# Patient Record
Sex: FEMALE | Race: WHITE | NOT HISPANIC OR LATINO | Employment: OTHER | URBAN - METROPOLITAN AREA
[De-identification: names, ages, dates, MRNs, and addresses within clinical notes are randomized per-mention and may not be internally consistent; named-entity substitution may affect disease eponyms.]

---

## 2017-04-10 RX ORDER — LOSARTAN POTASSIUM AND HYDROCHLOROTHIAZIDE 12.5; 1 MG/1; MG/1
TABLET ORAL
Qty: 90 TABLET | Refills: 1 | Status: SHIPPED | OUTPATIENT
Start: 2017-04-10 | End: 2017-09-27 | Stop reason: SDUPTHER

## 2017-04-13 ENCOUNTER — TELEPHONE (OUTPATIENT)
Dept: INTERNAL MEDICINE | Age: 67
End: 2017-04-13

## 2017-04-13 NOTE — TELEPHONE ENCOUNTER
Called patient 4-13-17 @ 10:44 to reschedule her appointment due to her having to fast. Needs an earlier appointment

## 2017-04-18 ENCOUNTER — OFFICE VISIT (OUTPATIENT)
Dept: INTERNAL MEDICINE | Age: 67
End: 2017-04-18

## 2017-04-18 VITALS
SYSTOLIC BLOOD PRESSURE: 136 MMHG | WEIGHT: 151 LBS | RESPIRATION RATE: 12 BRPM | HEART RATE: 72 BPM | HEIGHT: 64 IN | BODY MASS INDEX: 25.78 KG/M2 | DIASTOLIC BLOOD PRESSURE: 76 MMHG

## 2017-04-18 DIAGNOSIS — G25.0 BENIGN HEAD TREMOR: ICD-10-CM

## 2017-04-18 DIAGNOSIS — I10 BENIGN ESSENTIAL HTN: Primary | ICD-10-CM

## 2017-04-18 DIAGNOSIS — E78.5 HYPERLIPIDEMIA, UNSPECIFIED HYPERLIPIDEMIA TYPE: ICD-10-CM

## 2017-04-18 DIAGNOSIS — Z72.0 TOBACCO ABUSE: ICD-10-CM

## 2017-04-18 DIAGNOSIS — L98.9 SKIN LESIONS: ICD-10-CM

## 2017-04-18 DIAGNOSIS — R73.9 HYPERGLYCEMIA: ICD-10-CM

## 2017-04-18 LAB
ALBUMIN SERPL-MCNC: 4.8 G/DL (ref 3.5–5.2)
ALBUMIN/GLOB SERPL: 1.8 G/DL
ALP SERPL-CCNC: 107 U/L (ref 39–117)
ALT SERPL-CCNC: 18 U/L (ref 1–33)
AST SERPL-CCNC: 18 U/L (ref 1–32)
BILIRUB SERPL-MCNC: 0.6 MG/DL (ref 0.1–1.2)
BUN SERPL-MCNC: 8 MG/DL (ref 8–23)
BUN/CREAT SERPL: 11.6 (ref 7–25)
CALCIUM SERPL-MCNC: 10.2 MG/DL (ref 8.6–10.5)
CHLORIDE SERPL-SCNC: 100 MMOL/L (ref 98–107)
CHOLEST SERPL-MCNC: 218 MG/DL (ref 0–200)
CO2 SERPL-SCNC: 26.5 MMOL/L (ref 22–29)
CREAT SERPL-MCNC: 0.69 MG/DL (ref 0.57–1)
GLOBULIN SER CALC-MCNC: 2.6 GM/DL
GLUCOSE SERPL-MCNC: 131 MG/DL (ref 65–99)
HBA1C MFR BLD: 5.41 % (ref 4.8–5.6)
HDLC SERPL-MCNC: 102 MG/DL (ref 40–60)
LDLC SERPL CALC-MCNC: 106 MG/DL (ref 0–100)
POTASSIUM SERPL-SCNC: 4.2 MMOL/L (ref 3.5–5.2)
PROT SERPL-MCNC: 7.4 G/DL (ref 6–8.5)
SODIUM SERPL-SCNC: 140 MMOL/L (ref 136–145)
TRIGL SERPL-MCNC: 49 MG/DL (ref 0–150)
VLDLC SERPL CALC-MCNC: 9.8 MG/DL (ref 5–40)

## 2017-04-18 PROCEDURE — 99214 OFFICE O/P EST MOD 30 MIN: CPT | Performed by: INTERNAL MEDICINE

## 2017-04-18 RX ORDER — DICLOFENAC SODIUM 75 MG/1
75 TABLET, DELAYED RELEASE ORAL DAILY PRN
COMMUNITY
Start: 2017-04-17 | End: 2018-06-19 | Stop reason: SDUPTHER

## 2017-04-18 NOTE — PROGRESS NOTES
"  Katherine Williamson is a 66 y.o. female who presents with   Chief Complaint   Patient presents with   • Hypertension     Checkup; lab updates   • Hyperlipidemia     As above   • Blood Sugar Problem     As above.  Blood sugar/A1c on lab testing 6 months ago-normal.  Currently on no medications for blood sugar.   • Skin lesions     Unrelated reason for today's visit: Skin lesions-left neck; right temple.   • Nicotine Dependence     Unrelated reason for today's visit: Cigarette smoker-one pack daily for the past 43 years.  Low dose CT scanning ordered in the past but she did not pursue it.  We will reorder it today and she is agreeable \"if insurance will cover it\".   • Head tremor     Unrelated reason for today's visit: Head tremor.  No other tremors.   .    Hypertension   This is a chronic problem. The current episode started more than 1 year ago. The problem is controlled. Past treatments include diuretics and angiotensin blockers. The current treatment provides moderate improvement. There are no compliance problems.    Hyperlipidemia   This is a chronic problem. The current episode started more than 1 year ago. The problem is controlled. Exacerbating diseases include diabetes. She is currently on no antihyperlipidemic treatment.   Blood Sugar Problem   This is a chronic problem. The current episode started more than 1 year ago. The problem has been unchanged. She has tried nothing for the symptoms.   Nicotine Dependence   Presents for follow-up visit. Her urge triggers include company of smokers. She smokes 1 pack of cigarettes per day.      Head tremor: History is that the patient's  notices the head tremor but the patient herself has not had a problem with it, does not know what is happening and does not consider it to be a problem at this point.  She has no other tremors she says.    Skin lesions: Seborrheic-like keratoses areas-left neck; right temple.  She would like dermatology referral for removal.    The " following portions of the patient's history were reviewed and updated as appropriate: allergies, current medications, past medical history and problem list.    Review of Systems   Constitutional: Negative.    HENT: Negative.    Eyes: Negative.    Respiratory: Negative.    Cardiovascular: Negative.    Genitourinary: Negative.    Musculoskeletal: Negative.    Skin: Negative.    Neurological: Positive for tremors.        Head tremor.  Patient says it is not a problem for her but her  has noticed it.   Psychiatric/Behavioral: Negative.        Objective   Physical Exam   Constitutional: She is oriented to person, place, and time. She appears well-developed and well-nourished. No distress.   HENT:   Head: Normocephalic and atraumatic.   Eyes: Conjunctivae and EOM are normal. Pupils are equal, round, and reactive to light.   Neck: Normal range of motion. Neck supple. No thyromegaly present.   Neck exam negative.  Carotid auscultation normal-no bruits heard.   Cardiovascular: Normal rate, regular rhythm, normal heart sounds and intact distal pulses.  Exam reveals no gallop and no friction rub.    No murmur heard.  Pulmonary/Chest: Effort normal and breath sounds normal. No respiratory distress. She has no wheezes. She has no rales. She exhibits no tenderness.   Musculoskeletal:   No head tremors or any other tremors noticed on today's visit.  Patient declines neurology referral stating that the issue is not enough of a problem for her at this point in time.   Neurological: She is alert and oriented to person, place, and time. She displays normal reflexes. No cranial nerve deficit. She exhibits normal muscle tone. Coordination normal.   History of head tremor as noted but I do not see any tremors on today's visit in her head or hands or any other extremity.  She does not consider the issue a problem to the extent that she would like a workup at this point and declines it.   Skin:   There are 2 areas of seborrheic-like  "keratoses, one at the left neck measuring approximately 7 mm; the other is at the right temple measuring approximately 8 mm.  The left neck lesion is raised.  The right temporal area is flat.  Neither appear to be suspicious for skin cancer.   Psychiatric: She has a normal mood and affect. Her behavior is normal. Judgment and thought content normal.   Nursing note and vitals reviewed.      Assessment/Plan   Katherine was seen today for hypertension, hyperlipidemia, blood sugar problem, skin lesions, nicotine dependence and head tremor.    Diagnoses and all orders for this visit:    Benign essential HTN  -     Comprehensive Metabolic Panel    Hyperlipidemia, unspecified hyperlipidemia type  -     Comprehensive Metabolic Panel  -     Lipid Panel    Hyperglycemia  -     Comprehensive Metabolic Panel  -     Hemoglobin A1c    Skin lesions  -     Ambulatory Referral to Dermatology    Tobacco abuse  -     CT Chest Low Dose Wo; Future    Benign head tremor      Plan: Labs as above for the issues as outlined.  We will make a dermatology referral for her skin lesions per her request.  She is agreeable to getting a low dose CT scan of the lungs because of her long history of cigarette smoking of at least one pack daily for the past 43 years.  She is only agreeable however \"if insurance will cover it\".    Her history of head tremor is not such that she wants to get into a workup at this time nor does she express any interest in a neurology referral at this time.    She will continue all current medications as prescribed.  We will see her in 6 months for a general checkup/lab update visit then.         "

## 2017-09-27 RX ORDER — LOSARTAN POTASSIUM AND HYDROCHLOROTHIAZIDE 12.5; 1 MG/1; MG/1
1 TABLET ORAL DAILY
Qty: 90 TABLET | Refills: 0 | Status: SHIPPED | OUTPATIENT
Start: 2017-09-27 | End: 2018-01-05 | Stop reason: SDUPTHER

## 2017-10-12 RX ORDER — LOSARTAN POTASSIUM AND HYDROCHLOROTHIAZIDE 12.5; 1 MG/1; MG/1
TABLET ORAL
Qty: 90 TABLET | Refills: 1 | Status: SHIPPED | OUTPATIENT
Start: 2017-10-12 | End: 2017-10-18 | Stop reason: SDUPTHER

## 2017-10-16 ENCOUNTER — TELEPHONE (OUTPATIENT)
Dept: INTERNAL MEDICINE | Age: 67
End: 2017-10-16

## 2017-10-16 NOTE — TELEPHONE ENCOUNTER
Pt called and said that she has an apt for this Wed and she is suppose to come fasting for labs, but she states that she has to go to [facility] in order for her Ins to pay for it.    Pt said is there a form that has to be sent over for them to know what type of labs to draw.    Pt said please call her at [phone]

## 2017-10-18 ENCOUNTER — OFFICE VISIT (OUTPATIENT)
Dept: INTERNAL MEDICINE | Age: 67
End: 2017-10-18

## 2017-10-18 VITALS
DIASTOLIC BLOOD PRESSURE: 70 MMHG | HEART RATE: 70 BPM | WEIGHT: 150.2 LBS | HEIGHT: 64 IN | BODY MASS INDEX: 25.64 KG/M2 | TEMPERATURE: 98.2 F | RESPIRATION RATE: 12 BRPM | SYSTOLIC BLOOD PRESSURE: 110 MMHG

## 2017-10-18 DIAGNOSIS — I10 BENIGN ESSENTIAL HTN: Primary | ICD-10-CM

## 2017-10-18 DIAGNOSIS — R73.9 HYPERGLYCEMIA: ICD-10-CM

## 2017-10-18 DIAGNOSIS — E78.5 HYPERLIPIDEMIA, UNSPECIFIED HYPERLIPIDEMIA TYPE: ICD-10-CM

## 2017-10-18 PROCEDURE — 99214 OFFICE O/P EST MOD 30 MIN: CPT | Performed by: INTERNAL MEDICINE

## 2017-10-18 NOTE — PROGRESS NOTES
"  Katherine Williamson is a 66 y.o. female who presents with   Chief Complaint   Patient presents with   • Hypertension     Checkup; lab updates   • Hyperlipidemia     As above.  Currently on no lipid lowering therapy.   • Blood Sugar Problem     Blood sugar 131; A1c normal-April 2017.  Patient admits to consuming \"3-4 beers every day\"   .    Hypertension   This is a chronic problem. The current episode started more than 1 year ago. The problem is controlled. Past treatments include diuretics and angiotensin blockers. The current treatment provides moderate improvement. There are no compliance problems.    Hyperlipidemia   This is a chronic problem. The current episode started more than 1 year ago. The problem is controlled. Recent lipid tests were reviewed and are normal. Exacerbating diseases include diabetes. She is currently on no antihyperlipidemic treatment.   Blood Sugar Problem   This is a chronic problem. The current episode started more than 1 month ago. The problem has been waxing and waning. The symptoms are aggravated by eating (Also by beer consumption as per history). She has tried nothing for the symptoms.        The following portions of the patient's history were reviewed and updated as appropriate: allergies, current medications, past medical history and problem list.    Review of Systems   Constitutional: Negative.    HENT: Negative.    Eyes: Negative.    Respiratory: Negative.    Cardiovascular: Negative.    Genitourinary: Negative.    Musculoskeletal: Negative.    Skin: Negative.    Neurological: Negative.    Psychiatric/Behavioral: Negative.        Objective   Physical Exam   Constitutional: She is oriented to person, place, and time. She appears well-developed and well-nourished. No distress.   HENT:   Head: Normocephalic and atraumatic.   Eyes: Conjunctivae and EOM are normal. Pupils are equal, round, and reactive to light.   Neck: Normal range of motion. Neck supple. No thyromegaly present.   Neck " exam negative.  Carotid auscultation normal-no bruits heard.   Cardiovascular: Normal rate, regular rhythm, normal heart sounds and intact distal pulses.  Exam reveals no gallop and no friction rub.    No murmur heard.  Pulmonary/Chest: Effort normal and breath sounds normal. No respiratory distress. She has no wheezes. She has no rales. She exhibits no tenderness.   Neurological: She is alert and oriented to person, place, and time.   Psychiatric: She has a normal mood and affect. Her behavior is normal. Judgment and thought content normal.   Nursing note and vitals reviewed.      Assessment/Plan   Katherine was seen today for hypertension, hyperlipidemia and blood sugar problem.    Diagnoses and all orders for this visit:    Benign essential HTN  -     Comprehensive Metabolic Panel    Hyperlipidemia, unspecified hyperlipidemia type  -     Comprehensive Metabolic Panel  -     Lipid Panel    Hyperglycemia  -     Comprehensive Metabolic Panel  -     Hemoglobin A1c      Plan: Labs as above.Today's exam unremarkable for any new problems or events.  Continue all current treatment as prescribed.  A follow-up checkup/lab update visit is advised for 6 months.  It is suspected that her hyperglycemia is more dietary induced than diabetic induced given the fact that her A1c is normal.    She also says that she will need to go to [facility]  To get her blood tests done to get it covered through her insurance.    The patient currently is a smoker.  Smoking cessation has been discussed and also a low-dose CT scan of the lung has been advised and was actually ordered in April but the record shows that it was not done.  At this point the patient declines the test fully aware of the fact that the test is a screening test for lung cancer and that it is advised by radiology to be done in smokers of the age of the patient.  The patient is aware that declining low-dose CT scan means that lung cancer if present may not be detected and may  progress to the point of being metastatic and eventually terminal.  The patient is willing to assume those risks based on the current decision made.

## 2018-01-05 RX ORDER — LOSARTAN POTASSIUM AND HYDROCHLOROTHIAZIDE 12.5; 1 MG/1; MG/1
TABLET ORAL
Qty: 90 TABLET | Refills: 0 | Status: SHIPPED | OUTPATIENT
Start: 2018-01-05 | End: 2018-04-11 | Stop reason: SDUPTHER

## 2018-04-11 RX ORDER — LOSARTAN POTASSIUM AND HYDROCHLOROTHIAZIDE 12.5; 1 MG/1; MG/1
TABLET ORAL
Qty: 90 TABLET | Refills: 0 | Status: SHIPPED | OUTPATIENT
Start: 2018-04-11 | End: 2018-07-10 | Stop reason: SDUPTHER

## 2018-04-18 ENCOUNTER — OFFICE VISIT (OUTPATIENT)
Dept: INTERNAL MEDICINE | Age: 68
End: 2018-04-18

## 2018-04-18 VITALS
HEIGHT: 64 IN | TEMPERATURE: 98.2 F | SYSTOLIC BLOOD PRESSURE: 120 MMHG | DIASTOLIC BLOOD PRESSURE: 66 MMHG | BODY MASS INDEX: 25.78 KG/M2 | RESPIRATION RATE: 12 BRPM | WEIGHT: 151 LBS | OXYGEN SATURATION: 98 % | HEART RATE: 81 BPM

## 2018-04-18 DIAGNOSIS — R73.9 HYPERGLYCEMIA: ICD-10-CM

## 2018-04-18 DIAGNOSIS — Z72.0 TOBACCO ABUSE: ICD-10-CM

## 2018-04-18 DIAGNOSIS — Z00.00 HEALTHCARE MAINTENANCE: ICD-10-CM

## 2018-04-18 DIAGNOSIS — E78.5 HYPERLIPIDEMIA, UNSPECIFIED HYPERLIPIDEMIA TYPE: ICD-10-CM

## 2018-04-18 DIAGNOSIS — I10 BENIGN ESSENTIAL HTN: Primary | ICD-10-CM

## 2018-04-18 LAB
ALBUMIN SERPL-MCNC: 4.2 G/DL (ref 3.5–5.2)
ALBUMIN/GLOB SERPL: 1.4 G/DL
ALP SERPL-CCNC: 104 U/L (ref 39–117)
ALT SERPL-CCNC: 19 U/L (ref 1–33)
AST SERPL-CCNC: 19 U/L (ref 1–32)
BILIRUB SERPL-MCNC: 0.8 MG/DL (ref 0.1–1.2)
BUN SERPL-MCNC: 10 MG/DL (ref 8–23)
BUN/CREAT SERPL: 13.2 (ref 7–25)
CALCIUM SERPL-MCNC: 9.9 MG/DL (ref 8.6–10.5)
CHLORIDE SERPL-SCNC: 100 MMOL/L (ref 98–107)
CHOLEST SERPL-MCNC: 205 MG/DL (ref 0–200)
CO2 SERPL-SCNC: 24.8 MMOL/L (ref 22–29)
CREAT SERPL-MCNC: 0.76 MG/DL (ref 0.57–1)
GFR SERPLBLD CREATININE-BSD FMLA CKD-EPI: 76 ML/MIN/1.73
GFR SERPLBLD CREATININE-BSD FMLA CKD-EPI: 93 ML/MIN/1.73
GLOBULIN SER CALC-MCNC: 3 GM/DL
GLUCOSE SERPL-MCNC: 98 MG/DL (ref 65–99)
HBA1C MFR BLD: 5.59 % (ref 4.8–5.6)
HDLC SERPL-MCNC: 82 MG/DL (ref 40–60)
LDLC SERPL CALC-MCNC: 111 MG/DL (ref 0–100)
POTASSIUM SERPL-SCNC: 4.3 MMOL/L (ref 3.5–5.2)
PROT SERPL-MCNC: 7.2 G/DL (ref 6–8.5)
SODIUM SERPL-SCNC: 140 MMOL/L (ref 136–145)
T4 FREE SERPL-MCNC: 1.27 NG/DL (ref 0.93–1.7)
TRIGL SERPL-MCNC: 59 MG/DL (ref 0–150)
TSH SERPL DL<=0.005 MIU/L-ACNC: 1.16 MIU/ML (ref 0.27–4.2)
VLDLC SERPL CALC-MCNC: 11.8 MG/DL (ref 5–40)

## 2018-04-18 PROCEDURE — 99214 OFFICE O/P EST MOD 30 MIN: CPT | Performed by: INTERNAL MEDICINE

## 2018-04-18 NOTE — PROGRESS NOTES
Katherine Williamson is a 67 y.o. female who presents with   Chief Complaint   Patient presents with   • Hypertension   • Hyperlipidemia   • Blood Sugar Problem   • Needs mammogram updated   • Needs low dose CT scanning of the lungs-cigarette smoker-one   .    67-year-old female presents for her six-month checkup/lab update visit.  Labs were ordered through [facility] in October but I see no reports from that time in her chart.  Also she continues to smoke one pack of cigarettes or less daily.  A low-dose CT scan of the lungs was ordered last April but she was less than 67 years of age then and the record shows that it was not done.      Hypertension   This is a chronic problem. The current episode started more than 1 year ago. Past treatments include angiotensin blockers and diuretics. Current antihypertension treatment includes angiotensin blockers and diuretics. The current treatment provides moderate improvement. There are no compliance problems.    Hyperlipidemia   This is a chronic problem. The current episode started more than 1 year ago. The problem is controlled. Exacerbating diseases include diabetes. She is currently on no antihyperlipidemic treatment.   Blood Sugar Problem   This is a chronic problem. The problem has been waxing and waning. She has tried nothing for the symptoms.        The following portions of the patient's history were reviewed and updated as appropriate: allergies, current medications, past medical history and problem list.    Review of Systems   Constitutional: Negative.    HENT: Negative.    Eyes: Negative.    Respiratory: Negative.    Cardiovascular: Negative.    Genitourinary: Negative.    Musculoskeletal: Negative.    Skin: Negative.    Neurological: Negative.    Psychiatric/Behavioral: Negative.        Objective   Physical Exam   Constitutional: She is oriented to person, place, and time. She appears well-developed and well-nourished. No distress.   HENT:   Head:  Normocephalic and atraumatic.   Eyes: Conjunctivae and EOM are normal. Pupils are equal, round, and reactive to light.   Neck: Normal range of motion. Neck supple. No thyromegaly present.   Neck exam negative.  Carotid auscultation normal-no bruits heard.   Cardiovascular: Normal rate, regular rhythm, normal heart sounds and intact distal pulses.  Exam reveals no gallop and no friction rub.    No murmur heard.  Pulmonary/Chest: Effort normal and breath sounds normal. No respiratory distress. She has no wheezes. She has no rales. She exhibits no tenderness.   Neurological: She is alert and oriented to person, place, and time.   Psychiatric: She has a normal mood and affect. Her behavior is normal. Judgment and thought content normal.   Nursing note and vitals reviewed.      Assessment/Plan   Katherine was seen today for hypertension, hyperlipidemia, blood sugar problem, needs mammogram updated and needs low dose ct scanning of the lungs-cigarette smoker-one.    Diagnoses and all orders for this visit:    Benign essential HTN  -     Comprehensive Metabolic Panel  -     TSH+Free T4    Hyperlipidemia, unspecified hyperlipidemia type  -     Comprehensive Metabolic Panel  -     Lipid Panel  -     TSH+Free T4    Hyperglycemia  -     Comprehensive Metabolic Panel  -     Hemoglobin A1c  -     TSH+Free T4    Tobacco abuse  -     CT Chest Low Dose Wo; Future    Healthcare maintenance  -     Mammo Screening Bilateral With CAD; Future      Plan: Labs as above.Today's exam unremarkable for any new problems or events.  Continue all current treatment as prescribed.  A follow-up checkup/lab update visit is advised for 6 months assuming today's labs are all acceptable.

## 2018-04-24 ENCOUNTER — APPOINTMENT (OUTPATIENT)
Dept: MAMMOGRAPHY | Facility: HOSPITAL | Age: 68
End: 2018-04-24

## 2018-05-09 ENCOUNTER — APPOINTMENT (OUTPATIENT)
Dept: MAMMOGRAPHY | Facility: HOSPITAL | Age: 68
End: 2018-05-09

## 2018-05-29 ENCOUNTER — HOSPITAL ENCOUNTER (OUTPATIENT)
Dept: MAMMOGRAPHY | Facility: HOSPITAL | Age: 68
Discharge: HOME | End: 2018-05-29
Admitting: INTERNAL MEDICINE

## 2018-05-29 DIAGNOSIS — Z00.00 HEALTHCARE MAINTENANCE: ICD-10-CM

## 2018-05-29 PROCEDURE — 77067 SCR MAMMO BI INCL CAD: CPT

## 2018-06-19 ENCOUNTER — TELEPHONE (OUTPATIENT)
Dept: INTERNAL MEDICINE | Age: 68
End: 2018-06-19

## 2018-06-19 RX ORDER — DICLOFENAC SODIUM 75 MG/1
75 TABLET, DELAYED RELEASE ORAL 2 TIMES DAILY WITH MEALS
Qty: 60 TABLET | Refills: 5 | Status: SHIPPED | OUTPATIENT
Start: 2018-06-19 | End: 2019-04-18

## 2018-06-19 NOTE — TELEPHONE ENCOUNTER
I do not recall how she is taking it however the usual dose is diclofenac sodium, 75 mg twice a day.  Take with food.  Go ahead and send her in this medication-number [id number] refills

## 2018-06-19 NOTE — TELEPHONE ENCOUNTER
Pt called stating at her last visit she asked Dr Swanson if he wouldn't mind prescribing Diclofenac Sodium 75 mg (last filled by pt's Ortho) and Dr Swanson said he would.  Nothing in pt's chart showing this was done.  [facility] pharmacy # [phone]  Pt's # [phone]  Thanks SP

## 2018-07-10 RX ORDER — LOSARTAN POTASSIUM AND HYDROCHLOROTHIAZIDE 12.5; 1 MG/1; MG/1
TABLET ORAL
Qty: 90 TABLET | Refills: 0 | Status: SHIPPED | OUTPATIENT
Start: 2018-07-10 | End: 2018-10-07 | Stop reason: SDUPTHER

## 2018-10-08 RX ORDER — LOSARTAN POTASSIUM AND HYDROCHLOROTHIAZIDE 12.5; 1 MG/1; MG/1
TABLET ORAL
Qty: 90 TABLET | Refills: 0 | Status: SHIPPED | OUTPATIENT
Start: 2018-10-08 | End: 2019-01-02 | Stop reason: SDUPTHER

## 2018-10-18 ENCOUNTER — OFFICE VISIT (OUTPATIENT)
Dept: INTERNAL MEDICINE | Age: 68
End: 2018-10-18

## 2018-10-18 VITALS
BODY MASS INDEX: 24.59 KG/M2 | DIASTOLIC BLOOD PRESSURE: 60 MMHG | OXYGEN SATURATION: 97 % | HEIGHT: 64 IN | RESPIRATION RATE: 13 BRPM | HEART RATE: 84 BPM | TEMPERATURE: 97.3 F | SYSTOLIC BLOOD PRESSURE: 118 MMHG | WEIGHT: 144 LBS

## 2018-10-18 DIAGNOSIS — E78.5 HYPERLIPIDEMIA, UNSPECIFIED HYPERLIPIDEMIA TYPE: ICD-10-CM

## 2018-10-18 DIAGNOSIS — Z23 NEED FOR INFLUENZA VACCINATION: ICD-10-CM

## 2018-10-18 DIAGNOSIS — I10 BENIGN ESSENTIAL HTN: Primary | ICD-10-CM

## 2018-10-18 PROCEDURE — 90662 IIV NO PRSV INCREASED AG IM: CPT | Performed by: INTERNAL MEDICINE

## 2018-10-18 PROCEDURE — 90471 IMMUNIZATION ADMIN: CPT | Performed by: INTERNAL MEDICINE

## 2018-10-18 PROCEDURE — 99214 OFFICE O/P EST MOD 30 MIN: CPT | Performed by: INTERNAL MEDICINE

## 2018-10-18 RX ORDER — CYANOCOBALAMIN (VITAMIN B-12) 500 MCG
TABLET ORAL
COMMUNITY

## 2018-10-18 NOTE — PROGRESS NOTES
Katherine Williamson is a 67 y.o. female who presents with   Chief Complaint   Patient presents with   • Hypertension   • Hyperlipidemia   • Flu Vaccine   .    67-year-old female.  Six-month checkup/lab update visit.  Flu shot also given today.  No complaints on today's visit.      Hypertension   This is a chronic problem. The current episode started more than 1 year ago. The problem is controlled. Current antihypertension treatment includes diuretics and angiotensin blockers. The current treatment provides moderate improvement. There are no compliance problems.    Hyperlipidemia   This is a chronic problem. The current episode started more than 1 year ago. The problem is controlled. Recent lipid tests were reviewed and are high. Current antihyperlipidemic treatment includes diet change. The current treatment provides moderate improvement of lipids. There are no compliance problems.         The following portions of the patient's history were reviewed and updated as appropriate: allergies, current medications, past medical history and problem list.    Review of Systems   Constitutional: Negative.    HENT: Negative.    Eyes: Negative.    Respiratory: Negative.    Cardiovascular: Negative.    Genitourinary: Negative.    Musculoskeletal: Negative.    Skin: Negative.    Neurological: Negative.    Psychiatric/Behavioral: Negative.        Objective   Physical Exam   Constitutional: She is oriented to person, place, and time. She appears well-developed and well-nourished. No distress.   HENT:   Head: Normocephalic and atraumatic.   Eyes: Pupils are equal, round, and reactive to light. Conjunctivae and EOM are normal.   Neck: Normal range of motion. Neck supple. No thyromegaly present.   Neck exam negative.  Carotid auscultation normal-no bruits heard.   Cardiovascular: Normal rate, regular rhythm, normal heart sounds and intact distal pulses.  Exam reveals no gallop and no friction rub.    No murmur heard.  Pulmonary/Chest:  Effort normal and breath sounds normal. No respiratory distress. She has no wheezes. She has no rales. She exhibits no tenderness.   Neurological: She is alert and oriented to person, place, and time.   Psychiatric: She has a normal mood and affect. Her behavior is normal. Judgment and thought content normal.   Nursing note and vitals reviewed.      Assessment/Plan   Katherine was seen today for hypertension, hyperlipidemia and flu vaccine.    Diagnoses and all orders for this visit:    Benign essential HTN  -     Comprehensive Metabolic Panel    Need for influenza vaccination  -     Flu Vaccine High Dose PF 65YR+ ([id number])    Hyperlipidemia, unspecified hyperlipidemia type  -     Comprehensive Metabolic Panel  -     Lipid Panel        Plan: Labs as above for the issues as outlined.  Her hyperlipidemia was reviewed with her showing persisting elevations of cholesterol and LDL but no treatment has been advised because of her HDLs being in the  range.    Blood pressure under good control.  Continue the same.  Her weight continues to be managed very well by her with consistent weights in the 150-155 range.    She continues to smoke cigarettes.  On 3 separate occasions we have ordered a low-dose CT scan of the lungs and on all 3 of those occasions she never did follow through to get the procedure performed.  She does not seem interested in doing that at this time.  She is willing to assume the risks of her decision.    Assuming today's labs are acceptable, we will see her in 6 months for her checkup/lab update visit sometime in April.

## 2019-01-02 RX ORDER — LOSARTAN POTASSIUM AND HYDROCHLOROTHIAZIDE 12.5; 1 MG/1; MG/1
1 TABLET ORAL DAILY
Qty: 90 TABLET | Refills: 0 | Status: SHIPPED | OUTPATIENT
Start: 2019-01-02 | End: 2019-04-03 | Stop reason: SDUPTHER

## 2019-04-03 RX ORDER — LOSARTAN POTASSIUM AND HYDROCHLOROTHIAZIDE 12.5; 1 MG/1; MG/1
TABLET ORAL
Qty: 90 TABLET | Refills: 0 | Status: SHIPPED | OUTPATIENT
Start: 2019-04-03 | End: 2019-07-01 | Stop reason: SDUPTHER

## 2019-04-18 ENCOUNTER — OFFICE VISIT (OUTPATIENT)
Dept: INTERNAL MEDICINE | Age: 69
End: 2019-04-18

## 2019-04-18 VITALS
BODY MASS INDEX: 24.28 KG/M2 | DIASTOLIC BLOOD PRESSURE: 68 MMHG | SYSTOLIC BLOOD PRESSURE: 122 MMHG | OXYGEN SATURATION: 98 % | TEMPERATURE: 98.3 F | RESPIRATION RATE: 13 BRPM | HEART RATE: 98 BPM | WEIGHT: 142.2 LBS | HEIGHT: 64 IN

## 2019-04-18 DIAGNOSIS — E78.5 HYPERLIPIDEMIA, UNSPECIFIED HYPERLIPIDEMIA TYPE: ICD-10-CM

## 2019-04-18 DIAGNOSIS — I10 BENIGN ESSENTIAL HTN: Primary | ICD-10-CM

## 2019-04-18 DIAGNOSIS — R25.2 MUSCLE CRAMPS: ICD-10-CM

## 2019-04-18 DIAGNOSIS — R73.9 HYPERGLYCEMIA: ICD-10-CM

## 2019-04-18 PROCEDURE — 99214 OFFICE O/P EST MOD 30 MIN: CPT | Performed by: INTERNAL MEDICINE

## 2019-04-18 NOTE — PROGRESS NOTES
Katherine Williamson is a 68 y.o. female who presents with   Chief Complaint   Patient presents with   • Hypertension   • Hyperlipidemia   • Blood Sugar Problem   • Hands cramping     Off and on-3 months   .    68-year-old female.  6-month checkup/lab update visit.  Main complaint on today's visit is off and on hand cramps for the past 3 months.      Hypertension   This is a chronic problem. The current episode started more than 1 year ago. The problem is controlled. Current antihypertension treatment includes diuretics and angiotensin blockers. The current treatment provides moderate improvement. There are no compliance problems.    Hyperlipidemia   This is a chronic problem. The current episode started more than 1 year ago. The problem is controlled. Exacerbating diseases include diabetes. Current antihyperlipidemic treatment includes diet change.   Blood Sugar Problem   This is a chronic problem. The current episode started more than 1 year ago. The problem has been waxing and waning. She has tried nothing for the symptoms.        The following portions of the patient's history were reviewed and updated as appropriate: allergies, current medications, past medical history and problem list.    Review of Systems   Constitutional: Negative.    HENT: Negative.    Eyes: Negative.    Respiratory: Negative.    Cardiovascular: Negative.    Genitourinary: Negative.    Musculoskeletal: Negative.    Skin: Negative.    Neurological: Negative.    Psychiatric/Behavioral: Negative.        Objective   Physical Exam   Constitutional: She is oriented to person, place, and time. She appears well-developed and well-nourished. No distress.   HENT:   Head: Normocephalic and atraumatic.   Eyes: Conjunctivae and EOM are normal. Pupils are equal, round, and reactive to light.   Neck: Normal range of motion. Neck supple. No thyromegaly present.   Neck exam negative.  Carotid auscultation normal-no bruits heard.   Cardiovascular: Normal rate,  regular rhythm, normal heart sounds and intact distal pulses. Exam reveals no gallop and no friction rub.   No murmur heard.  Pulmonary/Chest: Effort normal and breath sounds normal. No respiratory distress. She has no wheezes. She has no rales. She exhibits no tenderness.   Neurological: She is alert and oriented to person, place, and time.   Psychiatric: She has a normal mood and affect. Her behavior is normal. Judgment and thought content normal.   Nursing note and vitals reviewed.      Assessment/Plan   Katherine was seen today for hypertension, hyperlipidemia, blood sugar problem and hands cramping.    Diagnoses and all orders for this visit:    Benign essential HTN  -     Comprehensive Metabolic Panel; Future    Hyperlipidemia, unspecified hyperlipidemia type  -     Comprehensive Metabolic Panel; Future  -     Lipid Panel; Future    Hyperglycemia  -     Comprehensive Metabolic Panel; Future  -     Hemoglobin A1c; Future    Muscle cramps  -     CK; Future  -     Magnesium; Future      Plan: Labs as above for the issues as outlined.  Patient advised to try using Gatorade as a liquid supplement especially during the warmer months when she is outdoors a lot which she likes to do.    Assuming today's labs are acceptable we will tentatively plan on seeing her for a 6-month checkup/lab update visit sometime in October.

## 2019-04-23 ENCOUNTER — RESULTS ENCOUNTER (OUTPATIENT)
Dept: INTERNAL MEDICINE | Age: 69
End: 2019-04-23

## 2019-04-23 DIAGNOSIS — R25.2 MUSCLE CRAMPS: ICD-10-CM

## 2019-04-23 DIAGNOSIS — E78.5 HYPERLIPIDEMIA, UNSPECIFIED HYPERLIPIDEMIA TYPE: ICD-10-CM

## 2019-04-23 DIAGNOSIS — I10 BENIGN ESSENTIAL HTN: ICD-10-CM

## 2019-04-23 DIAGNOSIS — R73.9 HYPERGLYCEMIA: ICD-10-CM

## 2019-05-10 ENCOUNTER — HOSPITAL ENCOUNTER (EMERGENCY)
Facility: HOSPITAL | Age: 69
Discharge: HOME | End: 2019-05-10
Attending: EMERGENCY MEDICINE | Admitting: EMERGENCY MEDICINE

## 2019-05-10 VITALS
HEIGHT: 63 IN | HEART RATE: 77 BPM | WEIGHT: 145.5 LBS | SYSTOLIC BLOOD PRESSURE: 158 MMHG | RESPIRATION RATE: 16 BRPM | DIASTOLIC BLOOD PRESSURE: 94 MMHG | OXYGEN SATURATION: 97 % | TEMPERATURE: 98.4 F | BODY MASS INDEX: 25.78 KG/M2

## 2019-05-10 DIAGNOSIS — S05.01XA ABRASION OF RIGHT CORNEA, INITIAL ENCOUNTER: Primary | ICD-10-CM

## 2019-05-10 PROCEDURE — 99283 EMERGENCY DEPT VISIT LOW MDM: CPT

## 2019-05-10 RX ORDER — TETRACAINE HYDROCHLORIDE 5 MG/ML
2 SOLUTION OPHTHALMIC ONCE
Status: COMPLETED | OUTPATIENT
Start: 2019-05-10 | End: 2019-05-10

## 2019-05-10 RX ORDER — NAPROXEN 500 MG/1
500 TABLET ORAL 2 TIMES DAILY PRN
COMMUNITY
End: 2020-09-03 | Stop reason: SDUPTHER

## 2019-05-10 RX ORDER — HYDROCODONE BITARTRATE AND ACETAMINOPHEN 5; 325 MG/1; MG/1
1 TABLET ORAL 4 TIMES DAILY PRN
Qty: 16 TABLET | Refills: 0 | Status: SHIPPED | OUTPATIENT
Start: 2019-05-10 | End: 2020-04-30

## 2019-05-10 RX ORDER — ERYTHROMYCIN 5 MG/G
OINTMENT OPHTHALMIC
Qty: 1 G | Refills: 0 | Status: SHIPPED | OUTPATIENT
Start: 2019-05-10 | End: 2019-10-24

## 2019-05-10 RX ADMIN — TETRACAINE HYDROCHLORIDE 2 DROP: 5 SOLUTION OPHTHALMIC at 04:14

## 2019-05-10 RX ADMIN — FLUORESCEIN SODIUM 1 STRIP: 1 STRIP OPHTHALMIC at 04:15

## 2019-05-10 NOTE — ED PROVIDER NOTES
EMERGENCY DEPARTMENT ENCOUNTER    CHIEF COMPLAINT  Chief Complaint: Eye pain  History given by: patient   History limited by: n/a   Room Number: 16/16  PMD: Ronnie Swanson MD      HPI:  Pt is a 68 y.o. female who presents complaining of right eye pain that woke her from sleep at 02:30. Pt also complains of increased tearing and redness. She denies any vision changes or visual field loss. Pt states that she was recently dx with cataracts, and is scheduled to see Chrissy. Pt states that she was at her baseline prior to going to bed.     Duration:  2 hours   Timing: constant   Location: right eye   Radiation: none  Quality: pain  Intensity/Severity: redness  Progression: unchanged   Associated Symptoms: increased tearing, redness   Aggravating Factors: none  Alleviating Factors: none    PAST MEDICAL HISTORY  Active Ambulatory Problems     Diagnosis Date Noted   • Benign essential HTN 04/01/2016   • HLD (hyperlipidemia) 04/01/2016   • Tobacco abuse 04/05/2016   • Dislocation of hip joint prosthesis (CMS/[facility]) 04/16/2016   • Fracture of proximal humerus 08/22/2015   • Mechanical complication of internal orthopedic device (CMS/[facility]) 04/16/2016   • 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 Kaplan.  Follow-up in 5 years.   • JOINT REPLACEMENT  2005?    Left total hip replacement in 2005.  In December 2015 patient had left hip revision       FAMILY HISTORY  Family History   Problem Relation Age of Onset   • Cancer Mother    • Breast cancer Mother        SOCIAL  HISTORY  Social History     Socioeconomic History   • Marital status:      Spouse name: Not on file   • Number of children: Not on file   • Years of education: Not on file   • Highest education level: Not on file   Tobacco Use   • Smoking status: Current Every Day Smoker     Packs/day: 0.50     Years: 43.00     Pack years: 21.50     Types: Cigarettes   • Smokeless tobacco: Never Used   Substance and Sexual Activity   • Alcohol use: Yes     Alcohol/week: 15.0 oz     Types: 25 Cans of beer per week       ALLERGIES  Hydrocodone-acetaminophen    REVIEW OF SYSTEMS  Review of Systems   Constitutional: Negative for chills and fever.   HENT: Negative for sore throat.    Eyes: Positive for pain and redness.        Increased tears   Respiratory: Negative for cough.    Gastrointestinal: Negative for nausea and vomiting.   Genitourinary: Negative for dysuria.   Musculoskeletal: Negative for back pain.   Psychiatric/Behavioral: The patient is not nervous/anxious.        PHYSICAL EXAM  ED Triage Vitals   Temp Heart Rate Resp BP SpO2   05/10/19 0358 05/10/19 0358 05/10/19 0358 05/10/19 0419 05/10/19 0358   98.4 °F (36.9 °C) 102 18 158/82 96 %      Temp src Heart Rate Source Patient Position BP Location FiO2 (%)   05/10/19 0358 -- 05/10/19 0419 05/10/19 0419 --   Tympanic  Sitting Right arm        Physical Exam   Constitutional: She is oriented to person, place, and time. No distress.   Eyes: EOM are normal. Lids are everted and swept, no foreign bodies found. Right conjunctiva is injected.   Slit lamp exam:       The right eye shows fluorescein uptake (medial superior aspect of the orbit). The right eye shows no corneal ulcer and no foreign body.   No consensual photophobia. No temporal artery tenderness.    Neck: Normal range of motion.   Cardiovascular: Normal rate and regular rhythm.   Pulmonary/Chest: Effort normal and breath sounds normal. No respiratory distress.   Neurological: She is alert and oriented to person,  place, and time. She has normal sensation and normal strength.   Skin: Skin is warm and dry.   Psychiatric: Affect normal.   Nursing note and vitals reviewed.      PROCEDURES  Procedures      PROGRESS AND CONSULTS       04:24  BP- 158/82 HR- 102 Temp- 98.4 °F (36.9 °C) (Tympanic) O2 sat- 96%  Fluorescein and tetracaine placed in the right eye. Woods lamp exam performed. Informed pt that there is dye uptake indicating a corneal abrasion. Informed her of the plan for abx ointment and pain medication. Pt will be discharged and is to f/u with an ophthalmologist. Pt instructed not to wear contacts.. Pt understands and agrees with the plan, all questions answered.    MEDICAL DECISION MAKING  Results were reviewed/discussed with the patient and they were also made aware of online access. Pt also made aware that some labs, such as cultures, will not be resulted during ER visit and follow up with PMD is necessary.     MDM  Number of Diagnoses or Management Options  Patient Progress  Patient progress: stable         DIAGNOSIS  Final diagnoses:   Abrasion of right cornea, initial encounter       DISPOSITION  DISCHARGE    Patient discharged in stable condition.    Reviewed implications of results, diagnosis, meds, responsibility to follow up, warning signs and symptoms of possible worsening, potential complications and reasons to return to ER.    Patient/Family voiced understanding of above instructions.    Discussed plan for discharge, as there is no emergent indication for admission. Patient referred to primary care provider for BP management due to today's BP. Pt/family is agreeable and understands need for follow up and repeat testing.  Pt is aware that discharge does not mean that nothing is wrong but it indicates no emergency is present that requires admission and they must continue care with follow-up as given below or physician of their choice.     FOLLOW-UP  Ronnie Swanson MD  [address]  [address]  [phone]    Schedule an appointment as soon as possible for a visit           Medication List      New Prescriptions    erythromycin 5 MG/GM ophthalmic ointment  Commonly known as:  ROMYCIN  Administer  to the right eye Every 4 (Four) Hours While Awake.     HYDROcodone-acetaminophen 5-325 MG per tablet  Commonly known as:  NORCO  Take 1 tablet by mouth 4 (Four) Times a Day As Needed for Moderate Pain .           Latest Documented Vital Signs:   As of 6:47 AM   BP- 158/94 HR- 77 Temp- 98.4 °F (36.9 °C) (Oral) O2 sat- 97%    --  Documentation assistance provided by troy Gomez for Dr Ruvalcaba.  Information recorded by the scribe was done at my direction and has been verified and validated by me.     Delia Gomez  05/10/19 0437       Delia Gomez  05/10/19 0443       Greg Ruvalcaba MD  05/10/19 [id number]

## 2019-05-10 NOTE — ED TRIAGE NOTES
"To ER via PV.  C/o pain, redness, watery right eye.  NKI.  Woke up with pain.  States sharp pain \"feels like glass\".  "

## 2019-07-01 RX ORDER — LOSARTAN POTASSIUM AND HYDROCHLOROTHIAZIDE 12.5; 1 MG/1; MG/1
TABLET ORAL
Qty: 90 TABLET | Refills: 1 | Status: SHIPPED | OUTPATIENT
Start: 2019-07-01 | End: 2019-10-02 | Stop reason: RX

## 2019-10-02 ENCOUNTER — TELEPHONE (OUTPATIENT)
Dept: INTERNAL MEDICINE | Age: 69
End: 2019-10-02

## 2019-10-02 NOTE — TELEPHONE ENCOUNTER
Rx for losartan-hydrochlorothiazide (HYZAAR) 100-12.5 MG per tablet is on back order.    Pharmacy is asking for alternative.    Please advise.    KD

## 2019-10-03 RX ORDER — IRBESARTAN AND HYDROCHLOROTHIAZIDE 150; 12.5 MG/1; MG/1
1 TABLET, FILM COATED ORAL DAILY
Qty: 90 TABLET | Refills: 3 | Status: SHIPPED | OUTPATIENT
Start: 2019-10-03 | End: 2019-10-24 | Stop reason: SDUPTHER

## 2019-10-08 RX ORDER — IRBESARTAN 150 MG/1
150 TABLET ORAL DAILY
Qty: 90 TABLET | Refills: 0 | Status: SHIPPED | OUTPATIENT
Start: 2019-10-08 | End: 2020-01-06

## 2019-10-08 RX ORDER — HYDROCHLOROTHIAZIDE 12.5 MG/1
12.5 TABLET ORAL DAILY
Qty: 90 TABLET | Refills: 0 | Status: SHIPPED | OUTPATIENT
Start: 2019-10-08 | End: 2020-01-06

## 2019-10-24 ENCOUNTER — OFFICE VISIT (OUTPATIENT)
Dept: INTERNAL MEDICINE | Age: 69
End: 2019-10-24

## 2019-10-24 VITALS
HEIGHT: 63 IN | RESPIRATION RATE: 13 BRPM | TEMPERATURE: 97.9 F | DIASTOLIC BLOOD PRESSURE: 80 MMHG | SYSTOLIC BLOOD PRESSURE: 120 MMHG | WEIGHT: 144.6 LBS | BODY MASS INDEX: 25.62 KG/M2 | HEART RATE: 58 BPM | OXYGEN SATURATION: 98 %

## 2019-10-24 DIAGNOSIS — E78.5 HYPERLIPIDEMIA, UNSPECIFIED HYPERLIPIDEMIA TYPE: ICD-10-CM

## 2019-10-24 DIAGNOSIS — R14.0 ABDOMINAL BLOATING: ICD-10-CM

## 2019-10-24 DIAGNOSIS — R73.9 HYPERGLYCEMIA: ICD-10-CM

## 2019-10-24 DIAGNOSIS — I10 BENIGN ESSENTIAL HTN: Primary | ICD-10-CM

## 2019-10-24 DIAGNOSIS — Z00.00 HEALTHCARE MAINTENANCE: ICD-10-CM

## 2019-10-24 PROCEDURE — 99214 OFFICE O/P EST MOD 30 MIN: CPT | Performed by: INTERNAL MEDICINE

## 2019-10-24 NOTE — PROGRESS NOTES
Katherine Williamson is a 68 y.o. female who presents with   Chief Complaint   Patient presents with   • Hypertension     Avapro 150 mg; hydrochlorothiazide 12.5 mg   • Hyperlipidemia     No prescription medication   • Blood Sugar Problem     No prescription medication   • Bloated     3-4 weeks.  Abdominal bloating.  No pain.  No history of food intolerance.   .    68-year-old female.  6-month checkup/lab update visit.  Complains of abdominal bloating for the past 3-4 weeks.      Hypertension   This is a chronic problem. The current episode started more than 1 year ago. The problem is controlled. Current antihypertension treatment includes diuretics and angiotensin blockers. The current treatment provides moderate improvement. There are no compliance problems.    Hyperlipidemia   This is a chronic problem. The current episode started more than 1 year ago. The problem is controlled. Recent lipid tests were reviewed and are normal. Exacerbating diseases include diabetes. She is currently on no antihyperlipidemic treatment.   Blood Sugar Problem   This is a chronic problem. The current episode started more than 1 year ago. The problem has been waxing and waning. She has tried nothing for the symptoms.        The following portions of the patient's history were reviewed and updated as appropriate: allergies, current medications, past medical history and problem list.    Review of Systems   Constitutional: Negative.    HENT: Negative.    Eyes: Negative.    Respiratory: Negative.    Cardiovascular: Negative.    Gastrointestinal:        History of bloating as noted above   Genitourinary: Negative.    Musculoskeletal: Negative.    Skin: Negative.    Neurological: Negative.    Psychiatric/Behavioral: Negative.        Objective   Physical Exam   Constitutional: She is oriented to person, place, and time. She appears well-developed and well-nourished. No distress.   HENT:   Head: Normocephalic and atraumatic.   Eyes: Conjunctivae  and EOM are normal. Pupils are equal, round, and reactive to light.   Neck: Normal range of motion. Neck supple. No thyromegaly present.   Neck exam negative.  Carotid auscultation normal-no bruits heard.   Cardiovascular: Normal rate, regular rhythm, normal heart sounds and intact distal pulses. Exam reveals no gallop and no friction rub.   No murmur heard.  Pulmonary/Chest: Effort normal and breath sounds normal. No respiratory distress. She has no wheezes. She has no rales. She exhibits no tenderness.   Abdominal: Soft. Bowel sounds are normal. She exhibits no distension and no mass. There is no tenderness. There is no rebound and no guarding. No hernia.   Neurological: She is alert and oriented to person, place, and time.   Psychiatric: She has a normal mood and affect. Her behavior is normal. Judgment and thought content normal.   Nursing note and vitals reviewed.      Assessment/Plan   Katherine was seen today for hypertension, hyperlipidemia, blood sugar problem and bloated.    Diagnoses and all orders for this visit:    Benign essential HTN  -     Comprehensive Metabolic Panel; Future    Hyperlipidemia, unspecified hyperlipidemia type  -     Comprehensive Metabolic Panel; Future  -     Lipid Panel; Future    Hyperglycemia  -     Comprehensive Metabolic Panel; Future  -     Hemoglobin A1c; Future    Abdominal bloating  -     US Gallbladder; Future    Healthcare maintenance  -     Hepatitis C Antibody; Future      Plan: Labs as above for the issues as outlined.  Labs to be done through [email or web address].    Continue current treatment as prescribed.    We will schedule gallbladder ultrasound.  May need CT scan if negative.    Annual physical advised-declined.    6-month checkup/lab update advised.

## 2019-10-29 ENCOUNTER — RESULTS ENCOUNTER (OUTPATIENT)
Dept: INTERNAL MEDICINE | Age: 69
End: 2019-10-29

## 2019-10-29 DIAGNOSIS — I10 BENIGN ESSENTIAL HTN: ICD-10-CM

## 2019-10-29 DIAGNOSIS — R73.9 HYPERGLYCEMIA: ICD-10-CM

## 2019-10-29 DIAGNOSIS — Z00.00 HEALTHCARE MAINTENANCE: ICD-10-CM

## 2019-10-29 DIAGNOSIS — E78.5 HYPERLIPIDEMIA, UNSPECIFIED HYPERLIPIDEMIA TYPE: ICD-10-CM

## 2019-10-31 ENCOUNTER — HOSPITAL ENCOUNTER (OUTPATIENT)
Dept: ULTRASOUND IMAGING | Facility: HOSPITAL | Age: 69
Discharge: HOME | End: 2019-10-31
Admitting: INTERNAL MEDICINE

## 2019-10-31 DIAGNOSIS — R14.0 ABDOMINAL BLOATING: ICD-10-CM

## 2019-10-31 PROCEDURE — 76705 ECHO EXAM OF ABDOMEN: CPT

## 2019-11-04 ENCOUNTER — TELEPHONE (OUTPATIENT)
Dept: INTERNAL MEDICINE | Age: 69
End: 2019-11-04

## 2019-11-04 DIAGNOSIS — R14.0 ABDOMINAL BLOATING: Primary | ICD-10-CM

## 2019-11-04 DIAGNOSIS — R10.9 ABDOMINAL PAIN, UNSPECIFIED ABDOMINAL LOCATION: ICD-10-CM

## 2019-11-04 NOTE — TELEPHONE ENCOUNTER
Pt was read dictation of US results. Pt would like to proceed with CT Abd. Order placed. Pt stated that CT had to be done at [facility]. Do to insurance. MM

## 2019-11-11 ENCOUNTER — TELEPHONE (OUTPATIENT)
Dept: INTERNAL MEDICINE | Age: 69
End: 2019-11-11

## 2019-11-11 DIAGNOSIS — R14.0 BLOATING: Primary | ICD-10-CM

## 2019-11-11 NOTE — TELEPHONE ENCOUNTER
Dr. Swanson ordered a CT abd w/contrast; however the scan ends at the belly botton.    Pt diagnosis is bloating & if  wants the entire stomach, order needs to be changed to CT abd & pelvis.    Pls advise.    [facility] w[facility] can be reached #[phone]

## 2019-11-11 NOTE — TELEPHONE ENCOUNTER
Spoke c [facility]. Advised Dr. Swanson gave the ok to change order. Updated order placed.   [facility] verbalized understanding. LUISANA

## 2019-11-14 PROBLEM — K57.90 DIVERTICULOSIS: Status: ACTIVE | Noted: 2019-11-14

## 2019-11-14 PROBLEM — K76.0 HEPATIC STEATOSIS: Status: ACTIVE | Noted: 2019-11-14

## 2020-01-06 RX ORDER — IRBESARTAN 150 MG/1
150 TABLET ORAL DAILY
Qty: 90 TABLET | Refills: 3 | Status: SHIPPED | OUTPATIENT
Start: 2020-01-06 | End: 2020-12-23

## 2020-01-06 RX ORDER — HYDROCHLOROTHIAZIDE 12.5 MG/1
12.5 TABLET ORAL DAILY
Qty: 90 TABLET | Refills: 3 | Status: SHIPPED | OUTPATIENT
Start: 2020-01-06 | End: 2020-12-18

## 2020-04-24 ENCOUNTER — OFFICE VISIT (OUTPATIENT)
Dept: INTERNAL MEDICINE | Age: 70
End: 2020-04-24

## 2020-04-24 DIAGNOSIS — R07.2 PRECORDIAL PAIN: ICD-10-CM

## 2020-04-24 DIAGNOSIS — I10 BENIGN ESSENTIAL HTN: Primary | ICD-10-CM

## 2020-04-24 PROCEDURE — 99443 PR PHYS/QHP TELEPHONE EVALUATION 21-30 MIN: CPT | Performed by: INTERNAL MEDICINE

## 2020-04-24 NOTE — PROGRESS NOTES
"  Katherine Williamson is a 69 y.o. female who presents with   Chief Complaint   Patient presents with   • Hypertension     Telephone visit: Avapro 150 mg; hydrochlorothiazide 12.5 mg.  Not monitoring blood pressures at home.  Last visit October 24, 2019.  Blood pressure 120/80 then   • Chest Pain     2 weeks ago chest tightness with rest and tingling in left jaw.   .    69-year-old female.  Telephone visit due to the coronavirus pandemic.  Patient last seen October 24, 2019 as noted above.  Currently she is not monitoring her blood pressure at home but continues to take her prescribed medications as above.  Her additional complaint on today's visit 1 to 2 weeks ago while sitting in her kitchen she had sudden onset of chest tightness with tingling in her right jaw.  She said \"it felt like a heard of elephants sitting on my chest\".  She denies any nausea, vomiting or diaphoresis associated with the symptoms and since then has had no further symptoms either with rest or with exertion.    Hypertension   This is a chronic problem. The current episode started more than 1 year ago. The problem is unchanged. The problem is controlled. Associated symptoms include chest pain and neck pain. Pertinent negatives include no palpitations or shortness of breath. Current antihypertension treatment includes angiotensin blockers and diuretics. The current treatment provides moderate improvement.   Chest Pain    This is a new problem. The current episode started 1 to 4 weeks ago. The onset quality is sudden. The problem has been resolved. The pain is present in the substernal region. The pain is moderate. The quality of the pain is described as heavy. The pain radiates to the right jaw. Pertinent negatives include no palpitations or shortness of breath.        There were no vitals taken for this visit. Due to the nature of today's telephone visit, vital signs could not be performed.    The following portions of the patient's history were " "reviewed and updated as appropriate: allergies, current medications, past medical history and problem list.    Review of Systems   Respiratory: Negative for shortness of breath.    Cardiovascular: Positive for chest pain. Negative for palpitations.   Musculoskeletal: Positive for neck pain.       Objective   Physical Exam Due to the nature of today's telephone visit a physical exam could not be performed.  Physically her chest tubes are as outlined    Assessment/Plan   Katherine was seen today for hypertension and chest pain.    Diagnoses and all orders for this visit:    Benign essential HTN    Precordial pain  -     Ambulatory Referral to Cardiology        Plan: Patient is not monitoring her own blood pressures at home but says she \"feels no different\" than her visit in October.  For now we will continue her current treatment as prescribed and we will plan on seeing her sometime in August for a checkup/lab update visit then.    Her chest symptoms sound strongly suggestive of possible cardiac origin and I am going to make a referral to cardiology for further evaluation of this.    Total amount of time spent with this patient on this telephone visit- 22 minutes.    Verbal consent obtained for this telephone visit.         "

## 2020-04-30 ENCOUNTER — OFFICE VISIT (OUTPATIENT)
Dept: CARDIOLOGY | Facility: CLINIC | Age: 70
End: 2020-04-30

## 2020-04-30 VITALS
HEIGHT: 63 IN | SYSTOLIC BLOOD PRESSURE: 108 MMHG | WEIGHT: 147.8 LBS | RESPIRATION RATE: 18 BRPM | DIASTOLIC BLOOD PRESSURE: 70 MMHG | HEART RATE: 85 BPM | BODY MASS INDEX: 26.19 KG/M2

## 2020-04-30 DIAGNOSIS — E78.5 HYPERLIPIDEMIA, UNSPECIFIED HYPERLIPIDEMIA TYPE: ICD-10-CM

## 2020-04-30 DIAGNOSIS — R73.9 HYPERGLYCEMIA: ICD-10-CM

## 2020-04-30 DIAGNOSIS — I10 BENIGN ESSENTIAL HTN: ICD-10-CM

## 2020-04-30 DIAGNOSIS — R07.89 CHEST PAIN, ATYPICAL: Primary | ICD-10-CM

## 2020-04-30 DIAGNOSIS — Z72.0 TOBACCO ABUSE: ICD-10-CM

## 2020-04-30 PROCEDURE — 99204 OFFICE O/P NEW MOD 45 MIN: CPT | Performed by: INTERNAL MEDICINE

## 2020-04-30 PROCEDURE — 93000 ELECTROCARDIOGRAM COMPLETE: CPT | Performed by: INTERNAL MEDICINE

## 2020-04-30 RX ORDER — ASCORBIC ACID 500 MG
500 TABLET ORAL DAILY
COMMUNITY

## 2020-04-30 NOTE — PROGRESS NOTES
Cardiology clinic note  Fred Nixon MD, PhD  [facility] cardiology  Subjective:     Encounter Date:04/30/2020      Patient ID: Katherine Williamson is a 69 y.o. female.    Chief Complaint:  Chief Complaint   Patient presents with   • Chest Pain       HPI:  History of Present Illness  At the pleasure of seeing this very pleasant 69-year-old female today who was referred by primary care for evaluation of atypical chest pain approximately 2 weeks ago.  She had a single episode of substernal chest pain without radiation diaphoresis or palpitations that was of moderate severity lasting approximately 20 minutes and then resolving spontaneously.  She has not had any chest pain since.  She has no history of thromboembolic disease.  She has no previous history of any cardiac or cardiovascular disease other than cardiac risk factors of prolonged for greater than 50 years smoking, essential hypertension and age.  She has a history of hyperlipidemia by documentation however most recent lipid panel not on antilipid therapy shows HDL of 90 with LDL of 93 and non-HDL cholesterol less than 100 which is essentially at goal.  She does not take statin therapy on aspirin presently she is on antihypertensives for essential hypertension which is well controlled today with HCTZ as well as irbesartan.  She has not had any recurrent symptoms of chest pain or above normal dyspnea on exertion or syncope or palpitations or PND orthopnea heart failure signs or symptoms or peripheral edema.  She has been in her normal state health and otherwise feels fairly well.  She has not had previous ischemic evaluation or structural functional evaluation with 2D echo.  No other concerning signs or symptoms presently.  We had a long conversation about smoking cessation but it appears she is not ready to quit at this time.  She does walk routinely on a treadmill 3-4 times a week approximately 1-1/2 miles but is plagued by hip pain and knee pain  which limits her activity.  She is fairly active outside in her yard as well.  She again routinely does not get chest pain with activity and this seems to be an isolated episode.    Review of systems a 14 point review of systems is negative except was mentioned above    Historical data copied forward from previous encounters and EMR is unchanged    The following portions of the patient's history were reviewed and updated as appropriate: allergies, current medications, past family history, past medical history, past social history, past surgical history and problem list.    Problem List:  Patient Active Problem List   Diagnosis   • Benign essential HTN   • HLD (hyperlipidemia)   • Tobacco abuse   • Dislocation of hip joint prosthesis (CMS/HCC)   • Fracture of proximal humerus   • Mechanical complication of internal orthopedic device (CMS/HCC)   • Wear of articular bearing surface of internal prosthetic joint (CMS/HCC)   • History of repair of hip joint   • History of operative procedure on hip   • Hyperglycemia   • Hepatic steatosis   • Diverticulosis   • Chest pain, atypical       Past Medical History:  Past Medical History:   Diagnosis Date   • Cataract    • Hypertension        Past Surgical History:  Past Surgical History:   Procedure Laterality Date   • COLONOSCOPY  10/2015    Qbppa-vknuiiecipol-Qn. Kaplan.  Follow-up in 5 years.   • JOINT REPLACEMENT  2005?    Left total hip replacement in 2005.  In December 2015 patient had left hip revision       Social History:  Social History     Socioeconomic History   • Marital status:      Spouse name: Not on file   • Number of children: Not on file   • Years of education: Not on file   • Highest education level: Not on file   Tobacco Use   • Smoking status: Current Every Day Smoker     Packs/day: 0.50     Years: 50.00     Pack years: 25.00     Types: Cigarettes   • Smokeless tobacco: Never Used   Substance and Sexual Activity   • Alcohol use: Yes     Alcohol/week:  "30.0 standard drinks     Types: 30 Cans of beer per week       Allergies:  Allergies   Allergen Reactions   • Hydrocodone-Acetaminophen Itching       Immunizations:  Immunization History   Administered Date(s) Administered   • Fluzone High Dose =>65 Years ([employer] ONLY) 10/18/2018       ROS:  ROS       Objective:         /70 (BP Location: Left arm, Patient Position: Sitting)   Pulse 85   Resp 18   Ht 160 cm (63\")   Wt 67 kg (147 lb 12.8 oz)   BMI 26.18 kg/m²     Physical Exam  No acute distress alert and oriented x3 afebrile vital signs stable  Normocephalic atraumatic pupils equal round extraocular was intact bilaterally  Trachea midline neck supple no carotid bruits no JVD  Mild delayed expiratory phase with no wheezing rales or rhonchi  Regular rate and rhythm no rubs or gallops faint 1 out of 6 systolic ejection murmur left sternal border  Nondistended nontender bowel sounds positive  No clubbing cyanosis with only trace lower extremity edema mild varicosities  Palpable pulses in dorsalis pedis as well as radials equal in quality 1-2+  Neurologically grossly intact bilaterally  Normal mood and affect  Vitals reviewed above  No gross bony abnormalities.  In-Office Procedure(s):    ECG 12 Lead  Date/Time: 4/30/2020 1:52 PM  Performed by: Fred Nixon MD  Authorized by: Fred Nixon MD   Comparison: not compared with previous ECG   Previous ECG: no previous ECG available  Rhythm: sinus rhythm  Rate: normal  Conduction: conduction normal  QRS axis: normal    Clinical impression: normal ECG            ASCVD RIsk Score::  The 10-year ASCVD risk score ([place] WILBURN Jr., et al., 2013) is: 10.2%    Values used to calculate the score:      Age: 69 years      Sex: Female      Is Non- : No      Diabetic: No      Tobacco smoker: Yes      Systolic Blood Pressure: 108 mmHg      Is BP treated: Yes      HDL Cholesterol: 82 mg/dL      Total Cholesterol: 205 mg/dL    Recent " Radiology:  Imaging Results (Most Recent)     None          Lab Review:   No visits with results within 6 Month(s) from this visit.   Latest known visit with results is:   Office Visit on 04/18/2018   Component Date Value   • Glucose 04/18/2018 98    • BUN 04/18/2018 10    • Creatinine 04/18/2018 0.76    • eGFR Non African Am 04/18/2018 76    • eGFR  Am 04/18/2018 93    • BUN/Creatinine Ratio 04/18/2018 13.2    • Sodium 04/18/2018 140    • Potassium 04/18/2018 4.3    • Chloride 04/18/2018 100    • Total CO2 04/18/2018 24.8    • Calcium 04/18/2018 9.9    • Total Protein 04/18/2018 7.2    • Albumin 04/18/2018 4.20    • Globulin 04/18/2018 3.0    • A/G Ratio 04/18/2018 1.4    • Total Bilirubin 04/18/2018 0.8    • Alkaline Phosphatase 04/18/2018 104    • AST (SGOT) 04/18/2018 19    • ALT (SGPT) 04/18/2018 19    • Hemoglobin A1C 04/18/2018 5.59    • Total Cholesterol 04/18/2018 205*   • Triglycerides 04/18/2018 59    • HDL Cholesterol 04/18/2018 82*   • VLDL Cholesterol 04/18/2018 11.8    • LDL Cholesterol  04/18/2018 111*   • TSH 04/18/2018 1.160    • Free T4 04/18/2018 1.27                 Assessment:          Diagnosis Plan   1. Benign essential HTN     2. Hyperlipidemia, unspecified hyperlipidemia type     3. Hyperglycemia     4. Tobacco abuse     5. Chest pain, atypical            Plan:      Atypical chest pain  Risk ratification with 2D echo and Lexiscan stress if she is unable to walk on the treadmill  ASCVD risk score greater than 10% and she should be on small daily aspirin as well as at least low intensity statin therapy  We will discuss with the patient about atorvastatin 10 mg p.o. daily in addition to aspirin 81 mg p.o. daily    Essential hypertension is controlled goal blood pressure less than 130 systolic  Smoking cessation counseling provided today extensively greater than 10 minutes  Preventative health maintenance conversation for greater than 10 minutes as well on lifestyle changes, diet and  exercise per AHA guidelines, smoking cessation, reduction of cardiovascular vascular risk factors, AHA guidelines for suspect statin and aspirin therapy.    No evidence of congestive heart failure    Tobacco abuse, smoking cessation counseling as above  Alcohol counseling per AHA guidelines    We will see back in clinic in 30 days to discuss results and reassess symptoms, will address aspirin and statin therapy at that time      New patient to me with new problems above    Fred Nixon MD, PhD    Thank you very much for the referral with the pleasure of involved in her cardiovascular care.  Please call with any questions or concerns               Fred Nixon MD  04/30/20  .  Answers for HPI/ROS submitted by the patient on 4/29/2020   What is the primary reason for your visit?: Other  Please describe your symptoms.: Extreme pressure on chest, sweats and tingling in right jaw. It lasted for about 15-20 minutes. That was 2 weeks ago and there have been no other symptoms since. Dr Swanson recommended to have it checked out.  Have you had these symptoms before?: No  How long have you been having these symptoms?: Other  Please list any medications you are currently taking for this condition.: None. I do take ibsartan and htzd for blood pressure.  Please describe any probable cause for these symptoms. : None that I can think of. I was sitting at the kitchen table drinking a cup of coffee.

## 2020-05-13 ENCOUNTER — HOSPITAL ENCOUNTER (OUTPATIENT)
Dept: NUCLEAR MEDICINE | Facility: HOSPITAL | Age: 70
Discharge: HOME | End: 2020-05-13

## 2020-05-13 ENCOUNTER — HOSPITAL ENCOUNTER (OUTPATIENT)
Dept: CARDIOLOGY | Facility: HOSPITAL | Age: 70
Discharge: HOME | End: 2020-05-13
Admitting: INTERNAL MEDICINE

## 2020-05-13 VITALS
HEART RATE: 99 BPM | SYSTOLIC BLOOD PRESSURE: 130 MMHG | HEIGHT: 63 IN | BODY MASS INDEX: 26.17 KG/M2 | DIASTOLIC BLOOD PRESSURE: 86 MMHG | WEIGHT: 147.71 LBS

## 2020-05-13 DIAGNOSIS — R07.89 CHEST PAIN, ATYPICAL: ICD-10-CM

## 2020-05-13 LAB
AORTIC DIMENSIONLESS INDEX: 0.8 (DI)
BH CV ECHO MEAS - ACS: 1.8 CM
BH CV ECHO MEAS - AO MAX PG: 7 MMHG
BH CV ECHO MEAS - AO MEAN PG (FULL): 2 MMHG
BH CV ECHO MEAS - AO MEAN PG: 4 MMHG
BH CV ECHO MEAS - AO ROOT AREA (BSA CORRECTED): 1.9
BH CV ECHO MEAS - AO ROOT AREA: 8.6 CM^2
BH CV ECHO MEAS - AO ROOT DIAM: 3.3 CM
BH CV ECHO MEAS - AO V2 MAX: 133 CM/SEC
BH CV ECHO MEAS - AO V2 MEAN: 96.1 CM/SEC
BH CV ECHO MEAS - AO V2 VTI: 27 CM
BH CV ECHO MEAS - AVA(I,A): 2.2 CM^2
BH CV ECHO MEAS - AVA(I,D): 2.2 CM^2
BH CV ECHO MEAS - BSA(HAYCOCK): 1.7 M^2
BH CV ECHO MEAS - BSA: 1.7 M^2
BH CV ECHO MEAS - BZI_BMI: 26 KILOGRAMS/M^2
BH CV ECHO MEAS - BZI_METRIC_HEIGHT: 160 CM
BH CV ECHO MEAS - BZI_METRIC_WEIGHT: 66.7 KG
BH CV ECHO MEAS - EDV(CUBED): 32.8 ML
BH CV ECHO MEAS - EDV(MOD-SP2): 39 ML
BH CV ECHO MEAS - EDV(MOD-SP4): 42 ML
BH CV ECHO MEAS - EDV(TEICH): 41 ML
BH CV ECHO MEAS - EF(CUBED): 67.5 %
BH CV ECHO MEAS - EF(MOD-BP): 67 %
BH CV ECHO MEAS - EF(MOD-SP2): 64.1 %
BH CV ECHO MEAS - EF(MOD-SP4): 66.7 %
BH CV ECHO MEAS - EF(TEICH): 60.4 %
BH CV ECHO MEAS - ESV(CUBED): 10.6 ML
BH CV ECHO MEAS - ESV(MOD-SP2): 14 ML
BH CV ECHO MEAS - ESV(MOD-SP4): 14 ML
BH CV ECHO MEAS - ESV(TEICH): 16.2 ML
BH CV ECHO MEAS - FS: 31.3 %
BH CV ECHO MEAS - IVS/LVPW: 1.2
BH CV ECHO MEAS - IVSD: 1.2 CM
BH CV ECHO MEAS - LA DIMENSION: 3.2 CM
BH CV ECHO MEAS - LA/AO: 0.97
BH CV ECHO MEAS - LAT PEAK E' VEL: 12.8 CM/SEC
BH CV ECHO MEAS - LV DIASTOLIC VOL/BSA (35-75): 24.8 ML/M^2
BH CV ECHO MEAS - LV MASS(C)D: 104.3 GRAMS
BH CV ECHO MEAS - LV MASS(C)DI: 61.5 GRAMS/M^2
BH CV ECHO MEAS - LV MEAN PG: 2 MMHG
BH CV ECHO MEAS - LV SYSTOLIC VOL/BSA (12-30): 8.3 ML/M^2
BH CV ECHO MEAS - LV V1 MAX: 103 CM/SEC
BH CV ECHO MEAS - LV V1 MEAN: 69.6 CM/SEC
BH CV ECHO MEAS - LV V1 VTI: 20.8 CM
BH CV ECHO MEAS - LVIDD: 3.2 CM
BH CV ECHO MEAS - LVIDS: 2.2 CM
BH CV ECHO MEAS - LVLD AP2: 6.6 CM
BH CV ECHO MEAS - LVLD AP4: 7.3 CM
BH CV ECHO MEAS - LVLS AP2: 5.1 CM
BH CV ECHO MEAS - LVLS AP4: 5.1 CM
BH CV ECHO MEAS - LVOT AREA (M): 2.8 CM^2
BH CV ECHO MEAS - LVOT AREA: 2.8 CM^2
BH CV ECHO MEAS - LVOT DIAM: 1.9 CM
BH CV ECHO MEAS - LVPWD: 1 CM
BH CV ECHO MEAS - MED PEAK E' VEL: 7.8 CM/SEC
BH CV ECHO MEAS - MV A DUR: 0.17 SEC
BH CV ECHO MEAS - MV A MAX VEL: 97.7 CM/SEC
BH CV ECHO MEAS - MV DEC SLOPE: 195 CM/SEC^2
BH CV ECHO MEAS - MV DEC TIME: 0.16 SEC
BH CV ECHO MEAS - MV E MAX VEL: 61.2 CM/SEC
BH CV ECHO MEAS - MV E/A: 0.63
BH CV ECHO MEAS - MV MEAN PG: 2 MMHG
BH CV ECHO MEAS - MV P1/2T MAX VEL: 59.7 CM/SEC
BH CV ECHO MEAS - MV P1/2T: 89.7 MSEC
BH CV ECHO MEAS - MV V2 MEAN: 58.2 CM/SEC
BH CV ECHO MEAS - MV V2 VTI: 16.1 CM
BH CV ECHO MEAS - MVA P1/2T LCG: 3.7 CM^2
BH CV ECHO MEAS - MVA(P1/2T): 2.5 CM^2
BH CV ECHO MEAS - MVA(VTI): 3.7 CM^2
BH CV ECHO MEAS - PA ACC SLOPE: 604 CM/SEC^2
BH CV ECHO MEAS - PA ACC TIME: 0.14 SEC
BH CV ECHO MEAS - PA MAX PG (FULL): 0.11 MMHG
BH CV ECHO MEAS - PA MAX PG: 3.2 MMHG
BH CV ECHO MEAS - PA PR(ACCEL): 15.6 MMHG
BH CV ECHO MEAS - PA V2 MAX: 89.5 CM/SEC
BH CV ECHO MEAS - PULM A REVS DUR: 0.17 SEC
BH CV ECHO MEAS - PULM A REVS VEL: 36 CM/SEC
BH CV ECHO MEAS - PULM DIAS VEL: 34.1 CM/SEC
BH CV ECHO MEAS - PULM S/D: 2.1
BH CV ECHO MEAS - PULM SYS VEL: 73.1 CM/SEC
BH CV ECHO MEAS - PVA(V,A): 4.4 CM^2
BH CV ECHO MEAS - PVA(V,D): 4.4 CM^2
BH CV ECHO MEAS - QP/QS: 1.2
BH CV ECHO MEAS - RV MAX PG: 3.1 MMHG
BH CV ECHO MEAS - RV MEAN PG: 2 MMHG
BH CV ECHO MEAS - RV V1 MAX: 88 CM/SEC
BH CV ECHO MEAS - RV V1 MEAN: 60.5 CM/SEC
BH CV ECHO MEAS - RV V1 VTI: 15.5 CM
BH CV ECHO MEAS - RVOT AREA: 4.5 CM^2
BH CV ECHO MEAS - RVOT DIAM: 2.4 CM
BH CV ECHO MEAS - SI(AO): 136.1 ML/M^2
BH CV ECHO MEAS - SI(CUBED): 13 ML/M^2
BH CV ECHO MEAS - SI(LVOT): 34.8 ML/M^2
BH CV ECHO MEAS - SI(MOD-SP2): 14.7 ML/M^2
BH CV ECHO MEAS - SI(MOD-SP4): 16.5 ML/M^2
BH CV ECHO MEAS - SI(TEICH): 14.6 ML/M^2
BH CV ECHO MEAS - SV(AO): 230.9 ML
BH CV ECHO MEAS - SV(CUBED): 22.1 ML
BH CV ECHO MEAS - SV(LVOT): 59 ML
BH CV ECHO MEAS - SV(MOD-SP2): 25 ML
BH CV ECHO MEAS - SV(MOD-SP4): 28 ML
BH CV ECHO MEAS - SV(RVOT): 70.1 ML
BH CV ECHO MEAS - SV(TEICH): 24.8 ML
BH CV ECHO MEAS - TAPSE (>1.6): 1.8 CM2
BH CV ECHO MEASUREMENTS AVERAGE E/E' RATIO: 5.94
BH CV XLRA - RV BASE: 2.6 CM
BH CV XLRA - RV LENGTH: 6.5 CM
BH CV XLRA - RV MID: 1.7 CM
BH CV XLRA - TDI S': 12.1 CM/SEC
LEFT ATRIUM VOLUME INDEX: 21 ML/M2
MAXIMAL PREDICTED HEART RATE: 153 BPM
STRESS TARGET HR: 130 BPM

## 2020-05-13 PROCEDURE — 0 TECHNETIUM SESTAMIBI: Performed by: INTERNAL MEDICINE

## 2020-05-13 PROCEDURE — A9500 TC99M SESTAMIBI: HCPCS | Performed by: INTERNAL MEDICINE

## 2020-05-13 PROCEDURE — 78452 HT MUSCLE IMAGE SPECT MULT: CPT | Performed by: INTERNAL MEDICINE

## 2020-05-13 PROCEDURE — 93306 TTE W/DOPPLER COMPLETE: CPT

## 2020-05-13 PROCEDURE — 93017 CV STRESS TEST TRACING ONLY: CPT

## 2020-05-13 PROCEDURE — 93306 TTE W/DOPPLER COMPLETE: CPT | Performed by: INTERNAL MEDICINE

## 2020-05-13 PROCEDURE — 93018 CV STRESS TEST I&R ONLY: CPT | Performed by: INTERNAL MEDICINE

## 2020-05-13 PROCEDURE — 78452 HT MUSCLE IMAGE SPECT MULT: CPT

## 2020-05-13 RX ADMIN — TECHNETIUM TC 99M SESTAMIBI 1 DOSE: 1 INJECTION INTRAVENOUS at 08:00

## 2020-05-13 RX ADMIN — TECHNETIUM TC 99M SESTAMIBI 1 DOSE: 1 INJECTION INTRAVENOUS at 09:25

## 2020-05-15 LAB
BH CV STRESS BP STAGE 1: NORMAL
BH CV STRESS BP STAGE 2: NORMAL
BH CV STRESS DURATION MIN STAGE 1: 3
BH CV STRESS DURATION MIN STAGE 2: 1
BH CV STRESS DURATION SEC STAGE 1: 0
BH CV STRESS DURATION SEC STAGE 2: 45
BH CV STRESS GRADE STAGE 1: 10
BH CV STRESS GRADE STAGE 2: 12
BH CV STRESS HR STAGE 1: 125
BH CV STRESS HR STAGE 2: 136
BH CV STRESS METS STAGE 1: 5
BH CV STRESS METS STAGE 2: 7.5
BH CV STRESS PROTOCOL 1: NORMAL
BH CV STRESS RECOVERY BP: NORMAL MMHG
BH CV STRESS RECOVERY HR: 90 BPM
BH CV STRESS SPEED STAGE 1: 1.7
BH CV STRESS SPEED STAGE 2: 2.5
BH CV STRESS STAGE 1: 1
BH CV STRESS STAGE 2: 2
LV EF NUC BP: 81 %
MAXIMAL PREDICTED HEART RATE: 153 BPM
PERCENT MAX PREDICTED HR: 88.89 %
STRESS BASELINE BP: NORMAL MMHG
STRESS BASELINE HR: 91 BPM
STRESS PERCENT HR: 105 %
STRESS POST ESTIMATED WORKLOAD: 6.5 METS
STRESS POST EXERCISE DUR MIN: 4 MIN
STRESS POST EXERCISE DUR SEC: 35 SEC
STRESS POST PEAK BP: NORMAL MMHG
STRESS POST PEAK HR: 136 BPM
STRESS TARGET HR: 130 BPM

## 2020-09-03 ENCOUNTER — OFFICE VISIT (OUTPATIENT)
Dept: INTERNAL MEDICINE | Age: 70
End: 2020-09-03

## 2020-09-03 VITALS
SYSTOLIC BLOOD PRESSURE: 130 MMHG | HEART RATE: 100 BPM | HEIGHT: 63 IN | OXYGEN SATURATION: 96 % | BODY MASS INDEX: 26.22 KG/M2 | DIASTOLIC BLOOD PRESSURE: 70 MMHG | RESPIRATION RATE: 13 BRPM | TEMPERATURE: 98.5 F | WEIGHT: 148 LBS

## 2020-09-03 DIAGNOSIS — I10 BENIGN ESSENTIAL HTN: Primary | ICD-10-CM

## 2020-09-03 DIAGNOSIS — L98.9 SKIN LESION: ICD-10-CM

## 2020-09-03 DIAGNOSIS — E78.5 HYPERLIPIDEMIA, UNSPECIFIED HYPERLIPIDEMIA TYPE: ICD-10-CM

## 2020-09-03 DIAGNOSIS — R73.9 HYPERGLYCEMIA: ICD-10-CM

## 2020-09-03 PROCEDURE — 99214 OFFICE O/P EST MOD 30 MIN: CPT | Performed by: INTERNAL MEDICINE

## 2020-09-03 RX ORDER — NAPROXEN 500 MG/1
500 TABLET ORAL 2 TIMES DAILY PRN
Qty: 30 TABLET | Refills: 5 | Status: SHIPPED | OUTPATIENT
Start: 2020-09-03 | End: 2021-10-07 | Stop reason: SDUPTHER

## 2020-09-03 NOTE — PROGRESS NOTES
"Answers for HPI/ROS submitted by the patient on 8/31/2020   What is the primary reason for your visit?: Physical    Katherine Williamson is a 69 y.o. female who presents with   Chief Complaint   Patient presents with   • Hypertension     Hydrochlorothiazide 12.5 mg; Avapro 150 mg   • Hyperlipidemia     No specific treatment   • Blood Sugar Problem     No specific treatment   • Skin lesions     Right zygomatic area; left lateral abdominal wall   .    69-year-old female.  6-month checkup/lab update visit.  She gets her labs done through [facility].  They were last done in October and were reviewed with her.  All appear to be within ranges of acceptability.  Is also noted that she currently continues to smoke.  She has no desire to quit.  Low-dose CT scan of the lungs was ordered for her in 2016, 2017 and 2018 and she never showed up to have the procedure done.  It is further noted that she is not interested in doing it and is willing to assume the risks of her decision and realizes that cigarette smoking is associated with lung cancer and that the low-dose CT scan of the lungs may be able to detect it in an early incurable stage.    Hypertension   This is a chronic problem. The problem is unchanged. The problem is controlled. Current antihypertension treatment includes diuretics and angiotensin blockers. The current treatment provides moderate improvement. There are no compliance problems.    Hyperlipidemia   This is a chronic problem. The current episode started more than 1 year ago. The problem is controlled. Recent lipid tests were reviewed and are normal. She is currently on no antihyperlipidemic treatment.   Blood Sugar Problem   This is a chronic problem. The current episode started more than 1 year ago. The problem has been waxing and waning. She has tried nothing for the symptoms.        /70   Pulse 100   Temp 98.5 °F (36.9 °C)   Resp 13   Ht 160 cm (62.99\")   Wt 67.1 kg (148 lb)   SpO2 96%   BMI " 26.22 kg/m²     The following portions of the patient's history were reviewed and updated as appropriate: allergies, current medications, past medical history and problem list.    Review of Systems   Constitutional: Negative.    HENT: Negative.    Eyes: Negative.    Respiratory: Negative.    Cardiovascular: Negative.    Genitourinary: Negative.    Musculoskeletal: Negative.    Skin: Negative.    Neurological: Negative.    Psychiatric/Behavioral: Negative.        Objective   Physical Exam   Constitutional: She is oriented to person, place, and time. She appears well-developed and well-nourished. No distress.   HENT:   Head: Normocephalic and atraumatic.   Eyes: Pupils are equal, round, and reactive to light. Conjunctivae and EOM are normal.   Neck: Normal range of motion. Neck supple. No thyromegaly present.   Neck exam negative.  Carotid auscultation normal-no bruits heard.   Cardiovascular: Normal rate, regular rhythm, normal heart sounds and intact distal pulses. Exam reveals no gallop and no friction rub.   No murmur heard.  Pulmonary/Chest: Effort normal and breath sounds normal. No respiratory distress. She has no wheezes. She has no rales. She exhibits no tenderness.   Neurological: She is alert and oriented to person, place, and time.   Psychiatric: She has a normal mood and affect. Her behavior is normal. Judgment and thought content normal.   Nursing note and vitals reviewed.      Assessment/Plan   Katherine was seen today for hypertension, hyperlipidemia, blood sugar problem and skin lesions.    Diagnoses and all orders for this visit:    Benign essential HTN  -     Comprehensive Metabolic Panel; Future    Hyperlipidemia, unspecified hyperlipidemia type  -     Comprehensive Metabolic Panel; Future  -     Lipid Panel; Future    Hyperglycemia  -     Comprehensive Metabolic Panel; Future  -     Hemoglobin A1c; Future    Skin lesion  -     Ambulatory Referral to Dermatology        Plan: Labs as above for the  physical issues as outlined.    Continue current treatment as prescribed assuming today's labs are acceptable    Annual physical with comprehensive lab updates in 1 year advised

## 2020-09-08 ENCOUNTER — RESULTS ENCOUNTER (OUTPATIENT)
Dept: INTERNAL MEDICINE | Age: 70
End: 2020-09-08

## 2020-09-08 DIAGNOSIS — E78.5 HYPERLIPIDEMIA, UNSPECIFIED HYPERLIPIDEMIA TYPE: ICD-10-CM

## 2020-09-08 DIAGNOSIS — R73.9 HYPERGLYCEMIA: ICD-10-CM

## 2020-09-08 DIAGNOSIS — I10 BENIGN ESSENTIAL HTN: ICD-10-CM

## 2020-12-18 DIAGNOSIS — I10 BENIGN ESSENTIAL HTN: Primary | ICD-10-CM

## 2020-12-18 RX ORDER — HYDROCHLOROTHIAZIDE 12.5 MG/1
12.5 TABLET ORAL DAILY
Qty: 90 TABLET | Refills: 3 | Status: SHIPPED | OUTPATIENT
Start: 2020-12-18 | End: 2021-10-07

## 2020-12-23 DIAGNOSIS — I10 BENIGN ESSENTIAL HTN: Primary | ICD-10-CM

## 2020-12-23 RX ORDER — IRBESARTAN 150 MG/1
150 TABLET ORAL DAILY
Qty: 90 TABLET | Refills: 3 | Status: SHIPPED | OUTPATIENT
Start: 2020-12-23 | End: 2021-10-07

## 2021-02-19 ENCOUNTER — TELEPHONE (OUTPATIENT)
Dept: INTERNAL MEDICINE | Age: 71
End: 2021-02-19

## 2021-02-19 DIAGNOSIS — E78.5 HYPERLIPIDEMIA, UNSPECIFIED HYPERLIPIDEMIA TYPE: ICD-10-CM

## 2021-02-19 DIAGNOSIS — I10 BENIGN ESSENTIAL HTN: ICD-10-CM

## 2021-02-19 DIAGNOSIS — R73.09 HIGH GLUCOSE: ICD-10-CM

## 2021-02-19 DIAGNOSIS — Z00.00 HEALTHCARE MAINTENANCE: Primary | ICD-10-CM

## 2021-02-19 DIAGNOSIS — Z13.29 THYROID DISORDER SCREENING: ICD-10-CM

## 2021-02-19 NOTE — TELEPHONE ENCOUNTER
PT WILL BE DOING PHYSICAL LABS ONE WEEK PRIOR AT [facility], ORDERS NEED TO BE PLACED AND FAXED. PHYSICAL IS 9/7/21

## 2021-02-22 NOTE — TELEPHONE ENCOUNTER
The first set of labs I put in for [facility]...that was wrong. I deleted those orders and updated order to show external draw with [facility] resulting for insurance purpose per pt request.   Please sign orders for Aug 2021. MM

## 2021-03-19 ENCOUNTER — BULK ORDERING (OUTPATIENT)
Dept: CASE MANAGEMENT | Facility: OTHER | Age: 71
End: 2021-03-19

## 2021-03-19 DIAGNOSIS — Z23 IMMUNIZATION DUE: ICD-10-CM

## 2021-10-07 ENCOUNTER — OFFICE VISIT (OUTPATIENT)
Dept: INTERNAL MEDICINE | Age: 71
End: 2021-10-07

## 2021-10-07 VITALS
SYSTOLIC BLOOD PRESSURE: 130 MMHG | TEMPERATURE: 97.1 F | BODY MASS INDEX: 26.33 KG/M2 | HEIGHT: 63 IN | OXYGEN SATURATION: 99 % | WEIGHT: 148.6 LBS | DIASTOLIC BLOOD PRESSURE: 80 MMHG | HEART RATE: 85 BPM

## 2021-10-07 DIAGNOSIS — F17.210: ICD-10-CM

## 2021-10-07 DIAGNOSIS — Z12.31 ENCOUNTER FOR SCREENING MAMMOGRAM FOR MALIGNANT NEOPLASM OF BREAST: ICD-10-CM

## 2021-10-07 DIAGNOSIS — Z00.00 ENCOUNTER FOR ANNUAL PHYSICAL EXAM: ICD-10-CM

## 2021-10-07 DIAGNOSIS — I73.00 RAYNAUD'S PHENOMENON WITHOUT GANGRENE: ICD-10-CM

## 2021-10-07 DIAGNOSIS — I10 PRIMARY HYPERTENSION: Primary | ICD-10-CM

## 2021-10-07 DIAGNOSIS — F17.200 CURRENT EVERY DAY SMOKER: ICD-10-CM

## 2021-10-07 DIAGNOSIS — Z80.0 FAMILY HISTORY OF COLON CANCER: ICD-10-CM

## 2021-10-07 DIAGNOSIS — L98.9 SKIN LESION: ICD-10-CM

## 2021-10-07 DIAGNOSIS — Z12.11 SCREENING FOR MALIGNANT NEOPLASM OF COLON: ICD-10-CM

## 2021-10-07 PROCEDURE — 99397 PER PM REEVAL EST PAT 65+ YR: CPT | Performed by: NURSE PRACTITIONER

## 2021-10-07 PROCEDURE — 99213 OFFICE O/P EST LOW 20 MIN: CPT | Performed by: NURSE PRACTITIONER

## 2021-10-07 RX ORDER — NAPROXEN 500 MG/1
500 TABLET ORAL 2 TIMES DAILY PRN
Qty: 30 TABLET | Refills: 5 | Status: SHIPPED | OUTPATIENT
Start: 2021-10-07 | End: 2022-01-10

## 2021-10-07 RX ORDER — IRBESARTAN AND HYDROCHLOROTHIAZIDE 150; 12.5 MG/1; MG/1
1 TABLET, FILM COATED ORAL DAILY
Qty: 90 TABLET | Refills: 3 | Status: SHIPPED | OUTPATIENT
Start: 2021-10-07 | End: 2021-12-21 | Stop reason: SDUPTHER

## 2021-10-07 NOTE — PROGRESS NOTES
"[facility] [place] [facility]  MARGARITO Garvin      Katherine CAST Williamson / 70 y.o. / female  10/07/2021      VITALS     Visit Vitals  /80 (Cuff Size: Adult)   Pulse 85   Temp 97.1 °F (36.2 °C) (Temporal)   Ht 160 cm (63\")   Wt 67.4 kg (148 lb 9.6 oz)   SpO2 99%   BMI 26.32 kg/m²       BP Readings from Last 3 Encounters:   10/07/21 130/80   09/03/20 130/70   05/13/20 130/86     Wt Readings from Last 3 Encounters:   10/07/21 67.4 kg (148 lb 9.6 oz)   09/03/20 67.1 kg (148 lb)   05/13/20 67 kg (147 lb 11.3 oz)      Body mass index is 26.32 kg/m².    MEDICATIONS     Current Outpatient Medications   Medication Sig Dispense Refill   • Cholecalciferol (VITAMIN D) 1000 UNITS tablet Take 2,000 Units by mouth.     • cyanocobalamin (VITAMIN B-12) 500 MCG tablet Take  by mouth.     • naproxen (NAPROSYN) 500 MG tablet Take 1 tablet by mouth 2 (Two) Times a Day As Needed for Mild Pain . 30 tablet 5   • vitamin C (ASCORBIC ACID) 500 MG tablet Take 500 mg by mouth Daily.     • irbesartan-hydrochlorothiazide (Avalide) 150-12.5 MG tablet Take 1 tablet by mouth Daily. 90 tablet 3     No current facility-administered medications for this visit.       _____________________________________________________________________________________    CHIEF COMPLAINT     Establish Care, Annual Exam, and Hypertension      HISTORY OF PRESENT ILLNESS     Katherine presents for annual health maintenance visit.    · Last health maintenance visit: approximately 1 year ago  · General health: fair  · Lifestyle:  · Attempting to lose weight?: No   · Diet: eats decently  · Exercise: does not exercise  · Tobacco: Regular use (>1 pack/day)   · Alcohol: 5+ occasions/week  · Work: Retired  · Reproductive health:  · Sexually active?: No   · Sexual problems?: No problems  · Concern for STD?: No    · Sees Gynecologist?: No   · Vivienne/Postmenopausal?: Yes   · Domestic abuse concerns: No   · Depression Screening:      PHQ-2/PHQ-9 Depression Screening 10/7/2021   Little interest " or pleasure in doing things 0   Feeling down, depressed, or hopeless 0   Total Score 0         PHQ-2: 0 (Not depressed)   PHQ-9: 0 (Negative screening for depression)    Patient Care Team:  Zelalem Flor APRN as PCP - General (Internal Medicine)  Brandon Mitchell MD as Consulting Physician (Orthopedic Surgery)  ______________________________________________________________________    ALLERGIES  Allergies   Allergen Reactions   • Hydrocodone-Acetaminophen Itching        PFSH:     The following portions of the patient's history were reviewed and updated as appropriate: Allergies / Current Medications / Past Medical History / Surgical History / Social History / Family History    PROBLEM LIST   Patient Active Problem List   Diagnosis   • Benign essential HTN   • Tobacco abuse   • Dislocation of hip joint prosthesis (HCC)   • Fracture of proximal humerus   • Mechanical complication of internal orthopedic device (HCC)   • 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 Kaplan.  Follow-up in 5 years.   • EYE SURGERY     • JOINT REPLACEMENT  2005?    Left total hip replacement in 2005.  In December 2015 patient had left hip revision   • TONSILLECTOMY         SOCIAL HISTORY  Social History     Socioeconomic History   • Marital status:      Spouse name: Not on file   • Number of children: Not on file   • Years of education: Not on file   • Highest education level: Not on file   Tobacco Use   • Smoking status: Current Every Day Smoker     Packs/day: 0.50     Years: 51.00     Pack years: 25.50      Types: Cigarettes     Start date: 1970   • Smokeless tobacco: Never Used   Vaping Use   • Vaping Use: Never used   Substance and Sexual Activity   • Alcohol use: Yes     Alcohol/week: 30.0 standard drinks     Types: 30 Cans of beer per week   • Drug use: Never   • Sexual activity: Not Currently     Partners: Male     Birth control/protection: None       FAMILY HISTORY  Family History   Problem Relation Age of Onset   • Cancer Mother    • Breast cancer Mother    • Arthritis Mother    • Deep vein thrombosis Mother    • Heart attack Father    • Heart disease Father    • Heart attack Maternal Grandmother        IMMUNIZATION HISTORY  Immunization History   Administered Date(s) Administered   • COVID-19 (PFIZER) 03/08/2021, 03/29/2021   • Fluzone High Dose =>65 Years ([employer] ONLY) 10/18/2018, 10/02/2019, 09/23/2020, 09/25/2021   • Fluzone High-Dose 65+yrs 09/23/2020, 09/25/2021   • Pneumococcal Conjugate 13-Valent (PCV13) 11/22/2019   • Shingrix 09/23/2020, 09/25/2021   • Zostavax 09/23/2020   • Zoster, Unspecified 09/25/2021       ______________________________________________________________________    REVIEW OF SYSTEMS     Review of Systems   Constitutional: Negative.    HENT: Negative.    Eyes: Negative.    Respiratory: Negative.    Cardiovascular: Negative.         Cold fingertips/toes; discoloration    Gastrointestinal: Negative.    Endocrine: Negative.    Genitourinary: Negative.    Musculoskeletal: Negative.    Skin: Negative.    Allergic/Immunologic: Negative.    Neurological: Negative.    Hematological: Negative.    Psychiatric/Behavioral: Negative.      PHYSICAL EXAMINATION     Physical Exam  Constitutional:       Appearance: Normal appearance.   HENT:      Head: Normocephalic and atraumatic.      Right Ear: Tympanic membrane normal.      Left Ear: Tympanic membrane normal.      Nose: Nose normal.      Mouth/Throat:      Mouth: Mucous membranes are moist.      Pharynx: Oropharynx is clear.   Eyes:       Conjunctiva/sclera: Conjunctivae normal.      Pupils: Pupils are equal, round, and reactive to light.   Neck:      Vascular: No carotid bruit.   Cardiovascular:      Rate and Rhythm: Normal rate and regular rhythm.      Pulses: Normal pulses.           Carotid pulses are 2+ on the right side and 2+ on the left side.       Radial pulses are 2+ on the right side and 2+ on the left side.        Popliteal pulses are 2+ on the right side and 2+ on the left side.        Dorsalis pedis pulses are 2+ on the right side and 2+ on the left side.      Heart sounds: Normal heart sounds.   Pulmonary:      Effort: Pulmonary effort is normal.      Breath sounds: Normal breath sounds.   Abdominal:      Palpations: Abdomen is soft.      Tenderness: There is no right CVA tenderness or left CVA tenderness.   Genitourinary:     Comments: Has discontinued Pap smears.  Order for mammogram  Musculoskeletal:         General: Normal range of motion.      Cervical back: Normal range of motion.      Right lower leg: No edema.      Left lower leg: No edema.   Lymphadenopathy:      Cervical: No cervical adenopathy.   Skin:     General: Skin is dry.      Findings: Lesion present.      Comments: Cool to touch fingertips and toes; intermittent purpling in color   Neurological:      Mental Status: She is alert and oriented to person, place, and time.      Cranial Nerves: No cranial nerve deficit.      Sensory: No sensory deficit.      Motor: No weakness.      Gait: Gait normal.      Deep Tendon Reflexes: Babinski sign absent on the right side. Babinski sign absent on the left side.      Reflex Scores:       Patellar reflexes are 2+ on the right side and 2+ on the left side.  Psychiatric:         Thought Content: Thought content normal.         Judgment: Judgment normal.        REVIEWED DATA      Labs:    Lab Results   Component Value Date     04/18/2018    K 4.3 04/18/2018    CALCIUM 9.9 04/18/2018    AST 19 04/18/2018    ALT 19 04/18/2018     BUN 10 04/18/2018    CREATININE 0.76 04/18/2018    CREATININE 0.69 04/18/2017    CREATININE 0.66 10/03/2016    EGFRIFNONA 76 04/18/2018    EGFRIFAFRI 93 04/18/2018       Lab Results   Component Value Date    GLU 98 04/18/2018    HGBA1C 5.59 04/18/2018    HGBA1C 5.41 04/18/2017    HGBA1C 5.60 10/03/2016    TSH 1.160 04/18/2018    FREET4 1.27 04/18/2018       Lab Results   Component Value Date     (H) 04/18/2018    HDL 82 (H) 04/18/2018    TRIG 59 04/18/2018       No results found for: CLVG19KF     No results found for: WBC, HGB, MCV, PLT    No results found for: PROTEIN, GLUCOSEU, BLOODU, NITRITEU, LEUKOCYTESUR     No results found for: HEPCVIRUSABY    Imaging:        Medical Tests:        ASSESSMENT & PLAN     ANNUAL WELLNESS EXAM / PHYSICAL     Other medical problems addressed today:  Patient presents to [facility] new provider in the office along with annual physical.    Annual physical labs unremarkable:  Sugars in normal range.  No anemia or infection seen on CBC.  CMP shows stable kidney function, electrolytes and liver enzymes.  Hepatitis C virus antibody is negative.  Cholesterol looks good.  Thyroid is in normal range.  No significant infection seen on urinalysis.     Previous provider follow-up for hypertension: Currently well controlled with irbesartan 150 along with hydrochlorothiazide 12.5 mg daily.  No chest pain, shortness of breath, lower leg swelling or headache.    She does have multiple skin lesions in which she would like referral to dermatology.    Bilateral toe and finger coolness along with intermittent discoloration purple in color.  She is an every day smoker and has increased to a pack a day over the course the last year from half a pack.  No autoimmune disease in her family, ever she has had no work-up for scleroderma or lupus.  This is been occurring over the course the last 2 years.  Denies any claudication symptoms.     Summary/Discussion:     · Order for colonoscopy for  routine screening.  Last completed in 2016.  Parent history of colon cancer.  · Order for mammogram  · Referral to dermatology for skin lesion  · Start work-up for what sounds like Raynaud's phenomenon with JASSON.   · Order for low-dose chest CT for lung cancer screening  · Follow-up recommended in 1 year for annual physical with fasting labs and chronic medical follow-up, earlier if needed    Next Appointment with me: Visit date not found    Return in about 1 year (around 10/7/2022) for Annual physical, Next scheduled follow up.      HEALTHCARE MAINTENANCE ISSUES     Cancer Screening:  · Colon: Initial/Next screening at age: DUE NOW  · Repeat colon cancer screening: every 3-5 years  · Breast: Recommended monthly self exams; annual professional exam  · Mammogram: every 1 year  · Cervical: screening deferred by patient  · Skin: Monthly self skin examination, annual exam by health professional  · Lung: Order for low-dose CT  · Other:    Screening Labs & Tests:  · Lab results reviewed & discussed with the patient or test orders placed today.  · EKG:  · CV Screening: Lipid panel  · DEXA (65+ or postmenopausal with risk factors):   · HEP C (If born [birth year], or risk factors): Negative screen  · Other:     Immunization/Vaccinations (to be given today unless deferred by patient)  · Influenza: Patient had the flu shot this season  · Hepatitis A: Recommended here or at pharmacy  · Tetanus/Pertussis: Wants to get it later  · Pneumovax: Wants to get it later  · Shingles: Not needed at this time  · Other:     Lifestyle Counseling:  · Lifestyle Modifications: Improve dietary compliance and Quit smoking  · Safety Issues: Always wear seatbelt, Avoid texting while driving   · Use sunscreen, regular skin examination  · Recommended annual dental/vision examination.  · Emotional/Stress/Sleep: Reviewed and  given when appropriate      Health Maintenance   Topic Date Due   • TDAP/TD VACCINES (1 - Tdap) Never done   • LUNG CANCER  SCREENING  Never done   • Pneumococcal Vaccine 65+ (1 of 2 - PPSV23) 01/17/2020   • MAMMOGRAM  05/29/2020   • ANNUAL PHYSICAL  10/25/2020   • DXA SCAN  10/07/2021 (Originally 11/11/2018)   • LIPID PANEL  09/07/2022   • COLORECTAL CANCER SCREENING  10/24/2029   • HEPATITIS C SCREENING  Completed   • COVID-19 Vaccine  Completed   • INFLUENZA VACCINE  Completed   • ZOSTER VACCINE  Completed   • PAP SMEAR  Discontinued         Examiner was wearing KN95 mask, face shield and exam gloves during the entire duration of the visit. Patient was masked the entire time.   Minimum social distance of 6 ft maintained entire visit except if physical contact was necessary as documented.     **Dragon Disclaimer:   Much of this encounter note is an electronic transcription/translation of spoken language to printed text. The electronic translation of spoken language may permit erroneous, or at times, nonsensical words or phrases to be inadvertently transcribed. Although I have reviewed the note for such errors, some may still exist.

## 2021-10-20 ENCOUNTER — PREP FOR SURGERY (OUTPATIENT)
Dept: OTHER | Facility: HOSPITAL | Age: 71
End: 2021-10-20

## 2021-10-20 DIAGNOSIS — Z86.010 HISTORY OF COLON POLYPS: Primary | ICD-10-CM

## 2021-10-20 DIAGNOSIS — Z80.0 FAMILY HISTORY OF COLON CANCER IN MOTHER: ICD-10-CM

## 2021-10-20 PROBLEM — Z86.0100 HISTORY OF COLON POLYPS: Status: ACTIVE | Noted: 2021-10-20

## 2021-11-08 ENCOUNTER — HOSPITAL ENCOUNTER (OUTPATIENT)
Dept: CT IMAGING | Facility: HOSPITAL | Age: 71
Discharge: HOME | End: 2021-11-08
Admitting: NURSE PRACTITIONER

## 2021-11-08 DIAGNOSIS — F17.200 CURRENT EVERY DAY SMOKER: ICD-10-CM

## 2021-11-08 DIAGNOSIS — F17.210: ICD-10-CM

## 2021-11-08 PROCEDURE — 71271 CT THORAX LUNG CANCER SCR C-: CPT

## 2021-12-21 RX ORDER — IRBESARTAN AND HYDROCHLOROTHIAZIDE 150; 12.5 MG/1; MG/1
1 TABLET, FILM COATED ORAL DAILY
Qty: 90 TABLET | Refills: 3 | Status: SHIPPED | OUTPATIENT
Start: 2021-12-21 | End: 2023-01-09

## 2022-01-10 RX ORDER — NAPROXEN 500 MG/1
500 TABLET ORAL 2 TIMES DAILY PRN
Qty: 180 TABLET | Refills: 0 | Status: SHIPPED | OUTPATIENT
Start: 2022-01-10

## 2022-01-12 ENCOUNTER — HOSPITAL ENCOUNTER (OUTPATIENT)
Facility: HOSPITAL | Age: 72
Setting detail: HOSPITAL OUTPATIENT SURGERY
Discharge: HOME | End: 2022-01-12
Attending: COLON & RECTAL SURGERY | Admitting: COLON & RECTAL SURGERY

## 2022-01-12 ENCOUNTER — ANESTHESIA EVENT (OUTPATIENT)
Dept: GASTROENTEROLOGY | Facility: HOSPITAL | Age: 72
End: 2022-01-12

## 2022-01-12 ENCOUNTER — ANESTHESIA (OUTPATIENT)
Dept: GASTROENTEROLOGY | Facility: HOSPITAL | Age: 72
End: 2022-01-12

## 2022-01-12 VITALS
WEIGHT: 148 LBS | OXYGEN SATURATION: 97 % | SYSTOLIC BLOOD PRESSURE: 151 MMHG | BODY MASS INDEX: 26.22 KG/M2 | HEIGHT: 63 IN | DIASTOLIC BLOOD PRESSURE: 73 MMHG | HEART RATE: 75 BPM | RESPIRATION RATE: 16 BRPM

## 2022-01-12 DIAGNOSIS — Z86.010 HISTORY OF COLON POLYPS: ICD-10-CM

## 2022-01-12 DIAGNOSIS — Z80.0 FAMILY HISTORY OF COLON CANCER IN MOTHER: ICD-10-CM

## 2022-01-12 PROCEDURE — 88305 TISSUE EXAM BY PATHOLOGIST: CPT | Performed by: COLON & RECTAL SURGERY

## 2022-01-12 PROCEDURE — 25010000002 PROPOFOL 10 MG/ML EMULSION: Performed by: NURSE ANESTHETIST, CERTIFIED REGISTERED

## 2022-01-12 PROCEDURE — 45380 COLONOSCOPY AND BIOPSY: CPT | Performed by: COLON & RECTAL SURGERY

## 2022-01-12 PROCEDURE — 45385 COLONOSCOPY W/LESION REMOVAL: CPT | Performed by: COLON & RECTAL SURGERY

## 2022-01-12 RX ORDER — SODIUM CHLORIDE, SODIUM LACTATE, POTASSIUM CHLORIDE, CALCIUM CHLORIDE 600; 310; 30; 20 MG/100ML; MG/100ML; MG/100ML; MG/100ML
30 INJECTION, SOLUTION INTRAVENOUS CONTINUOUS PRN
Status: DISCONTINUED | OUTPATIENT
Start: 2022-01-12 | End: 2022-01-12 | Stop reason: HOSPADM

## 2022-01-12 RX ORDER — ONDANSETRON 2 MG/ML
4 INJECTION INTRAMUSCULAR; INTRAVENOUS ONCE AS NEEDED
Status: DISCONTINUED | OUTPATIENT
Start: 2022-01-12 | End: 2022-01-12 | Stop reason: HOSPADM

## 2022-01-12 RX ORDER — PROPOFOL 10 MG/ML
VIAL (ML) INTRAVENOUS AS NEEDED
Status: DISCONTINUED | OUTPATIENT
Start: 2022-01-12 | End: 2022-01-12 | Stop reason: SURG

## 2022-01-12 RX ORDER — PROPOFOL 10 MG/ML
VIAL (ML) INTRAVENOUS CONTINUOUS PRN
Status: DISCONTINUED | OUTPATIENT
Start: 2022-01-12 | End: 2022-01-12 | Stop reason: SURG

## 2022-01-12 RX ORDER — LIDOCAINE HYDROCHLORIDE 20 MG/ML
INJECTION, SOLUTION INFILTRATION; PERINEURAL AS NEEDED
Status: DISCONTINUED | OUTPATIENT
Start: 2022-01-12 | End: 2022-01-12 | Stop reason: SURG

## 2022-01-12 RX ADMIN — SODIUM CHLORIDE, POTASSIUM CHLORIDE, SODIUM LACTATE AND CALCIUM CHLORIDE 30 ML/HR: 600; 310; 30; 20 INJECTION, SOLUTION INTRAVENOUS at 08:26

## 2022-01-12 RX ADMIN — Medication 200 MCG/KG/MIN: at 08:50

## 2022-01-12 RX ADMIN — PROPOFOL 80 MG: 10 INJECTION, EMULSION INTRAVENOUS at 08:50

## 2022-01-12 RX ADMIN — LIDOCAINE HYDROCHLORIDE 60 MG: 20 INJECTION, SOLUTION INFILTRATION; PERINEURAL at 08:50

## 2022-01-12 NOTE — H&P
Katherine Williamson is a 71 y.o. female  who is referred by Colin Schroeder MD for a colonoscopy. She   has an indications: previous adenomatous polyp.     She denies any change in bowel function, melena, or hematochezia.    Past Medical History:   Diagnosis Date   • Arthritis    • Cataract    • History of colon polyps    • Hypertension        Past Surgical History:   Procedure Laterality Date   • COLONOSCOPY  10/2015    Fbrah-igotoapmsozj-Du. [facility].  Follow-up in 5 years.   • EYE SURGERY     • JOINT REPLACEMENT  2005?    Left total hip replacement in 2005.  In December 2015 patient had left hip revision   • TONSILLECTOMY         Medications Prior to Admission   Medication Sig Dispense Refill Last Dose   • Cholecalciferol (VITAMIN D) 1000 UNITS tablet Take 2,000 Units by mouth.   1/11/2022 at Unknown time   • cyanocobalamin (VITAMIN B-12) 500 MCG tablet Take  by mouth.   1/11/2022 at Unknown time   • irbesartan-hydrochlorothiazide (Avalide) 150-12.5 MG tablet Take 1 tablet by mouth Daily. 90 tablet 3 1/11/2022 at Unknown time   • vitamin C (ASCORBIC ACID) 500 MG tablet Take 500 mg by mouth Daily.   1/11/2022 at Unknown time   • naproxen (NAPROSYN) 500 MG tablet TAKE 1 TABLET BY MOUTH 2 (TWO) TIMES A DAY AS NEEDED FOR MILD PAIN . 180 tablet 0 1/6/2022       Allergies   Allergen Reactions   • Hydrocodone-Acetaminophen Itching       Family History   Problem Relation Age of Onset   • Cancer Mother    • Breast cancer Mother    • Arthritis Mother    • Deep vein thrombosis Mother    • Heart attack Father    • Heart disease Father    • Heart attack Maternal Grandmother    • Malig Hyperthermia Neg Hx        Social History     Socioeconomic History   • Marital status:    Tobacco Use   • Smoking status: Current Every Day Smoker     Packs/day: 0.50     Years: 51.00     Pack years: 25.50     Types: Cigarettes     Start date: 1970   • Smokeless tobacco: Never Used   Vaping Use   • Vaping Use: Never used   Substance and Sexual  Activity   • Alcohol use: Yes     Alcohol/week: 30.0 standard drinks     Types: 30 Cans of beer per week     Comment: 4-6 per day    • Drug use: Never   • Sexual activity: Not Currently     Partners: Male     Birth control/protection: None       Review of Systems   Gastrointestinal: Negative for abdominal pain, nausea and vomiting.   All other systems reviewed and are negative.      Vitals:    01/12/22 0818   BP: 142/84   Pulse: 77   Resp: 14   SpO2: 97%         Physical Exam  Constitutional:       Appearance: She is well-developed.   HENT:      Head: Normocephalic and atraumatic.   Eyes:      Pupils: Pupils are equal, round, and reactive to light.   Cardiovascular:      Rate and Rhythm: Regular rhythm.   Pulmonary:      Effort: Pulmonary effort is normal.   Abdominal:      General: There is no distension.      Palpations: Abdomen is soft.   Musculoskeletal:         General: Normal range of motion.   Skin:     General: Skin is warm and dry.   Neurological:      Mental Status: She is alert and oriented to person, place, and time.   Psychiatric:         Thought Content: Thought content normal.         Judgment: Judgment normal.           Assessment/Plan      indications: previous adenomatous polyp         I recommend colonoscopy.  I described risks, benefits of the procedure with the patient including but not limited to bleeding, infection, possibility of perforation and possible polypectomy. All of the patient's questions were answered and they would like to proceed with the above recommendations.

## 2022-01-12 NOTE — ANESTHESIA PREPROCEDURE EVALUATION
Anesthesia Evaluation     Patient summary reviewed                Airway   No difficulty expected  Dental      Pulmonary    Cardiovascular     Rhythm: regular    (+) hypertension,       Neuro/Psych  GI/Hepatic/Renal/Endo    (+)   liver disease,     Musculoskeletal     Abdominal    Substance History      OB/GYN          Other                        Anesthesia Plan    ASA 2     MAC       Anesthetic plan, all risks, benefits, and alternatives have been provided, discussed and informed consent has been obtained with: patient.

## 2022-01-12 NOTE — ANESTHESIA POSTPROCEDURE EVALUATION
Patient: Katherine Williamson    Procedure Summary     Date: 01/12/22 Room / Location: [facility] ENDOSCOPY 9 /  [facility]    Anesthesia Start: 0844 Anesthesia Stop: 0914    Procedure: COLONOSCOPY to cecum with biopsies / hot snare polypectomies (N/A ) Diagnosis:       History of colon polyps      Family history of colon cancer in mother      (History of colon polyps [Z86.010])      (Family history of colon cancer in mother [Z80.0])    Surgeons: Colin Schroeder MD Provider: Satnam Clark MD    Anesthesia Type: MAC ASA Status: 2          Anesthesia Type: MAC    Vitals  Vitals Value Taken Time   /73 01/12/22 0934   Temp     Pulse 75 01/12/22 0934   Resp 16 01/12/22 0934   SpO2 97 % 01/12/22 0934           Post Anesthesia Care and Evaluation    Patient location during evaluation: PACU  Patient participation: complete - patient participated  Level of consciousness: awake  Pain score: 1  Pain management: adequate  Airway patency: patent  Anesthetic complications: No anesthetic complications  PONV Status: none  Cardiovascular status: acceptable  Respiratory status: acceptable  Hydration status: acceptable

## 2022-01-13 LAB
LAB AP CASE REPORT: NORMAL
LAB AP CLINICAL INFORMATION: NORMAL
PATH REPORT.FINAL DX SPEC: NORMAL
PATH REPORT.GROSS SPEC: NORMAL

## 2022-01-19 ENCOUNTER — HOSPITAL ENCOUNTER (OUTPATIENT)
Dept: MAMMOGRAPHY | Facility: HOSPITAL | Age: 72
Discharge: HOME | End: 2022-01-19
Admitting: NURSE PRACTITIONER

## 2022-01-19 DIAGNOSIS — Z12.31 ENCOUNTER FOR SCREENING MAMMOGRAM FOR MALIGNANT NEOPLASM OF BREAST: ICD-10-CM

## 2022-01-19 PROCEDURE — 77063 BREAST TOMOSYNTHESIS BI: CPT

## 2022-01-19 PROCEDURE — 77067 SCR MAMMO BI INCL CAD: CPT

## 2022-06-06 ENCOUNTER — OFFICE VISIT (OUTPATIENT)
Dept: INTERNAL MEDICINE | Age: 72
End: 2022-06-06

## 2022-06-06 VITALS
WEIGHT: 148 LBS | BODY MASS INDEX: 26.22 KG/M2 | TEMPERATURE: 97.1 F | HEIGHT: 63 IN | SYSTOLIC BLOOD PRESSURE: 122 MMHG | HEART RATE: 98 BPM | OXYGEN SATURATION: 98 % | DIASTOLIC BLOOD PRESSURE: 80 MMHG

## 2022-06-06 DIAGNOSIS — J30.9 ALLERGIC RHINITIS, UNSPECIFIED SEASONALITY, UNSPECIFIED TRIGGER: Primary | ICD-10-CM

## 2022-06-06 PROCEDURE — 99213 OFFICE O/P EST LOW 20 MIN: CPT | Performed by: NURSE PRACTITIONER

## 2022-06-06 RX ORDER — FLUTICASONE PROPIONATE 50 MCG
SPRAY, SUSPENSION (ML) NASAL
COMMUNITY
Start: 2022-06-06

## 2022-06-06 RX ORDER — GUAIFENESIN AND DEXTROMETHORPHAN HYDROBROMIDE 600; 30 MG/1; MG/1
1 TABLET, EXTENDED RELEASE ORAL 2 TIMES DAILY PRN
Qty: 30 TABLET | Refills: 0 | Status: SHIPPED | OUTPATIENT
Start: 2022-06-06 | End: 2022-06-27

## 2022-06-06 NOTE — PROGRESS NOTES
"    I N T E R N A L  M E D I C I N E  MARGARITO Zelaya    ENCOUNTER DATE:  06/06/2022    Katherine Williamson / 71 y.o. / female      CHIEF COMPLAINT / REASON FOR OFFICE VISIT     Cough      ASSESSMENT & PLAN     Diagnoses and all orders for this visit:    1. Allergic rhinitis, unspecified seasonality, unspecified trigger (Primary)    Other orders  -     fluticasone (FLONASE) 50 MCG/ACT nasal spray; Administer 1spray in each nostril twice daily.  -     guaifenesin-dextromethorphan (MUCINEX DM)  MG tablet sustained-release 12 hour tablet; Take 1 tablet by mouth 2 (Two) Times a Day As Needed (cough) for up to 21 days.  Dispense: 30 tablet; Refill: 0         SUMMARY/DISCUSSION  • Discussion of Allergic Rhinitis and management, medications to pharmacy  • Follow up with MARGARITO Garvin as scheduled  • I spent 15 min in direct care of this patient on this date of service. This time includes times spent by me in the following activities: Preparing for the visit, obtaining and/or reviewing a separately obtained history, performing a medically appropriate examination and/or evaluation, reviewing medical records, reviewing tests, ordering medications, tests, or procedures, counseling and educating the patient, documenting information in the medical record and reviewing office note/correspondence from other providers.     Return in about 4 months (around 10/11/2022) for Annual physical.      VITAL SIGNS     Visit Vitals  /80   Pulse 98   Temp 97.1 °F (36.2 °C) (Temporal)   Ht 160 cm (62.99\")   Wt 67.1 kg (148 lb)   SpO2 98%   BMI 26.22 kg/m²           BP Readings from Last 3 Encounters:   06/06/22 122/80   01/12/22 151/73   10/07/21 130/80     Wt Readings from Last 3 Encounters:   06/06/22 67.1 kg (148 lb)   01/11/22 67.1 kg (148 lb)   10/07/21 67.4 kg (148 lb 9.6 oz)     Body mass index is 26.22 kg/m².    Blood pressure readings recorded on patient flowsheet:  No flowsheet data found.          MEDICATIONS AT THE TIME " OF OFFICE VISIT     Current Outpatient Medications on File Prior to Visit   Medication Sig Dispense Refill   • Cholecalciferol (VITAMIN D) 1000 UNITS tablet Take 2,000 Units by mouth.     • cyanocobalamin (VITAMIN B-12) 500 MCG tablet Take  by mouth.     • irbesartan-hydrochlorothiazide (Avalide) 150-12.5 MG tablet Take 1 tablet by mouth Daily. 90 tablet 3   • naproxen (NAPROSYN) 500 MG tablet TAKE 1 TABLET BY MOUTH 2 (TWO) TIMES A DAY AS NEEDED FOR MILD PAIN . 180 tablet 0   • vitamin C (ASCORBIC ACID) 500 MG tablet Take 500 mg by mouth Daily.       No current facility-administered medications on file prior to visit.        HISTORY OF PRESENT ILLNESS     71 year old female being seen today for Allergic Rhinitis.  States had been working out in the yard at her home and her daughter's for past two weeks. Positive for nasal drainage. Negative for congestion, sore throat, headache or sinus discomfort. Discussion of allergies and management. Encouraged daily anithistamine. Rx for flonase and Mucinex DM to pharmacy. Encouraged to call if not feeling better later this week.       Patient Care Team:  Zelalem Flor APRN as PCP - General (Internal Medicine)  Brandon Mitchell MD as Consulting Physician (Orthopedic Surgery)    REVIEW OF SYSTEMS     Review of Systems   Constitutional: Negative.    HENT: Positive for rhinorrhea.    Eyes: Negative.    Respiratory: Negative.    Cardiovascular: Negative.    Skin: Negative.    Allergic/Immunologic: Positive for environmental allergies.   Neurological: Negative.           PHYSICAL EXAMINATION     Physical Exam  Constitutional:       Appearance: Normal appearance.   HENT:      Head: Normocephalic.      Right Ear: Tympanic membrane normal.      Left Ear: Tympanic membrane normal.      Nose: Nose normal.      Mouth/Throat:      Mouth: Mucous membranes are moist.   Cardiovascular:      Rate and Rhythm: Normal rate and regular rhythm.      Pulses: Normal pulses.      Heart sounds:  Normal heart sounds.   Pulmonary:      Effort: Pulmonary effort is normal.      Breath sounds: Normal breath sounds.   Musculoskeletal:      Cervical back: Normal range of motion and neck supple.   Skin:     General: Skin is warm and dry.   Neurological:      Mental Status: She is alert and oriented to person, place, and time.           REVIEWED DATA     Labs:     Lab Results   Component Value Date     04/18/2018    K 4.3 04/18/2018    CALCIUM 9.9 04/18/2018    AST 19 04/18/2018    ALT 19 04/18/2018    BUN 10 04/18/2018    CREATININE 0.76 04/18/2018    CREATININE 0.69 04/18/2017    CREATININE 0.66 10/03/2016    EGFRIFNONA 76 04/18/2018    EGFRIFAFRI 93 04/18/2018       Lab Results   Component Value Date    HGBA1C 5.59 04/18/2018    HGBA1C 5.41 04/18/2017    HGBA1C 5.60 10/03/2016       Lab Results   Component Value Date     (H) 04/18/2018     (H) 04/18/2017     (H) 10/03/2016    HDL 82 (H) 04/18/2018     (H) 04/18/2017    TRIG 59 04/18/2018    TRIG 49 04/18/2017       Lab Results   Component Value Date    TSH 1.160 04/18/2018    FREET4 1.27 04/18/2018       No results found for: WBC, HGB, PLT    No results found for: MALBCRERATIO       Imaging:           Medical Tests:           Summary of old records / correspondence / consultant report:           Request outside records:             *Examiner was wearing [id number] mask and eye protection during the entire duration of the visit. Patient was masked the entire time. Minimum social distance of 6 ft maintained entire visit except if physical contact was necessary as documented.       Template created by MARGARITO Zelaya

## 2022-08-30 ENCOUNTER — OFFICE VISIT (OUTPATIENT)
Dept: INTERNAL MEDICINE | Age: 72
End: 2022-08-30

## 2022-08-30 VITALS
HEART RATE: 107 BPM | SYSTOLIC BLOOD PRESSURE: 128 MMHG | HEIGHT: 63 IN | DIASTOLIC BLOOD PRESSURE: 74 MMHG | TEMPERATURE: 97.1 F | WEIGHT: 149 LBS | BODY MASS INDEX: 26.4 KG/M2 | OXYGEN SATURATION: 98 %

## 2022-08-30 DIAGNOSIS — N64.4 NIPPLE PAIN: Primary | ICD-10-CM

## 2022-08-30 DIAGNOSIS — Z78.0 POSTMENOPAUSAL: ICD-10-CM

## 2022-08-30 PROCEDURE — 90471 IMMUNIZATION ADMIN: CPT | Performed by: NURSE PRACTITIONER

## 2022-08-30 PROCEDURE — 90677 PCV20 VACCINE IM: CPT | Performed by: NURSE PRACTITIONER

## 2022-08-30 PROCEDURE — 91305 COVID-19 (PFIZER) 12+ YRS: CPT | Performed by: NURSE PRACTITIONER

## 2022-08-30 PROCEDURE — 99213 OFFICE O/P EST LOW 20 MIN: CPT | Performed by: NURSE PRACTITIONER

## 2022-08-30 PROCEDURE — 0051A COVID-19 (PFIZER) 12+ YRS: CPT | Performed by: NURSE PRACTITIONER

## 2022-08-30 NOTE — PROGRESS NOTES
"Today has not been as bad of the day yesterday yesterday was terrible    I N T E R N A L  M E D I C I N MAIRA FIGUEROATOMEKA STANTONMARGARITO      ENCOUNTER DATE:  08/30/2022    Katherine Williamson / 71 y.o. / female      CHIEF COMPLAINT / REASON FOR OFFICE VISIT     Breast Problem (Sharp pain in nipple hourly at times x2 wks, no pain in last 3-4 days, nipple discolored at this time)      ASSESSMENT & PLAN     1. Nipple pain  - Suspect likely nipple vasospasm, but with family history of breast cancer we will perform diagnostic mammogram with ultrasound if needed  - Mammo diagnostic digital tomosynthesis left w CAD; Future  - US breast left complete; Future    2. Postmenopausal  - DEXA Bone Density Axial; Future    Orders Placed This Encounter   Procedures   • DEXA Bone Density Axial   • Mammo diagnostic digital tomosynthesis left w CAD   • US breast left complete   • COVID-19 Vaccine (Pfizer) Gray Cap   • Pneumococcal Conjugate Vaccine 20-Valent (PCV20)     No orders of the defined types were placed in this encounter.      SUMMARY/DISCUSSION  Follow-up as scheduled, earlier if needed    Next Appointment with me: 10/11/2022    Return for Next scheduled follow up.      VITAL SIGNS     Visit Vitals  /74 (Cuff Size: Adult)   Pulse 107   Temp 97.1 °F (36.2 °C) (Temporal)   Ht 160 cm (62.99\")   Wt 67.6 kg (149 lb)   SpO2 98%   BMI 26.40 kg/m²       Wt Readings from Last 3 Encounters:   08/30/22 67.6 kg (149 lb)   06/06/22 67.1 kg (148 lb)   01/11/22 67.1 kg (148 lb)     Body mass index is 26.4 kg/m².      MEDICATIONS AT THE TIME OF OFFICE VISIT     Current Outpatient Medications on File Prior to Visit   Medication Sig   • Cholecalciferol (VITAMIN D) 1000 UNITS tablet Take 2,000 Units by mouth.   • cyanocobalamin (VITAMIN B-12) 500 MCG tablet Take  by mouth.   • fluticasone (FLONASE) 50 MCG/ACT nasal spray Administer 1spray in each nostril twice daily.   • irbesartan-hydrochlorothiazide (Avalide) 150-12.5 MG tablet Take 1 tablet by mouth " Daily.   • naproxen (NAPROSYN) 500 MG tablet TAKE 1 TABLET BY MOUTH 2 (TWO) TIMES A DAY AS NEEDED FOR MILD PAIN .   • vitamin C (ASCORBIC ACID) 500 MG tablet Take 500 mg by mouth Daily.     No current facility-administered medications on file prior to visit.       HISTORY OF PRESENT ILLNESS     Patient presents with sharp pain in her left nipple over the course the last 2 weeks almost on an hourly basis with mild erythema which has completely resolved in the last 3 to 4 days.  She does have a family history of breast cancer, no discharge from the nipple, and retraction, breast pain or tenderness.  No masses felt on home breast exam.  Last mammogram completed in January 2022 with benign findings.    REVIEW OF SYSTEMS     Constitutional neg except per HPI   Resp neg  CV neg  Chest left nipple pain    PHYSICAL EXAMINATION     Physical Exam  Constitutional  No distress  Chest negative left nipple retraction, discharge, no masses felt in all 4 quadrants along with the axilla, no discoloration on today's exam    REVIEWED DATA     Labs:         Imaging:           Medical Tests:             Summary of old records / correspondence / consultant report:           Request outside records:           *Examiner was wearing medical surgical mask, face shield and exam gloves during the entire duration of the visit. Patient was masked the entire time.   Minimum social distance of 6 ft maintained entire visit except if physical contact was necessary as documented.     Dictated utilizing Dragon dictation

## 2022-09-22 ENCOUNTER — HOSPITAL ENCOUNTER (OUTPATIENT)
Dept: MAMMOGRAPHY | Facility: HOSPITAL | Age: 72
Discharge: HOME | End: 2022-09-22

## 2022-09-22 ENCOUNTER — HOSPITAL ENCOUNTER (OUTPATIENT)
Dept: ULTRASOUND IMAGING | Facility: HOSPITAL | Age: 72
Discharge: HOME | End: 2022-09-22

## 2022-09-22 DIAGNOSIS — N64.4 NIPPLE PAIN: ICD-10-CM

## 2022-09-22 PROCEDURE — G0279 TOMOSYNTHESIS, MAMMO: HCPCS

## 2022-09-22 PROCEDURE — 76642 ULTRASOUND BREAST LIMITED: CPT

## 2022-09-22 PROCEDURE — 77065 DX MAMMO INCL CAD UNI: CPT

## 2022-09-28 DIAGNOSIS — Z00.00 HEALTHCARE MAINTENANCE: Primary | ICD-10-CM

## 2022-10-05 ENCOUNTER — OFFICE VISIT (OUTPATIENT)
Dept: INTERNAL MEDICINE | Age: 72
End: 2022-10-05

## 2022-10-05 VITALS
SYSTOLIC BLOOD PRESSURE: 124 MMHG | TEMPERATURE: 96.9 F | BODY MASS INDEX: 26.4 KG/M2 | HEIGHT: 63 IN | HEART RATE: 106 BPM | WEIGHT: 149 LBS | OXYGEN SATURATION: 98 % | DIASTOLIC BLOOD PRESSURE: 80 MMHG

## 2022-10-05 DIAGNOSIS — N64.4 NIPPLE PAIN: ICD-10-CM

## 2022-10-05 DIAGNOSIS — R61 NIGHT SWEATS: Primary | ICD-10-CM

## 2022-10-05 DIAGNOSIS — Z12.2 SCREENING FOR LUNG CANCER: ICD-10-CM

## 2022-10-05 DIAGNOSIS — Z00.00 HEALTHCARE MAINTENANCE: ICD-10-CM

## 2022-10-05 DIAGNOSIS — Z80.3 FAMILY HISTORY OF BREAST CANCER: ICD-10-CM

## 2022-10-05 DIAGNOSIS — Z87.891 SMOKING HISTORY: ICD-10-CM

## 2022-10-05 PROCEDURE — 99214 OFFICE O/P EST MOD 30 MIN: CPT

## 2022-10-05 NOTE — PROGRESS NOTES
"    I N T E R N A L  M E D I C I N E  MARGARITO Murphy    ENCOUNTER DATE:  10/05/2022    Katherine Williamson / 71 y.o. / female      CHIEF COMPLAINT / REASON FOR OFFICE VISIT     Night Sweats      ASSESSMENT & PLAN     Diagnoses and all orders for this visit:    1. Night sweats (Primary)  -     CT Chest Low Dose Wo; Future    2. Nipple pain  -     MRI Breast Left With & Without Contrast; Future    3. Family history of breast cancer  -     MRI Breast Left With & Without Contrast; Future    4. Smoking history  -     CT Chest Low Dose Wo; Future    5. Screening for lung cancer  -     CT Chest Low Dose Wo; Future         SUMMARY/DISCUSSION  • She is agreeable to receive Breast MRI and update CT Chest lung cancer screening.  • Agreeable to have labs done at [facility], and will have results faxed to us for review.  • Follow up with Zelalem PIMENTEL as scheduled next week.      Next Appointment with me: Visit date not found    Return for Next scheduled follow up.      VITAL SIGNS     Visit Vitals  /80   Pulse 106   Temp 96.9 °F (36.1 °C)   Ht 160 cm (62.99\")   Wt 67.6 kg (149 lb)   SpO2 98%   BMI 26.40 kg/m²             Wt Readings from Last 3 Encounters:   10/05/22 67.6 kg (149 lb)   08/30/22 67.6 kg (149 lb)   06/06/22 67.1 kg (148 lb)     Body mass index is 26.4 kg/m².        MEDICATIONS AT THE TIME OF OFFICE VISIT     Current Outpatient Medications on File Prior to Visit   Medication Sig Dispense Refill   • Cholecalciferol (VITAMIN D) 1000 UNITS tablet Take 2,000 Units by mouth.     • cyanocobalamin (VITAMIN B-12) 500 MCG tablet Take  by mouth.     • fluticasone (FLONASE) 50 MCG/ACT nasal spray Administer 1spray in each nostril twice daily.     • irbesartan-hydrochlorothiazide (Avalide) 150-12.5 MG tablet Take 1 tablet by mouth Daily. 90 tablet 3   • naproxen (NAPROSYN) 500 MG tablet TAKE 1 TABLET BY MOUTH 2 (TWO) TIMES A DAY AS NEEDED FOR MILD PAIN . 180 tablet 0   • vitamin C (ASCORBIC ACID) 500 MG tablet Take 500 mg by " "mouth Daily.       No current facility-administered medications on file prior to visit.        HISTORY OF PRESENT ILLNESS     Still experiencing recurrent, fleeting episodes of \"sharp\" pain, \"like a needle,\" in left nipple.  Family history of breast cancer in her mom (diagnosed in her 80s) and great grandmother with radical mastectomy.  No discharge, retraction, breast pain or tenderness.  Mammogram and ultrasound updated on September 22, 2022 without significant findings.  Recommend follow up with MRI breast.       Over the last month, she reports recurrent episodes of waking up in the middle of the night drenched with sweat.  Episodes occur 3-4 times each week.  No recent weight loss, fever/ chills.  Denies any new medications or supplements.  She is a current smoker, with 50 year smoking history.  Does have a chronic cough, but denies any changes.  CT Low Dose Lung CA screening due in November 2022.    Up to date on colonoscopy.    09/22/2022 Mammogram & Ultrasound  IMPRESSION:  There are no findings suspicious for malignancy in the left  breast. Routine followup mammography is recommended. If there is  persistent clinical concern regarding the patient's left nipple  discomfort/pain, then further evaluation with an MRI of the breast  without and with contrast should be considered.      Patient Care Team:  Zelalem Flor APRN as PCP - General (Internal Medicine)  Brandon Mitchell MD as Consulting Physician (Orthopedic Surgery)    REVIEW OF SYSTEMS     Review of Systems   Constitutional: Positive for diaphoresis (Night sweats). Negative for chills, fever and unexpected weight change.   Respiratory: Positive for cough (Chronic). Negative for chest tightness and shortness of breath.    Cardiovascular: Negative for chest pain, palpitations and leg swelling.   Gastrointestinal: Negative for abdominal pain, constipation, diarrhea, nausea and vomiting.   Genitourinary: Negative for decreased urine volume and " difficulty urinating.   Neurological: Negative for dizziness, weakness, light-headedness and headaches.   Psychiatric/Behavioral: The patient is not nervous/anxious.           PHYSICAL EXAMINATION     Physical Exam  Vitals reviewed.   Constitutional:       General: She is not in acute distress.     Appearance: Normal appearance. She is not ill-appearing, toxic-appearing or diaphoretic.   HENT:      Head: Normocephalic and atraumatic.   Cardiovascular:      Rate and Rhythm: Normal rate and regular rhythm.      Heart sounds: Normal heart sounds.   Pulmonary:      Effort: Pulmonary effort is normal.      Breath sounds: Normal breath sounds.   Neurological:      Mental Status: She is alert and oriented to person, place, and time. Mental status is at baseline.   Psychiatric:         Mood and Affect: Mood normal.         Behavior: Behavior normal.         Thought Content: Thought content normal.         Judgment: Judgment normal.           REVIEWED DATA     Labs:           Imaging:            Medical Tests:           Summary of old records / correspondence / consultant report:           Request outside records:

## 2022-10-11 ENCOUNTER — OFFICE VISIT (OUTPATIENT)
Dept: INTERNAL MEDICINE | Age: 72
End: 2022-10-11

## 2022-10-11 VITALS
SYSTOLIC BLOOD PRESSURE: 124 MMHG | HEIGHT: 63 IN | OXYGEN SATURATION: 97 % | HEART RATE: 86 BPM | DIASTOLIC BLOOD PRESSURE: 74 MMHG | TEMPERATURE: 97.3 F | WEIGHT: 151 LBS | BODY MASS INDEX: 26.75 KG/M2

## 2022-10-11 DIAGNOSIS — Z12.31 ENCOUNTER FOR SCREENING MAMMOGRAM FOR MALIGNANT NEOPLASM OF BREAST: ICD-10-CM

## 2022-10-11 DIAGNOSIS — R61 UNEXPLAINED NIGHT SWEATS: ICD-10-CM

## 2022-10-11 DIAGNOSIS — Z82.49 FAMILY HISTORY OF HEART DISEASE: ICD-10-CM

## 2022-10-11 DIAGNOSIS — Z00.00 ENCOUNTER FOR ANNUAL PHYSICAL EXAM: Primary | ICD-10-CM

## 2022-10-11 DIAGNOSIS — E83.52 HYPERCALCEMIA: ICD-10-CM

## 2022-10-11 PROCEDURE — 99397 PER PM REEVAL EST PAT 65+ YR: CPT | Performed by: NURSE PRACTITIONER

## 2022-10-11 PROCEDURE — 93000 ELECTROCARDIOGRAM COMPLETE: CPT | Performed by: NURSE PRACTITIONER

## 2022-10-11 PROCEDURE — 99214 OFFICE O/P EST MOD 30 MIN: CPT | Performed by: NURSE PRACTITIONER

## 2022-10-11 NOTE — PROGRESS NOTES
"[facility]  MARGARITO Garvin      Katherine CAST Williamson / 71 y.o. / female  10/11/2022      VITALS     Visit Vitals  /74 (Cuff Size: Adult)   Pulse 86   Temp 97.3 °F (36.3 °C) (Temporal)   Ht 160 cm (62.99\")   Wt 68.5 kg (151 lb)   SpO2 97%   BMI 26.76 kg/m²       BP Readings from Last 3 Encounters:   10/11/22 124/74   10/05/22 124/80   08/30/22 128/74     Wt Readings from Last 3 Encounters:   10/11/22 68.5 kg (151 lb)   10/05/22 67.6 kg (149 lb)   08/30/22 67.6 kg (149 lb)      Body mass index is 26.76 kg/m².    MEDICATIONS     Current Outpatient Medications   Medication Sig Dispense Refill   • Cholecalciferol (VITAMIN D) 1000 UNITS tablet Take 2,000 Units by mouth.     • cyanocobalamin (VITAMIN B-12) 500 MCG tablet Take  by mouth.     • fluticasone (FLONASE) 50 MCG/ACT nasal spray Administer 1spray in each nostril twice daily.     • irbesartan-hydrochlorothiazide (Avalide) 150-12.5 MG tablet Take 1 tablet by mouth Daily. 90 tablet 3   • naproxen (NAPROSYN) 500 MG tablet TAKE 1 TABLET BY MOUTH 2 (TWO) TIMES A DAY AS NEEDED FOR MILD PAIN . 180 tablet 0   • vitamin C (ASCORBIC ACID) 500 MG tablet Take 500 mg by mouth Daily.       No current facility-administered medications for this visit.       _____________________________________________________________________________________    CHIEF COMPLAINT     Annual Exam, Night Sweats, and Abnormal Calcium      HISTORY OF PRESENT ILLNESS     Katherine presents for annual health maintenance visit.    · Last health maintenance visit: approximately 1 year ago  · General health: good  · Lifestyle:  · Attempting to lose weight?: No   · Diet: eats decently  · Exercise: exercises 0-1 day/week   · Tobacco: Regular use (~1 pack/day)   · Alcohol: 4-5 occasions/week and 5+ drinks/occasion  · Work: Retired  · Reproductive health:  · Sexually active?: No   · Sexual problems?: No problems  · Concern for STD?: No    · Sees Gynecologist?: No   · Vivienne/Postmenopausal?: Yes "   · Domestic abuse concerns: No   · Depression Screening:      PHQ-2/PHQ-9 Depression Screening 10/11/2022   Retired PHQ-9 Total Score -   Retired Total Score -   Little Interest or Pleasure in Doing Things 0-->not at all   Feeling Down, Depressed or Hopeless 0-->not at all   PHQ-9: Brief Depression Severity Measure Score 0         PHQ-2: 0 (Not depressed)   PHQ-9: 0 (Negative screening for depression)    Patient Care Team:  Zelalem Flor APRN as PCP - General (Internal Medicine)  Brandon Mitchell MD as Consulting Physician (Orthopedic Surgery)  ______________________________________________________________________    ALLERGIES  Allergies   Allergen Reactions   • Hydrocodone-Acetaminophen Itching        PFSH:     The following portions of the patient's history were reviewed and updated as appropriate: Allergies / Current Medications / Past Medical History / Surgical History / Social History / Family History    PROBLEM LIST   Patient Active Problem List   Diagnosis   • Benign essential HTN   • Tobacco abuse   • Dislocation of hip joint prosthesis (HCC)   • Fracture of proximal humerus   • Mechanical complication of internal orthopedic device (HCC)   • 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 JOSEPH LAU   • Hypertension        SURGICAL HISTORY  Past Surgical History:   Procedure Laterality Date   • COLONOSCOPY  10/2015    Ptxft-[id number]-Qr. Kaplan.  Follow-up in 5 years.   • COLONOSCOPY N/A 1/12/2022    2 BENIGN POLYPS IN DESCENDING, 5 MM BENIGN POLYP IN SIGMOID, MULTIPLE SMALL AND LARGE  DIVERTICULA IN SIGMOID, RESCOPE IN 3 YRS, DR. JOSEPH LAU AT [facility]   • EYE SURGERY     • JOINT REPLACEMENT  2005?    Left total hip replacement in 2005.  In December 2015 patient had left hip revision   • TONSILLECTOMY         SOCIAL HISTORY  Social History     Socioeconomic History   • Marital status:    Tobacco Use   • Smoking status: Every Day     Packs/day: 1.00     Years: 51.00     Pack years: 51.00     Types: Cigarettes     Start date: 1/1/1970   • Smokeless tobacco: Never   Vaping Use   • Vaping Use: Never used   Substance and Sexual Activity   • Alcohol use: Yes     Alcohol/week: 30.0 standard drinks     Types: 30 Cans of beer per week     Comment: 4-6 per day    • Drug use: Never   • Sexual activity: Not Currently     Partners: Male     Birth control/protection: None       FAMILY HISTORY  Family History   Problem Relation Age of Onset   • Cancer Mother    • Breast cancer Mother    • Arthritis Mother    • Deep vein thrombosis Mother    • Heart attack Father    • Heart disease Father         Had quadruple bypass   • Heart attack Maternal Grandmother    • Heart disease Maternal Grandmother    • Malig Hyperthermia Neg Hx        IMMUNIZATION HISTORY  Immunization History   Administered Date(s) Administered   • COVID-19 (PFIZER) PURPLE CAP 03/08/2021, 03/29/2021, 01/21/2022   • Covid-19 (Pfizer) Gray Cap 01/21/2022, 08/30/2022   • FLUAD TRI 65YR+ 09/25/2021   • Fluzone High Dose =>65 Years (Vax[employer] ONLY) 10/18/2018, 10/02/2019, 09/23/2020, 09/25/2021   • Fluzone High-Dose 65+yrs 09/23/2020, 09/25/2021   • Pneumococcal Conjugate 13-Valent (PCV13) 11/22/2019   • Pneumococcal Conjugate 20-Valent (PCV20) 08/30/2022   • Shingrix 09/23/2020, 09/25/2021, 09/25/2021   • Zostavax 09/23/2020   • Zoster, Unspecified 09/25/2021       ______________________________________________________________________    REVIEW OF SYSTEMS     Review of Systems   Constitutional: Negative.    HENT: Negative.    Eyes: Negative.     Respiratory: Negative.    Cardiovascular: Negative.    Gastrointestinal: Negative.    Endocrine:        Night sweats    Genitourinary: Negative.    Musculoskeletal: Negative.    Skin: Negative.    Allergic/Immunologic: Negative.    Neurological: Negative.    Hematological: Negative.    Psychiatric/Behavioral: Negative.      PHYSICAL EXAMINATION     Physical Exam  Constitutional:       Appearance: Normal appearance.   HENT:      Head: Normocephalic and atraumatic.      Right Ear: Tympanic membrane normal.      Left Ear: Tympanic membrane normal.      Nose: Nose normal. No congestion.      Mouth/Throat:      Mouth: Mucous membranes are moist.      Pharynx: Oropharynx is clear.   Eyes:      Conjunctiva/sclera: Conjunctivae normal.      Pupils: Pupils are equal, round, and reactive to light.   Neck:      Thyroid: No thyromegaly.      Vascular: No carotid bruit.   Cardiovascular:      Rate and Rhythm: Normal rate and regular rhythm.      Pulses: Normal pulses.      Heart sounds: Normal heart sounds.   Pulmonary:      Effort: Pulmonary effort is normal.      Breath sounds: Normal breath sounds.   Abdominal:      General: There is no distension.      Palpations: Abdomen is soft.      Tenderness: There is no abdominal tenderness. There is no right CVA tenderness, left CVA tenderness, guarding or rebound.   Genitourinary:     Comments: Discontinued pelvic exam, neg  symptoms   Musculoskeletal:         General: Normal range of motion.      Cervical back: Normal range of motion.      Right lower leg: No edema.      Left lower leg: No edema.   Lymphadenopathy:      Head:      Right side of head: No submental, submandibular, tonsillar, preauricular, posterior auricular or occipital adenopathy.      Left side of head: No submental, submandibular, tonsillar, preauricular, posterior auricular or occipital adenopathy.      Cervical: No cervical adenopathy.      Right cervical: No superficial, deep or posterior cervical  adenopathy.     Left cervical: No superficial, deep or posterior cervical adenopathy.      Upper Body:      Right upper body: No supraclavicular or axillary adenopathy.      Left upper body: No supraclavicular or axillary adenopathy.      Lower Body: No right inguinal adenopathy. No left inguinal adenopathy.   Skin:     General: Skin is warm and dry.   Neurological:      Mental Status: She is alert and oriented to person, place, and time.      Cranial Nerves: No cranial nerve deficit.      Motor: No weakness.      Coordination: Coordination normal.      Gait: Gait normal.      Deep Tendon Reflexes: Babinski sign absent on the right side. Babinski sign absent on the left side.      Reflex Scores:       Patellar reflexes are 2+ on the right side and 2+ on the left side.  Psychiatric:         Mood and Affect: Mood normal.         Behavior: Behavior normal.         Thought Content: Thought content normal.         Judgment: Judgment normal.        REVIEWED DATA      Labs:    Lab Results   Component Value Date     04/18/2018    K 4.3 04/18/2018    CALCIUM 9.9 04/18/2018    AST 19 04/18/2018    ALT 19 04/18/2018    BUN 10 04/18/2018    CREATININE 0.76 04/18/2018    CREATININE 0.69 04/18/2017    CREATININE 0.66 10/03/2016    EGFRIFNONA 76 04/18/2018    EGFRIFAFRI 93 04/18/2018       Lab Results   Component Value Date    HGBA1C 5.59 04/18/2018    HGBA1C 5.41 04/18/2017    HGBA1C 5.60 10/03/2016    TSH 1.160 04/18/2018    FREET4 1.27 04/18/2018       Lab Results   Component Value Date     (H) 04/18/2018    HDL 82 (H) 04/18/2018    TRIG 59 04/18/2018       No results found for: BAQS75AR     No results found for: WBC, HGB, MCV, PLT    No results found for: PROTEIN, GLUCOSEU, BLOODU, NITRITEU, LEUKOCYTESUR     No results found for: HEPCVIRUSABY    Imaging:        Medical Tests:        ASSESSMENT & PLAN     ANNUAL WELLNESS EXAM / PHYSICAL     Other medical problems addressed today:    Patient presents for annual  physical along with complaints of night sweats approximately 3-4 times weekly not correlating with spicy foods, although she does admit to some increased GERD, but does not correlate on the dates that night sweats occur.  She has been afebrile, checking her temperature at home.  No chest pain, shortness of breath or cough above her normal smoker's cough.  Continues to smoke approximately 1 pack daily.  Not ready for smoking cessation.  Colonoscopy in January 2022 with recall in 3 years.  CT chest low-dose is scheduled for November 18, prior CT chest low-dose last year within normal.  Scheduled for MRI of the breast as she is having vasospasms within the next month.  Ultrasound of the breast benign.  All annual fasting labs were unremarkable except for elevated calcium.  She is on hydrochlorothiazide and taking calcium supplementation.  She was having intermittent numbness and tingling to the left upper extremity.  EKG normal sinus rhythm today without any noted ischemia.  No new medications or over-the-counter supplements.  No limb swelling, sore throat or recent illness.  No abdominal pain or changes in stool.  No vaginal discharge, bleeding, hematuria or changes in urination.  No increasing fatigue.  No nausea or vomiting.  No new sexual partners, low risk for HIV.    Summary/Discussion:     · Family history of heart disease with night sweats, although EKG is within normal received CT cardiac calcium score.  Work-up by cardiology in 2020 with stress test and echocardiogram that was overall stable.  · Will add additional parathyroid work-up with high calcium, stop calcium supplement.  We will additionally add a.m. cortisol, ESR and CBC that was not performed during annual physical labs to assess for blood disorders  · Recommend obtaining flu vaccination at pharmacy, declines in office today  · Follow-up in 3 months for chronic medical, 1 year annual physical    Next Appointment with me: Visit date not  found    Return in about 3 months (around 1/11/2023) for Next scheduled follow up; 1 year physical .      HEALTHCARE MAINTENANCE ISSUES     Cancer Screening:  · Colon: Initial/Next screening at age: CURRENT  · Repeat colon cancer screening: every 3 years  · Breast: Recommended monthly self exams; annual professional exam  · Mammogram: every 1 year  · Cervical: screening deferred by patient  · Skin: Monthly self skin examination, annual exam by health professional  · Lung: Meets criteria for lung cancer screening. Low dose CT recommended and patient wishes to proceed.   · Other:    Screening Labs & Tests:  · Lab results reviewed & discussed with the patient or test orders placed today.  EKG:   ECG 12 Lead    Date/Time: 10/11/2022 10:22 AM  Performed by: Zelalem Flor APRN  Authorized by: Zelalem Flor APRN   Previous ECG: no previous ECG available  Rhythm: sinus rhythm  Conduction: conduction normal  ST Segments: ST segments normal  T Waves: T waves normal    Clinical impression: normal ECG        ·   · CV Screening: Lipid panel  · DEXA (65+ or postmenopausal with risk factors):   · HEP C (If born [birth year]10-4[birth year], or risk factors): Negative screen  · Other:     Immunization/Vaccinations (to be given today unless deferred by patient)  · Influenza: Patient plans to get the flu vaccine at pharmacy/work/outside facility  · Hepatitis A: Not needed at this time  · Tetanus/Pertussis: Recommended here or at pharmacy  · Pneumovax: Up to date  · Shingles: Up to date  · Other:     Lifestyle Counseling:  · Lifestyle Modifications: Improve dietary compliance, Increase intensity/regularity of aerobic exercise and Quit smoking  · Safety Issues: Always wear seatbelt, Avoid texting while driving   · Use sunscreen, regular skin examination  · Recommended annual dental/vision examination.  · Emotional/Stress/Sleep: Reviewed and  given when appropriate      Health Maintenance   Topic Date Due   • DXA SCAN  11/11/2018   • TDAP/TD  VACCINES (1 - Tdap) 10/11/2022 (Originally 12/2/1969)   • INFLUENZA VACCINE  03/31/2023 (Originally 8/1/2022)   • COVID-19 Vaccine (5 - Booster for Pfizer series) 10/25/2022   • LUNG CANCER SCREENING  11/08/2022   • LIPID PANEL  10/05/2023   • ANNUAL PHYSICAL  10/12/2023   • MAMMOGRAM  09/22/2024   • COLORECTAL CANCER SCREENING  01/12/2032   • HEPATITIS C SCREENING  Completed   • Pneumococcal Vaccine 65+  Completed   • PAP SMEAR  Discontinued   • ZOSTER VACCINE  Discontinued         Examiner was wearing KN95 mask, face shield and exam gloves during the entire duration of the visit. Patient was masked the entire time.   Minimum social distance of 6 ft maintained entire visit except if physical contact was necessary as documented.     **Dragon Disclaimer:   Much of this encounter note is an electronic transcription/translation of spoken language to printed text. The electronic translation of spoken language may permit erroneous, or at times, nonsensical words or phrases to be inadvertently transcribed. Although I have reviewed the note for such errors, some may still exist.

## 2022-11-01 ENCOUNTER — APPOINTMENT (OUTPATIENT)
Dept: MRI IMAGING | Facility: HOSPITAL | Age: 72
End: 2022-11-01

## 2022-11-15 ENCOUNTER — TELEPHONE (OUTPATIENT)
Dept: INTERNAL MEDICINE | Age: 72
End: 2022-11-15

## 2022-11-15 NOTE — TELEPHONE ENCOUNTER
Ok for hub to read imaging report.  ----- Message from MARGARITO Murphy sent at 11/15/2022 12:01 PM EST -----  Please call pt.    CT Chest was a benign lung cancer screening, with mild emphysematous changes in the upper lobes along with a faint 3 mm nodule in the right middle lobe, which the radiologist identified as being stable.      It is very important to reduce cigarette intake/ pursue smoking cessation.  Please let us know if you would like to discuss further.    Please follow up with Zelalem PIMENTEL as scheduled.

## 2022-11-17 ENCOUNTER — HOSPITAL ENCOUNTER (OUTPATIENT)
Dept: MRI IMAGING | Facility: HOSPITAL | Age: 72
Discharge: HOME | End: 2022-11-17

## 2022-11-17 DIAGNOSIS — Z80.3 FAMILY HISTORY OF BREAST CANCER: ICD-10-CM

## 2022-11-17 DIAGNOSIS — N64.4 NIPPLE PAIN: ICD-10-CM

## 2022-11-17 PROCEDURE — A9577 INJ MULTIHANCE: HCPCS

## 2022-11-17 PROCEDURE — 82565 ASSAY OF CREATININE: CPT

## 2022-11-17 PROCEDURE — 0 GADOBENATE DIMEGLUMINE 529 MG/ML SOLUTION

## 2022-11-17 PROCEDURE — 77049 MRI BREAST C-+ W/CAD BI: CPT

## 2022-11-17 RX ADMIN — GADOBENATE DIMEGLUMINE 14 ML: 529 INJECTION, SOLUTION INTRAVENOUS at 15:35

## 2022-11-18 ENCOUNTER — APPOINTMENT (OUTPATIENT)
Dept: CT IMAGING | Facility: HOSPITAL | Age: 72
End: 2022-11-18

## 2022-11-18 ENCOUNTER — TELEPHONE (OUTPATIENT)
Dept: INTERNAL MEDICINE | Age: 72
End: 2022-11-18

## 2022-11-18 LAB — CREAT BLDA-MCNC: 0.7 MG/DL (ref 0.6–1.3)

## 2022-11-18 NOTE — TELEPHONE ENCOUNTER
Lm for pt to call for MRI results ok for hub to read. ----- Message from MARGARITO Murphy sent at 11/18/2022 11:15 AM EST -----  Please call pt.    Breast MRI showed no suspicious findings for malignancy in either breast.  No abnormality to explain the left nipple pain was seen.  Has the left nipple pain resolved and/ or improved?    Recommend following up with Zelalem PIMENTEL as scheduled.

## 2022-12-02 ENCOUNTER — APPOINTMENT (OUTPATIENT)
Dept: CT IMAGING | Facility: HOSPITAL | Age: 72
End: 2022-12-02

## 2023-01-09 ENCOUNTER — OFFICE VISIT (OUTPATIENT)
Dept: INTERNAL MEDICINE | Age: 73
End: 2023-01-09
Payer: COMMERCIAL

## 2023-01-09 VITALS
WEIGHT: 147.8 LBS | HEART RATE: 103 BPM | SYSTOLIC BLOOD PRESSURE: 108 MMHG | DIASTOLIC BLOOD PRESSURE: 66 MMHG | BODY MASS INDEX: 26.19 KG/M2 | TEMPERATURE: 96.6 F | HEIGHT: 63 IN | OXYGEN SATURATION: 98 %

## 2023-01-09 DIAGNOSIS — I73.9 VASOSPASM: Primary | ICD-10-CM

## 2023-01-09 DIAGNOSIS — I95.1 ORTHOSTATIC HYPOTENSION: ICD-10-CM

## 2023-01-09 DIAGNOSIS — N64.4 BREAST PAIN, LEFT: ICD-10-CM

## 2023-01-09 PROCEDURE — 99213 OFFICE O/P EST LOW 20 MIN: CPT | Performed by: NURSE PRACTITIONER

## 2023-01-09 RX ORDER — IRBESARTAN 150 MG/1
150 TABLET ORAL NIGHTLY
Qty: 90 TABLET | Refills: 1 | Status: SHIPPED | OUTPATIENT
Start: 2023-01-09

## 2023-01-09 NOTE — PROGRESS NOTES
I N T E R N A L  M E D I C I N E  TOMEKA STANTON, MARGARITO      ENCOUNTER DATE:  01/09/2023    Katherine Williamson / 72 y.o. / female      CHIEF COMPLAINT / REASON FOR OFFICE VISIT     Hypertension, Dizziness, and Breast Problem      ASSESSMENT & PLAN     Problem List Items Addressed This Visit    None  Visit Diagnoses     Vasospasm (HCC)    -  Primary    Relevant Orders    Ambulatory Referral to Obstetrics / Gynecology    Breast pain, left        Relevant Orders    Ambulatory Referral to Obstetrics / Gynecology    Orthostatic hypotension            Orders Placed This Encounter   Procedures   • Ambulatory Referral to Obstetrics / Gynecology     New Medications Ordered This Visit   Medications   • irbesartan (Avapro) 150 MG tablet     Sig: Take 1 tablet by mouth Every Night.     Dispense:  90 tablet     Refill:  1       SUMMARY/DISCUSSION  • Patient was orthostatic blood pressures today, will irbesartan hydrochlorothiazide and discontinue hydrochlorothiazide portion.  • Follow-up in 4 months for chronic medical, earlier if needed    Next Appointment with me: Visit date not found    Return in about 4 months (around 5/9/2023) for Next scheduled follow up.      VITAL SIGNS     Visit Vitals  /66 (BP Location: Left arm, Patient Position: Lying)   Pulse 103   Temp 96.6 °F (35.9 °C) (Temporal)   Ht 160 cm (62.99\")   Wt 67 kg (147 lb 12.8 oz)   SpO2 98%   BMI 26.19 kg/m²     Wt Readings from Last 3 Encounters:   01/09/23 67 kg (147 lb 12.8 oz)   10/11/22 68.5 kg (151 lb)   10/05/22 67.6 kg (149 lb)     Body mass index is 26.19 kg/m².      MEDICATIONS AT THE TIME OF OFFICE VISIT     Current Outpatient Medications on File Prior to Visit   Medication Sig   • Cholecalciferol (VITAMIN D) 1000 UNITS tablet Take 1,000 Units by mouth.   • cyanocobalamin (VITAMIN B-12) 500 MCG tablet Take  by mouth.   • fluticasone (FLONASE) 50 MCG/ACT nasal spray Administer 1spray in each nostril twice daily.   • naproxen (NAPROSYN) 500 MG tablet TAKE  1 TABLET BY MOUTH 2 (TWO) TIMES A DAY AS NEEDED FOR MILD PAIN .   • vitamin C (ASCORBIC ACID) 500 MG tablet Take 500 mg by mouth Daily.   • [DISCONTINUED] irbesartan-hydrochlorothiazide (Avalide) 150-12.5 MG tablet Take 1 tablet by mouth Daily.     No current facility-administered medications on file prior to visit.          HISTORY OF PRESENT ILLNESS     Patient has been experiencing left breast vasospasms for almost a year at this point.  Ultrasound of the breast benign.  MRI of the breast unremarkable.  Symptoms do not correlate with stress.  Not ready for smoking cessation.  No longer has OB/GYN.  No nipple discharge, significant tenderness to the site or erythema.    She does admit to the last couple weeks having lightheadedness or near syncope episodes when standing or changing positions too suddenly.  She is lost 4 pounds since October, often does not drink enough fluids and is started exercising regularly.  No chest pain, heart palpitations, headache, confusion or shortness of breath.    REVIEW OF SYSTEMS     Constitutional neg except per HPI   Resp neg  CV neg  Breast vasospasm  Neuro lightheadedness    PHYSICAL EXAMINATION     Physical Exam  Constitutional  No distress  Cardiovascular Rate  normal . Rhythm: regular . Heart sounds:  normal  Pulmonary/Chest  Effort normal. Breath sounds:  normal  Neuro normal gait  Psychiatric  Alert. Judgment and thought content normal. Mood normal     REVIEWED DATA     Labs:   Lab Results   Component Value Date    GLUCOSE 98 04/18/2018    BUN 10 04/18/2018    CREATININE 0.70 11/17/2022    EGFRIFNONA 76 04/18/2018    EGFRIFAFRI 93 04/18/2018    BCR 13.2 04/18/2018    K 4.3 04/18/2018    CO2 24.8 04/18/2018    CALCIUM 9.9 04/18/2018    PROTENTOTREF 7.2 04/18/2018    ALBUMIN 4.20 04/18/2018    LABIL2 1.4 04/18/2018    AST 19 04/18/2018    ALT 19 04/18/2018   No results found for: WBC, HGB, HCT, MCV, PLT   Lab Results   Component Value Date    TSH 1.160 04/18/2018     Lab  Results   Component Value Date    CHLPL 205 (H) 04/18/2018    TRIG 59 04/18/2018    HDL 82 (H) 04/18/2018     (H) 04/18/2018     Lab Results   Component Value Date    HGBA1C 5.59 04/18/2018     Imaging:           Medical Tests:           Summary of old records / correspondence / consultant report:           Request outside records:             *Examiner was wearing medical surgical mask  **Dragon dictation used for documentation

## 2023-01-23 ENCOUNTER — HOSPITAL ENCOUNTER (OUTPATIENT)
Dept: MAMMOGRAPHY | Facility: HOSPITAL | Age: 73
Discharge: HOME | End: 2023-01-23
Admitting: NURSE PRACTITIONER
Payer: COMMERCIAL

## 2023-01-23 DIAGNOSIS — Z12.31 ENCOUNTER FOR SCREENING MAMMOGRAM FOR MALIGNANT NEOPLASM OF BREAST: ICD-10-CM

## 2023-01-23 PROCEDURE — 77067 SCR MAMMO BI INCL CAD: CPT

## 2023-01-23 PROCEDURE — 77063 BREAST TOMOSYNTHESIS BI: CPT

## 2023-01-24 ENCOUNTER — OFFICE VISIT (OUTPATIENT)
Dept: OBSTETRICS AND GYNECOLOGY | Facility: CLINIC | Age: 73
End: 2023-01-24
Payer: COMMERCIAL

## 2023-01-24 VITALS
BODY MASS INDEX: 26.29 KG/M2 | SYSTOLIC BLOOD PRESSURE: 134 MMHG | DIASTOLIC BLOOD PRESSURE: 70 MMHG | WEIGHT: 148.4 LBS | HEIGHT: 63 IN

## 2023-01-24 DIAGNOSIS — N64.4 MASTALGIA IN FEMALE: Primary | ICD-10-CM

## 2023-01-24 PROCEDURE — 99203 OFFICE O/P NEW LOW 30 MIN: CPT | Performed by: OBSTETRICS & GYNECOLOGY

## 2023-01-24 NOTE — PROGRESS NOTES
"        REASON FOR FOLLOWUP/CHIEF COMPLAINT: Left nipple pain     HISTORY OF PRESENT ILLNESS: Patient first noticed this in April 2022.  She will have periodic sharp shooting discomfort emanating from the left nipple that will not radiate anywhere else and will last only a few seconds to a minute or so.  There is nothing that she does to alleviate it and denies any precipitating factor.  She denies any trauma.  She denies any nipple discharge or nipple bleeding.  She had a normal breast MRI in November 2022 and she had a normal mammogram yesterday.  Both of these readings are in epic.      Past Medical History, Past Surgical History, Social History, Family History have been reviewed and are without significant changes except as mentioned.    Review of Systems   Review of Systems   Constitutional: Negative for fatigue and fever.   Respiratory: Negative for cough and shortness of breath.    Genitourinary: Negative for vaginal bleeding.   Skin:        Left breast pain   Patient reports that she is not currently experiencing any symptoms of urinary incontinence.      Medications:  The current medication list was reviewed in the EMR    ALLERGIES:    Allergies   Allergen Reactions   • Hydrocodone-Acetaminophen Itching              Vitals:    01/24/23 0957   BP: 134/70   Weight: 67.3 kg (148 lb 6.4 oz)   Height: 160 cm (62.99\")     Physical Exam    CONSTITUTIONAL:  Vital signs reviewed.  No distress, looks comfortable.  BREAST: Breasts are symmetric and there are no skin changes.  Both nipples are prominent but again without lesion or erythema or open areas or any drainage.  Deeper breast exam is negative for mass or tenderness.  There is no tenderness on direct palpation of the left nipple on today's exam.  SKIN:  Warm.  No rashes.  PSYCHIATRIC:  Normal judgment and insight.  Normal mood and affect.       RECENT LABS:No results found for: WBC, HGB, PLT    ASSESSMENT/PLAN:  Katherine Williamson [unfilled]   1.  Left breast mastalgia. "  Uncertain of etiology but did discuss use of vitamin E and evening primrose oil as potential sources even though prospective control data do not show any absolute benefit.  2.  Positive family history of breast cancer.  Reassured by negative MRI and negative mammogram both in the past several months.  Did discuss referral to high risk breast clinic for ongoing care and potentially second opinion on this given her family history of breast cancer.

## 2023-01-26 ENCOUNTER — PATIENT MESSAGE (OUTPATIENT)
Dept: OBSTETRICS AND GYNECOLOGY | Facility: CLINIC | Age: 73
End: 2023-01-26
Payer: COMMERCIAL

## 2023-01-26 ENCOUNTER — PATIENT ROUNDING (BHMG ONLY) (OUTPATIENT)
Dept: OBSTETRICS AND GYNECOLOGY | Facility: CLINIC | Age: 73
End: 2023-01-26
Payer: COMMERCIAL

## 2023-03-17 ENCOUNTER — TELEPHONE (OUTPATIENT)
Dept: INTERNAL MEDICINE | Age: 73
End: 2023-03-17
Payer: COMMERCIAL

## 2023-03-17 NOTE — TELEPHONE ENCOUNTER
CALL 1;  [facility] ADVISING TO RESCHEDULE APPT WITH SCHEDULING OR CALL OFFICE TO CANCEL ORDER IF NOT GOING TO HAVE IT DONE

## 2023-04-03 ENCOUNTER — HOSPITAL ENCOUNTER (OUTPATIENT)
Dept: CT IMAGING | Facility: HOSPITAL | Age: 73
Discharge: HOME | End: 2023-04-03
Payer: COMMERCIAL

## 2023-04-03 ENCOUNTER — OFFICE VISIT (OUTPATIENT)
Dept: INTERNAL MEDICINE | Age: 73
End: 2023-04-03
Payer: COMMERCIAL

## 2023-04-03 ENCOUNTER — LAB (OUTPATIENT)
Dept: LAB | Facility: HOSPITAL | Age: 73
End: 2023-04-03
Payer: COMMERCIAL

## 2023-04-03 VITALS
OXYGEN SATURATION: 92 % | WEIGHT: 140 LBS | BODY MASS INDEX: 24.8 KG/M2 | SYSTOLIC BLOOD PRESSURE: 122 MMHG | TEMPERATURE: 96.2 F | HEART RATE: 86 BPM | DIASTOLIC BLOOD PRESSURE: 74 MMHG | HEIGHT: 63 IN

## 2023-04-03 DIAGNOSIS — M54.6 ACUTE RIGHT-SIDED THORACIC BACK PAIN: Primary | ICD-10-CM

## 2023-04-03 DIAGNOSIS — R79.89 POSITIVE D DIMER: ICD-10-CM

## 2023-04-03 DIAGNOSIS — Z87.891 HISTORY OF SMOKING: ICD-10-CM

## 2023-04-03 DIAGNOSIS — R07.1 CHEST PAIN ON BREATHING: ICD-10-CM

## 2023-04-03 LAB
BILIRUB BLD-MCNC: NEGATIVE MG/DL
CLARITY, POC: CLEAR
COLOR UR: NORMAL
D DIMER PPP FEU-MCNC: 0.96 MCGFEU/ML (ref 0–0.7)
GLUCOSE UR STRIP-MCNC: NEGATIVE MG/DL
KETONES UR QL: NEGATIVE
LEUKOCYTE EST, POC: NEGATIVE
NITRITE UR-MCNC: NEGATIVE MG/ML
PH UR: 7 [PH] (ref 5–8)
PROT UR STRIP-MCNC: NEGATIVE MG/DL
RBC # UR STRIP: NEGATIVE /UL
SP GR UR: 1.01 (ref 1–1.03)
UROBILINOGEN UR QL: NORMAL

## 2023-04-03 PROCEDURE — 85379 FIBRIN DEGRADATION QUANT: CPT

## 2023-04-03 PROCEDURE — 71275 CT ANGIOGRAPHY CHEST: CPT

## 2023-04-03 PROCEDURE — 82565 ASSAY OF CREATININE: CPT

## 2023-04-03 PROCEDURE — 36415 COLL VENOUS BLD VENIPUNCTURE: CPT

## 2023-04-03 PROCEDURE — 25510000001 IOPAMIDOL PER 1 ML

## 2023-04-03 RX ORDER — METHYLPREDNISOLONE 4 MG/1
TABLET ORAL
Qty: 21 TABLET | Refills: 0 | OUTPATIENT
Start: 2023-04-03 | End: 2023-04-10

## 2023-04-03 RX ADMIN — IOPAMIDOL 85 ML: 755 INJECTION, SOLUTION INTRAVENOUS at 15:16

## 2023-04-03 NOTE — PROGRESS NOTES
"    I N T E R N A L  M E D I C I NICOLE E  MARGARITO Murphy    ENCOUNTER DATE:  04/03/2023    Katherine Williamson / 72 y.o. / female      CHIEF COMPLAINT / REASON FOR OFFICE VISIT     Back Pain      ASSESSMENT & PLAN     Diagnoses and all orders for this visit:    1. Acute right-sided thoracic back pain (Primary)  -     ECG 12 Lead  -     D-dimer, Quantitative  -     POC Urinalysis Dipstick  -     CT Angiogram Chest    2. Chest pain on breathing  -     CT Angiogram Chest    3. Positive D dimer  -     CT Angiogram Chest    4. History of smoking  -     CT Angiogram Chest         SUMMARY/DISCUSSION    Pt is not experiencing any shortness of breath, but does report right posterior thoracic area pleuritic type pain.  She is agreeable to rule out PE with D-dimer blood test.  If positive, she understands recommendation is to undergo CT Angiogram Chest rule out.  She is aware to visit ER for any chest pain, dyspnea or worsening symptoms.       UPDATE: Positive D-dimer blood test.  STAT CT Angiogram Chest ordered.      ECG 12 Lead    Date/Time: 4/3/2023 12:07 PM  Performed by: Kaykay Tena APRN  Authorized by: Kaykay Tena APRN   Comparison: compared with previous ECG from 10/11/2022  Rhythm: sinus rhythm          Next Appointment with me: Visit date not found    Return for Next scheduled follow up.      VITAL SIGNS     Visit Vitals  /74   Pulse 86   Temp 96.2 °F (35.7 °C)   Ht 160 cm (62.99\")   Wt 63.5 kg (140 lb)   SpO2 92%   BMI 24.81 kg/m²             Wt Readings from Last 3 Encounters:   04/03/23 63.5 kg (140 lb)   01/24/23 67.3 kg (148 lb 6.4 oz)   01/09/23 67 kg (147 lb 12.8 oz)     Body mass index is 24.81 kg/m².        MEDICATIONS AT THE TIME OF OFFICE VISIT     Current Outpatient Medications on File Prior to Visit   Medication Sig Dispense Refill   • Cholecalciferol (VITAMIN D) 1000 UNITS tablet Take 1 tablet by mouth.     • cyanocobalamin (VITAMIN B-12) 500 MCG tablet Take  by mouth.     • fluticasone (FLONASE) " "50 MCG/ACT nasal spray Administer 1spray in each nostril twice daily. (Patient taking differently: Administer 1spray in each nostril twice daily.  prn)     • irbesartan (Avapro) 150 MG tablet Take 1 tablet by mouth Every Night. 90 tablet 1   • naproxen (NAPROSYN) 500 MG tablet TAKE 1 TABLET BY MOUTH 2 (TWO) TIMES A DAY AS NEEDED FOR MILD PAIN . (Patient taking differently: Take 1 tablet by mouth 2 (Two) Times a Day As Needed for Mild Pain. Prn) 180 tablet 0   • vitamin C (ASCORBIC ACID) 500 MG tablet Take 1 tablet by mouth Daily.       No current facility-administered medications on file prior to visit.        HISTORY OF PRESENT ILLNESS     Symptoms started acutely on Friday afternoon with right posterior \"sharp\" thoracic pain that is worse with inspiration and coughing.  Symptoms were also made worse after a period of pulling weeds this weekend.  Pain is constant and is reduced when she lays on her right side for sleeping.  She has taken acetaminophen without any significant improvement in symptoms.  Symptoms started after she had helped her friend clean her home.  She denies any trauma or injury.  She is a smoker with a chronic cough.  No prior history of blood clots.  Denies dyspnea.  Denies any pain with pressure or palpation to right posterior thoracic area.      She is actively working towards weight loss and has lost 8 pounds since January 2023.        No prior history of back pain.  Denies numbness, tingling, weakness, bowel/ bladder changes.      CT Chest Low Dose on 11/11/2022 showed stable, faint 3 mm nodule in the right middle lobe, along with mild emphysematous changes in the upper lobes.      Patient Care Team:  Zelalem Flor APRN as PCP - General (Internal Medicine)  Brandon Mitchell MD as Consulting Physician (Orthopedic Surgery)    REVIEW OF SYSTEMS     Review of Systems   Constitutional: Negative for chills, fever and unexpected weight change.   Respiratory: Positive for cough. Negative for " chest tightness and shortness of breath.    Cardiovascular: Negative for chest pain, palpitations and leg swelling.   Musculoskeletal: Positive for back pain (Right thoracic).   Neurological: Negative for dizziness, weakness, light-headedness and headaches.   Psychiatric/Behavioral: The patient is not nervous/anxious.           PHYSICAL EXAMINATION     Physical Exam  Vitals reviewed.   Constitutional:       General: She is not in acute distress.     Appearance: Normal appearance. She is not ill-appearing, toxic-appearing or diaphoretic.   HENT:      Head: Normocephalic and atraumatic.   Cardiovascular:      Rate and Rhythm: Normal rate and regular rhythm.      Heart sounds: Normal heart sounds.   Pulmonary:      Effort: Pulmonary effort is normal. No respiratory distress.      Breath sounds: Normal breath sounds. No wheezing, rhonchi or rales.   Musculoskeletal:      Thoracic back: Normal. No tenderness or bony tenderness. Normal range of motion.        Back:       Right lower leg: No edema.      Left lower leg: No edema.   Neurological:      Mental Status: She is alert and oriented to person, place, and time. Mental status is at baseline.   Psychiatric:         Mood and Affect: Mood normal.         Behavior: Behavior normal.         Thought Content: Thought content normal.         Judgment: Judgment normal.             REVIEWED DATA     Labs:           Imaging:            Medical Tests:           Summary of old records / correspondence / consultant report:           Request outside records:

## 2023-04-04 LAB — CREAT BLDA-MCNC: 0.6 MG/DL (ref 0.6–1.3)

## 2023-04-05 DIAGNOSIS — Z87.891 HISTORY OF SMOKING: Primary | ICD-10-CM

## 2023-04-05 DIAGNOSIS — R91.1 LUNG NODULE SEEN ON IMAGING STUDY: ICD-10-CM

## 2023-04-10 ENCOUNTER — HOSPITAL ENCOUNTER (EMERGENCY)
Facility: HOSPITAL | Age: 73
Discharge: HOME | End: 2023-04-10
Attending: EMERGENCY MEDICINE | Admitting: EMERGENCY MEDICINE
Payer: COMMERCIAL

## 2023-04-10 VITALS
HEART RATE: 91 BPM | BODY MASS INDEX: 23.22 KG/M2 | OXYGEN SATURATION: 90 % | TEMPERATURE: 98.9 F | HEIGHT: 64 IN | SYSTOLIC BLOOD PRESSURE: 140 MMHG | RESPIRATION RATE: 16 BRPM | DIASTOLIC BLOOD PRESSURE: 81 MMHG | WEIGHT: 136 LBS

## 2023-04-10 DIAGNOSIS — M54.6 ACUTE RIGHT-SIDED THORACIC BACK PAIN: Primary | ICD-10-CM

## 2023-04-10 LAB
ALBUMIN SERPL-MCNC: 4.1 G/DL (ref 3.5–5.2)
ALBUMIN/GLOB SERPL: 1.4 G/DL
ALP SERPL-CCNC: 147 U/L (ref 39–117)
ALT SERPL W P-5'-P-CCNC: 27 U/L (ref 1–33)
ANION GAP SERPL CALCULATED.3IONS-SCNC: 9.8 MMOL/L (ref 5–15)
AST SERPL-CCNC: 18 U/L (ref 1–32)
BASOPHILS # BLD AUTO: 0.04 10*3/MM3 (ref 0–0.2)
BASOPHILS NFR BLD AUTO: 0.3 % (ref 0–1.5)
BILIRUB SERPL-MCNC: 0.4 MG/DL (ref 0–1.2)
BILIRUB UR QL STRIP: NEGATIVE
BUN SERPL-MCNC: 14 MG/DL (ref 8–23)
BUN/CREAT SERPL: 23.3 (ref 7–25)
CALCIUM SPEC-SCNC: 9.8 MG/DL (ref 8.6–10.5)
CHLORIDE SERPL-SCNC: 100 MMOL/L (ref 98–107)
CLARITY UR: CLEAR
CO2 SERPL-SCNC: 26.2 MMOL/L (ref 22–29)
COLOR UR: YELLOW
CREAT SERPL-MCNC: 0.6 MG/DL (ref 0.57–1)
DEPRECATED RDW RBC AUTO: 42.5 FL (ref 37–54)
EGFRCR SERPLBLD CKD-EPI 2021: 96.7 ML/MIN/1.73
EOSINOPHIL # BLD AUTO: 0.31 10*3/MM3 (ref 0–0.4)
EOSINOPHIL NFR BLD AUTO: 2.4 % (ref 0.3–6.2)
ERYTHROCYTE [DISTWIDTH] IN BLOOD BY AUTOMATED COUNT: 12.4 % (ref 12.3–15.4)
GLOBULIN UR ELPH-MCNC: 3 GM/DL
GLUCOSE SERPL-MCNC: 112 MG/DL (ref 65–99)
GLUCOSE UR STRIP-MCNC: NEGATIVE MG/DL
HCT VFR BLD AUTO: 42.4 % (ref 34–46.6)
HGB BLD-MCNC: 15 G/DL (ref 12–15.9)
HGB UR QL STRIP.AUTO: NEGATIVE
IMM GRANULOCYTES # BLD AUTO: 0.06 10*3/MM3 (ref 0–0.05)
IMM GRANULOCYTES NFR BLD AUTO: 0.5 % (ref 0–0.5)
KETONES UR QL STRIP: NEGATIVE
LEUKOCYTE ESTERASE UR QL STRIP.AUTO: NEGATIVE
LIPASE SERPL-CCNC: 24 U/L (ref 13–60)
LYMPHOCYTES # BLD AUTO: 2.07 10*3/MM3 (ref 0.7–3.1)
LYMPHOCYTES NFR BLD AUTO: 15.9 % (ref 19.6–45.3)
MCH RBC QN AUTO: 32.9 PG (ref 26.6–33)
MCHC RBC AUTO-ENTMCNC: 35.4 G/DL (ref 31.5–35.7)
MCV RBC AUTO: 93 FL (ref 79–97)
MONOCYTES # BLD AUTO: 0.86 10*3/MM3 (ref 0.1–0.9)
MONOCYTES NFR BLD AUTO: 6.6 % (ref 5–12)
NEUTROPHILS NFR BLD AUTO: 74.3 % (ref 42.7–76)
NEUTROPHILS NFR BLD AUTO: 9.69 10*3/MM3 (ref 1.7–7)
NITRITE UR QL STRIP: NEGATIVE
NRBC BLD AUTO-RTO: 0.1 /100 WBC (ref 0–0.2)
PH UR STRIP.AUTO: 7 [PH] (ref 5–8)
PLATELET # BLD AUTO: 297 10*3/MM3 (ref 140–450)
PMV BLD AUTO: 10 FL (ref 6–12)
POTASSIUM SERPL-SCNC: 4.6 MMOL/L (ref 3.5–5.2)
PROT SERPL-MCNC: 7.1 G/DL (ref 6–8.5)
PROT UR QL STRIP: NEGATIVE
QT INTERVAL: 352 MS
RBC # BLD AUTO: 4.56 10*6/MM3 (ref 3.77–5.28)
SODIUM SERPL-SCNC: 136 MMOL/L (ref 136–145)
SP GR UR STRIP: 1.01 (ref 1–1.03)
TROPONIN T SERPL HS-MCNC: <6 NG/L
UROBILINOGEN UR QL STRIP: NORMAL
WBC NRBC COR # BLD: 13.03 10*3/MM3 (ref 3.4–10.8)

## 2023-04-10 PROCEDURE — 93010 ELECTROCARDIOGRAM REPORT: CPT | Performed by: INTERNAL MEDICINE

## 2023-04-10 PROCEDURE — 99283 EMERGENCY DEPT VISIT LOW MDM: CPT

## 2023-04-10 PROCEDURE — 81003 URINALYSIS AUTO W/O SCOPE: CPT | Performed by: EMERGENCY MEDICINE

## 2023-04-10 PROCEDURE — 83690 ASSAY OF LIPASE: CPT | Performed by: EMERGENCY MEDICINE

## 2023-04-10 PROCEDURE — 84484 ASSAY OF TROPONIN QUANT: CPT | Performed by: EMERGENCY MEDICINE

## 2023-04-10 PROCEDURE — 80053 COMPREHEN METABOLIC PANEL: CPT | Performed by: EMERGENCY MEDICINE

## 2023-04-10 PROCEDURE — 85025 COMPLETE CBC W/AUTO DIFF WBC: CPT | Performed by: EMERGENCY MEDICINE

## 2023-04-10 PROCEDURE — 25010000002 KETOROLAC TROMETHAMINE PER 15 MG: Performed by: EMERGENCY MEDICINE

## 2023-04-10 PROCEDURE — 93005 ELECTROCARDIOGRAM TRACING: CPT | Performed by: EMERGENCY MEDICINE

## 2023-04-10 PROCEDURE — 96374 THER/PROPH/DIAG INJ IV PUSH: CPT

## 2023-04-10 RX ORDER — KETOROLAC TROMETHAMINE 15 MG/ML
15 INJECTION, SOLUTION INTRAMUSCULAR; INTRAVENOUS ONCE
Status: COMPLETED | OUTPATIENT
Start: 2023-04-10 | End: 2023-04-10

## 2023-04-10 RX ORDER — KETOROLAC TROMETHAMINE 10 MG/1
10 TABLET, FILM COATED ORAL EVERY 8 HOURS PRN
Qty: 15 TABLET | Refills: 0 | Status: SHIPPED | OUTPATIENT
Start: 2023-04-10 | End: 2023-04-15

## 2023-04-10 RX ORDER — SODIUM CHLORIDE 0.9 % (FLUSH) 0.9 %
10 SYRINGE (ML) INJECTION AS NEEDED
Status: DISCONTINUED | OUTPATIENT
Start: 2023-04-10 | End: 2023-04-10 | Stop reason: HOSPADM

## 2023-04-10 RX ORDER — OMEPRAZOLE 40 MG/1
40 CAPSULE, DELAYED RELEASE ORAL DAILY
Qty: 30 CAPSULE | Refills: 0 | Status: SHIPPED | OUTPATIENT
Start: 2023-04-10

## 2023-04-10 RX ADMIN — KETOROLAC TROMETHAMINE 15 MG: 15 INJECTION, SOLUTION INTRAMUSCULAR; INTRAVENOUS at 07:59

## 2023-04-10 NOTE — ED PROVIDER NOTES
EMERGENCY DEPARTMENT ENCOUNTER    Room Number:  09/09  Date seen:  4/10/2023  PCP: Zelalem Flor APRN  Historian: Patient      HPI:  Chief Complaint: Right-sided back pain  A complete HPI/ROS/PMH/PSH/SH/FH are unobtainable due to: Nothing  Context: Katherine Williamson is a 72 y.o. female who presents to the ED c/o right scapular pain that radiates around the right flank to the right upper quadrant.  The symptoms started about 10 days ago.  She saw her PCP last Monday and was prescribed a Medrol Dosepak which she believed provided some temporary relief.  She also had a CTA chest performed last week as outpatient that was negative for blood clots that demonstrated a new pulmonary nodule.  She denies fever or chills.  She has had no bowel or bladder incontinence, no tingling, numbness, weakness in lower extremities.  She denies rash.  The pain is worse with breathing.  She has tried Tylenol without much relief.  She also tried topical Aspercreme.            PAST MEDICAL HISTORY  Active Ambulatory Problems     Diagnosis Date Noted   • Benign essential HTN 04/01/2016   • Tobacco abuse 04/05/2016   • Dislocation of hip joint prosthesis 04/16/2016   • Fracture of proximal humerus 08/22/2015   • Mechanical complication of internal orthopedic device 04/16/2016   • Wear of articular bearing surface of internal prosthetic joint 12/03/2015   • History of repair of hip joint 09/28/2015   • History of operative procedure on hip 12/03/2015   • Hyperglycemia 10/03/2016   • Hepatic steatosis 11/14/2019   • Diverticulosis 11/14/2019   • Chest pain, atypical 04/30/2020   • History of colon polyps 10/20/2021   • Family history of colon cancer in mother 10/20/2021   • Allergic rhinitis 06/06/2022   • Lung nodule seen on imaging study 04/05/2023     Resolved Ambulatory Problems     Diagnosis Date Noted   • HLD (hyperlipidemia) 04/01/2016     Past Medical History:   Diagnosis Date   • Arthritis    • Cataract    • Colon polyps    • Hypertension           PAST SURGICAL HISTORY  Past Surgical History:   Procedure Laterality Date   • COLONOSCOPY  10/2015    Mxyjy-zzvxexgnavux-Zo. Kaplan.  Follow-up in 5 years.   • COLONOSCOPY N/A 1/12/2022    2 BENIGN POLYPS IN DESCENDING, 5 MM BENIGN POLYP IN SIGMOID, MULTIPLE SMALL AND LARGE DIVERTICULA IN SIGMOID, RESCOPE IN 3 YRS, DR. JOSEPH LAU AT [facility]   • EYE SURGERY     • JOINT REPLACEMENT  2005?    Left total hip replacement in 2005.  In December 2015 patient had left hip revision   • TONSILLECTOMY           FAMILY HISTORY  Family History   Problem Relation Age of Onset   • Cancer Mother    • Breast cancer Mother    • Arthritis Mother    • Deep vein thrombosis Mother    • Heart attack Father    • Heart disease Father         Had quadruple bypass   • Heart attack Maternal Grandmother    • Heart disease Maternal Grandmother    • Malig Hyperthermia Neg Hx          SOCIAL HISTORY  Social History     Socioeconomic History   • Marital status:    Tobacco Use   • Smoking status: Every Day     Packs/day: 1.00     Years: 51.00     Pack years: 51.00     Types: Cigarettes     Start date: 1/1/1970   • Smokeless tobacco: Never   Vaping Use   • Vaping Use: Never used   Substance and Sexual Activity   • Alcohol use: Yes     Alcohol/week: 30.0 standard drinks     Types: 30 Cans of beer per week     Comment: 4-6 per day    • Drug use: Never   • Sexual activity: Not Currently     Partners: Male     Birth control/protection: None         ALLERGIES  Hydrocodone-acetaminophen        REVIEW OF SYSTEMS  Review of Systems   Review of all 14 systems is negative other than stated in the HPI above.      PHYSICAL EXAM  ED Triage Vitals   Temp Heart Rate Resp BP SpO2   04/10/23 0657 04/10/23 0657 04/10/23 0657 04/10/23 0659 04/10/23 0657   98.9 °F (37.2 °C) 103 16 144/83 91 %      Temp src Heart Rate Source Patient Position BP Location FiO2 (%)   -- -- -- -- --              Physical Exam      GENERAL: Awake and alert, no acute  distress  HENT: nares patent  EYES: no scleral icterus, EOMI  CV: regular rhythm, normal rate  RESPIRATORY: normal effort, lungs are bilaterally  ABDOMEN: soft, no significant right upper quadrant tenderness, negative Romo sign  MUSCULOSKELETAL: no deformity, no midline T or L-spine tenderness, mild point tenderness of the right ribs posteriorly without crepitus.  NEURO: alert, moves all extremities, follows commands, normal sensation to light touch throughout all extremities, cranial nerves II through XII grossly intact  PSYCH:  calm, cooperative  SKIN: warm, dry.  There is a 2 cm slightly erythematous papule on the right lower flank in the mid axillary line at approximately T10 or T11 dermatome level.    Vital signs and nursing notes reviewed.          LAB RESULTS  Recent Results (from the past 24 hour(s))   Comprehensive Metabolic Panel    Collection Time: 04/10/23  7:33 AM    Specimen: Blood   Result Value Ref Range    Glucose 112 (H) 65 - 99 mg/dL    BUN 14 8 - 23 mg/dL    Creatinine 0.60 0.57 - 1.00 mg/dL    Sodium 136 136 - 145 mmol/L    Potassium 4.6 3.5 - 5.2 mmol/L    Chloride 100 98 - 107 mmol/L    CO2 26.2 22.0 - 29.0 mmol/L    Calcium 9.8 8.6 - 10.5 mg/dL    Total Protein 7.1 6.0 - 8.5 g/dL    Albumin 4.1 3.5 - 5.2 g/dL    ALT (SGPT) 27 1 - 33 U/L    AST (SGOT) 18 1 - 32 U/L    Alkaline Phosphatase 147 (H) 39 - 117 U/L    Total Bilirubin 0.4 0.0 - 1.2 mg/dL    Globulin 3.0 gm/dL    A/G Ratio 1.4 g/dL    BUN/Creatinine Ratio 23.3 7.0 - 25.0    Anion Gap 9.8 5.0 - 15.0 mmol/L    eGFR 96.7 >60.0 mL/min/1.73   Lipase    Collection Time: 04/10/23  7:33 AM    Specimen: Blood   Result Value Ref Range    Lipase 24 13 - 60 U/L   Single High Sensitivity Troponin T    Collection Time: 04/10/23  7:33 AM    Specimen: Blood   Result Value Ref Range    HS Troponin T <6 <10 ng/L   CBC Auto Differential    Collection Time: 04/10/23  7:33 AM    Specimen: Blood   Result Value Ref Range    WBC 13.03 (H) 3.40 - 10.80  10*3/mm3    RBC 4.56 3.77 - 5.28 10*6/mm3    Hemoglobin 15.0 12.0 - 15.9 g/dL    Hematocrit 42.4 34.0 - 46.6 %    MCV 93.0 79.0 - 97.0 fL    MCH 32.9 26.6 - 33.0 pg    MCHC 35.4 31.5 - 35.7 g/dL    RDW 12.4 12.3 - 15.4 %    RDW-SD 42.5 37.0 - 54.0 fl    MPV 10.0 6.0 - 12.0 fL    Platelets 297 140 - 450 10*3/mm3    Neutrophil % 74.3 42.7 - 76.0 %    Lymphocyte % 15.9 (L) 19.6 - 45.3 %    Monocyte % 6.6 5.0 - 12.0 %    Eosinophil % 2.4 0.3 - 6.2 %    Basophil % 0.3 0.0 - 1.5 %    Immature Grans % 0.5 0.0 - 0.5 %    Neutrophils, Absolute 9.69 (H) 1.70 - 7.00 10*3/mm3    Lymphocytes, Absolute 2.07 0.70 - 3.10 10*3/mm3    Monocytes, Absolute 0.86 0.10 - 0.90 10*3/mm3    Eosinophils, Absolute 0.31 0.00 - 0.40 10*3/mm3    Basophils, Absolute 0.04 0.00 - 0.20 10*3/mm3    Immature Grans, Absolute 0.06 (H) 0.00 - 0.05 10*3/mm3    nRBC 0.1 0.0 - 0.2 /100 WBC   ECG 12 Lead Chest Pain    Collection Time: 04/10/23  7:41 AM   Result Value Ref Range    QT Interval 352 ms   Urinalysis With Microscopic If Indicated (No Culture) - Urine, Clean Catch    Collection Time: 04/10/23  8:36 AM    Specimen: Urine, Clean Catch   Result Value Ref Range    Color, UA Yellow Yellow, Straw    Appearance, UA Clear Clear    pH, UA 7.0 5.0 - 8.0    Specific Gravity, UA 1.013 1.005 - 1.030    Glucose, UA Negative Negative    Ketones, UA Negative Negative    Bilirubin, UA Negative Negative    Blood, UA Negative Negative    Protein, UA Negative Negative    Leuk Esterase, UA Negative Negative    Nitrite, UA Negative Negative    Urobilinogen, UA 0.2 E.U./dL 0.2 - 1.0 E.U./dL       Ordered the above labs and reviewed the results.        RADIOLOGY  No Radiology Exams Resulted Within Past 24 Hours    Ordered the above noted radiological studies. Reviewed by me in PACS.            PROCEDURES  Procedures              MEDICATIONS GIVEN IN ER  Medications   sodium chloride 0.9 % flush 10 mL (has no administration in time range)   ketorolac (TORADOL) injection 15  mg (15 mg Intravenous Given 4/10/23 0759)                   MEDICAL DECISION MAKING, PROGRESS, and CONSULTS    All labs have been independently reviewed by me.  All radiology studies have been reviewed by me and I have also reviewed the radiology report.   EKG's independently viewed and interpreted by me.  Discussion below represents my analysis of pertinent findings related to patient's condition, differential diagnosis, treatment plan and final disposition.      Additional sources:  - Discussed/ obtained information from independent historians: N/A    - External (non-ED) record review: Recent PCP note reviewed and also outpatient CTA chest.        Orders placed during this visit:  Orders Placed This Encounter   Procedures   • Comprehensive Metabolic Panel   • Lipase   • Single High Sensitivity Troponin T   • CBC Auto Differential   • Urinalysis With Microscopic If Indicated (No Culture) - Urine, Clean Catch   • ECG 12 Lead Chest Pain   • Insert Peripheral IV   • CBC & Differential         Differential diagnosis includes but is not limited to:    Thoracic radiculopathy  Pleurisy  Herpes zoster  Pulmonary embolus  Intercostal strain        Independent interpretation of labs, radiology studies, and discussions with consultants:  ED Course as of 04/10/23 0907   Mon Apr 10, 2023   0718 Record review: I reviewed PCP progress note from 4/3/2023.  She was evaluated for right posterior thoracic pleuritic pain.  A stat CTA chest of been ordered to further evaluate for pulmonary embolus.  There was no PE identified however there was a new right lower lobe nodule. [JR]   0718 There is a small slightly erythematous papule on the right lower flank that does not correlate with the distribution of patient's pain.  Additionally this small rash does not appear consistent with herpes zoster. [JR]   0742 Patient reassessed.  She had significant symptomatic relief with Toradol.  I explained that her labs appear reassuring and that I  think her symptoms are most likely secondary to either pleurisy or thoracic radiculopathy.  I explained that if symptoms persist she may need MRI of the thoracic spine for further evaluation.  She requested oral Toradol to take at home.  I counseled her on the risk of gastritis and peptic ulcer disease when taking NSAIDs frequently.  We ultimately agreed upon a 5-day course of oral Toradol, with daily omeprazole for GI prophylaxis, follow-up with PCP if symptoms persist for consideration of an alternative such as meloxicam.  Return precautions were discussed. [JR]   0749 EKG          EKG time: 7:41 AM  Rhythm/Rate: Sinus rhythm, 92  P waves and ID: Normal  QRS, axis: Normal axis  ST and T waves: No acute ischemic changes    Interpreted Contemporaneously by me, independently viewed         [JR]   0830 HS Troponin T: <6 [JR]   0831 Alkaline Phosphatase(!): 147 [JR]   0831 AST (SGOT): 18 [JR]   0831 Total Bilirubin: 0.4 [JR]   0831 ALT (SGPT): 27 [JR]   0849 Leukocytes, UA: Negative [JR]   0849 Nitrite, UA: Negative [JR]      ED Course User Index  [JR] Brandon Kennedy MD             I wore an N95 mask, face shield, and gloves during this patient encounter.  Patient also wearing a surgical mask.  Hand hygeine performed before and after seeing the patient.    DIAGNOSIS  Final diagnoses:   Acute right-sided thoracic back pain         DISPOSITION  DISCHARGE    Patient discharged in stable condition.    Reviewed implications of results, diagnosis, meds, responsibility to follow up, warning signs and symptoms of possible worsening, potential complications and reasons to return to ER.    Patient/Family voiced understanding of above instructions.    Discussed plan for discharge, as there is no emergent indication for admission. Patient referred to primary care provider for BP management due to today's BP. Pt/family is agreeable and understands need for follow up and repeat testing.  Pt is aware that discharge does not  mean that nothing is wrong but it indicates no emergency is present that requires admission and they must continue care with follow-up as given below or physician of their choice.     FOLLOW-UP  Zelalem Flor APRN  [address]  [place] [id number] [phone]    Schedule an appointment as soon as possible for a visit       Markell López MD  [address]  [place] [id number] [phone]    Schedule an appointment as soon as possible for a visit   As needed         Medication List      New Prescriptions    ketorolac 10 MG tablet  Commonly known as: TORADOL  Take 1 tablet by mouth Every 8 (Eight) Hours As Needed for Moderate Pain for up to 5 days.     omeprazole 40 MG capsule  Commonly known as: priLOSEC  Take 1 capsule by mouth Daily.        Changed    fluticasone 50 MCG/ACT nasal spray  Commonly known as: FLONASE  Administer 1spray in each nostril twice daily.  What changed: additional instructions        Stop    methylPREDNISolone 4 MG dose pack  Commonly known as: MEDROL     naproxen 500 MG tablet  Commonly known as: NAPROSYN           Where to Get Your Medications      These medications were sent to [facility]/pharmacy #[id number] - [place], [place] - [address] - [phone]  - [phone] FX  [address], [facility] [address]    Phone: [phone]   · ketorolac 10 MG tablet  · omeprazole 40 MG capsule                   Latest Documented Vital Signs:  As of 09:07 EDT  BP- 140/81 HR- 91 Temp- 98.9 °F (37.2 °C) O2 sat- 90%              --    Please note that portions of this were completed with a voice recognition program.       Note Disclaimer: At [facility], we believe that sharing information builds trust and better relationships. You are receiving this note because you are receiving care at [facility] or recently visited. It is possible you will see health information before a provider has talked with you about it. This kind of information can be easy to  misunderstand. To help you fully understand what it means for your health, we urge you to discuss this note with your provider.           Brandon Kennedy MD  04/10/23 0907

## 2023-04-10 NOTE — ED NOTES
RUQ pain and R middle back pain, and R shoulder pain x1 week. Seen last week and had some relief with steroid prescription, but pain has now worsened.

## 2023-04-18 NOTE — ED NOTES
Pt here for c/o right eye pain that woke her from sleep at 0230 - Pt cannot remember if anything got in her eye. Pt tried flushing eye with water, using visine, without relief.  Bed in low position, call light within reach, side rails up X2. Pt is alert and oriented X4, PERRLA, respirations are even and unlabored, chest rise and fall is equal in expansion.  Pt does not appear to be in distress at this time     Kelli Quick RN  05/10/19 8455    
[FreeTextEntry2] : Follow Up- L Spine

## 2023-04-24 ENCOUNTER — TELEPHONE (OUTPATIENT)
Dept: INTERNAL MEDICINE | Age: 73
End: 2023-04-24
Payer: COMMERCIAL

## 2023-04-24 ENCOUNTER — OFFICE VISIT (OUTPATIENT)
Dept: INTERNAL MEDICINE | Age: 73
End: 2023-04-24
Payer: COMMERCIAL

## 2023-04-24 VITALS
SYSTOLIC BLOOD PRESSURE: 108 MMHG | DIASTOLIC BLOOD PRESSURE: 60 MMHG | OXYGEN SATURATION: 95 % | TEMPERATURE: 97.2 F | WEIGHT: 136 LBS | BODY MASS INDEX: 23.22 KG/M2 | HEIGHT: 64 IN | HEART RATE: 98 BPM

## 2023-04-24 DIAGNOSIS — M54.6 ACUTE RIGHT-SIDED THORACIC BACK PAIN: ICD-10-CM

## 2023-04-24 DIAGNOSIS — R53.83 OTHER FATIGUE: ICD-10-CM

## 2023-04-24 DIAGNOSIS — R06.09 DYSPNEA ON EXERTION: Primary | ICD-10-CM

## 2023-04-24 NOTE — TELEPHONE ENCOUNTER
----- Message from MARGARITO Garvin sent at 4/24/2023 11:43 AM EDT -----  Regarding: FW: Back Pain  Contact: [phone]  Needs to be seen by us ASAP or the ER  ----- Message -----  From: Daily Carrasco LPN  Sent: 4/24/2023  11:39 AM EDT  To: MARGARITO Garvin  Subject: FW: Back Pain                                      ----- Message -----  From: Katherine Williamson  Sent: 4/24/2023  11:35 AM EDT  To: Yaima  [id number]  Subject: Back Pain                                        Ok, so the back pain isn’t as intense, but still there.  I’m now experiencing some shortness of breath and swelling in my feet/ankles (especially my left foot).  The swelling just started in the past week.  Could it be from eliminating the diuretic in my blood pressure medicine?  I feel like I’m whining but am so tired of not feeling good or having any energy.

## 2023-04-24 NOTE — TELEPHONE ENCOUNTER
CANT SCHEDULE PATIENT WITHOUT THE CT AND ULTRASOUND ORDERS. PLEASE CALL THEM BACK     [phone] IS FAX NUMBER

## 2023-04-24 NOTE — PROGRESS NOTES
"    I N T E R N A L  M E D I C I N E  Kaykay Tena, APRN    ENCOUNTER DATE:  04/24/2023    Katherine Williamson / 72 y.o. / female      CHIEF COMPLAINT / REASON FOR OFFICE VISIT     Shortness of Breath, Back Pain, and Edema (Left foot and ankle )      ASSESSMENT & PLAN     Diagnoses and all orders for this visit:    1. Dyspnea on exertion (Primary)  -     Full Pulmonary Function Test With Bronchodilator; Future  -     Adult Transthoracic Echo Complete W/ Cont if Necessary Per Protocol; Future  -     CBC & Differential    2. Other fatigue  -     CBC & Differential  -     Comprehensive Metabolic Panel  -     TSH+Free T4  -     Iron Profile  -     Ferritin  -     Folate    3. Acute right-sided thoracic back pain         SUMMARY/DISCUSSION  • Pt is agreeable for labs, PFT and ECHO.  No signs of DVTs on today's exam.  She was educated to visit the ER for acutely worsening symptoms, redness, warmth, swelling, pain.  • May need to consider thoracic XR/ MRI if no improvement in thoracic back pain with conservative treatment.        Next Appointment with me: Visit date not found    Return for Next scheduled follow up.      VITAL SIGNS     Visit Vitals  /60   Pulse 98   Temp 97.2 °F (36.2 °C)   Ht 162.6 cm (64.02\")   Wt 61.7 kg (136 lb)   SpO2 95%   BMI 23.33 kg/m²             Wt Readings from Last 3 Encounters:   04/24/23 61.7 kg (136 lb)   04/10/23 61.7 kg (136 lb)   04/03/23 63.5 kg (140 lb)     Body mass index is 23.33 kg/m².        MEDICATIONS AT THE TIME OF OFFICE VISIT     Current Outpatient Medications on File Prior to Visit   Medication Sig Dispense Refill   • Cholecalciferol (VITAMIN D) 1000 UNITS tablet Take 1 tablet by mouth.     • cyanocobalamin (VITAMIN B-12) 500 MCG tablet Take  by mouth.     • fluticasone (FLONASE) 50 MCG/ACT nasal spray Administer 1spray in each nostril twice daily. (Patient taking differently: Administer 1spray in each nostril twice daily.  prn)     • irbesartan (Avapro) 150 MG tablet Take 1 " tablet by mouth Every Night. 90 tablet 1   • vitamin C (ASCORBIC ACID) 500 MG tablet Take 1 tablet by mouth Daily.       No current facility-administered medications on file prior to visit.        HISTORY OF PRESENT ILLNESS     Here today with concerns for ongoing, worsening fatigue.  She reports shortness of breath with exertion.  She has experienced 12 pound weight loss since January 2023.  She reports she was initially focused on improved dietary habits, but has stopped in recent weeks but continues to lose weight.      Pt was seen in the ER on April 10, 2023 for acutely worsening right-sided thoracic back pain.  She had initially been treated with a steroid dose pack with some improvement.  She reports symptoms are overall improving; however, she does continue to experience sharp pain at times, worse with deep inhalation.  She is using ice, heat, naproxen with benefit.  She has had prior negative CT Chest Angiogram for PE, but did have new right lower lobe nodule, with recommended follow up due in 3 months.      Last week, she reports new bilateral foot swelling, with L>R.  Symptoms are worse after she has been standing for a period of time and are improved after she reclines for the evening.  She reports minimal swelling is currently present at today's visit.     She does continue to smoke.      Patient Care Team:  Zelalem Flor APRN as PCP - General (Internal Medicine)  Brandon Mitchell MD as Consulting Physician (Orthopedic Surgery)    REVIEW OF SYSTEMS     Review of Systems   Constitutional: Positive for fatigue and unexpected weight change. Negative for chills and fever.   Respiratory: Positive for shortness of breath (With exertion). Negative for cough and chest tightness.    Cardiovascular: Positive for leg swelling. Negative for chest pain and palpitations.   Musculoskeletal: Positive for back pain (Right posterior thoracic pain).   Neurological: Negative for dizziness, weakness, light-headedness  and headaches.   Psychiatric/Behavioral: The patient is not nervous/anxious.           PHYSICAL EXAMINATION     Physical Exam  Vitals reviewed.   Constitutional:       General: She is not in acute distress.     Appearance: Normal appearance. She is not ill-appearing, toxic-appearing or diaphoretic.   HENT:      Head: Normocephalic and atraumatic.   Cardiovascular:      Rate and Rhythm: Normal rate and regular rhythm.      Pulses: Normal pulses.      Heart sounds: Normal heart sounds.   Pulmonary:      Effort: Pulmonary effort is normal.      Breath sounds: Normal breath sounds. No wheezing, rhonchi or rales.   Musculoskeletal:      Right lower leg: No edema.      Left lower leg: No edema.   Skin:     General: Skin is warm and dry.      Findings: No erythema.   Neurological:      Mental Status: She is alert and oriented to person, place, and time. Mental status is at baseline.   Psychiatric:         Mood and Affect: Mood normal.         Behavior: Behavior normal.         Thought Content: Thought content normal.         Judgment: Judgment normal.           REVIEWED DATA     Labs:           Imaging:            Medical Tests:           Summary of old records / correspondence / consultant report:           Request outside records:

## 2023-05-02 ENCOUNTER — APPOINTMENT (OUTPATIENT)
Dept: GENERAL RADIOLOGY | Facility: HOSPITAL | Age: 73
DRG: 871 | End: 2023-05-02
Payer: COMMERCIAL

## 2023-05-02 ENCOUNTER — APPOINTMENT (OUTPATIENT)
Dept: CT IMAGING | Facility: HOSPITAL | Age: 73
DRG: 871 | End: 2023-05-02
Payer: COMMERCIAL

## 2023-05-02 ENCOUNTER — TELEPHONE (OUTPATIENT)
Dept: INTERNAL MEDICINE | Age: 73
End: 2023-05-02
Payer: COMMERCIAL

## 2023-05-02 ENCOUNTER — HOSPITAL ENCOUNTER (INPATIENT)
Facility: HOSPITAL | Age: 73
LOS: 17 days | DRG: 871 | End: 2023-05-19
Attending: EMERGENCY MEDICINE | Admitting: INTERNAL MEDICINE
Payer: COMMERCIAL

## 2023-05-02 DIAGNOSIS — R73.9 HYPERGLYCEMIA: ICD-10-CM

## 2023-05-02 DIAGNOSIS — J96.01 ACUTE RESPIRATORY FAILURE WITH HYPOXIA: Primary | ICD-10-CM

## 2023-05-02 DIAGNOSIS — R74.8 ELEVATED LIVER ENZYMES: ICD-10-CM

## 2023-05-02 DIAGNOSIS — J18.9 COMMUNITY ACQUIRED PNEUMONIA, UNSPECIFIED LATERALITY: ICD-10-CM

## 2023-05-02 DIAGNOSIS — R93.89 ABNORMAL CXR: ICD-10-CM

## 2023-05-02 DIAGNOSIS — A41.9 SEPSIS, DUE TO UNSPECIFIED ORGANISM, UNSPECIFIED WHETHER ACUTE ORGAN DYSFUNCTION PRESENT: ICD-10-CM

## 2023-05-02 PROBLEM — J86.9 EMPYEMA: Status: ACTIVE | Noted: 2023-05-02

## 2023-05-02 LAB
ALBUMIN SERPL-MCNC: 2.3 G/DL (ref 3.5–5.2)
ALBUMIN/GLOB SERPL: 0.5 G/DL
ALP SERPL-CCNC: 202 U/L (ref 39–117)
ALT SERPL W P-5'-P-CCNC: 37 U/L (ref 1–33)
ANION GAP SERPL CALCULATED.3IONS-SCNC: 17 MMOL/L (ref 5–15)
AST SERPL-CCNC: 37 U/L (ref 1–32)
B PARAPERT DNA SPEC QL NAA+PROBE: NOT DETECTED
B PERT DNA SPEC QL NAA+PROBE: NOT DETECTED
BILIRUB SERPL-MCNC: 0.6 MG/DL (ref 0–1.2)
BUN SERPL-MCNC: 28 MG/DL (ref 8–23)
BUN/CREAT SERPL: 28.6 (ref 7–25)
C PNEUM DNA NPH QL NAA+NON-PROBE: NOT DETECTED
CALCIUM SPEC-SCNC: 9.6 MG/DL (ref 8.6–10.5)
CHLORIDE SERPL-SCNC: 97 MMOL/L (ref 98–107)
CO2 SERPL-SCNC: 23 MMOL/L (ref 22–29)
CREAT SERPL-MCNC: 0.98 MG/DL (ref 0.57–1)
D-LACTATE SERPL-SCNC: 3.3 MMOL/L (ref 0.5–2)
D-LACTATE SERPL-SCNC: 6.3 MMOL/L (ref 0.5–2)
DEPRECATED RDW RBC AUTO: 42.6 FL (ref 37–54)
EGFRCR SERPLBLD CKD-EPI 2021: 62.2 ML/MIN/1.73
ERYTHROCYTE [DISTWIDTH] IN BLOOD BY AUTOMATED COUNT: 12.8 % (ref 12.3–15.4)
FLUAV SUBTYP SPEC NAA+PROBE: NOT DETECTED
FLUBV RNA ISLT QL NAA+PROBE: NOT DETECTED
GEN 5 2HR TROPONIN T REFLEX: 10 NG/L
GLOBULIN UR ELPH-MCNC: 4.4 GM/DL
GLUCOSE SERPL-MCNC: 124 MG/DL (ref 65–99)
HADV DNA SPEC NAA+PROBE: NOT DETECTED
HCOV 229E RNA SPEC QL NAA+PROBE: NOT DETECTED
HCOV HKU1 RNA SPEC QL NAA+PROBE: NOT DETECTED
HCOV NL63 RNA SPEC QL NAA+PROBE: NOT DETECTED
HCOV OC43 RNA SPEC QL NAA+PROBE: NOT DETECTED
HCT VFR BLD AUTO: 37.5 % (ref 34–46.6)
HGB BLD-MCNC: 12.9 G/DL (ref 12–15.9)
HMPV RNA NPH QL NAA+NON-PROBE: NOT DETECTED
HOLD SPECIMEN: NORMAL
HOLD SPECIMEN: NORMAL
HPIV1 RNA ISLT QL NAA+PROBE: NOT DETECTED
HPIV2 RNA SPEC QL NAA+PROBE: NOT DETECTED
HPIV3 RNA NPH QL NAA+PROBE: NOT DETECTED
HPIV4 P GENE NPH QL NAA+PROBE: NOT DETECTED
L PNEUMO1 AG UR QL IA: NEGATIVE
LYMPHOCYTES # BLD MANUAL: 0.68 10*3/MM3 (ref 0.7–3.1)
LYMPHOCYTES NFR BLD MANUAL: 1 % (ref 5–12)
M PNEUMO IGG SER IA-ACNC: NOT DETECTED
MCH RBC QN AUTO: 31.3 PG (ref 26.6–33)
MCHC RBC AUTO-ENTMCNC: 34.4 G/DL (ref 31.5–35.7)
MCV RBC AUTO: 91 FL (ref 79–97)
METAMYELOCYTES NFR BLD MANUAL: 3 % (ref 0–0)
MONOCYTES # BLD: 0.23 10*3/MM3 (ref 0.1–0.9)
MRSA DNA SPEC QL NAA+PROBE: NORMAL
NEUTROPHILS # BLD AUTO: 21.12 10*3/MM3 (ref 1.7–7)
NEUTROPHILS NFR BLD MANUAL: 92.9 % (ref 42.7–76)
NRBC BLD AUTO-RTO: 0 /100 WBC (ref 0–0.2)
NT-PROBNP SERPL-MCNC: 1345 PG/ML (ref 0–900)
PLAT MORPH BLD: NORMAL
PLATELET # BLD AUTO: 589 10*3/MM3 (ref 140–450)
PMV BLD AUTO: 10.2 FL (ref 6–12)
POTASSIUM SERPL-SCNC: 4.2 MMOL/L (ref 3.5–5.2)
PROCALCITONIN SERPL-MCNC: 3.95 NG/ML (ref 0–0.25)
PROT SERPL-MCNC: 6.7 G/DL (ref 6–8.5)
QT INTERVAL: 323 MS
RBC # BLD AUTO: 4.12 10*6/MM3 (ref 3.77–5.28)
RBC MORPH BLD: NORMAL
RHINOVIRUS RNA SPEC NAA+PROBE: NOT DETECTED
RSV RNA NPH QL NAA+NON-PROBE: NOT DETECTED
S PNEUM AG SPEC QL LA: NEGATIVE
SARS-COV-2 RNA NPH QL NAA+NON-PROBE: NOT DETECTED
SODIUM SERPL-SCNC: 137 MMOL/L (ref 136–145)
TROPONIN T DELTA: -1 NG/L
TROPONIN T SERPL HS-MCNC: 11 NG/L
VARIANT LYMPHS NFR BLD MANUAL: 3 % (ref 19.6–45.3)
WBC MORPH BLD: NORMAL
WBC NRBC COR # BLD: 22.73 10*3/MM3 (ref 3.4–10.8)
WHOLE BLOOD HOLD COAG: NORMAL
WHOLE BLOOD HOLD SPECIMEN: NORMAL

## 2023-05-02 PROCEDURE — 87076 CULTURE ANAEROBE IDENT EACH: CPT | Performed by: EMERGENCY MEDICINE

## 2023-05-02 PROCEDURE — 87070 CULTURE OTHR SPECIMN AEROBIC: CPT | Performed by: INTERNAL MEDICINE

## 2023-05-02 PROCEDURE — 84484 ASSAY OF TROPONIN QUANT: CPT | Performed by: EMERGENCY MEDICINE

## 2023-05-02 PROCEDURE — 85007 BL SMEAR W/DIFF WBC COUNT: CPT | Performed by: EMERGENCY MEDICINE

## 2023-05-02 PROCEDURE — 83605 ASSAY OF LACTIC ACID: CPT | Performed by: EMERGENCY MEDICINE

## 2023-05-02 PROCEDURE — 71045 X-RAY EXAM CHEST 1 VIEW: CPT

## 2023-05-02 PROCEDURE — 94799 UNLISTED PULMONARY SVC/PX: CPT

## 2023-05-02 PROCEDURE — 83880 ASSAY OF NATRIURETIC PEPTIDE: CPT | Performed by: EMERGENCY MEDICINE

## 2023-05-02 PROCEDURE — 93010 ELECTROCARDIOGRAM REPORT: CPT | Performed by: INTERNAL MEDICINE

## 2023-05-02 PROCEDURE — 84145 PROCALCITONIN (PCT): CPT | Performed by: EMERGENCY MEDICINE

## 2023-05-02 PROCEDURE — 94664 DEMO&/EVAL PT USE INHALER: CPT

## 2023-05-02 PROCEDURE — 87641 MR-STAPH DNA AMP PROBE: CPT | Performed by: INTERNAL MEDICINE

## 2023-05-02 PROCEDURE — 25010000002 CEFTRIAXONE PER 250 MG: Performed by: EMERGENCY MEDICINE

## 2023-05-02 PROCEDURE — 94761 N-INVAS EAR/PLS OXIMETRY MLT: CPT

## 2023-05-02 PROCEDURE — 93005 ELECTROCARDIOGRAM TRACING: CPT

## 2023-05-02 PROCEDURE — 25010000002 VANCOMYCIN 10 G RECONSTITUTED SOLUTION: Performed by: EMERGENCY MEDICINE

## 2023-05-02 PROCEDURE — 87449 NOS EACH ORGANISM AG IA: CPT | Performed by: INTERNAL MEDICINE

## 2023-05-02 PROCEDURE — 80053 COMPREHEN METABOLIC PANEL: CPT | Performed by: EMERGENCY MEDICINE

## 2023-05-02 PROCEDURE — 36415 COLL VENOUS BLD VENIPUNCTURE: CPT

## 2023-05-02 PROCEDURE — 25010000002 METHYLPREDNISOLONE PER 40 MG: Performed by: INTERNAL MEDICINE

## 2023-05-02 PROCEDURE — 71250 CT THORAX DX C-: CPT

## 2023-05-02 PROCEDURE — 94640 AIRWAY INHALATION TREATMENT: CPT

## 2023-05-02 PROCEDURE — 0202U NFCT DS 22 TRGT SARS-COV-2: CPT | Performed by: EMERGENCY MEDICINE

## 2023-05-02 PROCEDURE — 99285 EMERGENCY DEPT VISIT HI MDM: CPT

## 2023-05-02 PROCEDURE — 25010000002 PIPERACILLIN SOD-TAZOBACTAM PER 1 G: Performed by: INTERNAL MEDICINE

## 2023-05-02 PROCEDURE — 87205 SMEAR GRAM STAIN: CPT | Performed by: INTERNAL MEDICINE

## 2023-05-02 PROCEDURE — 85025 COMPLETE CBC W/AUTO DIFF WBC: CPT | Performed by: EMERGENCY MEDICINE

## 2023-05-02 PROCEDURE — 87040 BLOOD CULTURE FOR BACTERIA: CPT | Performed by: EMERGENCY MEDICINE

## 2023-05-02 RX ORDER — CHOLECALCIFEROL (VITAMIN D3) 125 MCG
1000 CAPSULE ORAL DAILY
Status: DISCONTINUED | OUTPATIENT
Start: 2023-05-03 | End: 2023-05-19 | Stop reason: HOSPADM

## 2023-05-02 RX ORDER — SODIUM CHLORIDE 0.9 % (FLUSH) 0.9 %
10 SYRINGE (ML) INJECTION AS NEEDED
Status: DISCONTINUED | OUTPATIENT
Start: 2023-05-02 | End: 2023-05-17

## 2023-05-02 RX ORDER — FLUTICASONE PROPIONATE 50 MCG
1 SPRAY, SUSPENSION (ML) NASAL DAILY
Status: DISCONTINUED | OUTPATIENT
Start: 2023-05-03 | End: 2023-05-19 | Stop reason: HOSPADM

## 2023-05-02 RX ORDER — SODIUM CHLORIDE 9 MG/ML
125 INJECTION, SOLUTION INTRAVENOUS CONTINUOUS
Status: DISCONTINUED | OUTPATIENT
Start: 2023-05-02 | End: 2023-05-04

## 2023-05-02 RX ORDER — ONDANSETRON 2 MG/ML
4 INJECTION INTRAMUSCULAR; INTRAVENOUS EVERY 6 HOURS PRN
Status: DISCONTINUED | OUTPATIENT
Start: 2023-05-02 | End: 2023-05-17

## 2023-05-02 RX ORDER — SODIUM CHLORIDE 9 MG/ML
250 INJECTION, SOLUTION INTRAVENOUS CONTINUOUS
Status: DISCONTINUED | OUTPATIENT
Start: 2023-05-02 | End: 2023-05-02

## 2023-05-02 RX ORDER — SODIUM CHLORIDE 9 MG/ML
40 INJECTION, SOLUTION INTRAVENOUS AS NEEDED
Status: DISCONTINUED | OUTPATIENT
Start: 2023-05-02 | End: 2023-05-17

## 2023-05-02 RX ORDER — NITROGLYCERIN 0.4 MG/1
0.4 TABLET SUBLINGUAL
Status: DISCONTINUED | OUTPATIENT
Start: 2023-05-02 | End: 2023-05-17

## 2023-05-02 RX ORDER — IPRATROPIUM BROMIDE AND ALBUTEROL SULFATE 2.5; .5 MG/3ML; MG/3ML
3 SOLUTION RESPIRATORY (INHALATION)
Status: DISCONTINUED | OUTPATIENT
Start: 2023-05-02 | End: 2023-05-19 | Stop reason: HOSPADM

## 2023-05-02 RX ORDER — ASCORBIC ACID 500 MG
500 TABLET ORAL DAILY
Status: DISCONTINUED | OUTPATIENT
Start: 2023-05-03 | End: 2023-05-14

## 2023-05-02 RX ORDER — LOSARTAN POTASSIUM 50 MG/1
50 TABLET ORAL
Status: DISCONTINUED | OUTPATIENT
Start: 2023-05-03 | End: 2023-05-14

## 2023-05-02 RX ORDER — ACETAMINOPHEN 500 MG
1000 TABLET ORAL ONCE
Status: COMPLETED | OUTPATIENT
Start: 2023-05-02 | End: 2023-05-02

## 2023-05-02 RX ORDER — METHYLPREDNISOLONE SODIUM SUCCINATE 40 MG/ML
40 INJECTION, POWDER, LYOPHILIZED, FOR SOLUTION INTRAMUSCULAR; INTRAVENOUS EVERY 8 HOURS
Status: DISCONTINUED | OUTPATIENT
Start: 2023-05-02 | End: 2023-05-04

## 2023-05-02 RX ORDER — ACETAMINOPHEN 325 MG/1
650 TABLET ORAL EVERY 4 HOURS PRN
Status: DISCONTINUED | OUTPATIENT
Start: 2023-05-02 | End: 2023-05-17

## 2023-05-02 RX ORDER — SODIUM CHLORIDE 0.9 % (FLUSH) 0.9 %
10 SYRINGE (ML) INJECTION EVERY 12 HOURS SCHEDULED
Status: DISCONTINUED | OUTPATIENT
Start: 2023-05-02 | End: 2023-05-17

## 2023-05-02 RX ORDER — ACETAMINOPHEN 650 MG/1
650 SUPPOSITORY RECTAL EVERY 4 HOURS PRN
Status: DISCONTINUED | OUTPATIENT
Start: 2023-05-02 | End: 2023-05-17

## 2023-05-02 RX ORDER — ACETAMINOPHEN 160 MG/5ML
650 SOLUTION ORAL EVERY 4 HOURS PRN
Status: DISCONTINUED | OUTPATIENT
Start: 2023-05-02 | End: 2023-05-17

## 2023-05-02 RX ADMIN — IPRATROPIUM BROMIDE AND ALBUTEROL SULFATE 3 ML: 2.5; .5 SOLUTION RESPIRATORY (INHALATION) at 20:43

## 2023-05-02 RX ADMIN — SODIUM CHLORIDE 125 ML/HR: 9 INJECTION, SOLUTION INTRAVENOUS at 21:41

## 2023-05-02 RX ADMIN — SODIUM CHLORIDE 500 ML: 9 INJECTION, SOLUTION INTRAVENOUS at 21:41

## 2023-05-02 RX ADMIN — TAZOBACTAM SODIUM AND PIPERACILLIN SODIUM 3.38 G: 375; 3 INJECTION, SOLUTION INTRAVENOUS at 21:39

## 2023-05-02 RX ADMIN — IPRATROPIUM BROMIDE AND ALBUTEROL SULFATE 3 ML: 2.5; .5 SOLUTION RESPIRATORY (INHALATION) at 17:05

## 2023-05-02 RX ADMIN — METHYLPREDNISOLONE SODIUM SUCCINATE 40 MG: 40 INJECTION, POWDER, FOR SOLUTION INTRAMUSCULAR; INTRAVENOUS at 16:58

## 2023-05-02 RX ADMIN — SODIUM CHLORIDE 1851 ML: 9 INJECTION, SOLUTION INTRAVENOUS at 14:37

## 2023-05-02 RX ADMIN — Medication 10 ML: at 21:40

## 2023-05-02 RX ADMIN — VANCOMYCIN HYDROCHLORIDE 1250 MG: 10 INJECTION, POWDER, LYOPHILIZED, FOR SOLUTION INTRAVENOUS at 15:58

## 2023-05-02 RX ADMIN — ACETAMINOPHEN 1000 MG: 500 TABLET ORAL at 16:57

## 2023-05-02 RX ADMIN — CEFTRIAXONE 2 G: 2 INJECTION, POWDER, FOR SOLUTION INTRAMUSCULAR; INTRAVENOUS at 14:38

## 2023-05-02 NOTE — CONSULTS
CONSULT NOTE    Patient Identification:  Katherine Williamson  72 y.o.  female  1950  [id number]            Requesting physician:     Reason for Consultation:      CC: Shortness of breath with exertion    History of Present Illness:  Patient is a 72-year-old with a previous medical history of emphysema and ongoing tobacco abuse as well as abnormal CT scan beginning of April with a new pulmonary nodule in the right lower base who presented to the emergency room with increased shortness of breath.  This has been ongoing for 2 weeks of progressive in nature.  It is severe.  She has had associated cough.  Cough has been productive of brown mucus.  Her shortness of breath is worse with exertion however does not resolve with rest.  She denies fever rigors or chills.    She does report excessive alcohol intake however none over the past 10 days.  She says she drinks 4-5 beers a night on a routine.  Smoked up until 10 days prior to admission as well.  She says she has never been diagnosed with COPD that she is aware of she never been evaluated by pulmonologist and never uses any inhalers.    No diarrhea vomiting emesis.  No pleuritic chest pain.    Review of Systems:  As per HPI otherwise a 12 system review of systems performed and all else negative    Past Medical History:   Diagnosis Date   • Arthritis    • Cataract    • Colon polyps     FOLLOWED BY DR. JOSEPH LAU   • Hypertension        Past Surgical History:   Procedure Laterality Date   • COLONOSCOPY  10/2015    [id number].  Follow-up in 5 years.   • COLONOSCOPY N/A 1/12/2022    2 BENIGN POLYPS IN DESCENDING, 5 MM BENIGN POLYP IN SIGMOID, MULTIPLE SMALL AND LARGE DIVERTICULA IN SIGMOID, RESCOPE IN 3 YRS, DR. JOSEPH LAU AT [facility]   • EYE SURGERY     • JOINT REPLACEMENT  2005?    Left total hip replacement in 2005.  In December 2015 patient had left hip revision   • TONSILLECTOMY          (Not in a hospital admission)      Allergies    Allergen Reactions   • Hydrocodone-Acetaminophen Itching       Social History     Socioeconomic History   • Marital status:    Tobacco Use   • Smoking status: Every Day     Packs/day: 1.00     Years: 51.00     Pack years: 51.00     Types: Cigarettes     Start date: 1/1/1970   • Smokeless tobacco: Never   Vaping Use   • Vaping Use: Never used   Substance and Sexual Activity   • Alcohol use: Yes     Alcohol/week: 30.0 standard drinks     Types: 30 Cans of beer per week     Comment: 4-6 per day    • Drug use: Never   • Sexual activity: Not Currently     Partners: Male     Birth control/protection: None       Family History   Problem Relation Age of Onset   • Cancer Mother    • Breast cancer Mother    • Arthritis Mother    • Deep vein thrombosis Mother    • Heart attack Father    • Heart disease Father         Had quadruple bypass   • Heart attack Maternal Grandmother    • Heart disease Maternal Grandmother    • Malig Hyperthermia Neg Hx        Physical Exam:  /60   Pulse 98   Temp 98 °F (36.7 °C) (Tympanic)   Resp 16   SpO2 91%   There is no height or weight on file to calculate BMI.   8 L nasal cannula  General appearance: Ill-appearing  conversant   Eyes: Anicteric sclerae, moist conjunctivae; no lid lag;    HENT: Atraumatic; oropharynx clear with moist mucous membranes and no mucosal ulcerations; normal hard and soft palate  Neck: Trachea midline; supple  Lungs: Diminished air entry and crackles at right base, with slightly increased respiratory effort and no intercostal retractions  CV: RRR, no MRGs   Abdomen: Soft, nontender; no masses or HSM  Extremities: Mild peripheral edema or extremity lymphadenopathy  Skin: Normal temperature, turgor and texture; no rash, ulcers or subcutaneous nodules  Psych: Appropriate affect, alert  Neuro cranials 2 through gross intact speech intact moves all extremities    LABS:  Results from last 7 days   Lab Units 05/02/23  1428   WBC 10*3/mm3 22.73*   HEMOGLOBIN  g/dL 12.9   PLATELETS 10*3/mm3 589*     Results from last 7 days   Lab Units 05/02/23  1428   SODIUM mmol/L 137   POTASSIUM mmol/L 4.2   CHLORIDE mmol/L 97*   CO2 mmol/L 23.0   BUN mg/dL 28*   CREATININE mg/dL 0.98   GLUCOSE mg/dL 124*   CALCIUM mg/dL 9.6   Estimated Creatinine Clearance: 52 mL/min (by C-G formula based on SCr of 0.98 mg/dL).    Imaging: I personally visualized the images of scans/x-rays performed within last 3 days.  Imaging Results (Most Recent)     Procedure Component Value Units Date/Time    XR Chest 1 View [[id number]] Collected: 05/02/23 1504     Updated: 05/02/23 1510    Narrative:      XR CHEST 1 VW-     HISTORY: Female who is 72 years-old,  short of breath     TECHNIQUE: Frontal view of the chest     COMPARISON:  image from CT from 11/08/2021     FINDINGS: Heart, mediastinum and pulmonary vasculature are unremarkable.  A large region of opacification at the right mid to lower lung  suggesting pneumonia and/or neoplasm, clinical correlation and follow-up  advised, CT could be obtained for further evaluation. There may be  minimal right pleural effusion. No pneumothorax. No acute osseous  process.       Impression:      A large region of opacification at the right mid to lower  lung suggesting pneumonia and/or neoplasm, clinical correlation and  follow-up advised, CT could be obtained for further evaluation.      This report was finalized on 5/2/2023 3:06 PM by Dr. Ronnie Connor M.D.           Ct chest 4-3-2023:I  There is a newly seen triangular peripheral right  lower lobe 2.4 x 2.1 cm nodule on series 5, image 102. No pleural  effusion or pneumothorax are seen. The central airways are patent.  MPRESSION:  1. Newly seen right lower lobe nodule. Consider 3 month follow-up after  treatment if clinically indicated. Differential considerations include  pneumonia, infarct, and neoplasm.  2. No pulmonary embolus seen. Additional incidental findings as above.       Assessment /  Recommendations:  Right lower lobe opacity - nodule, mass vs pna  CT scan with a 2.4 cm lung nodule in the right lower lobe last month  Emphysema  Hypertension  Tobacco abuse  AHRF  Sepsis  Suspect underlying COPD with acute exacerbation.    From pulmonary perspective  Ceftriaxone  Azithromycin  CT scan chest noncontrast stat  Titrate oxygen.  Sputum culture  Urine Legionella  Urine strep  IV fluids this can tolerated.  Blood cultures  Adjust antibiotics per microbiology.  Tobacco cessation counseling.  DuoNebs  IV Solu-Medrol    If worsening oxygenation will need to be moved to the ICU  Await ct chest - susepct if oxygenation improves will need bronch  If worsens and needs intubation, then would bronch.  Discussed with ER provider    Mark Barrett MD  [facility]  05/02/23  16:52 EDT
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
## 2023-05-02 NOTE — H&P
Internal medicine history and physical  INTERNAL MEDICINE   [facility]       Patient Identification:  Name: Katherine Williamson  Age: 72 y.o.  Sex: female  :  1950  MRN: [id number]                   Primary Care Physician: Zelalem Flor APRN                               Date of admission:2023    Chief Complaint: Progressive weakness increasing cough and shortness of breath worse over the course of last 2 weeks but has not been feeling very well since the beginning of April.  Also having foul-smelling breath in the last few days.    History of Present Illness:   Patient is a 72-year-old female with past medical history remarkable for arthritis, hypertension was in her usual state of her health until  i.e. Friday afternoon when she developed sudden onset of right mid back/chest discomfort that gets worse with coughing and taking deep breath.  Her symptoms started after she helped her friend cleaning her home.  Besides this discomfort that she had she was able to carry out routines of her activities including yard work and pulling weeds over the weekend since the onset of pain.  Her symptoms continued to get worse to the point that she went to see her primary care provider and was evaluated with plans to do CT scan of the chest PE protocol as her D-dimer came back positive.  CT scan of chest PE protocol on 4/3/2023 revealed severe emphysematous changes in the lung without any consolidation pleural effusion or pneumothorax she was noted to have triangular peripheral right lower lobe 2.4 x 2.1 cm nodule with no pleural effusion or pneumothorax.  Differential diagnosis for this nodule based on shape and location included: Pneumonia, infarct or neoplasm.  Follow-up examination in 3 months was recommended.  Patient was started on prednisone Dosepak on 4/3/2023 and follow-up call back/communication from the patient on 2023 suggested improvement in her discomfort.  After she completed her  Dosepak her symptoms recurred so she came back to the emergency room at our facility on 4/10/2023.  Review of system documented at that time shows she denies fever or chill or decrease in appetite.  A work-up revealed white blood cell count of 13,000 and fairly unremarkable CMP.  An assessment of pleurisy/thoracic radiculopathy versus zoster versus pulmonary embolism versus intercostal strain was raised.  She was given IV Toradol with improvement and was subsequently discharged on 5-day course of oral Toradol along with omeprazole for GI prophylaxis with instructions to follow-up with PCP and if she continues to get better may consider alternative such as meloxicam.  Patient had a follow-up visit with her primary care provider for back pain dyspnea on exertion on 4/24/2023 with persistent discomfort in her back along with shortness of breath and swelling of bilateral lower extremity left greater than right.  She he was also noted to have lost 4 pounds since beginning of the April.  It is documented that she continues to smoke.  Arrangements were made for echocardiogram and pulmonary function test and consideration for thoracic x-ray and MRI if her back pain does not improve with conservative management.  Review system performed that day shows fatigue weight change shortness of breath but no fever or chills.  Patient is also scheduled to have CT scan of the chest and pulmonary function test.  It appears that patient CT scan of the chest performed-imaging on 4/26/2023.  Patient called and communicated with the care provider on 5/1/2023 but with a result of her CT chest that was not uploaded in her MyChart.  It was at that point recommended the patient and the need to come in and see her provider in the office to see the extent of her discomfort and declining function and associated shortness of breath or if she is not well may have to go to the emergency room.  Earlier today [facility] imaging called about the result  of the CT scan of the chest performed on 4/26/2023 and findings were concerning for thick-walled pleural effusion with gas in it concerning for empyema as well as worsening pneumonia in the right lower lobe with previously noted lung nodule of 3 mm increased to 7 mm for which PET scan was recommended.  Patient also had in the meantime got herself a pulse ox meter and reported that her oxygen levels are less than 90 and she was recommended to go to the ER immediately.  In the emergency room she was noted to be hypoxic with worsening lung infiltrate and parameters positive for sepsis.  Pulmonary service was consulted patient had blood cultures drawn and was started on Zithromax and ceftriaxone.  Patient is significant foul-smelling breath that has been going on for the last couple of weeks.  Patient is being admitted for further care.      Past Medical History:  Past Medical History:   Diagnosis Date   • Arthritis    • Cataract    • Colon polyps     FOLLOWED BY DR. JOSEPH LAU   • Hypertension      Past Surgical History:  Past Surgical History:   Procedure Laterality Date   • COLONOSCOPY  10/2015    Vuriz-lbuhjwpqvryw-Lv. Kaplan.  Follow-up in 5 years.   • COLONOSCOPY N/A 1/12/2022    2 BENIGN POLYPS IN DESCENDING, 5 MM BENIGN POLYP IN SIGMOID, MULTIPLE SMALL AND LARGE DIVERTICULA IN SIGMOID, RESCOPE IN 3 YRS, DR. JOSEPH LAU AT [facility]   • EYE SURGERY     • JOINT REPLACEMENT  2005?    Left total hip replacement in 2005.  In December 2015 patient had left hip revision   • TONSILLECTOMY        Home Meds:  (Not in a hospital admission)    Current Meds:     Current Facility-Administered Medications:   •  azithromycin (ZITHROMAX) 500 mg in sodium chloride 0.9 % 250 mL IVPB-VTB, 500 mg, Intravenous, Q24H, Mark Barrett MD  •  [START ON 5/3/2023] cefTRIAXone (ROCEPHIN) 2 g in sodium chloride 0.9 % 100 mL IVPB-VTB, 2 g, Intravenous, Q24H, Mark Barrett MD  •  ipratropium-albuterol (DUO-NEB) nebulizer solution 3  mL, 3 mL, Nebulization, 4x Daily - RT, Mark Barrett MD, 3 mL at 05/02/23 1705  •  methylPREDNISolone sodium succinate (SOLU-Medrol) injection 40 mg, 40 mg, Intravenous, Q8H, Mark Barrett MD, 40 mg at 05/02/23 1658  •  [COMPLETED] Insert Peripheral IV, , , Once **AND** sodium chloride 0.9 % flush 10 mL, 10 mL, Intravenous, PRN, Brandon Kennedy MD    Current Outpatient Medications:   •  Cholecalciferol (VITAMIN D) 1000 UNITS tablet, Take 1 tablet by mouth., Disp: , Rfl:   •  cyanocobalamin (VITAMIN B-12) 500 MCG tablet, Take  by mouth., Disp: , Rfl:   •  fluticasone (FLONASE) 50 MCG/ACT nasal spray, Administer 1spray in each nostril twice daily. (Patient taking differently: Administer 1spray in each nostril twice daily. prn), Disp: , Rfl:   •  irbesartan (Avapro) 150 MG tablet, Take 1 tablet by mouth Every Night., Disp: 90 tablet, Rfl: 1  •  vitamin C (ASCORBIC ACID) 500 MG tablet, Take 1 tablet by mouth Daily., Disp: , Rfl:   Allergies:  Allergies   Allergen Reactions   • Hydrocodone-Acetaminophen Itching     Social History:   Social History     Tobacco Use   • Smoking status: Every Day     Packs/day: 1.00     Years: 51.00     Pack years: 51.00     Types: Cigarettes     Start date: 1/1/1970   • Smokeless tobacco: Never   Substance Use Topics   • Alcohol use: Yes     Alcohol/week: 30.0 standard drinks     Types: 30 Cans of beer per week     Comment: 4-6 per day       Family History:  Family History   Problem Relation Age of Onset   • Cancer Mother    • Breast cancer Mother    • Arthritis Mother    • Deep vein thrombosis Mother    • Heart attack Father    • Heart disease Father         Had quadruple bypass   • Heart attack Maternal Grandmother    • Heart disease Maternal Grandmother    • Malig Hyperthermia Neg Hx           Review of Systems  See history of present illness and past medical history.   Constitutional: Positive for fatigue and unexpected weight change. Negative for chills and  fever.   Respiratory: Positive for shortness of breath (With exertion). Negative for cough and chest tightness.    Cardiovascular: Positive for leg swelling. Negative for chest pain and palpitations.   Musculoskeletal: Positive for back pain (Right posterior thoracic pain).   Neurological: Negative for dizziness, weakness, light-headedness and headaches.   Psychiatric/Behavioral: The patient is not nervous/anxious.      Vitals:   /54   Pulse 106   Temp 98 °F (36.7 °C) (Tympanic)   Resp 18   SpO2 90%   I/O:     Intake/Output Summary (Last 24 hours) at 5/2/2023 1803  Last data filed at 5/2/2023 1740  Gross per 24 hour   Intake 2201 ml   Output --   Net 2201 ml     Exam:  Patient is examined using the personal protective equipment as per guidelines from infection control for this particular patient as enacted.  Hand washing was performed before and after patient interaction.  General Appearance:   Ill-appearing female who is significant foul-smelling breath but she is also aware of.   Head:    Normocephalic, without obvious abnormality, atraumatic   Eyes:    PERRL, conjunctiva/corneas clear, EOM's intact, both eyes   Ears:    Normal external ear canals, both ears   Nose:   Nares normal, septum midline, mucosa normal, no drainage    or sinus tenderness   Throat:   Lips, tongue, gums normal; oral mucosa pink and moist   Neck:   Supple, symmetrical, trachea midline, no adenopathy;     thyroid:  no enlargement/tenderness/nodules; no carotid    bruit or JVD   Back:     Symmetric, no curvature, ROM normal, no CVA tenderness   Lungs:    Decreased breath sounds all over her right chest   Chest Wall:    No tenderness or deformity    Heart:   S1-S2 tachycardic   Abdomen:    Soft nontender   Extremities:   Extremities normal, atraumatic, no cyanosis or edema   Pulses:   Pulses palpable in all extremities; symmetric all extremities   Skin:   Skin color normal, Skin is warm and dry,  no rashes or palpable lesions    Neurologic:  Alert and oriented and grossly nonfocal       Data Review:      I reviewed the patient's new clinical results.  Results from last 7 days   Lab Units 05/02/23  1428   WBC 10*3/mm3 22.73*   HEMOGLOBIN g/dL 12.9   PLATELETS 10*3/mm3 589*     Results from last 7 days   Lab Units 05/02/23  1428   SODIUM mmol/L 137   POTASSIUM mmol/L 4.2   CHLORIDE mmol/L 97*   CO2 mmol/L 23.0   BUN mg/dL 28*   CREATININE mg/dL 0.98   CALCIUM mg/dL 9.6   GLUCOSE mg/dL 124*     CT Angiogram Chest    Addendum Date: 4/4/2023    Outside study of 11/11/2022 has become available. The right lower lobe nodule is new from that study. The differential remains unchanged. Recommendations remain unchanged.  This report was finalized on 4/4/2023 3:53 PM by Dr. El Segovia M.D.      Result Date: 4/4/2023  1. Newly seen right lower lobe nodule. Consider 3 month follow-up after treatment if clinically indicated. Differential considerations include pneumonia, infarct, and neoplasm. 2. No pulmonary embolus seen. Additional incidental findings as above.  This report was finalized on 4/3/2023 3:57 PM by Dr. El Segovia M.D.      XR Chest 1 View    Result Date: 5/2/2023  A large region of opacification at the right mid to lower lung suggesting pneumonia and/or neoplasm, clinical correlation and follow-up advised, CT could be obtained for further evaluation.  This report was finalized on 5/2/2023 3:06 PM by Dr. Ronnie Connor M.D.      Microbiology Results (last 10 days)     Procedure Component Value - Date/Time    Legionella Antigen, Urine - Urine, Urine, Clean Catch [[id number]]  (Normal) Collected: 05/02/23 1754    Lab Status: Final result Specimen: Urine, Clean Catch Updated: 05/02/23 1834     LEGIONELLA ANTIGEN, URINE Negative    S. Pneumo Ag Urine or CSF - Urine, Urine, Clean Catch [[id number]]  (Normal) Collected: 05/02/23 1754    Lab Status: Final result Specimen: Urine, Clean Catch Updated: 05/02/23 1834     Strep Pneumo Ag  Negative    MRSA Screen, PCR (Inpatient) - Swab, Nares [[id number]]  (Normal) Collected: 05/02/23 1741    Lab Status: Final result Specimen: Swab from Nares Updated: 05/02/23 1923     MRSA PCR No MRSA Detected    Narrative:      The negative predictive value of this diagnostic test is high and should only be used to consider de-escalating anti-MRSA therapy. A positive result may indicate colonization with MRSA and must be correlated clinically.    Respiratory Culture - Sputum, Cough [[id number]] Collected: 05/02/23 1739    Lab Status: Preliminary result Specimen: Sputum from Cough Updated: 05/02/23 2258     Gram Stain Many (4+) WBCs per low power field      Many (4+) Mixed bacterial morphotypes seen on Gram Stain      Moderate (3+) Epithelial cells per low power field    Respiratory Panel PCR w/COVID-19(SARS-CoV-2) [facility]O/DOREEN/[facility] In-House, NP Swab in UTM/VTM, 3-4 HR TAT - Swab, Nasopharynx [[id number]]  (Normal) Collected: 05/02/23 1427    Lab Status: Final result Specimen: Swab from Nasopharynx Updated: 05/02/23 1542     ADENOVIRUS, PCR Not Detected     Coronavirus 229E Not Detected     Coronavirus HKU1 Not Detected     Coronavirus NL63 Not Detected     Coronavirus OC43 Not Detected     COVID19 Not Detected     Human Metapneumovirus Not Detected     Human Rhinovirus/Enterovirus Not Detected     Influenza A PCR Not Detected     Influenza B PCR Not Detected     Parainfluenza Virus 1 Not Detected     Parainfluenza Virus 2 Not Detected     Parainfluenza Virus 3 Not Detected     Parainfluenza Virus 4 Not Detected     RSV, PCR Not Detected     Bordetella pertussis pcr Not Detected     Bordetella parapertussis PCR Not Detected     Chlamydophila pneumoniae PCR Not Detected     Mycoplasma pneumo by PCR Not Detected    Narrative:      In the setting of a positive respiratory panel with a viral infection PLUS a negative procalcitonin without other underlying concern for bacterial infection, consider observing off  antibiotics or discontinuation of antibiotics and continue supportive care. If the respiratory panel is positive for atypical bacterial infection (Bordetella pertussis, Chlamydophila pneumoniae, or Mycoplasma pneumoniae), consider antibiotic de-escalation to target atypical bacterial infection.        ECG 12 Lead Dyspnea   Final Result   HEART RATE= 109  bpm   RR Interval= 550  ms   LA Interval= 130  ms   P Horizontal Axis= 17  deg   P Front Axis= 81  deg   QRSD Interval= 73  ms   QT Interval= 323  ms   QRS Axis= 67  deg   T Wave Axis= 69  deg   - OTHERWISE NORMAL ECG -   Sinus tachycardia   Rate has increased   Electronically Signed By: Genet Gomez ([facility]) 02-May-2023 17:02:28   Date and Time of Study: 2023-05-02 13:56:39          Assessment:  Active Hospital Problems    Diagnosis  POA   • **Acute respiratory failure with hypoxia [J96.01]  Yes   • Pneumonia [J18.9]  Yes   • Empyema [J86.9]  Yes   • Tobacco abuse [Z72.0]  Yes   • Benign essential HTN [I10]  Yes       Medical decision making/care plan: See admitting orders  · Acute hypoxic respiratory failure due to worsening right-sided pneumonia with significant halitosis and progressive parapneumonic effusion with evidence of bronchopleural fistula versus active infection with gas-forming agents-concerning for aspiration pneumonia with lung abscess and evolving empyema-plan is to admit the patient continue with IV antibiotics in the situation would recommend Lalito, thoracic surgery consultation for consideration of VATS decortication and drainage of empyema, continued assistance from pulmonary service for her potential declining respiratory status and need for supportive care, make her n.p.o. after midnight, continue with IV fluids.  Follow-up on blood cultures and adjust antibiotic therapy accordingly.  Duration of antibiotic treatment at present is open ended and will depend upon when her final surgical intervention is performed.  · Chronic tobacco use with  emphysema/COPD-continue with oxygen supplementation and nebulizer treatment.  Further management of her respiratory status including whether or not she would need steroid will be deferred to our pulmonary service.  It appears that Solu-Medrol has been initiated earlier by pulmonary service in the emergency room.  · Hypertension-continue antihypertensive regimen and avoid hypotensive episodes.    Olga Palm MD   5/2/2023  18:03 EDT    Parts of this note may be an electronic transcription/translation of spoken language to printed text using the Dragon dictation system.

## 2023-05-02 NOTE — TELEPHONE ENCOUNTER
Caller: Katherine Williamson    Relationship: Self    Best call back number: [phone]    What was the call regarding: PATIENT CALLED REQUESTING TO SPEAK WITH TOMEKA STANTON OR NURSE, SHE STATED SHE HAD BOUGHT A PULSE OXIMETER AND HAD READINGS.    OFFICE INFORMED HUB NURSE WAS NOT AVAILABLE, AND TO PUT BACK MESSAGE WITH READINGS.    PATIENTS READINGS TODAY:    1030AM- SITTING 63/85                 STANDING 64/61    11AM- SITTING 34/83             STANDING 52/62      Do you require a callback: YES

## 2023-05-02 NOTE — ED NOTES
Nursing report ED to floor  Katherine Williamson  72 y.o.  female    HPI :   Chief Complaint   Patient presents with    Shortness of Breath       Admitting doctor:   Olga Palm MD    Admitting diagnosis:   The primary encounter diagnosis was Acute respiratory failure with hypoxia. Diagnoses of Sepsis, due to unspecified organism, unspecified whether acute organ dysfunction present, Community acquired pneumonia, unspecified laterality, Abnormal CXR, Hyperglycemia, and Elevated liver enzymes were also pertinent to this visit.    Code status:   Current Code Status       Date Active Code Status Order ID Comments User Context       Not on file            Allergies:   Hydrocodone-acetaminophen    Isolation:   Enhanced Droplet/Contact     Intake and Output    Intake/Output Summary (Last 24 hours) at 5/2/2023 1714  Last data filed at 5/2/2023 1701  Gross per 24 hour   Intake 1951 ml   Output --   Net 1951 ml       Weight:   There were no vitals filed for this visit.    Most recent vitals:   Vitals:    05/02/23 1650 05/02/23 1701 05/02/23 1705 05/02/23 1706   BP:  134/61     BP Location:       Patient Position:       Pulse:  102     Resp:   18    Temp:       TempSrc:       SpO2: 90%   (!) 88%       Active LDAs/IV Access:   Lines, Drains & Airways       Active LDAs       Name Placement date Placement time Site Days    Peripheral IV 05/02/23 1430 Right Antecubital 05/02/23  1430  Antecubital  less than 1                    Labs (abnormal labs have a star):   Labs Reviewed   COMPREHENSIVE METABOLIC PANEL - Abnormal; Notable for the following components:       Result Value    Glucose 124 (*)     BUN 28 (*)     Chloride 97 (*)     Albumin 2.3 (*)     ALT (SGPT) 37 (*)     AST (SGOT) 37 (*)     Alkaline Phosphatase 202 (*)     BUN/Creatinine Ratio 28.6 (*)     Anion Gap 17.0 (*)     All other components within normal limits    Narrative:     GFR Normal >60  Chronic Kidney Disease <60  Kidney Failure <15     BNP (IN-HOUSE) - Abnormal;  "Notable for the following components:    proBNP 1,345.0 (*)     All other components within normal limits    Narrative:     Among patients with dyspnea, NT-proBNP is highly sensitive for the detection of acute congestive heart failure. In addition NT-proBNP of <300 pg/ml effectively rules out acute congestive heart failure with 99% negative predictive value.    Results may be falsely decreased if patient taking Biotin.     TROPONIN - Abnormal; Notable for the following components:    HS Troponin T 11 (*)     All other components within normal limits    Narrative:     High Sensitive Troponin T Reference Range:  <10.0 ng/L- Negative Female for AMI  <15.0 ng/L- Negative Male for AMI  >=10 - Abnormal Female indicating possible myocardial injury.  >=15 - Abnormal Male indicating possible myocardial injury.   Clinicians would have to utilize clinical acumen, EKG, Troponin, and serial changes to determine if it is an Acute Myocardial Infarction or myocardial injury due to an underlying chronic condition.        CBC WITH AUTO DIFFERENTIAL - Abnormal; Notable for the following components:    WBC 22.73 (*)     Platelets 589 (*)     All other components within normal limits   PROCALCITONIN - Abnormal; Notable for the following components:    Procalcitonin 3.95 (*)     All other components within normal limits    Narrative:     As a Marker for Sepsis (Non-Neonates):    1. <0.5 ng/mL represents a low risk of severe sepsis and/or septic shock.  2. >2 ng/mL represents a high risk of severe sepsis and/or septic shock.    As a Marker for Lower Respiratory Tract Infections that require antibiotic therapy:    PCT on Admission    Antibiotic Therapy       6-12 Hrs later    >0.5                Strongly Recommended  >0.25 - <0.5        Recommended   0.1 - 0.25          Discouraged              Remeasure/reassess PCT  <0.1                Strongly Discouraged     Remeasure/reassess PCT    As 28 day mortality risk marker: \"Change in " "Procalcitonin Result\" (>80% or <=80%) if Day 0 (or Day 1) and Day 4 values are available. Refer to http://www.[facility]-pct-calculator.com    Change in PCT <=80%  A decrease of PCT levels below or equal to 80% defines a positive change in PCT test result representing a higher risk for 28-day all-cause mortality of patients diagnosed with severe sepsis for septic shock.    Change in PCT >80%  A decrease of PCT levels of more than 80% defines a negative change in PCT result representing a lower risk for 28-day all-cause mortality of patients diagnosed with severe sepsis or septic shock.      LACTIC ACID, PLASMA - Abnormal; Notable for the following components:    Lactate 6.3 (*)     All other components within normal limits   MANUAL DIFFERENTIAL - Abnormal; Notable for the following components:    Neutrophil % 92.9 (*)     Lymphocyte % 3.0 (*)     Monocyte % 1.0 (*)     Metamyelocyte % 3.0 (*)     Neutrophils Absolute 21.12 (*)     Lymphocytes Absolute 0.68 (*)     All other components within normal limits   RESPIRATORY PANEL PCR W/ COVID-19 (SARS-COV-2) ALEN/GATITO/DOREEN/PAD/COR/MAD/[facility] IN-HOUSE, NP SWAB IN [facility], 3-4 HR TAT - Normal    Narrative:     In the setting of a positive respiratory panel with a viral infection PLUS a negative procalcitonin without other underlying concern for bacterial infection, consider observing off antibiotics or discontinuation of antibiotics and continue supportive care. If the respiratory panel is positive for atypical bacterial infection (Bordetella pertussis, Chlamydophila pneumoniae, or Mycoplasma pneumoniae), consider antibiotic de-escalation to target atypical bacterial infection.   BLOOD CULTURE   BLOOD CULTURE   RESPIRATORY CULTURE   LEGIONELLA ANTIGEN, URINE   STREP PNEUMO AG, URINE OR CSF   MRSA SCREEN, PCR   RAINBOW DRAW    Narrative:     The following orders were created for panel order [place] Draw.  Procedure                               Abnormality         Status              "        ---------                               -----------         ------                     Green Top (Gel)[[id number]]                                  Final result               Lavender Top[[id number]]                                     Final result               Gold Top - SST[[id number]]                                   Final result               Light Blue Top[[id number]]                                   Final result                 Please view results for these tests on the individual orders.   HIGH SENSITIVITIY TROPONIN T 2HR   LACTIC ACID, REFLEX   CBC AND DIFFERENTIAL    Narrative:     The following orders were created for panel order CBC & Differential.  Procedure                               Abnormality         Status                     ---------                               -----------         ------                     CBC Auto Differential[[id number]]        Abnormal            Final result                 Please view results for these tests on the individual orders.   GREEN TOP   LAVENDER TOP   GOLD TOP - SST   LIGHT BLUE TOP       EKG:   ECG 12 Lead Dyspnea   Final Result   HEART RATE= 109  bpm   RR Interval= 550  ms   SC Interval= 130  ms   P Horizontal Axis= 17  deg   P Front Axis= 81  deg   QRSD Interval= 73  ms   QT Interval= 323  ms   QRS Axis= 67  deg   T Wave Axis= 69  deg   - OTHERWISE NORMAL ECG -   Sinus tachycardia   Rate has increased   Electronically Signed By: Genet Gomez ([facility]) 02-May-2023 17:02:28   Date and Time of Study: 2023-05-02 13:56:39          Meds given in ED:   Medications   sodium chloride 0.9 % flush 10 mL (has no administration in time range)   cefTRIAXone (ROCEPHIN) 2 g in sodium chloride 0.9 % 100 mL IVPB-VTB (has no administration in time range)   azithromycin (ZITHROMAX) 500 mg in sodium chloride 0.9 % 250 mL IVPB-VTB (has no administration in time range)   ipratropium-albuterol (DUO-NEB) nebulizer solution 3 mL (3 mL Nebulization Given 5/2/23 0893)    methylPREDNISolone sodium succinate (SOLU-Medrol) injection 40 mg (40 mg Intravenous Given 5/2/23 1658)   cefTRIAXone (ROCEPHIN) 2 g in sodium chloride 0.9 % 100 mL IVPB-VTB (0 g Intravenous Stopped 5/2/23 1515)   sodium chloride 0.9 % bolus 1,851 mL (0 mL Intravenous Stopped 5/2/23 1701)   vancomycin 1250 mg/250 mL 0.9% NS IVPB ([facility]) (1,250 mg Intravenous New Bag 5/2/23 1558)   acetaminophen (TYLENOL) tablet 1,000 mg (1,000 mg Oral Given 5/2/23 1657)       Imaging results:  XR Chest 1 View    Result Date: 5/2/2023  A large region of opacification at the right mid to lower lung suggesting pneumonia and/or neoplasm, clinical correlation and follow-up advised, CT could be obtained for further evaluation.  This report was finalized on 5/2/2023 3:06 PM by Dr. Ronnie Connor M.D.       Ambulatory status:   - Ax2    Social issues:   Social History     Socioeconomic History    Marital status:    Tobacco Use    Smoking status: Every Day     Packs/day: 1.00     Years: 51.00     Pack years: 51.00     Types: Cigarettes     Start date: 1/1/1970    Smokeless tobacco: Never   Vaping Use    Vaping Use: Never used   Substance and Sexual Activity    Alcohol use: Yes     Alcohol/week: 30.0 standard drinks     Types: 30 Cans of beer per week     Comment: 4-6 per day     Drug use: Never    Sexual activity: Not Currently     Partners: Male     Birth control/protection: None       NIH Stroke Scale:         Neela Willson RN  05/02/23 17:14 EDT     Call Neela #[phone] with any questions

## 2023-05-02 NOTE — TELEPHONE ENCOUNTER
"[facility] called, pt had imaging on 4/26/2023 and the radiologist was concerned about several things. He said that in November of 2022 the pt had a 3mm nodule in her right middle lung, that nodule is now 7mm and he recommends that she have a PET scan done. He also said that she had \"thick walled pleural effusion' with gas in it and some infection 'empyema\" that needs to be drained as well as pneumonia in her right lower lobe. Radiologist stated that the imaging has been sent to us but it is behind due to some IT issues on their end.  "

## 2023-05-03 PROBLEM — R91.1 PULMONARY NODULE: Status: ACTIVE | Noted: 2023-05-03

## 2023-05-03 PROBLEM — A41.9 SEPSIS: Status: ACTIVE | Noted: 2023-05-03

## 2023-05-03 PROBLEM — J43.8 OTHER EMPHYSEMA: Status: ACTIVE | Noted: 2023-05-03

## 2023-05-03 PROBLEM — J90 PLEURAL EFFUSION, RIGHT: Status: ACTIVE | Noted: 2023-05-03

## 2023-05-03 LAB
ALBUMIN SERPL-MCNC: 2 G/DL (ref 3.5–5.2)
ALBUMIN/GLOB SERPL: 0.5 G/DL
ALP SERPL-CCNC: 162 U/L (ref 39–117)
ALT SERPL W P-5'-P-CCNC: 28 U/L (ref 1–33)
ANION GAP SERPL CALCULATED.3IONS-SCNC: 12.4 MMOL/L (ref 5–15)
ARTERIAL PATENCY WRIST A: POSITIVE
AST SERPL-CCNC: 26 U/L (ref 1–32)
ATMOSPHERIC PRESS: 742.5 MMHG
BASE EXCESS BLDA CALC-SCNC: -1.4 MMOL/L (ref 0–2)
BDY SITE: ABNORMAL
BILIRUB SERPL-MCNC: 0.4 MG/DL (ref 0–1.2)
BUN SERPL-MCNC: 32 MG/DL (ref 8–23)
BUN/CREAT SERPL: 45.7 (ref 7–25)
BURR CELLS BLD QL SMEAR: ABNORMAL
CALCIUM SPEC-SCNC: 8.6 MG/DL (ref 8.6–10.5)
CHLORIDE SERPL-SCNC: 105 MMOL/L (ref 98–107)
CO2 SERPL-SCNC: 21.6 MMOL/L (ref 22–29)
CREAT SERPL-MCNC: 0.7 MG/DL (ref 0.57–1)
D-LACTATE SERPL-SCNC: 1.9 MMOL/L (ref 0.5–2)
DEPRECATED RDW RBC AUTO: 41.2 FL (ref 37–54)
EGFRCR SERPLBLD CKD-EPI 2021: 93.2 ML/MIN/1.73
ERYTHROCYTE [DISTWIDTH] IN BLOOD BY AUTOMATED COUNT: 12.8 % (ref 12.3–15.4)
GAS FLOW AIRWAY: 10 LPM
GLOBULIN UR ELPH-MCNC: 3.7 GM/DL
GLUCOSE SERPL-MCNC: 119 MG/DL (ref 65–99)
HCO3 BLDA-SCNC: 22.6 MMOL/L (ref 22–28)
HCT VFR BLD AUTO: 33.6 % (ref 34–46.6)
HGB BLD-MCNC: 11.7 G/DL (ref 12–15.9)
LYMPHOCYTES # BLD MANUAL: 0.72 10*3/MM3 (ref 0.7–3.1)
LYMPHOCYTES NFR BLD MANUAL: 5.4 % (ref 5–12)
MCH RBC QN AUTO: 30.7 PG (ref 26.6–33)
MCHC RBC AUTO-ENTMCNC: 34.8 G/DL (ref 31.5–35.7)
MCV RBC AUTO: 88.2 FL (ref 79–97)
MODALITY: ABNORMAL
MONOCYTES # BLD: 1.18 10*3/MM3 (ref 0.1–0.9)
NEUTROPHILS # BLD AUTO: 19.96 10*3/MM3 (ref 1.7–7)
NEUTROPHILS NFR BLD MANUAL: 91.3 % (ref 42.7–76)
PCO2 BLDA: 35.1 MM HG (ref 35–45)
PH BLDA: 7.42 PH UNITS (ref 7.35–7.45)
PLAT MORPH BLD: NORMAL
PLATELET # BLD AUTO: 498 10*3/MM3 (ref 140–450)
PMV BLD AUTO: 9.8 FL (ref 6–12)
PO2 BLDA: 69.6 MM HG (ref 80–100)
POIKILOCYTOSIS BLD QL SMEAR: ABNORMAL
POTASSIUM SERPL-SCNC: 4 MMOL/L (ref 3.5–5.2)
PROCALCITONIN SERPL-MCNC: 2.62 NG/ML (ref 0–0.25)
PROT SERPL-MCNC: 5.7 G/DL (ref 6–8.5)
RBC # BLD AUTO: 3.81 10*6/MM3 (ref 3.77–5.28)
SAO2 % BLDCOA: 94.1 % (ref 92–99)
SMUDGE CELLS BLD QL SMEAR: ABNORMAL
SODIUM SERPL-SCNC: 139 MMOL/L (ref 136–145)
SPHEROCYTES BLD QL SMEAR: ABNORMAL
TOTAL RATE: 26 BREATHS/MINUTE
VARIANT LYMPHS NFR BLD MANUAL: 3.3 % (ref 19.6–45.3)
WBC NRBC COR # BLD: 21.86 10*3/MM3 (ref 3.4–10.8)

## 2023-05-03 PROCEDURE — 94799 UNLISTED PULMONARY SVC/PX: CPT

## 2023-05-03 PROCEDURE — 94761 N-INVAS EAR/PLS OXIMETRY MLT: CPT

## 2023-05-03 PROCEDURE — 82803 BLOOD GASES ANY COMBINATION: CPT

## 2023-05-03 PROCEDURE — 80053 COMPREHEN METABOLIC PANEL: CPT | Performed by: INTERNAL MEDICINE

## 2023-05-03 PROCEDURE — 25010000002 PIPERACILLIN SOD-TAZOBACTAM PER 1 G: Performed by: INTERNAL MEDICINE

## 2023-05-03 PROCEDURE — 25010000002 METHYLPREDNISOLONE PER 40 MG: Performed by: INTERNAL MEDICINE

## 2023-05-03 PROCEDURE — 85007 BL SMEAR W/DIFF WBC COUNT: CPT | Performed by: INTERNAL MEDICINE

## 2023-05-03 PROCEDURE — 83605 ASSAY OF LACTIC ACID: CPT | Performed by: INTERNAL MEDICINE

## 2023-05-03 PROCEDURE — 85025 COMPLETE CBC W/AUTO DIFF WBC: CPT | Performed by: INTERNAL MEDICINE

## 2023-05-03 PROCEDURE — 94760 N-INVAS EAR/PLS OXIMETRY 1: CPT

## 2023-05-03 PROCEDURE — 36600 WITHDRAWAL OF ARTERIAL BLOOD: CPT

## 2023-05-03 PROCEDURE — 84145 PROCALCITONIN (PCT): CPT | Performed by: INTERNAL MEDICINE

## 2023-05-03 PROCEDURE — 99223 1ST HOSP IP/OBS HIGH 75: CPT | Performed by: NURSE PRACTITIONER

## 2023-05-03 RX ORDER — MAGNESIUM SULFATE HEPTAHYDRATE 40 MG/ML
2 INJECTION, SOLUTION INTRAVENOUS AS NEEDED
Status: DISCONTINUED | OUTPATIENT
Start: 2023-05-03 | End: 2023-05-16

## 2023-05-03 RX ORDER — POTASSIUM CHLORIDE 1.5 G/1.77G
40 POWDER, FOR SOLUTION ORAL AS NEEDED
Status: DISCONTINUED | OUTPATIENT
Start: 2023-05-03 | End: 2023-05-16

## 2023-05-03 RX ORDER — MAGNESIUM SULFATE HEPTAHYDRATE 40 MG/ML
4 INJECTION, SOLUTION INTRAVENOUS AS NEEDED
Status: DISCONTINUED | OUTPATIENT
Start: 2023-05-03 | End: 2023-05-16

## 2023-05-03 RX ORDER — GUAIFENESIN 600 MG/1
600 TABLET, EXTENDED RELEASE ORAL 2 TIMES DAILY
Status: DISCONTINUED | OUTPATIENT
Start: 2023-05-03 | End: 2023-05-19 | Stop reason: HOSPADM

## 2023-05-03 RX ORDER — POTASSIUM CHLORIDE 750 MG/1
40 TABLET, FILM COATED, EXTENDED RELEASE ORAL AS NEEDED
Status: DISCONTINUED | OUTPATIENT
Start: 2023-05-03 | End: 2023-05-16

## 2023-05-03 RX ORDER — POTASSIUM CHLORIDE 7.45 MG/ML
10 INJECTION INTRAVENOUS
Status: DISCONTINUED | OUTPATIENT
Start: 2023-05-03 | End: 2023-05-16

## 2023-05-03 RX ADMIN — IPRATROPIUM BROMIDE AND ALBUTEROL SULFATE 3 ML: 2.5; .5 SOLUTION RESPIRATORY (INHALATION) at 07:43

## 2023-05-03 RX ADMIN — GUAIFENESIN 600 MG: 600 TABLET, EXTENDED RELEASE ORAL at 17:18

## 2023-05-03 RX ADMIN — IPRATROPIUM BROMIDE AND ALBUTEROL SULFATE 3 ML: 2.5; .5 SOLUTION RESPIRATORY (INHALATION) at 12:02

## 2023-05-03 RX ADMIN — IPRATROPIUM BROMIDE AND ALBUTEROL SULFATE 3 ML: 2.5; .5 SOLUTION RESPIRATORY (INHALATION) at 15:42

## 2023-05-03 RX ADMIN — Medication 10 ML: at 21:34

## 2023-05-03 RX ADMIN — TAZOBACTAM SODIUM AND PIPERACILLIN SODIUM 3.38 G: 375; 3 INJECTION, SOLUTION INTRAVENOUS at 03:16

## 2023-05-03 RX ADMIN — Medication 1000 MCG: at 09:19

## 2023-05-03 RX ADMIN — TAZOBACTAM SODIUM AND PIPERACILLIN SODIUM 3.38 G: 375; 3 INJECTION, SOLUTION INTRAVENOUS at 12:36

## 2023-05-03 RX ADMIN — METHYLPREDNISOLONE SODIUM SUCCINATE 40 MG: 40 INJECTION, POWDER, FOR SOLUTION INTRAMUSCULAR; INTRAVENOUS at 09:19

## 2023-05-03 RX ADMIN — OXYCODONE HYDROCHLORIDE AND ACETAMINOPHEN 500 MG: 500 TABLET ORAL at 09:19

## 2023-05-03 RX ADMIN — LOSARTAN POTASSIUM 50 MG: 50 TABLET, FILM COATED ORAL at 09:29

## 2023-05-03 RX ADMIN — FLUTICASONE PROPIONATE 1 SPRAY: 50 SPRAY, METERED NASAL at 09:19

## 2023-05-03 RX ADMIN — METHYLPREDNISOLONE SODIUM SUCCINATE 40 MG: 40 INJECTION, POWDER, FOR SOLUTION INTRAMUSCULAR; INTRAVENOUS at 00:31

## 2023-05-03 RX ADMIN — IPRATROPIUM BROMIDE AND ALBUTEROL SULFATE 3 ML: 2.5; .5 SOLUTION RESPIRATORY (INHALATION) at 19:39

## 2023-05-03 RX ADMIN — Medication 10 ML: at 09:21

## 2023-05-03 RX ADMIN — METHYLPREDNISOLONE SODIUM SUCCINATE 40 MG: 40 INJECTION, POWDER, FOR SOLUTION INTRAMUSCULAR; INTRAVENOUS at 17:18

## 2023-05-03 RX ADMIN — TAZOBACTAM SODIUM AND PIPERACILLIN SODIUM 3.38 G: 375; 3 INJECTION, SOLUTION INTRAVENOUS at 19:45

## 2023-05-03 RX ADMIN — SODIUM CHLORIDE 125 ML/HR: 9 INJECTION, SOLUTION INTRAVENOUS at 16:00

## 2023-05-03 NOTE — DISCHARGE PLACEMENT REQUEST
"Clay Katherine CAST (72 y.o. Female)     Date of Birth   1950    Social Security Number       Address   [address] [address]    Home Phone   [phone]    MRN   [id number]       Congregation   [employer]    Marital Status                               Admission Date   5/2/23    Admission Type   Emergency    Admitting Provider   Olga Palm MD    Attending Provider   Ford Britton DO    Department, Room/Bed   [address], [address]/1       Discharge Date       Discharge Disposition       Discharge Destination                               Attending Provider: Ford Britton DO    Allergies: Hydrocodone-acetaminophen    Isolation: None   Infection: None   Code Status: CPR    Ht: 160 cm (63\")   Wt: 63.5 kg (139 lb 15.9 oz)    Admission Cmt: None   Principal Problem: Acute respiratory failure with hypoxia [J96.01]                 Active Insurance as of 5/2/2023     Primary Coverage     Payor Plan Insurance Group Employer/Plan Group    [employer] BLUE [employer]OSS [employer] BLUE CROSS BLUE SHIELD PPO [id number]     Payor Plan Address Payor Plan Phone Number Payor Plan Fax Number Effective Dates    [address] [phone]  3/24/2013 - None Entered    [facility] [id number]       Subscriber Name Subscriber Birth Date Member ID       SAMANTHA CERDA III 3/[birth year]/1960 [id number]                 Emergency Contacts      (Rel.) Home Phone Work Phone Mobile Phone    KERRI lauren (Daughter) [phone] -- --    Odalys Briceno (Other) -- -- [phone]    Samantha Cerda III (Spouse) [phone] -- --          "

## 2023-05-03 NOTE — CASE MANAGEMENT/SOCIAL WORK
Discharge Planning Assessment  [facility]     Patient Name: Katherine Williamson  MRN: [id number]  Today's Date: 5/3/2023    Admit Date: 5/2/2023    Plan: if rehab is needed pt wants BAR  ~ will need pt/ot eval, Normal for home oxygen if needed, [facility] when she returns home   Discharge Needs Assessment     Row Name 05/03/23 1625       Living Environment    People in Home spouse    Name(s) of People in Home : Ronnie    Current Living Arrangements home    Potentially Unsafe Housing Conditions none    Primary Care Provided by self    Provides Primary Care For no one    Family Caregiver if Needed spouse    Family Caregiver Names : Ronnie    Quality of Family Relationships supportive    Able to Return to Prior Arrangements other (see comments)  uncertain at this time       Resource/Environmental Concerns    Resource/Environmental Concerns none    Transportation Concerns none       Transition Planning    Patient/Family Anticipates Transition to inpatient rehabilitation facility;home with family    Patient/Family Anticipated Services at Transition skilled nursing;home health care;rehabilitation services    Transportation Anticipated family or friend will provide       Discharge Needs Assessment    Readmission Within the Last 30 Days no previous admission in last 30 days    Equipment Currently Used at Home none    Concerns to be Addressed discharge planning;decision-making;adjustment to diagnosis/illness;basic needs    Anticipated Changes Related to Illness inability to care for self    Equipment Needed After Discharge oxygen               Discharge Plan     Row Name 05/03/23 1628       Plan    Plan if rehab is needed pt wants BAR  ~ will need pt/ot eval, Normal for home oxygen if needed, [facility] when she returns home    Row Name 05/03/23 1626       Plan    Provided Post Acute Provider List? Yes    Post Acute Provider List Nursing Home;Home Health    Delivered To Patient    Method of  Delivery In person    Plan Comments Pt confirmed the address, PCP and [facility] are correct. Pt said she lives with her  Ronnie ([phone]) and prior to admit she was IADL, used no DME, could afford her Rx,  has been to a rehab facility and had [facility] after hip surgery.  Pt said if home oxygen is needed she wants to use [facility] provided they are in network with her [place], she said she wants the least out of pocket, pt said if she needs rehab she wants to stay here and get rehab at [facility].               Sarita Richardson RN              Continued Care and Services - Admitted Since 5/2/2023     Destination     Service Provider Request Status Selected Services Address Phone Fax Patient Preferred    [facility] Pending - No Request Sent N/A [address] [address] [phone] -- --          Durable Medical Equipment     Service Provider Request Status Selected Services Address Phone Fax Patient Preferred    [facility] Pending - Request Sent N/A [address], [facility] [address] [phone] --          Home Medical Care     Service Provider Request Status Selected Services Address Phone Fax Patient Preferred    [facility] Pending - Request Sent N/A [address] [id number] [phone] --              Expected Discharge Date and Time     Expected Discharge Date Expected Discharge Time    May 8, 2023          Demographic Summary     Row Name 05/03/23 4974       General Information    Admission Type inpatient    Arrived From home    Required Notices Provided --  Pt said she has Medicare A, message sent to [employer] with request she confirm and add Medicare to facesheet    Referral Source admission list    Reason for Consult discharge planning    Preferred Language English       Contact Information    Permission Granted to Share Info With  family/designee               Functional Status     Row Name 05/03/23 1625       Functional Status, IADL    Medications independent    Meal Preparation independent    Housekeeping independent    Laundry independent    Shopping independent               Sarita Richardson RN

## 2023-05-03 NOTE — PLAN OF CARE
Goal Outcome Evaluation:              Outcome Evaluation: new admit from ED. vss overnight on 11-12L o2.  Pt is upadlib to BSC. NS @ 100ml/hr, IV zosyn.  Cardiothoracic consulted for today.

## 2023-05-03 NOTE — PROGRESS NOTES
Name: Katherine Williamson ADMIT: 2023   : 1950  PCP: Zelalem Flor APRN    MRN: [id number] LOS: 1 days   AGE/SEX: 72 y.o. female  ROOM: [facility]     Subjective   Subjective   Patient seen and examined this morning.  Hospital day 1.  At time my examination, patient is awake, alert, oriented, resting in bed.  On 10 L HFNC, she did have an episode of desaturation this morning when she was getting up from bed to go and use the restroom, subsequently improved when she sat back down.  O2 sats currently 91% at time my examination.  She continues to endorse ongoing dyspnea, productive cough, relatively unchanged from prior.         Objective   Objective   Vital Signs  Temp:  [97.9 °F (36.6 °C)-98.3 °F (36.8 °C)] 97.9 °F (36.6 °C)  Heart Rate:  [] 82  Resp:  [14-26] 26  BP: ()/(33-71) 132/71  SpO2:  [82 %-99 %] 82 %  on  Flow (L/min):  [3-12] 10;   Device (Oxygen Therapy): humidified;high-flow nasal cannula  Body mass index is 23.91 kg/m².  Physical Exam  Vitals and nursing note reviewed.   Constitutional:       Appearance: She is ill-appearing (Elderly/frail, ill-appearing).      Comments: Appears uncomfortable at present   Cardiovascular:      Rate and Rhythm: Normal rate and regular rhythm.      Pulses: Normal pulses.      Heart sounds: Normal heart sounds.   Pulmonary:      Effort: Pulmonary effort is normal. No respiratory distress.      Breath sounds: Decreased air movement present. Decreased breath sounds, wheezing and rhonchi present.   Abdominal:      General: Bowel sounds are normal.      Palpations: Abdomen is soft.      Tenderness: There is no abdominal tenderness.   Musculoskeletal:      Right lower leg: No edema.      Left lower leg: No edema.   Skin:     General: Skin is warm and dry.   Neurological:      General: No focal deficit present.      Mental Status: She is alert and oriented to person, place, and time.       Results Review     I reviewed the patient's new clinical  results.  Results from last 7 days   Lab Units 05/02/23  1428   WBC 10*3/mm3 22.73*   HEMOGLOBIN g/dL 12.9   PLATELETS 10*3/mm3 589*     Results from last 7 days   Lab Units 05/03/23  0607 05/02/23  1428   SODIUM mmol/L 139 137   POTASSIUM mmol/L 4.0 4.2   CHLORIDE mmol/L 105 97*   CO2 mmol/L 21.6* 23.0   BUN mg/dL 32* 28*   CREATININE mg/dL 0.70 0.98   GLUCOSE mg/dL 119* 124*   EGFR mL/min/1.73 93.2 62.2     Results from last 7 days   Lab Units 05/03/23  0607 05/02/23  1428   ALBUMIN g/dL 2.0* 2.3*   BILIRUBIN mg/dL 0.4 0.6   ALK PHOS U/L 162* 202*   AST (SGOT) U/L 26 37*   ALT (SGPT) U/L 28 37*     Results from last 7 days   Lab Units 05/03/23  0607 05/02/23  1428   CALCIUM mg/dL 8.6 9.6   ALBUMIN g/dL 2.0* 2.3*     Results from last 7 days   Lab Units 05/03/23  0607 05/02/23  1743 05/02/23  1433 05/02/23  1428   PROCALCITONIN ng/mL 2.62*  --   --  3.95*   LACTATE mmol/L 1.9 3.3* 6.3*  --      No results found for: HGBA1C, POCGLU    XR Chest 1 View    Result Date: 5/2/2023  A large region of opacification at the right mid to lower lung suggesting pneumonia and/or neoplasm, clinical correlation and follow-up advised, CT could be obtained for further evaluation.  This report was finalized on 5/2/2023 3:06 PM by Dr. Ronnie Connor M.D.      I have personally reviewed all medications:  Scheduled Medications  vitamin C, 500 mg, Oral, Daily  fluticasone, 1 spray, Each Nare, Daily  ipratropium-albuterol, 3 mL, Nebulization, 4x Daily - RT  losartan, 50 mg, Oral, Q24H  methylPREDNISolone sodium succinate, 40 mg, Intravenous, Q8H  piperacillin-tazobactam, 3.375 g, Intravenous, Q8H  sodium chloride, 10 mL, Intravenous, Q12H  cyanocobalamin, 1,000 mcg, Oral, Daily    Infusions  sodium chloride, 125 mL/hr, Last Rate: 125 mL/hr (05/02/23 2141)    Diet  NPO Diet NPO Type: Sips with Meds, Ice Chips    I have personally reviewed:  [x]  Laboratory   [x]  Microbiology   [x]  Radiology   [x]  EKG/Telemetry  [x]   Cardiology/Vascular   [x]  Records       Assessment/Plan     Active Hospital Problems    Diagnosis  POA   • **Acute respiratory failure with hypoxia [J96.01]  Yes   • Sepsis [A41.9]  Yes   • Pleural effusion, right [J90]  Yes   • Other emphysema [J43.8]  Yes   • Pulmonary nodule [R91.1]  Yes   • Pneumonia [J18.9]  Yes   • Tobacco abuse [Z72.0]  Yes   • Benign essential HTN [I10]  Yes      Resolved Hospital Problems   No resolved problems to display.       72 y.o. female admitted with Acute respiratory failure with hypoxia.    Sepsis secondary to bacterial pneumonia  Acute respiratory failure with hypoxia  Right Pleural effusion  Emphysema/COPD with acute exacerbation  Pulmonary nodule  - SIRS 3/4 on admission (tachycardia, hypotension, leukocytosis).  - Patient meets criteria for diagnosis of acute respiratory with hypoxia with: Complaints of dyspnea, documented O2 sat of 82% on room air, new oxygen requirement and P/F ratio <300.  - Imaging obtained on admission reviewed, showing dense consolidation in the right lower and middle lobes, pleural effusion with gas bubbles demonstrated within felt to likely represent empyema versus lung abscess, enlarging precarinal lymph node. Per chart review, patient does have recent prior findings of pulmonary nodule on imaging.  - Pulmonology consulted following admission, she does have history of emphysema and has longstanding smoking history, has never been formally diagnosed with COPD, however, they feel like she likely has COPD with acute exacerbation at this time.  - Infectious workup unremarkable thus far. Patient currently on treatment with Zosyn, IV corticosteroids, bronchodilators.  She continues to have increased oxygen requirements, however, stable from prior, along with significant respiratory symptoms.  - Continue current treatment as prescribed for now.  Pulmonology and cardiothoracic surgery are both been consulted, will follow their plans and recommendations,  greatly appreciate their help.  - Continue supplemental oxygen/HFNC as needed, telemetry, pulse oximetry, elevate HOB, aspiration precautions.    History of hypertension with hypotension on admission  - BP slightly improved this morning, when compared to admission. Continue to hold home Losartan. Can resume if/when appropriate.    Lactic acidosis  - Likely 2/2 hypoperfusion from sepsis. Lactic acid remains elevated on repeat labs, but improving from prior. Continue to closely monitor.    Sinus tachycardia  - Noted on telemetry and EKG. Monitor for now on telemetry.      · SCDs for DVT prophylaxis.  · Full code.  · Discussed with patient, nursing staff, consulting provider, CCP and care team on multidisciplinary rounds.  · Anticipate discharge TBD timing yet to be determined.      Ford Britton DO  [facility]  05/03/23  08:07 EDT

## 2023-05-03 NOTE — CONSULTS
Inpatient Thoracic Surgery Consult  Consult performed by: Yvrose Stone, HILARIA, APRN  Consult ordered by: Olga Palm MD          Patient Care Team:  Zelalem Flor APRN as PCP - General (Internal Medicine)  Brandon Mitchell MD as Consulting Physician (Orthopedic Surgery)    Chief Complaint   Patient presents with   • Shortness of Breath       Subjective     History of Present Illness    Ms. Williamson is a 72-year-old lady who is a current everyday smoker with past medical history of hypertension.  She presented to [facility] ER on 5/2/2023 with increasing shortness of breath x2 weeks.  Initially it was just with exertion but progressed to occurring at rest.  She also has had increased fatigue and general malaise. She had a CT angiogram on 4/2/2023 which was negative for pulmonary embolus, did reveal a 2.4 cm right lower lobe pulmonary nodule and 3-month CT chest was suggested.  Initial evaluation in the ER this admission included a CT of the chest which demonstrated a large right pleural effusion with gas bubbles and diffuse consolidation and through the right middle and lower lobe.  These findings were concerning for empyema versus lung abscess, for which thoracic surgery has been asked to see this lady.    Lactate on admission was 6.3, now 1.9 after fluid resuscitation and antibiotics.  White count upon presentation was 22,000, now 21.8 K as of this morning.  She is resting in bed on 11 L high flow nasal cannula which is drastically different from her baseline where she is active and able to care for herself.      Review of Systems   Constitutional: Positive for fatigue.   Eyes: Negative.    Respiratory: Positive for shortness of breath.    Cardiovascular: Negative.    Gastrointestinal: Negative.    Endocrine: Negative.    Genitourinary: Negative.    Musculoskeletal: Negative.    Skin: Negative.    Allergic/Immunologic: Negative.    Neurological: Positive for weakness.   Hematological: Negative.          Patient Active Problem List   Diagnosis   • Benign essential HTN   • Tobacco abuse   • Dislocation of hip joint prosthesis   • Fracture of proximal humerus   • Mechanical complication of internal orthopedic device   • Wear of articular bearing surface of internal prosthetic joint   • History of repair of hip joint   • History of operative procedure on hip   • Hyperglycemia   • Hepatic steatosis   • Diverticulosis   • Chest pain, atypical   • History of colon polyps   • Family history of colon cancer in mother   • Allergic rhinitis   • Lung nodule seen on imaging study   • Acute respiratory failure with hypoxia   • Pneumonia   • Empyema   • Sepsis   • Pleural effusion, right   • Other emphysema   • Pulmonary nodule     Past Medical History:   Diagnosis Date   • Arthritis    • Cataract    • Colon polyps     FOLLOWED BY DR. JOSEPH LAU   • Hypertension      Past Surgical History:   Procedure Laterality Date   • COLONOSCOPY  10/2015    Akjad-gzdenxdwavar-Pm. Kaplan.  Follow-up in 5 years.   • COLONOSCOPY N/A 1/12/2022    2 BENIGN POLYPS IN DESCENDING, 5 MM BENIGN POLYP IN SIGMOID, MULTIPLE SMALL AND LARGE DIVERTICULA IN SIGMOID, RESCOPE IN 3 YRS, DR. JOSEPH LAU AT [facility]   • EYE SURGERY     • JOINT REPLACEMENT  2005?    Left total hip replacement in 2005.  In December 2015 patient had left hip revision   • TONSILLECTOMY       Family History   Problem Relation Age of Onset   • Cancer Mother    • Breast cancer Mother    • Arthritis Mother    • Deep vein thrombosis Mother    • Heart attack Father    • Heart disease Father         Had quadruple bypass   • Heart attack Maternal Grandmother    • Heart disease Maternal Grandmother    • Malig Hyperthermia Neg Hx      Social History     Socioeconomic History   • Marital status:    Tobacco Use   • Smoking status: Every Day     Packs/day: 1.00     Years: 51.00     Pack years: 51.00     Types: Cigarettes     Start date: 1/1/1970   • Smokeless tobacco: Never   Vaping Use    • Vaping Use: Never used   Substance and Sexual Activity   • Alcohol use: Yes     Alcohol/week: 30.0 standard drinks     Types: 30 Cans of beer per week     Comment: 4-6 per day, but very few in the past month   • Drug use: Never   • Sexual activity: Not Currently     Partners: Male     Birth control/protection: None     Medications Prior to Admission   Medication Sig Dispense Refill Last Dose   • Cholecalciferol (VITAMIN D) 1000 UNITS tablet Take 1 tablet by mouth.   5/1/2023   • cyanocobalamin (VITAMIN B-12) 500 MCG tablet Take  by mouth.   5/1/2023   • irbesartan (Avapro) 150 MG tablet Take 1 tablet by mouth Every Night. 90 tablet 1 5/1/2023   • vitamin C (ASCORBIC ACID) 500 MG tablet Take 1 tablet by mouth Daily.   5/1/2023     Allergies   Allergen Reactions   • Hydrocodone-Acetaminophen Itching       Objective      Vital Signs  Temp:  [97.9 °F (36.6 °C)-98.3 °F (36.8 °C)] 97.9 °F (36.6 °C)  Heart Rate:  [] 95  Resp:  [14-26] 26  BP: ()/(33-71) 134/69    Intake & Output (last day)       05/02 0701  05/03 0700 05/03 0701  05/04 0700    IV Piggyback 2201     Total Intake(mL/kg) 2201 (36)     Net +2201           Urine Unmeasured Occurrence 2 x 1 x          Physical Exam  Constitutional:       General: She is not in acute distress.     Appearance: Normal appearance. She is not ill-appearing.   HENT:      Head: Normocephalic and atraumatic.   Cardiovascular:      Rate and Rhythm: Normal rate.   Pulmonary:      Effort: Tachypnea and respiratory distress present.      Breath sounds: Decreased breath sounds present.   Musculoskeletal:      Cervical back: Normal range of motion and neck supple.   Skin:     General: Skin is warm.   Neurological:      General: No focal deficit present.      Mental Status: She is alert.   Psychiatric:         Mood and Affect: Mood normal.         Results Review:    I reviewed the patient's new clinical results.  I reviewed the patient's new imaging results and agree with the  interpretation.  Discussed with patient, RN and Dr. Gray    Imaging Results (Last 24 Hours)     Procedure Component Value Units Date/Time    CT Chest Without Contrast Diagnostic [[id number]] Collected: 05/02/23 1657     Updated: 05/02/23 1705    Narrative:      CT CHEST WO CONTRAST DIAGNOSTIC-     Radiation dose reduction techniques were utilized, including automated  exposure control and exposure modulation based on body size.     Clinical: Shortness of breath, sepsis     COMPARISON examination 04/03/2023     FINDINGS: There is dense consolidation involving the right lower lobe  and right middle lobe. There is a new pleural effusion which is small to  moderate in size. Attenuation is compatible with serous fluid. There are  gas bubbles demonstrated within the pleural cavity. Fluid and gas-filled  cavities demonstrated along the posterior margin of the right middle  lobe, in the expected area of the minor fissure, empyema versus lung  abscess.     There is extensive bullae formation involving both lungs with an upper  lobe predominance. The left lung is clear. The tracheobronchial tree is  satisfactory in appearance. Enlarging precarinal lymph node, currently  16 mm in length. Cardiac size within normal limits, trace pericardial  thickening or fluid. Likely small sliding-type hiatal hernia. The  esophagus otherwise unremarkable. Limited images through the upper  abdomen demonstrate diffuse fatty infiltration of the liver.     CONCLUSION:  1. Dense consolidation demonstrated within the right lower lobe and  right middle lobe.  2. Pleural effusion, gas bubbles are demonstrated within. Gas and fluid  filled cavities along the posterior right middle lobe, in the projected  area of the minor fissure, empyema versus lung abscess.  3. Enlarging precarinal lymph node.  4. Probable small sliding-type hiatal hernia.  5. Fatty infiltration of the liver.  6. Trace pericardial thickening/effusion.        This report was finalized  on 5/2/2023 5:02 PM by Dr. Abdullahi Voss M.D.       XR Chest 1 View [[id number]] Collected: 05/02/23 1504     Updated: 05/02/23 1510    Narrative:      XR CHEST 1 VW-     HISTORY: Female who is 72 years-old,  short of breath     TECHNIQUE: Frontal view of the chest     COMPARISON:  image from CT from 11/08/2021     FINDINGS: Heart, mediastinum and pulmonary vasculature are unremarkable.  A large region of opacification at the right mid to lower lung  suggesting pneumonia and/or neoplasm, clinical correlation and follow-up  advised, CT could be obtained for further evaluation. There may be  minimal right pleural effusion. No pneumothorax. No acute osseous  process.       Impression:      A large region of opacification at the right mid to lower  lung suggesting pneumonia and/or neoplasm, clinical correlation and  follow-up advised, CT could be obtained for further evaluation.      This report was finalized on 5/2/2023 3:06 PM by Dr. Ronnie Connor M.D.             Lab Results:  Lab Results (last 24 hours)     Procedure Component Value Units Date/Time    Manual Differential [[id number]]  (Abnormal) Collected: 05/03/23 0607    Specimen: Blood Updated: 05/03/23 0857     Neutrophil % 91.3 %      Lymphocyte % 3.3 %      Monocyte % 5.4 %      Neutrophils Absolute 19.96 10*3/mm3      Lymphocytes Absolute 0.72 10*3/mm3      Monocytes Absolute 1.18 10*3/mm3      Clifford Cells Mod/2+     Poikilocytes Large/3+     Spherocytes Slight/1+     Smudge Cells Slight/1+     Platelet Morphology Normal    CBC Auto Differential [[id number]]  (Abnormal) Collected: 05/03/23 0607    Specimen: Blood Updated: 05/03/23 0857     WBC 21.86 10*3/mm3      RBC 3.81 10*6/mm3      Hemoglobin 11.7 g/dL      Hematocrit 33.6 %      MCV 88.2 fL      MCH 30.7 pg      MCHC 34.8 g/dL      RDW 12.8 %      RDW-SD 41.2 fl      MPV 9.8 fL      Platelets 498 10*3/mm3     Blood Gas, Arterial - [[id number]]  (Abnormal) Collected: 05/03/23 0805    Specimen:  "Arterial Blood Updated: 05/03/23 0808     Site Arterial: left radial     Alexandre's Test Positive     pH, Arterial 7.417 pH units      pCO2, Arterial 35.1 mm Hg      pO2, Arterial 69.6 mm Hg      HCO3, Arterial 22.6 mmol/L      Base Excess, Arterial -1.4 mmol/L      O2 Saturation Calculated 94.1 %      Comment: 91% on 10L Meter: [id number] : [id number] Debbie Melchor        Barometric Pressure for Blood Gas 742.5 mmHg      Modality HFNC     Flow Rate 10 lpm      Rate 26 Breaths/minute     Respiratory Culture - Sputum, Cough [[id number]] Collected: 05/02/23 1739    Specimen: Sputum from Cough Updated: 05/03/23 0752     Respiratory Culture Scant growth (1+) The culture consists of normal respiratory aishwarya. This is a preliminary report; final report to follow.     Gram Stain Many (4+) WBCs per low power field      Many (4+) Mixed bacterial morphotypes seen on Gram Stain      Moderate (3+) Epithelial cells per low power field    Procalcitonin [[id number]]  (Abnormal) Collected: 05/03/23 0607    Specimen: Blood Updated: 05/03/23 0723     Procalcitonin 2.62 ng/mL     Narrative:      As a Marker for Sepsis (Non-Neonates):    1. <0.5 ng/mL represents a low risk of severe sepsis and/or septic shock.  2. >2 ng/mL represents a high risk of severe sepsis and/or septic shock.    As a Marker for Lower Respiratory Tract Infections that require antibiotic therapy:    PCT on Admission    Antibiotic Therapy       6-12 Hrs later    >0.5                Strongly Recommended  >0.25 - <0.5        Recommended   0.1 - 0.25          Discouraged              Remeasure/reassess PCT  <0.1                Strongly Discouraged     Remeasure/reassess PCT    As 28 day mortality risk marker: \"Change in Procalcitonin Result\" (>80% or <=80%) if Day 0 (or Day 1) and Day 4 values are available. Refer to http://www.[facility]-pct-calculator.com    Change in PCT <=80%  A decrease of PCT levels below or equal to 80% defines a positive change in PCT test " result representing a higher risk for 28-day all-cause mortality of patients diagnosed with severe sepsis for septic shock.    Change in PCT >80%  A decrease of PCT levels of more than 80% defines a negative change in PCT result representing a lower risk for 28-day all-cause mortality of patients diagnosed with severe sepsis or septic shock.       Comprehensive Metabolic Panel [[id number]]  (Abnormal) Collected: 05/03/23 0607    Specimen: Blood Updated: 05/03/23 0720     Glucose 119 mg/dL      BUN 32 mg/dL      Creatinine 0.70 mg/dL      Sodium 139 mmol/L      Potassium 4.0 mmol/L      Chloride 105 mmol/L      CO2 21.6 mmol/L      Calcium 8.6 mg/dL      Total Protein 5.7 g/dL      Albumin 2.0 g/dL      ALT (SGPT) 28 U/L      AST (SGOT) 26 U/L      Alkaline Phosphatase 162 U/L      Total Bilirubin 0.4 mg/dL      Globulin 3.7 gm/dL      A/G Ratio 0.5 g/dL      BUN/Creatinine Ratio 45.7     Anion Gap 12.4 mmol/L      eGFR 93.2 mL/min/1.73     Narrative:      GFR Normal >60  Chronic Kidney Disease <60  Kidney Failure <15      Lactic Acid, Plasma [[id number]]  (Normal) Collected: 05/03/23 0607    Specimen: Blood Updated: 05/03/23 0715     Lactate 1.9 mmol/L     MRSA Screen, PCR (Inpatient) - Swab, Nares [[id number]]  (Normal) Collected: 05/02/23 1741    Specimen: Swab from Nares Updated: 05/02/23 1923     MRSA PCR No MRSA Detected    Narrative:      The negative predictive value of this diagnostic test is high and should only be used to consider de-escalating anti-MRSA therapy. A positive result may indicate colonization with MRSA and must be correlated clinically.    STAT Lactic Acid, Reflex [[id number]]  (Abnormal) Collected: 05/02/23 1743    Specimen: Blood Updated: 05/02/23 1850     Lactate 3.3 mmol/L     Legionella Antigen, Urine - Urine, Urine, Clean Catch [[id number]]  (Normal) Collected: 05/02/23 1754    Specimen: Urine, Clean Catch Updated: 05/02/23 1834     LEGIONELLA ANTIGEN, URINE Negative    S. Pneumo Ag Urine or  CSF - Urine, Urine, Clean Catch [[id number]]  (Normal) Collected: 05/02/23 1754    Specimen: Urine, Clean Catch Updated: 05/02/23 1834     Strep Pneumo Ag Negative    High Sensitivity Troponin T 2Hr [[id number]]  (Abnormal) Collected: 05/02/23 1743    Specimen: Blood Updated: 05/02/23 1832     HS Troponin T 10 ng/L      Troponin T Delta -1 ng/L     Narrative:      High Sensitive Troponin T Reference Range:  <10.0 ng/L- Negative Female for AMI  <15.0 ng/L- Negative Male for AMI  >=10 - Abnormal Female indicating possible myocardial injury.  >=15 - Abnormal Male indicating possible myocardial injury.   Clinicians would have to utilize clinical acumen, EKG, Troponin, and serial changes to determine if it is an Acute Myocardial Infarction or myocardial injury due to an underlying chronic condition.         Respiratory Panel PCR w/COVID-19(SARS-CoV-2) ALEN/GATITO/DOREEN/PAD/[facility] In-House, NP Swab in UTM/VTM, 3-4 HR TAT - Swab, Nasopharynx [[id number]]  (Normal) Collected: 05/02/23 1427    Specimen: Swab from Nasopharynx Updated: 05/02/23 1542     ADENOVIRUS, PCR Not Detected     Coronavirus 229E Not Detected     Coronavirus HKU1 Not Detected     Coronavirus NL63 Not Detected     Coronavirus OC43 Not Detected     COVID19 Not Detected     Human Metapneumovirus Not Detected     Human Rhinovirus/Enterovirus Not Detected     Influenza A PCR Not Detected     Influenza B PCR Not Detected     Parainfluenza Virus 1 Not Detected     Parainfluenza Virus 2 Not Detected     Parainfluenza Virus 3 Not Detected     Parainfluenza Virus 4 Not Detected     RSV, PCR Not Detected     Bordetella pertussis pcr Not Detected     Bordetella parapertussis PCR Not Detected     Chlamydophila pneumoniae PCR Not Detected     Mycoplasma pneumo by PCR Not Detected    Narrative:      In the setting of a positive respiratory panel with a viral infection PLUS a negative procalcitonin without other underlying concern for bacterial infection, consider observing  off antibiotics or discontinuation of antibiotics and continue supportive care. If the respiratory panel is positive for atypical bacterial infection (Bordetella pertussis, Chlamydophila pneumoniae, or Mycoplasma pneumoniae), consider antibiotic de-escalation to target atypical bacterial infection.    [place] Draw [[id number]] Collected: 05/02/23 1428    Specimen: Blood Updated: 05/02/23 1531    Narrative:      The following orders were created for panel order [place] Draw.  Procedure                               Abnormality         Status                     ---------                               -----------         ------                     Green Top (Gel)[[id number]]                                  Final result               Lavender Top[[id number]]                                     Final result               Gold Top - SST[[id number]]                                   Final result               Light Blue Top[[id number]]                                   Final result                 Please view results for these tests on the individual orders.    Gold Top - SST [[id number]] Collected: 05/02/23 1428    Specimen: Blood Updated: 05/02/23 1531     Extra Tube Hold for add-ons.     Comment: Auto resulted.       Light Blue Top [[id number]] Collected: 05/02/23 1428    Specimen: Blood Updated: 05/02/23 1531     Extra Tube Hold for add-ons.     Comment: Auto resulted       Green Top (Gel) [[id number]] Collected: 05/02/23 1428    Specimen: Blood Updated: 05/02/23 1531     Extra Tube Hold for add-ons.     Comment: Auto resulted.       Lavender Top [[id number]] Collected: 05/02/23 1428    Specimen: Blood Updated: 05/02/23 1531     Extra Tube hold for add-on     Comment: Auto resulted       Lactic Acid, Plasma [[id number]]  (Abnormal) Collected: 05/02/23 1433    Specimen: Blood Updated: 05/02/23 1526     Lactate 6.3 mmol/L     Manual Differential [[id number]]  (Abnormal) Collected: 05/02/23 1428    Specimen: Blood Updated:  05/02/23 1521     Neutrophil % 92.9 %      Lymphocyte % 3.0 %      Monocyte % 1.0 %      Metamyelocyte % 3.0 %      Neutrophils Absolute 21.12 10*3/mm3      Lymphocytes Absolute 0.68 10*3/mm3      Monocytes Absolute 0.23 10*3/mm3      RBC Morphology Normal     WBC Morphology Normal     Platelet Morphology Normal    BNP [[id number]]  (Abnormal) Collected: 05/02/23 1428    Specimen: Blood Updated: 05/02/23 1514     proBNP 1,345.0 pg/mL     Narrative:      Among patients with dyspnea, NT-proBNP is highly sensitive for the detection of acute congestive heart failure. In addition NT-proBNP of <300 pg/ml effectively rules out acute congestive heart failure with 99% negative predictive value.    Results may be falsely decreased if patient taking Biotin.      High Sensitivity Troponin T [[id number]]  (Abnormal) Collected: 05/02/23 1428    Specimen: Blood Updated: 05/02/23 1514     HS Troponin T 11 ng/L     Narrative:      High Sensitive Troponin T Reference Range:  <10.0 ng/L- Negative Female for AMI  <15.0 ng/L- Negative Male for AMI  >=10 - Abnormal Female indicating possible myocardial injury.  >=15 - Abnormal Male indicating possible myocardial injury.   Clinicians would have to utilize clinical acumen, EKG, Troponin, and serial changes to determine if it is an Acute Myocardial Infarction or myocardial injury due to an underlying chronic condition.         Procalcitonin [[id number]]  (Abnormal) Collected: 05/02/23 1428    Specimen: Blood Updated: 05/02/23 1514     Procalcitonin 3.95 ng/mL     Narrative:      As a Marker for Sepsis (Non-Neonates):    1. <0.5 ng/mL represents a low risk of severe sepsis and/or septic shock.  2. >2 ng/mL represents a high risk of severe sepsis and/or septic shock.    As a Marker for Lower Respiratory Tract Infections that require antibiotic therapy:    PCT on Admission    Antibiotic Therapy       6-12 Hrs later    >0.5                Strongly Recommended  >0.25 - <0.5        Recommended   0.1  "- 0.25          Discouraged              Remeasure/reassess PCT  <0.1                Strongly Discouraged     Remeasure/reassess PCT    As 28 day mortality risk marker: \"Change in Procalcitonin Result\" (>80% or <=80%) if Day 0 (or Day 1) and Day 4 values are available. Refer to http://www.[facility] [place]-pct-calculator.com    Change in PCT <=80%  A decrease of PCT levels below or equal to 80% defines a positive change in PCT test result representing a higher risk for 28-day all-cause mortality of patients diagnosed with severe sepsis for septic shock.    Change in PCT >80%  A decrease of PCT levels of more than 80% defines a negative change in PCT result representing a lower risk for 28-day all-cause mortality of patients diagnosed with severe sepsis or septic shock.       Comprehensive Metabolic Panel [[id number]]  (Abnormal) Collected: 05/02/23 1428    Specimen: Blood Updated: 05/02/23 1507     Glucose 124 mg/dL      BUN 28 mg/dL      Creatinine 0.98 mg/dL      Sodium 137 mmol/L      Potassium 4.2 mmol/L      Chloride 97 mmol/L      CO2 23.0 mmol/L      Calcium 9.6 mg/dL      Total Protein 6.7 g/dL      Albumin 2.3 g/dL      ALT (SGPT) 37 U/L      AST (SGOT) 37 U/L      Alkaline Phosphatase 202 U/L      Total Bilirubin 0.6 mg/dL      Globulin 4.4 gm/dL      A/G Ratio 0.5 g/dL      BUN/Creatinine Ratio 28.6     Anion Gap 17.0 mmol/L      eGFR 62.2 mL/min/1.73     Narrative:      GFR Normal >60  Chronic Kidney Disease <60  Kidney Failure <15      CBC & Differential [[id number]]  (Abnormal) Collected: 05/02/23 1428    Specimen: Blood Updated: 05/02/23 1447    Narrative:      The following orders were created for panel order CBC & Differential.  Procedure                               Abnormality         Status                     ---------                               -----------         ------                     CBC Auto Differential[[id number]]        Abnormal            Final result                 Please view results for " these tests on the individual orders.    CBC Auto Differential [[id number]]  (Abnormal) Collected: 05/02/23 1428    Specimen: Blood Updated: 05/02/23 1447     WBC 22.73 10*3/mm3      RBC 4.12 10*6/mm3      Hemoglobin 12.9 g/dL      Hematocrit 37.5 %      MCV 91.0 fL      MCH 31.3 pg      MCHC 34.4 g/dL      RDW 12.8 %      RDW-SD 42.6 fl      MPV 10.2 fL      Platelets 589 10*3/mm3      nRBC 0.0 /100 WBC     Blood Culture - Blood, Arm, Left [[id number]] Collected: 05/02/23 1434    Specimen: Blood from Arm, Left Updated: 05/02/23 1439    Blood Culture - Blood, Arm, Right [[id number]] Collected: 05/02/23 1433    Specimen: Blood from Arm, Right Updated: 05/02/23 1439              Assessment & Plan       Acute respiratory failure with hypoxia    Benign essential HTN    Tobacco abuse    Pneumonia    Sepsis    Pleural effusion, right    Other emphysema    Pulmonary nodule      Assessment & Plan    I independently reviewed the CT of the chest performed upon admission which demonstrates diffuse intraparenchymal pneumonia to the right middle and lower lung.  There is a small right pleural effusion.  There is an enlarged right precarinal lymph node.    Pneumonia: Continue IV antibiotics per primary service.  Pulmonary consult noted and notes reviewed.  No plans noted for bronchoscopy at this time, but answered patient's questions to the best of my ability regarding this procedure if indicated.  She will need to increase her pulmonary toilet.  This was discussed at length with the patient and her visitor at bedside.  Agree with plans for repeat CT chest after completion of antibiotics.    Right pleural effusion: Not significant enough for intervention.  Likely related to her pneumonia.  To continue treatment as above.    I discussed the patients findings and our recommendations with patient, nursing staff and Dr. Gray    Thank you for this consult and allowing us to participate in the care of your patient.  Nothing more to offer  from a thoracic perspective.  We will sign off.      Yvrose Stone DNP, APRN  [facility]  05/03/23  10:07 EDT      I spent >75 minutes reviewing the patient's chart including medical history, notes, radiographic imaging, labs and performing an assessment and development of a plan and discussion with the patient/family at bedside.

## 2023-05-03 NOTE — PLAN OF CARE
Goal Outcome Evaluation:            Educated patient on use and importance of incentive spirometer and OPEP. Left both at bedside. Patient O2 demands increased slightly from 10L to 12L by end of shift.

## 2023-05-03 NOTE — PROGRESS NOTES
[facility] INPATIENT PROGRESS NOTE         [address]    5/3/2023      PATIENT IDENTIFICATION:  Name: Katherine Williamson ADMIT: 2023   : 1950  PCP: Zelalem Flor APRN    MRN: [id number] LOS: 1 days   AGE/SEX: 72 y.o. female  ROOM: [facility]                     LOS 1    Reason for visit: Right lower lobe lung mass versus pneumonia      SUBJECTIVE:      On 10 L high flow supplemental oxygen with saturations of 92% at time of visit.  Complains of coarse cough and feeling as if it is more on her right side of her chest.  No wheezing or cardiac-like ches discomfort.  Diminished breath sounds.  No wheezing or rhonchi.  I am seeing the patient for the first time today.  All patient problems are new to me.      Objective   OBJECTIVE:    Vital Sign Min/Max for last 24 hours  Temp  Min: 97.9 °F (36.6 °C)  Max: 98.3 °F (36.8 °C)   BP  Min: 84/70  Max: 143/69   Pulse  Min: 77  Max: 118   Resp  Min: 14  Max: 26   SpO2  Min: 82 %  Max: 99 %   No data recorded   Weight  Min: 61.2 kg (135 lb)  Max: 63.5 kg (139 lb 15.9 oz)                         Body mass index is 24.8 kg/m².    Intake/Output Summary (Last 24 hours) at 5/3/2023 1044  Last data filed at 2023 1740  Gross per 24 hour   Intake 2201 ml   Output --   Net 2201 ml         Exam:  GEN:  No distress, appears stated age  EYES:   PERRL, anicteric sclerae  ENT:    External ears/nose normal, OP clear  NECK:  No adenopathy, midline trachea  LUNGS: Normal chest on inspection, palpation and diminished basilar breath sounds on auscultation   CV:  Normal S1S2, without murmur  ABD:  Nontender, nondistended, no hepatosplenomegaly, +BS  EXT:  No edema.  No cyanosis or clubbing.  No mottling and normal cap refill.    Assessment     Scheduled meds:  vitamin C, 500 mg, Oral, Daily  fluticasone, 1 spray, Each Nare, Daily  ipratropium-albuterol, 3 mL, Nebulization, 4x Daily - RT  losartan, 50 mg, Oral, Q24H  methylPREDNISolone sodium succinate, 40 mg, Intravenous,  Q8H  piperacillin-tazobactam, 3.375 g, Intravenous, Q8H  sodium chloride, 10 mL, Intravenous, Q12H  cyanocobalamin, 1,000 mcg, Oral, Daily      IV meds:                      sodium chloride, 125 mL/hr, Last Rate: 125 mL/hr (05/02/23 2141)      Data Review:  Results from last 7 days   Lab Units 05/03/23  0607 05/02/23  1428   SODIUM mmol/L 139 137   POTASSIUM mmol/L 4.0 4.2   CHLORIDE mmol/L 105 97*   CO2 mmol/L 21.6* 23.0   BUN mg/dL 32* 28*   CREATININE mg/dL 0.70 0.98   GLUCOSE mg/dL 119* 124*   CALCIUM mg/dL 8.6 9.6         Estimated Creatinine Clearance: 67.1 mL/min (by C-G formula based on SCr of 0.7 mg/dL).  Results from last 7 days   Lab Units 05/03/23  0607 05/02/23  1428   WBC 10*3/mm3 21.86* 22.73*   HEMOGLOBIN g/dL 11.7* 12.9   PLATELETS 10*3/mm3 498* 589*         Results from last 7 days   Lab Units 05/03/23  0607 05/02/23  1428   ALT (SGPT) U/L 28 37*   AST (SGOT) U/L 26 37*     Results from last 7 days   Lab Units 05/03/23  0805   PH, ARTERIAL pH units 7.417   PO2 ART mm Hg 69.6*   PCO2, ARTERIAL mm Hg 35.1   HCO3 ART mmol/L 22.6     Results from last 7 days   Lab Units 05/03/23  0607 05/02/23  1743 05/02/23  1433 05/02/23  1428   PROCALCITONIN ng/mL 2.62*  --   --  3.95*   LACTATE mmol/L 1.9 3.3* 6.3*  --          No results found for: HGBA1C, POCGLU      CT 5/2 chest reviewed            Microbiology reviewed                  Active Hospital Problems    Diagnosis  POA   • **Acute respiratory failure with hypoxia [J96.01]  Yes   • Sepsis [A41.9]  Yes   • Pleural effusion, right [J90]  Yes   • Other emphysema [J43.8]  Yes   • Pulmonary nodule [R91.1]  Yes   • Pneumonia [J18.9]  Yes   • Tobacco abuse [Z72.0]  Yes   • Benign essential HTN [I10]  Yes      Resolved Hospital Problems   No resolved problems to display.         ASSESSMENT:    Right lower lobe opacity - nodule, mass vs pna  CT scan with a 2.4 cm lung nodule in the right lower lobe last month  Emphysema  Hypertension  Tobacco  abuse  AHRF  Sepsis  Suspect underlying COPD with acute exacerbation.        PLAN:    Encourage pulmonary toilet.  Continue antibiotics.  Repeat CT chest in several weeks after antibiotics for follow-up of questionable mass component  Wean oxygen as able.        Elijah Berrios MD  Pulmonary and Critical Care Medicine  [facility], [facility]  5/3/2023    10:44 EDT

## 2023-05-04 DIAGNOSIS — Z87.891 SMOKING HISTORY: Primary | ICD-10-CM

## 2023-05-04 DIAGNOSIS — R91.1 LUNG NODULE SEEN ON IMAGING STUDY: ICD-10-CM

## 2023-05-04 LAB
ANION GAP SERPL CALCULATED.3IONS-SCNC: 9.5 MMOL/L (ref 5–15)
ANISOCYTOSIS BLD QL: ABNORMAL
BACTERIA SPEC AEROBE CULT: ABNORMAL
BACTERIA SPEC RESP CULT: NORMAL
BUN SERPL-MCNC: 25 MG/DL (ref 8–23)
BUN/CREAT SERPL: 55.6 (ref 7–25)
CALCIUM SPEC-SCNC: 9.1 MG/DL (ref 8.6–10.5)
CHLORIDE SERPL-SCNC: 106 MMOL/L (ref 98–107)
CO2 SERPL-SCNC: 25.5 MMOL/L (ref 22–29)
CREAT SERPL-MCNC: 0.45 MG/DL (ref 0.57–1)
DEPRECATED RDW RBC AUTO: 40.8 FL (ref 37–54)
EGFRCR SERPLBLD CKD-EPI 2021: 103.6 ML/MIN/1.73
ERYTHROCYTE [DISTWIDTH] IN BLOOD BY AUTOMATED COUNT: 12.8 % (ref 12.3–15.4)
GLUCOSE SERPL-MCNC: 142 MG/DL (ref 65–99)
GRAM STN SPEC: ABNORMAL
GRAM STN SPEC: NORMAL
HCT VFR BLD AUTO: 33.3 % (ref 34–46.6)
HGB BLD-MCNC: 11.6 G/DL (ref 12–15.9)
ISOLATED FROM: ABNORMAL
LYMPHOCYTES # BLD MANUAL: 0.45 10*3/MM3 (ref 0.7–3.1)
LYMPHOCYTES NFR BLD MANUAL: 2.1 % (ref 5–12)
MACROCYTES BLD QL SMEAR: ABNORMAL
MCH RBC QN AUTO: 30.8 PG (ref 26.6–33)
MCHC RBC AUTO-ENTMCNC: 34.8 G/DL (ref 31.5–35.7)
MCV RBC AUTO: 88.3 FL (ref 79–97)
MONOCYTES # BLD: 0.45 10*3/MM3 (ref 0.1–0.9)
NEUTROPHILS # BLD AUTO: 20.68 10*3/MM3 (ref 1.7–7)
NEUTROPHILS NFR BLD MANUAL: 95.8 % (ref 42.7–76)
PLAT MORPH BLD: NORMAL
PLATELET # BLD AUTO: 441 10*3/MM3 (ref 140–450)
PMV BLD AUTO: 9.7 FL (ref 6–12)
POIKILOCYTOSIS BLD QL SMEAR: ABNORMAL
POTASSIUM SERPL-SCNC: 4.1 MMOL/L (ref 3.5–5.2)
RBC # BLD AUTO: 3.77 10*6/MM3 (ref 3.77–5.28)
SMUDGE CELLS BLD QL SMEAR: ABNORMAL
SODIUM SERPL-SCNC: 141 MMOL/L (ref 136–145)
TARGETS BLD QL SMEAR: ABNORMAL
VARIANT LYMPHS NFR BLD MANUAL: 2.1 % (ref 19.6–45.3)
WBC NRBC COR # BLD: 21.59 10*3/MM3 (ref 3.4–10.8)

## 2023-05-04 PROCEDURE — 94664 DEMO&/EVAL PT USE INHALER: CPT

## 2023-05-04 PROCEDURE — 94799 UNLISTED PULMONARY SVC/PX: CPT

## 2023-05-04 PROCEDURE — 85007 BL SMEAR W/DIFF WBC COUNT: CPT | Performed by: STUDENT IN AN ORGANIZED HEALTH CARE EDUCATION/TRAINING PROGRAM

## 2023-05-04 PROCEDURE — 25010000002 PIPERACILLIN SOD-TAZOBACTAM PER 1 G: Performed by: INTERNAL MEDICINE

## 2023-05-04 PROCEDURE — 80048 BASIC METABOLIC PNL TOTAL CA: CPT | Performed by: STUDENT IN AN ORGANIZED HEALTH CARE EDUCATION/TRAINING PROGRAM

## 2023-05-04 PROCEDURE — 94761 N-INVAS EAR/PLS OXIMETRY MLT: CPT

## 2023-05-04 PROCEDURE — 94760 N-INVAS EAR/PLS OXIMETRY 1: CPT

## 2023-05-04 PROCEDURE — 87040 BLOOD CULTURE FOR BACTERIA: CPT | Performed by: NURSE PRACTITIONER

## 2023-05-04 PROCEDURE — 25010000002 METHYLPREDNISOLONE PER 40 MG: Performed by: INTERNAL MEDICINE

## 2023-05-04 PROCEDURE — 85025 COMPLETE CBC W/AUTO DIFF WBC: CPT | Performed by: STUDENT IN AN ORGANIZED HEALTH CARE EDUCATION/TRAINING PROGRAM

## 2023-05-04 RX ADMIN — OXYCODONE HYDROCHLORIDE AND ACETAMINOPHEN 500 MG: 500 TABLET ORAL at 09:33

## 2023-05-04 RX ADMIN — TAZOBACTAM SODIUM AND PIPERACILLIN SODIUM 3.38 G: 375; 3 INJECTION, SOLUTION INTRAVENOUS at 12:32

## 2023-05-04 RX ADMIN — GUAIFENESIN 600 MG: 600 TABLET, EXTENDED RELEASE ORAL at 09:32

## 2023-05-04 RX ADMIN — Medication 10 ML: at 20:20

## 2023-05-04 RX ADMIN — TAZOBACTAM SODIUM AND PIPERACILLIN SODIUM 3.38 G: 375; 3 INJECTION, SOLUTION INTRAVENOUS at 18:21

## 2023-05-04 RX ADMIN — TAZOBACTAM SODIUM AND PIPERACILLIN SODIUM 3.38 G: 375; 3 INJECTION, SOLUTION INTRAVENOUS at 03:47

## 2023-05-04 RX ADMIN — IPRATROPIUM BROMIDE AND ALBUTEROL SULFATE 3 ML: 2.5; .5 SOLUTION RESPIRATORY (INHALATION) at 10:42

## 2023-05-04 RX ADMIN — IPRATROPIUM BROMIDE AND ALBUTEROL SULFATE 3 ML: 2.5; .5 SOLUTION RESPIRATORY (INHALATION) at 15:49

## 2023-05-04 RX ADMIN — IPRATROPIUM BROMIDE AND ALBUTEROL SULFATE 3 ML: 2.5; .5 SOLUTION RESPIRATORY (INHALATION) at 07:11

## 2023-05-04 RX ADMIN — Medication 10 ML: at 09:37

## 2023-05-04 RX ADMIN — METHYLPREDNISOLONE SODIUM SUCCINATE 40 MG: 40 INJECTION, POWDER, FOR SOLUTION INTRAMUSCULAR; INTRAVENOUS at 09:32

## 2023-05-04 RX ADMIN — IPRATROPIUM BROMIDE AND ALBUTEROL SULFATE 3 ML: 2.5; .5 SOLUTION RESPIRATORY (INHALATION) at 20:25

## 2023-05-04 RX ADMIN — METHYLPREDNISOLONE SODIUM SUCCINATE 40 MG: 40 INJECTION, POWDER, FOR SOLUTION INTRAMUSCULAR; INTRAVENOUS at 00:06

## 2023-05-04 RX ADMIN — GUAIFENESIN 600 MG: 600 TABLET, EXTENDED RELEASE ORAL at 20:20

## 2023-05-04 RX ADMIN — LOSARTAN POTASSIUM 50 MG: 50 TABLET, FILM COATED ORAL at 09:32

## 2023-05-04 RX ADMIN — Medication 1000 MCG: at 09:32

## 2023-05-04 NOTE — PROGRESS NOTES
[facility] INPATIENT PROGRESS NOTE         [address]    2023      PATIENT IDENTIFICATION:  Name: Katherine Williamson ADMIT: 2023   : 1950  PCP: Zelalem Flor APRN    MRN: [id number] LOS: 2 days   AGE/SEX: 72 y.o. female  ROOM: [facility]                     LOS 2    Reason for visit: Right lower lobe lung mass versus pneumonia      SUBJECTIVE:      Still requiring high flow supplemental oxygen at 10 L/min.  Still with moderate cough.      Objective   OBJECTIVE:    Vital Sign Min/Max for last 24 hours  Temp  Min: 97.4 °F (36.3 °C)  Max: 98.1 °F (36.7 °C)   BP  Min: 135/67  Max: 160/80   Pulse  Min: 65  Max: 109   Resp  Min: 14  Max: 22   SpO2  Min: 90 %  Max: 98 %   No data recorded   Weight  Min: 66.2 kg (145 lb 15.1 oz)  Max: 66.2 kg (145 lb 15.1 oz)                         Body mass index is 25.85 kg/m².    Intake/Output Summary (Last 24 hours) at 2023 0950  Last data filed at 2023 0937  Gross per 24 hour   Intake 480 ml   Output --   Net 480 ml         Exam:  GEN:  No distress, appears stated age  EYES:   PERRL, anicteric sclerae  ENT:    External ears/nose normal, OP clear  NECK:  No adenopathy, midline trachea  LUNGS: Normal chest on inspection, palpation and diminished basilar breath sounds on auscultation   CV:  Normal S1S2, without murmur  ABD:  Nontender, nondistended, no hepatosplenomegaly, +BS  EXT:  No edema.  No cyanosis or clubbing.  No mottling and normal cap refill.    Assessment     Scheduled meds:  vitamin C, 500 mg, Oral, Daily  fluticasone, 1 spray, Each Nare, Daily  guaiFENesin, 600 mg, Oral, BID  ipratropium-albuterol, 3 mL, Nebulization, 4x Daily - RT  losartan, 50 mg, Oral, Q24H  methylPREDNISolone sodium succinate, 40 mg, Intravenous, Q8H  piperacillin-tazobactam, 3.375 g, Intravenous, Q8H  sodium chloride, 10 mL, Intravenous, Q12H  cyanocobalamin, 1,000 mcg, Oral, Daily      IV meds:                      sodium chloride, 125 mL/hr, Last Rate: Stopped  (05/03/23 4076)      Data Review:  Results from last 7 days   Lab Units 05/04/23  0603 05/03/23  0607 05/02/23  1428   SODIUM mmol/L 141 139 137   POTASSIUM mmol/L 4.1 4.0 4.2   CHLORIDE mmol/L 106 105 97*   CO2 mmol/L 25.5 21.6* 23.0   BUN mg/dL 25* 32* 28*   CREATININE mg/dL 0.45* 0.70 0.98   GLUCOSE mg/dL 142* 119* 124*   CALCIUM mg/dL 9.1 8.6 9.6         Estimated Creatinine Clearance: 106.3 mL/min (A) (by C-G formula based on SCr of 0.45 mg/dL (L)).  Results from last 7 days   Lab Units 05/04/23  0604 05/03/23  0607 05/02/23  1428   WBC 10*3/mm3 21.59* 21.86* 22.73*   HEMOGLOBIN g/dL 11.6* 11.7* 12.9   PLATELETS 10*3/mm3 441 498* 589*         Results from last 7 days   Lab Units 05/03/23  0607 05/02/23  1428   ALT (SGPT) U/L 28 37*   AST (SGOT) U/L 26 37*     Results from last 7 days   Lab Units 05/03/23  0805   PH, ARTERIAL pH units 7.417   PO2 ART mm Hg 69.6*   PCO2, ARTERIAL mm Hg 35.1   HCO3 ART mmol/L 22.6     Results from last 7 days   Lab Units 05/03/23  0607 05/02/23  1743 05/02/23  1433 05/02/23  1428   PROCALCITONIN ng/mL 2.62*  --   --  3.95*   LACTATE mmol/L 1.9 3.3* 6.3*  --          No results found for: HGBA1C, POCGLU      CT 5/2 chest reviewed            Microbiology reviewed                  Active Hospital Problems    Diagnosis  POA   • **Acute respiratory failure with hypoxia [J96.01]  Yes   • Sepsis [A41.9]  Yes   • Pleural effusion, right [J90]  Yes   • Other emphysema [J43.8]  Yes   • Pulmonary nodule [R91.1]  Yes   • Pneumonia [J18.9]  Yes   • Tobacco abuse [Z72.0]  Yes   • Benign essential HTN [I10]  Yes      Resolved Hospital Problems   No resolved problems to display.         ASSESSMENT:    Right lower lobe opacity - nodule, mass vs pna  CT scan with a 2.4 cm lung nodule in the right lower lobe last month  Emphysema  Hypertension  Tobacco abuse  AHRF  Sepsis  Suspect underlying COPD with acute exacerbation.        PLAN:    Encourage pulmonary toilet.  Continue antibiotics.  I do not  think we need to continue steroids.  Repeat CT chest in several weeks after antibiotics for follow-up of questionable mass component.    Wean oxygen as able.        Elijah Berrios MD  Pulmonary and Critical Care Medicine  [facility], [facility]  5/4/2023    09:50 EDT

## 2023-05-04 NOTE — CASE MANAGEMENT/SOCIAL WORK
Continued Stay Note  [facility]     Patient Name: Katherine Williamson  MRN: [id number]  Today's Date: 5/4/2023    Admit Date: 5/2/2023    Plan: Rehab vs. retrun home with [facility] following, [place] if needs O2.   Discharge Plan     Row Name 05/04/23 1130       Plan    Plan Rehab vs. retrun home with [facility] following, [place] if needs O2.    Patient/Family in Agreement with Plan yes    Plan Comments MD requests referral to LTAC.  Spoke with patient at bedside, she is agreeable - left message for Talya/John.  [facility] also following, [facility] following.  Still on 12l/Hi-flow.  CCP continues to follow.  Chelsey CLAYTON                   Expected Discharge Date and Time     Expected Discharge Date Expected Discharge Time    May 8, 2023             Becky S. Humeniuk, RN

## 2023-05-04 NOTE — PLAN OF CARE
Goal Outcome Evaluation:  Plan of Care Reviewed With: patient        Progress: no change  Outcome Evaluation: Patient had no c/o pain through the night. Up with assist of to BSC. Very SOA with activity. Curently on 10 L HFNC. IV Zosyn continued. Devoloping edema in BUE and BLE. VSS. Daily weight. Blood culture positive results called to MD. COFFMAN.

## 2023-05-04 NOTE — PAYOR COMM NOTE
"  Katherine Cerda (72 y.o. Female)       PLEASE SEE ATTACHED FOR INPT CONTINUED STAY AUTH.     REF#[id number]    PLEASE CALL  OR  [phone]    THANK YOU    YOSVANY COSTA LPN CCP    Date of Birth   1950    Social Security Number       Address   [address] [address]    Home Phone   [phone]    MRN   [id number]       Protestant   Quaker    Marital Status                               Admission Date   5/2/23    Admission Type   Emergency    Admitting Provider   Olga Palm MD    Attending Provider   Gee Clement MD    Department, Room/Bed   [address], [address]607/1       Discharge Date       Discharge Disposition       Discharge Destination                               Attending Provider: Gee Clement MD    Allergies: Hydrocodone-acetaminophen    Isolation: None   Infection: None   Code Status: CPR    Ht: 160 cm (63\")   Wt: 66.2 kg (145 lb 15.1 oz)    Admission Cmt: None   Principal Problem: Acute respiratory failure with hypoxia [J96.01]                 Active Insurance as of 5/2/2023     Primary Coverage     Payor Plan Insurance Group Employer/Plan Group    ANTHEM BLUE CROSS ANTHEM BLUE CROSS BLUE SHIELD PPO [id number]     Payor Plan Address Payor Plan Phone Number Payor Plan Fax Number Effective Dates    [address] [phone]  3/24/2013 - None Entered    [facility] [id number]       Subscriber Name Subscriber Birth Date Member ID       SAMANTHA CERDA III [birth year]1960 [id number]           Secondary Coverage     Payor Plan Insurance Group Employer/Plan Group    MEDICARE MEDICARE A ONLY      Payor Plan Address Payor Plan Phone Number Payor Plan Fax Number Effective Dates    [address] [phone]  12/1/2017 - None Entered    [facility] [id number]       Subscriber Name Subscriber Birth Date Member ID       KATHERINE CERDA 1950 [id number]                 Emergency Contacts      (Rel.) Home Phone Work Phone Mobile Phone "    xinKERRI (Daughter) [phone] -- --    Odalys Briceno (Other) -- -- [phone]    ClayRonnie III (Spouse) [phone] -- --            [place]: [facility] [id number]  Tax ID [id number]  Oxygen Therapy (last day)     Date/Time SpO2 Device (Oxygen Therapy) Flow (L/min) Oxygen Concentration (%) ETCO2 (mmHg)    05/04/23 1042 90 high-flow nasal cannula 12 -- --    05/04/23 0800 -- high-flow nasal cannula 12 -- --    05/04/23 0738 -- high-flow nasal cannula 12 -- --    05/04/23 0720 -- high-flow nasal cannula 12 -- --    05/04/23 0711 -- high-flow nasal cannula 10 -- --    05/04/23 0348 98 humidified;high-flow nasal cannula 10 -- --    05/04/23 0008 -- humidified;high-flow nasal cannula 11 -- --    05/03/23 2300 93 -- -- -- --    05/03/23 2019 -- humidified;high-flow nasal cannula 11 -- --    05/03/23 1949 90 humidified;high-flow nasal cannula 12 -- --    05/03/23 1943 94 -- -- -- --    05/03/23 1939 97 humidified;high-flow nasal cannula 12 -- --    05/03/23 1700 -- humidified;high-flow nasal cannula 12 -- --    05/03/23 1542 91 humidified;high-flow nasal cannula 10 -- --    05/03/23 1228 91 high-flow nasal cannula 10 -- --    05/03/23 1202 91 humidified;high-flow nasal cannula 10 -- --    05/03/23 1100 95 -- -- -- --    05/03/23 0929 91 -- -- -- --    05/03/23 0900 -- humidified;high-flow nasal cannula 11 -- --    05/03/23 0743 82 humidified;high-flow nasal cannula 10 -- --    05/03/23 0736 88 high-flow nasal cannula 10 -- --    05/03/23 0631 92 humidified;high-flow nasal cannula 10 -- --    05/03/23 0534 93 -- 11 -- --    05/03/23 0244 93 -- -- -- --          Intake & Output (last day)       05/03 0701 05/04 0700 05/04 0701 05/05 0700    P.O. 240 240    IV Piggyback      Total Intake(mL/kg) 240 (3.6) 240 (3.6)    Net +240 +240          Urine Unmeasured Occurrence 2 x 1 x    Stool Unmeasured Occurrence  1 x        Lines, Drains & Airways     Active LDAs     Name Placement date Placement time Site Days     Peripheral IV 23 1430 Right Antecubital 23  1430  Antecubital  1    Peripheral IV 23 1410 Anterior;Right Wrist 23  1410  Wrist  less than 1                  Operative/Procedure Notes (last 24 hours)  Notes from 23 1227 through 23 1227   No notes of this type exist for this encounter.            Physician Progress Notes (last 24 hours)      Elijah Berrios MD at 23 0950              [facility] INPATIENT PROGRESS NOTE         [address]    2023      PATIENT IDENTIFICATION:  Name: Katherine Williamson ADMIT: 2023   : 1950  PCP: Zelalem Flor APRN    MRN: [id number] LOS: 2 days   AGE/SEX: 72 y.o. female  ROOM: [facility]                     LOS 2    Reason for visit: Right lower lobe lung mass versus pneumonia      SUBJECTIVE:      Still requiring high flow supplemental oxygen at 10 L/min.  Still with moderate cough.      Objective   OBJECTIVE:    Vital Sign Min/Max for last 24 hours  Temp  Min: 97.4 °F (36.3 °C)  Max: 98.1 °F (36.7 °C)   BP  Min: 135/67  Max: 160/80   Pulse  Min: 65  Max: 109   Resp  Min: 14  Max: 22   SpO2  Min: 90 %  Max: 98 %   No data recorded   Weight  Min: 66.2 kg (145 lb 15.1 oz)  Max: 66.2 kg (145 lb 15.1 oz)                         Body mass index is 25.85 kg/m².    Intake/Output Summary (Last 24 hours) at 2023 0950  Last data filed at 2023 0937  Gross per 24 hour   Intake 480 ml   Output --   Net 480 ml         Exam:  GEN:  No distress, appears stated age  EYES:   PERRL, anicteric sclerae  ENT:    External ears/nose normal, OP clear  NECK:  No adenopathy, midline trachea  LUNGS: Normal chest on inspection, palpation and diminished basilar breath sounds on auscultation   CV:  Normal S1S2, without murmur  ABD:  Nontender, nondistended, no hepatosplenomegaly, +BS  EXT:  No edema.  No cyanosis or clubbing.  No mottling and normal cap refill.    Assessment     Scheduled meds:  vitamin C, 500 mg, Oral, Daily  fluticasone, 1  spray, Each Nare, Daily  guaiFENesin, 600 mg, Oral, BID  ipratropium-albuterol, 3 mL, Nebulization, 4x Daily - RT  losartan, 50 mg, Oral, Q24H  methylPREDNISolone sodium succinate, 40 mg, Intravenous, Q8H  piperacillin-tazobactam, 3.375 g, Intravenous, Q8H  sodium chloride, 10 mL, Intravenous, Q12H  cyanocobalamin, 1,000 mcg, Oral, Daily      IV meds:                      sodium chloride, 125 mL/hr, Last Rate: Stopped (05/03/23 1757)      Data Review:  Results from last 7 days   Lab Units 05/04/23  0603 05/03/23  0607 05/02/23  1428   SODIUM mmol/L 141 139 137   POTASSIUM mmol/L 4.1 4.0 4.2   CHLORIDE mmol/L 106 105 97*   CO2 mmol/L 25.5 21.6* 23.0   BUN mg/dL 25* 32* 28*   CREATININE mg/dL 0.45* 0.70 0.98   GLUCOSE mg/dL 142* 119* 124*   CALCIUM mg/dL 9.1 8.6 9.6         Estimated Creatinine Clearance: 106.3 mL/min (A) (by C-G formula based on SCr of 0.45 mg/dL (L)).  Results from last 7 days   Lab Units 05/04/23  0604 05/03/23  0607 05/02/23  1428   WBC 10*3/mm3 21.59* 21.86* 22.73*   HEMOGLOBIN g/dL 11.6* 11.7* 12.9   PLATELETS 10*3/mm3 441 498* 589*         Results from last 7 days   Lab Units 05/03/23  0607 05/02/23  1428   ALT (SGPT) U/L 28 37*   AST (SGOT) U/L 26 37*     Results from last 7 days   Lab Units 05/03/23  0805   PH, ARTERIAL pH units 7.417   PO2 ART mm Hg 69.6*   PCO2, ARTERIAL mm Hg 35.1   HCO3 ART mmol/L 22.6     Results from last 7 days   Lab Units 05/03/23  0607 05/02/23  1743 05/02/23  1433 05/02/23  1428   PROCALCITONIN ng/mL 2.62*  --   --  3.95*   LACTATE mmol/L 1.9 3.3* 6.3*  --          No results found for: HGBA1C, POCGLU      CT 5/2 chest reviewed            Microbiology reviewed                 Active Hospital Problems    Diagnosis  POA   • **Acute respiratory failure with hypoxia [J96.01]  Yes   • Sepsis [A41.9]  Yes   • Pleural effusion, right [J90]  Yes   • Other emphysema [J43.8]  Yes   • Pulmonary nodule [R91.1]  Yes   • Pneumonia [J18.9]  Yes   • Tobacco abuse [Z72.0]  Yes   •  Benign essential HTN [I10]  Yes      Resolved Hospital Problems   No resolved problems to display.         ASSESSMENT:    Right lower lobe opacity - nodule, mass vs pna  CT scan with a 2.4 cm lung nodule in the right lower lobe last month  Emphysema  Hypertension  Tobacco abuse  AHRF  Sepsis  Suspect underlying COPD with acute exacerbation.        PLAN:    Encourage pulmonary toilet.  Continue antibiotics.  I do not think we need to continue steroids.  Repeat CT chest in several weeks after antibiotics for follow-up of questionable mass component.    Wean oxygen as able.        Elijah Berrios MD  Pulmonary and Critical Care Medicine  [facility], [facility]  5/4/2023    09:50 EDT       Electronically signed by Elijah Berrios MD at 05/04/23 0910       Consult Notes (last 24 hours)  Notes from 05/03/23 1227 through 05/04/23 1227   No notes of this type exist for this encounter.

## 2023-05-05 ENCOUNTER — APPOINTMENT (OUTPATIENT)
Dept: GENERAL RADIOLOGY | Facility: HOSPITAL | Age: 73
DRG: 871 | End: 2023-05-05
Payer: COMMERCIAL

## 2023-05-05 LAB
ANION GAP SERPL CALCULATED.3IONS-SCNC: 6.3 MMOL/L (ref 5–15)
BUN SERPL-MCNC: 24 MG/DL (ref 8–23)
BUN/CREAT SERPL: 49 (ref 7–25)
C DIFF TOX GENS STL QL NAA+PROBE: NEGATIVE
CALCIUM SPEC-SCNC: 9.2 MG/DL (ref 8.6–10.5)
CHLORIDE SERPL-SCNC: 103 MMOL/L (ref 98–107)
CO2 SERPL-SCNC: 27.7 MMOL/L (ref 22–29)
CREAT SERPL-MCNC: 0.49 MG/DL (ref 0.57–1)
DEPRECATED RDW RBC AUTO: 42.2 FL (ref 37–54)
EGFRCR SERPLBLD CKD-EPI 2021: 101.5 ML/MIN/1.73
ERYTHROCYTE [DISTWIDTH] IN BLOOD BY AUTOMATED COUNT: 12.9 % (ref 12.3–15.4)
GLUCOSE SERPL-MCNC: 132 MG/DL (ref 65–99)
HCT VFR BLD AUTO: 33.1 % (ref 34–46.6)
HGB BLD-MCNC: 11.4 G/DL (ref 12–15.9)
LYMPHOCYTES # BLD MANUAL: 0.73 10*3/MM3 (ref 0.7–3.1)
LYMPHOCYTES NFR BLD MANUAL: 5.8 % (ref 5–12)
MCH RBC QN AUTO: 31 PG (ref 26.6–33)
MCHC RBC AUTO-ENTMCNC: 34.4 G/DL (ref 31.5–35.7)
MCV RBC AUTO: 89.9 FL (ref 79–97)
MONOCYTES # BLD: 1.14 10*3/MM3 (ref 0.1–0.9)
NEUTROPHILS # BLD AUTO: 17.8 10*3/MM3 (ref 1.7–7)
NEUTROPHILS NFR BLD MANUAL: 90.5 % (ref 42.7–76)
PLAT MORPH BLD: NORMAL
PLATELET # BLD AUTO: 355 10*3/MM3 (ref 140–450)
PMV BLD AUTO: 9.7 FL (ref 6–12)
POIKILOCYTOSIS BLD QL SMEAR: ABNORMAL
POTASSIUM SERPL-SCNC: 4 MMOL/L (ref 3.5–5.2)
RBC # BLD AUTO: 3.68 10*6/MM3 (ref 3.77–5.28)
SODIUM SERPL-SCNC: 137 MMOL/L (ref 136–145)
VARIANT LYMPHS NFR BLD MANUAL: 3.7 % (ref 19.6–45.3)
WBC MORPH BLD: NORMAL
WBC NRBC COR # BLD: 19.67 10*3/MM3 (ref 3.4–10.8)

## 2023-05-05 PROCEDURE — 80048 BASIC METABOLIC PNL TOTAL CA: CPT | Performed by: STUDENT IN AN ORGANIZED HEALTH CARE EDUCATION/TRAINING PROGRAM

## 2023-05-05 PROCEDURE — 94761 N-INVAS EAR/PLS OXIMETRY MLT: CPT

## 2023-05-05 PROCEDURE — 85025 COMPLETE CBC W/AUTO DIFF WBC: CPT | Performed by: STUDENT IN AN ORGANIZED HEALTH CARE EDUCATION/TRAINING PROGRAM

## 2023-05-05 PROCEDURE — 94799 UNLISTED PULMONARY SVC/PX: CPT

## 2023-05-05 PROCEDURE — 94760 N-INVAS EAR/PLS OXIMETRY 1: CPT

## 2023-05-05 PROCEDURE — 92610 EVALUATE SWALLOWING FUNCTION: CPT

## 2023-05-05 PROCEDURE — 25010000002 PIPERACILLIN SOD-TAZOBACTAM PER 1 G: Performed by: INTERNAL MEDICINE

## 2023-05-05 PROCEDURE — 94664 DEMO&/EVAL PT USE INHALER: CPT

## 2023-05-05 PROCEDURE — 87493 C DIFF AMPLIFIED PROBE: CPT | Performed by: NURSE PRACTITIONER

## 2023-05-05 PROCEDURE — 85007 BL SMEAR W/DIFF WBC COUNT: CPT | Performed by: STUDENT IN AN ORGANIZED HEALTH CARE EDUCATION/TRAINING PROGRAM

## 2023-05-05 PROCEDURE — 71046 X-RAY EXAM CHEST 2 VIEWS: CPT

## 2023-05-05 RX ORDER — CITALOPRAM 20 MG/1
20 TABLET ORAL DAILY
Status: DISCONTINUED | OUTPATIENT
Start: 2023-05-05 | End: 2023-05-19 | Stop reason: HOSPADM

## 2023-05-05 RX ADMIN — TAZOBACTAM SODIUM AND PIPERACILLIN SODIUM 3.38 G: 375; 3 INJECTION, SOLUTION INTRAVENOUS at 20:14

## 2023-05-05 RX ADMIN — CITALOPRAM 20 MG: 20 TABLET, FILM COATED ORAL at 16:55

## 2023-05-05 RX ADMIN — GUAIFENESIN 600 MG: 600 TABLET, EXTENDED RELEASE ORAL at 09:05

## 2023-05-05 RX ADMIN — GUAIFENESIN 600 MG: 600 TABLET, EXTENDED RELEASE ORAL at 20:14

## 2023-05-05 RX ADMIN — TAZOBACTAM SODIUM AND PIPERACILLIN SODIUM 3.38 G: 375; 3 INJECTION, SOLUTION INTRAVENOUS at 02:24

## 2023-05-05 RX ADMIN — Medication 1000 MCG: at 09:05

## 2023-05-05 RX ADMIN — LOSARTAN POTASSIUM 50 MG: 50 TABLET, FILM COATED ORAL at 09:05

## 2023-05-05 RX ADMIN — IPRATROPIUM BROMIDE AND ALBUTEROL SULFATE 3 ML: 2.5; .5 SOLUTION RESPIRATORY (INHALATION) at 15:04

## 2023-05-05 RX ADMIN — TAZOBACTAM SODIUM AND PIPERACILLIN SODIUM 3.38 G: 375; 3 INJECTION, SOLUTION INTRAVENOUS at 11:40

## 2023-05-05 RX ADMIN — Medication 10 ML: at 09:09

## 2023-05-05 RX ADMIN — Medication 10 ML: at 20:15

## 2023-05-05 RX ADMIN — IPRATROPIUM BROMIDE AND ALBUTEROL SULFATE 3 ML: 2.5; .5 SOLUTION RESPIRATORY (INHALATION) at 10:52

## 2023-05-05 RX ADMIN — IPRATROPIUM BROMIDE AND ALBUTEROL SULFATE 3 ML: 2.5; .5 SOLUTION RESPIRATORY (INHALATION) at 19:09

## 2023-05-05 RX ADMIN — IPRATROPIUM BROMIDE AND ALBUTEROL SULFATE 3 ML: 2.5; .5 SOLUTION RESPIRATORY (INHALATION) at 07:02

## 2023-05-05 RX ADMIN — OXYCODONE HYDROCHLORIDE AND ACETAMINOPHEN 500 MG: 500 TABLET ORAL at 09:05

## 2023-05-05 NOTE — CASE MANAGEMENT/SOCIAL WORK
Continued Stay Note  [facility]     Patient Name: Katherine Williamson  MRN: [id number]  Today's Date: 5/5/2023    Admit Date: 5/2/2023    Plan: LTAC vs. [facility] vs. home with [facility] following   Discharge Plan     Row Name 05/05/23 1457       Plan    Plan LTAC vs. BAR vs. home with [facility] following    Patient/Family in Agreement with Plan yes    Plan Comments MD anticipates patient will be ready to DC on Monday.  Spoke with patient at bedside.  She prefers [facility] due to location.  But is agreeable to [place]/LTAC if needed.  Spoke with Talya, she will start pre-cert with plan for Monday.  [facility] also following.  CCP continues to follow.  Chelsey CLAYTON                   Expected Discharge Date and Time     Expected Discharge Date Expected Discharge Time    May 8, 2023             Becky S. Humeniuk, RN

## 2023-05-05 NOTE — PAYOR COMM NOTE
"Katherine Cerda (72 y.o. Female)     PLEASE SEE ATTACHED FOR INPT CONTINUED STAY AUTH.    REF#[id number]    PLEASE CALL   OR  [phone]    THANK YOU    YOSVANY COSTA LPN CCP    Date of Birth   1950    Social Security Number       Address   [address] [address]    Home Phone   [phone]    MRN   [id number]       Mosque   Druze    Marital Status                               Admission Date   5/2/23    Admission Type   Emergency    Admitting Provider   Olga Palm MD    Attending Provider   Gee Clement MD    Department, Room/Bed   [address], S607/1       Discharge Date       Discharge Disposition       Discharge Destination                               Attending Provider: Gee Clement MD    Allergies: Hydrocodone-acetaminophen    Isolation: None   Infection: None   Code Status: CPR    Ht: 160 cm (63\")   Wt: 65.8 kg (145 lb)    Admission Cmt: None   Principal Problem: Acute respiratory failure with hypoxia [J96.01]                 Active Insurance as of 5/2/2023     Primary Coverage     Payor Plan Insurance Group Employer/Plan Group    ANTHEM BLUE CROSS ANTHEM BLUE CROSS BLUE SHIELD PPO [id number]     Payor Plan Address Payor Plan Phone Number Payor Plan Fax Number Effective Dates    [address] [phone]  3/24/2013 - None Entered    [facility] [id number]       Subscriber Name Subscriber Birth Date Member ID       SAMANTHA CERDA III 3/3/1958 [id number]           Secondary Coverage     Payor Plan Insurance Group Employer/Plan Group    MEDICARE MEDICARE A ONLY      Payor Plan Address Payor Plan Phone Number Payor Plan Fax Number Effective Dates    [address] [phone]  12/1/2017 - None Entered    [facility] [id number]       Subscriber Name Subscriber Birth Date Member ID       KATHERINE CERDA 1950 [id number]                 Emergency Contacts      (Rel.) Home Phone Work Phone Mobile Phone    " xinKERRI (Daughter) [phone] -- --    Odalys Briceno (Other) -- -- [phone]    WilliamsonRonnie page III (Spouse) [phone] -- --            [place]: NP [phone]  Tax ID [id number]  Oxygen Therapy (last day)     Date/Time SpO2 Device (Oxygen Therapy) Flow (L/min) Oxygen Concentration (%) ETCO2 (mmHg)    05/05/23 1052 100 humidified;high-flow nasal cannula 10 -- --    05/05/23 0800 -- high-flow nasal cannula 10 -- --    05/05/23 0733 90 high-flow nasal cannula 10 -- --    05/05/23 0702 86 humidified;nasal cannula 10 -- --    05/05/23 0039 -- humidified;high-flow nasal cannula 10 -- --    05/04/23 2344 97 humidified;high-flow nasal cannula 10 -- --    05/04/23 2100 -- humidified;high-flow nasal cannula 10 -- --    05/04/23 2025 94 humidified;high-flow nasal cannula 10 -- --    05/04/23 1949 94 high-flow nasal cannula 10 -- --    05/04/23 1549 -- high-flow nasal cannula 10 -- --    05/04/23 1248 -- high-flow nasal cannula 12 -- --    05/04/23 1042 90 high-flow nasal cannula 12 -- --    05/04/23 0800 -- high-flow nasal cannula 12 -- --    05/04/23 0738 -- high-flow nasal cannula 12 -- --    05/04/23 0720 -- high-flow nasal cannula 12 -- --    05/04/23 0711 -- high-flow nasal cannula 10 -- --    05/04/23 0348 98 humidified;high-flow nasal cannula 10 -- --    05/04/23 0008 -- humidified;high-flow nasal cannula 11 -- --          Intake & Output (last day)       05/04 0701  05/05 0700 05/05 0701  05/06 0700    P.O. 450 0    Total Intake(mL/kg) 450 (6.8) 0 (0)    Net +450 0          Urine Unmeasured Occurrence 2 x     Stool Unmeasured Occurrence 2 x         Lines, Drains & Airways     Active LDAs     Name Placement date Placement time Site Days    Peripheral IV 05/02/23 1430 Right Antecubital 05/02/23  1430  Antecubital  2    Peripheral IV 05/03/23 1410 Anterior;Right Wrist 05/03/23  1410  Wrist  1                Operative/Procedure Notes (last 24 hours)  Notes from 05/04/23 1206 through 05/05/23 1206   No notes of  this type exist for this encounter.            Physician Progress Notes (last 24 hours)      Elijah Berrios MD at 23 0800              [facility] INPATIENT PROGRESS NOTE         [address]    2023      PATIENT IDENTIFICATION:  Name: Katherine Williamson ADMIT: 2023   : 1950  PCP: Zelalem Flor APRN    MRN: [id number] LOS: 3 days   AGE/SEX: 72 y.o. female  ROOM: [facility]                     LOS 3    Reason for visit: Right lower lobe lung mass versus pneumonia      SUBJECTIVE:      Still requiring high flow supplemental oxygen at 10 L/min.  Still with moderate cough.  Complains of some difficulty swallowing in the last couple days.      Objective   OBJECTIVE:    Vital Sign Min/Max for last 24 hours  Temp  Min: 97.9 °F (36.6 °C)  Max: 98.5 °F (36.9 °C)   BP  Min: 139/85  Max: 161/88   Pulse  Min: 73  Max: 106   Resp  Min: 16  Max: 18   SpO2  Min: 86 %  Max: 97 %   No data recorded   Weight  Min: 65.8 kg (145 lb)  Max: 65.8 kg (145 lb)                         Body mass index is 25.69 kg/m².    Intake/Output Summary (Last 24 hours) at 2023 0800  Last data filed at 2023 0700  Gross per 24 hour   Intake 450 ml   Output --   Net 450 ml         Exam:  GEN:  No distress, appears stated age  EYES:   PERRL, anicteric sclerae  ENT:    External ears/nose normal, OP clear  NECK:  No adenopathy, midline trachea  LUNGS: Normal chest on inspection, palpation and diminished basilar breath sounds on auscultation   CV:  Normal S1S2, without murmur  ABD:  Nontender, nondistended, no hepatosplenomegaly, +BS  EXT:  No edema.  No cyanosis or clubbing.  No mottling and normal cap refill.    Assessment     Scheduled meds:  vitamin C, 500 mg, Oral, Daily  fluticasone, 1 spray, Each Nare, Daily  guaiFENesin, 600 mg, Oral, BID  ipratropium-albuterol, 3 mL, Nebulization, 4x Daily - RT  losartan, 50 mg, Oral, Q24H  piperacillin-tazobactam, 3.375 g, Intravenous, Q8H  sodium chloride, 10 mL, Intravenous,  Q12H  cyanocobalamin, 1,000 mcg, Oral, Daily      IV meds:                         Data Review:  Results from last 7 days   Lab Units 05/05/23  0556 05/04/23  0603 05/03/23  0607 05/02/23  1428   SODIUM mmol/L 137 141 139 137   POTASSIUM mmol/L 4.0 4.1 4.0 4.2   CHLORIDE mmol/L 103 106 105 97*   CO2 mmol/L 27.7 25.5 21.6* 23.0   BUN mg/dL 24* 25* 32* 28*   CREATININE mg/dL 0.49* 0.45* 0.70 0.98   GLUCOSE mg/dL 132* 142* 119* 124*   CALCIUM mg/dL 9.2 9.1 8.6 9.6         Estimated Creatinine Clearance: 97.5 mL/min (A) (by C-G formula based on SCr of 0.49 mg/dL (L)).  Results from last 7 days   Lab Units 05/04/23  0604 05/03/23  0607 05/02/23  1428   WBC 10*3/mm3 21.59* 21.86* 22.73*   HEMOGLOBIN g/dL 11.6* 11.7* 12.9   PLATELETS 10*3/mm3 441 498* 589*         Results from last 7 days   Lab Units 05/03/23  0607 05/02/23  1428   ALT (SGPT) U/L 28 37*   AST (SGOT) U/L 26 37*     Results from last 7 days   Lab Units 05/03/23  0805   PH, ARTERIAL pH units 7.417   PO2 ART mm Hg 69.6*   PCO2, ARTERIAL mm Hg 35.1   HCO3 ART mmol/L 22.6     Results from last 7 days   Lab Units 05/03/23  0607 05/02/23  1743 05/02/23  1433 05/02/23  1428   PROCALCITONIN ng/mL 2.62*  --   --  3.95*   LACTATE mmol/L 1.9 3.3* 6.3*  --          No results found for: HGBA1C, POCGLU      CT 5/2 chest reviewed            Microbiology reviewed  05/02/2023 1739 05/04/2023 0717 Respiratory Culture - Sputum, Cough [[id number]]   Sputum from Cough    Final result Component Value   Respiratory Culture Scant growth (1+) Normal respiratory aishwarya. No S. aureus or Pseudomonas aeruginosa detected. Final report.   Gram Stain Many (4+) WBCs per low power field    Many (4+) Mixed bacterial morphotypes seen on Gram Stain    Moderate (3+) Epithelial cells per low power field             05/02/2023 1434 05/04/2023 0737 Blood Culture - Blood, Arm, Left [[id number]]    (Abnormal)   Blood from Arm, Left    Final result Component Value   Blood Culture Corynebacterium  species Critical    Isolated from Aerobic Bottle   Gram Stain Aerobic Bottle Gram positive bacilli resembling diphtheroids Critical                        Active Hospital Problems    Diagnosis  POA   • **Acute respiratory failure with hypoxia [J96.01]  Yes   • Sepsis [A41.9]  Yes   • Pleural effusion, right [J90]  Yes   • Other emphysema [J43.8]  Yes   • Pulmonary nodule [R91.1]  Yes   • Pneumonia [J18.9]  Yes   • Tobacco abuse [Z72.0]  Yes   • Benign essential HTN [I10]  Yes      Resolved Hospital Problems   No resolved problems to display.         ASSESSMENT:    Right lower lobe opacity - nodule, mass vs pna  CT scan with a 2.4 cm lung nodule in the right lower lobe last month  Emphysema  Hypertension  Tobacco abuse  AHRF  Sepsis  Suspect underlying COPD with acute exacerbation.  Dysphagia      PLAN:    Encourage pulmonary toilet.  Continue antibiotics.  Steroid discontinued.   Repeat CT chest in several weeks after antibiotics for follow-up of questionable mass component.    Repeat CXR today for follow up PNA and eval for progressing effusion.    Wean oxygen as able.  Speech evaluation given her complaints of difficulty swallowing.      Elijah Berrios MD  Pulmonary and Critical Care Medicine  [facility]  2023    08:00 EDT       Electronically signed by Elijah Berrios MD at 23 0834     Elvin Bowen APRN at 23 1549              Name: Katherine Williamson ADMIT: 2023   : 1950  PCP: Zelalem Flor APRN    MRN: [id number] LOS: 2 days   AGE/SEX: 72 y.o. female  ROOM: [facility]     Subjective   Subjective   Patient appears generally weak, ill, relatively comfortable, no apparent distress.  Tolerating diet and minimal physical activity to bathroom.  Denies overnight events.  Respiratory therapy at bedside.    Review of Systems   Constitutional: Negative for chills and fever.   Respiratory: Positive for cough and shortness of breath.    Cardiovascular: Negative for chest pain  and leg swelling.   Gastrointestinal: Negative for nausea and vomiting.   Genitourinary: Negative for difficulty urinating and dysuria.   Musculoskeletal: Positive for gait problem (Due to generalized weakness). Negative for myalgias.       Objective   Objective   Vital Signs  Temp:  [97.4 °F (36.3 °C)-98.2 °F (36.8 °C)] 98.2 °F (36.8 °C)  Heart Rate:  [] 65  Resp:  [14-19] 16  BP: (151-160)/(70-80) 159/73  SpO2:  [90 %-98 %] 90 %  on  Flow (L/min):  [10-12] 10;   Device (Oxygen Therapy): high-flow nasal cannula  Body mass index is 25.85 kg/m².     Physical Exam  Constitutional:       General: She is not in acute distress.     Appearance: She is ill-appearing. She is not toxic-appearing.   Cardiovascular:      Rate and Rhythm: Normal rate.      Heart sounds: Normal heart sounds.   Pulmonary:      Effort: Pulmonary effort is normal.      Breath sounds: Wheezing (Right lung) and rhonchi (Right lung) present.   Abdominal:      General: Bowel sounds are normal.      Palpations: Abdomen is soft.   Musculoskeletal:      Right lower leg: No edema.      Left lower leg: No edema.   Skin:     General: Skin is warm and dry.   Neurological:      Mental Status: She is alert.       Results Review     I reviewed the patient's new clinical results.  Results from last 7 days   Lab Units 05/04/23  0604 05/03/23  0607 05/02/23  1428   WBC 10*3/mm3 21.59* 21.86* 22.73*   HEMOGLOBIN g/dL 11.6* 11.7* 12.9   PLATELETS 10*3/mm3 441 498* 589*     Results from last 7 days   Lab Units 05/04/23  0603 05/03/23  0607 05/02/23  1428   SODIUM mmol/L 141 139 137   POTASSIUM mmol/L 4.1 4.0 4.2   CHLORIDE mmol/L 106 105 97*   CO2 mmol/L 25.5 21.6* 23.0   BUN mg/dL 25* 32* 28*   CREATININE mg/dL 0.45* 0.70 0.98   GLUCOSE mg/dL 142* 119* 124*   EGFR mL/min/1.73 103.6 93.2 62.2     Results from last 7 days   Lab Units 05/03/23  0607 05/02/23  1428   ALBUMIN g/dL 2.0* 2.3*   BILIRUBIN mg/dL 0.4 0.6   ALK PHOS U/L 162* 202*   AST (SGOT) U/L 26 37*    ALT (SGPT) U/L 28 37*     Results from last 7 days   Lab Units 05/04/23  0603 05/03/23  0607 05/02/23  1428   CALCIUM mg/dL 9.1 8.6 9.6   ALBUMIN g/dL  --  2.0* 2.3*     Results from last 7 days   Lab Units 05/03/23  0607 05/02/23  1743 05/02/23  1433 05/02/23  1428   PROCALCITONIN ng/mL 2.62*  --   --  3.95*   LACTATE mmol/L 1.9 3.3* 6.3*  --      No results found for: HGBA1C, POCGLU    No radiology results for the last day  I have personally reviewed all medications:  Scheduled Medications  vitamin C, 500 mg, Oral, Daily  fluticasone, 1 spray, Each Nare, Daily  guaiFENesin, 600 mg, Oral, BID  ipratropium-albuterol, 3 mL, Nebulization, 4x Daily - RT  losartan, 50 mg, Oral, Q24H  piperacillin-tazobactam, 3.375 g, Intravenous, Q8H  sodium chloride, 10 mL, Intravenous, Q12H  cyanocobalamin, 1,000 mcg, Oral, Daily    Infusions   Diet  Diet: Regular/House Diet; Texture: Regular Texture (IDDSI 7); Fluid Consistency: Thin (IDDSI 0)    I have personally reviewed:  [x]  Laboratory   [x]  Microbiology   []  Radiology   []  EKG/Telemetry  []  Cardiology/Vascular   []  Pathology    []  Records      Assessment/Plan     Active Hospital Problems    Diagnosis  POA   • **Acute respiratory failure with hypoxia [J96.01]  Yes   • Sepsis [A41.9]  Yes   • Pleural effusion, right [J90]  Yes   • Other emphysema [J43.8]  Yes   • Pulmonary nodule [R91.1]  Yes   • Pneumonia [J18.9]  Yes   • Tobacco abuse [Z72.0]  Yes   • Benign essential HTN [I10]  Yes      Resolved Hospital Problems   No resolved problems to display.       72 y.o. female admitted with Acute respiratory failure with hypoxia.    Sepsis secondary to bacterial pneumonia  Acute respiratory failure with hypoxia  Right Pleural effusion  Emphysema/COPD with acute exacerbation  Pulmonary nodule  - SIRS 3/4 on admission (tachycardia, hypotension, leukocytosis).  - Patient meets criteria for diagnosis of acute respiratory with hypoxia with: Complaints of dyspnea, documented O2 sat  of 82% on room air, new oxygen requirement and P/F ratio <300.  Improving with trend.  -Radiograph showing dense consolidation in the right lower and middle lobes, pleural effusion with gas bubbles demonstrated within felt to likely represent empyema versus lung abscess, enlarging precarinal lymph node. Per chart review, patient does have recent prior findings of pulmonary nodule on imaging.  - Pulmonology following discontinued steroids recommend continue antibiotic for now.    -Blood cultures growing Corynebacterium species (1 of 2 ), plan repeat blood cultures x2.  Continue Zosyn & bronchodilators.    - Cardiothoracic surgery signed off and did not recommend intervention given right pleural effusion not significant.     History of hypertension with hypotension on admission  - BP acceptable acutely. Losartan resumed.      Lactic acidosis  - Resolved.  Likely 2/2 hypoperfusion from sepsis.  Continue to closely monitor.     Sinus tachycardia  - Resolved.  Most likely due to complications of PNA.  Monitor for now on telemetry.      · SCDs for DVT prophylaxis.  · Full code.  · Discussed with patient & RN.  · Anticipate discharge pending clinical course /rehab versus home with home health      MARGARITO Garcia  [facility]  05/04/23  15:59 EDT        Electronically signed by Elvin Bowen APRN at 05/04/23 2074       Consult Notes (last 24 hours)  Notes from 05/04/23 1206 through 05/05/23 1206   No notes of this type exist for this encounter.

## 2023-05-05 NOTE — PROGRESS NOTES
[facility] INPATIENT PROGRESS NOTE         [address]    2023      PATIENT IDENTIFICATION:  Name: Katherine Williamson ADMIT: 2023   : 1950  PCP: Zelalem Flor APRN    MRN: [id number] LOS: 3 days   AGE/SEX: 72 y.o. female  ROOM: [facility]                     LOS 3    Reason for visit: Right lower lobe lung mass versus pneumonia      SUBJECTIVE:      Still requiring high flow supplemental oxygen at 10 L/min.  Still with moderate cough.  Complains of some difficulty swallowing in the last couple days.      Objective   OBJECTIVE:    Vital Sign Min/Max for last 24 hours  Temp  Min: 97.9 °F (36.6 °C)  Max: 98.5 °F (36.9 °C)   BP  Min: 139/85  Max: 161/88   Pulse  Min: 73  Max: 106   Resp  Min: 16  Max: 18   SpO2  Min: 86 %  Max: 97 %   No data recorded   Weight  Min: 65.8 kg (145 lb)  Max: 65.8 kg (145 lb)                         Body mass index is 25.69 kg/m².    Intake/Output Summary (Last 24 hours) at 2023 0800  Last data filed at 2023 0700  Gross per 24 hour   Intake 450 ml   Output --   Net 450 ml         Exam:  GEN:  No distress, appears stated age  EYES:   PERRL, anicteric sclerae  ENT:    External ears/nose normal, OP clear  NECK:  No adenopathy, midline trachea  LUNGS: Normal chest on inspection, palpation and diminished basilar breath sounds on auscultation   CV:  Normal S1S2, without murmur  ABD:  Nontender, nondistended, no hepatosplenomegaly, +BS  EXT:  No edema.  No cyanosis or clubbing.  No mottling and normal cap refill.    Assessment     Scheduled meds:  vitamin C, 500 mg, Oral, Daily  fluticasone, 1 spray, Each Nare, Daily  guaiFENesin, 600 mg, Oral, BID  ipratropium-albuterol, 3 mL, Nebulization, 4x Daily - RT  losartan, 50 mg, Oral, Q24H  piperacillin-tazobactam, 3.375 g, Intravenous, Q8H  sodium chloride, 10 mL, Intravenous, Q12H  cyanocobalamin, 1,000 mcg, Oral, Daily      IV meds:                         Data Review:  Results from last 7 days   Lab Units  05/05/23  0556 05/04/23  0603 05/03/23  0607 05/02/23  1428   SODIUM mmol/L 137 141 139 137   POTASSIUM mmol/L 4.0 4.1 4.0 4.2   CHLORIDE mmol/L 103 106 105 97*   CO2 mmol/L 27.7 25.5 21.6* 23.0   BUN mg/dL 24* 25* 32* 28*   CREATININE mg/dL 0.49* 0.45* 0.70 0.98   GLUCOSE mg/dL 132* 142* 119* 124*   CALCIUM mg/dL 9.2 9.1 8.6 9.6         Estimated Creatinine Clearance: 97.5 mL/min (A) (by C-G formula based on SCr of 0.49 mg/dL (L)).  Results from last 7 days   Lab Units 05/04/23  0604 05/03/23  0607 05/02/23  1428   WBC 10*3/mm3 21.59* 21.86* 22.73*   HEMOGLOBIN g/dL 11.6* 11.7* 12.9   PLATELETS 10*3/mm3 441 498* 589*         Results from last 7 days   Lab Units 05/03/23  0607 05/02/23  1428   ALT (SGPT) U/L 28 37*   AST (SGOT) U/L 26 37*     Results from last 7 days   Lab Units 05/03/23  0805   PH, ARTERIAL pH units 7.417   PO2 ART mm Hg 69.6*   PCO2, ARTERIAL mm Hg 35.1   HCO3 ART mmol/L 22.6     Results from last 7 days   Lab Units 05/03/23  0607 05/02/23  1743 05/02/23  1433 05/02/23  1428   PROCALCITONIN ng/mL 2.62*  --   --  3.95*   LACTATE mmol/L 1.9 3.3* 6.3*  --          No results found for: HGBA1C, POCGLU      CT 5/2 chest reviewed            Microbiology reviewed  05/02/2023 1739 05/04/2023 0717 Respiratory Culture - Sputum, Cough [[id number]]   Sputum from Cough    Final result Component Value   Respiratory Culture Scant growth (1+) Normal respiratory aishwarya. No S. aureus or Pseudomonas aeruginosa detected. Final report.   Gram Stain Many (4+) WBCs per low power field    Many (4+) Mixed bacterial morphotypes seen on Gram Stain    Moderate (3+) Epithelial cells per low power field             05/02/2023 1434 05/04/2023 0737 Blood Culture - Blood, Arm, Left [[id number]]    (Abnormal)   Blood from Arm, Left    Final result Component Value   Blood Culture Corynebacterium species Critical    Isolated from Aerobic Bottle   Gram Stain Aerobic Bottle Gram positive bacilli resembling diphtheroids Critical                          Active Hospital Problems    Diagnosis  POA   • **Acute respiratory failure with hypoxia [J96.01]  Yes   • Sepsis [A41.9]  Yes   • Pleural effusion, right [J90]  Yes   • Other emphysema [J43.8]  Yes   • Pulmonary nodule [R91.1]  Yes   • Pneumonia [J18.9]  Yes   • Tobacco abuse [Z72.0]  Yes   • Benign essential HTN [I10]  Yes      Resolved Hospital Problems   No resolved problems to display.         ASSESSMENT:    Right lower lobe opacity - nodule, mass vs pna  CT scan with a 2.4 cm lung nodule in the right lower lobe last month  Emphysema  Hypertension  Tobacco abuse  AHRF  Sepsis  Suspect underlying COPD with acute exacerbation.  Dysphagia      PLAN:    Encourage pulmonary toilet.  Continue antibiotics.  Steroid discontinued.   Repeat CT chest in several weeks after antibiotics for follow-up of questionable mass component.    Repeat CXR today for follow up PNA and eval for progressing effusion.    Wean oxygen as able.  Speech evaluation given her complaints of difficulty swallowing.      Elijah Berrios MD  Pulmonary and Critical Care Medicine  [facility]  5/5/2023    08:00 EDT

## 2023-05-05 NOTE — PROGRESS NOTES
Name: Katherine Williamson ADMIT: 2023   : 1950  PCP: Zelalem Flor APRN    MRN: [id number] LOS: 3 days   AGE/SEX: 72 y.o. female  ROOM: [facility]     Subjective   Subjective   Patient seen at bedside.       Objective   Objective   Vital Signs  Temp:  [97.9 °F (36.6 °C)-98.5 °F (36.9 °C)] 98.3 °F (36.8 °C)  Heart Rate:  [] 97  Resp:  [16] 16  BP: (139-161)/(61-88) 148/61  SpO2:  [86 %-100 %] 88 %  on  Flow (L/min):  [10] 10;   Device (Oxygen Therapy): high-flow nasal cannula;humidified  Body mass index is 25.69 kg/m².  Physical Exam   General, awake and alert.  Head and ENT, normocephalic and atraumatic.  Lungs, right lung rhonchi  Heart, regular rate and rhythm.  Abdomen, soft and nontender.  Extremities, no clubbing or cyanosis.  Neuro, no focal deficits.  Skin: Warm and no rash.  Psych, normal mood and affect.  Musculoskeletal, joint examination is grossly normal.        Results Review     I reviewed the patient's new clinical results.  Results from last 7 days   Lab Units 23  0556 23  0604 23  0607 23  1428   WBC 10*3/mm3 19.67* 21.59* 21.86* 22.73*   HEMOGLOBIN g/dL 11.4* 11.6* 11.7* 12.9   PLATELETS 10*3/mm3 355 441 498* 589*     Results from last 7 days   Lab Units 23  0556 23  0603 23  0607 23  1428   SODIUM mmol/L 137 141 139 137   POTASSIUM mmol/L 4.0 4.1 4.0 4.2   CHLORIDE mmol/L 103 106 105 97*   CO2 mmol/L 27.7 25.5 21.6* 23.0   BUN mg/dL 24* 25* 32* 28*   CREATININE mg/dL 0.49* 0.45* 0.70 0.98   GLUCOSE mg/dL 132* 142* 119* 124*   EGFR mL/min/1.73 101.5 103.6 93.2 62.2     Results from last 7 days   Lab Units 23  0607 23  1428   ALBUMIN g/dL 2.0* 2.3*   BILIRUBIN mg/dL 0.4 0.6   ALK PHOS U/L 162* 202*   AST (SGOT) U/L 26 37*   ALT (SGPT) U/L 28 37*     Results from last 7 days   Lab Units 23  0556 23  0603 23  0607 23  1428   CALCIUM mg/dL 9.2 9.1 8.6 9.6   ALBUMIN g/dL  --   --  2.0* 2.3*     Results from  last 7 days   Lab Units 05/03/23  0607 05/02/23  1743 05/02/23  1433 05/02/23  1428   PROCALCITONIN ng/mL 2.62*  --   --  3.95*   LACTATE mmol/L 1.9 3.3* 6.3*  --      No results found for: HGBA1C, POCGLU    No radiology results for the last day  I have personally reviewed all medications:  Scheduled Medications  vitamin C, 500 mg, Oral, Daily  citalopram, 20 mg, Oral, Daily  fluticasone, 1 spray, Each Nare, Daily  guaiFENesin, 600 mg, Oral, BID  ipratropium-albuterol, 3 mL, Nebulization, 4x Daily - RT  losartan, 50 mg, Oral, Q24H  piperacillin-tazobactam, 3.375 g, Intravenous, Q8H  sodium chloride, 10 mL, Intravenous, Q12H  cyanocobalamin, 1,000 mcg, Oral, Daily    Infusions   Diet  Diet: Regular/House Diet; Texture: Regular Texture (IDDSI 7); Fluid Consistency: Thin (IDDSI 0)    I have personally reviewed:  [x]  Laboratory   [x]  Microbiology   [x]  Radiology   [x]  EKG/Telemetry  [x]  Cardiology/Vascular   [x]  Pathology    [x]  Records       Assessment/Plan     Active Hospital Problems    Diagnosis  POA   • **Acute respiratory failure with hypoxia [J96.01]  Yes   • Sepsis [A41.9]  Yes   • Pleural effusion, right [J90]  Yes   • Other emphysema [J43.8]  Yes   • Pulmonary nodule [R91.1]  Yes   • Pneumonia [J18.9]  Yes   • Tobacco abuse [Z72.0]  Yes   • Benign essential HTN [I10]  Yes      Resolved Hospital Problems   No resolved problems to display.       72 y.o. female admitted with Acute respiratory failure with hypoxia.    Assessment and plan  1.  Sepsis due to bacterial pneumonia, patient has acute respiratory failure with hypoxia along with right-sided pleural effusion and emphysema.  Patient is on Zosyn coverage which will be continued.  Pulmonary on board and following.    2.  COPD with acute exacerbation, continue breathing treatments.  Continue supplemental oxygen.    3.  Sinus tachycardia, resolved, continue to monitor.    4.  Depression, start Celexa.    5.  CODE STATUS is full code.  Further plans based  on hospital course.    Gee Clement MD  [facility]  05/05/23  16:30 EDT

## 2023-05-05 NOTE — PLAN OF CARE
Goal Outcome Evaluation:  Plan of Care Reviewed With: patient, family           Outcome Evaluation: Clinical swallow eval completed. Pt had baseline coughing prior to eval and inconsistently during (productive at times) the eval. Pt c/o decreased appetite. Pt took trials of thin (cup/straw), pureed, soft, mixed, and regular solids. No coughing was noted directly after a trial and voice remained strong throughout the eval. Laryngeal elevation was adequate with palpation. Given pt's respiratory status, silent aspiration is a concern. Discussed with RN and MD. MD would like to proceed with VFSS to further assess. Recommend continue with regular and thin; send supplements; meds w/ thin; upright for meals and 30 min after; slow rate; small bites/sips. ST to schedule VFSS to further assess and r/o aspiration.

## 2023-05-05 NOTE — THERAPY EVALUATION
Acute Care - Speech Language Pathology   Swallow Initial Evaluation [facility]     Patient Name: Katherine Williamson  : 1950  MRN: [id number]  Today's Date: 2023               Admit Date: 2023    Visit Dx:     ICD-10-CM ICD-9-CM   1. Acute respiratory failure with hypoxia  J96.01 518.81   2. Sepsis, due to unspecified organism, unspecified whether acute organ dysfunction present  A41.9 038.9     995.91   3. Community acquired pneumonia, unspecified laterality  J18.9 486   4. Abnormal CXR  R93.89 793.2   5. Hyperglycemia  R73.9 790.29   6. 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 JOSEPH LAU   • Hypertension      Past Surgical History:   Procedure Laterality Date   • COLONOSCOPY  10/2015    [id number]. [facility].  Follow-up in 5 years.   • COLONOSCOPY N/A 2022    2 BENIGN POLYPS IN DESCENDING, 5 MM BENIGN POLYP IN SIGMOID, MULTIPLE SMALL AND LARGE DIVERTICULA IN SIGMOID, RESCOPE IN 3 YRS, DR. JOSEPH LAU AT [facility]   • EYE SURGERY     • JOINT REPLACEMENT  ?    Left total hip replacement in .  In 2015 patient had left hip revision   • TONSILLECTOMY         SLP  Recommendation and Plan  SLP Swallowing Diagnosis: R/O pharyngeal dysphagia (05/05/23 1200)  SLP Diet Recommendation: regular textures, thin liquids (05/05/23 1200)  Recommended Precautions and Strategies: upright posture during/after eating, small bites of food and sips of liquid (05/05/23 1200)  SLP Rec. for Method of Medication Administration: meds whole, with thin liquids, as tolerated (05/05/23 1200)     Monitor for Signs of Aspiration: yes (05/05/23 1200)  Recommended Diagnostics: VFSS ([facility] [place] (05/05/23 1200)  Swallow Criteria for Skilled Therapeutic Interventions Met: demonstrates skilled criteria (05/05/23 1200)  Anticipated Discharge Disposition (SLP): unknown (05/05/23 1200)  Rehab Potential/Prognosis, Swallowing: good, to achieve stated therapy goals (05/05/23 1200)  Therapy Frequency (Swallow): PRN (05/05/23 1200)  Predicted Duration Therapy Intervention (Days): until discharge (05/05/23 1200)                                        Plan of Care Reviewed With: patient, family  Outcome Evaluation: Clinical swallow eval completed. Pt had baseline coughing prior to eval and inconsistently during (productive at times) the eval. Pt c/o decreased appetite. Pt took trials of thin (cup/straw), pureed, soft, mixed, and regular solids. No coughing was noted directly after a trial and voice remained strong throughout the eval. Laryngeal elevation was adequate with palpation. Given pt's respiratory status, silent aspiration is a concern. Discussed with RN and MD. MD would like to proceed with VFSS to further assess. Recommend continue with regular and thin; send supplements; meds w/ thin; upright for meals and 30 min after; slow rate; small bites/sips. ST to schedule VFSS to further assess and r/o aspiration.      SWALLOW EVALUATION (last 72 hours)     SLP Adult Swallow Evaluation     Row Name 05/05/23 1200                   Rehab Evaluation    Document Type evaluation  -AW        Subjective Information no complaints   -AW        Patient Observations alert;cooperative;agree to therapy  -AW        Patient/Family/Caregiver Comments/Observations Pt's cousin at bedside.  -AW        Patient Effort good  -AW        Symptoms Noted During/After Treatment none  -AW           General Information    Patient Profile Reviewed yes  -AW        Pertinent History Of Current Problem Pt admitted with sepsis, PNA, respiratory failure, and concern for R lobe mass vs infection. PMH: COPD  -AW        Current Method of Nutrition regular textures;thin liquids  -AW        Precautions/Limitations, Vision WFL  -AW        Precautions/Limitations, Hearing WFL  -AW        Prior Level of Function-Communication WFL  -AW        Prior Level of Function-Swallowing no diet consistency restrictions  -AW        Plans/Goals Discussed with patient;family;agreed upon  -AW        Barriers to Rehab medically complex  -AW        Patient's Goals for Discharge return home  -AW        Family Goals for Discharge family did not state  -AW           Pain    Additional Documentation Pain Scale: Numbers Pre/Post-Treatment (Group)  -AW           Pain Scale: Numbers Pre/Post-Treatment    Pretreatment Pain Rating 0/10 - no pain  -AW        Posttreatment Pain Rating 0/10 - no pain  -AW           Oral Motor Structure and Function    Dentition Assessment natural, present and adequate  -AW        Secretion Management WNL/WFL  -AW        Mucosal Quality moist, healthy  -AW        Volitional Swallow WFL  -AW           Oral Musculature and Cranial Nerve Assessment    Oral Motor General Assessment WFL  -AW           General Eating/Swallowing Observations    Respiratory Support Currently in Use high-flow nasal cannula  -AW        Eating/Swallowing Skills self-fed  -AW        Positioning During Eating upright in bed  -AW        Utensils Used spoon;cup;straw  -AW        Consistencies Trialed regular textures;soft to chew textures;mixed consistency;pureed;thin liquids  -AW           Clinical  Swallow Eval    Clinical Swallow Evaluation Summary Clinical swallow eval completed. Pt had baseline coughing prior to eval and inconsistently during (productive at times) the eval. Pt c/o decreased appetite. Pt took trials of thin (cup/straw), pureed, soft, mixed, and regular solids. No coughing was noted directly after a trial and voice remained strong throughout the eval. Laryngeal elevation was adequate with palpation. Given pt's respiratory status, silent aspiration is a concern. Discussed with RN and MD. MD would like to proceed with VFSS to further assess. Recommend continue with regular and thin; send supplements; meds w/ thin; upright for meals and 30 min after; slow rate; small bites/sips. ST to schedule VFSS to further assess and r/o aspiration.  -AW           SLP Evaluation Clinical Impression    SLP Swallowing Diagnosis R/O pharyngeal dysphagia  -AW        Functional Impact risk of aspiration/pneumonia  -AW        Rehab Potential/Prognosis, Swallowing good, to achieve stated therapy goals  -AW        Swallow Criteria for Skilled Therapeutic Interventions Met demonstrates skilled criteria  -AW           Recommendations    Therapy Frequency (Swallow) PRN  -AW        Predicted Duration Therapy Intervention (Days) until discharge  -AW        SLP Diet Recommendation regular textures;thin liquids  -AW        Recommended Diagnostics VFSS ([facility])  -AW        Recommended Precautions and Strategies upright posture during/after eating;small bites of food and sips of liquid  -AW        Oral Care Recommendations Oral Care BID/PRN  -AW        SLP Rec. for Method of Medication Administration meds whole;with thin liquids;as tolerated  -AW        Monitor for Signs of Aspiration yes  -AW        Anticipated Discharge Disposition (SLP) unknown  -AW              User Key  (r) = Recorded By, (t) = Taken By, (c) = Cosigned By    Initials Name Effective Dates    AW Silke Ewing, MS [facility]-SLP 06/16/21 -                  EDUCATION  The patient has been educated in the following areas:   Dysphagia (Swallowing Impairment) Oral Care/Hydration.              Time Calculation:    Time Calculation- SLP     Row Name 05/05/23 1504             Time Calculation- SLP    SLP Start Time 1200  -AW      SLP Received On 05/05/23  -MELCHOR            User Key  (r) = Recorded By, (t) = Taken By, (c) = Cosigned By    Initials Name Provider Type    Silke Joshi, MS CCC-SLP Speech and Language Pathologist                Therapy Charges for Today     Code Description Service Date Service Provider Modifiers Qty    [id number]  ST EVAL ORAL PHARYNG SWALLOW 4 5/5/2023 Silke Ewing, MS CCC-SLP GN 1               iSlke Ewing MS CCC-SLP  5/5/2023

## 2023-05-06 ENCOUNTER — APPOINTMENT (OUTPATIENT)
Dept: GENERAL RADIOLOGY | Facility: HOSPITAL | Age: 73
DRG: 871 | End: 2023-05-06
Payer: COMMERCIAL

## 2023-05-06 LAB
ANION GAP SERPL CALCULATED.3IONS-SCNC: 5.3 MMOL/L (ref 5–15)
BUN SERPL-MCNC: 16 MG/DL (ref 8–23)
BUN/CREAT SERPL: 43.2 (ref 7–25)
CALCIUM SPEC-SCNC: 9.2 MG/DL (ref 8.6–10.5)
CHLORIDE SERPL-SCNC: 105 MMOL/L (ref 98–107)
CO2 SERPL-SCNC: 29.7 MMOL/L (ref 22–29)
CREAT SERPL-MCNC: 0.37 MG/DL (ref 0.57–1)
DEPRECATED RDW RBC AUTO: 41.6 FL (ref 37–54)
EGFRCR SERPLBLD CKD-EPI 2021: 108.6 ML/MIN/1.73
EOSINOPHIL # BLD MANUAL: 0.2 10*3/MM3 (ref 0–0.4)
EOSINOPHIL NFR BLD MANUAL: 1 % (ref 0.3–6.2)
ERYTHROCYTE [DISTWIDTH] IN BLOOD BY AUTOMATED COUNT: 12.6 % (ref 12.3–15.4)
GLUCOSE SERPL-MCNC: 107 MG/DL (ref 65–99)
HCT VFR BLD AUTO: 32.8 % (ref 34–46.6)
HGB BLD-MCNC: 11.2 G/DL (ref 12–15.9)
LYMPHOCYTES # BLD MANUAL: 0.4 10*3/MM3 (ref 0.7–3.1)
LYMPHOCYTES NFR BLD MANUAL: 2 % (ref 5–12)
MCH RBC QN AUTO: 30.8 PG (ref 26.6–33)
MCHC RBC AUTO-ENTMCNC: 34.1 G/DL (ref 31.5–35.7)
MCV RBC AUTO: 90.1 FL (ref 79–97)
MONOCYTES # BLD: 0.4 10*3/MM3 (ref 0.1–0.9)
MYELOCYTES NFR BLD MANUAL: 1 % (ref 0–0)
NEUTROPHILS # BLD AUTO: 18.61 10*3/MM3 (ref 1.7–7)
NEUTROPHILS NFR BLD MANUAL: 94 % (ref 42.7–76)
PLAT MORPH BLD: NORMAL
PLATELET # BLD AUTO: 316 10*3/MM3 (ref 140–450)
PMV BLD AUTO: 9.8 FL (ref 6–12)
POIKILOCYTOSIS BLD QL SMEAR: ABNORMAL
POTASSIUM SERPL-SCNC: 3.7 MMOL/L (ref 3.5–5.2)
RBC # BLD AUTO: 3.64 10*6/MM3 (ref 3.77–5.28)
SODIUM SERPL-SCNC: 140 MMOL/L (ref 136–145)
VARIANT LYMPHS NFR BLD MANUAL: 2 % (ref 19.6–45.3)
WBC MORPH BLD: NORMAL
WBC NRBC COR # BLD: 19.8 10*3/MM3 (ref 3.4–10.8)

## 2023-05-06 PROCEDURE — 94799 UNLISTED PULMONARY SVC/PX: CPT

## 2023-05-06 PROCEDURE — 92611 MOTION FLUOROSCOPY/SWALLOW: CPT

## 2023-05-06 PROCEDURE — 85025 COMPLETE CBC W/AUTO DIFF WBC: CPT | Performed by: STUDENT IN AN ORGANIZED HEALTH CARE EDUCATION/TRAINING PROGRAM

## 2023-05-06 PROCEDURE — 74230 X-RAY XM SWLNG FUNCJ C+: CPT

## 2023-05-06 PROCEDURE — 25010000002 PIPERACILLIN SOD-TAZOBACTAM PER 1 G: Performed by: INTERNAL MEDICINE

## 2023-05-06 PROCEDURE — 94664 DEMO&/EVAL PT USE INHALER: CPT

## 2023-05-06 PROCEDURE — 87205 SMEAR GRAM STAIN: CPT | Performed by: INTERNAL MEDICINE

## 2023-05-06 PROCEDURE — 94761 N-INVAS EAR/PLS OXIMETRY MLT: CPT

## 2023-05-06 PROCEDURE — 87070 CULTURE OTHR SPECIMN AEROBIC: CPT | Performed by: INTERNAL MEDICINE

## 2023-05-06 PROCEDURE — 80048 BASIC METABOLIC PNL TOTAL CA: CPT | Performed by: STUDENT IN AN ORGANIZED HEALTH CARE EDUCATION/TRAINING PROGRAM

## 2023-05-06 PROCEDURE — 85007 BL SMEAR W/DIFF WBC COUNT: CPT | Performed by: STUDENT IN AN ORGANIZED HEALTH CARE EDUCATION/TRAINING PROGRAM

## 2023-05-06 RX ORDER — LOPERAMIDE HYDROCHLORIDE 2 MG/1
2 CAPSULE ORAL ONCE
Status: COMPLETED | OUTPATIENT
Start: 2023-05-06 | End: 2023-05-06

## 2023-05-06 RX ORDER — MIRTAZAPINE 15 MG/1
7.5 TABLET, FILM COATED ORAL NIGHTLY
Status: DISCONTINUED | OUTPATIENT
Start: 2023-05-06 | End: 2023-05-14

## 2023-05-06 RX ADMIN — Medication 10 ML: at 08:55

## 2023-05-06 RX ADMIN — LOPERAMIDE HYDROCHLORIDE 2 MG: 2 CAPSULE ORAL at 16:53

## 2023-05-06 RX ADMIN — BARIUM SULFATE 183 ML: 960 POWDER, FOR SUSPENSION ORAL at 09:45

## 2023-05-06 RX ADMIN — MIRTAZAPINE 7.5 MG: 15 TABLET, FILM COATED ORAL at 20:36

## 2023-05-06 RX ADMIN — IPRATROPIUM BROMIDE AND ALBUTEROL SULFATE 3 ML: 2.5; .5 SOLUTION RESPIRATORY (INHALATION) at 15:21

## 2023-05-06 RX ADMIN — Medication 1000 MCG: at 08:55

## 2023-05-06 RX ADMIN — BARIUM SULFATE 1 TEASPOON(S): 0.6 CREAM ORAL at 09:53

## 2023-05-06 RX ADMIN — BARIUM SULFATE 135 ML: 980 POWDER, FOR SUSPENSION ORAL at 09:53

## 2023-05-06 RX ADMIN — Medication 10 ML: at 20:36

## 2023-05-06 RX ADMIN — TAZOBACTAM SODIUM AND PIPERACILLIN SODIUM 3.38 G: 375; 3 INJECTION, SOLUTION INTRAVENOUS at 10:29

## 2023-05-06 RX ADMIN — IPRATROPIUM BROMIDE AND ALBUTEROL SULFATE 3 ML: 2.5; .5 SOLUTION RESPIRATORY (INHALATION) at 20:58

## 2023-05-06 RX ADMIN — LOPERAMIDE HYDROCHLORIDE 2 MG: 2 CAPSULE ORAL at 05:36

## 2023-05-06 RX ADMIN — LOSARTAN POTASSIUM 50 MG: 50 TABLET, FILM COATED ORAL at 08:55

## 2023-05-06 RX ADMIN — IPRATROPIUM BROMIDE AND ALBUTEROL SULFATE 3 ML: 2.5; .5 SOLUTION RESPIRATORY (INHALATION) at 11:10

## 2023-05-06 RX ADMIN — IPRATROPIUM BROMIDE AND ALBUTEROL SULFATE 3 ML: 2.5; .5 SOLUTION RESPIRATORY (INHALATION) at 07:16

## 2023-05-06 RX ADMIN — GUAIFENESIN 600 MG: 600 TABLET, EXTENDED RELEASE ORAL at 20:36

## 2023-05-06 RX ADMIN — GUAIFENESIN 600 MG: 600 TABLET, EXTENDED RELEASE ORAL at 08:55

## 2023-05-06 RX ADMIN — TAZOBACTAM SODIUM AND PIPERACILLIN SODIUM 3.38 G: 375; 3 INJECTION, SOLUTION INTRAVENOUS at 02:38

## 2023-05-06 RX ADMIN — CITALOPRAM 20 MG: 20 TABLET, FILM COATED ORAL at 08:55

## 2023-05-06 RX ADMIN — TAZOBACTAM SODIUM AND PIPERACILLIN SODIUM 3.38 G: 375; 3 INJECTION, SOLUTION INTRAVENOUS at 18:23

## 2023-05-06 RX ADMIN — OXYCODONE HYDROCHLORIDE AND ACETAMINOPHEN 500 MG: 500 TABLET ORAL at 08:55

## 2023-05-06 NOTE — CONSULTS
"IDENTIFYING INFORMATION: The patient is a 72-year-old  white female admitted with weakness increasing cough and shortness of breath over the past 2 weeks.  She is seen by psychiatry related to depression and poor sleep and loss of appetite.    CHIEF COMPLAINT: I have never been sick before.    INFORMANT: Patient and chart    RELIABILITY: Good    HISTORY OF PRESENT ILLNESS: The patient is a 72-year-old white female admitted on 5/2/2023 following a 2-week period of increasing weakness cough and shortness of air.  On admission she was diagnosed with acute hypoxic respiratory failure secondary to right-sided pneumonia.  She is seen by psychiatry related and some depression.  She denies suicidal or homicidal ideation but reports that great frustration over her hospitalization stating \"I have never been sick before.  She is currently on Celexa 20 mg daily but reports that this is prescribed by her primary care physician and reports no other previous psychiatric contact apart from marriage counseling 4 years ago following her first marriages end.  She does complain of poor sleep and loss of appetite.  She also complains of frustrated mood.  She is reporting reduction in sleep and appetite.  She is tired and lives with her .  She reports social alcohol use.    PAST PSYCHIATRIC HISTORY: As noted previously, the patient saw a marriage counselor some 40 years ago following the end of her first marriage.  She is currently prescribed citalopram 20 mg daily.    PAST MEDICAL HISTORY: Significant for pneumonia empyema, hypertension, respiratory failure, arthritis, cataracts, colonic polyps    MEDICATIONS:   Current Facility-Administered Medications   Medication Dose Route Frequency Provider Last Rate Last Admin   • acetaminophen (TYLENOL) tablet 650 mg  650 mg Oral Q4H PRN Olga Palm MD        Or   • acetaminophen (TYLENOL) 160 MG/5ML solution 650 mg  650 mg Oral Q4H PRN Olga Palm MD        Or   • acetaminophen " (TYLENOL) suppository 650 mg  650 mg Rectal Q4H PRN Olga Palm MD       • ascorbic acid (VITAMIN C) tablet 500 mg  500 mg Oral Daily Olga Palm MD   500 mg at 05/06/23 0855   • citalopram (CeleXA) tablet 20 mg  20 mg Oral Daily Gee Clement MD   20 mg at 05/06/23 0855   • fluticasone (FLONASE) 50 MCG/ACT nasal spray 1 spray  1 spray Each Nare Daily Olga Palm MD   1 spray at 05/03/23 0919   • guaiFENesin (MUCINEX) 12 hr tablet 600 mg  600 mg Oral BID Yvrose Stone, HILARIA, APRN   600 mg at 05/06/23 0855   • ipratropium-albuterol (DUO-NEB) nebulizer solution 3 mL  3 mL Nebulization 4x Daily - RT Olga Palm MD   3 mL at 05/06/23 1521   • loperamide (IMODIUM) capsule 2 mg  2 mg Oral Once Gee Clement MD       • losartan (COZAAR) tablet 50 mg  50 mg Oral Q24H Olga Palm MD   50 mg at 05/06/23 0855   • Magnesium Sulfate - Total Dose 10 grams - Magnesium 1 or Less  2 g Intravenous PRN Ford Britton DO        Or   • Magnesium Sulfate - Total Dose 6 grams - Magnesium 1.1 - 1.5  2 g Intravenous PRN Ford Britton DO        Or   • Magnesium Sulfate - Total Dose 4 grams - Magnesium 1.6 - 1.9  4 g Intravenous PRN Ford Britton DO       • mirtazapine (REMERON) tablet 7.5 mg  7.5 mg Oral Nightly Yfn Garcia III, MD       • nitroglycerin (NITROSTAT) SL tablet 0.4 mg  0.4 mg Sublingual Q5 Min PRN Olga Palm MD       • ondansetron (ZOFRAN) injection 4 mg  4 mg Intravenous Q6H PRN Olga Palm MD       • piperacillin-tazobactam (ZOSYN) 3.375 g in iso-osmotic dextrose 50 ml (premix)  3.375 g Intravenous Q8H Olga Palm MD   3.375 g at 05/06/23 1029   • potassium & sodium phosphates (PHOS-NAK) 280-160-250 MG packet - for Phosphorus less than 1.25 mg/dL  2 packet Oral Q6H PRN Ford Britton, DO        Or   • potassium & sodium phosphates (PHOS-NAK) 280-160-250 MG packet - for Phosphorus 1.25 - 2.5 mg/dL  2 packet Oral Q6H PRN Ford Britton, DO       • potassium chloride (K-DUR,KLOR-CON) ER  "tablet 40 mEq  40 mEq Oral PRN Ford Britton DO        Or   • potassium chloride (KLOR-CON) packet 40 mEq  40 mEq Oral PRN Ford Britton DO        Or   • potassium chloride 10 mEq in 100 mL IVPB  10 mEq Intravenous Q1H PRN Ford Britton DO       • sodium chloride 0.9 % flush 10 mL  10 mL Intravenous PRN Olga Palm MD       • sodium chloride 0.9 % flush 10 mL  10 mL Intravenous Q12H Olga Palm MD   10 mL at 05/06/23 0855   • sodium chloride 0.9 % flush 10 mL  10 mL Intravenous PRN Olga Palm MD       • sodium chloride 0.9 % infusion 40 mL  40 mL Intravenous PRN Olga Palm MD       • vitamin B-12 (CYANOCOBALAMIN) tablet 1,000 mcg  1,000 mcg Oral Daily Olga Palm MD   1,000 mcg at 05/06/23 0855         ALLERGIES: Hydrocodone    FAMILY HISTORY: The patient's father \"took Valium\"    SOCIAL HISTORY: Patient lives with her .  She is retired.  She is a smoker and reports social alcohol use    MENTAL STATUS EXAM: The patient is a well-developed well-nourished white female appearing her stated age.  A large breathing device is noted to be in place.  The patient is awake alert and oriented in all spheres.  Her mood is mildly dysphoric her affect congruent.  Speech is relevant and coherent.  There are no deficits memory or cognition noted.  Intelligence is judged to be in the average range based on fund of knowledge, the patient is cooperative with interview.  She denies current suicidal or homicidal ideation or psychotic features.  Her judgment insight are intact.    ASSETS/LIABILITIES: Motivation for change/health issues    DIAGNOSTIC IMPRESSION: Major depressive disorder recurrent mild, medical problems described previously    PLAN: Although the patient is already on citalopram, I will add Remeron 7.5 mg nightly in hopes of addressing the patient's complaints of reduction in appetite and sleep.  I will continue to follow with you.  "

## 2023-05-06 NOTE — PLAN OF CARE
Goal Outcome Evaluation:               Patient had multiple loose bowel movements overnight. CDIFF negative. Imodium ordered. Incontinence care provided. On 10L highflow nasal cannula. IV abx infusing per order. VSS.

## 2023-05-06 NOTE — PLAN OF CARE
Goal Outcome Evaluation:  Plan of Care Reviewed With: patient        Progress: no change  Outcome Evaluation:   Video swallow study completed. Pt present with mild oropharyngeal dysphagia. Transient penetration for initial trial thins, immediately ejected from the airway. No penetration or aspiration throughout additional trials. Esophageal scan may suggest slow clearance, with thin wash aiding, possible retrograde flow below UES. Pt exhibits coughing x2 between PO trials with fluoro on, no material observed in airway.     Recommend: continue regular and thins. Medications: with thins. Compensations: GERD precautions which include: consumption of small meals, utilization of thin liquid wash or extra sauces/gravies as indicated, remaining upright for all PO consumption and at least 30 minutes s/p, avoid eating at reasonable time frame based on sleep schedule, avoid dry/sticky/dense foods as needed.    Completed verbal education in fluoroscopy suite, reviewing video swallow study images and education for results, compensations. Question comprehension d/t pt levels of discomfort, recommend additional review at bedside as indicated.

## 2023-05-06 NOTE — MBS/VFSS/FEES
Acute Care - Speech Language Pathology   Swallow Initial Evaluation [facility]     Patient Name: Katherine Williamson  : 1950  MRN: [id number]  Today's Date: 2023               Admit Date: 2023    Visit Dx:     ICD-10-CM ICD-9-CM   1. Acute respiratory failure with hypoxia  J96.01 518.81   2. Sepsis, due to unspecified organism, unspecified whether acute organ dysfunction present  A41.9 038.9     995.91   3. Community acquired pneumonia, unspecified laterality  J18.9 486   4. Abnormal CXR  R93.89 793.2   5. Hyperglycemia  R73.9 790.29   6. 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 JOSEPH LAU   • Hypertension      Past Surgical History:   Procedure Laterality Date   • COLONOSCOPY  10/2015    Slrov-yopvvgmbeonk-[facility]d. [facility].  Follow-up in 5 years.   • COLONOSCOPY N/A 2022    2 BENIGN POLYPS IN DESCENDING, 5 MM BENIGN POLYP IN SIGMOID, MULTIPLE SMALL AND LARGE DIVERTICULA IN SIGMOID, RESCOPE IN 3 YRS, DR. JOSEPH LAU AT [facility]   • EYE SURGERY     • JOINT REPLACEMENT  ?    Left total hip replacement in .  In 2015 patient had left hip revision   • TONSILLECTOMY         SLP  Recommendation and Plan  Video swallow study completed. Pt present with mild oropharyngeal dysphagia. Transient penetration for initial trial thins, immediately ejected from the airway. No penetration or aspiration throughout additional trials. Esophageal scan may suggest slow clearance, with thin wash aiding, possible retrograde flow below UES. Pt exhibits coughing x2 between PO trials with fluoro on, no material observed in airway.       Recommend: continue regular and thins. Medications: with thins. Compensations: GERD precautions which include: consumption of small meals, utilization of thin liquid wash or extra sauces/gravies as indicated, remaining upright for all PO consumption and at least 30 minutes s/p, avoid eating at reasonable time frame based on sleep schedule, avoid dry/sticky/dense foods as needed.      Completed verbal education in fluoroscopy suite, reviewing video swallow study images and education for results, compensations. Question comprehension d/t pt levels of discomfort, recommend additional review at bedside as indicated.      SWALLOW EVALUATION (last 72 hours)     SLP Adult Swallow Evaluation     Row Name 05/06/23 1100       Rehab Evaluation    Document Type evaluation  -AB    Subjective Information complains of  not feeling well, discomfort of stretcher  -AB    Patient Observations alert  -AB    Patient/Family/Caregiver Comments/Observations seen in fluorscopy suite  -AB    Patient Effort good  -AB    Symptoms Noted During/After Treatment none  -AB       General Information    Patient Profile Reviewed yes  -AB    Pertinent History Of Current Problem acute respiratory failure with hypoxia  -AB    Current Method of Nutrition regular textures;thin liquids  -AB    Precautions/Limitations, Vision WFL  -AB    Precautions/Limitations, Hearing WFL  -AB    Prior Level of Function-Communication WFL  -AB    Prior Level of Function-Swallowing no diet consistency restrictions  -AB    Plans/Goals  Discussed with patient;family;agreed upon  -AB    Barriers to Rehab medically complex  -AB    Patient's Goals for Discharge --    Family Goals for Discharge --       Pain    Additional Documentation --       Pain Scale: Numbers Pre/Post-Treatment    Pretreatment Pain Rating 0/10 - no pain  -AB    Posttreatment Pain Rating 0/10 - no pain  -AB       Oral Motor Structure and Function    Oral Lesions or Structural Abnormalities and/or variants none  -AB    Dentition Assessment natural, present and adequate  -AB    Secretion Management WNL/WFL  -AB    Mucosal Quality moist, healthy  -AB    Gag Response --  did not test  -AB    Volitional Swallow WFL  -AB    Volitional Cough weak  -AB       Oral Musculature and Cranial Nerve Assessment    Oral Motor General Assessment WFL  -AB       General Eating/Swallowing Observations    Respiratory Support Currently in Use high-flow nasal cannula  -AB    O2 Liters --  10L  -AB    Eating/Swallowing Skills self-fed  -AB    Positioning During Eating upright in bed  -AB    Utensils Used spoon;cup;straw  -AB    Consistencies Trialed regular textures;soft to chew textures;mixed consistency;pureed;thin liquids  -AB       Clinical Swallow Eval    Clinical Swallow Evaluation Summary --       MBS/VFSS Interpretation    VFSS Summary Radiologist Dr. Connor present. Pt seated 90 degree hip flexion, visualized in lateral position. Trials assessed included:  thin by cup/straw, puree, mech soft/mixed consistency, regular solids. Premature spillage to valleculae with mixed consistencies, pyriform with thins, Base of tongue retraction, epiglottic inversion adequate. Hyolaryngeal elevation mildly reduced, functional. Minimal initiation delay 1 second with thin liquids. Pooling in pyriform, trace lining residue for thins; minimal base of tongue and valleculae residue for solids cleared with second swallow. Transient penetration, underside of the epiglottis, ejected from the airway before the swallow  with initial thins by cup. No additional incidents of penetration or aspiration occur throughout. Esophageal scan performed following regular solids and thin liquids. Possible slow distal clearance of regular solid, aided by thin wash with minimal retrograde flow remaining below UES.  -AB       SLP Communication to Radiology    Summary Statement Radiologist Dr. Connor present. Pt seated 90 degree hip flexion, visualized in lateral position. Trials assessed included:  thin by cup/straw, puree, mech soft/mixed consistency, regular solids. Premature spillage to valleculae with mixed consistencies, pyriform with thins, Base of tongue retraction, epiglottic inversion adequate. Hyolaryngeal elevation mildly reduced, functional. Minimal initiation delay 1 second with thin liquids. Pooling in pyriform, trace lining residue for thins; minimal base of tongue and valleculae residue for solids cleared with second swallow. Transient penetration, underside of the epiglottis, ejected from the airway before the swallow with initial thins by cup. No additional incidents of penetration or aspiration occur throughout. Esophageal scan performed following regular solids and thin liquids. Possible slow distal clearance of regular solid, aided by thin wash with minimal retrograde flow remaining below UES.  -AB       SLP Evaluation Clinical Impression    SLP Swallowing Diagnosis mild;oral dysphagia;pharyngeal dysphagia  -AB    Functional Impact risk of aspiration/pneumonia  -AB    Rehab Potential/Prognosis, Swallowing good, to achieve stated therapy goals  -AB    Swallow Criteria for Skilled Therapeutic Interventions Met demonstrates skilled criteria  -AB       SLP Treatment Clinical Impressions    Care Plan Review evaluation/treatment results reviewed;care plan/treatment goals reviewed;risks/benefits reviewed;current/potential barriers reviewed;patient/other agree to care plan  -AB       Recommendations    Therapy Frequency (Swallow) PRN   -AB    Predicted Duration Therapy Intervention (Days) until discharge  -AB    SLP Diet Recommendation regular textures;thin liquids  -AB    Recommended Diagnostics --  -AB    Recommended Precautions and Strategies upright posture during/after eating;small bites of food and sips of liquid;reflux precautions  -AB    Oral Care Recommendations Oral Care BID/PRN  -AB    SLP Rec. for Method of Medication Administration meds whole;with thin liquids;as tolerated  -AB    Monitor for Signs of Aspiration yes  -AB    Anticipated Discharge Disposition (SLP) unknown  -AB       Swallow Goals (SLP)    Swallow LTGs Patient will demonstrate functional swallow for  -AB    Swallow STGs swallow management recall goal selection (SLP)  -AB    Swallow Management Recall Goal Selection (SLP) swallow management recall, SLP goal 1  -AB       (LTG) Patient will demonstrate functional swallow for    Diet Texture (Demonstrate functional swallow) regular textures  -AB    Liquid viscosity (Demonstrate functional swallow) thin liquids  -AB    [place] (Demonstrate functional swallow) independently (over 90% accuracy)  -AB    Progress/Outcomes (Demonstrate functional swallow) new goal  -AB       (STG) Swallow Management Recall Goal 1 (SLP)    Activity (Swallow Management Recall Goal 1, SLP) independent recall of;aspiration precautions;reflux precautions  -AB    [place]/Accuracy (Swallow Management Recall Goal 1, SLP) independently (over 90% accuracy)  -AB    Progress/Outcomes (Swallow Management Recall Goal 1, SLP) new goal  -AB          User Key  (r) = Recorded By, (t) = Taken By, (c) = Cosigned By    Initials Name Effective Dates    Marce Sanderson, MS CCC-SLP 12/27/22 -                 EDUCATION  The patient has been educated in the following areas:   Dysphagia (Swallowing Impairment) Modified Diet Instruction.        SLP GOALS     Row Name 05/06/23 1100             (LTG) Patient will demonstrate functional swallow for    Diet  Texture (Demonstrate functional swallow) regular textures  -AB      Liquid viscosity (Demonstrate functional swallow) thin liquids  -AB      [place] (Demonstrate functional swallow) independently (over 90% accuracy)  -AB      Progress/Outcomes (Demonstrate functional swallow) new goal  -AB         (STG) Swallow Management Recall Goal 1 (SLP)    Activity (Swallow Management Recall Goal 1, SLP) independent recall of;aspiration precautions;reflux precautions  -AB      [place]/Accuracy (Swallow Management Recall Goal 1, SLP) independently (over 90% accuracy)  -AB      Progress/Outcomes (Swallow Management Recall Goal 1, SLP) new goal  -AB            User Key  (r) = Recorded By, (t) = Taken By, (c) = Cosigned By    Initials Name Provider Type    Marce Sanderson MS CCC-SLP Speech and Language Pathologist                   Time Calculation:    Time Calculation- SLP     Row Name 05/06/23 1138             Time Calculation- SLP    SLP Received On 05/06/23  -AB            User Key  (r) = Recorded By, (t) = Taken By, (c) = Cosigned By    Initials Name Provider Type    Marce Sanderson MS CCC-SLP Speech and Language Pathologist                Therapy Charges for Today     Code Description Service Date Service Provider Modifiers Qty    [id number]  ST MOTION FLUORO EVAL SWALLOW 6 5/6/2023 Marce Alvarado, MS CCC-SLP GN 1               Marce Alvarado MS CCC-SLP  5/6/2023

## 2023-05-06 NOTE — PROGRESS NOTES
Name: Katherine Williamson ADMIT: 2023   : 1950  PCP: Zelalem Flor APRN    MRN: [id number] LOS: 4 days   AGE/SEX: 72 y.o. female  ROOM: [facility]     Subjective   Subjective   Patient is seen at bedside, no new complaints.         Objective   Objective   Vital Signs  Temp:  [97.5 °F (36.4 °C)-98.6 °F (37 °C)] 98.6 °F (37 °C)  Heart Rate:  [67-93] 89  Resp:  [18-24] 20  BP: (140-151)/(62-84) 147/62  SpO2:  [86 %-95 %] 90 %  on  Flow (L/min):  [10-50] 50;   Device (Oxygen Therapy): high-flow nasal cannula;heated;humidified  Body mass index is 24.21 kg/m².  Physical Exam   General, awake and alert.  Head and ENT, normocephalic and atraumatic.  Lungs, right lung rhonchi  Heart, regular rate and rhythm.  Abdomen, soft and nontender.  Extremities, no clubbing or cyanosis.  Neuro, no focal deficits.  Skin: Warm and no rash.  Psych, normal mood and affect.  Musculoskeletal, joint examination is grossly normal.    Copied text material from yesterday's note has been reviewed for appropriate changes and remains accurate as of 23.      Results Review     I reviewed the patient's new clinical results.  Results from last 7 days   Lab Units 23  0556 23  0604 23  0607   WBC 10*3/mm3 19.80* 19.67* 21.59* 21.86*   HEMOGLOBIN g/dL 11.2* 11.4* 11.6* 11.7*   PLATELETS 10*3/mm3 316 355 441 498*     Results from last 7 days   Lab Units 23  0556 23  0603 23  0607   SODIUM mmol/L 140 137 141 139   POTASSIUM mmol/L 3.7 4.0 4.1 4.0   CHLORIDE mmol/L 105 103 106 105   CO2 mmol/L 29.7* 27.7 25.5 21.6*   BUN mg/dL 16 24* 25* 32*   CREATININE mg/dL 0.37* 0.49* 0.45* 0.70   GLUCOSE mg/dL 107* 132* 142* 119*   EGFR mL/min/1.73 108.6 101.5 103.6 93.2     Results from last 7 days   Lab Units 23  0607 23  1428   ALBUMIN g/dL 2.0* 2.3*   BILIRUBIN mg/dL 0.4 0.6   ALK PHOS U/L 162* 202*   AST (SGOT) U/L 26 37*   ALT (SGPT) U/L 28 37*     Results from last 7 days    Lab Units 05/06/23  0627 05/05/23  0556 05/04/23  0603 05/03/23  0607 05/02/23  1428   CALCIUM mg/dL 9.2 9.2 9.1 8.6 9.6   ALBUMIN g/dL  --   --   --  2.0* 2.3*     Results from last 7 days   Lab Units 05/03/23  0607 05/02/23  1743 05/02/23  1433 05/02/23  1428   PROCALCITONIN ng/mL 2.62*  --   --  3.95*   LACTATE mmol/L 1.9 3.3* 6.3*  --      No results found for: HGBA1C, POCGLU    FL Video Swallow With Speech Single Contrast    Result Date: 5/6/2023  Fluoroscopy was provided for the speech pathologist during a video swallow study. For full details please see the speech pathology report.  This report was finalized on 5/6/2023 1:48 PM by Dr. Sebastian Burciaga M.D.      I have personally reviewed all medications:  Scheduled Medications  vitamin C, 500 mg, Oral, Daily  citalopram, 20 mg, Oral, Daily  fluticasone, 1 spray, Each Nare, Daily  guaiFENesin, 600 mg, Oral, BID  ipratropium-albuterol, 3 mL, Nebulization, 4x Daily - RT  losartan, 50 mg, Oral, Q24H  mirtazapine, 7.5 mg, Oral, Nightly  piperacillin-tazobactam, 3.375 g, Intravenous, Q8H  sodium chloride, 10 mL, Intravenous, Q12H  cyanocobalamin, 1,000 mcg, Oral, Daily    Infusions   Diet  Diet: Regular/House Diet; Texture: Regular Texture (IDDSI 7); Fluid Consistency: Thin (IDDSI 0)    I have personally reviewed:  [x]  Laboratory   [x]  Microbiology   [x]  Radiology   [x]  EKG/Telemetry  [x]  Cardiology/Vascular   [x]  Pathology    [x]  Records       Assessment/Plan     Active Hospital Problems    Diagnosis  POA   • **Acute respiratory failure with hypoxia [J96.01]  Yes   • Sepsis [A41.9]  Yes   • Pleural effusion, right [J90]  Yes   • Other emphysema [J43.8]  Yes   • Pulmonary nodule [R91.1]  Yes   • Pneumonia [J18.9]  Yes   • Tobacco abuse [Z72.0]  Yes   • Benign essential HTN [I10]  Yes      Resolved Hospital Problems   No resolved problems to display.       72 y.o. female admitted with Acute respiratory failure with hypoxia.    Assessment and plan  1.  Sepsis  due to bacterial pneumonia, patient has acute respiratory failure with hypoxia along with right-sided pleural effusion and emphysema.  Patient is on Zosyn coverage which will be continued.  Pulmonary on board and following.     2.  COPD with acute exacerbation, continue breathing treatments.  Continue supplemental oxygen.     3.  Sinus tachycardia, resolved, continue to monitor.     4.  Depression, start Celexa.     5.  CODE STATUS is full code.  Further plans based on hospital course.      Gee Clement MD  [facility]  05/06/23  17:33 EDT

## 2023-05-06 NOTE — PROGRESS NOTES
"                                              LOS: 4 days   Patient Care Team:  Zelalem Flor APRN as PCP - General (Internal Medicine)  Stephen, Brandon Meyer MD as Consulting Physician (Orthopedic Surgery)    Chief Complaint:  F/up respiratory failure, pneumonia and medical problems listed below.    Subjective   Interval History  I reviewed the admission note, progress notes, PMH, PSH, Family hx, social history, imagings and prior records on this admission, summarized the finding in my note and formulated a transition of care plan.    On 10 L oxygen earlier today but I saw her when she returned from [facility] and was changed in bed and she was on 15 L oxygen and her SPO2 was down to 83%.  Staff reported that she desats with minimal activities and she does not have much reserve and it takes her a while to come back off.    Patient continues to report cough with brownish to yellowish phlegm.  On questioning she and the indicated that she has a pleuritic right-sided chest pain.    REVIEW OF SYSTEMS:   CARDIOVASCULAR: No chest pain, chest pressure or chest discomfort. No palpitations or edema.      GASTROINTESTINAL: Diarrhea.  No nausea or vomiting.  CONSTITUTIONAL: No fever or chills.     Ventilator/Non-Invasive Ventilation Settings (From admission, onward)    None                Physical Exam:     Vital Signs  Temp:  [97.5 °F (36.4 °C)-98.5 °F (36.9 °C)] 98.5 °F (36.9 °C)  Heart Rate:  [75-97] 89  Resp:  [16-20] 18  BP: (140-151)/(61-84) 140/84    Intake/Output Summary (Last 24 hours) at 5/6/2023 0922  Last data filed at 5/5/2023 1747  Gross per 24 hour   Intake 300 ml   Output --   Net 300 ml     Flowsheet Rows    Flowsheet Row First Filed Value   Admission Height 160 cm (63\") Documented at 05/02/2023 1900   Admission Weight 61.2 kg (135 lb) Documented at 05/02/2023 1900          PPE used per hospital policy    General Appearance:   Alert, cooperative, in no acute distress   ENMT:  Mallampati score 3, dry mucous " membrane.  Mild thrush on tongue.   Eyes:  Pupils equal and reactive to light. EOMI   Neck:   Trachea midline. No thyromegaly.   Lungs:    Coarse breath sounds with crackles on the right and diminished air entry.    Heart:   Regular rhythm and normal rate, normal S1 and S2, no         murmur   Skin:   No rash or ecchymosis   Abdomen:     Soft. No tenderness. No HSM.   Neuro/psych:  Conscious, alert, oriented x3. Strength 5/5 in upper and lower  ext.  Appropriate mood and affect   Extremities:  No cyanosis, clubbing or edema.  Warm extremities and well-perfused          Results Review:        Results from last 7 days   Lab Units 05/06/23  0627 05/05/23  0556 05/04/23  0603   SODIUM mmol/L 140 137 141   POTASSIUM mmol/L 3.7 4.0 4.1   CHLORIDE mmol/L 105 103 106   CO2 mmol/L 29.7* 27.7 25.5   BUN mg/dL 16 24* 25*   CREATININE mg/dL 0.37* 0.49* 0.45*   GLUCOSE mg/dL 107* 132* 142*   CALCIUM mg/dL 9.2 9.2 9.1     Results from last 7 days   Lab Units 05/02/23  1743 05/02/23  1428   HSTROP T ng/L 10* 11*     Results from last 7 days   Lab Units 05/06/23  0627 05/05/23  0556 05/04/23  0604   WBC 10*3/mm3 19.80* 19.67* 21.59*   HEMOGLOBIN g/dL 11.2* 11.4* 11.6*   HEMATOCRIT % 32.8* 33.1* 33.3*   PLATELETS 10*3/mm3 316 355 441         Results from last 7 days   Lab Units 05/02/23  1428   PROBNP pg/mL 1,345.0*                   Results from last 7 days   Lab Units 05/03/23  0805   PH, ARTERIAL pH units 7.417   PCO2, ARTERIAL mm Hg 35.1   PO2 ART mm Hg 69.6*   FLOW RATE lpm 10   MODALITY  HFNC   O2 SATURATION CALC % 94.1         I reviewed the patient's new clinical results.        Medication Review:   vitamin C, 500 mg, Oral, Daily  citalopram, 20 mg, Oral, Daily  fluticasone, 1 spray, Each Nare, Daily  guaiFENesin, 600 mg, Oral, BID  ipratropium-albuterol, 3 mL, Nebulization, 4x Daily - RT  losartan, 50 mg, Oral, Q24H  piperacillin-tazobactam, 3.375 g, Intravenous, Q8H  sodium chloride, 10 mL, Intravenous,  Q12H  cyanocobalamin, 1,000 mcg, Oral, Daily             Diagnostic imaging:  I personally and independently reviewed the following images:  CXR 5/5/2023 compared to CT chest 5/2/2023:  Upper lobe predominant emphysema.  Cavitating pneumonia/abscess right lower lobe with loculated right pleural effusion.          Assessment     1. Right lower lobe lung abscess/pneumonia  2. Right loculated pleural effusion  3. Acute hypoxic respiratory failure  4. Sepsis  5. Emphysema, upper lobe predominant  6. Peripheral pleural-based nodule, 2.4 cm on CT chest 4/3/23, could have been related to early pneumonia.  Underlying malignancy cannot be excluded.    · Tobacco abuse  · Dysphagia    All problems new to me    Plan         Continue empiric antibiotic therapy with Zosyn.  Eventually can be switched to Augmentin/Unasyn.  Recommend at least 14 days therapy due to abscess formation and pleural effusion.  Will require reimaging as well in the near future, ideally 4 to 6 weeks following antibiotic therapy but could reimage sooner if no improvement or if worsening.  Pleural effusion was initially too small and not amenable for intervention but could get larger over time.  Patient was already seen by thoracic surgery on 5/3.    No benefit from bronchoscopy at this point other than trying to identify the underlying organism which would not  much.  In addition patient is requiring significant oxygen support and at risk of worsening respiratory failure and requiring mechanical ventilation with any sort of sedation..    Bronchodilators with DuoNeb.  Pulmonary toileting.    Recollect sputum culture.    Oxygen by NC and titrate keep SPO2 > 90%.  We will switch to HHFNC.    Recheck CXR in a.m.    Speech evaluation.  VFSS    Cheyanne Ayers MD  05/06/23  09:29 EDT          This note was dictated utilizing Dragon dictation

## 2023-05-06 NOTE — PLAN OF CARE
Goal Outcome Evaluation:  Plan of Care Reviewed With: patient        Progress: no change  Outcome Evaluation: Patient down for video swallow this AM. Worsening O2 saturations and increased to 15L HF w/ stats mid 80s. Dr. Jamibeh at bedside during this time, and optiflow ordered. On 50L and 78%. Stats low 90s. Continue IV abx. Multiple loose BMs, imodium ordered x's 1. VSS. Will continue to monitor.

## 2023-05-07 ENCOUNTER — APPOINTMENT (OUTPATIENT)
Dept: GENERAL RADIOLOGY | Facility: HOSPITAL | Age: 73
DRG: 871 | End: 2023-05-07
Payer: COMMERCIAL

## 2023-05-07 LAB
ANION GAP SERPL CALCULATED.3IONS-SCNC: 8.7 MMOL/L (ref 5–15)
BACTERIA SPEC AEROBE CULT: NORMAL
BASOPHILS # BLD AUTO: 0.07 10*3/MM3 (ref 0–0.2)
BASOPHILS NFR BLD AUTO: 0.4 % (ref 0–1.5)
BUN SERPL-MCNC: 12 MG/DL (ref 8–23)
BUN/CREAT SERPL: 32.4 (ref 7–25)
CALCIUM SPEC-SCNC: 8.9 MG/DL (ref 8.6–10.5)
CHLORIDE SERPL-SCNC: 102 MMOL/L (ref 98–107)
CO2 SERPL-SCNC: 31.3 MMOL/L (ref 22–29)
CREAT SERPL-MCNC: 0.37 MG/DL (ref 0.57–1)
DEPRECATED RDW RBC AUTO: 42.3 FL (ref 37–54)
EGFRCR SERPLBLD CKD-EPI 2021: 108.6 ML/MIN/1.73
EOSINOPHIL # BLD AUTO: 0.03 10*3/MM3 (ref 0–0.4)
EOSINOPHIL NFR BLD AUTO: 0.2 % (ref 0.3–6.2)
ERYTHROCYTE [DISTWIDTH] IN BLOOD BY AUTOMATED COUNT: 12.9 % (ref 12.3–15.4)
GLUCOSE SERPL-MCNC: 96 MG/DL (ref 65–99)
HCT VFR BLD AUTO: 33.6 % (ref 34–46.6)
HGB BLD-MCNC: 11.5 G/DL (ref 12–15.9)
IMM GRANULOCYTES # BLD AUTO: 0.65 10*3/MM3 (ref 0–0.05)
IMM GRANULOCYTES NFR BLD AUTO: 3.3 % (ref 0–0.5)
LYMPHOCYTES # BLD AUTO: 1.74 10*3/MM3 (ref 0.7–3.1)
LYMPHOCYTES NFR BLD AUTO: 8.8 % (ref 19.6–45.3)
MCH RBC QN AUTO: 30.9 PG (ref 26.6–33)
MCHC RBC AUTO-ENTMCNC: 34.2 G/DL (ref 31.5–35.7)
MCV RBC AUTO: 90.3 FL (ref 79–97)
MONOCYTES # BLD AUTO: 0.59 10*3/MM3 (ref 0.1–0.9)
MONOCYTES NFR BLD AUTO: 3 % (ref 5–12)
NEUTROPHILS NFR BLD AUTO: 16.64 10*3/MM3 (ref 1.7–7)
NEUTROPHILS NFR BLD AUTO: 84.3 % (ref 42.7–76)
NRBC BLD AUTO-RTO: 0.1 /100 WBC (ref 0–0.2)
PLATELET # BLD AUTO: 313 10*3/MM3 (ref 140–450)
PMV BLD AUTO: 9.6 FL (ref 6–12)
POTASSIUM SERPL-SCNC: 3.4 MMOL/L (ref 3.5–5.2)
RBC # BLD AUTO: 3.72 10*6/MM3 (ref 3.77–5.28)
SODIUM SERPL-SCNC: 142 MMOL/L (ref 136–145)
WBC NRBC COR # BLD: 19.72 10*3/MM3 (ref 3.4–10.8)

## 2023-05-07 PROCEDURE — 25010000002 PIPERACILLIN SOD-TAZOBACTAM PER 1 G: Performed by: INTERNAL MEDICINE

## 2023-05-07 PROCEDURE — 94799 UNLISTED PULMONARY SVC/PX: CPT

## 2023-05-07 PROCEDURE — 85025 COMPLETE CBC W/AUTO DIFF WBC: CPT | Performed by: STUDENT IN AN ORGANIZED HEALTH CARE EDUCATION/TRAINING PROGRAM

## 2023-05-07 PROCEDURE — 94761 N-INVAS EAR/PLS OXIMETRY MLT: CPT

## 2023-05-07 PROCEDURE — 71045 X-RAY EXAM CHEST 1 VIEW: CPT

## 2023-05-07 PROCEDURE — 94664 DEMO&/EVAL PT USE INHALER: CPT

## 2023-05-07 PROCEDURE — 80048 BASIC METABOLIC PNL TOTAL CA: CPT | Performed by: STUDENT IN AN ORGANIZED HEALTH CARE EDUCATION/TRAINING PROGRAM

## 2023-05-07 RX ORDER — LOPERAMIDE HYDROCHLORIDE 2 MG/1
2 CAPSULE ORAL EVERY 6 HOURS PRN
Status: DISCONTINUED | OUTPATIENT
Start: 2023-05-07 | End: 2023-05-14

## 2023-05-07 RX ADMIN — TAZOBACTAM SODIUM AND PIPERACILLIN SODIUM 3.38 G: 375; 3 INJECTION, SOLUTION INTRAVENOUS at 18:22

## 2023-05-07 RX ADMIN — Medication 10 ML: at 21:09

## 2023-05-07 RX ADMIN — MIRTAZAPINE 7.5 MG: 15 TABLET, FILM COATED ORAL at 21:08

## 2023-05-07 RX ADMIN — LOSARTAN POTASSIUM 50 MG: 50 TABLET, FILM COATED ORAL at 09:02

## 2023-05-07 RX ADMIN — IPRATROPIUM BROMIDE AND ALBUTEROL SULFATE 3 ML: 2.5; .5 SOLUTION RESPIRATORY (INHALATION) at 15:18

## 2023-05-07 RX ADMIN — Medication 10 ML: at 09:02

## 2023-05-07 RX ADMIN — Medication 1000 MCG: at 09:02

## 2023-05-07 RX ADMIN — TAZOBACTAM SODIUM AND PIPERACILLIN SODIUM 3.38 G: 375; 3 INJECTION, SOLUTION INTRAVENOUS at 11:54

## 2023-05-07 RX ADMIN — IPRATROPIUM BROMIDE AND ALBUTEROL SULFATE 3 ML: 2.5; .5 SOLUTION RESPIRATORY (INHALATION) at 06:59

## 2023-05-07 RX ADMIN — CITALOPRAM 20 MG: 20 TABLET, FILM COATED ORAL at 09:02

## 2023-05-07 RX ADMIN — TAZOBACTAM SODIUM AND PIPERACILLIN SODIUM 3.38 G: 375; 3 INJECTION, SOLUTION INTRAVENOUS at 01:12

## 2023-05-07 RX ADMIN — OXYCODONE HYDROCHLORIDE AND ACETAMINOPHEN 500 MG: 500 TABLET ORAL at 09:02

## 2023-05-07 RX ADMIN — IPRATROPIUM BROMIDE AND ALBUTEROL SULFATE 3 ML: 2.5; .5 SOLUTION RESPIRATORY (INHALATION) at 12:01

## 2023-05-07 RX ADMIN — GUAIFENESIN 600 MG: 600 TABLET, EXTENDED RELEASE ORAL at 21:08

## 2023-05-07 RX ADMIN — LOPERAMIDE HYDROCHLORIDE 2 MG: 2 CAPSULE ORAL at 18:22

## 2023-05-07 RX ADMIN — POTASSIUM CHLORIDE 40 MEQ: 750 TABLET, EXTENDED RELEASE ORAL at 06:54

## 2023-05-07 RX ADMIN — GUAIFENESIN 600 MG: 600 TABLET, EXTENDED RELEASE ORAL at 09:02

## 2023-05-07 RX ADMIN — IPRATROPIUM BROMIDE AND ALBUTEROL SULFATE 3 ML: 2.5; .5 SOLUTION RESPIRATORY (INHALATION) at 19:30

## 2023-05-07 NOTE — PLAN OF CARE
Goal Outcome Evaluation:  Plan of Care Reviewed With: patient        Progress: no change   Pt is AO x 4.No complains of pain .O2 saturations dropped to 84% on activity. Currently on 30L .other VSS.

## 2023-05-07 NOTE — PROGRESS NOTES
"                                              LOS: 5 days   Patient Care Team:  Zelalem Flor APRN as PCP - General (Internal Medicine)  Stephen, Brandon Meyer MD as Consulting Physician (Orthopedic Surgery)    Chief Complaint:  F/up respiratory failure, pneumonia and medical problems listed below.    Subjective   Interval History  On HHFNC 50 L/minute and 70% FiO2.    Continues to have cough.  Now productive of thick greenish phlegm.    REVIEW OF SYSTEMS:   CARDIOVASCULAR: No chest pain, chest pressure or chest discomfort. No palpitations or edema.      GASTROINTESTINAL: No nausea, vomiting or diarrhea.  CONSTITUTIONAL: No fever or chills.     Ventilator/Non-Invasive Ventilation Settings (From admission, onward)    None                Physical Exam:     Vital Signs  Temp:  [98 °F (36.7 °C)-99.3 °F (37.4 °C)] 98.5 °F (36.9 °C)  Heart Rate:  [67-96] 96  Resp:  [16-24] 18  BP: (145-158)/(62-89) 158/89    Intake/Output Summary (Last 24 hours) at 5/7/2023 0838  Last data filed at 5/6/2023 1825  Gross per 24 hour   Intake 240 ml   Output --   Net 240 ml     Flowsheet Rows    Flowsheet Row First Filed Value   Admission Height 160 cm (63\") Documented at 05/02/2023 1900   Admission Weight 61.2 kg (135 lb) Documented at 05/02/2023 1900          PPE used per hospital policy    General Appearance:   Alert, cooperative, in no acute distress   ENMT:  Mallampati score 3, dry mucous membrane.  Mild thrush on tongue.   Eyes:  Pupils equal and reactive to light. EOMI   Neck:   Trachea midline. No thyromegaly.   Lungs:    Coarse crackles and diminished air entry in the right lower lobe.  No wheezing.  No use of accessory muscles.    Heart:   Regular rhythm and normal rate, normal S1 and S2, no         murmur   Skin:   No rash or ecchymosis   Abdomen:     Soft. No tenderness. No HSM.   Neuro/psych:  Conscious, alert, oriented x3. Strength 5/5 in upper and lower  ext.  Appropriate mood and affect   Extremities:  No cyanosis, clubbing " or edema.  Warm extremities and well-perfused          Results Review:        Results from last 7 days   Lab Units 05/07/23  0435 05/06/23  0627 05/05/23  0556   SODIUM mmol/L 142 140 137   POTASSIUM mmol/L 3.4* 3.7 4.0   CHLORIDE mmol/L 102 105 103   CO2 mmol/L 31.3* 29.7* 27.7   BUN mg/dL 12 16 24*   CREATININE mg/dL 0.37* 0.37* 0.49*   GLUCOSE mg/dL 96 107* 132*   CALCIUM mg/dL 8.9 9.2 9.2     Results from last 7 days   Lab Units 05/02/23  1743 05/02/23  1428   HSTROP T ng/L 10* 11*     Results from last 7 days   Lab Units 05/07/23  0435 05/06/23  0627 05/05/23  0556   WBC 10*3/mm3 19.72* 19.80* 19.67*   HEMOGLOBIN g/dL 11.5* 11.2* 11.4*   HEMATOCRIT % 33.6* 32.8* 33.1*   PLATELETS 10*3/mm3 313 316 355         Results from last 7 days   Lab Units 05/02/23  1428   PROBNP pg/mL 1,345.0*                   Results from last 7 days   Lab Units 05/03/23  0805   PH, ARTERIAL pH units 7.417   PCO2, ARTERIAL mm Hg 35.1   PO2 ART mm Hg 69.6*   FLOW RATE lpm 10   MODALITY  HFNC   O2 SATURATION CALC % 94.1         I reviewed the patient's new clinical results.        Medication Review:   vitamin C, 500 mg, Oral, Daily  citalopram, 20 mg, Oral, Daily  fluticasone, 1 spray, Each Nare, Daily  guaiFENesin, 600 mg, Oral, BID  ipratropium-albuterol, 3 mL, Nebulization, 4x Daily - RT  losartan, 50 mg, Oral, Q24H  mirtazapine, 7.5 mg, Oral, Nightly  piperacillin-tazobactam, 3.375 g, Intravenous, Q8H  sodium chloride, 10 mL, Intravenous, Q12H  cyanocobalamin, 1,000 mcg, Oral, Daily             Diagnostic imaging:  I personally and independently reviewed the following images:  CXR 5/5/2023 compared to CT chest 5/2/2023:  Upper lobe predominant emphysema.  Cavitating pneumonia/abscess right lower lobe with loculated right pleural effusion.          CXR 5/7/23: Right lower lobe consolidation.  Similar to 5/5    Assessment     1. Right lower lobe lung abscess/pneumonia  2. Right loculated pleural effusion  3. Acute hypoxic respiratory  failure  4. Sepsis  5. Emphysema, upper lobe predominant  6. Peripheral pleural-based nodule, 2.4 cm on CT chest 4/3/23, could have been related to early pneumonia.  Underlying malignancy cannot be excluded.    · Tobacco abuse  · Dysphagia      Plan         Continue empiric antibiotic therapy with Zosyn. Recommend at least 14 days therapy due to abscess formation and pleural effusion.  Will require reimaging as well in the near future, ideally 4 to 6 weeks following antibiotic therapy but could reimage sooner if no improvement or if worsening.  Pleural effusion was initially too small and not amenable for intervention but could get larger over time.  Patient was already seen by thoracic surgery on 5/3.    DuoNeb 4 times a day.  Pulmonary toileting.    Oxygen by HHFNC and titrate keep SPO2 >90%.    Mucinex    Guarded prognosis.  Discussed with the patient again the seriousness of her illness.     Cheyanne Ayers MD  05/07/23  08:38 EDT          This note was dictated utilizing [employer] dictation

## 2023-05-07 NOTE — PROGRESS NOTES
Name: Katherine Williamson ADMIT: 2023   : 1950  PCP: Zelalem Flor APRN    MRN: [id number] LOS: 5 days   AGE/SEX: 72 y.o. female  ROOM: [facility]     Subjective   Subjective   Patient seen at bedside.       Objective   Objective   Vital Signs  Temp:  [98 °F (36.7 °C)-99.3 °F (37.4 °C)] 98.1 °F (36.7 °C)  Heart Rate:  [82-96] 83  Resp:  [16-20] 20  BP: (141-158)/(73-89) 141/74  SpO2:  [90 %-98 %] 94 %  on  Flow (L/min):  [50] 50;   Device (Oxygen Therapy): high-flow nasal cannula;heated;humidified  Body mass index is 24.98 kg/m².  Physical Exam   General, awake and alert.  Head and ENT, normocephalic and atraumatic.  Lungs, right lung rhonchi  Heart, regular rate and rhythm.  Abdomen, soft and nontender.  Extremities, no clubbing or cyanosis.  Neuro, no focal deficits.  Skin: Warm and no rash.  Psych, normal mood and affect.  Musculoskeletal, joint examination is grossly normal.    Copied text material from yesterday's note has been reviewed for appropriate changes and remains accurate as of 23.    Results Review     I reviewed the patient's new clinical results.  Results from last 7 days   Lab Units 23  0435 23  0623  0556 23  0604   WBC 10*3/mm3 19.72* 19.80* 19.67* 21.59*   HEMOGLOBIN g/dL 11.5* 11.2* 11.4* 11.6*   PLATELETS 10*3/mm3 313 316 355 441     Results from last 7 days   Lab Units 23  0435 23  0627 23  0556 23  0603   SODIUM mmol/L 142 140 137 141   POTASSIUM mmol/L 3.4* 3.7 4.0 4.1   CHLORIDE mmol/L 102 105 103 106   CO2 mmol/L 31.3* 29.7* 27.7 25.5   BUN mg/dL 12 16 24* 25*   CREATININE mg/dL 0.37* 0.37* 0.49* 0.45*   GLUCOSE mg/dL 96 107* 132* 142*   EGFR mL/min/1.73 108.6 108.6 101.5 103.6     Results from last 7 days   Lab Units 23  0607 23  1428   ALBUMIN g/dL 2.0* 2.3*   BILIRUBIN mg/dL 0.4 0.6   ALK PHOS U/L 162* 202*   AST (SGOT) U/L 26 37*   ALT (SGPT) U/L 28 37*     Results from last 7 days   Lab Units 23  7649  05/06/23  0627 05/05/23  0556 05/04/23  0603 05/03/23  0607 05/02/23  1428   CALCIUM mg/dL 8.9 9.2 9.2 9.1 8.6 9.6   ALBUMIN g/dL  --   --   --   --  2.0* 2.3*     Results from last 7 days   Lab Units 05/03/23  0607 05/02/23  1743 05/02/23  1433 05/02/23  1428   PROCALCITONIN ng/mL 2.62*  --   --  3.95*   LACTATE mmol/L 1.9 3.3* 6.3*  --      No results found for: HGBA1C, POCGLU    FL Video Swallow With Speech Single Contrast    Result Date: 5/6/2023  Fluoroscopy was provided for the speech pathologist during a video swallow study. For full details please see the speech pathology report.  This report was finalized on 5/6/2023 1:48 PM by Dr. Sebastian Burciaga M.D.      XR Chest 1 View    Result Date: 5/7/2023  Consolidation right mid to lower lung appears similar to prior exam, with persistent right pleural effusion. Mild left basilar atelectasis or infiltrate appears mildly increased.  This report was finalized on 5/7/2023 7:12 AM by Dr. Ronnie Connor M.D.      I have personally reviewed all medications:  Scheduled Medications  vitamin C, 500 mg, Oral, Daily  citalopram, 20 mg, Oral, Daily  fluticasone, 1 spray, Each Nare, Daily  guaiFENesin, 600 mg, Oral, BID  ipratropium-albuterol, 3 mL, Nebulization, 4x Daily - RT  losartan, 50 mg, Oral, Q24H  mirtazapine, 7.5 mg, Oral, Nightly  piperacillin-tazobactam, 3.375 g, Intravenous, Q8H  sodium chloride, 10 mL, Intravenous, Q12H  cyanocobalamin, 1,000 mcg, Oral, Daily    Infusions   Diet  Diet: Regular/House Diet; Texture: Regular Texture (IDDSI 7); Fluid Consistency: Thin (IDDSI 0)    I have personally reviewed:  [x]  Laboratory   [x]  Microbiology   [x]  Radiology   [x]  EKG/Telemetry  [x]  Cardiology/Vascular   [x]  Pathology    [x]  Records       Assessment/Plan     Active Hospital Problems    Diagnosis  POA   • **Acute respiratory failure with hypoxia [J96.01]  Yes   • Sepsis [A41.9]  Yes   • Pleural effusion, right [J90]  Yes   • Other emphysema [J43.8]  Yes   •  Pulmonary nodule [R91.1]  Yes   • Pneumonia [J18.9]  Yes   • Tobacco abuse [Z72.0]  Yes   • Benign essential HTN [I10]  Yes      Resolved Hospital Problems   No resolved problems to display.       72 y.o. female admitted with Acute respiratory failure with hypoxia.    Assessment and plan  1.  Sepsis due to bacterial pneumonia, patient has acute respiratory failure with hypoxia along with right-sided pleural effusion and emphysema.  Patient is on Zosyn coverage which will be continued.  Pulmonary on board and following.     2.  COPD with acute exacerbation, continue breathing treatments.  Continue supplemental oxygen.     3.  Sinus tachycardia, resolved, continue to monitor.     4.  Depression, start Celexa.    5.  Diarrhea, add Imodium as needed.     6.  CODE STATUS is full code.  Further plans based on hospital course.          Gee Clement MD  [facility]  05/07/23  17:41 EDT

## 2023-05-07 NOTE — SIGNIFICANT NOTE
05/07/23 1201   Oxygen Therapy   SpO2 95 %   Pulse Oximetry Type Continuous   Device (Oxygen Therapy) humidified;high-flow nasal cannula;heated   Flow (L/min) 50   Oxygen Concentration (%) 73  (dec to 60%)

## 2023-05-07 NOTE — PLAN OF CARE
Goal Outcome Evaluation:  Plan of Care Reviewed With: patient        Progress: no change   Pt sat remain low,especially with activity. At 30l currently and saturatig at 92-93%.No diarrhea noted tonight.

## 2023-05-08 ENCOUNTER — APPOINTMENT (OUTPATIENT)
Dept: CT IMAGING | Facility: HOSPITAL | Age: 73
DRG: 871 | End: 2023-05-08
Payer: COMMERCIAL

## 2023-05-08 LAB
ANION GAP SERPL CALCULATED.3IONS-SCNC: 4.4 MMOL/L (ref 5–15)
ARTERIAL PATENCY WRIST A: POSITIVE
ATMOSPHERIC PRESS: 746.9 MMHG
BACTERIA SPEC RESP CULT: NORMAL
BASE EXCESS BLDA CALC-SCNC: 9.8 MMOL/L (ref 0–2)
BASOPHILS # BLD AUTO: 0.04 10*3/MM3 (ref 0–0.2)
BASOPHILS NFR BLD AUTO: 0.2 % (ref 0–1.5)
BDY SITE: ABNORMAL
BUN SERPL-MCNC: 11 MG/DL (ref 8–23)
BUN/CREAT SERPL: 36.7 (ref 7–25)
CALCIUM SPEC-SCNC: 8.7 MG/DL (ref 8.6–10.5)
CHLORIDE SERPL-SCNC: 101 MMOL/L (ref 98–107)
CO2 SERPL-SCNC: 31.6 MMOL/L (ref 22–29)
CREAT SERPL-MCNC: 0.3 MG/DL (ref 0.57–1)
DEPRECATED RDW RBC AUTO: 43.7 FL (ref 37–54)
EGFRCR SERPLBLD CKD-EPI 2021: 114.3 ML/MIN/1.73
EOSINOPHIL # BLD AUTO: 0.07 10*3/MM3 (ref 0–0.4)
EOSINOPHIL NFR BLD AUTO: 0.4 % (ref 0.3–6.2)
ERYTHROCYTE [DISTWIDTH] IN BLOOD BY AUTOMATED COUNT: 13.1 % (ref 12.3–15.4)
GLUCOSE BLDC GLUCOMTR-MCNC: 154 MG/DL (ref 70–130)
GLUCOSE SERPL-MCNC: 105 MG/DL (ref 65–99)
GRAM STN SPEC: NORMAL
HCO3 BLDA-SCNC: 34 MMOL/L (ref 22–28)
HCT VFR BLD AUTO: 33.5 % (ref 34–46.6)
HGB BLD-MCNC: 11.1 G/DL (ref 12–15.9)
IMM GRANULOCYTES # BLD AUTO: 0.49 10*3/MM3 (ref 0–0.05)
IMM GRANULOCYTES NFR BLD AUTO: 2.7 % (ref 0–0.5)
INHALED O2 CONCENTRATION: 65 %
LYMPHOCYTES # BLD AUTO: 1.38 10*3/MM3 (ref 0.7–3.1)
LYMPHOCYTES NFR BLD AUTO: 7.6 % (ref 19.6–45.3)
MCH RBC QN AUTO: 30.4 PG (ref 26.6–33)
MCHC RBC AUTO-ENTMCNC: 33.1 G/DL (ref 31.5–35.7)
MCV RBC AUTO: 91.8 FL (ref 79–97)
MODALITY: ABNORMAL
MONOCYTES # BLD AUTO: 0.46 10*3/MM3 (ref 0.1–0.9)
MONOCYTES NFR BLD AUTO: 2.5 % (ref 5–12)
NEUTROPHILS NFR BLD AUTO: 15.6 10*3/MM3 (ref 1.7–7)
NEUTROPHILS NFR BLD AUTO: 86.6 % (ref 42.7–76)
NRBC BLD AUTO-RTO: 0.1 /100 WBC (ref 0–0.2)
O2 A-A PPRESDIFF RESPIRATORY: 0.1 MMHG
PCO2 BLDA: 42.7 MM HG (ref 35–45)
PH BLDA: 7.51 PH UNITS (ref 7.35–7.45)
PLATELET # BLD AUTO: 288 10*3/MM3 (ref 140–450)
PMV BLD AUTO: 9.9 FL (ref 6–12)
PO2 BLDA: 60 MM HG (ref 80–100)
POTASSIUM SERPL-SCNC: 3.5 MMOL/L (ref 3.5–5.2)
RBC # BLD AUTO: 3.65 10*6/MM3 (ref 3.77–5.28)
SAO2 % BLDCOA: 92.7 % (ref 92–99)
SODIUM SERPL-SCNC: 137 MMOL/L (ref 136–145)
TOTAL RATE: 20 BREATHS/MINUTE
WBC NRBC COR # BLD: 18.04 10*3/MM3 (ref 3.4–10.8)

## 2023-05-08 PROCEDURE — 94760 N-INVAS EAR/PLS OXIMETRY 1: CPT

## 2023-05-08 PROCEDURE — 80048 BASIC METABOLIC PNL TOTAL CA: CPT | Performed by: STUDENT IN AN ORGANIZED HEALTH CARE EDUCATION/TRAINING PROGRAM

## 2023-05-08 PROCEDURE — 94664 DEMO&/EVAL PT USE INHALER: CPT

## 2023-05-08 PROCEDURE — 82948 REAGENT STRIP/BLOOD GLUCOSE: CPT

## 2023-05-08 PROCEDURE — 25010000002 ENOXAPARIN PER 10 MG: Performed by: INTERNAL MEDICINE

## 2023-05-08 PROCEDURE — 94799 UNLISTED PULMONARY SVC/PX: CPT

## 2023-05-08 PROCEDURE — 94761 N-INVAS EAR/PLS OXIMETRY MLT: CPT

## 2023-05-08 PROCEDURE — 85025 COMPLETE CBC W/AUTO DIFF WBC: CPT | Performed by: STUDENT IN AN ORGANIZED HEALTH CARE EDUCATION/TRAINING PROGRAM

## 2023-05-08 PROCEDURE — 25010000002 PIPERACILLIN SOD-TAZOBACTAM PER 1 G: Performed by: INTERNAL MEDICINE

## 2023-05-08 PROCEDURE — 82803 BLOOD GASES ANY COMBINATION: CPT

## 2023-05-08 PROCEDURE — 71250 CT THORAX DX C-: CPT

## 2023-05-08 PROCEDURE — 36600 WITHDRAWAL OF ARTERIAL BLOOD: CPT

## 2023-05-08 RX ORDER — ENOXAPARIN SODIUM 100 MG/ML
40 INJECTION SUBCUTANEOUS NIGHTLY
Status: DISCONTINUED | OUTPATIENT
Start: 2023-05-08 | End: 2023-05-09

## 2023-05-08 RX ADMIN — TAZOBACTAM SODIUM AND PIPERACILLIN SODIUM 3.38 G: 375; 3 INJECTION, SOLUTION INTRAVENOUS at 03:34

## 2023-05-08 RX ADMIN — OXYCODONE HYDROCHLORIDE AND ACETAMINOPHEN 500 MG: 500 TABLET ORAL at 08:08

## 2023-05-08 RX ADMIN — IPRATROPIUM BROMIDE AND ALBUTEROL SULFATE 3 ML: 2.5; .5 SOLUTION RESPIRATORY (INHALATION) at 07:39

## 2023-05-08 RX ADMIN — MIRTAZAPINE 7.5 MG: 15 TABLET, FILM COATED ORAL at 21:21

## 2023-05-08 RX ADMIN — TAZOBACTAM SODIUM AND PIPERACILLIN SODIUM 3.38 G: 375; 3 INJECTION, SOLUTION INTRAVENOUS at 18:50

## 2023-05-08 RX ADMIN — Medication 1000 MCG: at 08:08

## 2023-05-08 RX ADMIN — GUAIFENESIN 600 MG: 600 TABLET, EXTENDED RELEASE ORAL at 08:08

## 2023-05-08 RX ADMIN — Medication 10 ML: at 21:21

## 2023-05-08 RX ADMIN — LOSARTAN POTASSIUM 50 MG: 50 TABLET, FILM COATED ORAL at 08:08

## 2023-05-08 RX ADMIN — ENOXAPARIN SODIUM 40 MG: 100 INJECTION SUBCUTANEOUS at 21:22

## 2023-05-08 RX ADMIN — IPRATROPIUM BROMIDE AND ALBUTEROL SULFATE 3 ML: 2.5; .5 SOLUTION RESPIRATORY (INHALATION) at 14:58

## 2023-05-08 RX ADMIN — CITALOPRAM 20 MG: 20 TABLET, FILM COATED ORAL at 08:08

## 2023-05-08 RX ADMIN — TAZOBACTAM SODIUM AND PIPERACILLIN SODIUM 3.38 G: 375; 3 INJECTION, SOLUTION INTRAVENOUS at 11:50

## 2023-05-08 RX ADMIN — FLUTICASONE PROPIONATE 1 SPRAY: 50 SPRAY, METERED NASAL at 08:08

## 2023-05-08 RX ADMIN — GUAIFENESIN 600 MG: 600 TABLET, EXTENDED RELEASE ORAL at 21:21

## 2023-05-08 RX ADMIN — Medication 10 ML: at 08:08

## 2023-05-08 RX ADMIN — IPRATROPIUM BROMIDE AND ALBUTEROL SULFATE 3 ML: 2.5; .5 SOLUTION RESPIRATORY (INHALATION) at 20:01

## 2023-05-08 RX ADMIN — IPRATROPIUM BROMIDE AND ALBUTEROL SULFATE 3 ML: 2.5; .5 SOLUTION RESPIRATORY (INHALATION) at 11:21

## 2023-05-08 NOTE — PROGRESS NOTES
I reviewed the CT chest done stat today and compared to 5/2.  There has not been much change in the left pleural effusion which appears to be mild and partially loculated.  There is persistent large consolidation with abscess and necrosis in the right lower lobe.  Development of new infiltrates in the left upper lobe and mild left pleural effusion.    CT 5/8 compared to 5/2:      I do not see much benefit from draining the left pleural effusion which did not appear to be different compared to previously.  I think patient is mostly symptomatic from her lung abscess and pneumonia than from the pleural effusion.    We will continue current management with antibiotics and pulmonary toileting.  Unfortunately she has extensive pneumonia and her prognosis is guarded at best.

## 2023-05-08 NOTE — PAYOR COMM NOTE
"Katherine Cerda (72 y.o. Female)     PLEASE SEE ATTACHED FOR CONTINUED STAY REVIEW. PT TRANSFERRED  TO ICU TODAY.       AUTH#[id number]    PLEASE CALL  [phone] OR  [phone]    THANK YOU    YOSVANY COSTA LPN CCP    Date of Birth   1950    Social Security Number       Address   [address] [place] [address]    Home Phone   [phone]    MRN   [id number]       Sikh   Faith    Marital Status                               Admission Date   5/2/23    Admission Type   Emergency    Admitting Provider   Olga Palm MD    Attending Provider   Gee Clement MD    Department, Room/Bed   [facility] CARDIAC INTENSIVE CARE, 3009/1       Discharge Date       Discharge Disposition       Discharge Destination                               Attending Provider: Gee Clement MD    Allergies: Hydrocodone-acetaminophen    Isolation: None   Infection: None   Code Status: CPR    Ht: 160 cm (63\")   Wt: 66.9 kg (147 lb 7.8 oz)    Admission Cmt: None   Principal Problem: Acute respiratory failure with hypoxia [J96.01]                 Active Insurance as of 5/2/2023     Primary Coverage     Payor Plan Insurance Group Employer/Plan Group    [employer]HEM BLUE CROSS ANTHEM BLUE CROSS BLUE SHIELD PPO [id number]     Payor Plan Address Payor Plan Phone Number Payor Plan Fax Number Effective Dates    [address] [phone]  3/24/2013 - None Entered    [facility] [id number]       Subscriber Name Subscriber Birth Date Member ID       SAMANTHA CERDA III 3/3/1958 [id number]           Secondary Coverage     Payor Plan Insurance Group Employer/Plan Group    MEDICARE MEDICARE A ONLY      Payor Plan Address Payor Plan Phone Number Payor Plan Fax Number Effective Dates    [address] [phone]  12/1/2017 - None Entered    [facility] [id number]       Subscriber Name Subscriber Birth Date Member ID       KATHERINE CERDA 1950 [id number]                 Emergency Contacts      " (Rel.) Home Phone Work Phone Mobile Phone    KERRI lauren (Daughter) [phone] -- --    JanakOdalys (Other) -- -- [phone]    WilliamsonRonnie page III (Spouse) [phone] -- --            Oxygen Therapy (last day)     Date/Time SpO2 Device (Oxygen Therapy) Flow (L/min) Oxygen Concentration (%) ETCO2 (mmHg)    05/08/23 1240 98 high-flow nasal cannula;heated;humidified 55 65 --    05/08/23 1121 91 heated;high-flow nasal cannula 50 80 --    05/08/23 1100 86 -- -- -- --    05/08/23 0900 88 -- -- -- --    05/08/23 0803 -- heated;high-flow nasal cannula 50 60 --    05/08/23 0739 92 heated;high-flow nasal cannula 50 60 --    05/08/23 0651 91 high-flow nasal cannula -- -- --    05/08/23 0458 -- high-flow nasal cannula;heated;humidified 50 60 --    05/08/23 0028 -- heated;high-flow nasal cannula;humidified -- -- --    05/08/23 0019 95 high-flow nasal cannula;humidified;heated -- -- --    05/07/23 2055 -- humidified;heated;high-flow nasal cannula -- -- --    05/07/23 2005 96 high-flow nasal cannula;heated;humidified -- -- --    05/07/23 1930 -- high-flow nasal cannula;heated;humidified 50 60 --    05/07/23 1518 94 high-flow nasal cannula;heated;humidified 50 60 --    05/07/23 1430 -- heated;humidified;high-flow nasal cannula 50 60 --    05/07/23 1339 94 high-flow nasal cannula -- -- --    05/07/23 1207 -- heated;high-flow nasal cannula;humidified 50 60 --    05/07/23 1201 95 humidified;high-flow nasal cannula;heated 50 73 --    05/07/23 0845 -- heated;humidified;high-flow nasal cannula 50 73 --    05/07/23 0720 96 high-flow nasal cannula -- -- --    05/07/23 0704 92 -- -- -- --    05/07/23 0659 -- high-flow nasal cannula;heated;humidified 50 73 --          Intake & Output (last day)       05/07 0701 05/08 0700 05/08 0701 05/09 0700    P.O. 120     Total Intake(mL/kg) 120 (1.8)     Net +120           Stool Unmeasured Occurrence 1 x         Lines, Drains & Airways     Active LDAs     Name Placement date Placement time Site  Days    Peripheral IV 05/02/23 1430 Right Antecubital 05/02/23  1430  Antecubital  5    Peripheral IV 05/03/23 1410 Anterior;Right Wrist 05/03/23  1410  Wrist  4    External Urinary Catheter 05/07/23  --  --  1                     Physician Progress Notes (last 24 hours)      Yfn Garcia III, MD at 05/08/23 1239        The patient reports some improvement in sleep and appetite with initiation of Remeron.  She is frustrated at her medical course and states that she will be transferred to ICU later today.  I will continue to follow with you.    Electronically signed by Yfn Garcia III, MD at 05/08/23 1239     Cheyanne Ayers MD at 05/08/23 1219                                                        LOS: 6 days   Patient Care Team:  Zelalem Flor APRN as PCP - General (Internal Medicine)  Stephen, Brandon Meyer MD as Consulting Physician (Orthopedic Surgery)    Chief Complaint:  F/up respiratory failure, pneumonia and medical problems listed below.    Subjective   Interval History  Worse today.  She became cyanotic this morning.  Oxygen requirement increased.  She went down to 86% despite being on 50 L/man and 60% FiO2.    Continues to have cough.  Productive of greenish to dark phlegm.  Has mild pleuritic right-sided chest pain.    REVIEW OF SYSTEMS:   CARDIOVASCULAR: No chest pain, chest pressure or chest discomfort. No palpitations or edema.      GASTROINTESTINAL: No nausea, vomiting or diarrhea.  CONSTITUTIONAL: No fever or chills.     Ventilator/Non-Invasive Ventilation Settings (From admission, onward)    None                Physical Exam:     Vital Signs  Temp:  [98.1 °F (36.7 °C)-98.7 °F (37.1 °C)] 98.3 °F (36.8 °C)  Heart Rate:  [70-97] 90  Resp:  [18-20] 20  BP: (125-162)/(70-84) 162/84    Intake/Output Summary (Last 24 hours) at 5/8/2023 1219  Last data filed at 5/7/2023 1837  Gross per 24 hour   Intake 120 ml   Output --   Net 120 ml     Flowsheet Rows    Flowsheet Row First  "Filed Value   Admission Height 160 cm (63\") Documented at 05/02/2023 1900   Admission Weight 61.2 kg (135 lb) Documented at 05/02/2023 1900          PPE used per hospital policy    General Appearance:   Alert, cooperative, mild distress at the time of evaluation when she was taken off HHFNC.   ENMT:  Mallampati score 3, dry mucous membrane.  Mild thrush on tongue.   Eyes:  Pupils equal and reactive to light. EOMI   Neck:   Trachea midline. No thyromegaly.   Lungs:    Coarse crackles and diminished air entry in the right lower lobe.  No wheezing.  Mild use of accessory muscles noted.    Heart:   Regular rhythm and normal rate, normal S1 and S2, no         murmur   Skin:   No rash or ecchymosis   Abdomen:     Soft. No tenderness. No HSM.   Neuro/psych:  Conscious, alert, oriented x3. Strength 5/5 in upper and lower  ext.  Appropriate mood and affect   Extremities:  No cyanosis, clubbing or edema.  Warm extremities and well-perfused          Results Review:        Results from last 7 days   Lab Units 05/08/23  0605 05/07/23 0435 05/06/23  0627   SODIUM mmol/L 137 142 140   POTASSIUM mmol/L 3.5 3.4* 3.7   CHLORIDE mmol/L 101 102 105   CO2 mmol/L 31.6* 31.3* 29.7*   BUN mg/dL 11 12 16   CREATININE mg/dL 0.30* 0.37* 0.37*   GLUCOSE mg/dL 105* 96 107*   CALCIUM mg/dL 8.7 8.9 9.2     Results from last 7 days   Lab Units 05/02/23  1743 05/02/23  1428   HSTROP T ng/L 10* 11*     Results from last 7 days   Lab Units 05/08/23  0605 05/07/23  0435 05/06/23  0627   WBC 10*3/mm3 18.04* 19.72* 19.80*   HEMOGLOBIN g/dL 11.1* 11.5* 11.2*   HEMATOCRIT % 33.5* 33.6* 32.8*   PLATELETS 10*3/mm3 288 313 316         Results from last 7 days   Lab Units 05/02/23  1428   PROBNP pg/mL 1,345.0*                   Results from last 7 days   Lab Units 05/03/23  0805   PH, ARTERIAL pH units 7.417   PCO2, ARTERIAL mm Hg 35.1   PO2 ART mm Hg 69.6*   FLOW RATE lpm 10   MODALITY  HFNC   O2 SATURATION CALC % 94.1         I reviewed the patient's new " clinical results.        Medication Review:   vitamin C, 500 mg, Oral, Daily  citalopram, 20 mg, Oral, Daily  fluticasone, 1 spray, Each Nare, Daily  guaiFENesin, 600 mg, Oral, BID  ipratropium-albuterol, 3 mL, Nebulization, 4x Daily - RT  losartan, 50 mg, Oral, Q24H  mirtazapine, 7.5 mg, Oral, Nightly  piperacillin-tazobactam, 3.375 g, Intravenous, Q8H  sodium chloride, 10 mL, Intravenous, Q12H  cyanocobalamin, 1,000 mcg, Oral, Daily             Diagnostic imaging:  I personally and independently reviewed the following images:  CXR 5/5/2023 compared to CT chest 5/2/2023:  Upper lobe predominant emphysema.  Cavitating pneumonia/abscess right lower lobe with loculated right pleural effusion.          CXR 5/7/23: Right lower lobe consolidation.  Similar to 5/5    Assessment     1. Right lower lobe lung abscess/pneumonia  2. Right loculated pleural effusion  3. Acute hypoxic respiratory failure, worse  4. Sepsis  5. Emphysema, upper lobe predominant  6. Peripheral pleural-based nodule, 2.4 cm on CT chest 4/3/23, could have been related to early pneumonia.  Underlying malignancy cannot be excluded.    · Tobacco abuse  · Dysphagia  · HTN      Plan     O2 by [facility]- settings increased.  Now 50 L/minute FiO2 80%.  Check stat ABG.  Discussed with patient and family that if her condition deteriorates then she may require intubation and mechanical ventilation.  Transfer to ICU for close monitoring.    Antibiotics with Zosyn day 5.  She will need at least 14 days of treatment and may be longer.    Check stat CT chest to evaluate for worsening effusion.  She may require either a small bore chest tube or large bore chest tube for drainage.  High risk mortality with VATS but we will defer decision to thoracic surgery and I will contact them after the CT is performed.      DuoNeb 4 times a day.  Pulmonary toileting.  Mucinex.    Mucinex    Initiate Lovenox for DVT prophylaxis    Continue losartan for HTN    Condition is critical.   Prognosis is guarded.  Discussed with her family at bedside and nursing staff    Cheyanne Ayers MD  23  12:19 EDT    CC time 37 minutes      This note was dictated utilizing Dragon dictation    Electronically signed by Cheyanne Ayers MD at 23 [phone]     Gee Clement MD at 23 [phone]              Name: Katherine Williamson ADMIT: 2023   : 1950  PCP: Zelalem Flor APRN    MRN: [id number] LOS: 5 days   AGE/SEX: 72 y.o. female  ROOM: [facility]     Subjective   Subjective   Patient seen at bedside.      Objective   Objective   Vital Signs  Temp:  [98 °F (36.7 °C)-99.3 °F (37.4 °C)] 98.1 °F (36.7 °C)  Heart Rate:  [82-96] 83  Resp:  [16-20] 20  BP: (141-158)/(73-89) 141/74  SpO2:  [90 %-98 %] 94 %  on  Flow (L/min):  [50] 50;   Device (Oxygen Therapy): high-flow nasal cannula;heated;humidified  Body mass index is 24.98 kg/m².  Physical Exam   General, awake and alert.  Head and ENT, normocephalic and atraumatic.  Lungs, right lung rhonchi  Heart, regular rate and rhythm.  Abdomen, soft and nontender.  Extremities, no clubbing or cyanosis.  Neuro, no focal deficits.  Skin: Warm and no rash.  Psych, normal mood and affect.  Musculoskeletal, joint examination is grossly normal.    Copied text material from yesterday's note has been reviewed for appropriate changes and remains accurate as of 23.    Results Review     I reviewed the patient's new clinical results.  Results from last 7 days   Lab Units 23  04323  0556 23  0604   WBC 10*3/mm3 19.72* 19.80* 19.67* 21.59*   HEMOGLOBIN g/dL 11.5* 11.2* 11.4* 11.6*   PLATELETS 10*3/mm3 313 316 355 441     Results from last 7 days   Lab Units 23  0435 23  0623  0556 23  0603   SODIUM mmol/L 142 140 137 141   POTASSIUM mmol/L 3.4* 3.7 4.0 4.1   CHLORIDE mmol/L 102 105 103 106   CO2 mmol/L 31.3* 29.7* 27.7 25.5   BUN mg/dL 12 16 24* 25*   CREATININE mg/dL 0.37* 0.37* 0.49* 0.45*   GLUCOSE mg/dL 96 107*  132* 142*   EGFR mL/min/1.73 108.6 108.6 101.5 103.6     Results from last 7 days   Lab Units 05/03/23  0607 05/02/23  1428   ALBUMIN g/dL 2.0* 2.3*   BILIRUBIN mg/dL 0.4 0.6   ALK PHOS U/L 162* 202*   AST (SGOT) U/L 26 37*   ALT (SGPT) U/L 28 37*     Results from last 7 days   Lab Units 05/07/23  0435 05/06/23  0627 05/05/23  0556 05/04/23  0603 05/03/23  0607 05/02/23  1428   CALCIUM mg/dL 8.9 9.2 9.2 9.1 8.6 9.6   ALBUMIN g/dL  --   --   --   --  2.0* 2.3*     Results from last 7 days   Lab Units 05/03/23  0607 05/02/23  1743 05/02/23  1433 05/02/23  1428   PROCALCITONIN ng/mL 2.62*  --   --  3.95*   LACTATE mmol/L 1.9 3.3* 6.3*  --      No results found for: HGBA1C, POCGLU    FL Video Swallow With Speech Single Contrast    Result Date: 5/6/2023  Fluoroscopy was provided for the speech pathologist during a video swallow study. For full details please see the speech pathology report.  This report was finalized on 5/6/2023 1:48 PM by Dr. Sebastian Burciaga M.D.      XR Chest 1 View    Result Date: 5/7/2023  Consolidation right mid to lower lung appears similar to prior exam, with persistent right pleural effusion. Mild left basilar atelectasis or infiltrate appears mildly increased.  This report was finalized on 5/7/2023 7:12 AM by Dr. Ronnie Connor M.D.      I have personally reviewed all medications:  Scheduled Medications  vitamin C, 500 mg, Oral, Daily  citalopram, 20 mg, Oral, Daily  fluticasone, 1 spray, Each Nare, Daily  guaiFENesin, 600 mg, Oral, BID  ipratropium-albuterol, 3 mL, Nebulization, 4x Daily - RT  losartan, 50 mg, Oral, Q24H  mirtazapine, 7.5 mg, Oral, Nightly  piperacillin-tazobactam, 3.375 g, Intravenous, Q8H  sodium chloride, 10 mL, Intravenous, Q12H  cyanocobalamin, 1,000 mcg, Oral, Daily    Infusions   Diet  Diet: Regular/House Diet; Texture: Regular Texture (IDDSI 7); Fluid Consistency: Thin (IDDSI 0)    I have personally reviewed:  [x]  Laboratory   [x]  Microbiology   [x]  Radiology    [x]  EKG/Telemetry  [x]  Cardiology/Vascular   [x]  Pathology    [x]  Records      Assessment/Plan     Active Hospital Problems    Diagnosis  POA   • **Acute respiratory failure with hypoxia [J96.01]  Yes   • Sepsis [A41.9]  Yes   • Pleural effusion, right [J90]  Yes   • Other emphysema [J43.8]  Yes   • Pulmonary nodule [R91.1]  Yes   • Pneumonia [J18.9]  Yes   • Tobacco abuse [Z72.0]  Yes   • Benign essential HTN [I10]  Yes      Resolved Hospital Problems   No resolved problems to display.       72 y.o. female admitted with Acute respiratory failure with hypoxia.    Assessment and plan  1.  Sepsis due to bacterial pneumonia, patient has acute respiratory failure with hypoxia along with right-sided pleural effusion and emphysema.  Patient is on Zosyn coverage which will be continued.  Pulmonary on board and following.     2.  COPD with acute exacerbation, continue breathing treatments.  Continue supplemental oxygen.     3.  Sinus tachycardia, resolved, continue to monitor.     4.  Depression, start Celexa.    5.  Diarrhea, add Imodium as needed.     6.  CODE STATUS is full code.  Further plans based on hospital course.          Gee Clement MD  [facility]  05/07/23  17:41 EDT      Electronically signed by Gee Clement MD at 05/07/23 0207

## 2023-05-08 NOTE — PROGRESS NOTES
"                                              LOS: 6 days   Patient Care Team:  Zelalem Flor APRN as PCP - General (Internal Medicine)  Brandon Mitchell MD as Consulting Physician (Orthopedic Surgery)    Chief Complaint:  F/up respiratory failure, pneumonia and medical problems listed below.    Subjective   Interval History  Worse today.  She became cyanotic this morning.  Oxygen requirement increased.  She went down to 86% despite being on 50 L/man and 60% FiO2.    Continues to have cough.  Productive of greenish to dark phlegm.  Has mild pleuritic right-sided chest pain.    REVIEW OF SYSTEMS:   CARDIOVASCULAR: No chest pain, chest pressure or chest discomfort. No palpitations or edema.      GASTROINTESTINAL: No nausea, vomiting or diarrhea.  CONSTITUTIONAL: No fever or chills.     Ventilator/Non-Invasive Ventilation Settings (From admission, onward)    None                Physical Exam:     Vital Signs  Temp:  [98.1 °F (36.7 °C)-98.7 °F (37.1 °C)] 98.3 °F (36.8 °C)  Heart Rate:  [70-97] 90  Resp:  [18-20] 20  BP: (125-162)/(70-84) 162/84    Intake/Output Summary (Last 24 hours) at 5/8/2023 1219  Last data filed at 5/7/2023 1837  Gross per 24 hour   Intake 120 ml   Output --   Net 120 ml     Flowsheet Rows    Flowsheet Row First Filed Value   Admission Height 160 cm (63\") Documented at 05/02/2023 1900   Admission Weight 61.2 kg (135 lb) Documented at 05/02/2023 1900          PPE used per hospital policy    General Appearance:   Alert, cooperative, mild distress at the time of evaluation when she was taken off HHFNC.   ENMT:  Mallampati score 3, dry mucous membrane.  Mild thrush on tongue.   Eyes:  Pupils equal and reactive to light. EOMI   Neck:   Trachea midline. No thyromegaly.   Lungs:    Coarse crackles and diminished air entry in the right lower lobe.  No wheezing.  Mild use of accessory muscles noted.    Heart:   Regular rhythm and normal rate, normal S1 and S2, no         murmur   Skin:   No rash or " ecchymosis   Abdomen:     Soft. No tenderness. No HSM.   Neuro/psych:  Conscious, alert, oriented x3. Strength 5/5 in upper and lower  ext.  Appropriate mood and affect   Extremities:  No cyanosis, clubbing or edema.  Warm extremities and well-perfused          Results Review:        Results from last 7 days   Lab Units 05/08/23  0605 05/07/23  0435 05/06/23  0627   SODIUM mmol/L 137 142 140   POTASSIUM mmol/L 3.5 3.4* 3.7   CHLORIDE mmol/L 101 102 105   CO2 mmol/L 31.6* 31.3* 29.7*   BUN mg/dL 11 12 16   CREATININE mg/dL 0.30* 0.37* 0.37*   GLUCOSE mg/dL 105* 96 107*   CALCIUM mg/dL 8.7 8.9 9.2     Results from last 7 days   Lab Units 05/02/23  1743 05/02/23  1428   HSTROP T ng/L 10* 11*     Results from last 7 days   Lab Units 05/08/23  0605 05/07/23  0435 05/06/23  0627   WBC 10*3/mm3 18.04* 19.72* 19.80*   HEMOGLOBIN g/dL 11.1* 11.5* 11.2*   HEMATOCRIT % 33.5* 33.6* 32.8*   PLATELETS 10*3/mm3 288 313 316         Results from last 7 days   Lab Units 05/02/23  1428   PROBNP pg/mL 1,345.0*                   Results from last 7 days   Lab Units 05/03/23  0805   PH, ARTERIAL pH units 7.417   PCO2, ARTERIAL mm Hg 35.1   PO2 ART mm Hg 69.6*   FLOW RATE lpm 10   MODALITY  HFNC   O2 SATURATION CALC % 94.1         I reviewed the patient's new clinical results.        Medication Review:   vitamin C, 500 mg, Oral, Daily  citalopram, 20 mg, Oral, Daily  fluticasone, 1 spray, Each Nare, Daily  guaiFENesin, 600 mg, Oral, BID  ipratropium-albuterol, 3 mL, Nebulization, 4x Daily - RT  losartan, 50 mg, Oral, Q24H  mirtazapine, 7.5 mg, Oral, Nightly  piperacillin-tazobactam, 3.375 g, Intravenous, Q8H  sodium chloride, 10 mL, Intravenous, Q12H  cyanocobalamin, 1,000 mcg, Oral, Daily             Diagnostic imaging:  I personally and independently reviewed the following images:  CXR 5/5/2023 compared to CT chest 5/2/2023:  Upper lobe predominant emphysema.  Cavitating pneumonia/abscess right lower lobe with loculated right pleural  effusion.          CXR 5/7/23: Right lower lobe consolidation.  Similar to 5/5    Assessment     1. Right lower lobe lung abscess/pneumonia  2. Right loculated pleural effusion  3. Acute hypoxic respiratory failure, worse  4. Sepsis  5. Emphysema, upper lobe predominant  6. Peripheral pleural-based nodule, 2.4 cm on CT chest 4/3/23, could have been related to early pneumonia.  Underlying malignancy cannot be excluded.    · Tobacco abuse  · Dysphagia  · HTN      Plan     O2 by [facility]- settings increased.  Now 50 L/minute FiO2 80%.  Check stat ABG.  Discussed with patient and family that if her condition deteriorates then she may require intubation and mechanical ventilation.  Transfer to ICU for close monitoring.    Antibiotics with Zosyn day 5.  She will need at least 14 days of treatment and may be longer.    Check stat CT chest to evaluate for worsening effusion.  She may require either a small bore chest tube or large bore chest tube for drainage.  High risk mortality with VATS but we will defer decision to thoracic surgery and I will contact them after the CT is performed.      DuoNeb 4 times a day.  Pulmonary toileting.  Mucinex.    Mucinex    Initiate Lovenox for DVT prophylaxis    Continue losartan for HTN    Condition is critical.  Prognosis is guarded.  Discussed with her family at bedside and nursing staff    Cheyanne Ayers MD  05/08/23  12:19 EDT    CC time 37 minutes      This note was dictated utilizing Tellybean dictation

## 2023-05-08 NOTE — PROGRESS NOTES
The patient reports some improvement in sleep and appetite with initiation of Remeron.  She is frustrated at her medical course and states that she will be transferred to ICU later today.  I will continue to follow with you.

## 2023-05-08 NOTE — PLAN OF CARE
Goal Outcome Evaluation:               Pt on optiflow (50L - 60%), purewick in place. No BM this shift. IV abx continues as ordered. Rested well. VSS. Will continue to monitor.

## 2023-05-08 NOTE — PROGRESS NOTES
Name: Katherine Williamson ADMIT: 2023   : 1950  PCP: Zelalem Flor APRN    MRN: [id number] LOS: 6 days   AGE/SEX: 72 y.o. female  ROOM: [facility]     Subjective   Subjective   Patient seen at bedside.       Objective   Objective   Vital Signs  Temp:  [97.7 °F (36.5 °C)-99.2 °F (37.3 °C)] 99.2 °F (37.3 °C)  Heart Rate:  [] 77  Resp:  [18-24] 22  BP: (125-162)/(66-84) 139/70  SpO2:  [86 %-100 %] 96 %  on  Flow (L/min):  [5-55] 5;   Device (Oxygen Therapy): humidified;heated;high-flow nasal cannula  Body mass index is 26.13 kg/m².  Physical Exam   General, awake and alert.  Head and ENT, normocephalic and atraumatic.  Lungs, right lung rhonchi  Heart, regular rate and rhythm.  Abdomen, soft and nontender.  Extremities, no clubbing or cyanosis.  Neuro, no focal deficits.  Skin: Warm and no rash.  Psych, normal mood and affect.  Musculoskeletal, joint examination is grossly normal.     Copied text material from yesterday's note has been reviewed for appropriate changes and remains accurate as of 23.      Results Review     I reviewed the patient's new clinical results.  Results from last 7 days   Lab Units 23  0623  0556   WBC 10*3/mm3 18.04* 19.72* 19.80* 19.67*   HEMOGLOBIN g/dL 11.1* 11.5* 11.2* 11.4*   PLATELETS 10*3/mm3 288 313 316 355     Results from last 7 days   Lab Units 23  0623  04323  0623  0556   SODIUM mmol/L 137 142 140 137   POTASSIUM mmol/L 3.5 3.4* 3.7 4.0   CHLORIDE mmol/L 101 102 105 103   CO2 mmol/L 31.6* 31.3* 29.7* 27.7   BUN mg/dL 11 12 16 24*   CREATININE mg/dL 0.30* 0.37* 0.37* 0.49*   GLUCOSE mg/dL 105* 96 107* 132*   EGFR mL/min/1.73 114.3 108.6 108.6 101.5     Results from last 7 days   Lab Units 23  0607 23  1428   ALBUMIN g/dL 2.0* 2.3*   BILIRUBIN mg/dL 0.4 0.6   ALK PHOS U/L 162* 202*   AST (SGOT) U/L 26 37*   ALT (SGPT) U/L 28 37*     Results from last 7 days   Lab Units  05/08/23  0605 05/07/23  0435 05/06/23  0627 05/05/23  0556 05/04/23  0603 05/03/23  0607 05/02/23  1428   CALCIUM mg/dL 8.7 8.9 9.2 9.2   < > 8.6 9.6   ALBUMIN g/dL  --   --   --   --   --  2.0* 2.3*    < > = values in this interval not displayed.     Results from last 7 days   Lab Units 05/03/23  0607 05/02/23  1743 05/02/23  1433 05/02/23  1428   PROCALCITONIN ng/mL 2.62*  --   --  3.95*   LACTATE mmol/L 1.9 3.3* 6.3*  --      Glucose   Date/Time Value Ref Range Status   05/08/2023 1242 154 (H) 70 - 130 mg/dL Final     Comment:     Meter: [id number] : [id number] William Wright NA       XR Chest 1 View    Result Date: 5/7/2023  Consolidation right mid to lower lung appears similar to prior exam, with persistent right pleural effusion. Mild left basilar atelectasis or infiltrate appears mildly increased.  This report was finalized on 5/7/2023 7:12 AM by Dr. Ronnie Connor M.D.      I have personally reviewed all medications:  Scheduled Medications  vitamin C, 500 mg, Oral, Daily  citalopram, 20 mg, Oral, Daily  enoxaparin, 40 mg, Subcutaneous, Nightly  fluticasone, 1 spray, Each Nare, Daily  guaiFENesin, 600 mg, Oral, BID  ipratropium-albuterol, 3 mL, Nebulization, 4x Daily - RT  losartan, 50 mg, Oral, Q24H  mirtazapine, 7.5 mg, Oral, Nightly  piperacillin-tazobactam, 3.375 g, Intravenous, Q8H  sodium chloride, 10 mL, Intravenous, Q12H  cyanocobalamin, 1,000 mcg, Oral, Daily    Infusions   Diet  Diet: Regular/House Diet; Texture: Regular Texture (IDDSI 7); Fluid Consistency: Thin (IDDSI 0)    I have personally reviewed:  [x]  Laboratory   [x]  Microbiology   [x]  Radiology   [x]  EKG/Telemetry  [x]  Cardiology/Vascular   [x]  Pathology    [x]  Records       Assessment/Plan     Active Hospital Problems    Diagnosis  POA   • **Acute respiratory failure with hypoxia [J96.01]  Yes   • Sepsis [A41.9]  Yes   • Pleural effusion, right [J90]  Yes   • Other emphysema [J43.8]  Yes   • Pulmonary nodule [R91.1]  Yes   •  Pneumonia [J18.9]  Yes   • Tobacco abuse [Z72.0]  Yes   • Benign essential HTN [I10]  Yes      Resolved Hospital Problems   No resolved problems to display.       72 y.o. female admitted with Acute respiratory failure with hypoxia.    Assessment and plan  1.  Sepsis due to bacterial pneumonia, patient has acute respiratory failure with hypoxia along with right-sided pleural effusion and emphysema.  Patient is on Zosyn coverage which will be continued.  Pulmonary on board and following.  Patient's pulmonary status is declining, transfer out of ICU.     2.  COPD with acute exacerbation, continue breathing treatments.  Continue supplemental oxygen.     3.  Sinus tachycardia, resolved, continue to monitor.     4.  Depression, start Celexa.     5.  Diarrhea, added Imodium as needed.     6.  CODE STATUS is full code.  Further plans based on hospital course.          Gee Clement MD  [facility]  05/08/23  16:08 EDT

## 2023-05-08 NOTE — PLAN OF CARE
Goal Outcome Evaluation:   Transferred from the floor for increasing respiratory distress. Awake and alert in no acute distress. Tolerating optiflow 70%FiO2/55 LPM with O2 sats greater than 95%. Vitals stable. Appetite poor. Plan of care explained to daughter . Will continue to monitor.

## 2023-05-09 LAB
ANION GAP SERPL CALCULATED.3IONS-SCNC: 5 MMOL/L (ref 5–15)
BACTERIA SPEC AEROBE CULT: NORMAL
BACTERIA SPEC AEROBE CULT: NORMAL
BASOPHILS # BLD AUTO: 0.03 10*3/MM3 (ref 0–0.2)
BASOPHILS NFR BLD AUTO: 0.2 % (ref 0–1.5)
BUN SERPL-MCNC: 12 MG/DL (ref 8–23)
BUN/CREAT SERPL: 38.7 (ref 7–25)
CALCIUM SPEC-SCNC: 8.5 MG/DL (ref 8.6–10.5)
CHLORIDE SERPL-SCNC: 100 MMOL/L (ref 98–107)
CO2 SERPL-SCNC: 30 MMOL/L (ref 22–29)
CREAT SERPL-MCNC: 0.31 MG/DL (ref 0.57–1)
CRYPTOC AG CSF QL: NEGATIVE
DEPRECATED RDW RBC AUTO: 45.4 FL (ref 37–54)
EGFRCR SERPLBLD CKD-EPI 2021: 113.4 ML/MIN/1.73
EOSINOPHIL # BLD AUTO: 0.08 10*3/MM3 (ref 0–0.4)
EOSINOPHIL NFR BLD AUTO: 0.4 % (ref 0.3–6.2)
ERYTHROCYTE [DISTWIDTH] IN BLOOD BY AUTOMATED COUNT: 13.3 % (ref 12.3–15.4)
GLUCOSE SERPL-MCNC: 102 MG/DL (ref 65–99)
HCT VFR BLD AUTO: 37 % (ref 34–46.6)
HGB BLD-MCNC: 12.2 G/DL (ref 12–15.9)
LYMPHOCYTES # BLD AUTO: 1.82 10*3/MM3 (ref 0.7–3.1)
LYMPHOCYTES NFR BLD AUTO: 9.3 % (ref 19.6–45.3)
MCH RBC QN AUTO: 30.7 PG (ref 26.6–33)
MCHC RBC AUTO-ENTMCNC: 33 G/DL (ref 31.5–35.7)
MCV RBC AUTO: 93.2 FL (ref 79–97)
MONOCYTES # BLD AUTO: 0.49 10*3/MM3 (ref 0.1–0.9)
MONOCYTES NFR BLD AUTO: 2.5 % (ref 5–12)
NEUTROPHILS NFR BLD AUTO: 16.76 10*3/MM3 (ref 1.7–7)
NEUTROPHILS NFR BLD AUTO: 85.7 % (ref 42.7–76)
PLAT MORPH BLD: NORMAL
PLATELET # BLD AUTO: 320 10*3/MM3 (ref 140–450)
PMV BLD AUTO: 10.8 FL (ref 6–12)
POTASSIUM SERPL-SCNC: 4.7 MMOL/L (ref 3.5–5.2)
PROCALCITONIN SERPL-MCNC: 0.23 NG/ML (ref 0–0.25)
RBC # BLD AUTO: 3.97 10*6/MM3 (ref 3.77–5.28)
RBC MORPH BLD: NORMAL
SODIUM SERPL-SCNC: 135 MMOL/L (ref 136–145)
WBC MORPH BLD: NORMAL
WBC NRBC COR # BLD: 19.56 10*3/MM3 (ref 3.4–10.8)

## 2023-05-09 PROCEDURE — 85025 COMPLETE CBC W/AUTO DIFF WBC: CPT | Performed by: STUDENT IN AN ORGANIZED HEALTH CARE EDUCATION/TRAINING PROGRAM

## 2023-05-09 PROCEDURE — 87449 NOS EACH ORGANISM AG IA: CPT | Performed by: INTERNAL MEDICINE

## 2023-05-09 PROCEDURE — 86235 NUCLEAR ANTIGEN ANTIBODY: CPT | Performed by: INTERNAL MEDICINE

## 2023-05-09 PROCEDURE — 83516 IMMUNOASSAY NONANTIBODY: CPT | Performed by: INTERNAL MEDICINE

## 2023-05-09 PROCEDURE — 99232 SBSQ HOSP IP/OBS MODERATE 35: CPT | Performed by: NURSE PRACTITIONER

## 2023-05-09 PROCEDURE — 94664 DEMO&/EVAL PT USE INHALER: CPT

## 2023-05-09 PROCEDURE — 86037 ANCA TITER EACH ANTIBODY: CPT | Performed by: INTERNAL MEDICINE

## 2023-05-09 PROCEDURE — 87899 AGENT NOS ASSAY W/OPTIC: CPT | Performed by: INTERNAL MEDICINE

## 2023-05-09 PROCEDURE — 86698 HISTOPLASMA ANTIBODY: CPT | Performed by: INTERNAL MEDICINE

## 2023-05-09 PROCEDURE — 84145 PROCALCITONIN (PCT): CPT | Performed by: INTERNAL MEDICINE

## 2023-05-09 PROCEDURE — 94799 UNLISTED PULMONARY SVC/PX: CPT

## 2023-05-09 PROCEDURE — 99223 1ST HOSP IP/OBS HIGH 75: CPT | Performed by: INTERNAL MEDICINE

## 2023-05-09 PROCEDURE — 85007 BL SMEAR W/DIFF WBC COUNT: CPT | Performed by: STUDENT IN AN ORGANIZED HEALTH CARE EDUCATION/TRAINING PROGRAM

## 2023-05-09 PROCEDURE — 25010000002 PIPERACILLIN SOD-TAZOBACTAM PER 1 G: Performed by: INTERNAL MEDICINE

## 2023-05-09 PROCEDURE — 94761 N-INVAS EAR/PLS OXIMETRY MLT: CPT

## 2023-05-09 PROCEDURE — 25010000002 MEROPENEM PER 100 MG: Performed by: INTERNAL MEDICINE

## 2023-05-09 PROCEDURE — 80048 BASIC METABOLIC PNL TOTAL CA: CPT | Performed by: STUDENT IN AN ORGANIZED HEALTH CARE EDUCATION/TRAINING PROGRAM

## 2023-05-09 PROCEDURE — 87385 HISTOPLASMA CAPSUL AG IA: CPT | Performed by: INTERNAL MEDICINE

## 2023-05-09 PROCEDURE — 87305 ASPERGILLUS AG IA: CPT | Performed by: INTERNAL MEDICINE

## 2023-05-09 PROCEDURE — 86225 DNA ANTIBODY NATIVE: CPT | Performed by: INTERNAL MEDICINE

## 2023-05-09 RX ORDER — OMEPRAZOLE 40 MG/1
40 CAPSULE, DELAYED RELEASE ORAL DAILY
Status: ON HOLD | COMMUNITY
End: 2023-05-23

## 2023-05-09 RX ADMIN — GUAIFENESIN 600 MG: 600 TABLET, EXTENDED RELEASE ORAL at 08:31

## 2023-05-09 RX ADMIN — Medication 1000 MCG: at 08:30

## 2023-05-09 RX ADMIN — GUAIFENESIN 600 MG: 600 TABLET, EXTENDED RELEASE ORAL at 20:49

## 2023-05-09 RX ADMIN — CITALOPRAM 20 MG: 20 TABLET, FILM COATED ORAL at 08:31

## 2023-05-09 RX ADMIN — MEROPENEM 1 G: 1 INJECTION, POWDER, FOR SOLUTION INTRAVENOUS at 14:57

## 2023-05-09 RX ADMIN — IPRATROPIUM BROMIDE AND ALBUTEROL SULFATE 3 ML: 2.5; .5 SOLUTION RESPIRATORY (INHALATION) at 06:56

## 2023-05-09 RX ADMIN — MIRTAZAPINE 7.5 MG: 15 TABLET, FILM COATED ORAL at 20:49

## 2023-05-09 RX ADMIN — LOSARTAN POTASSIUM 50 MG: 50 TABLET, FILM COATED ORAL at 08:31

## 2023-05-09 RX ADMIN — MEROPENEM 1 G: 1 INJECTION, POWDER, FOR SOLUTION INTRAVENOUS at 20:48

## 2023-05-09 RX ADMIN — Medication 10 ML: at 20:50

## 2023-05-09 RX ADMIN — DOXYCYCLINE 100 MG: 100 INJECTION, POWDER, LYOPHILIZED, FOR SOLUTION INTRAVENOUS at 15:53

## 2023-05-09 RX ADMIN — TAZOBACTAM SODIUM AND PIPERACILLIN SODIUM 3.38 G: 375; 3 INJECTION, SOLUTION INTRAVENOUS at 03:55

## 2023-05-09 RX ADMIN — IPRATROPIUM BROMIDE AND ALBUTEROL SULFATE 3 ML: 2.5; .5 SOLUTION RESPIRATORY (INHALATION) at 19:34

## 2023-05-09 RX ADMIN — TAZOBACTAM SODIUM AND PIPERACILLIN SODIUM 3.38 G: 375; 3 INJECTION, SOLUTION INTRAVENOUS at 10:58

## 2023-05-09 RX ADMIN — IPRATROPIUM BROMIDE AND ALBUTEROL SULFATE 3 ML: 2.5; .5 SOLUTION RESPIRATORY (INHALATION) at 10:36

## 2023-05-09 RX ADMIN — IPRATROPIUM BROMIDE AND ALBUTEROL SULFATE 3 ML: 2.5; .5 SOLUTION RESPIRATORY (INHALATION) at 14:50

## 2023-05-09 RX ADMIN — Medication 10 ML: at 08:34

## 2023-05-09 RX ADMIN — OXYCODONE HYDROCHLORIDE AND ACETAMINOPHEN 500 MG: 500 TABLET ORAL at 08:31

## 2023-05-09 NOTE — PROGRESS NOTES
PROGRESS NOTE  Patient Name: Katherine Williamson  Age/Sex: 72 y.o. female  : 1950  MRN: [id number]    Date of Admission: 2023  Date of Encounter Visit: 23   LOS: 7 days   Patient Care Team:  Zelalem Flor APRN as PCP - General (Internal Medicine)  Stephen, Brandon Meyer MD as Consulting Physician (Orthopedic Surgery)    Chief Complaint: Acute progressive respiratory failure    Hospital course: Patient has been progressing despite being treated with Rocephin and vancomycin initially and then switched to Zosyn was increased to density in the extent of the consolidation and now with some necrosis/cavitation.  She is on the Optiflow, she is maintaining good oxygenation but this is an increase in her oxygen requirements compared to earlier despite being on Zosyn for several days.  Her white count has been consistently elevated and her fever is low-grade and persistent.  The patient awake and responsive, able to answer question  She denies any sick exposure  She denies any significant risk factor for the zoonosis infection  Denies any autoimmune disease or connective tissue disorder      REVIEW OF SYSTEMS:   CONSTITUTIONAL: no fever or chills  CARDIOVASCULAR: No chest pain, chest pressure or chest discomfort. No palpitations or edema.   RESPIRATORY: Shortness of breath with cough productive of estella looking secretions  GASTROINTESTINAL: No anorexia, nausea, vomiting or diarrhea. No abdominal pain or blood.   HEMATOLOGIC: No bleeding or bruising.     Ventilator/Non-Invasive Ventilation Settings (From admission, onward)    Optiflow 100% 60 L/min            Vital Signs  Temp:  [97.7 °F (36.5 °C)-100.5 °F (38.1 °C)] 100.5 °F (38.1 °C)  Heart Rate:  [] 121  Resp:  [18-24] 20  BP: (115-186)/() 186/85  SpO2:  [91 %-100 %] 91 %  on  Flow (L/min):  [55] 55 Device (Oxygen Therapy): heated;high-flow nasal cannula;humidified    Intake/Output Summary (Last 24 hours) at 2023 1242  Last data filed at  "5/9/2023 1058  Gross per 24 hour   Intake 458 ml   Output 275 ml   Net 183 ml     Flowsheet Rows    Flowsheet Row First Filed Value   Admission Height 160 cm (63\") Documented at 05/02/2023 1900   Admission Weight 61.2 kg (135 lb) Documented at 05/02/2023 1900        Body mass index is 25.7 kg/m².      05/07/23  0300 05/08/23  0500 05/09/23  0527   Weight: 64 kg (141 lb) 66.9 kg (147 lb 7.8 oz) 65.8 kg (145 lb 1 oz)       Physical Exam:  GEN: Sickly looking lady, awake and responsive, in distress, coughing  EYES:   Sclerae clear. No icterus. PERRL. Normal EOM  ENT:   External ears/nose normal, no oral lesions, no thrush, mucous membranes moist  NECK:  Supple, midline trachea, no JVD  LUNGS: Normal chest on inspection, patient has positive wet sounding cough with productive secretion, positive expiratory wheezes, positive crackles, minimal use of accessory muscles  CV:  Regular rhythm and rate. Normal S1/S2. No murmurs, gallops, or rubs noted.  ABD:  Soft, nontender and nondistended. Normal bowel sounds. No guarding  EXT:  Moves all extremities well. No cyanosis. No redness.  Trace ankle edema.   Skin: Dry, intact, no bleeding    Results Review:    Results From Last 14 Days   Lab Units 05/03/23  0607 05/02/23  1743 05/02/23  1433   LACTATE mmol/L 1.9 3.3* 6.3*     Results from last 7 days   Lab Units 05/09/23  0714 05/08/23  0605 05/07/23  0435 05/06/23  0627 05/05/23  0556 05/04/23  0603 05/03/23  0607 05/02/23  1428   SODIUM mmol/L 135* 137 142 140 137 141 139 137   POTASSIUM mmol/L 4.7 3.5 3.4* 3.7 4.0 4.1 4.0 4.2   CHLORIDE mmol/L 100 101 102 105 103 106 105 97*   CO2 mmol/L 30.0* 31.6* 31.3* 29.7* 27.7 25.5 21.6* 23.0   BUN mg/dL 12 11 12 16 24* 25* 32* 28*   CREATININE mg/dL 0.31* 0.30* 0.37* 0.37* 0.49* 0.45* 0.70 0.98   CALCIUM mg/dL 8.5* 8.7 8.9 9.2 9.2 9.1 8.6 9.6   AST (SGOT) U/L  --   --   --   --   --   --  26 37*   ALT (SGPT) U/L  --   --   --   --   --   --  28 37*   ANION GAP mmol/L 5.0 4.4* 8.7 5.3 " 6.3 9.5 12.4 17.0*   ALBUMIN g/dL  --   --   --   --   --   --  2.0* 2.3*     Results from last 7 days   Lab Units 05/02/23  1743 05/02/23  1428   HSTROP T ng/L 10* 11*         Results from last 7 days   Lab Units 05/02/23  1428   PROBNP pg/mL 1,345.0*     Results from last 7 days   Lab Units 05/09/23  0714 05/08/23  0605 05/07/23  0435 05/06/23  0627 05/05/23  0556 05/04/23  0604 05/03/23  0607   WBC 10*3/mm3 19.56* 18.04* 19.72* 19.80* 19.67* 21.59* 21.86*   HEMOGLOBIN g/dL 12.2 11.1* 11.5* 11.2* 11.4* 11.6* 11.7*   HEMATOCRIT % 37.0 33.5* 33.6* 32.8* 33.1* 33.3* 33.6*   PLATELETS 10*3/mm3 320 288 313 316 355 441 498*   MCV fL 93.2 91.8 90.3 90.1 89.9 88.3 88.2   NEUTROPHIL % % 85.7* 86.6* 84.3*  --   --   --   --    LYMPHOCYTE % % 9.3* 7.6* 8.8*  --   --   --   --    MONOCYTES % % 2.5* 2.5* 3.0*  --   --   --   --    EOSINOPHIL % % 0.4 0.4 0.2*  --   --   --   --    BASOPHIL % % 0.2 0.2 0.4  --   --   --   --    IMM GRAN % %  --  2.7* 3.3*  --   --   --   --                    Invalid input(s): LDLCALC  Results from last 7 days   Lab Units 05/08/23  1303 05/03/23  0805   PH, ARTERIAL pH units 7.509* 7.417   PCO2, ARTERIAL mm Hg 42.7 35.1   PO2 ART mm Hg 60.0* 69.6*   HCO3 ART mmol/L 34.0* 22.6         Glucose   Date/Time Value Ref Range Status   05/08/2023 1242 154 (H) 70 - 130 mg/dL Final     Comment:     Meter: [id number] : [id number] William HORN     Results from last 7 days   Lab Units 05/03/23  0607 05/02/23  1743 05/02/23  1433 05/02/23  1428   PROCALCITONIN ng/mL 2.62*  --   --  3.95*   LACTATE mmol/L 1.9 3.3* 6.3*  --      Results from last 7 days   Lab Units 05/06/23  1402 05/04/23  1815 05/04/23  1811 05/02/23  1754 05/02/23  1739 05/02/23  1434 05/02/23  1433   BLOODCX   --  No growth at 4 days No growth at 4 days  --   --  Corynebacterium species* No growth at 5 days   RESPCX  Rare Normal respiratory aishwarya. No S. aureus or Pseudomonas aeruginosa detected. Final report.  --   --   --  Scant  growth (1+) Normal respiratory aishwarya. No S. aureus or Pseudomonas aeruginosa detected. Final report.  --   --    STREP PNEUMO AG   --   --   --  Negative  --   --   --    L. PNEUMOPHILA SEROGP 1 UR AG   --   --   --  Negative  --   --   --          Results from last 7 days   Lab Units 05/02/23  1427   COVID19  Not Detected   ADENOVIRUS DETECTION BY PCR  Not Detected   CORONAVIRUS 229E  Not Detected   CORONAVIRUS HKU1  Not Detected   CORONAVIRUS NL63  Not Detected   CORONAVIRUS OC43  Not Detected   HUMAN METAPNEUMOVIRUS  Not Detected   HUMAN RHINOVIRUS/ENTEROVIRUS  Not Detected   INFLUENZA B PCR  Not Detected   PARAINFLUENZA 1  Not Detected   PARAINFLUENZA VIRUS 2  Not Detected   PARAINFLUENZA VIRUS 3  Not Detected   PARAINFLUENZA VIRUS 4  Not Detected   BORDETELLA PERTUSSIS PCR  Not Detected   BORDETELLA PARAPERTUSSIS PCR  Not Detected   CHLAMYDOPHILA PNEUMONIAE PCR  Not Detected   MYCOPLAMA PNEUMO PCR  Not Detected   RSV, PCR  Not Detected               Imaging:   Imaging Results (All)     Procedure Component Value Units Date/Time          I reviewed the patient's new clinical results.  I personally viewed and interpreted the patient's imaging results: Bilateral dense pneumonia with some cavitation on the right side, multiples        Medication Review:   vitamin C, 500 mg, Oral, Daily  citalopram, 20 mg, Oral, Daily  enoxaparin, 40 mg, Subcutaneous, Nightly  fluticasone, 1 spray, Each Nare, Daily  guaiFENesin, 600 mg, Oral, BID  ipratropium-albuterol, 3 mL, Nebulization, 4x Daily - RT  losartan, 50 mg, Oral, Q24H  mirtazapine, 7.5 mg, Oral, Nightly  piperacillin-tazobactam, 3.375 g, Intravenous, Q8H  sodium chloride, 10 mL, Intravenous, Q12H  cyanocobalamin, 1,000 mcg, Oral, Daily             ASSESSMENT:   1. Right lower lobe lung abscess/pneumonia  2. Sepsis  3. Right loculated pleural effusion  4. Acute hypoxic respiratory failure, worse  5. Sepsis  6. Emphysema, upper lobe predominant  7. Peripheral  pleural-based nodule, 2.4 cm on CT chest 4/3/23, could have been related to early pneumonia.  Underlying malignancy cannot be excluded  8. Tobacco abuse  9. Essential hypertension  10.     PLAN:  Repeat blood cultures are negative, probably the Corynebacterium may be a colonizer.  Patient is on antibiotic for dense pneumonia with evidence of necrosis/air bubbles.  The white count is persistently elevated, kidney function is relatively stable with a very mild hyponatremia of no clinical significance.  Lactic acid is negative and has normalized, patient is on antibiotic with Zosyn, DVT prophylaxis with Lovenox,  Given the progression despite adequate treatment patient will be transition to imipenem and we will add doxycycline to cover for any atypical/rickettsial disease.  We will check for fungal serology and we will ask infectious disease to weigh in on the current empiric treatment since all the cultures have been consistently negative  Patient is not stable enough to tolerate any bronchoscopy at this point is much as it is clinically necessary and need to be reconsidered if she continues to decline despite the above initial measures  The fluid is to's minimal at this point to be safely drained for diagnostic sampling    Discussed with patient and with the family at the bedside    Disposition: Continue to monitor in CICU    Jennifer Hood MD  05/09/23  12:42 EDT      Time: Critical care 38 min      Dictated utilizing Dragon dictation

## 2023-05-09 NOTE — CONSULTS
"    Chief Complaint: Necrotizing pneumonia        Subjective     Ms. Williamson is a 72-year-old lady who was previously seen by our service earlier in this admission for her necrotizing pneumonia.  She a small right pleural effusion was seen on CT chest performed on admission but was not significant enough to warrant intervention.  We recommended medical management.  We were reconsulted today after CT chest was completed yesterday and demonstrated worsening pneumonia and bilateral pleural effusions.    She has been seen by pulmonary medicine and infectious disease.  Up until now, she has been on Zosyn.  This has since been changed to meropenem as well as empiric doxycycline. She has persistent leukocytosis.  Fungal studies are pending.  Repeat sputum cultures have also been ordered.  She has been too unstable for bronchoscopy per chart review.  She was transferred to the ICU yesterday and is currently on 60% / 55L high flow nasal cannula, which is increased from admission.      Vital Signs:  Temp:  [98 °F (36.7 °C)-100.5 °F (38.1 °C)] 100.5 °F (38.1 °C)  Heart Rate:  [] 104  Resp:  [18-22] 20  BP: (115-186)/() 146/69    Intake & Output (last day)       05/08 0701  05/09 0700 05/09 0701  05/10 0700    P.O. 240 175    I.V. (mL/kg) 168 (2.6)     IV Piggyback  150    Total Intake(mL/kg) 408 (6.2) 325 (4.9)    Urine (mL/kg/hr) 275 (0.2) 100 (0.2)    Total Output 275 100    Net +133 +225                Objective:  General Appearance:  Ill-appearing, in distress and comfortable.    Vital signs: (most recent): Blood pressure 146/69, pulse 104, temperature 100.5 °F (38.1 °C), temperature source Oral, resp. rate 20, height 160 cm (63\"), weight 65.8 kg (145 lb 1 oz), SpO2 97 %.    HEENT: (High flow nasal cannula)    Lungs:  Increased effort.  She is not in respiratory distress.  There are decreased breath sounds and rhonchi.    Heart: Tachycardia.    Extremities: Decreased range of motion.    Neurological: Patient is " alert.    Skin:  Warm and dry.                Results Review:     I reviewed the patient's new clinical results.  I reviewed the patient's new imaging results and agree with the interpretation.  Discussed with Patient and family at bedside    Imaging Results (Last 24 Hours)     Procedure Component Value Units Date/Time    CT Chest Without Contrast Diagnostic [[id number]] Collected: 05/08/23 1650     Updated: 05/08/23 1650    Narrative:      CT CHEST WITHOUT CONTRAST     HISTORY: 72-year-old female with empyema and lung abscess. Follow-up.     TECHNIQUE: Radiation dose reduction techniques were utilized, including  automated exposure control and exposure modulation based on body size.   3 mm images were obtained through the chest without the administration  of IV contrast. Compared with chest CT 05/02/2023.     FINDINGS: There has been development of a significantly larger number of  regions of cavitation throughout the airspace consolidations throughout  the right lower and middle lobes. The air-fluid level in the perihilar  region of the right lower lobe is slightly more prominent. The  small-moderate-sized loculated right pleural effusion appears largely  unchanged. There is a new moderate-sized left pleural effusion and a new  airspace consolidation adjacently. There are also new ill-defined  airspace opacities throughout the dependent aspect of the left upper  lobe. Minimal pericardial effusion is stable. There is no acute  abnormality at the visualized upper abdomen. Extensive hepatic steatosis  is noted.       Impression:      1. Progression of lung abscesses at the right lower and middle lobes. No  significant change in the size of the right empyema.  2. The new large left lower lobe airspace consolidation and less-defined  airspace opacities throughout the dependent aspect of the left upper  lobe are suspicious for aspiration. There is also a new moderate-sized  left pleural effusion.             Lab Results:  "    Lab Results (last 24 hours)     Procedure Component Value Units Date/Time    Cryptococcal Antigen [[id number]]  (Normal) Collected: 05/09/23 1400    Specimen: Blood Updated: 05/09/23 1443     Crypto Ag Negative    Histoplasma Antigen ser [[id number]] Collected: 05/09/23 1400    Specimen: Blood Updated: 05/09/23 1409    [facility] Comprehensive Panel [[id number]] Collected: 05/09/23 1400    Specimen: Blood Updated: 05/09/23 1409    Histoplasma Antibodies [[id number]] Collected: 05/09/23 1400    Specimen: Blood Updated: 05/09/23 1409    Aspergillus Galactomannan Antigen - Blood, Arm, Left [[id number]] Collected: 05/09/23 1400    Specimen: Blood from Arm, Left Updated: 05/09/23 1409    ANCA Panel [[id number]] Collected: 05/09/23 1400    Specimen: Blood Updated: 05/09/23 1409    Procalcitonin [[id number]]  (Normal) Collected: 05/09/23 0714    Specimen: Blood Updated: 05/09/23 1406     Procalcitonin 0.23 ng/mL     Narrative:      As a Marker for Sepsis (Non-Neonates):    1. <0.5 ng/mL represents a low risk of severe sepsis and/or septic shock.  2. >2 ng/mL represents a high risk of severe sepsis and/or septic shock.    As a Marker for Lower Respiratory Tract Infections that require antibiotic therapy:    PCT on Admission    Antibiotic Therapy       6-12 Hrs later    >0.5                Strongly Recommended  >0.25 - <0.5        Recommended   0.1 - 0.25          Discouraged              Remeasure/reassess PCT  <0.1                Strongly Discouraged     Remeasure/reassess PCT    As 28 day mortality risk marker: \"Change in Procalcitonin Result\" (>80% or <=80%) if Day 0 (or Day 1) and Day 4 values are available. Refer to http://www.[facility]-pct-calculator.com    Change in PCT <=80%  A decrease of PCT levels below or equal to 80% defines a positive change in PCT test result representing a higher risk for 28-day all-cause mortality of patients diagnosed with severe sepsis for septic shock.    Change in PCT >80%  A decrease of PCT levels " of more than 80% defines a negative change in PCT result representing a lower risk for 28-day all-cause mortality of patients diagnosed with severe sepsis or septic shock.       Scan Slide [[id number]]  (Normal) Collected: 05/09/23 0714    Specimen: Blood Updated: 05/09/23 0848     RBC Morphology Normal     WBC Morphology Normal     Platelet Morphology Normal    CBC & Differential [[id number]]  (Abnormal) Collected: 05/09/23 0714    Specimen: Blood Updated: 05/09/23 0828    Narrative:      The following orders were created for panel order CBC & Differential.  Procedure                               Abnormality         Status                     ---------                               -----------         ------                     CBC Auto Differential[[id number]]        Abnormal            Final result                 Please view results for these tests on the individual orders.    CBC Auto Differential [[id number]]  (Abnormal) Collected: 05/09/23 0714    Specimen: Blood Updated: 05/09/23 0828     WBC 19.56 10*3/mm3      RBC 3.97 10*6/mm3      Hemoglobin 12.2 g/dL      Hematocrit 37.0 %      MCV 93.2 fL      MCH 30.7 pg      MCHC 33.0 g/dL      RDW 13.3 %      RDW-SD 45.4 fl      MPV 10.8 fL      Platelets 320 10*3/mm3      Neutrophil % 85.7 %      Lymphocyte % 9.3 %      Monocyte % 2.5 %      Eosinophil % 0.4 %      Basophil % 0.2 %      Neutrophils, Absolute 16.76 10*3/mm3      Lymphocytes, Absolute 1.82 10*3/mm3      Monocytes, Absolute 0.49 10*3/mm3      Eosinophils, Absolute 0.08 10*3/mm3      Basophils, Absolute 0.03 10*3/mm3     Basic Metabolic Panel [[id number]]  (Abnormal) Collected: 05/09/23 0714    Specimen: Blood Updated: 05/09/23 0750     Glucose 102 mg/dL      BUN 12 mg/dL      Creatinine 0.31 mg/dL      Sodium 135 mmol/L      Potassium 4.7 mmol/L      Comment: Slight hemolysis detected by analyzer. Results may be affected.        Chloride 100 mmol/L      CO2 30.0 mmol/L      Calcium 8.5 mg/dL       BUN/Creatinine Ratio 38.7     Anion Gap 5.0 mmol/L      eGFR 113.4 mL/min/1.73     Narrative:      GFR Normal >60  Chronic Kidney Disease <60  Kidney Failure <15      Blood Culture - Blood, Hand, Right [[id number]]  (Normal) Collected: 05/04/23 1811    Specimen: Blood from Hand, Right Updated: 05/08/23 1845     Blood Culture No growth at 4 days    Narrative:      Less than seven (7) mL's of blood was collected.  Insufficient quantity may yield false negative results.    Blood Culture - Blood, Arm, Left [[id number]]  (Normal) Collected: 05/04/23 1815    Specimen: Blood from Arm, Left Updated: 05/08/23 1845     Blood Culture No growth at 4 days    Narrative:      Less than seven (7) mL's of blood was collected.  Insufficient quantity may yield false negative results.           Assessment & Plan       Acute respiratory failure with hypoxia    Benign essential HTN    Tobacco abuse    Pneumonia    Sepsis    Pleural effusion, right    Other emphysema    Pulmonary nodule       Assessment & Plan    I have independently reviewed the repeat CT chest performed yesterday which demonstrates worsening bilateral pleural effusions, left greater than right with new associated left lung consolidation.  She has worsening pneumonia and lung abscess on the right.    Bilateral Pleural effusions: Due to patient's increasing oxygen demands and appearance on CT chest, patient would potentially benefit from a left-sided thoracentesis.  We will ask IR to perform this procedure tomorrow and order fluid studies of pleural fluid. Plan to repeat a chest x-ray once fluid is drained.  Continue pulmonary toilet as able.    Pneumonia/right lung abscess: Defer to infectious disease for antibiotic management.  Fungal studies pending.  No indication for surgical intervention at this time.    Thoracic surgery will follow.    Yvrose Stone DNP, APRN  [facility]  05/09/23  15:50 EDT

## 2023-05-09 NOTE — CONSULTS
Referring Provider: Dr Hood    Reason for Consultation: progressive necrotizing pneumonia    History of present illness:  Katherine Williamson is a 72 y.o. with PMH emphysema due to tobacco use who I am asked to evaluate and give opinion for progressive necrotizing pneumonia. History is obtained from the patient, her daughter, her friend, her nurse and review of the old medical records which I summarize/synthesize as follows: She presented to the ER on 5/2/23 with about two weeks of shortness of breath worse w/ exertion and cough that had been gradually worsening. She was coughing up brownish green mucus. No associated fever or chills. In early April, she had a CT done that showed a new RLL nodule. A repeat scan at [facility] on 4/26/23 showed a right loculated effusion w/ gas concerning for empyema. Due to progression of symptoms, she came to the ER on 5/2/23.     Labs at admission were notable for WBC 22 (92% PMNs), procal 3.95, Crt 0.98, AST and ALT of 37, and RPP negative. Blood cultures showed a contaminant w/ Corynebacterium in 1/2 sets. CXR and CT chest showed a RML/RLL opacification. She was initially started on ceftriaxone and then changed to Zosyn. A repeat CT chest yesterday afternoon showed progression of the findings in the right lung and some new changes in the left lung. Therefore, she was changed to meropenem and doxycycline just a short while ago. ID consulted for further recommendations.      Multiple blood and respiratory cultures have been negative. MRSA nares screen was negative. She received high dose IV steroids so WBC has only come down to 19k. She is felt to be too unstable for bronchoscopy. Thoracic surgery was consulted and recommended medical management.     No history of resistant or unusual infections. NO history of sepsis. No recent dental work. No current mouth or tooth pain. She has a replaced hip but no other hardware in the body. 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 She has been recently working there as a . She does doing outdoor work such as landscaping.     Past Medical History:   Diagnosis Date   • Arthritis    • Cataract    • Colon polyps     FOLLOWED BY DR. JOSEPH LAU   • Hypertension        Past Surgical History:   Procedure Laterality Date   • COLONOSCOPY  10/2015    [facility].  Follow-up in 5 years.   • COLONOSCOPY N/A 1/12/2022    2 BENIGN POLYPS IN DESCENDING, 5 MM BENIGN POLYP IN SIGMOID, MULTIPLE SMALL AND LARGE DIVERTICULA IN SIGMOID, RESCOPE IN 3 YRS, DR. JOSEPH LAU AT [facility]   • EYE SURGERY     • JOINT REPLACEMENT  2005?    Left total hip replacement in 2005.  In December 2015 patient had left hip revision   • TONSILLECTOMY         Social History:  Lives in [place]    + tobacco use    Antibiotic allergies and intolerances:  None    Medications:    Current Facility-Administered Medications:   •  acetaminophen (TYLENOL) tablet 650 mg, 650 mg, Oral, Q4H PRN **OR** acetaminophen (TYLENOL) 160 MG/5ML solution 650 mg, 650 mg, Oral, Q4H PRN **OR** acetaminophen (TYLENOL) suppository 650 mg, 650 mg, Rectal, Q4H PRN, Olga Palm MD  •  ascorbic acid (VITAMIN C) tablet 500 mg, 500 mg, Oral, Daily, Olga Palm MD, 500 mg at 05/09/23 0831  •  citalopram (CeleXA) tablet 20 mg, 20 mg, Oral, Daily, Gee Clement MD, 20 mg at 05/09/23 0831  •  doxycycline (VIBRAMYCIN) 100 mg in sodium chloride 0.9 % 100 mL IVPB-VTB, 100 mg, Intravenous, Q12H, Jennifer Hood MD  •  Enoxaparin Sodium (LOVENOX) syringe 40 mg, 40 mg, Subcutaneous, Nightly, Cheyanne Ayers MD, 40 mg at 05/08/23 2122  •  fluticasone (FLONASE) 50 MCG/ACT nasal spray 1 spray, 1 spray, Each Nare, Daily, Olga Palm MD, 1 spray at 05/08/23 0808  •  guaiFENesin (MUCINEX) 12 hr tablet 600 mg, 600 mg, Oral, BID, Yvrose Stone DNP, APRN, 600 mg at 05/09/23 0831  •  ipratropium-albuterol (DUO-NEB) nebulizer solution 3 mL, 3 mL, Nebulization, 4x Daily - RT,  Olga Palm MD, 3 mL at 05/09/23 1036  •  loperamide (IMODIUM) capsule 2 mg, 2 mg, Oral, Q6H PRN, Gee Clement MD, 2 mg at 05/07/23 1822  •  losartan (COZAAR) tablet 50 mg, 50 mg, Oral, Q24H, Olga Palm MD, 50 mg at 05/09/23 0831  •  Magnesium Sulfate - Total Dose 10 grams - Magnesium 1 or Less, 2 g, Intravenous, PRN **OR** Magnesium Sulfate - Total Dose 6 grams - Magnesium 1.1 - 1.5, 2 g, Intravenous, PRN **OR** Magnesium Sulfate - Total Dose 4 grams - Magnesium 1.6 - 1.9, 4 g, Intravenous, PRN, Ford Britton, DO  •  meropenem (MERREM) 1 g in sodium chloride 0.9 % 100 mL IVPB-VTB, 1 g, Intravenous, Once, Jennifer Hood MD  •  meropenem (MERREM) 1 g in sodium chloride 0.9 % 100 mL IVPB-VTB, 1 g, Intravenous, Q8H, Jennifer Hood MD  •  mirtazapine (REMERON) tablet 7.5 mg, 7.5 mg, Oral, Nightly, Yfn Garcia III, MD, 7.5 mg at 05/08/23 2121  •  nitroglycerin (NITROSTAT) SL tablet 0.4 mg, 0.4 mg, Sublingual, Q5 Min PRN, Olga Palm MD  •  ondansetron (ZOFRAN) injection 4 mg, 4 mg, Intravenous, Q6H PRN, Olga Palm MD  •  potassium & sodium phosphates (PHOS-NAK) 280-160-250 MG packet - for Phosphorus less than 1.25 mg/dL, 2 packet, Oral, Q6H PRN **OR** potassium & sodium phosphates (PHOS-NAK) 280-160-250 MG packet - for Phosphorus 1.25 - 2.5 mg/dL, 2 packet, Oral, Q6H PRN, Ford Britton, DO  •  potassium chloride (K-DUR,KLOR-CON) ER tablet 40 mEq, 40 mEq, Oral, PRN, 40 mEq at 05/07/23 0654 **OR** potassium chloride (KLOR-CON) packet 40 mEq, 40 mEq, Oral, PRN **OR** potassium chloride 10 mEq in 100 mL IVPB, 10 mEq, Intravenous, Q1H PRN, Ford Britton DO  •  [COMPLETED] Insert Peripheral IV, , , Once **AND** sodium chloride 0.9 % flush 10 mL, 10 mL, Intravenous, PRN, NéstorOlga alston MD  •  sodium chloride 0.9 % flush 10 mL, 10 mL, Intravenous, Q12H, Olga Palm MD, 10 mL at 05/09/23 0834  •  sodium chloride 0.9 % flush 10 mL, 10 mL, Intravenous, PRN, Olga Palm MD  •  sodium chloride 0.9 %  infusion 40 mL, 40 mL, Intravenous, PRN, Olga Palm MD  •  vitamin B-12 (CYANOCOBALAMIN) tablet 1,000 mcg, 1,000 mcg, Oral, Daily, Olga Palm MD, 1,000 mcg at 05/09/23 0830      Objective   Vital Signs   Temp:  [98 °F (36.7 °C)-100.5 °F (38.1 °C)] 100.5 °F (38.1 °C)  Heart Rate:  [] 121  Resp:  [18-22] 20  BP: (115-186)/() 186/85    Physical Exam:   General: awake, alert, very nice but frail appearing  Eyes: no scleral icterus  ENT: no thrush; HFNC in nares  Cardiovascular: tachycardic  Respiratory: tachypneic on HFNC  GI: Abdomen is soft, not tender, + bowel sounds in all four quadrants  :  no Snell catheter  Skin: No rashes  Neurological: Alert and oriented x 3  Psychiatric: Normal mood and affect   Vasc: PIV w/o erythema    Labs:     Lab Results   Component Value Date    WBC 19.56 (H) 05/09/2023    HGB 12.2 05/09/2023    HCT 37.0 05/09/2023    MCV 93.2 05/09/2023     05/09/2023       Lab Results   Component Value Date    GLUCOSE 102 (H) 05/09/2023    BUN 12 05/09/2023    CREATININE 0.31 (L) 05/09/2023    EGFRIFNONA 76 04/18/2018    EGFRIFAFRI 93 04/18/2018    BCR 38.7 (H) 05/09/2023    CO2 30.0 (H) 05/09/2023    CALCIUM 8.5 (L) 05/09/2023    PROTENTOTREF 7.2 04/18/2018    ALBUMIN 2.0 (L) 05/03/2023    LABIL2 1.4 04/18/2018    AST 26 05/03/2023    ALT 28 05/03/2023     Procal 3.95--->2.6    Microbiology:  5/2 RPP: negative  5/2 BCx: Corynebacterium in 1/2 sets  5/2 SpCx: normal aishwarya  5/2 MRSA nares: negative  5/2 Strep pneumo Ag; negative  5/2 Legionella Ag: negatie  5/4 BCx: negative  5/5 C diff: negative  5/6 SpCx: normal aishwarya       Radiology:  5/2 CT chest personally reviewed w/ impressive RLL infiltrate    5/8 CT chest showed progression of lung abscesses in the RML and RLL with new findings in the LLL    ASSESSMENT/PLAN:  1. Pneumonia and right lung abscess  2. Pleural effusions  3. Acute hypoxic respiratory failure  4. Emphysema  5. Tobacco use    She has a serious and  progressive pneumonia worse on the right than the left. The etiology is not yet known. She does report choking and episodes of aspiration which could have been the culprit. That being said, swallow study was not definitive for aspiration.     She's had multiple negative cultures. She is too unstable for bronchoscopy. She has been on broad spectrum antibiotic of Zosyn without any significant improvement therefore I agree w/ the change to meropenem. MRSA nares negative so would not recommend adding vancomycin. OK to initiate empiric doxycycline as ordered by pulmonary but not as convinced it will be needed over the long-term. Fungal infections are possible so will send add'l work for Histo, Blasto, Aspergillus, and Crypto. I am going to ask thoracic surgery to re-evaluate for any interventional needs. I will repeat sputum cultures. I am not suspicious of TB so no need for airborne isolation. Repeat procal for trend.     ID will follow. Case and its management discussed with nurse pharmacist. I will d/w Dr Hood.

## 2023-05-09 NOTE — SIGNIFICANT NOTE
05/09/23 1500   Oxygen Therapy   SpO2 97 %   Pulse Oximetry Type Continuous   Device (Oxygen Therapy) heated;high-flow nasal cannula;humidified   Flow (L/min) 55   Oxygen Concentration (%) 65  (weaned to 65%)

## 2023-05-09 NOTE — PROGRESS NOTES
The patient is seen in ICU today.  She seems a bit brighter, stating that she slept well and seems encouraged regarding her medical status.  Continuing to follow.

## 2023-05-10 ENCOUNTER — APPOINTMENT (OUTPATIENT)
Dept: ULTRASOUND IMAGING | Facility: HOSPITAL | Age: 73
DRG: 871 | End: 2023-05-10
Payer: COMMERCIAL

## 2023-05-10 ENCOUNTER — APPOINTMENT (OUTPATIENT)
Dept: GENERAL RADIOLOGY | Facility: HOSPITAL | Age: 73
DRG: 871 | End: 2023-05-10
Payer: COMMERCIAL

## 2023-05-10 LAB
ALBUMIN SERPL-MCNC: 2 G/DL (ref 3.5–5.2)
ALP SERPL-CCNC: 158 U/L (ref 39–117)
ALT SERPL W P-5'-P-CCNC: 42 U/L (ref 1–33)
ANION GAP SERPL CALCULATED.3IONS-SCNC: 5.4 MMOL/L (ref 5–15)
AST SERPL-CCNC: 34 U/L (ref 1–32)
BASOPHILS # BLD AUTO: 0.07 10*3/MM3 (ref 0–0.2)
BASOPHILS NFR BLD AUTO: 0.2 % (ref 0–1.5)
BILIRUB CONJ SERPL-MCNC: <0.2 MG/DL (ref 0–0.3)
BILIRUB INDIRECT SERPL-MCNC: ABNORMAL MG/DL
BILIRUB SERPL-MCNC: 0.4 MG/DL (ref 0–1.2)
BUN SERPL-MCNC: 11 MG/DL (ref 8–23)
BUN/CREAT SERPL: 36.7 (ref 7–25)
CALCIUM SPEC-SCNC: 9.1 MG/DL (ref 8.6–10.5)
CENTROMERE B AB SER-ACNC: <0.2 AI (ref 0–0.9)
CHLORIDE SERPL-SCNC: 100 MMOL/L (ref 98–107)
CHROMATIN AB SERPL-ACNC: <0.2 AI (ref 0–0.9)
CO2 SERPL-SCNC: 29.6 MMOL/L (ref 22–29)
CREAT SERPL-MCNC: 0.3 MG/DL (ref 0.57–1)
DEPRECATED RDW RBC AUTO: 45.4 FL (ref 37–54)
DSDNA AB SER-ACNC: <1 IU/ML (ref 0–9)
EGFRCR SERPLBLD CKD-EPI 2021: 114.3 ML/MIN/1.73
ENA JO1 AB SER-ACNC: <0.2 AI (ref 0–0.9)
ENA RNP AB SER-ACNC: <0.2 AI (ref 0–0.9)
ENA SCL70 AB SER-ACNC: <0.2 AI (ref 0–0.9)
ENA SM AB SER-ACNC: <0.2 AI (ref 0–0.9)
ENA SS-A AB SER-ACNC: <0.2 AI (ref 0–0.9)
ENA SS-B AB SER-ACNC: <0.2 AI (ref 0–0.9)
EOSINOPHIL # BLD AUTO: 0.08 10*3/MM3 (ref 0–0.4)
EOSINOPHIL NFR BLD AUTO: 0.3 % (ref 0.3–6.2)
ERYTHROCYTE [DISTWIDTH] IN BLOOD BY AUTOMATED COUNT: 13.4 % (ref 12.3–15.4)
GLUCOSE BLDC GLUCOMTR-MCNC: 135 MG/DL (ref 70–130)
GLUCOSE FLD-MCNC: 142 MG/DL
GLUCOSE SERPL-MCNC: 141 MG/DL (ref 65–99)
HCT VFR BLD AUTO: 32.6 % (ref 34–46.6)
HGB BLD-MCNC: 11 G/DL (ref 12–15.9)
IMM GRANULOCYTES # BLD AUTO: 0.7 10*3/MM3 (ref 0–0.05)
IMM GRANULOCYTES NFR BLD AUTO: 2.5 % (ref 0–0.5)
LYMPHOCYTES # BLD AUTO: 2.12 10*3/MM3 (ref 0.7–3.1)
LYMPHOCYTES NFR BLD AUTO: 7.6 % (ref 19.6–45.3)
Lab: NORMAL
MCH RBC QN AUTO: 30.9 PG (ref 26.6–33)
MCHC RBC AUTO-ENTMCNC: 33.7 G/DL (ref 31.5–35.7)
MCV RBC AUTO: 91.6 FL (ref 79–97)
MONOCYTES # BLD AUTO: 0.89 10*3/MM3 (ref 0.1–0.9)
MONOCYTES NFR BLD AUTO: 3.2 % (ref 5–12)
NEUTROPHILS NFR BLD AUTO: 24.2 10*3/MM3 (ref 1.7–7)
NEUTROPHILS NFR BLD AUTO: 86.2 % (ref 42.7–76)
NRBC BLD AUTO-RTO: 0 /100 WBC (ref 0–0.2)
PH FLD: 8.03 [PH]
PLATELET # BLD AUTO: 317 10*3/MM3 (ref 140–450)
PMV BLD AUTO: 10 FL (ref 6–12)
POTASSIUM SERPL-SCNC: 3.8 MMOL/L (ref 3.5–5.2)
PROT FLD-MCNC: 1.4 G/DL
PROT SERPL-MCNC: 5.7 G/DL (ref 6–8.5)
RBC # BLD AUTO: 3.56 10*6/MM3 (ref 3.77–5.28)
SODIUM SERPL-SCNC: 135 MMOL/L (ref 136–145)
WBC NRBC COR # BLD: 28.06 10*3/MM3 (ref 3.4–10.8)

## 2023-05-10 PROCEDURE — 88341 IMHCHEM/IMCYTCHM EA ADD ANTB: CPT | Performed by: NURSE PRACTITIONER

## 2023-05-10 PROCEDURE — 82945 GLUCOSE OTHER FLUID: CPT | Performed by: NURSE PRACTITIONER

## 2023-05-10 PROCEDURE — 99233 SBSQ HOSP IP/OBS HIGH 50: CPT | Performed by: INTERNAL MEDICINE

## 2023-05-10 PROCEDURE — 87015 SPECIMEN INFECT AGNT CONCNTJ: CPT | Performed by: NURSE PRACTITIONER

## 2023-05-10 PROCEDURE — 87070 CULTURE OTHR SPECIMN AEROBIC: CPT | Performed by: NURSE PRACTITIONER

## 2023-05-10 PROCEDURE — 87205 SMEAR GRAM STAIN: CPT | Performed by: INTERNAL MEDICINE

## 2023-05-10 PROCEDURE — 87205 SMEAR GRAM STAIN: CPT | Performed by: NURSE PRACTITIONER

## 2023-05-10 PROCEDURE — 87102 FUNGUS ISOLATION CULTURE: CPT | Performed by: INTERNAL MEDICINE

## 2023-05-10 PROCEDURE — 87070 CULTURE OTHR SPECIMN AEROBIC: CPT | Performed by: INTERNAL MEDICINE

## 2023-05-10 PROCEDURE — 80048 BASIC METABOLIC PNL TOTAL CA: CPT | Performed by: STUDENT IN AN ORGANIZED HEALTH CARE EDUCATION/TRAINING PROGRAM

## 2023-05-10 PROCEDURE — 88305 TISSUE EXAM BY PATHOLOGIST: CPT | Performed by: NURSE PRACTITIONER

## 2023-05-10 PROCEDURE — 87116 MYCOBACTERIA CULTURE: CPT | Performed by: INTERNAL MEDICINE

## 2023-05-10 PROCEDURE — 87206 SMEAR FLUORESCENT/ACID STAI: CPT | Performed by: INTERNAL MEDICINE

## 2023-05-10 PROCEDURE — 84157 ASSAY OF PROTEIN OTHER: CPT | Performed by: NURSE PRACTITIONER

## 2023-05-10 PROCEDURE — 71045 X-RAY EXAM CHEST 1 VIEW: CPT

## 2023-05-10 PROCEDURE — 82948 REAGENT STRIP/BLOOD GLUCOSE: CPT

## 2023-05-10 PROCEDURE — 0 LIDOCAINE 1 % SOLUTION: Performed by: RADIOLOGY

## 2023-05-10 PROCEDURE — 94761 N-INVAS EAR/PLS OXIMETRY MLT: CPT

## 2023-05-10 PROCEDURE — 80076 HEPATIC FUNCTION PANEL: CPT | Performed by: INTERNAL MEDICINE

## 2023-05-10 PROCEDURE — 94799 UNLISTED PULMONARY SVC/PX: CPT

## 2023-05-10 PROCEDURE — 83986 ASSAY PH BODY FLUID NOS: CPT | Performed by: NURSE PRACTITIONER

## 2023-05-10 PROCEDURE — 94760 N-INVAS EAR/PLS OXIMETRY 1: CPT

## 2023-05-10 PROCEDURE — 94664 DEMO&/EVAL PT USE INHALER: CPT

## 2023-05-10 PROCEDURE — 87206 SMEAR FLUORESCENT/ACID STAI: CPT | Performed by: NURSE PRACTITIONER

## 2023-05-10 PROCEDURE — 88342 IMHCHEM/IMCYTCHM 1ST ANTB: CPT | Performed by: NURSE PRACTITIONER

## 2023-05-10 PROCEDURE — 99232 SBSQ HOSP IP/OBS MODERATE 35: CPT | Performed by: NURSE PRACTITIONER

## 2023-05-10 PROCEDURE — 88112 CYTOPATH CELL ENHANCE TECH: CPT | Performed by: NURSE PRACTITIONER

## 2023-05-10 PROCEDURE — 25010000002 MEROPENEM PER 100 MG: Performed by: INTERNAL MEDICINE

## 2023-05-10 PROCEDURE — 85025 COMPLETE CBC W/AUTO DIFF WBC: CPT | Performed by: STUDENT IN AN ORGANIZED HEALTH CARE EDUCATION/TRAINING PROGRAM

## 2023-05-10 PROCEDURE — 87116 MYCOBACTERIA CULTURE: CPT | Performed by: NURSE PRACTITIONER

## 2023-05-10 PROCEDURE — 87385 HISTOPLASMA CAPSUL AG IA: CPT | Performed by: INTERNAL MEDICINE

## 2023-05-10 PROCEDURE — 76942 ECHO GUIDE FOR BIOPSY: CPT

## 2023-05-10 PROCEDURE — 0W9B3ZX DRAINAGE OF LEFT PLEURAL CAVITY, PERCUTANEOUS APPROACH, DIAGNOSTIC: ICD-10-PCS | Performed by: RADIOLOGY

## 2023-05-10 RX ORDER — LIDOCAINE HYDROCHLORIDE 10 MG/ML
10 INJECTION, SOLUTION INFILTRATION; PERINEURAL ONCE
Status: COMPLETED | OUTPATIENT
Start: 2023-05-10 | End: 2023-05-10

## 2023-05-10 RX ADMIN — DOXYCYCLINE 100 MG: 100 INJECTION, POWDER, LYOPHILIZED, FOR SOLUTION INTRAVENOUS at 05:38

## 2023-05-10 RX ADMIN — ACETAMINOPHEN 650 MG: 325 TABLET ORAL at 20:14

## 2023-05-10 RX ADMIN — GUAIFENESIN 600 MG: 600 TABLET, EXTENDED RELEASE ORAL at 20:12

## 2023-05-10 RX ADMIN — MEROPENEM 1 G: 1 INJECTION, POWDER, FOR SOLUTION INTRAVENOUS at 04:39

## 2023-05-10 RX ADMIN — Medication 10 ML: at 08:58

## 2023-05-10 RX ADMIN — DOXYCYCLINE 100 MG: 100 INJECTION, POWDER, LYOPHILIZED, FOR SOLUTION INTRAVENOUS at 16:51

## 2023-05-10 RX ADMIN — IPRATROPIUM BROMIDE AND ALBUTEROL SULFATE 3 ML: 2.5; .5 SOLUTION RESPIRATORY (INHALATION) at 15:31

## 2023-05-10 RX ADMIN — GUAIFENESIN 600 MG: 600 TABLET, EXTENDED RELEASE ORAL at 08:57

## 2023-05-10 RX ADMIN — MEROPENEM 1 G: 1 INJECTION, POWDER, FOR SOLUTION INTRAVENOUS at 12:04

## 2023-05-10 RX ADMIN — OXYCODONE HYDROCHLORIDE AND ACETAMINOPHEN 500 MG: 500 TABLET ORAL at 08:57

## 2023-05-10 RX ADMIN — LOSARTAN POTASSIUM 50 MG: 50 TABLET, FILM COATED ORAL at 08:57

## 2023-05-10 RX ADMIN — IPRATROPIUM BROMIDE AND ALBUTEROL SULFATE 3 ML: 2.5; .5 SOLUTION RESPIRATORY (INHALATION) at 12:53

## 2023-05-10 RX ADMIN — IPRATROPIUM BROMIDE AND ALBUTEROL SULFATE 3 ML: 2.5; .5 SOLUTION RESPIRATORY (INHALATION) at 20:55

## 2023-05-10 RX ADMIN — Medication 10 ML: at 20:25

## 2023-05-10 RX ADMIN — LIDOCAINE HYDROCHLORIDE 6 ML: 10 INJECTION, SOLUTION INFILTRATION; PERINEURAL at 11:09

## 2023-05-10 RX ADMIN — Medication 1000 MCG: at 08:57

## 2023-05-10 RX ADMIN — IPRATROPIUM BROMIDE AND ALBUTEROL SULFATE 3 ML: 2.5; .5 SOLUTION RESPIRATORY (INHALATION) at 08:08

## 2023-05-10 RX ADMIN — CITALOPRAM 20 MG: 20 TABLET, FILM COATED ORAL at 08:57

## 2023-05-10 RX ADMIN — MEROPENEM 1 G: 1 INJECTION, POWDER, FOR SOLUTION INTRAVENOUS at 20:11

## 2023-05-10 RX ADMIN — MIRTAZAPINE 7.5 MG: 15 TABLET, FILM COATED ORAL at 20:11

## 2023-05-10 NOTE — PROGRESS NOTES
"Nutrition Services    Patient Name:  Katherine Williamson  YOB: 1950  MRN: [id number]  Admit Date:  5/2/2023  Assessment Date:  05/10/23    Comment: Nutrition screen for Length of Stay x 8 days  Dx:Acute progressive respiratory failure, Right lower lobe lung abscess/pneumonia, sepsis    Visit pt and . Pt on Regular thin liquid diet  Pt is drinking some of the supplement   She did better this am for breakfast per   Last Bm 5/6  Note thoracentesis planned today    Will continue to follow clinical course and monitor nutritional needs.     CLINICAL NUTRITION ASSESSMENT      Reason for Assessment Length of Stay     Diagnosis/Problem   :Acute progressive respiratory failure, Right lower lobe lung abscess/pneumonia, sepsis   Medical/Surgical History Past Medical History:   Diagnosis Date   • Arthritis    • Cataract    • Colon polyps     FOLLOWED BY DR. JOSEPH LAU   • Hypertension        Past Surgical History:   Procedure Laterality Date   • COLONOSCOPY  10/2015    Pdopm-vjslbdzonlnk-[facility]. [facility].  Follow-up in 5 years.   • COLONOSCOPY N/A 1/12/2022    2 BENIGN POLYPS IN DESCENDING, 5 MM BENIGN POLYP IN SIGMOID, MULTIPLE SMALL AND LARGE DIVERTICULA IN SIGMOID, RESCOPE IN 3 YRS, DR. JOSEPH LAU AT [facility]   • EYE SURGERY     • JOINT REPLACEMENT  2005?    Left total hip replacement in 2005.  In December 2015 patient had left hip revision   • TONSILLECTOMY          Encounter Information        Nutrition History:  Appetite has been down, she did better today for breakfast(Bnana, biscuit/gravy) and drank most of the supplement   Food Preferences:    Supplements:    Factors Affecting Intake: decreased appetite     Anthropometrics        Current Height  Current Weight  BMI kg/m2 Height: 160 cm (63\")  Weight: 65.8 kg (145 lb 1 oz) (05/10/23 0414)  Body mass index is 25.7 kg/m².   Adjusted BMI (if applicable)    BMI Category Overweight (25 - 29.9)       Admission Weight        Ideal Body Weight (IBW) 52.4 kg "   Adjusted IBW (if applicable)        Usual Body Weight (UBW) See wt hx   Weight Change/Trend Stable       Weight History Wt Readings from Last 30 Encounters:   05/10/23 0414 65.8 kg (145 lb 1 oz)   05/09/23 0527 65.8 kg (145 lb 1 oz)   05/08/23 0500 66.9 kg (147 lb 7.8 oz)   05/07/23 0300 64 kg (141 lb)   05/06/23 0300 62 kg (136 lb 11 oz)   05/05/23 0300 65.8 kg (145 lb)   05/04/23 0500 66.2 kg (145 lb 15.1 oz)   05/03/23 0917 63.5 kg (139 lb 15.9 oz)   05/02/23 1900 61.2 kg (135 lb)   04/24/23 1356 61.7 kg (136 lb)   04/10/23 0657 61.7 kg (136 lb)   04/03/23 1134 63.5 kg (140 lb)   01/24/23 0957 67.3 kg (148 lb 6.4 oz)   01/09/23 0854 67 kg (147 lb 12.8 oz)   10/11/22 0928 68.5 kg (151 lb)   10/05/22 0920 67.6 kg (149 lb)   08/30/22 1324 67.6 kg (149 lb)   06/06/22 1343 67.1 kg (148 lb)   01/11/22 1743 67.1 kg (148 lb)   10/07/21 0923 67.4 kg (148 lb 9.6 oz)   09/03/20 0941 67.1 kg (148 lb)   05/13/20 1049 67 kg (147 lb 11.3 oz)   04/30/20 1219 67 kg (147 lb 12.8 oz)   10/24/19 0840 65.6 kg (144 lb 9.6 oz)   05/10/19 0419 66 kg (145 lb 8 oz)   04/18/19 0844 64.5 kg (142 lb 3.2 oz)   10/18/18 0803 65.3 kg (144 lb)   04/18/18 0911 68.5 kg (151 lb)   10/18/17 0851 68.1 kg (150 lb 3.2 oz)   04/18/17 0758 68.5 kg (151 lb)   10/03/16 0907 69.4 kg (153 lb)   04/05/16 1023 67.6 kg (149 lb)   10/06/15 1058 68 kg (150 lb)   07/22/15 1337 68 kg (150 lb)   06/23/15 1303 68.5 kg (150 lb 15.9 oz)           --  Tests/Procedures        Tests/Procedures Thoracentesis     Labs       Pertinent Labs    Results from last 7 days   Lab Units 05/10/23  0904 05/09/23  0714 05/08/23  0605   SODIUM mmol/L 135* 135* 137   POTASSIUM mmol/L 3.8 4.7 3.5   CHLORIDE mmol/L 100 100 101   CO2 mmol/L 29.6* 30.0* 31.6*   BUN mg/dL 11 12 11   CREATININE mg/dL 0.30* 0.31* 0.30*   CALCIUM mg/dL 9.1 8.5* 8.7   BILIRUBIN mg/dL 0.4  --   --    ALK PHOS U/L 158*  --   --    ALT (SGPT) U/L 42*  --   --    AST (SGOT) U/L 34*  --   --    GLUCOSE mg/dL 141*  102* 105*     Results from last 7 days   Lab Units 05/10/23  0904 05/10/23  0707   HEMOGLOBIN g/dL  --  11.0*   HEMATOCRIT %  --  32.6*   WBC 10*3/mm3  --  28.06*   ALBUMIN g/dL 2.0*  --      Results from last 7 days   Lab Units 05/10/23  0707 05/09/23  0714 05/08/23  0605 05/07/23  0435 05/06/23  0627   PLATELETS 10*3/mm3 317 320 288 313 316     COVID19   Date Value Ref Range Status   05/02/2023 Not Detected Not Detected - Ref. Range Final     Lab Results   Component Value Date    HGBA1C 5.59 04/18/2018          Medications           Scheduled Medications vitamin C, 500 mg, Oral, Daily  citalopram, 20 mg, Oral, Daily  doxycycline, 100 mg, Intravenous, Q12H  fluticasone, 1 spray, Each Nare, Daily  guaiFENesin, 600 mg, Oral, BID  ipratropium-albuterol, 3 mL, Nebulization, 4x Daily - RT  losartan, 50 mg, Oral, Q24H  meropenem, 1 g, Intravenous, Q8H  mirtazapine, 7.5 mg, Oral, Nightly  sodium chloride, 10 mL, Intravenous, Q12H  cyanocobalamin, 1,000 mcg, Oral, Daily       Infusions hold, 1 each       PRN Medications •  acetaminophen **OR** acetaminophen **OR** acetaminophen  •  hold  •  loperamide  •  magnesium sulfate **OR** magnesium sulfate **OR** magnesium sulfate  •  nitroglycerin  •  ondansetron  •  potassium & sodium phosphates **OR** potassium & sodium phosphates  •  potassium chloride **OR** potassium chloride **OR** potassium chloride  •  [COMPLETED] Insert Peripheral IV **AND** sodium chloride  •  sodium chloride  •  sodium chloride     Physical Findings          Physical Appearance alert   Oral/Mouth Cavity WNL   Edema  1+ (trace)   Gastrointestinal last bowel movement: 5/6   Skin  skin intact   Tubes/Drains/Lines none   NFPE No clinical signs of muscle wasting or fat loss   -  Estimated/Assessed Needs       Energy Requirements    Weight for Calculation 65.8 kg   Method for Estimation  30 kcal/kg   EST Needs (kcal/day) 1974       Protein Requirements    Weight for Calculation 65.8 kg   EST Protein Needs  (g/kg) 1.0 - 1.2 gm/kg   EST Daily Needs (g/day) 65-78       Fluid Requirements     Method for Estimation 1 mL/kcal    Estimated Needs (mL/day)    -  Current Nutrition Orders & Evaluation of Intake       Oral Nutrition     Food Allergies NKFA   Current PO Diet Diet: Regular/House Diet; Texture: Regular Texture (IDDSI 7); Fluid Consistency: Thin (IDDSI 0)   Supplement Boost Plus, TID   PO Evaluation     % PO Intake 75% this am, has been <50% the last couple of days    # of Days Evaluated    --  PES STATEMENT / NUTRITION DIAGNOSIS      Nutrition Dx Problem  Problem: Predicted Suboptimal Intake  Etiology: Medical Diagnosis and Factors Affecting Nutrition decrease appetite  Signs/Symptoms: Report/Observation    Comment:    --  NUTRITION INTERVENTION / PLAN OF CARE      Intervention Goal(s) Maintain nutrition status, Tolerate PO , Increase intake, Maintain weight and PO intake goal %: 75         RD Intervention/Action Interview for preferences, Supplement provided, Encourage intake and Follow Tx Progress     --      Monitor/Evaluation Per protocol   Discharge Plan/Needs Pending clinical course   Education Will instruct as appropriate   --    RD to follow per protocol.      Electronically signed by:  Nilda Mueller RD  05/10/23 11:43 EDT

## 2023-05-10 NOTE — PROGRESS NOTES
The patient is sustaining improvement.  She is bright and states that she is sleeping well and that her appetite is improved.  Her oxygen sats today are 97.  She is encouraged by her medical recovery and looks greatly improved from when seen for the first time by this physician

## 2023-05-10 NOTE — PROGRESS NOTES
PROGRESS NOTE  Patient Name: Katherine Williamson  Age/Sex: 72 y.o. female  : 1950  MRN: [id number]    Date of Admission: 2023  Date of Encounter Visit: 05/10/23   LOS: 8 days   Patient Care Team:  Zelalem Flor APRN as PCP - General (Internal Medicine)  Stephen, Brandon Meyer MD as Consulting Physician (Orthopedic Surgery)    Chief Complaint: Acute progressive respiratory failure    Hospital course: Patient has been progressing despite being treated with Rocephin and vancomycin initially and then switched to Zosyn was increased to density in the extent of the consolidation and later transition to imipenem/doxycycline since she continued to progress despite the Zosyn  Fungal studies are pending, autoimmune panel is pending  She is aggressive pulmonary hygiene, and she is able to expectorate better, her lungs do sound better today and her FiO2 requirements improve  She was seen in consultation by infectious disease and by thoracic surgery, both of their notes were reviewed and input appreciated  Hemodynamically she is holding on and she is not requiring any pressors  She is tolerating p.o., she did pass her swallow evaluation  Low-grade fever Tmax 100.5      REVIEW OF SYSTEMS:   CONSTITUTIONAL: Low-grade fever  CARDIOVASCULAR: No chest pain, chest pressure or chest discomfort. No palpitations or edema.   RESPIRATORY: Less shortness of breath, less productive cough  GASTROINTESTINAL: No anorexia, nausea, vomiting or diarrhea. No abdominal pain or blood.   HEMATOLOGIC: No bleeding or bruising.     Ventilator/Non-Invasive Ventilation Settings (From admission, onward)    Optiflow 55 % 55 L/min            Vital Signs  Temp:  [98.1 °F (36.7 °C)-100.5 °F (38.1 °C)] 98.4 °F (36.9 °C)  Heart Rate:  [] 75  Resp:  [18-22] 18  BP: (101-186)/() 147/73  SpO2:  [91 %-98 %] 97 %  on  Flow (L/min):  [55] 55 Device (Oxygen Therapy): heated;humidified;high-flow nasal cannula    Intake/Output Summary (Last 24 hours)  "at 5/10/2023 0739  Last data filed at 5/10/2023 0550  Gross per 24 hour   Intake 425 ml   Output 1150 ml   Net -725 ml     Flowsheet Rows    Flowsheet Row First Filed Value   Admission Height 160 cm (63\") Documented at 05/02/2023 1900   Admission Weight 61.2 kg (135 lb) Documented at 05/02/2023 1900        Body mass index is 25.7 kg/m².      05/08/23  0500 05/09/23  0527 05/10/23  0414   Weight: 66.9 kg (147 lb 7.8 oz) 65.8 kg (145 lb 1 oz) 65.8 kg (145 lb 1 oz)       Physical Exam:  GEN: Sickly looking lady, awake and responsive, breathing is much less labored, on Optiflow 55% FiO2  EYES:   Sclerae clear. No icterus. PERRL. Normal EOM  ENT:   External ears/nose normal, no oral lesions, no thrush, mucous membranes moist  NECK:  Supple, midline trachea, no JVD  LUNGS: Normal chest on inspection, patient has less rhonchi on exam with minimal expiratory wheezes, no crackles with diminished breath sounds posteriorly  CV:  Regular rhythm and rate. Normal S1/S2. No murmurs, gallops, or rubs noted.  ABD:  Soft, nontender and nondistended. Normal bowel sounds. No guarding  EXT:  Moves all extremities well. No cyanosis. No redness.  1+ lower extremities edema.   Skin: Dry, intact, no bleeding    Results Review:    Results From Last 14 Days   Lab Units 05/03/23  0607 05/02/23  1743 05/02/23  1433   LACTATE mmol/L 1.9 3.3* 6.3*     Results from last 7 days   Lab Units 05/09/23  0714 05/08/23  0605 05/07/23  0435 05/06/23  0627 05/05/23  0556 05/04/23  0603   SODIUM mmol/L 135* 137 142 140 137 141   POTASSIUM mmol/L 4.7 3.5 3.4* 3.7 4.0 4.1   CHLORIDE mmol/L 100 101 102 105 103 106   CO2 mmol/L 30.0* 31.6* 31.3* 29.7* 27.7 25.5   BUN mg/dL 12 11 12 16 24* 25*   CREATININE mg/dL 0.31* 0.30* 0.37* 0.37* 0.49* 0.45*   CALCIUM mg/dL 8.5* 8.7 8.9 9.2 9.2 9.1   ANION GAP mmol/L 5.0 4.4* 8.7 5.3 6.3 9.5                 Results from last 7 days   Lab Units 05/10/23  0707 05/09/23  0714 05/08/23  0605 05/07/23  0435 05/06/23  0627 " 05/05/23  0556 05/04/23  0604   WBC 10*3/mm3 28.06* 19.56* 18.04* 19.72* 19.80* 19.67* 21.59*   HEMOGLOBIN g/dL 11.0* 12.2 11.1* 11.5* 11.2* 11.4* 11.6*   HEMATOCRIT % 32.6* 37.0 33.5* 33.6* 32.8* 33.1* 33.3*   PLATELETS 10*3/mm3 317 320 288 313 316 355 441   MCV fL 91.6 93.2 91.8 90.3 90.1 89.9 88.3   NEUTROPHIL % % 86.2* 85.7* 86.6* 84.3*  --   --   --    LYMPHOCYTE % % 7.6* 9.3* 7.6* 8.8*  --   --   --    MONOCYTES % % 3.2* 2.5* 2.5* 3.0*  --   --   --    EOSINOPHIL % % 0.3 0.4 0.4 0.2*  --   --   --    BASOPHIL % % 0.2 0.2 0.2 0.4  --   --   --    IMM GRAN % % 2.5*  --  2.7* 3.3*  --   --   --                    Invalid input(s): LDLCALC  Results from last 7 days   Lab Units 05/08/23  1303 05/03/23  0805   PH, ARTERIAL pH units 7.509* 7.417   PCO2, ARTERIAL mm Hg 42.7 35.1   PO2 ART mm Hg 60.0* 69.6*   HCO3 ART mmol/L 34.0* 22.6         Glucose   Date/Time Value Ref Range Status   05/08/2023 1242 154 (H) 70 - 130 mg/dL Final     Comment:     Meter: [id number] : [id number] William Wright NA     Results from last 7 days   Lab Units 05/09/23  0714   PROCALCITONIN ng/mL 0.23     Results from last 7 days   Lab Units 05/06/23  1402 05/04/23  1815 05/04/23  1811   BLOODCX   --  No growth at 5 days No growth at 5 days   RESPCX  Rare Normal respiratory aishwarya. No S. aureus or Pseudomonas aeruginosa detected. Final report.  --   --                        Imaging:   Imaging Results (All)     Procedure Component Value Units Date/Time            I reviewed the patient's new clinical results.  I personally viewed and interpreted the patient's imaging results: Bilateral dense pneumonia with some cavitation on the right side, multiples chest x-ray from today compared to the one done 3 days ago showed no significant changes        Medication Review:   vitamin C, 500 mg, Oral, Daily  citalopram, 20 mg, Oral, Daily  doxycycline, 100 mg, Intravenous, Q12H  fluticasone, 1 spray, Each Nare, Daily  guaiFENesin, 600 mg, Oral,  BID  ipratropium-albuterol, 3 mL, Nebulization, 4x Daily - RT  losartan, 50 mg, Oral, Q24H  meropenem, 1 g, Intravenous, Q8H  mirtazapine, 7.5 mg, Oral, Nightly  sodium chloride, 10 mL, Intravenous, Q12H  cyanocobalamin, 1,000 mcg, Oral, Daily        hold, 1 each        ASSESSMENT:   1. Right lower lobe lung abscess/pneumonia  2. Sepsis  3. Right loculated pleural effusion  4. Acute hypoxic respiratory failure, worse  5. Sepsis  6. Emphysema, upper lobe predominant  7. Peripheral pleural-based nodule, 2.4 cm on CT chest 4/3/23, could have been related to early pneumonia.  Underlying malignancy cannot be excluded  8. Tobacco abuse  9. Essential hypertension    PLAN:  Seen and examined, discussed with infectious disease discussed with patient and with RT at the bedside, she is sounding better today, her oxygen requirement has improved and she is down to 55 L at 55% FiO2.  She is working with the coughing and expectoration and pulmonary hygiene and she does have less secretion he does sound better on exam  On the other hand her white count did increase even though her procalcitonin looks reassuring  Surgical note reviewed, plan for thoracentesis today, we will follow-up on fluid analysis, she does have mild effusion, this may provide some further diagnostic clues, not sure if it makes a big difference from the therapeutic standpoint since the amount of fluid is not that large in the first place  The plan is to proceed with a bronchoscopy in case she had any further decline or lack of progression in the right direction.  That might require her to be on the ventilator, we will discuss with the family and the patient more details if we get to that point    We will continue to monitor in the CICU    Disposition: Continue to monitor in CICU    Jennifer Hood MD  05/10/23  07:39 EDT      Time: Critical care 33 min      Dictated utilizing Dragon dictation

## 2023-05-10 NOTE — PROGRESS NOTES
"    Chief Complaint: Pneumonia, right lung abscess, bilateral pleural effusions      Subjective:  Symptoms:  Stable.  She reports shortness of breath and weakness.  No chest pressure.    Diet:  Poor intake.    Activity level: Impaired due to weakness.    Pain:  She reports no pain.    Appears slightly improved today.  Hemodynamically stable.  Resting comfortably in bed on high flow nasal cannula    Vital Signs:  Temp:  [98.1 °F (36.7 °C)-99.9 °F (37.7 °C)] 98.9 °F (37.2 °C)  Heart Rate:  [] 94  Resp:  [18-22] 20  BP: (100-152)/(52-81) 139/67    Intake & Output (last day)       05/09 0701  05/10 0700 05/10 0701  05/11 0700    P.O. 175 175    I.V. (mL/kg)      IV Piggyback 250 100    Total Intake(mL/kg) 425 (6.5) 275 (4.2)    Urine (mL/kg/hr) 1150 (0.7)     Total Output 1150     Net -725 +275          Urine Unmeasured Occurrence 1 x           Objective:  General Appearance:  Comfortable, in no acute distress and ill-appearing.    Vital signs: (most recent): Blood pressure 139/67, pulse 94, temperature 98.9 °F (37.2 °C), temperature source Oral, resp. rate 20, height 160 cm (63\"), weight 65.8 kg (145 lb 1 oz), SpO2 96 %.    HEENT: Normal HEENT exam.  (High flow nasal cannula)    Lungs:  Tachypnea.  She is not in respiratory distress.    Heart: Normal rate.    Extremities: Decreased range of motion.    Neurological: Patient is alert.    Pupils:  Pupils are equal, round, and reactive to light.    Skin:  Warm and dry.                Results Review:     I reviewed the patient's new clinical results.  I reviewed the patient's new imaging results and agree with the interpretation.  I reviewed the patient's other test results and agree with the interpretation  Discussed with Patient, RN and Dr. Lobmardo    Imaging Results (Last 24 Hours)     Procedure Component Value Units Date/Time    US Thoracentesis [[id number]] Resulted: 05/10/23 1124    Specimen: Body Fluid Updated: 05/10/23 1126    XR Chest 1 View [[id number]] Collected: " 05/10/23 0954     Updated: 05/10/23 0954    Narrative:        Patient: ANNIE CERDA  Time Out: 09:53  Exam(s): XR CXR 1 VIEW     EXAM:    XR Chest, 1 View    CLINICAL HISTORY:     Reason for exam: Progressive pneumonia.    TECHNIQUE:    Frontal view of the chest.    COMPARISON:  5 7 23    FINDINGS:    Lungs: Again seen is right lower lobe airspace consolidation, with foci   of bronchiectasis.  This appears grossly unchanged since the prior exam.    No new site of airspace consolidation is identified.  There is mild   atelectasis in the left lung base.  No pulmonary edema.    Pleural space: No evidence of pneumothorax.    Heart: Cardiac silhouette is within normal limits.    Mediastinum: No mediastinal widening or shift.    Bones joints: There is no evidence of acute osseous abnormality.    IMPRESSION:       Stable right lower lobe airspace consolidation with bronchiectasis.      Impression:          Electronically signed by Eron Rosario M.D. on 05-10-23 at 0953    CT Chest Without Contrast Diagnostic [[id number]] Collected: 05/08/23 1650     Updated: 05/09/23 1619    Narrative:      CT CHEST WITHOUT CONTRAST     HISTORY: 72-year-old female with empyema and lung abscess. Follow-up.     TECHNIQUE: Radiation dose reduction techniques were utilized, including  automated exposure control and exposure modulation based on body size.   3 mm images were obtained through the chest without the administration  of IV contrast. Compared with chest CT 05/02/2023.     FINDINGS: There has been development of a significantly larger number of  regions of cavitation throughout the airspace consolidations throughout  the right lower and middle lobes. The air-fluid level in the perihilar  region of the right lower lobe is slightly more prominent. The  small-moderate-sized loculated right pleural effusion appears largely  unchanged. There is a new moderate-sized left pleural effusion and a new  airspace consolidation adjacently. There are  also new ill-defined  airspace opacities throughout the dependent aspect of the left upper  lobe. Minimal pericardial effusion is stable. There is no acute  abnormality at the visualized upper abdomen. Extensive hepatic steatosis  is noted.       Impression:      1. Progression of lung abscesses at the right lower and middle lobes. No  significant change in the size of the right empyema.  2. The new large left lower lobe airspace consolidation and less-defined  airspace opacities throughout the dependent aspect of the left upper  lobe are suspicious for aspiration. There is also a new moderate-sized  left pleural effusion.     This report was finalized on 5/9/2023 4:16 PM by Dr. Aracely Regan M.D.             Lab Results:     Lab Results (last 24 hours)     Procedure Component Value Units Date/Time    Glucose, Body Fluid - Pleural Fluid, Pleural Cavity [[id number]] Collected: 05/10/23 1110    Specimen: Pleural Fluid from Pleural Cavity Updated: 05/10/23 1200    Protein, Body Fluid - Pleural Fluid, Pleural Cavity [[id number]] Collected: 05/10/23 1110    Specimen: Pleural Fluid from Pleural Cavity Updated: 05/10/23 1200    pH, Body Fluid - Pleural Fluid, Pleural Cavity [[id number]] Collected: 05/10/23 1110    Specimen: Pleural Fluid from Pleural Cavity Updated: 05/10/23 1200    Body Fluid Culture - Body Fluid, Pleural Cavity [[id number]] Collected: 05/10/23 1110    Specimen: Body Fluid from Pleural Cavity Updated: 05/10/23 1200    AFB Culture - Body Fluid, Pleural Cavity [[id number]] Collected: 05/10/23 1110    Specimen: Body Fluid from Pleural Cavity Updated: 05/10/23 1200    Hepatic Function Panel [[id number]]  (Abnormal) Collected: 05/10/23 0904    Specimen: Blood Updated: 05/10/23 1054     Total Protein 5.7 g/dL      Albumin 2.0 g/dL      ALT (SGPT) 42 U/L      AST (SGOT) 34 U/L      Alkaline Phosphatase 158 U/L      Total Bilirubin 0.4 mg/dL      Bilirubin, Direct <0.2 mg/dL      Bilirubin, Indirect --     Comment:  Unable to calculate       Histoplasma Antigen ser [[id number]] Collected: 05/10/23 1026    Specimen: Blood Updated: 05/10/23 1041    Basic Metabolic Panel [[id number]]  (Abnormal) Collected: 05/10/23 0904    Specimen: Blood Updated: 05/10/23 0942     Glucose 141 mg/dL      BUN 11 mg/dL      Creatinine 0.30 mg/dL      Sodium 135 mmol/L      Potassium 3.8 mmol/L      Comment: Slight hemolysis detected by analyzer. Results may be affected.        Chloride 100 mmol/L      CO2 29.6 mmol/L      Calcium 9.1 mg/dL      BUN/Creatinine Ratio 36.7     Anion Gap 5.4 mmol/L      eGFR 114.3 mL/min/1.73     Narrative:      GFR Normal >60  Chronic Kidney Disease <60  Kidney Failure <15      CBC & Differential [[id number]]  (Abnormal) Collected: 05/10/23 0707    Specimen: Blood Updated: 05/10/23 0728    Narrative:      The following orders were created for panel order CBC & Differential.  Procedure                               Abnormality         Status                     ---------                               -----------         ------                     CBC Auto Differential[[id number]]        Abnormal            Final result                 Please view results for these tests on the individual orders.    CBC Auto Differential [[id number]]  (Abnormal) Collected: 05/10/23 0707    Specimen: Blood Updated: 05/10/23 0728     WBC 28.06 10*3/mm3      RBC 3.56 10*6/mm3      Hemoglobin 11.0 g/dL      Hematocrit 32.6 %      MCV 91.6 fL      MCH 30.9 pg      MCHC 33.7 g/dL      RDW 13.4 %      RDW-SD 45.4 fl      MPV 10.0 fL      Platelets 317 10*3/mm3      Neutrophil % 86.2 %      Lymphocyte % 7.6 %      Monocyte % 3.2 %      Eosinophil % 0.3 %      Basophil % 0.2 %      Immature Grans % 2.5 %      Neutrophils, Absolute 24.20 10*3/mm3      Lymphocytes, Absolute 2.12 10*3/mm3      Monocytes, Absolute 0.89 10*3/mm3      Eosinophils, Absolute 0.08 10*3/mm3      Basophils, Absolute 0.07 10*3/mm3      Immature Grans, Absolute 0.70 10*3/mm3       nRBC 0.0 /100 WBC     Fungus Culture - Sputum, Cough [[id number]] Collected: 05/10/23 0459    Specimen: Sputum from Cough Updated: 05/10/23 0711    Respiratory Culture - Sputum, Cough [[id number]] Collected: 05/10/23 0441    Specimen: Sputum from Cough Updated: 05/10/23 0528    AFB Culture - Sputum, Cough [[id number]] Collected: 05/10/23 0441    Specimen: Sputum from Cough Updated: 05/10/23 0528    Blood Culture - Blood, Arm, Left [[id number]]  (Normal) Collected: 05/04/23 1815    Specimen: Blood from Arm, Left Updated: 05/09/23 1845     Blood Culture No growth at 5 days    Narrative:      Less than seven (7) mL's of blood was collected.  Insufficient quantity may yield false negative results.    Blood Culture - Blood, Hand, Right [[id number]]  (Normal) Collected: 05/04/23 1811    Specimen: Blood from Hand, Right Updated: 05/09/23 1845     Blood Culture No growth at 5 days    Narrative:      Less than seven (7) mL's of blood was collected.  Insufficient quantity may yield false negative results.    Histoplasma Ag Ur - Urine, Urine, Clean Catch [[id number]] Collected: 05/09/23 1558    Specimen: Urine, Clean Catch Updated: 05/09/23 1604    Blastomyces Antigen - Urine, Urine, Clean Catch [[id number]] Collected: 05/09/23 1558    Specimen: Urine, Clean Catch Updated: 05/09/23 1604    Cryptococcal Antigen [[id number]]  (Normal) Collected: 05/09/23 1400    Specimen: Blood Updated: 05/09/23 1443     Crypto Ag Negative    [facility] Comprehensive Panel [[id number]] Collected: 05/09/23 1400    Specimen: Blood Updated: 05/09/23 1409    Histoplasma Antibodies [[id number]] Collected: 05/09/23 1400    Specimen: Blood Updated: 05/09/23 1409    Aspergillus Galactomannan Antigen - Blood, Arm, Left [[id number]] Collected: 05/09/23 1400    Specimen: Blood from Arm, Left Updated: 05/09/23 1409    ANCA Panel [[id number]] Collected: 05/09/23 1400    Specimen: Blood Updated: 05/09/23 1409    Procalcitonin [[id number]]  (Normal) Collected: 05/09/23 0790     "Specimen: Blood Updated: 05/09/23 1406     Procalcitonin 0.23 ng/mL     Narrative:      As a Marker for Sepsis (Non-Neonates):    1. <0.5 ng/mL represents a low risk of severe sepsis and/or septic shock.  2. >2 ng/mL represents a high risk of severe sepsis and/or septic shock.    As a Marker for Lower Respiratory Tract Infections that require antibiotic therapy:    PCT on Admission    Antibiotic Therapy       6-12 Hrs later    >0.5                Strongly Recommended  >0.25 - <0.5        Recommended   0.1 - 0.25          Discouraged              Remeasure/reassess PCT  <0.1                Strongly Discouraged     Remeasure/reassess PCT    As 28 day mortality risk marker: \"Change in Procalcitonin Result\" (>80% or <=80%) if Day 0 (or Day 1) and Day 4 values are available. Refer to http://www.Postlingpct-calculator.com    Change in PCT <=80%  A decrease of PCT levels below or equal to 80% defines a positive change in PCT test result representing a higher risk for 28-day all-cause mortality of patients diagnosed with severe sepsis for septic shock.    Change in PCT >80%  A decrease of PCT levels of more than 80% defines a negative change in PCT result representing a lower risk for 28-day all-cause mortality of patients diagnosed with severe sepsis or septic shock.              Assessment & Plan       Acute respiratory failure with hypoxia    Benign essential HTN    Tobacco abuse    Pneumonia    Sepsis    Pleural effusion, right    Other emphysema    Pulmonary nodule       Assessment & Plan    Left pleural effusion: Moderate effusion as seen on CT chest.  Plan for left thoracentesis with fluid studies later today.  Chest x-ray from today demonstrates left pleural effusion has increased compared to 5/7/2023.  Recheck chest x-ray in a.m.    Pulmonary abscess/pneumonia: Appears slightly improved today, not as tachypneic or tachycardic.  Oxygen requirements improved, currently on 55 L, 55% FiO2.  Continue antibiotics per ID " recommendations.  Procalcitonin is much improved.  Trend WBC with daily labs.  Pulmonary and ID note reviewed.  Agree with bronchoscopy if she further declines.      Yvrose Stone DNP, APRN  [facility]  05/10/23  12:41 EDT

## 2023-05-10 NOTE — PROGRESS NOTES
ID NOTE    CC: f/u pneumonia    Subj: Feels about the same. No fever. Tolerating meropenem w/o rash. Getting IR guided thoracentesis today.       Medications:    Current Facility-Administered Medications:   •  acetaminophen (TYLENOL) tablet 650 mg, 650 mg, Oral, Q4H PRN **OR** acetaminophen (TYLENOL) 160 MG/5ML solution 650 mg, 650 mg, Oral, Q4H PRN **OR** acetaminophen (TYLENOL) suppository 650 mg, 650 mg, Rectal, Q4H PRN, Olga Palm MD  •  ascorbic acid (VITAMIN C) tablet 500 mg, 500 mg, Oral, Daily, Olga Palm MD, 500 mg at 05/09/23 0831  •  citalopram (CeleXA) tablet 20 mg, 20 mg, Oral, Daily, Gee Clement MD, 20 mg at 05/09/23 0831  •  doxycycline (VIBRAMYCIN) 100 mg in sodium chloride 0.9 % 100 mL IVPB-VTB, 100 mg, Intravenous, Q12H, Jennifer Hood MD, 100 mg at 05/10/23 0538  •  fluticasone (FLONASE) 50 MCG/ACT nasal spray 1 spray, 1 spray, Each Nare, Daily, Olga Palm MD, 1 spray at 05/08/23 0808  •  guaiFENesin (MUCINEX) 12 hr tablet 600 mg, 600 mg, Oral, BID, Yvrose Stone DNP, APRN, 600 mg at 05/09/23 2049  •  Hold medication, 1 each, Does not apply, Continuous PRN, Yvrose Stone DNP, APRN  •  ipratropium-albuterol (DUO-NEB) nebulizer solution 3 mL, 3 mL, Nebulization, 4x Daily - RT, Olga Palm MD, 3 mL at 05/10/23 0808  •  loperamide (IMODIUM) capsule 2 mg, 2 mg, Oral, Q6H PRN, Gee Clement MD, 2 mg at 05/07/23 1822  •  losartan (COZAAR) tablet 50 mg, 50 mg, Oral, Q24H, Olga Palm MD, 50 mg at 05/09/23 0831  •  Magnesium Sulfate - Total Dose 10 grams - Magnesium 1 or Less, 2 g, Intravenous, PRN **OR** Magnesium Sulfate - Total Dose 6 grams - Magnesium 1.1 - 1.5, 2 g, Intravenous, PRN **OR** Magnesium Sulfate - Total Dose 4 grams - Magnesium 1.6 - 1.9, 4 g, Intravenous, PRN, Ford Britton, DO  •  meropenem (MERREM) 1 g in sodium chloride 0.9 % 100 mL IVPB-VTB, 1 g, Intravenous, Q8H, Jennifer Hood MD, 1 g at 05/10/23 0439  •  mirtazapine (REMERON) tablet 7.5 mg, 7.5 mg,  Oral, Nightly, Yfn Garcia III, MD, 7.5 mg at 05/09/23 2049  •  nitroglycerin (NITROSTAT) SL tablet 0.4 mg, 0.4 mg, Sublingual, Q5 Min PRN, Olga Palm MD  •  ondansetron (ZOFRAN) injection 4 mg, 4 mg, Intravenous, Q6H PRN, Olga Palm MD  •  potassium & sodium phosphates (PHOS-NAK) 280-160-250 MG packet - for Phosphorus less than 1.25 mg/dL, 2 packet, Oral, Q6H PRN **OR** potassium & sodium phosphates (PHOS-NAK) 280-160-250 MG packet - for Phosphorus 1.25 - 2.5 mg/dL, 2 packet, Oral, Q6H PRN, Ford Britton DO  •  potassium chloride (K-DUR,KLOR-CON) ER tablet 40 mEq, 40 mEq, Oral, PRN, 40 mEq at 05/07/23 0654 **OR** potassium chloride (KLOR-CON) packet 40 mEq, 40 mEq, Oral, PRN **OR** potassium chloride 10 mEq in 100 mL IVPB, 10 mEq, Intravenous, Q1H PRN, Ford Britton DO  •  [COMPLETED] Insert Peripheral IV, , , Once **AND** sodium chloride 0.9 % flush 10 mL, 10 mL, Intravenous, PRN, Olga Palm MD  •  sodium chloride 0.9 % flush 10 mL, 10 mL, Intravenous, Q12H, Olga Palm MD, 10 mL at 05/09/23 2050  •  sodium chloride 0.9 % flush 10 mL, 10 mL, Intravenous, PRN, Olga Palm MD  •  sodium chloride 0.9 % infusion 40 mL, 40 mL, Intravenous, PRN, Olga Palm MD  •  vitamin B-12 (CYANOCOBALAMIN) tablet 1,000 mcg, 1,000 mcg, Oral, Daily, Olga Palm MD, 1,000 mcg at 05/09/23 0830      Objective   Vital Signs   Temp:  [98.1 °F (36.7 °C)-100.5 °F (38.1 °C)] 98.4 °F (36.9 °C)  Heart Rate:  [] 75  Resp:  [18-22] 20  BP: (101-186)/(52-85) 147/73    Physical Exam:   General: awake, alert, very nice but frail appearing  Eyes: no scleral icterus  ENT: no thrush; HFNC in nares  Cardiovascular: NR  Respiratory: tachypneic on HFNC  GI: Abdomen is soft, not tender  :  no Snell catheter  Skin: No rashes  Neurological: Alert and oriented x 3  Psychiatric: Normal mood and affect     Labs:   CBC, Crypto Ag, Procal, and fungal studies and cultures reviewed today  Lab Results   Component Value Date     WBC 28.06 (H) 05/10/2023    HGB 11.0 (L) 05/10/2023    HCT 32.6 (L) 05/10/2023    MCV 91.6 05/10/2023     05/10/2023     Lab Results   Component Value Date    GLUCOSE 102 (H) 05/09/2023    CALCIUM 8.5 (L) 05/09/2023     (L) 05/09/2023    K 4.7 05/09/2023    CO2 30.0 (H) 05/09/2023     05/09/2023    BUN 12 05/09/2023    CREATININE 0.31 (L) 05/09/2023    EGFR 113.4 05/09/2023    BCR 38.7 (H) 05/09/2023    ANIONGAP 5.0 05/09/2023     Procal 3.95--->2.6-->0.2  Crypto Ag negative  Urine Histo Ag pending  Serum Histo Ag pending  Urine Blasto Ag pending  Aspergillus Ag pending    Microbiology:  5/2 RPP: negative  5/2 BCx: Corynebacterium in 1/2 sets  5/2 SpCx: normal aishwarya  5/2 MRSA nares: negative  5/2 Strep pneumo Ag; negative  5/2 Legionella Ag: negatie  5/4 BCx: negative  5/5 C diff: negative  5/6 SpCx: normal aishwarya  5/9 AFB Cx: pending  5/9 Fungus Cx: pending  5/90 SpCx: pending     Radiology:  5/10 CXR with BLL infiltrates on my read; right worse than left    ASSESSMENT/PLAN:  1. Pneumonia and right lung abscess  2. Pleural effusions  3. Acute hypoxic respiratory failure  4. Emphysema  5. Tobacco use    No fever and procal trending down. However, her supplemental O2 needs remain high and her WBC is up in the 20s now. Thoracic evaluated and plans for IR-guided thoracentesis today. I will follow-up the results. Repeat sputum cultures (bacteria, fungus, and AFB) obtained yesterday and are pending. Continue meropenem 1 g IV q8h. She remains too unstable for bronchoscopy. D/W Dr Hood. Intubation may be necessary.     ID will follow.

## 2023-05-11 ENCOUNTER — APPOINTMENT (OUTPATIENT)
Dept: GENERAL RADIOLOGY | Facility: HOSPITAL | Age: 73
DRG: 871 | End: 2023-05-11
Payer: COMMERCIAL

## 2023-05-11 LAB
ANION GAP SERPL CALCULATED.3IONS-SCNC: 3 MMOL/L (ref 5–15)
BASOPHILS # BLD AUTO: 0.03 10*3/MM3 (ref 0–0.2)
BASOPHILS NFR BLD AUTO: 0.2 % (ref 0–1.5)
BUN SERPL-MCNC: 13 MG/DL (ref 8–23)
BUN/CREAT SERPL: 56.5 (ref 7–25)
C-ANCA TITR SER IF: NORMAL TITER
CALCIUM SPEC-SCNC: 8.3 MG/DL (ref 8.6–10.5)
CHLORIDE SERPL-SCNC: 102 MMOL/L (ref 98–107)
CO2 SERPL-SCNC: 32 MMOL/L (ref 22–29)
CREAT SERPL-MCNC: 0.23 MG/DL (ref 0.57–1)
DEPRECATED RDW RBC AUTO: 45.5 FL (ref 37–54)
EGFRCR SERPLBLD CKD-EPI 2021: 121.8 ML/MIN/1.73
EOSINOPHIL # BLD AUTO: 0.06 10*3/MM3 (ref 0–0.4)
EOSINOPHIL NFR BLD AUTO: 0.3 % (ref 0.3–6.2)
ERYTHROCYTE [DISTWIDTH] IN BLOOD BY AUTOMATED COUNT: 13.4 % (ref 12.3–15.4)
GLUCOSE SERPL-MCNC: 93 MG/DL (ref 65–99)
HCT VFR BLD AUTO: 31.4 % (ref 34–46.6)
HGB BLD-MCNC: 10.3 G/DL (ref 12–15.9)
IMM GRANULOCYTES # BLD AUTO: 0.23 10*3/MM3 (ref 0–0.05)
IMM GRANULOCYTES NFR BLD AUTO: 1.2 % (ref 0–0.5)
LYMPHOCYTES # BLD AUTO: 1.6 10*3/MM3 (ref 0.7–3.1)
LYMPHOCYTES NFR BLD AUTO: 8.3 % (ref 19.6–45.3)
MCH RBC QN AUTO: 30.1 PG (ref 26.6–33)
MCHC RBC AUTO-ENTMCNC: 32.8 G/DL (ref 31.5–35.7)
MCV RBC AUTO: 91.8 FL (ref 79–97)
MONOCYTES # BLD AUTO: 0.71 10*3/MM3 (ref 0.1–0.9)
MONOCYTES NFR BLD AUTO: 3.7 % (ref 5–12)
MYELOPEROXIDASE AB SER IA-ACNC: <0.2 UNITS (ref 0–0.9)
NEUTROPHILS NFR BLD AUTO: 16.73 10*3/MM3 (ref 1.7–7)
NEUTROPHILS NFR BLD AUTO: 86.3 % (ref 42.7–76)
NRBC BLD AUTO-RTO: 0 /100 WBC (ref 0–0.2)
P-ANCA ATYPICAL TITR SER IF: NORMAL TITER
P-ANCA TITR SER IF: NORMAL TITER
PLATELET # BLD AUTO: 296 10*3/MM3 (ref 140–450)
PMV BLD AUTO: 9.9 FL (ref 6–12)
POTASSIUM SERPL-SCNC: 3.8 MMOL/L (ref 3.5–5.2)
PROTEINASE3 AB SER IA-ACNC: <0.2 UNITS (ref 0–0.9)
RBC # BLD AUTO: 3.42 10*6/MM3 (ref 3.77–5.28)
SODIUM SERPL-SCNC: 137 MMOL/L (ref 136–145)
WBC NRBC COR # BLD: 19.36 10*3/MM3 (ref 3.4–10.8)

## 2023-05-11 PROCEDURE — 25010000002 ONDANSETRON PER 1 MG: Performed by: INTERNAL MEDICINE

## 2023-05-11 PROCEDURE — 94664 DEMO&/EVAL PT USE INHALER: CPT

## 2023-05-11 PROCEDURE — 99232 SBSQ HOSP IP/OBS MODERATE 35: CPT | Performed by: INTERNAL MEDICINE

## 2023-05-11 PROCEDURE — 94799 UNLISTED PULMONARY SVC/PX: CPT

## 2023-05-11 PROCEDURE — 85025 COMPLETE CBC W/AUTO DIFF WBC: CPT | Performed by: STUDENT IN AN ORGANIZED HEALTH CARE EDUCATION/TRAINING PROGRAM

## 2023-05-11 PROCEDURE — 71045 X-RAY EXAM CHEST 1 VIEW: CPT

## 2023-05-11 PROCEDURE — 25010000002 MEROPENEM PER 100 MG: Performed by: INTERNAL MEDICINE

## 2023-05-11 PROCEDURE — 80048 BASIC METABOLIC PNL TOTAL CA: CPT | Performed by: STUDENT IN AN ORGANIZED HEALTH CARE EDUCATION/TRAINING PROGRAM

## 2023-05-11 PROCEDURE — 94761 N-INVAS EAR/PLS OXIMETRY MLT: CPT

## 2023-05-11 PROCEDURE — 25010000002 ENOXAPARIN PER 10 MG: Performed by: INTERNAL MEDICINE

## 2023-05-11 PROCEDURE — 99232 SBSQ HOSP IP/OBS MODERATE 35: CPT | Performed by: NURSE PRACTITIONER

## 2023-05-11 RX ORDER — ENOXAPARIN SODIUM 100 MG/ML
40 INJECTION SUBCUTANEOUS EVERY 24 HOURS
Status: DISCONTINUED | OUTPATIENT
Start: 2023-05-11 | End: 2023-05-14

## 2023-05-11 RX ADMIN — GUAIFENESIN 600 MG: 600 TABLET, EXTENDED RELEASE ORAL at 08:34

## 2023-05-11 RX ADMIN — MEROPENEM 1 G: 1 INJECTION, POWDER, FOR SOLUTION INTRAVENOUS at 12:00

## 2023-05-11 RX ADMIN — CITALOPRAM 20 MG: 20 TABLET, FILM COATED ORAL at 08:34

## 2023-05-11 RX ADMIN — MEROPENEM 1 G: 1 INJECTION, POWDER, FOR SOLUTION INTRAVENOUS at 06:21

## 2023-05-11 RX ADMIN — IPRATROPIUM BROMIDE AND ALBUTEROL SULFATE 3 ML: 2.5; .5 SOLUTION RESPIRATORY (INHALATION) at 11:31

## 2023-05-11 RX ADMIN — Medication 10 ML: at 08:34

## 2023-05-11 RX ADMIN — ENOXAPARIN SODIUM 40 MG: 100 INJECTION SUBCUTANEOUS at 15:45

## 2023-05-11 RX ADMIN — IPRATROPIUM BROMIDE AND ALBUTEROL SULFATE 3 ML: 2.5; .5 SOLUTION RESPIRATORY (INHALATION) at 19:17

## 2023-05-11 RX ADMIN — MEROPENEM 1 G: 1 INJECTION, POWDER, FOR SOLUTION INTRAVENOUS at 22:08

## 2023-05-11 RX ADMIN — ONDANSETRON 4 MG: 2 INJECTION INTRAMUSCULAR; INTRAVENOUS at 18:55

## 2023-05-11 RX ADMIN — DOXYCYCLINE 100 MG: 100 INJECTION, POWDER, LYOPHILIZED, FOR SOLUTION INTRAVENOUS at 04:17

## 2023-05-11 RX ADMIN — OXYCODONE HYDROCHLORIDE AND ACETAMINOPHEN 500 MG: 500 TABLET ORAL at 08:34

## 2023-05-11 RX ADMIN — MIRTAZAPINE 7.5 MG: 15 TABLET, FILM COATED ORAL at 20:27

## 2023-05-11 RX ADMIN — GUAIFENESIN 600 MG: 600 TABLET, EXTENDED RELEASE ORAL at 20:28

## 2023-05-11 RX ADMIN — LOSARTAN POTASSIUM 50 MG: 50 TABLET, FILM COATED ORAL at 08:34

## 2023-05-11 RX ADMIN — DOXYCYCLINE 100 MG: 100 INJECTION, POWDER, LYOPHILIZED, FOR SOLUTION INTRAVENOUS at 16:44

## 2023-05-11 RX ADMIN — Medication 1000 MCG: at 08:34

## 2023-05-11 RX ADMIN — Medication 10 ML: at 20:27

## 2023-05-11 NOTE — PAYOR COMM NOTE
"Clay Katherine CAST (72 y.o. Female)                                      ATTENTION, - CONTINUED STAY LORRI CASE  REF #     [id number]                                     REPLY TO JOHANNA GOODEN N   [phone]  - OR [facility]X TO [facility]                      Date of Birth   1950    Social Security Number       Address   [address] [address]    Home Phone   [phone]    MRN   [id number]       [employer]beterian   Rastafari    Marital Status                               Admission Date   5/2/23    Admission Type   Emergency    Admitting Provider   Olga Palm MD    Attending Provider   Gee Clement MD    Department, Room/Bed   [facility] CARDIAC INTENSIVE CARE, 3009/1       Discharge Date       Discharge Disposition       Discharge Destination                               Attending Provider: Gee Clement MD    Allergies: Hydrocodone-acetaminophen    Isolation: None   Infection: None   Code Status: CPR    Ht: 160 cm (63\")   Wt: 65.8 kg (145 lb 1 oz)    Admission Cmt: None   Principal Problem: Acute respiratory failure with hypoxia [J96.01]                 Active Insurance as of 5/2/2023     Primary Coverage     Payor Plan Insurance Group Employer/Plan Group    ANTHEM BLUE CROSS ANTHEM BLUE CROSS BLUE SHIELD PPO [id number]     Payor Plan Address Payor Plan Phone Number Payor Plan Fax Number Effective Dates    [address] [phone]  3/24/2013 - None Entered    [facility] [id number]       Subscriber Name Subscriber Birth Date Member ID       SAMANTHA CERDA III 3/3/1958 [id number]           Secondary Coverage     Payor Plan Insurance Group Employer/Plan Group    MEDICARE MEDICARE A ONLY      Payor Plan Address Payor Plan Phone Number Payor Plan Fax Number Effective Dates    [address] [phone]  12/1/2017 - None Entered    [facility] [id number]       Subscriber Name Subscriber Birth Date Member ID       KATHERINE CERDA 1950 [id number]           "       Emergency Contacts      (Rel.) Home Phone Work Phone Mobile Phone    KERRI lauren (Daughter) [phone] -- --    JanakOdalys (Other) -- -- [phone]    Ronnie Williamson III (POA) (Spouse) [phone] -- --            Vital Signs (last day)     Date/Time Temp Temp src Pulse Resp BP Patient Position SpO2    05/11/23 1131 -- -- 91 18 -- -- 94    05/11/23 0700 -- -- 82 -- 142/60 -- 94    05/11/23 0600 -- -- 77 -- 152/67 -- 92    05/11/23 0530 -- -- 81 -- 145/64 -- 93    05/11/23 0500 -- -- 92 -- 149/81 -- 93    05/11/23 0430 -- -- 79 -- 145/72 -- 95    05/11/23 0400 -- -- 77 -- 157/79 -- 95    05/11/23 0330 -- -- 90 -- 118/62 -- 96    05/11/23 0300 -- -- 78 -- 138/72 -- 93    05/11/23 0230 -- -- 84 -- 131/64 -- 94    05/11/23 0200 -- -- 84 -- 111/59 -- 94    05/11/23 0130 -- -- 87 -- 119/59 -- 93    05/11/23 0100 -- -- 92 -- 143/65 -- 93    05/11/23 0030 -- -- 86 -- 129/64 -- 94    05/11/23 0000 -- -- 83 -- 136/64 -- 94    05/10/23 2330 -- -- 83 -- 134/67 -- 92    05/10/23 2300 -- -- 89 -- 130/61 -- 92    05/10/23 2230 -- -- 91 -- 107/55 -- 93    05/10/23 2200 -- -- 94 -- 97/53 -- 95    05/10/23 2130 -- -- 73 -- 114/67 -- 93    05/10/23 2100 -- -- 95 -- 125/52 -- 98    05/10/23 2055 -- -- 91 -- -- -- 93    05/10/23 2030 -- -- 104 -- 143/119 -- 94    05/10/23 2000 98.6 (37) Oral 94 -- 136/65 -- 94    05/10/23 1930 -- -- 97 -- 131/66 -- 92    05/10/23 1900 -- -- 100 -- 119/64 -- 94    05/10/23 1800 -- -- 104 -- 109/57 -- 93    05/10/23 1700 -- -- 101 -- 113/64 -- 91    05/10/23 1600 -- -- 95 -- 140/63 -- 95    05/10/23 1531 -- -- 97 20 -- -- 95    05/10/23 1500 98.4 (36.9) Oral 96 -- 128/65 -- 96    05/10/23 1400 -- -- 90 -- 133/66 -- 94    05/10/23 1300 -- -- 91 -- 140/68 -- 99    05/10/23 1253 -- -- -- 20 -- -- --    05/10/23 1200 -- -- 94 -- 139/67 -- 96    05/10/23 1100 98.9 (37.2) Oral 96 -- 139/81 -- 96    05/10/23 1000 -- -- 93 -- 100/58 -- 94    05/10/23 0900 -- -- 100 -- 116/70 -- 97     05/10/23 0808 -- -- -- 20 -- -- --    05/10/23 0800 -- -- 92 -- 149/65 -- 92    05/10/23 0710 99.5 (37.5) Oral -- -- -- -- --    05/10/23 0700 -- -- 86 -- 149/74 -- 96    05/10/23 0600 -- -- 75 -- 147/73 -- 97    05/10/23 0500 -- -- 79 -- 137/74 -- 92    05/10/23 0400 98.4 (36.9) Oral 91 18 133/61 Lying 96    05/10/23 0308 -- -- 86 20 -- -- 96    05/10/23 0300 -- -- 84 -- 111/56 -- 96    05/10/23 0200 -- -- 76 -- 103/64 -- 95    05/10/23 0100 -- -- 88 -- 112/61 -- 96    05/10/23 0000 98.1 (36.7) -- 76 -- 130/55 -- 97          Oxygen Therapy (last day)     Date/Time SpO2 Device (Oxygen Therapy) Flow (L/min) Oxygen Concentration (%) ETCO2 (mmHg)    05/11/23 1131 94 high-flow nasal cannula;humidified 8 -- --    05/11/23 0700 94 -- -- -- --    05/11/23 0600 92 -- -- -- --    05/11/23 0530 93 -- -- -- --    05/11/23 0500 93 -- -- -- --    05/11/23 0430 95 -- -- -- --    05/11/23 0400 95 high-flow nasal cannula 8 50 --    05/11/23 0330 96 -- -- -- --    05/11/23 0300 93 -- -- -- --    05/11/23 0230 94 -- -- -- --    05/11/23 0200 94 -- -- -- --    05/11/23 0130 93 -- -- -- --    05/11/23 0100 93 -- -- -- --    05/11/23 0030 94 -- -- -- --    05/11/23 0000 94 high-flow nasal cannula 8 -- --    05/10/23 2330 92 -- -- -- --    05/10/23 2300 92 -- -- -- --    05/10/23 2230 93 -- -- -- --    05/10/23 2200 95 -- -- -- --    05/10/23 2130 93 -- -- -- --    05/10/23 2100 98 -- -- -- --    05/10/23 2055 93 high-flow nasal cannula 8 -- --    05/10/23 2030 94 -- -- -- --    05/10/23 2000 94 high-flow nasal cannula 8 -- --    05/10/23 1930 92 -- -- -- --    05/10/23 1900 94 -- -- -- --    05/10/23 1800 93 -- -- -- --    05/10/23 1700 91 -- -- -- --    05/10/23 1600 95 -- -- -- --    05/10/23 1531 95 high-flow nasal cannula;humidified  8 -- --    05/10/23 1500 96 -- -- -- --    05/10/23 1400 94 -- -- -- --    05/10/23 1300 99 -- -- -- --    05/10/23 1253 -- high-flow nasal cannula;heated;humidified 50  50 --    05/10/23 1200 96 -- --  -- --    05/10/23 1100 96 -- -- -- --    05/10/23 1000 94 -- -- -- --    05/10/23 0900 97 -- -- -- --    05/10/23 0808 -- high-flow nasal cannula;heated;humidified 55 55 --    05/10/23 0800 92 -- -- -- --    05/10/23 0700 96 -- -- -- --    05/10/23 0600 97 -- -- -- --    05/10/23 0500 92 -- -- -- --    05/10/23 0400 96 heated;humidified;high-flow nasal cannula 55 60 --    05/10/23 0308 96 heated;humidified;high-flow nasal cannula 55 60 --    05/10/23 0300 96 -- -- -- --    05/10/23 0200 95 -- -- -- --    05/10/23 0100 96 -- -- -- --    05/10/23 0000 97 heated;high-flow nasal cannula;humidified 55 63 --          Lines, Drains & Airways     Active LDAs     Name Placement date Placement time Site Days    Peripheral IV 05/02/23 1430 Right Antecubital 05/02/23  1430  Antecubital  8    Peripheral IV 05/03/23 1410 Anterior;Right Wrist 05/03/23  1410  Wrist  7    Peripheral IV 05/08/23 1323 Left Antecubital 05/08/23  1323  Antecubital  2    External Urinary Catheter 05/07/23  --  --  4                Orders (last 24 hrs)      Start     Ordered    05/11/23 1400  Enoxaparin Sodium (LOVENOX) syringe 40 mg  Every 24 Hours         05/11/23 1131    05/11/23 0912  Transfer Patient  Once         05/11/23 0912    05/11/23 0600  XR Chest 1 View  1 Time Imaging         05/10/23 0825    05/11/23 0600  CBC Auto Differential  PROCEDURE ONCE         05/10/23 2202    05/10/23 1804  POC Glucose Once  PROCEDURE ONCE         05/10/23 1801    05/09/23 2000  meropenem (MERREM) 1 g in sodium chloride 0.9 % 100 mL IVPB-VTB  Every 8 Hours         05/09/23 1312    05/09/23 1600  doxycycline (VIBRAMYCIN) 100 mg in sodium chloride 0.9 % 100 mL IVPB-VTB  Every 12 Hours         05/09/23 1314    05/09/23 1543  Hold medication  Continuous PRN         05/09/23 1550    05/07/23 1800  Dietary Nutrition Supplements Boost Plus (Ensure Enlive, Ensure Plus)  Daily With Breakfast, Lunch & Dinner       05/07/23 1203    05/07/23 1306  loperamide (IMODIUM) capsule  2 mg  Every 6 Hours PRN         05/07/23 1307    05/06/23 2100  mirtazapine (REMERON) tablet 7.5 mg  Nightly         05/06/23 1539    05/05/23 1500  citalopram (CeleXA) tablet 20 mg  Daily         05/05/23 1316    05/04/23 0600  Basic Metabolic Panel  Daily       05/03/23 0743    05/04/23 0600  CBC & Differential  Daily       05/03/23 0743    05/03/23 2200  Incentive Spirometry  Every 4 Hours While Awake       05/03/23 1833    05/03/23 1400  guaiFENesin (MUCINEX) 12 hr tablet 600 mg  2 Times Daily         05/03/23 1210    05/03/23 0900  losartan (COZAAR) tablet 50 mg  Every 24 Hours Scheduled         05/02/23 2007 05/03/23 0900  vitamin B-12 (CYANOCOBALAMIN) tablet 1,000 mcg  Daily         05/02/23 2007 05/03/23 0900  ascorbic acid (VITAMIN C) tablet 500 mg  Daily         05/02/23 2007 05/03/23 0900  fluticasone (FLONASE) 50 MCG/ACT nasal spray 1 spray  Daily         05/02/23 2007 05/03/23 0800  Oral Care  2 Times Daily       05/02/23 2007 05/03/23 0800  Strict Intake & Output  Every Hour       05/03/23 0743    05/03/23 0744  Daily Weights  Daily       05/03/23 0743    05/03/23 0744  Patient Currently On Electrolyte Replacement Protocol - Please Refer to MAR for Protocol Details  Misc Nursing Order (Specify)  Daily      Comments: Patient Currently On Electrolyte Replacement Protocol - Please Refer to MAR for Protocol Details    05/03/23 0743    05/03/23 0742  potassium & sodium phosphates (PHOS-NAK) 280-160-250 MG packet - for Phosphorus less than 1.25 mg/dL  Every 6 Hours PRN        See Hyperspace for full Linked Orders Report.    05/03/23 0743    05/03/23 0742  potassium & sodium phosphates (PHOS-NAK) 280-160-250 MG packet - for Phosphorus 1.25 - 2.5 mg/dL  Every 6 Hours PRN        See Hyperspace for full Linked Orders Report.    05/03/23 0743    05/03/23 0742  Magnesium Sulfate - Total Dose 10 grams - Magnesium 1 or Less  As Needed        See Hyperspace for full Linked Orders Report.    05/03/23  0743    05/03/23 0742  Magnesium Sulfate - Total Dose 6 grams - Magnesium 1.1 - 1.5  As Needed        See Hyperspace for full Linked Orders Report.    05/03/23 0743    05/03/23 0742  Magnesium Sulfate - Total Dose 4 grams - Magnesium 1.6 - 1.9  As Needed        See Hyperspace for full Linked Orders Report.    05/03/23 0743    05/03/23 0742  potassium chloride (K-DUR,KLOR-CON) ER tablet 40 mEq  As Needed        See Hyperspace for full Linked Orders Report.    05/03/23 0743    05/03/23 0742  potassium chloride (KLOR-CON) packet 40 mEq  As Needed        See Hyperspace for full Linked Orders Report.    05/03/23 0743    05/03/23 0742  potassium chloride 10 mEq in 100 mL IVPB  Every 1 Hour PRN        See Hyperspace for full Linked Orders Report.    05/03/23 0743    05/03/23 0000  Vital Signs  Every 4 Hours       05/02/23 2007 05/02/23 2100  sodium chloride 0.9 % flush 10 mL  Every 12 Hours Scheduled         05/02/23 2007 05/02/23 2008  Intake & Output  Every Shift       05/02/23 2007 05/02/23 2007  sodium chloride 0.9 % flush 10 mL  As Needed         05/02/23 2007 05/02/23 2007  sodium chloride 0.9 % infusion 40 mL  As Needed         05/02/23 2007 05/02/23 2007  nitroglycerin (NITROSTAT) SL tablet 0.4 mg  Every 5 Minutes PRN         05/02/23 2007 05/02/23 2007  acetaminophen (TYLENOL) tablet 650 mg  Every 4 Hours PRN        See Hyperspace for full Linked Orders Report.    05/02/23 2007 05/02/23 2007  acetaminophen (TYLENOL) 160 MG/5ML solution 650 mg  Every 4 Hours PRN        See Hyperspace for full Linked Orders Report.    05/02/23 2007 05/02/23 2007  acetaminophen (TYLENOL) suppository 650 mg  Every 4 Hours PRN        See Hyperspace for full Linked Orders Report.    05/02/23 2007 05/02/23 2007  ondansetron (ZOFRAN) injection 4 mg  Every 6 Hours PRN         05/02/23 2007 05/02/23 1630  ipratropium-albuterol (DUO-NEB) nebulizer solution 3 mL  4 Times Daily - RT         05/02/23 3053     05/02/23 1403  sodium chloride 0.9 % flush 10 mL  As Needed        See Hyperspace for full Linked Orders Report.    05/02/23 1403    Unscheduled  Telemetry - Pulse Oximetry  Continuous PRN      Comments: If Patient Develops Unresponsiveness, Acute Dyspnea, Cyanosis or Suspected Hypoxemia Start Continuous Pulse Ox Monitoring, Apply Oxygen & Notify Provider    05/02/23 2007    Unscheduled  ECG 12 Lead Chest Pain  As Needed      Comments: Nurse to Release if Patient Expericences Acute Chest Pain or Dysrhythmias    05/02/23 2007    Unscheduled  Potassium  As Needed      Comments: For Ventricular Arrhythmias      05/02/23 2007    Unscheduled  Magnesium  As Needed      Comments: For Ventricular Arrhythmias      05/02/23 2007    Unscheduled  High Sensitivity Troponin T  As Needed      Comments: For Chest Pain      05/02/23 2007    Unscheduled  Blood Gas, Arterial -  As Needed      Comments: Draw for Acute Dyspnea, Cyanosis, Suspected Hypoxemia or UnresponsivenessNotify Provider of Results      05/02/23 2007    Unscheduled  Initiate & Follow Electrolyte Replacement Protocol  As Needed       05/03/23 0743    --  SCANNED - TELEMETRY           05/02/23 0000    --  SCANNED - TELEMETRY           05/02/23 0000    --  SCANNED - TELEMETRY           05/02/23 0000    --  omeprazole (priLOSEC) 40 MG capsule  Daily         05/09/23 0958                   Physician Progress Notes (last 24 hours)      Yfn Garcia III, MD at 05/11/23 1245        The patient is much brighter today.  She is wearing only a nasal cannula today and is very encouraged by her medical progress.  She states that she is sleeping well and eating well and her mood is greatly improved.  From a psychiatric standpoint she appears greatly improved and I will sign off.    Electronically signed by Yfn Garcia III, MD at 05/11/23 1245     Yvrose Stone, HILARIA, APRN at 05/11/23 1150              Chief Complaint: Pneumonia, right lung abscess,  "bilateral pleural effusions      Subjective:  Symptoms:  Improved.  She reports shortness of breath and weakness.  No chest pressure.    Diet:  Poor intake.    Activity level: Impaired due to weakness.    Pain:  She reports no pain.    She is improved today. Off heated high flow, now on 8L high flow. Somewhat weak      Vital Signs:  Temp:  [98.4 °F (36.9 °C)-98.6 °F (37 °C)] 98.6 °F (37 °C)  Heart Rate:  [] 91  Resp:  [18-20] 18  BP: ()/() 142/60    Intake & Output (last day)       05/10 0701  05/11 0700 05/11 0701  05/12 0700    P.O. 425     I.V. (mL/kg) 132 (2)     IV Piggyback 400     Total Intake(mL/kg) 957 (14.5)     Urine (mL/kg/hr) 650 (0.4)     Total Output 650     Net +307                 Objective:  General Appearance:  Comfortable, in no acute distress and well-appearing.    Vital signs: (most recent): Blood pressure 142/60, pulse 91, temperature 98.6 °F (37 °C), temperature source Oral, resp. rate 18, height 160 cm (63\"), weight 65.8 kg (145 lb 1 oz), SpO2 94 %.  Vital signs are normal.    HEENT: Normal HEENT exam.  (High flow nasal cannula)    Lungs:  Normal effort.  She is not in respiratory distress.    Heart: Normal rate.    Extremities: Decreased range of motion.    Neurological: Patient is alert.    Pupils:  Pupils are equal, round, and reactive to light.    Skin:  Warm and dry.                Results Review:     I reviewed the patient's new clinical results.  I reviewed the patient's new imaging results and agree with the interpretation.  I reviewed the patient's other test results and agree with the interpretation  Discussed with Patient, RN and Dr. Lombardo    Imaging Results (Last 24 Hours)     Procedure Component Value Units Date/Time    XR Chest 1 View [[id number]] Collected: 05/11/23 0544     Updated: 05/11/23 0544    Narrative:        Patient: ANNIE CERDA  Time Out: 05:43  Exam(s): XR CXR 1 VIEW     EXAM:    XR Chest, 1 View    CLINICAL HISTORY:     Reason for exam: Pneumonia, " respiratory failure.    TECHNIQUE:    Frontal view of the chest.    COMPARISON:  5-10-23    IMPRESSION:       1.  No significant change from prior study.    Impression:          Electronically signed by Fred Whitmore MD on 05-11-23 at 0543    US Thoracentesis [[id number]] Collected: 05/10/23 1404    Specimen: Body Fluid Updated: 05/10/23 1410    Narrative:      ULTRASOUND-GUIDED LEFT THORACENTESIS     HISTORY: Pleural effusion     After signed informed consent was obtained the patient was prepped and  draped in the usual sterile fashion with 2% chlorhexidine. Lidocaine was  used for local anesthesia.     Ultrasound guidance was used to place a 5 Amharic catheter into the left  pleural fluid collection. 100 mL of straw-colored fluid was removed.  Sample was sent to the lab.     Confirmatory images were obtained.     Patient tolerated the procedure well with no immediate complications.        Impression:      Ultrasound-guided left thoracentesis as described        This report was finalized on 5/10/2023 2:07 PM by Dr. Adilson Vann M.D.             Lab Results:     Lab Results (last 24 hours)     Procedure Component Value Units Date/Time    Body Fluid Culture - Body Fluid, Pleural Cavity [[id number]] Collected: 05/10/23 1110    Specimen: Body Fluid from Pleural Cavity Updated: 05/11/23 0900     Body Fluid Culture No growth     Gram Stain Rare (1+) WBCs per low power field      No organisms seen    ANCA Panel [[id number]] Collected: 05/09/23 1400    Specimen: Blood Updated: 05/11/23 0819     ANTI-MPO ANTIBODIES <0.2 units      ANTI-PR3 ANTIBODIES <0.2 units      C-ANCA <1:20 titer      P-ANCA <1:20 titer      Comment: The presence of positive fluorescence exhibiting P-ANCA or C-ANCA  patterns alone is not specific for the diagnosis of Wegener's  Granulomatosis (WG) or microscopic polyangiitis. Decisions about  treatment should not be based solely on ANCA IFA results.  The  International ANCA Group Consensus  recommends follow up testing of  positive sera with both MI-3 and MPO-ANCA enzyme immunoassays. As  many as 5% serum samples are positive only by EIA.  Ref. AM J Clin Pathol 1999;111:507-513.        Atypical pANCA <1:20 titer      Comment: The atypical pANCA pattern has been observed in a significant  percentage of patients with ulcerative colitis, primary sclerosing  cholangitis and autoimmune hepatitis.       Narrative:      Performed at:  01 - [facility]  [id number]  : Marcelino Hyman MD, Phone:  [phone]  Performed at:  02 - [facility] [address]  [id number]  : Ricco Troncoso PhD, Phone:  [phone]    Respiratory Culture - Sputum, Cough [[id number]] Collected: 05/10/23 0441    Specimen: Sputum from Cough Updated: 05/11/23 0442     Gram Stain Many (4+) WBCs per low power field      Rare (1+) Epithelial cells per low power field      Many (4+) Yeast      Many (4+) Gram positive cocci    Basic Metabolic Panel [[id number]]  (Abnormal) Collected: 05/11/23 0251    Specimen: Blood Updated: 05/11/23 0324     Glucose 93 mg/dL      BUN 13 mg/dL      Creatinine 0.23 mg/dL      Sodium 137 mmol/L      Potassium 3.8 mmol/L      Chloride 102 mmol/L      CO2 32.0 mmol/L      Calcium 8.3 mg/dL      BUN/Creatinine Ratio 56.5     Anion Gap 3.0 mmol/L      eGFR 121.8 mL/min/1.73     Narrative:      GFR Normal >60  Chronic Kidney Disease <60  Kidney Failure <15      CBC & Differential [[id number]]  (Abnormal) Collected: 05/11/23 0251    Specimen: Blood Updated: 05/11/23 0306    Narrative:      The following orders were created for panel order CBC & Differential.  Procedure                               Abnormality         Status                     ---------                               -----------         ------                     CBC Auto Differential[[id number]]        Abnormal            Final result                 Please view results for  these tests on the individual orders.    CBC Auto Differential [[id number]]  (Abnormal) Collected: 05/11/23 0251    Specimen: Blood Updated: 05/11/23 0306     WBC 19.36 10*3/mm3      RBC 3.42 10*6/mm3      Hemoglobin 10.3 g/dL      Hematocrit 31.4 %      MCV 91.8 fL      MCH 30.1 pg      MCHC 32.8 g/dL      RDW 13.4 %      RDW-SD 45.5 fl      MPV 9.9 fL      Platelets 296 10*3/mm3      Neutrophil % 86.3 %      Lymphocyte % 8.3 %      Monocyte % 3.7 %      Eosinophil % 0.3 %      Basophil % 0.2 %      Immature Grans % 1.2 %      Neutrophils, Absolute 16.73 10*3/mm3      Lymphocytes, Absolute 1.60 10*3/mm3      Monocytes, Absolute 0.71 10*3/mm3      Eosinophils, Absolute 0.06 10*3/mm3      Basophils, Absolute 0.03 10*3/mm3      Immature Grans, Absolute 0.23 10*3/mm3      nRBC 0.0 /100 WBC     POC Glucose Once [[id number]]  (Abnormal) Collected: 05/10/23 1801    Specimen: Blood Updated: 05/10/23 1803     Glucose 135 mg/dL      Comment: Meter: [id number] : [id number] Della Martinez KORY       Non-gynecologic Cytology [[id number]] Collected: 05/10/23 1110    Specimen: Pleural Fluid from Pleural Cavity Updated: 05/10/23 1450    JASSON Comprehensive Panel [[id number]] Collected: 05/09/23 1400    Specimen: Blood Updated: 05/10/23 1312     Anti-DNA (DS) Ab Qn <1 IU/mL      Comment:                                    Negative      <5                                     Equivocal  5 - 9                                     Positive      >9        RNP Antibodies <0.2 AI      Vann Antibodies <0.2 AI      Antiscleroderma-70 Antibodies <0.2 AI      Sjogren's Anti-SS-A <0.2 AI      Sjogren's Anti-SS-B <0.2 AI      Antichromatin Antibodies <0.2 AI      DUTCH-1 IgG <0.2 AI      Anti-Centromere B Antibodies <0.2 AI      See below: Comment     Comment: Autoantibody                       Disease Association  ------------------------------------------------------------                          Condition                   Frequency  ---------------------   ------------------------   ---------  Antinuclear Antibody,    SLE, mixed connective  Direct (JASSON-D)           tissue diseases  ---------------------   ------------------------   ---------  dsDNA                    SLE                        40 - 60%  ---------------------   ------------------------   ---------  Chromatin                Drug induced SLE                90%                           SLE                        48 - 97%  ---------------------   ------------------------   ---------  SSA (Ro)                 SLE                        25 - 35%                           Sjogren's Syndrome         40 - 70%                            Lupus                 100%  ---------------------   ------------------------   ---------  SSB (La)                 SLE                             10%                           Sjogren's Syndrome              30%  ---------------------   -----------------------    ---------  Sm (anti-Smith)          SLE                        15 - 30%  ---------------------   -----------------------    ---------  RNP                      Mixed Connective Tissue                           Disease                         95%  (U1 nRNP,                SLE                        30 - 50%  anti-ribonucleoprotein)  Polymyositis and/or                           Dermatomyositis                 20%  ---------------------   ------------------------   ---------  Scl-70 (antiDNA          Scleroderma (diffuse)      20 - 35%  topoisomerase)           Crest                           13%  ---------------------   ------------------------   ---------  Lori-1                     Polymyositis and/or                           Dermatomyositis            20 - 40%  ---------------------   ------------------------   ---------  Centromere B             Scleroderma - Crest                           variant                         80%       Narrative:      Performed at:  01 - [facility]  [address]  [id number]  : Ricco Troncoso PhD, Phone:  [phone]    pH, Body Fluid - Pleural Fluid, Pleural Cavity [[id number]] Collected: 05/10/23 1110    Specimen: Pleural Fluid from Pleural Cavity Updated: 05/10/23 1302     pH, Fluid 8.03    Narrative:      Reference Range:  Serous fluids 6.8-7.6     Pleural transudates: 7.4-7.5     Pleural exudates: 7.35-7.45     All other fluids: No defined reference ranges    This test was developed, its performance characteristics determined and judged suitable for clinical purposes by [facility]. It has not been cleared or approved by the FDA. The laboratory is regulated under CLIA as qualified to perform high-complexity testing.    Protein, Body Fluid - Pleural Fluid, Pleural Cavity [[id number]] Collected: 05/10/23 1110    Specimen: Pleural Fluid from Pleural Cavity Updated: 05/10/23 1255     Protein, Total, Fluid 1.4 g/dL     Narrative:      No Reference Ranges Established.    A serous fluid total fluid (TP) greater than 50 percent of the serum TP suggests the fluid is an exudate.      1. Pleural TP/Serum TP >0.5  2. Pleural LD/Serum LD >0.6  3. Pleural LD >2/3 of the upper limit of normal for serum LDH    This test was developed, it performance characteristics determined and judged suitable for clinical purposes by [facility].  It has not been cleared or approved by the FDA.  The laboratory is regulated under CLIA as qualified to perform high-complexity testing.     Glucose, Body Fluid - Pleural Fluid, Pleural Cavity [[id number]] Collected: 05/10/23 1110    Specimen: Pleural Fluid from Pleural Cavity Updated: 05/10/23 1255     Glucose, Fluid 142 mg/dL     Narrative:      No Reference Ranges Established.    Serous fluid glucose less than 60 mg/dL or less than 30 mg/dL below serum glucose suggests an infectious or malignant exudate.     This test was developed, it performance  characteristics determined and judged suitable for clinical purposes by [facility].  It has not been cleared or approved by the FDA.  The laboratory is regulated under CLIA as qualified to perform high-complexity testing.     AFB Culture - Body Fluid, Pleural Cavity [[id number]] Collected: 05/10/23 1110    Specimen: Body Fluid from Pleural Cavity Updated: 05/10/23 1200           Assessment & Plan       Acute respiratory failure with hypoxia    Benign essential HTN    Tobacco abuse    Pneumonia    Sepsis    Pleural effusion, right    Other emphysema    Pulmonary nodule       Assessment & Plan    Left pleural effusion: Moderate effusion as seen on CT chest.  S/p left thoracentesis with fluid studies on 5/10/23 with 100ml removed. Plueral fluid with NGTD.  Today's x-ray was independently reviewed and demonstrates patchy airspace disease on the right.  Slight improvement to left pleural effusion.    Pulmonary abscess/pneumonia: Oxygen requirements improved, now on 8 L high flow nasal cannula.  Appears much more comfortable, plan to move out of the ICU today.  Continue antibiotics per ID recommendations.     Nothing further to add from a thoracic perspective.  We will be happy to see her in the future should the need arise.  Thank you for letting us participate in the care of Ms. Williamson.    Yvrose Stone DNP, APRNICOLE  [facility]  05/11/23  11:50 EDT            Electronically signed by Yvrose Stone DNP, APRNICOLE at 05/11/23 1156     Rigoberto Chan MD at 05/11/23 0934          ID NOTE    CC: f/u pneumonia    Subj: No fever. Weaned down to 8L. 100 cc removed from left pleural space yesterday. Cultures pending. She is feeling slightly better.       Medications:    Current Facility-Administered Medications:   •  acetaminophen (TYLENOL) tablet 650 mg, 650 mg, Oral, Q4H PRN, 650 mg at 05/10/23 2014 **OR** acetaminophen (TYLENOL) 160 MG/5ML solution 650 mg, 650 mg, Oral,  Q4H PRN **OR** acetaminophen (TYLENOL) suppository 650 mg, 650 mg, Rectal, Q4H PRN, Olga Palm MD  •  ascorbic acid (VITAMIN C) tablet 500 mg, 500 mg, Oral, Daily, Olga Palm MD, 500 mg at 05/11/23 0834  •  citalopram (CeleXA) tablet 20 mg, 20 mg, Oral, Daily, Gee Clement MD, 20 mg at 05/11/23 0834  •  doxycycline (VIBRAMYCIN) 100 mg in sodium chloride 0.9 % 100 mL IVPB-VTB, 100 mg, Intravenous, Q12H, Jennifer Hood MD, 100 mg at 05/11/23 0417  •  fluticasone (FLONASE) 50 MCG/ACT nasal spray 1 spray, 1 spray, Each Nare, Daily, Olga Palm MD, 1 spray at 05/08/23 0808  •  guaiFENesin (MUCINEX) 12 hr tablet 600 mg, 600 mg, Oral, BID, Yvrose Stone DNP, APRN, 600 mg at 05/11/23 0834  •  Hold medication, 1 each, Does not apply, Continuous PRN, Yvrose Stone DNP, APRN  •  ipratropium-albuterol (DUO-NEB) nebulizer solution 3 mL, 3 mL, Nebulization, 4x Daily - RT, Olga Palm MD, 3 mL at 05/10/23 2055  •  loperamide (IMODIUM) capsule 2 mg, 2 mg, Oral, Q6H PRN, Gee Clement MD, 2 mg at 05/07/23 1822  •  losartan (COZAAR) tablet 50 mg, 50 mg, Oral, Q24H, Olga Palm MD, 50 mg at 05/11/23 0834  •  Magnesium Sulfate - Total Dose 10 grams - Magnesium 1 or Less, 2 g, Intravenous, PRN **OR** Magnesium Sulfate - Total Dose 6 grams - Magnesium 1.1 - 1.5, 2 g, Intravenous, PRN **OR** Magnesium Sulfate - Total Dose 4 grams - Magnesium 1.6 - 1.9, 4 g, Intravenous, PRN, Burleson, Ford, DO  •  meropenem (MERREM) 1 g in sodium chloride 0.9 % 100 mL IVPB-VTB, 1 g, Intravenous, Q8H, Jennifer Hood MD, 1 g at 05/11/23 0621  •  mirtazapine (REMERON) tablet 7.5 mg, 7.5 mg, Oral, Nightly, Yfn Garcia III, MD, 7.5 mg at 05/10/23 2011  •  nitroglycerin (NITROSTAT) SL tablet 0.4 mg, 0.4 mg, Sublingual, Q5 Min PRN, Olga Palm MD  •  ondansetron (ZOFRAN) injection 4 mg, 4 mg, Intravenous, Q6H PRN, Olga Palm MD  •  potassium & sodium phosphates (PHOS-NAK) 280-160-250 MG packet - for Phosphorus  less than 1.25 mg/dL, 2 packet, Oral, Q6H PRN **OR** potassium & sodium phosphates (PHOS-NAK) 280-160-250 MG packet - for Phosphorus 1.25 - 2.5 mg/dL, 2 packet, Oral, Q6H PRN, Ford Britton,   •  potassium chloride (K-DUR,KLOR-CON) ER tablet 40 mEq, 40 mEq, Oral, PRN, 40 mEq at 05/07/23 0654 **OR** potassium chloride (KLOR-CON) packet 40 mEq, 40 mEq, Oral, PRN **OR** potassium chloride 10 mEq in 100 mL IVPB, 10 mEq, Intravenous, Q1H PRN, Ford Britton,   •  [COMPLETED] Insert Peripheral IV, , , Once **AND** sodium chloride 0.9 % flush 10 mL, 10 mL, Intravenous, PRN, Olga Palm MD  •  sodium chloride 0.9 % flush 10 mL, 10 mL, Intravenous, Q12H, Olga Palm MD, 10 mL at 05/11/23 0834  •  sodium chloride 0.9 % flush 10 mL, 10 mL, Intravenous, PRN, Olga Palm MD  •  sodium chloride 0.9 % infusion 40 mL, 40 mL, Intravenous, PRN, Olga Palm MD  •  vitamin B-12 (CYANOCOBALAMIN) tablet 1,000 mcg, 1,000 mcg, Oral, Daily, Olga Palm MD, 1,000 mcg at 05/11/23 0834      Objective   Vital Signs   Temp:  [98.4 °F (36.9 °C)-98.9 °F (37.2 °C)] 98.6 °F (37 °C)  Heart Rate:  [] 82  Resp:  [20] 20  BP: ()/() 142/60    Physical Exam:   General: awake, alert, very nice but frail appearing  Eyes: no scleral icterus  ENT: no thrush; HFNC in nares  Cardiovascular: NR  Respiratory: cough but normal WOB on 8L HFNC  GI: Abdomen is soft, not tender  :  no Snell catheter  Skin: No rashes  Neurological: Alert and oriented x 3  Psychiatric: Normal mood and affect     Labs:   CBC, BMP, fungal studies and cultures reviewed today  Lab Results   Component Value Date    WBC 19.36 (H) 05/11/2023    HGB 10.3 (L) 05/11/2023    HCT 31.4 (L) 05/11/2023    MCV 91.8 05/11/2023     05/11/2023     Lab Results   Component Value Date    GLUCOSE 93 05/11/2023    CALCIUM 8.3 (L) 05/11/2023     05/11/2023    K 3.8 05/11/2023    CO2 32.0 (H) 05/11/2023     05/11/2023    BUN 13 05/11/2023    CREATININE 0.23 (L)  2023    EGFR 121.8 2023    BCR 56.5 (H) 2023    ANIONGAP 3.0 (L) 2023     Procal 3.95--->2.6-->0.2  Crypto Ag negative  Urine Histo Ag pending  Serum Histo Ag pending  Urine Blasto Ag pending  Aspergillus Ag pending    Microbiology:   RPP: negative   BCx: Corynebacterium in 1/2 sets   SpCx: normal aishwarya   MRSA nares: negative   Strep pneumo Ag; negative   Legionella Ag: negatie   BCx: negative   C diff: negative   SpCx: normal aishwarya   AFB Cx: pending   Fungus Cx: pending  5/10 SpCx: GPCs on gram stain; Cxpending   5/10 L Thoracentesis Cx: NGTD    Radiology:   CXR is unchanged on my read    ASSESSMENT/PLAN:  1. Pneumonia and right lung abscess  2. Pleural effusions  3. Acute hypoxic respiratory failure  4. Emphysema  5. Tobacco use    She remains afebrile and now her WBC is trending down. Her O2 needs are also improved today. Still waiting on most recent cultures.  Continue meropenem 1 g IV q8h and doxycycline 100 mg PO BID. Duration TBD but likely 4 weeks.     D/W Dr Hood. ID will follow.       Electronically signed by Rigoberto Chan MD at 23 0936     Jennifer Hood MD at 23 0854            PROGRESS NOTE  Patient Name: Katherine Williamson  Age/Sex: 72 y.o. female  : 1950  MRN: [id number]    Date of Admission: 2023  Date of Encounter Visit: 23   LOS: 9 days   Patient Care Team:  Zelalem Flor APRN as PCP - General (Internal Medicine)  Brandon Mitchell MD as Consulting Physician (Orthopedic Surgery)    Chief Complaint: Acute progressive respiratory failure    Hospital course: Patient has been progressing despite being treated with Rocephin and vancomycin initially and then switched to Zosyn with increased to density and the extent of the consolidation and later transition to imipenem/doxycycline since she continued to progress despite the Zosyn  On the new regimen the patient is showing clinical improvement  She  "had pleural effusion that was drained on 5/10/2023 with 600 cc removed of straw-colored fluid was low protein consistency and normal glucose, the rest of the fluid analysis is still pending  Autoimmune panel came back negative  Fungal serology is pending  Patient has been afebrile for the last 24 hours and her white count is trending down with improvement in the oxygen requirements  Tolerating p.o., feeling better    REVIEW OF SYSTEMS:   CONSTITUTIONAL: Low-grade fever  CARDIOVASCULAR: No chest pain, chest pressure or chest discomfort. No palpitations or edema.   RESPIRATORY: Less shortness of breath, less productive cough  GASTROINTESTINAL: No anorexia, nausea, vomiting or diarrhea. No abdominal pain or blood.   HEMATOLOGIC: No bleeding or bruising.     Ventilator/Non-Invasive Ventilation Settings (From admission, onward)    8 L nasal cannula-            Vital Signs  Temp:  [98.4 °F (36.9 °C)-98.9 °F (37.2 °C)] 98.6 °F (37 °C)  Heart Rate:  [] 82  Resp:  [20] 20  BP: ()/() 142/60  SpO2:  [91 %-99 %] 94 %  on  Flow (L/min):  [8-50] 8 Device (Oxygen Therapy): high-flow nasal cannula    Intake/Output Summary (Last 24 hours) at 5/11/2023 0854  Last data filed at 5/11/2023 0417  Gross per 24 hour   Intake 782 ml   Output 650 ml   Net 132 ml     Flowsheet Rows    Flowsheet Row First Filed Value   Admission Height 160 cm (63\") Documented at 05/02/2023 1900   Admission Weight 61.2 kg (135 lb) Documented at 05/02/2023 1900        Body mass index is 25.7 kg/m².      05/09/23  0527 05/10/23  0414 05/11/23  0600   Weight: 65.8 kg (145 lb 1 oz) 65.8 kg (145 lb 1 oz) 65.8 kg (145 lb 1 oz)       Physical Exam:  GEN: Sickly looking lady, awake and responsive, breathing is much less labored, on Optiflow 55% FiO2  EYES:   Sclerae clear. No icterus. PERRL. Normal EOM  ENT:   External ears/nose normal, no oral lesions, no thrush, mucous membranes moist  NECK:  Supple, midline trachea, no JVD  LUNGS: Normal chest on " inspection, minimal inspiratory musical wheeze more so on the right, very minimal rhonchi, still diminished breath sounds  CV:  Regular rhythm and rate. Normal S1/S2. No murmurs, gallops, or rubs noted.  ABD:  Soft, nontender and nondistended. Normal bowel sounds. No guarding  EXT:  Moves all extremities well. No cyanosis. No redness.  Trace lower extremities edema.   Skin: Dry, intact, no bleeding    Results Review:    Results From Last 14 Days   Lab Units 05/03/23  0607 05/02/23  1743 05/02/23  1433   LACTATE mmol/L 1.9 3.3* 6.3*     Results from last 7 days   Lab Units 05/11/23  0251 05/10/23  0904 05/09/23  0714 05/08/23  0605 05/07/23 0435 05/06/23  0627 05/05/23  0556   SODIUM mmol/L 137 135* 135* 137 142 140 137   POTASSIUM mmol/L 3.8 3.8 4.7 3.5 3.4* 3.7 4.0   CHLORIDE mmol/L 102 100 100 101 102 105 103   CO2 mmol/L 32.0* 29.6* 30.0* 31.6* 31.3* 29.7* 27.7   BUN mg/dL 13 11 12 11 12 16 24*   CREATININE mg/dL 0.23* 0.30* 0.31* 0.30* 0.37* 0.37* 0.49*   CALCIUM mg/dL 8.3* 9.1 8.5* 8.7 8.9 9.2 9.2   AST (SGOT) U/L  --  34*  --   --   --   --   --    ALT (SGPT) U/L  --  42*  --   --   --   --   --    ANION GAP mmol/L 3.0* 5.4 5.0 4.4* 8.7 5.3 6.3   ALBUMIN g/dL  --  2.0*  --   --   --   --   --                  Results from last 7 days   Lab Units 05/11/23  0251 05/10/23  0707 05/09/23  0714 05/08/23  0605 05/07/23  0435 05/06/23  0627 05/05/23  0556   WBC 10*3/mm3 19.36* 28.06* 19.56* 18.04* 19.72* 19.80* 19.67*   HEMOGLOBIN g/dL 10.3* 11.0* 12.2 11.1* 11.5* 11.2* 11.4*   HEMATOCRIT % 31.4* 32.6* 37.0 33.5* 33.6* 32.8* 33.1*   PLATELETS 10*3/mm3 296 317 320 288 313 316 355   MCV fL 91.8 91.6 93.2 91.8 90.3 90.1 89.9   NEUTROPHIL % % 86.3* 86.2* 85.7* 86.6* 84.3*  --   --    LYMPHOCYTE % % 8.3* 7.6* 9.3* 7.6* 8.8*  --   --    MONOCYTES % % 3.7* 3.2* 2.5* 2.5* 3.0*  --   --    EOSINOPHIL % % 0.3 0.3 0.4 0.4 0.2*  --   --    BASOPHIL % % 0.2 0.2 0.2 0.2 0.4  --   --    IMM GRAN % % 1.2* 2.5*  --  2.7* 3.3*  --    --       Latest Reference Range & Units Most Recent   RNP Antibodies 0.0 - 0.9 AI <0.2  5/9/23 14:00   Vann Antibodies 0.0 - 0.9 AI <0.2  5/9/23 14:00   Sjogren's Anti-SS-A 0.0 - 0.9 AI <0.2  5/9/23 14:00   Sjogren's Anti-SS-B 0.0 - 0.9 AI <0.2  5/9/23 14:00   DUTCH-1 IgG 0.0 - 0.9 AI <0.2  5/9/23 14:00      Latest Reference Range & Units Most Recent   Anti-Centromere B Antibodies 0.0 - 0.9 AI <0.2  5/9/23 14:00   Antichromatin Antibodies 0.0 - 0.9 AI <0.2  5/9/23 14:00   Anti-DNA (DS) Ab Qn 0 - 9 IU/mL <1  5/9/23 14:00   C-ANCA Neg:<1:20 titer <1:20  5/9/23 14:00   DUTCH-1 IgG 0.0 - 0.9 AI <0.2  5/9/23 14:00   P-ANCA Neg:<1:20 titer <1:20  5/9/23 14:00   Atypical pANCA Neg:<1:20 titer <1:20  5/9/23 14:00      Latest Reference Range & Units Most Recent   Antiscleroderma-70 Antibodies 0.0 - 0.9 AI <0.2  5/9/23 14:00           Latest Reference Range & Units Most Recent   pH, Fluid  8.03  5/10/23 11:10   Glucose, Fluid mg/dL 142  5/10/23 11:10   Protein, Total, Fluid g/dL 1.4  5/10/23 11:10      Latest Reference Range & Units Most Recent   ANTI-MPO ANTIBODIES 0.0 - 0.9 units <0.2  5/9/23 14:00   ANTI-PR3 ANTIBODIES 0.0 - 0.9 units <0.2  5/9/23 14:00               Invalid input(s): LDLCALC  Results from last 7 days   Lab Units 05/08/23  1303   PH, ARTERIAL pH units 7.509*   PCO2, ARTERIAL mm Hg 42.7   PO2 ART mm Hg 60.0*   HCO3 ART mmol/L 34.0*         Glucose   Date/Time Value Ref Range Status   05/10/2023 1801 135 (H) 70 - 130 mg/dL Final     Comment:     Meter: [id number] : [id number] Haskellshaan Martinez KORY   05/08/2023 1242 154 (H) 70 - 130 mg/dL Final     Comment:     Meter: [id number] : [id number] William Wright KORY     Results from last 7 days   Lab Units 05/09/23  0714   PROCALCITONIN ng/mL 0.23     Results from last 7 days   Lab Units 05/10/23  1110 05/06/23  1402 05/04/23  1815 05/04/23  1811   BLOODCX   --   --  No growth at 5 days No growth at 5 days   BODYFLDCX  No growth at less than 24 hours  --   --   --     RESPCX   --  Rare Normal respiratory aishwarya. No S. aureus or Pseudomonas aeruginosa detected. Final report.  --   --                        Imaging:   Imaging Results (All)     Procedure Component Value Units Date/Time            I reviewed the patient's new clinical results.  I personally viewed and interpreted the patient's imaging results: Bilateral dense pneumonia with some cavitation on the right side, multiples chest x-ray from today compared to the one done 3 days ago showed no significant changes        Medication Review:   vitamin C, 500 mg, Oral, Daily  citalopram, 20 mg, Oral, Daily  doxycycline, 100 mg, Intravenous, Q12H  fluticasone, 1 spray, Each Nare, Daily  guaiFENesin, 600 mg, Oral, BID  ipratropium-albuterol, 3 mL, Nebulization, 4x Daily - RT  losartan, 50 mg, Oral, Q24H  meropenem, 1 g, Intravenous, Q8H  mirtazapine, 7.5 mg, Oral, Nightly  sodium chloride, 10 mL, Intravenous, Q12H  cyanocobalamin, 1,000 mcg, Oral, Daily        hold, 1 each        ASSESSMENT:   1. Right lower lobe lung abscess/pneumonia  2. Sepsis  3. Right loculated pleural effusion  4. Acute hypoxic respiratory failure, worse  5. Sepsis  6. Emphysema, upper lobe predominant  7. Peripheral pleural-based nodule, 2.4 cm on CT chest 4/3/23, could have been related to early pneumonia.  Underlying malignancy cannot be excluded  8. Tobacco abuse  9. Essential hypertension    PLAN:  Definitely doing better, white count is now trending down, patient is afebrile for the last 24 hours, chest x-ray is showing no further progression, pleural fluid analysis is in progress but so far it does not look like empyema with low protein level  Patient is on doxycycline and on meropenem which will be continued for now  Patient is down to the high flow nasal cannula oxygen and is good to be transferred back to a stepdown unit  Follow-up on the fluid cytology  Autoimmune panel including JASSON panel and ANCA panel came back both negative and no indication  for any immunomodulatory therapy  Continue to work on the deep breathing exercises and continue to wean oxygen as tolerated  We will repeat the chest x-ray in a couple of days, anticipating some improvement on follow-up imaging given the clinical improvement    Stepdown unit    Disposition: Continue to monitor in CICU    Jennifer Hood MD  05/11/23  08:54 EDT          Dictated utilizing Dragon dictation    Electronically signed by Jennifer Hood MD at 05/11/23 3536

## 2023-05-11 NOTE — PLAN OF CARE
Goal Outcome Evaluation:      Pt remians in CICU A&O x4 and was pleasant. Currently on 8L high flow NC, sating above 95. No issues over the night, VSS will continue to monitor.

## 2023-05-11 NOTE — PLAN OF CARE
Goal Outcome Evaluation:   VSS. Pt transferred from CICU. Coccyx, bilateral heels, and bilateral elbows red but blanchable. Low airloss mattress ordered. Q2 turn. Heel boots placed. SCD pump ordered. Pt on 8L of O2. IV team placed new IV in right upper arm. Pt had 1 BM during shift. Zofran given nausea.Will continue to monitor

## 2023-05-11 NOTE — PROGRESS NOTES
The patient is much brighter today.  She is wearing only a nasal cannula today and is very encouraged by her medical progress.  She states that she is sleeping well and eating well and her mood is greatly improved.  From a psychiatric standpoint she appears greatly improved and I will sign off.

## 2023-05-11 NOTE — PROGRESS NOTES
ID NOTE    CC: f/u pneumonia    Subj: No fever. Weaned down to 8L. 100 cc removed from left pleural space yesterday. Cultures pending. She is feeling slightly better.       Medications:    Current Facility-Administered Medications:   •  acetaminophen (TYLENOL) tablet 650 mg, 650 mg, Oral, Q4H PRN, 650 mg at 05/10/23 2014 **OR** acetaminophen (TYLENOL) 160 MG/5ML solution 650 mg, 650 mg, Oral, Q4H PRN **OR** acetaminophen (TYLENOL) suppository 650 mg, 650 mg, Rectal, Q4H PRN, Olga Palm MD  •  ascorbic acid (VITAMIN C) tablet 500 mg, 500 mg, Oral, Daily, Olga Palm MD, 500 mg at 05/11/23 0834  •  citalopram (CeleXA) tablet 20 mg, 20 mg, Oral, Daily, Gee Clement MD, 20 mg at 05/11/23 0834  •  doxycycline (VIBRAMYCIN) 100 mg in sodium chloride 0.9 % 100 mL IVPB-VTB, 100 mg, Intravenous, Q12H, Jennifer Hood MD, 100 mg at 05/11/23 0417  •  fluticasone (FLONASE) 50 MCG/ACT nasal spray 1 spray, 1 spray, Each Nare, Daily, Olga Palm MD, 1 spray at 05/08/23 0808  •  guaiFENesin (MUCINEX) 12 hr tablet 600 mg, 600 mg, Oral, BID, Yvrose Stone DNP, APRN, 600 mg at 05/11/23 0834  •  Hold medication, 1 each, Does not apply, Continuous PRN, Yvrose Stone DNP, APRN  •  ipratropium-albuterol (DUO-NEB) nebulizer solution 3 mL, 3 mL, Nebulization, 4x Daily - RT, Olga Palm MD, 3 mL at 05/10/23 2055  •  loperamide (IMODIUM) capsule 2 mg, 2 mg, Oral, Q6H PRN, Gee Clement MD, 2 mg at 05/07/23 1822  •  losartan (COZAAR) tablet 50 mg, 50 mg, Oral, Q24H, Olga Palm MD, 50 mg at 05/11/23 0834  •  Magnesium Sulfate - Total Dose 10 grams - Magnesium 1 or Less, 2 g, Intravenous, PRN **OR** Magnesium Sulfate - Total Dose 6 grams - Magnesium 1.1 - 1.5, 2 g, Intravenous, PRN **OR** Magnesium Sulfate - Total Dose 4 grams - Magnesium 1.6 - 1.9, 4 g, Intravenous, PRN, Ford Britton, DO  •  meropenem (MERREM) 1 g in sodium chloride 0.9 % 100 mL IVPB-VTB, 1 g, Intravenous, Q8H, Jennifer Hood MD, 1 g at 05/11/23  0621  •  mirtazapine (REMERON) tablet 7.5 mg, 7.5 mg, Oral, Nightly, Yfn Garcia III, MD, 7.5 mg at 05/10/23 2011  •  nitroglycerin (NITROSTAT) SL tablet 0.4 mg, 0.4 mg, Sublingual, Q5 Min PRN, Olga Palm MD  •  ondansetron (ZOFRAN) injection 4 mg, 4 mg, Intravenous, Q6H PRN, Olga Palm MD  •  potassium & sodium phosphates (PHOS-NAK) 280-160-250 MG packet - for Phosphorus less than 1.25 mg/dL, 2 packet, Oral, Q6H PRN **OR** potassium & sodium phosphates (PHOS-NAK) 280-160-250 MG packet - for Phosphorus 1.25 - 2.5 mg/dL, 2 packet, Oral, Q6H PRN, Ford Britton DO  •  potassium chloride (K-DUR,KLOR-CON) ER tablet 40 mEq, 40 mEq, Oral, PRN, 40 mEq at 05/07/23 0654 **OR** potassium chloride (KLOR-CON) packet 40 mEq, 40 mEq, Oral, PRN **OR** potassium chloride 10 mEq in 100 mL IVPB, 10 mEq, Intravenous, Q1H PRN, Ford Britton DO  •  [COMPLETED] Insert Peripheral IV, , , Once **AND** sodium chloride 0.9 % flush 10 mL, 10 mL, Intravenous, PRN, Olga Palm MD  •  sodium chloride 0.9 % flush 10 mL, 10 mL, Intravenous, Q12H, Olga Palm MD, 10 mL at 05/11/23 0834  •  sodium chloride 0.9 % flush 10 mL, 10 mL, Intravenous, PRN, Olga Palm MD  •  sodium chloride 0.9 % infusion 40 mL, 40 mL, Intravenous, PRN, Olga Palm MD  •  vitamin B-12 (CYANOCOBALAMIN) tablet 1,000 mcg, 1,000 mcg, Oral, Daily, Olga Palm MD, 1,000 mcg at 05/11/23 0834      Objective   Vital Signs   Temp:  [98.4 °F (36.9 °C)-98.9 °F (37.2 °C)] 98.6 °F (37 °C)  Heart Rate:  [] 82  Resp:  [20] 20  BP: ()/() 142/60    Physical Exam:   General: awake, alert, very nice but frail appearing  Eyes: no scleral icterus  ENT: no thrush; HFNC in nares  Cardiovascular: NR  Respiratory: cough but normal WOB on 8L HFNC  GI: Abdomen is soft, not tender  :  no Snell catheter  Skin: No rashes  Neurological: Alert and oriented x 3  Psychiatric: Normal mood and affect     Labs:   CBC, BMP, fungal studies and cultures reviewed  today  Lab Results   Component Value Date    WBC 19.36 (H) 05/11/2023    HGB 10.3 (L) 05/11/2023    HCT 31.4 (L) 05/11/2023    MCV 91.8 05/11/2023     05/11/2023     Lab Results   Component Value Date    GLUCOSE 93 05/11/2023    CALCIUM 8.3 (L) 05/11/2023     05/11/2023    K 3.8 05/11/2023    CO2 32.0 (H) 05/11/2023     05/11/2023    BUN 13 05/11/2023    CREATININE 0.23 (L) 05/11/2023    EGFR 121.8 05/11/2023    BCR 56.5 (H) 05/11/2023    ANIONGAP 3.0 (L) 05/11/2023     Procal 3.95--->2.6-->0.2  Crypto Ag negative  Urine Histo Ag pending  Serum Histo Ag pending  Urine Blasto Ag pending  Aspergillus Ag pending    Microbiology:  5/2 RPP: negative  5/2 BCx: Corynebacterium in 1/2 sets  5/2 SpCx: normal aishwarya  5/2 MRSA nares: negative  5/2 Strep pneumo Ag; negative  5/2 Legionella Ag: negatie  5/4 BCx: negative  5/5 C diff: negative  5/6 SpCx: normal aishwarya  5/9 AFB Cx: pending  5/9 Fungus Cx: pending  5/10 SpCx: GPCs on gram stain; Cxpending   5/10 L Thoracentesis Cx: NGTD    Radiology:  5/11 CXR is unchanged on my read    ASSESSMENT/PLAN:  1. Pneumonia and right lung abscess  2. Pleural effusions  3. Acute hypoxic respiratory failure  4. Emphysema  5. Tobacco use    She remains afebrile and now her WBC is trending down. Her O2 needs are also improved today. Still waiting on most recent cultures.  Continue meropenem 1 g IV q8h and doxycycline 100 mg PO BID. Duration TBD but likely 4 weeks.     D/W Dr Hood. ID will follow.

## 2023-05-11 NOTE — PROGRESS NOTES
PROGRESS NOTE  Patient Name: Katherine Williamson  Age/Sex: 72 y.o. female  : 1950  MRN: [id number]    Date of Admission: 2023  Date of Encounter Visit: 23   LOS: 9 days   Patient Care Team:  Zelalem Flor APRN as PCP - General (Internal Medicine)  Stephen, Brandon Meyer MD as Consulting Physician (Orthopedic Surgery)    Chief Complaint: Acute progressive respiratory failure    Hospital course: Patient has been progressing despite being treated with Rocephin and vancomycin initially and then switched to Zosyn with increased to density and the extent of the consolidation and later transition to imipenem/doxycycline since she continued to progress despite the Zosyn  On the new regimen the patient is showing clinical improvement  She had pleural effusion that was drained on 5/10/2023 with 600 cc removed of straw-colored fluid was low protein consistency and normal glucose, the rest of the fluid analysis is still pending  Autoimmune panel came back negative  Fungal serology is pending  Patient has been afebrile for the last 24 hours and her white count is trending down with improvement in the oxygen requirements  Tolerating p.o., feeling better    REVIEW OF SYSTEMS:   CONSTITUTIONAL: Low-grade fever  CARDIOVASCULAR: No chest pain, chest pressure or chest discomfort. No palpitations or edema.   RESPIRATORY: Less shortness of breath, less productive cough  GASTROINTESTINAL: No anorexia, nausea, vomiting or diarrhea. No abdominal pain or blood.   HEMATOLOGIC: No bleeding or bruising.     Ventilator/Non-Invasive Ventilation Settings (From admission, onward)    8 L nasal cannula-            Vital Signs  Temp:  [98.4 °F (36.9 °C)-98.9 °F (37.2 °C)] 98.6 °F (37 °C)  Heart Rate:  [] 82  Resp:  [20] 20  BP: ()/() 142/60  SpO2:  [91 %-99 %] 94 %  on  Flow (L/min):  [8-50] 8 Device (Oxygen Therapy): high-flow nasal cannula    Intake/Output Summary (Last 24 hours) at 2023 4334  Last data filed  "at 5/11/2023 0417  Gross per 24 hour   Intake 782 ml   Output 650 ml   Net 132 ml     Flowsheet Rows    Flowsheet Row First Filed Value   Admission Height 160 cm (63\") Documented at 05/02/2023 1900   Admission Weight 61.2 kg (135 lb) Documented at 05/02/2023 1900        Body mass index is 25.7 kg/m².      05/09/23  0527 05/10/23  0414 05/11/23  0600   Weight: 65.8 kg (145 lb 1 oz) 65.8 kg (145 lb 1 oz) 65.8 kg (145 lb 1 oz)       Physical Exam:  GEN: Sickly looking lady, awake and responsive, breathing is much less labored, on Optiflow 55% FiO2  EYES:   Sclerae clear. No icterus. PERRL. Normal EOM  ENT:   External ears/nose normal, no oral lesions, no thrush, mucous membranes moist  NECK:  Supple, midline trachea, no JVD  LUNGS: Normal chest on inspection, minimal inspiratory musical wheeze more so on the right, very minimal rhonchi, still diminished breath sounds  CV:  Regular rhythm and rate. Normal S1/S2. No murmurs, gallops, or rubs noted.  ABD:  Soft, nontender and nondistended. Normal bowel sounds. No guarding  EXT:  Moves all extremities well. No cyanosis. No redness.  Trace lower extremities edema.   Skin: Dry, intact, no bleeding    Results Review:    Results From Last 14 Days   Lab Units 05/03/23  0607 05/02/23  1743 05/02/23  1433   LACTATE mmol/L 1.9 3.3* 6.3*     Results from last 7 days   Lab Units 05/11/23  0251 05/10/23  0904 05/09/23  0714 05/08/23  0605 05/07/23  0435 05/06/23  0627 05/05/23  0556   SODIUM mmol/L 137 135* 135* 137 142 140 137   POTASSIUM mmol/L 3.8 3.8 4.7 3.5 3.4* 3.7 4.0   CHLORIDE mmol/L 102 100 100 101 102 105 103   CO2 mmol/L 32.0* 29.6* 30.0* 31.6* 31.3* 29.7* 27.7   BUN mg/dL 13 11 12 11 12 16 24*   CREATININE mg/dL 0.23* 0.30* 0.31* 0.30* 0.37* 0.37* 0.49*   CALCIUM mg/dL 8.3* 9.1 8.5* 8.7 8.9 9.2 9.2   AST (SGOT) U/L  --  34*  --   --   --   --   --    ALT (SGPT) U/L  --  42*  --   --   --   --   --    ANION GAP mmol/L 3.0* 5.4 5.0 4.4* 8.7 5.3 6.3   ALBUMIN g/dL  --  2.0* "  --   --   --   --   --                  Results from last 7 days   Lab Units 05/11/23  0251 05/10/23  0707 05/09/23  0714 05/08/23  0605 05/07/23  0435 05/06/23  0627 05/05/23  0556   WBC 10*3/mm3 19.36* 28.06* 19.56* 18.04* 19.72* 19.80* 19.67*   HEMOGLOBIN g/dL 10.3* 11.0* 12.2 11.1* 11.5* 11.2* 11.4*   HEMATOCRIT % 31.4* 32.6* 37.0 33.5* 33.6* 32.8* 33.1*   PLATELETS 10*3/mm3 296 317 320 288 313 316 355   MCV fL 91.8 91.6 93.2 91.8 90.3 90.1 89.9   NEUTROPHIL % % 86.3* 86.2* 85.7* 86.6* 84.3*  --   --    LYMPHOCYTE % % 8.3* 7.6* 9.3* 7.6* 8.8*  --   --    MONOCYTES % % 3.7* 3.2* 2.5* 2.5* 3.0*  --   --    EOSINOPHIL % % 0.3 0.3 0.4 0.4 0.2*  --   --    BASOPHIL % % 0.2 0.2 0.2 0.2 0.4  --   --    IMM GRAN % % 1.2* 2.5*  --  2.7* 3.3*  --   --       Latest Reference Range & Units Most Recent   RNP Antibodies 0.0 - 0.9 AI <0.2  5/9/23 14:00   Vann Antibodies 0.0 - 0.9 AI <0.2  5/9/23 14:00   Sjogren's Anti-SS-A 0.0 - 0.9 AI <0.2  5/9/23 14:00   Sjogren's Anti-SS-B 0.0 - 0.9 AI <0.2  5/9/23 14:00   DUTCH-1 IgG 0.0 - 0.9 AI <0.2  5/9/23 14:00      Latest Reference Range & Units Most Recent   Anti-Centromere B Antibodies 0.0 - 0.9 AI <0.2  5/9/23 14:00   Antichromatin Antibodies 0.0 - 0.9 AI <0.2  5/9/23 14:00   Anti-DNA (DS) Ab Qn 0 - 9 IU/mL <1  5/9/23 14:00   C-ANCA Neg:<1:20 titer <1:20  5/9/23 14:00   DUTCH-1 IgG 0.0 - 0.9 AI <0.2  5/9/23 14:00   P-ANCA Neg:<1:20 titer <1:20  5/9/23 14:00   Atypical pANCA Neg:<1:20 titer <1:20  5/9/23 14:00      Latest Reference Range & Units Most Recent   Antiscleroderma-70 Antibodies 0.0 - 0.9 AI <0.2  5/9/23 14:00           Latest Reference Range & Units Most Recent   pH, Fluid  8.03  5/10/23 11:10   Glucose, Fluid mg/dL 142  5/10/23 11:10   Protein, Total, Fluid g/dL 1.4  5/10/23 11:10      Latest Reference Range & Units Most Recent   ANTI-MPO ANTIBODIES 0.0 - 0.9 units <0.2  5/9/23 14:00   ANTI-PR3 ANTIBODIES 0.0 - 0.9 units <0.2  5/9/23 14:00               Invalid input(s):  LDLCALC  Results from last 7 days   Lab Units 05/08/23  1303   PH, ARTERIAL pH units 7.509*   PCO2, ARTERIAL mm Hg 42.7   PO2 ART mm Hg 60.0*   HCO3 ART mmol/L 34.0*         Glucose   Date/Time Value Ref Range Status   05/10/2023 1801 135 (H) 70 - 130 mg/dL Final     Comment:     Meter: [id number] : [id number] Della HORN   05/08/2023 1242 154 (H) 70 - 130 mg/dL Final     Comment:     Meter: [id number] : [id number] William HORN     Results from last 7 days   Lab Units 05/09/23  0714   PROCALCITONIN ng/mL 0.23     Results from last 7 days   Lab Units 05/10/23  1110 05/06/23  1402 05/04/23  1815 05/04/23  1811   BLOODCX   --   --  No growth at 5 days No growth at 5 days   BODYFLDCX  No growth at less than 24 hours  --   --   --    RESPCX   --  Rare Normal respiratory aishwarya. No S. aureus or Pseudomonas aeruginosa detected. Final report.  --   --                        Imaging:   Imaging Results (All)     Procedure Component Value Units Date/Time            I reviewed the patient's new clinical results.  I personally viewed and interpreted the patient's imaging results: Bilateral dense pneumonia with some cavitation on the right side, multiples chest x-ray from today compared to the one done 3 days ago showed no significant changes        Medication Review:   vitamin C, 500 mg, Oral, Daily  citalopram, 20 mg, Oral, Daily  doxycycline, 100 mg, Intravenous, Q12H  fluticasone, 1 spray, Each Nare, Daily  guaiFENesin, 600 mg, Oral, BID  ipratropium-albuterol, 3 mL, Nebulization, 4x Daily - RT  losartan, 50 mg, Oral, Q24H  meropenem, 1 g, Intravenous, Q8H  mirtazapine, 7.5 mg, Oral, Nightly  sodium chloride, 10 mL, Intravenous, Q12H  cyanocobalamin, 1,000 mcg, Oral, Daily        hold, 1 each        ASSESSMENT:   1. Right lower lobe lung abscess/pneumonia  2. Sepsis  3. Right loculated pleural effusion  4. Acute hypoxic respiratory failure, worse  5. Sepsis  6. Emphysema, upper lobe  predominant  7. Peripheral pleural-based nodule, 2.4 cm on CT chest 4/3/23, could have been related to early pneumonia.  Underlying malignancy cannot be excluded  8. Tobacco abuse  9. Essential hypertension    PLAN:  Definitely doing better, white count is now trending down, patient is afebrile for the last 24 hours, chest x-ray is showing no further progression, pleural fluid analysis is in progress but so far it does not look like empyema with low protein level  Patient is on doxycycline and on meropenem which will be continued for now  Patient is down to the high flow nasal cannula oxygen and is good to be transferred back to a stepdown unit  Follow-up on the fluid cytology  Autoimmune panel including JASSON panel and ANCA panel came back both negative and no indication for any immunomodulatory therapy  Continue to work on the deep breathing exercises and continue to wean oxygen as tolerated  We will repeat the chest x-ray in a couple of days, anticipating some improvement on follow-up imaging given the clinical improvement    Stepdown unit    Disposition: Continue to monitor in CICU    Jennifer Hood MD  05/11/23  08:54 EDT          Dictated utilizing Dragon dictation

## 2023-05-11 NOTE — PROGRESS NOTES
"    Chief Complaint: Pneumonia, right lung abscess, bilateral pleural effusions      Subjective:  Symptoms:  Improved.  She reports shortness of breath and weakness.  No chest pressure.    Diet:  Poor intake.    Activity level: Impaired due to weakness.    Pain:  She reports no pain.    She is improved today. Off heated high flow, now on 8L high flow. Somewhat weak      Vital Signs:  Temp:  [98.4 °F (36.9 °C)-98.6 °F (37 °C)] 98.6 °F (37 °C)  Heart Rate:  [] 91  Resp:  [18-20] 18  BP: ()/() 142/60    Intake & Output (last day)       05/10 0701  05/11 0700 05/11 0701 05/12 0700    P.O. 425     I.V. (mL/kg) 132 (2)     IV Piggyback 400     Total Intake(mL/kg) 957 (14.5)     Urine (mL/kg/hr) 650 (0.4)     Total Output 650     Net +307                 Objective:  General Appearance:  Comfortable, in no acute distress and well-appearing.    Vital signs: (most recent): Blood pressure 142/60, pulse 91, temperature 98.6 °F (37 °C), temperature source Oral, resp. rate 18, height 160 cm (63\"), weight 65.8 kg (145 lb 1 oz), SpO2 94 %.  Vital signs are normal.    HEENT: Normal HEENT exam.  (High flow nasal cannula)    Lungs:  Normal effort.  She is not in respiratory distress.    Heart: Normal rate.    Extremities: Decreased range of motion.    Neurological: Patient is alert.    Pupils:  Pupils are equal, round, and reactive to light.    Skin:  Warm and dry.                Results Review:     I reviewed the patient's new clinical results.  I reviewed the patient's new imaging results and agree with the interpretation.  I reviewed the patient's other test results and agree with the interpretation  Discussed with Patient, RN and Dr. Lombardo    Imaging Results (Last 24 Hours)     Procedure Component Value Units Date/Time    XR Chest 1 View [[id number]] Collected: 05/11/23 0544     Updated: 05/11/23 0544    Narrative:        Patient: ANNIE CERDA  Time Out: 05:43  Exam(s): XR CXR 1 VIEW     EXAM:    XR Chest, 1 " View    CLINICAL HISTORY:     Reason for exam: Pneumonia, respiratory failure.    TECHNIQUE:    Frontal view of the chest.    COMPARISON:  5-10-23    IMPRESSION:       1.  No significant change from prior study.    Impression:          Electronically signed by Fred Whitmore MD on 05-11-23 at 0543    US Thoracentesis [[id number]] Collected: 05/10/23 1404    Specimen: Body Fluid Updated: 05/10/23 1410    Narrative:      ULTRASOUND-GUIDED LEFT THORACENTESIS     HISTORY: Pleural effusion     After signed informed consent was obtained the patient was prepped and  draped in the usual sterile fashion with 2% chlorhexidine. Lidocaine was  used for local anesthesia.     Ultrasound guidance was used to place a 5 Citizen of [place] catheter into the left  pleural fluid collection. 100 mL of straw-colored fluid was removed.  Sample was sent to the lab.     Confirmatory images were obtained.     Patient tolerated the procedure well with no immediate complications.        Impression:      Ultrasound-guided left thoracentesis as described        This report was finalized on 5/10/2023 2:07 PM by Dr. Adilson Vann M.D.             Lab Results:     Lab Results (last 24 hours)     Procedure Component Value Units Date/Time    Body Fluid Culture - Body Fluid, Pleural Cavity [[id number]] Collected: 05/10/23 1110    Specimen: Body Fluid from Pleural Cavity Updated: 05/11/23 0900     Body Fluid Culture No growth     Gram Stain Rare (1+) WBCs per low power field      No organisms seen    ANCA Panel [[id number]] Collected: 05/09/23 1400    Specimen: Blood Updated: 05/11/23 0819     ANTI-MPO ANTIBODIES <0.2 units      ANTI-PR3 ANTIBODIES <0.2 units      C-ANCA <1:20 titer      P-ANCA <1:20 titer      Comment: The presence of positive fluorescence exhibiting P-ANCA or C-ANCA  patterns alone is not specific for the diagnosis of Wegener's  Granulomatosis (WG) or microscopic polyangiitis. Decisions about  treatment should not be based solely on ANCA IFA  results.  The  International ANCA Group Consensus recommends follow up testing of  positive sera with both MN-3 and MPO-ANCA enzyme immunoassays. As  many as 5% serum samples are positive only by EIA.  Ref. AM J Clin Pathol 1999;111:507-513.        Atypical pANCA <1:20 titer      Comment: The atypical pANCA pattern has been observed in a significant  percentage of patients with ulcerative colitis, primary sclerosing  cholangitis and autoimmune hepatitis.       Narrative:      Performed at:  01 - [facility]  [id number]  : Marcelino Hyman MD, Phone:  [phone]  Performed at:  02 - [facility]  [id number]  : Ricco Troncoso PhD, Phone:  [phone]    Respiratory Culture - Sputum, Cough [[id number]] Collected: 05/10/23 0441    Specimen: Sputum from Cough Updated: 05/11/23 0442     Gram Stain Many (4+) WBCs per low power field      Rare (1+) Epithelial cells per low power field      Many (4+) Yeast      Many (4+) Gram positive cocci    Basic Metabolic Panel [[id number]]  (Abnormal) Collected: 05/11/23 0251    Specimen: Blood Updated: 05/11/23 0324     Glucose 93 mg/dL      BUN 13 mg/dL      Creatinine 0.23 mg/dL      Sodium 137 mmol/L      Potassium 3.8 mmol/L      Chloride 102 mmol/L      CO2 32.0 mmol/L      Calcium 8.3 mg/dL      BUN/Creatinine Ratio 56.5     Anion Gap 3.0 mmol/L      eGFR 121.8 mL/min/1.73     Narrative:      GFR Normal >60  Chronic Kidney Disease <60  Kidney Failure <15      CBC & Differential [[id number]]  (Abnormal) Collected: 05/11/23 0251    Specimen: Blood Updated: 05/11/23 0306    Narrative:      The following orders were created for panel order CBC & Differential.  Procedure                               Abnormality         Status                     ---------                               -----------         ------                     CBC Auto Differential[[id number]]        Abnormal             Final result                 Please view results for these tests on the individual orders.    CBC Auto Differential [[id number]]  (Abnormal) Collected: 05/11/23 0251    Specimen: Blood Updated: 05/11/23 0306     WBC 19.36 10*3/mm3      RBC 3.42 10*6/mm3      Hemoglobin 10.3 g/dL      Hematocrit 31.4 %      MCV 91.8 fL      MCH 30.1 pg      MCHC 32.8 g/dL      RDW 13.4 %      RDW-SD 45.5 fl      MPV 9.9 fL      Platelets 296 10*3/mm3      Neutrophil % 86.3 %      Lymphocyte % 8.3 %      Monocyte % 3.7 %      Eosinophil % 0.3 %      Basophil % 0.2 %      Immature Grans % 1.2 %      Neutrophils, Absolute 16.73 10*3/mm3      Lymphocytes, Absolute 1.60 10*3/mm3      Monocytes, Absolute 0.71 10*3/mm3      Eosinophils, Absolute 0.06 10*3/mm3      Basophils, Absolute 0.03 10*3/mm3      Immature Grans, Absolute 0.23 10*3/mm3      nRBC 0.0 /100 WBC     POC Glucose Once [[id number]]  (Abnormal) Collected: 05/10/23 1801    Specimen: Blood Updated: 05/10/23 1803     Glucose 135 mg/dL      Comment: Meter: [id number] : [id number] Titus Juan KORY       Non-gynecologic Cytology [[id number]] Collected: 05/10/23 1110    Specimen: Pleural Fluid from Pleural Cavity Updated: 05/10/23 1450    JASSON Comprehensive Panel [[id number]] Collected: 05/09/23 1400    Specimen: Blood Updated: 05/10/23 1312     Anti-DNA (DS) Ab Qn <1 IU/mL      Comment:                                    Negative      <5                                     Equivocal  5 - 9                                     Positive      >9        RNP Antibodies <0.2 AI      Vann Antibodies <0.2 AI      Antiscleroderma-70 Antibodies <0.2 AI      Sjogren's Anti-SS-A <0.2 AI      Sjogren's Anti-SS-B <0.2 AI      Antichromatin Antibodies <0.2 AI      DUTCH-1 IgG <0.2 AI      Anti-Centromere B Antibodies <0.2 AI      See below: Comment     Comment: Autoantibody                       Disease Association  ------------------------------------------------------------                           Condition                  Frequency  ---------------------   ------------------------   ---------  Antinuclear Antibody,    SLE, mixed connective  Direct (JASSON-D)           tissue diseases  ---------------------   ------------------------   ---------  dsDNA                    SLE                        40 - 60%  ---------------------   ------------------------   ---------  Chromatin                Drug induced SLE                90%                           SLE                        48 - 97%  ---------------------   ------------------------   ---------  SSA (Ro)                 SLE                        25 - 35%                           Sjogren's Syndrome         40 - 70%                            Lupus                 100%  ---------------------   ------------------------   ---------  SSB (La)                 SLE                             10%                           Sjogren's Syndrome              30%  ---------------------   -----------------------    ---------  Sm (anti-Smith)          SLE                        15 - 30%  ---------------------   -----------------------    ---------  RNP                      Mixed Connective Tissue                           Disease                         95%  (U1 nRNP,                SLE                        30 - 50%  anti-ribonucleoprotein)  Polymyositis and/or                           Dermatomyositis                 20%  ---------------------   ------------------------   ---------  Scl-70 (antiDNA          Scleroderma (diffuse)      20 - 35%  topoisomerase)           Crest                           13%  ---------------------   ------------------------   ---------  Lori-1                     Polymyositis and/or                           Dermatomyositis            20 - 40%  ---------------------   ------------------------   ---------  Centromere B             Scleroderma - Crest                           variant                         80%       Narrative:       Performed at:  [address] [place] [address]  [id number]  : Ricco Troncoso PhD, Phone:  [phone]    pH, Body Fluid - Pleural Fluid, Pleural Cavity [[id number]] Collected: 05/10/23 1110    Specimen: Pleural Fluid from Pleural Cavity Updated: 05/10/23 1302     pH, Fluid 8.03    Narrative:      Reference Range:  Serous fluids 6.8-7.6     Pleural transudates: 7.4-7.5     Pleural exudates: 7.35-7.45     All other fluids: No defined reference ranges    This test was developed, its performance characteristics determined and judged suitable for clinical purposes by [facility]. It has not been cleared or approved by the FDA. The laboratory is regulated under CLIA as qualified to perform high-complexity testing.    Protein, Body Fluid - Pleural Fluid, Pleural Cavity [[id number]] Collected: 05/10/23 1110    Specimen: Pleural Fluid from Pleural Cavity Updated: 05/10/23 1255     Protein, Total, Fluid 1.4 g/dL     Narrative:      No Reference Ranges Established.    A serous fluid total fluid (TP) greater than 50 percent of the serum TP suggests the fluid is an exudate.      1. Pleural TP/Serum TP >0.5  2. Pleural LD/Serum LD >0.6  3. Pleural LD >2/3 of the upper limit of normal for serum LDH    This test was developed, it performance characteristics determined and judged suitable for clinical purposes by [facility].  It has not been cleared or approved by the FDA.  The laboratory is regulated under CLIA as qualified to perform high-complexity testing.     Glucose, Body Fluid - Pleural Fluid, Pleural Cavity [[id number]] Collected: 05/10/23 1110    Specimen: Pleural Fluid from Pleural Cavity Updated: 05/10/23 1255     Glucose, Fluid 142 mg/dL     Narrative:      No Reference Ranges Established.    Serous fluid glucose less than 60 mg/dL or less than 30 mg/dL below serum glucose suggests an infectious or malignant exudate.     This test was  developed, it performance characteristics determined and judged suitable for clinical purposes by [facility].  It has not been cleared or approved by the FDA.  The laboratory is regulated under CLIA as qualified to perform high-complexity testing.     AFB Culture - Body Fluid, Pleural Cavity [[id number]] Collected: 05/10/23 1110    Specimen: Body Fluid from Pleural Cavity Updated: 05/10/23 1200           Assessment & Plan       Acute respiratory failure with hypoxia    Benign essential HTN    Tobacco abuse    Pneumonia    Sepsis    Pleural effusion, right    Other emphysema    Pulmonary nodule       Assessment & Plan    Left pleural effusion: Moderate effusion as seen on CT chest.  S/p left thoracentesis with fluid studies on 5/10/23 with 100ml removed. Plueral fluid with NGTD.  Today's x-ray was independently reviewed and demonstrates patchy airspace disease on the right.  Slight improvement to left pleural effusion.    Pulmonary abscess/pneumonia: Oxygen requirements improved, now on 8 L high flow nasal cannula.  Appears much more comfortable, plan to move out of the ICU today.  Continue antibiotics per ID recommendations.     Nothing further to add from a thoracic perspective.  We will be happy to see her in the future should the need arise.  Thank you for letting us participate in the care of Ms. Williamson.    Yvrose Stone, HILARIA, APRN  [facility]  05/11/23  11:50 EDT

## 2023-05-12 LAB
ANION GAP SERPL CALCULATED.3IONS-SCNC: 0.6 MMOL/L (ref 5–15)
BACTERIA SPEC RESP CULT: NORMAL
BASOPHILS # BLD AUTO: 0.02 10*3/MM3 (ref 0–0.2)
BASOPHILS NFR BLD AUTO: 0.1 % (ref 0–1.5)
BUN SERPL-MCNC: 13 MG/DL (ref 8–23)
BUN/CREAT SERPL: 61.9 (ref 7–25)
CALCIUM SPEC-SCNC: 8.9 MG/DL (ref 8.6–10.5)
CHLORIDE SERPL-SCNC: 102 MMOL/L (ref 98–107)
CO2 SERPL-SCNC: 34.4 MMOL/L (ref 22–29)
CREAT SERPL-MCNC: 0.21 MG/DL (ref 0.57–1)
CYTO UR: NORMAL
DEPRECATED RDW RBC AUTO: 43.2 FL (ref 37–54)
EGFRCR SERPLBLD CKD-EPI 2021: 124.5 ML/MIN/1.73
EOSINOPHIL # BLD AUTO: 0.03 10*3/MM3 (ref 0–0.4)
EOSINOPHIL NFR BLD AUTO: 0.2 % (ref 0.3–6.2)
ERYTHROCYTE [DISTWIDTH] IN BLOOD BY AUTOMATED COUNT: 13.1 % (ref 12.3–15.4)
GLUCOSE SERPL-MCNC: 101 MG/DL (ref 65–99)
GRAM STN SPEC: NORMAL
H CAPSUL AB TITR SER ID: NEGATIVE {TITER}
HCT VFR BLD AUTO: 29.6 % (ref 34–46.6)
HGB BLD-MCNC: 10 G/DL (ref 12–15.9)
IMM GRANULOCYTES # BLD AUTO: 0.09 10*3/MM3 (ref 0–0.05)
IMM GRANULOCYTES NFR BLD AUTO: 0.6 % (ref 0–0.5)
LAB AP CASE REPORT: NORMAL
LAB AP SPECIAL STAINS: NORMAL
LYMPHOCYTES # BLD AUTO: 1.25 10*3/MM3 (ref 0.7–3.1)
LYMPHOCYTES NFR BLD AUTO: 9 % (ref 19.6–45.3)
MCH RBC QN AUTO: 30.6 PG (ref 26.6–33)
MCHC RBC AUTO-ENTMCNC: 33.8 G/DL (ref 31.5–35.7)
MCV RBC AUTO: 90.5 FL (ref 79–97)
MONOCYTES # BLD AUTO: 0.46 10*3/MM3 (ref 0.1–0.9)
MONOCYTES NFR BLD AUTO: 3.3 % (ref 5–12)
NEUTROPHILS NFR BLD AUTO: 12.07 10*3/MM3 (ref 1.7–7)
NEUTROPHILS NFR BLD AUTO: 86.8 % (ref 42.7–76)
NRBC BLD AUTO-RTO: 0 /100 WBC (ref 0–0.2)
PATH REPORT.FINAL DX SPEC: NORMAL
PATH REPORT.GROSS SPEC: NORMAL
PLATELET # BLD AUTO: 344 10*3/MM3 (ref 140–450)
PMV BLD AUTO: 10 FL (ref 6–12)
POTASSIUM SERPL-SCNC: 4.1 MMOL/L (ref 3.5–5.2)
RBC # BLD AUTO: 3.27 10*6/MM3 (ref 3.77–5.28)
REF LAB TEST METHOD: NORMAL
REF LAB TEST METHOD: NORMAL
SODIUM SERPL-SCNC: 137 MMOL/L (ref 136–145)
WBC NRBC COR # BLD: 13.92 10*3/MM3 (ref 3.4–10.8)

## 2023-05-12 PROCEDURE — 94761 N-INVAS EAR/PLS OXIMETRY MLT: CPT

## 2023-05-12 PROCEDURE — 80048 BASIC METABOLIC PNL TOTAL CA: CPT | Performed by: INTERNAL MEDICINE

## 2023-05-12 PROCEDURE — 97162 PT EVAL MOD COMPLEX 30 MIN: CPT

## 2023-05-12 PROCEDURE — 97530 THERAPEUTIC ACTIVITIES: CPT

## 2023-05-12 PROCEDURE — 94799 UNLISTED PULMONARY SVC/PX: CPT

## 2023-05-12 PROCEDURE — 99232 SBSQ HOSP IP/OBS MODERATE 35: CPT | Performed by: INTERNAL MEDICINE

## 2023-05-12 PROCEDURE — 92526 ORAL FUNCTION THERAPY: CPT

## 2023-05-12 PROCEDURE — 97166 OT EVAL MOD COMPLEX 45 MIN: CPT

## 2023-05-12 PROCEDURE — 25010000002 MEROPENEM PER 100 MG: Performed by: INTERNAL MEDICINE

## 2023-05-12 PROCEDURE — 94760 N-INVAS EAR/PLS OXIMETRY 1: CPT

## 2023-05-12 PROCEDURE — 94664 DEMO&/EVAL PT USE INHALER: CPT

## 2023-05-12 PROCEDURE — 25010000002 ENOXAPARIN PER 10 MG: Performed by: INTERNAL MEDICINE

## 2023-05-12 PROCEDURE — 85025 COMPLETE CBC W/AUTO DIFF WBC: CPT | Performed by: INTERNAL MEDICINE

## 2023-05-12 RX ORDER — DOXYCYCLINE 100 MG/1
100 CAPSULE ORAL EVERY 12 HOURS SCHEDULED
Status: DISCONTINUED | OUTPATIENT
Start: 2023-05-12 | End: 2023-05-14

## 2023-05-12 RX ADMIN — DOXYCYCLINE 100 MG: 100 INJECTION, POWDER, LYOPHILIZED, FOR SOLUTION INTRAVENOUS at 10:19

## 2023-05-12 RX ADMIN — DOXYCYCLINE 100 MG: 100 CAPSULE ORAL at 20:24

## 2023-05-12 RX ADMIN — MEROPENEM 1 G: 1 INJECTION, POWDER, FOR SOLUTION INTRAVENOUS at 12:12

## 2023-05-12 RX ADMIN — OXYCODONE HYDROCHLORIDE AND ACETAMINOPHEN 500 MG: 500 TABLET ORAL at 09:44

## 2023-05-12 RX ADMIN — Medication 10 ML: at 09:44

## 2023-05-12 RX ADMIN — ACETAMINOPHEN 650 MG: 325 TABLET ORAL at 10:54

## 2023-05-12 RX ADMIN — CITALOPRAM 20 MG: 20 TABLET, FILM COATED ORAL at 09:44

## 2023-05-12 RX ADMIN — IPRATROPIUM BROMIDE AND ALBUTEROL SULFATE 3 ML: 2.5; .5 SOLUTION RESPIRATORY (INHALATION) at 07:32

## 2023-05-12 RX ADMIN — MEROPENEM 1 G: 1 INJECTION, POWDER, FOR SOLUTION INTRAVENOUS at 20:24

## 2023-05-12 RX ADMIN — MEROPENEM 1 G: 1 INJECTION, POWDER, FOR SOLUTION INTRAVENOUS at 04:49

## 2023-05-12 RX ADMIN — IPRATROPIUM BROMIDE AND ALBUTEROL SULFATE 3 ML: 2.5; .5 SOLUTION RESPIRATORY (INHALATION) at 11:11

## 2023-05-12 RX ADMIN — IPRATROPIUM BROMIDE AND ALBUTEROL SULFATE 3 ML: 2.5; .5 SOLUTION RESPIRATORY (INHALATION) at 19:46

## 2023-05-12 RX ADMIN — GUAIFENESIN 600 MG: 600 TABLET, EXTENDED RELEASE ORAL at 20:24

## 2023-05-12 RX ADMIN — Medication 1000 MCG: at 09:44

## 2023-05-12 RX ADMIN — ENOXAPARIN SODIUM 40 MG: 100 INJECTION SUBCUTANEOUS at 13:27

## 2023-05-12 RX ADMIN — LOSARTAN POTASSIUM 50 MG: 50 TABLET, FILM COATED ORAL at 09:44

## 2023-05-12 RX ADMIN — IPRATROPIUM BROMIDE AND ALBUTEROL SULFATE 3 ML: 2.5; .5 SOLUTION RESPIRATORY (INHALATION) at 15:00

## 2023-05-12 RX ADMIN — Medication 10 ML: at 20:24

## 2023-05-12 RX ADMIN — GUAIFENESIN 600 MG: 600 TABLET, EXTENDED RELEASE ORAL at 09:44

## 2023-05-12 RX ADMIN — MIRTAZAPINE 7.5 MG: 15 TABLET, FILM COATED ORAL at 20:24

## 2023-05-12 NOTE — PLAN OF CARE
"Goal Outcome Evaluation:  Plan of Care Reviewed With: patient, spouse           Outcome Evaluation: Pt is a 72 y.o female admitted [facility] on 5/2/23 with cough and SOB. Pt noted to have necrotizing PNA, thoracentesis performed 5/10, currently on 8L O2. She reports she is very independent at baseline and exercises at the gym 3 days per week. Today she is motivated to work with therapies. She was able to sit EOB today and briefly stand but then became very dizzy. BP taken initally at 139/74 then dropped to 92/53 after standing. Also pt noted to have a lot of coughing today and O2 desats following return to bed. Pt requires extended recovery time to return to 89% and increased O2 demands to 10L. She requires total A for all LB ADLs at current functioning and is globally weak. Following session today she reports \"I feel like I just ran a maraton\". She would benefit from acute rehab following dc prior to returning home with her spouse.         "

## 2023-05-12 NOTE — PLAN OF CARE
Goal Outcome Evaluation:  Plan of Care Reviewed With: patient, spouse    Pt is 73 y/o F admitted to [facility] on 5/2/23 with cough and SOB. Pt noted to have necrotizing PNA, thoracentesis performed 5/10, currently on 8L O2. At baseline pt is indep with mobility, does not use AD or wear O2 at baseline. She lives with spouse, 3 DORYS. Pt currently demos min A of two for bed mobility and transfers. Upon standing pt reports significant dizziness. Vitals obtained and pt noted to be orthostatic with BP 92/53 ( 139/74 prior to activity). Upon return to supine BP up to 124/57 - RN notified. Pt also de-sat to 84% with activity. Requires increase in O2 to 10 L to recover to 89% in a period of ten min. RN aware and in room. Pt demos mobility below baseline and therefore would benefit from acute skilled PT to address functional mobility deficits. Anticipate d/c to rehab as pt significantly below baseline. Pt would be a good acute rehab candidate high PLOF and motivation to participate in therapy.

## 2023-05-12 NOTE — PLAN OF CARE
Goal Outcome Evaluation:  Plan of Care Reviewed With: patient        Progress: no change  Outcome Evaluation: VFSS follow up completed. Friend at bedside.  Verbal review of VFSS results 5.6. Discussed mild deficits and compensations to aid. Pt reports no current difficulties with swallow function, decreased appetite. Has already completed lunch (Filet O Fish) but amenable to consume thins by straw for evaluation tolerance. No s/s aspiration. Written education for diet/liquid levels and compensations, and reflux precautions. Following teaching, 100% comprehension, written education hung on wall for pt reference. Discussed plan of care, available as needed, however swallow functional with utilization of compensatory strategies.     Recommend: continue regular and thins. Medications: with thins. Compensations: GERD precautions which include: consumption of small meals, utilization of thin liquid wash or extra sauces/gravies as indicated, remaining upright for all PO consumption and at least 30 minutes s/p, avoid eating at reasonable time frame based on sleep schedule, avoid dry/sticky/dense foods as needed.    SLP to sign off at this juncture - instrumental completed, follow up education with 100% comprehension. Please reconsult if difficulties arise.

## 2023-05-12 NOTE — PLAN OF CARE
Goal Outcome Evaluation:transfer to special bed . No complaints made the whole shift . Doxycycline reschedule since it was not compatible with meropenem. Keep dry and comfortable. Able to sleep for longer time.Will continue to monitor .

## 2023-05-12 NOTE — NURSING NOTE
PT and OT attempted to get pt to chair. Pt got dizzy 0955 BP obtained 92/53 .   0959 PT back in bed /57  1012 /62. O2 up to 15L highflow O2

## 2023-05-12 NOTE — PLAN OF CARE
Goal Outcome Evaluation:   VSS. Attempted to sit in chair with PT and OT got dizzy and BP drop. Dr. Jamison. PT on 7L O2. Doxycycline switched to PO. Q2 turn on low airloss mattress. WIll continue to turn.

## 2023-05-12 NOTE — PROGRESS NOTES
[facility] Medicine Services  PROGRESS NOTE    Patient Name: Katherine Williamson  : 1950  MRN: [id number]    Date of Admission: 2023  Primary Care Physician: Zelalem Flor APRN    Subjective   Subjective     CC:  Follow-up lung infection    Subjective:  Patient says she is doing a little better.  She says she has been very sick and feels weak.  She is agreeable to try to work more with physical therapy and get up.  She denies any new complaints today.  She was on 8 L during my visit and I decreased her oxygen settings to 7 L and she maintained at 93%.    Review of Systems  No current fevers or chills  No current nausea, vomiting, or diarrhea  No current chest pain or palpitations      Objective   Objective     Vital Signs:   Temp:  [97.7 °F (36.5 °C)-98.7 °F (37.1 °C)] 98.7 °F (37.1 °C)  Heart Rate:  [] 90  Resp:  [18-20] 18  BP: ()/(53-74) 117/62        Physical Exam:  Constitutional:Awake, alert, acutely ill-appearing  HENT: NCAT, mucous membranes moist, neck supple  Respiratory: Cough is present, occasional coarse sounds, no wheezes, supplemental oxygen  Cardiovascular: Pulse rate is normal, palpable radial pulses  Gastrointestinal:  soft, nontender, nondistended  Musculoskeletal: Somewhat debilitated in appearance, mild lower extremity edema, BMI is 25  Psychiatric: Normal to mildly flat affect, cooperative, conversational  Neurologic: No slurred speech or facial droop, follows commands  Skin: No rashes or jaundice, warm      Results Reviewed:  Results from last 7 days   Lab Units 23  03423  0251 05/10/23  0707 23  0714   WBC 10*3/mm3 13.92* 19.36* 28.06* 19.56*   HEMOGLOBIN g/dL 10.0* 10.3* 11.0* 12.2   HEMATOCRIT % 29.6* 31.4* 32.6* 37.0   PLATELETS 10*3/mm3 344 296 317 320   PROCALCITONIN ng/mL  --   --   --  0.23     Results from last 7 days   Lab Units 23  03423  0251 05/10/23  0904   SODIUM mmol/L 137 137 135*   POTASSIUM mmol/L 4.1 3.8 3.8    CHLORIDE mmol/L 102 102 100   CO2 mmol/L 34.4* 32.0* 29.6*   BUN mg/dL 13 13 11   CREATININE mg/dL 0.21* 0.23* 0.30*   GLUCOSE mg/dL 101* 93 141*   CALCIUM mg/dL 8.9 8.3* 9.1   ALT (SGPT) U/L  --   --  42*   AST (SGOT) U/L  --   --  34*     Estimated Creatinine Clearance: 227.5 mL/min (A) (by C-G formula based on SCr of 0.21 mg/dL (L)).    Microbiology Results Abnormal     Procedure Component Value - Date/Time    Body Fluid Culture - Body Fluid, Pleural Cavity [[id number]] Collected: 05/10/23 1110    Lab Status: Preliminary result Specimen: Body Fluid from Pleural Cavity Updated: 05/12/23 0813     Body Fluid Culture No growth at 2 days     Gram Stain Rare (1+) WBCs per low power field      No organisms seen    Blastomyces Antigen - Urine, Urine, Clean Catch [[id number]] Collected: 05/09/23 1558    Lab Status: Final result Specimen: Urine, Clean Catch Updated: 05/12/23 0807     Reference Lab Report --    Histoplasma Ag Ur - Urine, Urine, Clean Catch [[id number]] Collected: 05/09/23 1558    Lab Status: Final result Specimen: Urine, Clean Catch Updated: 05/12/23 0806     Reference Lab Report --    AFB Culture - Sputum, Cough [[id number]] Collected: 05/10/23 0441    Lab Status: Preliminary result Specimen: Sputum from Cough Updated: 05/11/23 1419     AFB Stain No acid fast bacilli seen on concentrated smear    AFB Culture - Body Fluid, Pleural Cavity [[id number]] Collected: 05/10/23 1110    Lab Status: Preliminary result Specimen: Body Fluid from Pleural Cavity Updated: 05/11/23 1417     AFB Stain No acid fast bacilli seen on concentrated smear    Respiratory Culture - Sputum, Cough [[id number]] Collected: 05/10/23 0441    Lab Status: Preliminary result Specimen: Sputum from Cough Updated: 05/11/23 1255     Respiratory Culture Rare The culture consists of normal respiratory aishwarya. This is a preliminary report; final report to follow.     Gram Stain Many (4+) WBCs per low power field      Rare (1+) Epithelial cells per low  power field      Many (4+) Yeast      Many (4+) Gram positive cocci    Blood Culture - Blood, Arm, Left [[id number]]  (Normal) Collected: 05/04/23 1815    Lab Status: Final result Specimen: Blood from Arm, Left Updated: 05/09/23 1845     Blood Culture No growth at 5 days    Narrative:      Less than seven (7) mL's of blood was collected.  Insufficient quantity may yield false negative results.    Blood Culture - Blood, Hand, Right [[id number]]  (Normal) Collected: 05/04/23 1811    Lab Status: Final result Specimen: Blood from Hand, Right Updated: 05/09/23 1845     Blood Culture No growth at 5 days    Narrative:      Less than seven (7) mL's of blood was collected.  Insufficient quantity may yield false negative results.    Respiratory Culture - Sputum, Cough [[id number]] Collected: 05/06/23 1402    Lab Status: Final result Specimen: Sputum from Cough Updated: 05/08/23 0908     Respiratory Culture Rare Normal respiratory aishwarya. No S. aureus or Pseudomonas aeruginosa detected. Final report.     Gram Stain Moderate (3+) WBCs seen      Rare (1+) Epithelial cells per low power field      Mixed bacterial morphotypes seen on Gram Stain    Blood Culture - Blood, Arm, Right [[id number]]  (Normal) Collected: 05/02/23 1433    Lab Status: Final result Specimen: Blood from Arm, Right Updated: 05/07/23 1445     Blood Culture No growth at 5 days    Clostridioides difficile Toxin - Stool, Per Rectum [[id number]]  (Normal) Collected: 05/05/23 2142    Lab Status: Final result Specimen: Stool from Per Rectum Updated: 05/05/23 2235    Narrative:      The following orders were created for panel order Clostridioides difficile Toxin - Stool, Per Rectum.  Procedure                               Abnormality         Status                     ---------                               -----------         ------                     Clostridioides difficile...[[id number]]  Normal              Final result                 Please view results for these  tests on the individual orders.    Clostridioides difficile Toxin, PCR - Stool, Per Rectum [[id number]]  (Normal) Collected: 05/05/23 2142    Lab Status: Final result Specimen: Stool from Per Rectum Updated: 05/05/23 2235     C. Difficile Toxins by PCR Negative    Narrative:      The result indicates the absence of toxigenic C. difficile from stool specimen.     Respiratory Culture - Sputum, Cough [[id number]] Collected: 05/02/23 1739    Lab Status: Final result Specimen: Sputum from Cough Updated: 05/04/23 0717     Respiratory Culture Scant growth (1+) Normal respiratory aishwarya. No S. aureus or Pseudomonas aeruginosa detected. Final report.     Gram Stain Many (4+) WBCs per low power field      Many (4+) Mixed bacterial morphotypes seen on Gram Stain      Moderate (3+) Epithelial cells per low power field    MRSA Screen, PCR (Inpatient) - Swab, Nares [[id number]]  (Normal) Collected: 05/02/23 1741    Lab Status: Final result Specimen: Swab from Nares Updated: 05/02/23 1923     MRSA PCR No MRSA Detected    Narrative:      The negative predictive value of this diagnostic test is high and should only be used to consider de-escalating anti-MRSA therapy. A positive result may indicate colonization with MRSA and must be correlated clinically.    Legionella Antigen, Urine - Urine, Urine, Clean Catch [[id number]]  (Normal) Collected: 05/02/23 1754    Lab Status: Final result Specimen: Urine, Clean Catch Updated: 05/02/23 1834     LEGIONELLA ANTIGEN, URINE Negative    S. Pneumo Ag Urine or CSF - Urine, Urine, Clean Catch [[id number]]  (Normal) Collected: 05/02/23 1754    Lab Status: Final result Specimen: Urine, Clean Catch Updated: 05/02/23 1834     Strep Pneumo Ag Negative    Respiratory Panel PCR w/COVID-19(SARS-CoV-2) ALEN/GATITO/DOREEN/[facility] In-House, NP Swab in UTM/VTM, 3-4 HR TAT - Swab, Nasopharynx [[id number]]  (Normal) Collected: 05/02/23 1427    Lab Status: Final result Specimen: Swab from Nasopharynx Updated:  05/02/23 1542     ADENOVIRUS, PCR Not Detected     Coronavirus 229E Not Detected     Coronavirus HKU1 Not Detected     Coronavirus NL63 Not Detected     Coronavirus OC43 Not Detected     COVID19 Not Detected     Human Metapneumovirus Not Detected     Human Rhinovirus/Enterovirus Not Detected     Influenza A PCR Not Detected     Influenza B PCR Not Detected     Parainfluenza Virus 1 Not Detected     Parainfluenza Virus 2 Not Detected     Parainfluenza Virus 3 Not Detected     Parainfluenza Virus 4 Not Detected     RSV, PCR Not Detected     Bordetella pertussis pcr Not Detected     Bordetella parapertussis PCR Not Detected     Chlamydophila pneumoniae PCR Not Detected     Mycoplasma pneumo by PCR Not Detected    Narrative:      In the setting of a positive respiratory panel with a viral infection PLUS a negative procalcitonin without other underlying concern for bacterial infection, consider observing off antibiotics or discontinuation of antibiotics and continue supportive care. If the respiratory panel is positive for atypical bacterial infection (Bordetella pertussis, Chlamydophila pneumoniae, or Mycoplasma pneumoniae), consider antibiotic de-escalation to target atypical bacterial infection.          Imaging Results (Last 24 Hours)     ** No results found for the last 24 hours. **          Results for orders placed during the hospital encounter of 05/13/20    Adult Transthoracic Echo Complete W/ Cont if Necessary Per Protocol    Interpretation Summary  · Left ventricular systolic function is normal.  · Left ventricular diastolic dysfunction (grade I) consistent with impaired relaxation.    Normal LV and RV size and function  EF estimated 65 to 70%  Normal atrial sizes  Normal RV size and function  No significant valvulopathy seen  Bubble study negative for right to left interatrial shunt under resting and Valsalva conditions, slightly mobile interatrial septum without defect seen clearly  No masses  No effusion  seen  Grade 1 diastolic dysfunction by criteria  Cannot estimate PA systolic pressure secondary to insufficient TR envelope      I have reviewed the medications:  Scheduled Meds:vitamin C, 500 mg, Oral, Daily  citalopram, 20 mg, Oral, Daily  doxycycline, 100 mg, Oral, Q12H  enoxaparin, 40 mg, Subcutaneous, Q24H  fluticasone, 1 spray, Each Nare, Daily  guaiFENesin, 600 mg, Oral, BID  ipratropium-albuterol, 3 mL, Nebulization, 4x Daily - RT  losartan, 50 mg, Oral, Q24H  meropenem, 1 g, Intravenous, Q8H  mirtazapine, 7.5 mg, Oral, Nightly  sodium chloride, 10 mL, Intravenous, Q12H  cyanocobalamin, 1,000 mcg, Oral, Daily      Continuous Infusions:hold, 1 each      PRN Meds:.•  acetaminophen **OR** acetaminophen **OR** acetaminophen  •  hold  •  loperamide  •  magnesium sulfate **OR** magnesium sulfate **OR** magnesium sulfate  •  nitroglycerin  •  ondansetron  •  potassium & sodium phosphates **OR** potassium & sodium phosphates  •  potassium chloride **OR** potassium chloride **OR** potassium chloride  •  [COMPLETED] Insert Peripheral IV **AND** sodium chloride  •  sodium chloride  •  sodium chloride    Assessment & Plan   Assessment & Plan     Active Hospital Problems    Diagnosis  POA   • **Acute respiratory failure with hypoxia [J96.01]  Yes   • Sepsis [A41.9]  Yes   • Pleural effusion, right [J90]  Yes   • Other emphysema [J43.8]  Yes   • Pulmonary nodule [R91.1]  Yes   • Pneumonia [J18.9]  Yes   • Tobacco abuse [Z72.0]  Yes   • Benign essential HTN [I10]  Yes      Resolved Hospital Problems   No resolved problems to display.        Brief Hospital Course to date:  Katherine Williamson is a 72 y.o. female presents to the hospital with acute hypoxic respiratory failure, bacterial pneumonia, right lung abscess, parapneumonic pleural effusion which is now status postthoracentesis, emphysema, and tobacco use.  She required ICU level care due to severe hypoxia.    Discussion/plan:  All medical problems are new under my  management today.  Patient transferred out of intensive care unit hospitalist team is taking back over as primary service.  Lung images reviewed and significant pneumonia and lung infection noted as well as emphysema.    Appreciate infectious disease recommendation.  Currently receiving broad-spectrum coverage with doxycycline and meropenem and appears to be responding to therapy.  Patient is severely ill from her lung infection.    Plan for oxygen weaning as tolerated today.  Continue lung exercises.  Consult PT OT as she is severely debilitated from her acute illness    Cultures reviewed.  1 of 2 blood cultures from 5/4 grew corynebacterium likely skin aishwarya.  Continue to follow remaining cultures.    Appreciate thoracic surgery recommendations on parapneumonic effusion.    Tobacco cessation counseled.    SSRI for depression.  Currently seems controlled.    Hypertension: Receiving losartan.  Normotensive.  Monitor and adjust as needed.    Treatment plan discussed with the patient is in agreement    DVT Prophylaxis: Mechanical      Disposition: Pending clinical course.    CODE STATUS:   Code Status and Medical Interventions:   Ordered at: 05/02/23 1807     Code Status (Patient has no pulse and is not breathing):    CPR (Attempt to Resuscitate)     Medical Interventions (Patient has pulse or is breathing):    Full Support       Issac Mendez MD  05/12/23

## 2023-05-12 NOTE — CASE MANAGEMENT/SOCIAL WORK
Continued Stay Note  [facility]     Patient Name: Katherine Williamson  MRN: [id number]  Today's Date: 5/12/2023    Admit Date: 5/2/2023    Plan: [facility] vs [facility] [facility] vs Home with [facility].  Per ID, tentatively planning meropenem 1 g IV qh x 4 weeks with stop date ~6/6/23   Discharge Plan     Row Name 05/12/23 1951       Plan    Plan [facility] vs [facility] vs Home with [facility].  Per ID, tentatively planning meropenem 1 g IV qh x 4 weeks with stop date ~6/6/23    Patient/Family in Agreement with Plan yes    Plan Comments From ICU yesterday evening. Pt on up to 15 LHFNC at one point today. O2 has been weaned up and down throughout day. Referral to BAR placed so they can follow. [facility]  has accepted and will see at D/C if able to return home. Per ID, tentatively planning meropenem 1 g IV qh x 4 weeks with stop date ~6/6/23. Kt Ontiveros RN-BC               Discharge Codes    No documentation.               Expected Discharge Date and Time     Expected Discharge Date Expected Discharge Time    May 15, 2023             Kt Ontiveros RN

## 2023-05-12 NOTE — THERAPY DISCHARGE NOTE
Acute Care - Speech Language Pathology   Swallow Re-Evaluation/Discharge [facility]     Patient Name: Katherine Williamson  : 1950  MRN: [id number]  Today's Date: 2023               Admit Date: 2023    Visit Dx:    ICD-10-CM ICD-9-CM   1. Acute respiratory failure with hypoxia  J96.01 518.81   2. Sepsis, due to unspecified organism, unspecified whether acute organ dysfunction present  A41.9 038.9     995.91   3. Community acquired pneumonia, unspecified laterality  J18.9 486   4. Abnormal CXR  R93.89 793.2   5. Hyperglycemia  R73.9 790.29   6. 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 JOSEPH LAU   • Hypertension      Past Surgical History:   Procedure Laterality Date   • COLONOSCOPY  10/2015    Njlon-jymwgadujgzb-Wx. Kaplan.  Follow-up in 5 years.   • COLONOSCOPY N/A 2022    2 BENIGN POLYPS IN DESCENDING, 5 MM BENIGN POLYP IN SIGMOID, MULTIPLE SMALL AND LARGE DIVERTICULA IN SIGMOID, RESCOPE IN 3 YRS, DR. JOSEPH LAU AT [facility]   • EYE SURGERY     • JOINT REPLACEMENT  ?    Left total hip replacement in .  In 2015 patient had left hip revision   • TONSILLECTOMY          SLP Recommendation and Plan  Recommend: continue regular and thins. Medications: with thins. Compensations: GERD precautions which include: consumption of small meals, utilization of thin liquid wash or extra sauces/gravies as indicated, remaining upright for all PO consumption and at least 30 minutes s/p, avoid eating at reasonable time frame based on sleep schedule, avoid dry/sticky/dense foods as needed.        SLP to sign off at this juncture - instrumental completed, follow up education with 100% comprehension. Please reconsult if difficulties arise.    SWALLOW EVALUATION (last 72 hours)     SLP Adult Swallow Evaluation     Row Name 05/12/23 1500       Rehab Evaluation    Document Type discharge treatment  -AB    Subjective Information no complaints  -AB    Patient Observations alert;cooperative;agree to therapy  -AB    Patient/Family/Caregiver Comments/Observations seen in room 4east, 467, O2 weaned to 5L.  -AB    Patient Effort good  -AB    Symptoms Noted During/After Treatment none  -AB          User Key  (r) = Recorded By, (t) = Taken By, (c) = Cosigned By    Initials Name Effective Dates    AB Marce Alvarado, MS CCC-SLP 12/27/22 -                 EDUCATION  The patient has been educated in the following areas:   Dysphagia (Swallowing Impairment) Modified Diet Instruction.         SLP GOALS     Row Name 05/12/23 1500       (LTG) Patient will demonstrate functional swallow for    Diet Texture (Demonstrate functional swallow) regular textures  -AB    Liquid viscosity (Demonstrate functional swallow) thin liquids  -AB    [place] (Demonstrate functional swallow) independently (over 90% accuracy)  -AB    Progress/Outcomes (Demonstrate functional swallow) goal met  -AB       (STG) Swallow Management Recall Goal 1 (SLP)    Activity (Swallow Management Recall Goal 1, SLP) independent recall of;aspiration precautions;reflux precautions  -AB    [place]/Accuracy (Swallow Management Recall Goal 1,  SLP) independently (over 90% accuracy)  -AB    Progress/Outcomes (Swallow Management Recall Goal 1, SLP) goal met  -AB    Comment (Swallow Management Recall Goal 1, SLP) VFSS follow up completed. Friend at bedside.  Verbal review of VFSS results 5.6. Discussed mild deficits and compensations to aid. Pt reports no current difficulties with swallow function, decreased appetite. Has already completed lunch (Filet O Fish) but amenable to consume thins by straw for evaluation tolerance. No s/s aspiration. Written education for diet/liquid levels and compensations, and reflux precautions. Following teaching, 100% comprehension, written education hung on wall for pt reference. Discussed plan of care, available as needed, however swallow functional with utilization of compensatory strategies.     Recommend: continue regular and thins. Medications: with thins. Compensations: GERD precautions which include: consumption of small meals, utilization of thin liquid wash or extra sauces/gravies as indicated, remaining upright for all PO consumption and at least 30 minutes s/p, avoid eating at reasonable time frame based on sleep schedule, avoid dry/sticky/dense foods as needed.    SLP to sign off at this juncture - instrumental completed, follow up education with 100% comprehension. Please reconsult if difficulties arise.  -AB          User Key  (r) = Recorded By, (t) = Taken By, (c) = Cosigned By    Initials Name Provider Type    Marce Sanderson, MS CCC-SLP Speech and Language Pathologist                     Time Calculation:    Time Calculation- SLP     Row Name 05/12/23 1527             Time Calculation- SLP    SLP Received On 05/12/23  -AB            User Key  (r) = Recorded By, (t) = Taken By, (c) = Cosigned By    Initials Name Provider Type    Marce Sanderson, MS CCC-SLP Speech and Language Pathologist                Therapy Charges for Today     Code Description Service Date Service Provider Modifiers Qty     [id number]  ST TREATMENT SWALLOW 2 5/12/2023 Marce Alvarado, MS CCC-SLP GN 1               SLP Discharge Summary  Anticipated Discharge Disposition (SLP): unknown    Marce Alvarado MS CCC-SLP  5/12/2023

## 2023-05-12 NOTE — THERAPY EVALUATION
Patient Name: Katherine Williamson  : 1950    MRN: [id number]                              Today's Date: 2023       Admit Date: 2023    Visit Dx:     ICD-10-CM ICD-9-CM   1. Acute respiratory failure with hypoxia  J96.01 518.81   2. Sepsis, due to unspecified organism, unspecified whether acute organ dysfunction present  A41.9 038.9     995.91   3. Community acquired pneumonia, unspecified laterality  J18.9 486   4. Abnormal CXR  R93.89 793.2   5. Hyperglycemia  R73.9 790.29   6. 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 JOSEPH LAU   • Hypertension      Past Surgical History:   Procedure Laterality Date   • COLONOSCOPY  10/2015    Tdtwy-jxnqvxkuekhu-I[facility]. Kaplan.  Follow-up in 5 years.   • COLONOSCOPY N/A 2022    2 BENIGN POLYPS IN DESCENDING, 5 MM BENIGN POLYP IN SIGMOID, MULTIPLE SMALL AND LARGE DIVERTICULA IN SIGMOID, RESCOPE IN 3 YRS, DR. JOSEPH LAU AT [facility]   • EYE SURGERY     • JOINT REPLACEMENT  ?    Left total hip replacement in .  In 2015 patient had left hip revision   • TONSILLECTOMY        General Information     Row Name 23 1132          Physical  Therapy Time and Intention    Document Type evaluation  -ST     Mode of Treatment co-treatment;physical therapy;occupational therapy  -     Row Name 05/12/23 1132          General Information    Patient Profile Reviewed yes  -ST     Prior Level of Function independent:  -ST     Existing Precautions/Restrictions fall;oxygen therapy device and L/min  -ST     Barriers to Rehab medically complex  -     Row Name 05/12/23 1132          Living Environment    People in Home spouse  -     Row Name 05/12/23 1132          Home Main Entrance    Number of Stairs, Main Entrance three  -ST     Stair Railings, Main Entrance railings safe and in good condition  -     Row Name 05/12/23 1132          Stairs Within Home, Primary    Number of Stairs, Within Home, Primary none  -     Row Name 05/12/23 1132          Cognition    Orientation Status (Cognition) oriented x 3  -[facility] Name 05/12/23 1132          Safety Issues, Functional Mobility    Safety Issues Affecting Function (Mobility) sequencing abilities  -ST     Impairments Affecting Function (Mobility) balance;endurance/activity tolerance;strength  -     Comment, Safety Issues/Impairments (Mobility) gait belt, nonskid socks donned  -ST           User Key  (r) = Recorded By, (t) = Taken By, (c) = Cosigned By    Initials Name Provider Type    ST Verena Hess PT Physical Therapist               Mobility     Row Name 05/12/23 1136          Bed Mobility    Bed Mobility sit-supine;supine-sit  -ST     Supine-Sit [place] (Bed Mobility) minimum assist (75% patient effort);2 person assist  -ST     Sit-Supine [place] (Bed Mobility) moderate assist (50% patient effort);2 person assist  -ST     Assistive Device (Bed Mobility) head of bed elevated;bed rails  -ST     Comment, (Bed Mobility) increased assist to return to bed d/t fatigue and hypotension  -     Row Name 05/12/23 1136          Sit-Stand Transfer    Sit-Stand [place] (Transfers) verbal  cues;minimum assist (75% patient effort);1 person to manage equipment  -ST     Assistive Device (Sit-Stand Transfers) walker, front-wheeled  -ST     Row Name 05/12/23 1136          Gait/Stairs (Locomotion)    [place] Level (Gait) not tested  -ST     Comment, (Gait/Stairs) did not progress past standing d/t pt being orthostatic with report of significant dizziness upon standing  -ST           User Key  (r) = Recorded By, (t) = Taken By, (c) = Cosigned By    Initials Name Provider Type    Verena Peacock PT Physical Therapist               Obj/Interventions     Row Name 05/12/23 1137          Range of Motion Comprehensive    Comment, General Range of Motion bilat hip mobility noted to be limited - reports hx of THERESA  -ST     Row Name 05/12/23 1137          Strength Comprehensive (MMT)    Comment, General Manual Muscle Testing (MMT) Assessment bilat LE grossly 3+/5  -ST     Row Name 05/12/23 1137          Motor Skills    Therapeutic Exercise other (see comments)  supine AP, GS x 10  -ST     Row Name 05/12/23 1137          Balance    Comment, Balance min A of 2 for dyanmic balance at this time, no LOB  -ST           User Key  (r) = Recorded By, (t) = Taken By, (c) = Cosigned By    Initials Name Provider Type    Verena Peacock PT Physical Therapist               Goals/Plan     Row Name 05/12/23 1139          Bed Mobility Goal 1 (PT)    Activity/Assistive Device (Bed Mobility Goal 1, PT) bed mobility activities, all  -ST     [place] Level/Cues Needed (Bed Mobility Goal 1, PT) standby assist  -ST     Time Frame (Bed Mobility Goal 1, PT) 1 week  -ST     Progress/Outcomes (Bed Mobility Goal 1, PT) new goal  -ST     Row Name 05/12/23 1139          Transfer Goal 1 (PT)    Activity/Assistive Device (Transfer Goal 1, PT) sit-to-stand/stand-to-sit;bed-to-chair/chair-to-bed  -ST     [place] Level/Cues Needed (Transfer Goal 1, PT) contact guard required  -ST     Time Frame (Transfer Goal 1, PT) 1 week   -ST     Progress/Outcome (Transfer Goal 1, PT) new goal  -ST     Row Name 05/12/23 1139          Gait Training Goal 1 (PT)    Activity/Assistive Device (Gait Training Goal 1, PT) gait (walking locomotion);assistive device use  -ST     [place] Level (Gait Training Goal 1, PT) contact guard required  -ST     Distance (Gait Training Goal 1, PT) 50';  -ST     Time Frame (Gait Training Goal 1, PT) 1 week  -ST     Progress/Outcome (Gait Training Goal 1, PT) new goal  -ST           User Key  (r) = Recorded By, (t) = Taken By, (c) = Cosigned By    Initials Name Provider Type    ST Verena Hess, PT Physical Therapist               Clinical Impression     Row Name 05/12/23 1138          Pain    Pretreatment Pain Rating 0/10 - no pain  -ST     Posttreatment Pain Rating 0/10 - no pain  -ST     Row Name 05/12/23 1138          Plan of Care Review    Plan of Care Reviewed With patient;spouse  -     Outcome Evaluation Pt is 71 y/o F admitted to [facility] on 5/2/23 with cough and SOB. Pt noted to have necrotizing PNA, thoracentesis performed 5/10, currently on 8L O2. At baseline pt is indep with mobility, does not use AD or wear O2 at baseline. She lives with spouse, 3 DORYS. Pt currently demos min A of two for bed mobility and transfers. Upon standing pt reports significant dizziness. Vitals obtained and pt noted to be orthostatic with BP 92/53 ( 139/74 prior to activity). Upon return to supine BP up to 124/57 - RN notified. Pt also de-sat to 84% with activity. Requires increase in O2 to 10 L to recover to 89% in a period of ten min. RN aware and in room. Pt demos mobility below baseline and therefore would benefit from acute skilled PT to address functional mobility deficits. Anticipate d/c to rehab as pt significantly below baseline. Pt would be a good acute rehab candidate high PLOF and motivation to participate in therapy.  -ST     Row Name 05/12/23 1138          Therapy Assessment/Plan (PT)    Rehab Potential (PT) fair,  will monitor progress closely  -ST     Criteria for Skilled Interventions Met (PT) yes  -ST     Therapy Frequency (PT) 6 times/wk  -ST     Predicted Duration of Therapy Intervention (PT) 1 week  -ST     Row Name 05/12/23 1138          Positioning and Restraints    Pre-Treatment Position in bed  -ST     Post Treatment Position bed  -ST     In Bed supine;call light within reach;encouraged to call for assist;with family/caregiver;with nsg  -ST           User Key  (r) = Recorded By, (t) = Taken By, (c) = Cosigned By    Initials Name Provider Type    Verena Peacock PT Physical Therapist               Outcome Measures     Row Name 05/12/23 1141          How much help from another person do you currently need...    Turning from your back to your side while in flat bed without using bedrails? 3  -ST     Moving from lying on back to sitting on the side of a flat bed without bedrails? 2  -ST     Moving to and from a bed to a chair (including a wheelchair)? 2  -ST     Standing up from a chair using your arms (e.g., wheelchair, bedside chair)? 2  -ST     Climbing 3-5 steps with a railing? 1  -ST     To walk in hospital room? 2  -ST     AM-PAC 6 Clicks Score (PT) 12  -ST     Highest level of mobility 4 --> Transferred to chair/commode  -ST     Row Name 05/12/23 1141          Functional Assessment    Outcome Measure Options AM-PAC 6 Clicks Basic Mobility (PT)  -ST           User Key  (r) = Recorded By, (t) = Taken By, (c) = Cosigned By    Initials Name Provider Type    Verena Peacock PT Physical Therapist                             Physical Therapy Education     Title: PT OT SLP Therapies (In Progress)     Topic: Physical Therapy (In Progress)     Point: Mobility training (Done)     Learning Progress Summary           Patient Acceptance, E,TB, VU,NR by  at 5/12/2023 1141                   Point: Home exercise program (Done)     Learning Progress Summary           Patient Acceptance, E,TB, VU,NR by  at  5/12/2023 1141                   Point: Body mechanics (Done)     Learning Progress Summary           Patient Acceptance, E,TB, VU,NR by ST at 5/12/2023 1141                   Point: Precautions (Not Started)     Learner Progress:  Not documented in this visit.                      User Key     Initials Effective Dates Name Provider Type Discipline     09/22/22 -  Verena Hess, PT Physical Therapist PT              PT Recommendation and Plan     Plan of Care Reviewed With: patient, spouse  Outcome Evaluation: Pt is 73 y/o F admitted to [facility] on 5/2/23 with cough and SOB. Pt noted to have necrotizing PNA, thoracentesis performed 5/10, currently on 8L O2. At baseline pt is indep with mobility, does not use AD or wear O2 at baseline. She lives with spouse, 3 DORYS. Pt currently demos min A of two for bed mobility and transfers. Upon standing pt reports significant dizziness. Vitals obtained and pt noted to be orthostatic with BP 92/53 ( 139/74 prior to activity). Upon return to supine BP up to 124/57 - RN notified. Pt also de-sat to 84% with activity. Requires increase in O2 to 10 L to recover to 89% in a period of ten min. RN aware and in room. Pt demos mobility below baseline and therefore would benefit from acute skilled PT to address functional mobility deficits. Anticipate d/c to rehab as pt significantly below baseline. Pt would be a good acute rehab candidate high PLOF and motivation to participate in therapy.     Time Calculation:    PT Charges     Row Name 05/12/23 1146             Time Calculation    Start Time 0936  -ST      Stop Time 1014  -ST      Time Calculation (min) 38 min  -ST      PT Received On 05/12/23  -ST      PT - Next Appointment 05/13/23  -ST      PT Goal Re-Cert Due Date 05/19/23  -ST         Time Calculation- PT    Total Timed Code Minutes- PT 30 minute(s)  -ST         Timed Charges    76480 - PT Therapeutic Activity Minutes 30  -ST         Total Minutes    Timed Charges Total Minutes  30  -ST       Total Minutes 30  -ST            User Key  (r) = Recorded By, (t) = Taken By, (c) = Cosigned By    Initials Name Provider Type    Verena Peacock PT Physical Therapist              Therapy Charges for Today     Code Description Service Date Service Provider Modifiers Qty    83318763883 HC PT THERAPEUTIC ACT EA 15 MIN 5/12/2023 Verena Hess, PT GP 2    29269062944 HC PT EVAL MOD COMPLEXITY 2 5/12/2023 Verena Hess PT GP 1          PT G-Codes  Outcome Measure Options: AM-PAC 6 Clicks Basic Mobility (PT)  AM-PAC 6 Clicks Score (PT): 12  PT Discharge Summary  Anticipated Discharge Disposition (PT): inpatient rehabilitation facility    Verena Hess PT  5/12/2023

## 2023-05-12 NOTE — PROGRESS NOTES
ID NOTE    CC: f/u pneumonia    Subj: No fever. Worked w/ PT just before my arrival so currently on 15L NC. She looks and feels better again today. All cultures remain NGTD. Out of CICU to med-surg floor.       Medications:    Current Facility-Administered Medications:   •  acetaminophen (TYLENOL) tablet 650 mg, 650 mg, Oral, Q4H PRN, 650 mg at 05/10/23 2014 **OR** acetaminophen (TYLENOL) 160 MG/5ML solution 650 mg, 650 mg, Oral, Q4H PRN **OR** acetaminophen (TYLENOL) suppository 650 mg, 650 mg, Rectal, Q4H PRN, Gee Clement MD  •  ascorbic acid (VITAMIN C) tablet 500 mg, 500 mg, Oral, Daily, Gee Clement MD, 500 mg at 05/12/23 0944  •  citalopram (CeleXA) tablet 20 mg, 20 mg, Oral, Daily, Gee Clement MD, 20 mg at 05/12/23 0944  •  doxycycline (MONODOX) capsule 100 mg, 100 mg, Oral, Q12H, Rigoberto Cahn MD  •  Enoxaparin Sodium (LOVENOX) syringe 40 mg, 40 mg, Subcutaneous, Q24H, Jennifer Hood MD, 40 mg at 05/11/23 1545  •  fluticasone (FLONASE) 50 MCG/ACT nasal spray 1 spray, 1 spray, Each Nare, Daily, Gee Clement MD, 1 spray at 05/08/23 0808  •  guaiFENesin (MUCINEX) 12 hr tablet 600 mg, 600 mg, Oral, BID, Gee Clement MD, 600 mg at 05/12/23 0944  •  Hold medication, 1 each, Does not apply, Continuous PRN, Jennifer Hood MD  •  ipratropium-albuterol (DUO-NEB) nebulizer solution 3 mL, 3 mL, Nebulization, 4x Daily - RT, Gee Clement MD, 3 mL at 05/12/23 0732  •  loperamide (IMODIUM) capsule 2 mg, 2 mg, Oral, Q6H PRN, Gee Clement MD, 2 mg at 05/07/23 1822  •  losartan (COZAAR) tablet 50 mg, 50 mg, Oral, Q24H, Gee Clement MD, 50 mg at 05/12/23 0948  •  Magnesium Sulfate - Total Dose 10 grams - Magnesium 1 or Less, 2 g, Intravenous, PRN **OR** Magnesium Sulfate - Total Dose 6 grams - Magnesium 1.1 - 1.5, 2 g, Intravenous, PRN **OR** Magnesium Sulfate - Total Dose 4 grams - Magnesium 1.6 - 1.9, 4 g, Intravenous, PRN, Gee Clement MD  •  meropenem (MERREM) 1  g in sodium chloride 0.9 % 100 mL IVPB-VTB, 1 g, Intravenous, Q8H, Jennifer Hood MD, Last Rate: 33.3 mL/hr at 05/12/23 0449, 1 g at 05/12/23 0449  •  mirtazapine (REMERON) tablet 7.5 mg, 7.5 mg, Oral, Nightly, Gee Clement MD, 7.5 mg at 05/11/23 2027  •  nitroglycerin (NITROSTAT) SL tablet 0.4 mg, 0.4 mg, Sublingual, Q5 Min PRN, Gee Clement MD  •  ondansetron (ZOFRAN) injection 4 mg, 4 mg, Intravenous, Q6H PRN, Gee Clement MD, 4 mg at 05/11/23 1855  •  potassium & sodium phosphates (PHOS-NAK) 280-160-250 MG packet - for Phosphorus less than 1.25 mg/dL, 2 packet, Oral, Q6H PRN **OR** potassium & sodium phosphates (PHOS-NAK) 280-160-250 MG packet - for Phosphorus 1.25 - 2.5 mg/dL, 2 packet, Oral, Q6H PRN, Gee Clement MD  •  potassium chloride (K-DUR,KLOR-CON) ER tablet 40 mEq, 40 mEq, Oral, PRN, 40 mEq at 05/07/23 0654 **OR** potassium chloride (KLOR-CON) packet 40 mEq, 40 mEq, Oral, PRN **OR** potassium chloride 10 mEq in 100 mL IVPB, 10 mEq, Intravenous, Q1H PRN, Gee Clement MD  •  [COMPLETED] Insert Peripheral IV, , , Once **AND** sodium chloride 0.9 % flush 10 mL, 10 mL, Intravenous, PRN, Gee Clement MD  •  sodium chloride 0.9 % flush 10 mL, 10 mL, Intravenous, Q12H, Gee Clement MD, 10 mL at 05/12/23 0944  •  sodium chloride 0.9 % flush 10 mL, 10 mL, Intravenous, PRN, Gee Clement MD  •  sodium chloride 0.9 % infusion 40 mL, 40 mL, Intravenous, PRN, Gee Clement MD  •  vitamin B-12 (CYANOCOBALAMIN) tablet 1,000 mcg, 1,000 mcg, Oral, Daily, Gee Clement MD, 1,000 mcg at 05/12/23 0944      Objective   Vital Signs   Temp:  [97.7 °F (36.5 °C)-98.7 °F (37.1 °C)] 98.7 °F (37.1 °C)  Heart Rate:  [] 91  Resp:  [18-20] 18  BP: ()/(53-74) 117/62    Physical Exam:   General: awake, alert, very nice but frail appearing  Eyes: no scleral icterus  ENT: no thrush; HFNC in nares  Cardiovascular: NR  Respiratory: no wheezing; normal WOB on 15L HFNC  GI: Abdomen  is soft, not tender  :  no Snell catheter  Skin: No rashes  Neurological: Alert and oriented x 3  Psychiatric: Normal mood and affect     Labs:   CBC, BMP, fungal studies and cultures reviewed today  Lab Results   Component Value Date    WBC 13.92 (H) 05/12/2023    HGB 10.0 (L) 05/12/2023    HCT 29.6 (L) 05/12/2023    MCV 90.5 05/12/2023     05/12/2023     Lab Results   Component Value Date    GLUCOSE 101 (H) 05/12/2023    CALCIUM 8.9 05/12/2023     05/12/2023    K 4.1 05/12/2023    CO2 34.4 (H) 05/12/2023     05/12/2023    BUN 13 05/12/2023    CREATININE 0.21 (L) 05/12/2023    EGFR 124.5 05/12/2023    BCR 61.9 (H) 05/12/2023    ANIONGAP 0.6 (L) 05/12/2023     Procal 3.95--->2.6-->0.2  Crypto Ag negative  Urine Histo Ag negative  Serum Histo Ag pending  Urine Blasto Ag negative  Aspergillus Ag pending    Microbiology:  5/2 RPP: negative  5/2 BCx: Corynebacterium in 1/2 sets  5/2 SpCx: normal aishwarya  5/2 MRSA nares: negative  5/2 Strep pneumo Ag; negative  5/2 Legionella Ag: negatie  5/4 BCx: negative  5/5 C diff: negative  5/6 SpCx: normal aishwarya  5/9 AFB Cx: NGTD  5/9 Fungus Cx: NGTD  5/10 SpCx: NGTD  5/10 L Thoracentesis Cx: NGTD      ASSESSMENT/PLAN:  1. Pneumonia and right lung abscess  2. Pleural effusions  3. Acute hypoxic respiratory failure  4. Emphysema  5. Tobacco use    She remains afebrile and her WBC is trending down. Her O2 needs are gradually improving. It is only higher at the moment due to the fact she just worked w/ PT. Her cultures are negative. At this point, I am tentatively planning meropenem 1 g IV q8h x 4 weeks with stop date ~6/6/23 and doxycycline 100 mg PO BID for the same duration. I'll also order a repeat CT to be done just prior to the stop date. She will need follow-up w/ pulmonologist as well.     ID will follow.

## 2023-05-12 NOTE — PROGRESS NOTES
PROGRESS NOTE  Patient Name: Katherine Williamson  Age/Sex: 72 y.o. female  : 1950  MRN: [id number]    Date of Admission: 2023  Date of Encounter Visit: 23   LOS: 10 days   Patient Care Team:  Zelalem Flor APRN as PCP - General (Internal Medicine)  Stephen, Brandon Meyer MD as Consulting Physician (Orthopedic Surgery)    Chief Complaint: Acute progressive respiratory failure    Hospital course: Patient has been progressing despite being treated with Rocephin and vancomycin initially and then switched to Zosyn with increased to density and the extent of the consolidation and later transition to imipenem/doxycycline since she continued to progress despite the Zosyn  On the new regimen the patient is showing clinical improvement  She had pleural effusion that was drained on 5/10/2023 with 600 cc removed of straw-colored fluid was low protein consistency and normal glucose, the rest of the fluid analysis is still pending  Autoimmune panel came back negative  Fungal serology is pending  Patient has been afebrile for the last 48 hours along with parallel drop in the white blood cell count and improvement in the oxygen requirements  Tolerating p.o., feeling better    REVIEW OF SYSTEMS:   CONSTITUTIONAL: Low-grade fever  CARDIOVASCULAR: No chest pain, chest pressure or chest discomfort. No palpitations or edema.   RESPIRATORY: Less shortness of breath, less productive cough  GASTROINTESTINAL: No anorexia, nausea, vomiting or diarrhea. No abdominal pain or blood.   HEMATOLOGIC: No bleeding or bruising.     Ventilator/Non-Invasive Ventilation Settings (From admission, onward)    6 L nasal cannula-            Vital Signs  Temp:  [98.1 °F (36.7 °C)-98.7 °F (37.1 °C)] 98.1 °F (36.7 °C)  Heart Rate:  [] 98  Resp:  [18-20] 18  BP: ()/(53-74) 129/73  SpO2:  [92 %-96 %] 94 %  on  Flow (L/min):  [7-15] 7 Device (Oxygen Therapy): humidified;high-flow nasal cannula    Intake/Output Summary (Last 24 hours) at  "5/12/2023 1326  Last data filed at 5/12/2023 1234  Gross per 24 hour   Intake 100 ml   Output 600 ml   Net -500 ml     Flowsheet Rows    Flowsheet Row First Filed Value   Admission Height 160 cm (63\") Documented at 05/02/2023 1900   Admission Weight 61.2 kg (135 lb) Documented at 05/02/2023 1900        Body mass index is 25.7 kg/m².      05/10/23  0414 05/11/23  0600 05/12/23  0500   Weight: 65.8 kg (145 lb 1 oz) 65.8 kg (145 lb 1 oz) (Bed is broken)       Physical Exam:  GEN: Looking better, in no distress, awake and responsive, on high flow nasal cannula oxygen  EYES:   Sclerae clear. No icterus. PERRL. Normal EOM  ENT:   External ears/nose normal, no oral lesions, no thrush, mucous membranes moist  NECK:  Supple, midline trachea, no JVD  LUNGS: Normal chest on inspection, no wheezes on quiet breathing still having some wheezes on coughing, no much copious secretion, diminished breath sounds, very faint crackles more so on the right  CV:  Regular rhythm and rate. Normal S1/S2. No murmurs, gallops, or rubs noted.  ABD:  Soft, nontender and nondistended. Normal bowel sounds. No guarding  EXT:  Moves all extremities well. No cyanosis. No redness.  Trace lower extremities edema.   Skin: Dry, intact, no bleeding    Results Review:    Results From Last 14 Days   Lab Units 05/03/23  0607 05/02/23  1743 05/02/23  1433   LACTATE mmol/L 1.9 3.3* 6.3*     Results from last 7 days   Lab Units 05/12/23  0346 05/11/23  0251 05/10/23  0904 05/09/23  0714 05/08/23  0605 05/07/23  0435 05/06/23  0627   SODIUM mmol/L 137 137 135* 135* 137 142 140   POTASSIUM mmol/L 4.1 3.8 3.8 4.7 3.5 3.4* 3.7   CHLORIDE mmol/L 102 102 100 100 101 102 105   CO2 mmol/L 34.4* 32.0* 29.6* 30.0* 31.6* 31.3* 29.7*   BUN mg/dL 13 13 11 12 11 12 16   CREATININE mg/dL 0.21* 0.23* 0.30* 0.31* 0.30* 0.37* 0.37*   CALCIUM mg/dL 8.9 8.3* 9.1 8.5* 8.7 8.9 9.2   AST (SGOT) U/L  --   --  34*  --   --   --   --    ALT (SGPT) U/L  --   --  42*  --   --   --   --  "   ANION GAP mmol/L 0.6* 3.0* 5.4 5.0 4.4* 8.7 5.3   ALBUMIN g/dL  --   --  2.0*  --   --   --   --                  Results from last 7 days   Lab Units 05/12/23  0346 05/11/23  0251 05/10/23  0707 05/09/23  0714 05/08/23  0605 05/07/23  0435 05/06/23  0627   WBC 10*3/mm3 13.92* 19.36* 28.06* 19.56* 18.04* 19.72* 19.80*   HEMOGLOBIN g/dL 10.0* 10.3* 11.0* 12.2 11.1* 11.5* 11.2*   HEMATOCRIT % 29.6* 31.4* 32.6* 37.0 33.5* 33.6* 32.8*   PLATELETS 10*3/mm3 344 296 317 320 288 313 316   MCV fL 90.5 91.8 91.6 93.2 91.8 90.3 90.1   NEUTROPHIL % % 86.8* 86.3* 86.2* 85.7* 86.6* 84.3*  --    LYMPHOCYTE % % 9.0* 8.3* 7.6* 9.3* 7.6* 8.8*  --    MONOCYTES % % 3.3* 3.7* 3.2* 2.5* 2.5* 3.0*  --    EOSINOPHIL % % 0.2* 0.3 0.3 0.4 0.4 0.2*  --    BASOPHIL % % 0.1 0.2 0.2 0.2 0.2 0.4  --    IMM GRAN % % 0.6* 1.2* 2.5*  --  2.7* 3.3*  --       Latest Reference Range & Units Most Recent   RNP Antibodies 0.0 - 0.9 AI <0.2  5/9/23 14:00   Vann Antibodies 0.0 - 0.9 AI <0.2  5/9/23 14:00   Sjogren's Anti-SS-A 0.0 - 0.9 AI <0.2  5/9/23 14:00   Sjogren's Anti-SS-B 0.0 - 0.9 AI <0.2  5/9/23 14:00   DUTCH-1 IgG 0.0 - 0.9 AI <0.2  5/9/23 14:00      Latest Reference Range & Units Most Recent   Anti-Centromere B Antibodies 0.0 - 0.9 AI <0.2  5/9/23 14:00   Antichromatin Antibodies 0.0 - 0.9 AI <0.2  5/9/23 14:00   Anti-DNA (DS) Ab Qn 0 - 9 IU/mL <1  5/9/23 14:00   C-ANCA Neg:<1:20 titer <1:20  5/9/23 14:00   DUTCH-1 IgG 0.0 - 0.9 AI <0.2  5/9/23 14:00   P-ANCA Neg:<1:20 titer <1:20  5/9/23 14:00   Atypical pANCA Neg:<1:20 titer <1:20  5/9/23 14:00      Latest Reference Range & Units Most Recent   Antiscleroderma-70 Antibodies 0.0 - 0.9 AI <0.2  5/9/23 14:00           Latest Reference Range & Units Most Recent   pH, Fluid  8.03  5/10/23 11:10   Glucose, Fluid mg/dL 142  5/10/23 11:10   Protein, Total, Fluid g/dL 1.4  5/10/23 11:10      Latest Reference Range & Units Most Recent   ANTI-MPO ANTIBODIES 0.0 - 0.9 units <0.2  5/9/23 14:00   ANTI-PR3  ANTIBODIES 0.0 - 0.9 units <0.2  5/9/23 14:00               Invalid input(s): LDLCALC  Results from last 7 days   Lab Units 05/08/23  1303   PH, ARTERIAL pH units 7.509*   PCO2, ARTERIAL mm Hg 42.7   PO2 ART mm Hg 60.0*   HCO3 ART mmol/L 34.0*         Glucose   Date/Time Value Ref Range Status   05/10/2023 1801 135 (H) 70 - 130 mg/dL Final     Comment:     Meter: [id number] : [id number] Della HORN     Results from last 7 days   Lab Units 05/09/23  0714   PROCALCITONIN ng/mL 0.23     Results from last 7 days   Lab Units 05/10/23  1110 05/10/23  0441 05/06/23  1402   BODYFLDCX  No growth at 2 days  --   --    RESPCX   --  Rare Normal respiratory aishwarya. No S. aureus or Pseudomonas aeruginosa detected. Final report. Rare Normal respiratory aishwarya. No S. aureus or Pseudomonas aeruginosa detected. Final report.                       Imaging:   Imaging Results (All)     Procedure Component Value Units Date/Time            I reviewed the patient's new clinical results.  I personally viewed and interpreted the patient's imaging results: Bilateral dense pneumonia with some cavitation on the right side, multiples chest x-ray from today compared to the one done 3 days ago showed no significant changes        Medication Review:   vitamin C, 500 mg, Oral, Daily  citalopram, 20 mg, Oral, Daily  doxycycline, 100 mg, Oral, Q12H  enoxaparin, 40 mg, Subcutaneous, Q24H  fluticasone, 1 spray, Each Nare, Daily  guaiFENesin, 600 mg, Oral, BID  ipratropium-albuterol, 3 mL, Nebulization, 4x Daily - RT  losartan, 50 mg, Oral, Q24H  meropenem, 1 g, Intravenous, Q8H  mirtazapine, 7.5 mg, Oral, Nightly  sodium chloride, 10 mL, Intravenous, Q12H  cyanocobalamin, 1,000 mcg, Oral, Daily        hold, 1 each        ASSESSMENT:   1. Right lower lobe lung abscess/pneumonia  2. Sepsis  3. Right loculated pleural effusion  4. Acute hypoxic respiratory failure, worse  5. Sepsis  6. Emphysema, upper lobe predominant  7. Peripheral pleural-based  nodule, 2.4 cm on CT chest 4/3/23, could have been related to early pneumonia.  Underlying malignancy cannot be excluded  8. Tobacco abuse  9. Essential hypertension    PLAN:  Continues to improve with improving in the vital signs, oxygen requirements, and we will do a follow-up chest x-ray tomorrow to see if there is a pattern improvement in that 1 as well, which is expected  Continue with the meropenem and the doxycycline, may change the doxycycline to p.o.  Continue with the pulmonary hygiene measures  We will order the chest x-ray for tomorrow  Patient failed to respond to other antibiotic courses and she was switched to meropenem and doxycycline with improvement afterwards, there is no positive culture to help guide our antibiotic selection or de-escalation.  Infectious disease following  Follow-up on the pleural fluid cytology and culture  Most of the fungal serology are so far negative      Disposition: Per primary team    Jennifer Hood MD  05/12/23  13:26 EDT          Dictated utilizing Dragon dictation

## 2023-05-12 NOTE — THERAPY EVALUATION
Patient Name: Katherine Williamson  : 1950    MRN: [id number]                              Today's Date: 2023       Admit Date: 2023    Visit Dx:     ICD-10-CM ICD-9-CM   1. Acute respiratory failure with hypoxia  J96.01 518.81   2. Sepsis, due to unspecified organism, unspecified whether acute organ dysfunction present  A41.9 038.9     995.91   3. Community acquired pneumonia, unspecified laterality  J18.9 486   4. Abnormal CXR  R93.89 793.2   5. Hyperglycemia  R73.9 790.29   6. 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 JOSEPH LAU   • Hypertension      Past Surgical History:   Procedure Laterality Date   • COLONOSCOPY  10/2015    [facility].  Follow-up in 5 years.   • COLONOSCOPY N/A 2022    2 BENIGN POLYPS IN DESCENDING, 5 MM BENIGN POLYP IN SIGMOID, MULTIPLE SMALL AND LARGE DIVERTICULA IN SIGMOID, RESCOPE IN 3 YRS, DR. OJSEPH LAU AT [facility]   • EYE SURGERY     • JOINT REPLACEMENT  ?    Left total hip replacement in .  In 2015 patient had left hip revision   • TONSILLECTOMY        General Information     Row Name 23 1210          OT Time and  Intention    Document Type evaluation  -     Mode of Treatment co-treatment;physical therapy;occupational therapy  2/2 medical complexity and poor activity tolerance. Pt recently transfered to floor from ICU  -     Row Name 05/12/23 1210          General Information    Patient Profile Reviewed yes  -     Prior Level of Function independent:;ADL's;all household mobility  -     Existing Precautions/Restrictions fall;oxygen therapy device and L/min;orthostatic hypotension  -     Barriers to Rehab medically complex  -     Row Name 05/12/23 1210          Occupational Profile    Environmental Supports and Barriers (Occupational Profile) Pt has a rwx and shower chair from previous hip replacement 10 years prior- she does not typically use  -     Row Name 05/12/23 1210          Living Environment    People in Home spouse  -     Row Name 05/12/23 1210          Home Main Entrance    Number of Stairs, Main Entrance three  -     Row Name 05/12/23 1210          Stairs Within Home, Primary    Number of Stairs, Within Home, Primary none  -     Row Name 05/12/23 1210          Cognition    Orientation Status (Cognition) oriented x 3  -     Row Name 05/12/23 1210          Safety Issues, Functional Mobility    Impairments Affecting Function (Mobility) balance;endurance/activity tolerance;strength;shortness of breath  -           User Key  (r) = Recorded By, (t) = Taken By, (c) = Cosigned By    Initials Name Provider Type     Perla Carrasco OT Occupational Therapist                 Mobility/ADL's     Row Name 05/12/23 1211          Bed Mobility    Supine-Sit [place] (Bed Mobility) minimum assist (75% patient effort);2 person assist  -     Sit-Supine [place] (Bed Mobility) moderate assist (50% patient effort);2 person assist  -     Assistive Device (Bed Mobility) head of bed elevated;bed rails  -     Row Name 05/12/23 1211          Sit-Stand Transfer    Sit-Stand [place] (Transfers)  minimum assist (75% patient effort);2 person assist  -     Assistive Device (Sit-Stand Transfers) walker, front-wheeled  -     Row Name 05/12/23 1211          Functional Mobility    Functional Mobility- Comment dizzy with standing and returned to bed  -[facility] Name 05/12/23 1211          Activities of Daily Living    BADL Assessment/Intervention lower body dressing  other ADLs deferred today 2/2 BP  -[facility] Name 05/12/23 1211          Lower Body Dressing Assessment/Training    Early Level (Lower Body Dressing) don;socks;dependent (less than 25% patient effort)  -     Position (Lower Body Dressing) edge of bed sitting  -     Comment, (Lower Body Dressing) poor reaching to feet  -           User Key  (r) = Recorded By, (t) = Taken By, (c) = Cosigned By    Initials Name Provider Type    Perla Munroe OT Occupational Therapist               Obj/Interventions     [facility] Name 05/12/23 1212          Range of Motion Comprehensive    Comment, General Range of Motion shoulder flexion impaired 25% bilaterally due to weakness. Distal UE WFL  -[facility] Name 05/12/23 1212          Strength Comprehensive (MMT)    Comment, General Manual Muscle Testing (MMT) Assessment generalized weakness, BUE grossly 3-/5 at shoulders and 3+/5 distally.  -[facility] Name 05/12/23 1212          Motor Skills    Motor Skills functional endurance  -     Functional Endurance poor  -[facility] Name 05/12/23 1212          Balance    Balance Assessment sitting static balance;standing static balance  -     Static Sitting Balance supervision  -     Position, Sitting Balance sitting edge of bed  -     Static Standing Balance contact guard  -     Position/Device Used, Standing Balance supported;walker, rolling  -     Comment, Balance pt only able to stand for less than 30 seconds today  -           User Key  (r) = Recorded By, (t) = Taken By, (c) = Cosigned By    Initials Name Provider Type    LEONEL Carrasco  AUGUSTO Duncan Occupational Therapist               Goals/Plan     Row Name 05/12/23 1220          Transfer Goal 1 (OT)    Activity/Assistive Device (Transfer Goal 1, OT) sit-to-stand/stand-to-sit;bed-to-chair/chair-to-bed;commode, bedside without drop arms  -SM     [place] Level/Cues Needed (Transfer Goal 1, OT) minimum assist (75% or more patient effort)  -SM     Time Frame (Transfer Goal 1, OT) short term goal (STG);2 weeks  -SM     Progress/Outcome (Transfer Goal 1, OT) goal ongoing  -     Row Name 05/12/23 1220          Dressing Goal 1 (OT)    Activity/Device (Dressing Goal 1, OT) upper body dressing;lower body dressing  -SM     [place]/Cues Needed (Dressing Goal 1, OT) standby assist;moderate assist (50-74% patient effort)  -SM     Time Frame (Dressing Goal 1, OT) short term goal (STG);2 weeks  -SM     Progress/Outcome (Dressing Goal 1, OT) goal ongoing  -     Row Name 05/12/23 1220          Toileting Goal 1 (OT)    Activity/Device (Toileting Goal 1, OT) toileting skills, all  -SM     [place] Level/Cues Needed (Toileting Goal 1, OT) maximum assist (25-49% patient effort)  -SM     Time Frame (Toileting Goal 1, OT) short term goal (STG);2 weeks  -SM     Progress/Outcome (Toileting Goal 1, OT) goal ongoing  -     Row Name 05/12/23 1220          Strength Goal 1 (OT)    Strength Goal 1 (OT) Pt to increase UE shouler strength to 3+/5 to increase ability to perform reaching and ADLs.  -SM     Time Frame (Strength Goal 1, OT) short term goal (STG);2 weeks  -SM     Progress/Outcome (Strength Goal 1, OT) goal ongoing  -     Row Name 05/12/23 1220          Therapy Assessment/Plan (OT)    Planned Therapy Interventions (OT) activity tolerance training;adaptive equipment training;BADL retraining;functional balance retraining;occupation/activity based interventions;patient/caregiver education/training;transfer/mobility retraining;strengthening exercise;ROM/therapeutic exercise  -SM           User Key  (r) =  "Recorded By, (t) = Taken By, (c) = Cosigned By    Initials Name Provider Type    Perla Munroe, AUGUSTO Occupational Therapist               Clinical Impression     Row Name 05/12/23 1213          Pain Assessment    Pretreatment Pain Rating 0/10 - no pain  -     Posttreatment Pain Rating 0/10 - no pain  -     Row Name 05/12/23 1213          Plan of Care Review    Plan of Care Reviewed With patient;spouse  -     Outcome Evaluation Pt is a 72 y.o female admitted [facility] on 5/2/23 with cough and SOB. Pt noted to have necrotizing PNA, thoracentesis performed 5/10, currently on 8L O2. She reports she is very independent at baseline and exercises at the gym 3 days per week. Today she is motivated to work with therapies. She was able to sit EOB today and briefly stand but then became very dizzy. BP taken initally at 139/74 then dropped to 92/53 after standing. Also pt noted to have a lot of coughing today and O2 desats following return to bed. Pt requires extended recovery time to return to 89% and increased O2 demands to 10L. She requires total A for all LB ADLs at current functioning and is globally weak. Following session today she reports \"I feel like I just ran a [place]\". She would benefit from acute rehab following dc prior to returning home with her spouse.  -     Row Name 05/12/23 1213          Therapy Assessment/Plan (OT)    Rehab Potential (OT) good, to achieve stated therapy goals  -     Criteria for Skilled Therapeutic Interventions Met (OT) yes;skilled treatment is necessary  -     Therapy Frequency (OT) 5 times/wk  -     Row Name 05/12/23 1213          Therapy Plan Review/Discharge Plan (OT)    Anticipated Discharge Disposition (OT) inpatient rehabilitation facility  -     Row Name 05/12/23 1213          Vital Signs    Pre Systolic BP Rehab 139  -SM     Pre Treatment Diastolic BP 74  -SM     Intra Systolic BP Rehab 92  -SM     Intra Treatment Diastolic BP 53  -SM     Post Systolic BP Rehab 124  " -SM     Post Treatment Diastolic BP 57  -SM     Pre SpO2 (%) 95  -SM     O2 Delivery Pre Treatment hi-flow  -SM     Intra SpO2 (%) 85  -SM     O2 Delivery Intra Treatment hi-flow  -SM     Post SpO2 (%) 89  -SM     O2 Delivery Post Treatment hi-flow  -SM     Recovery Time 10 min  -SM     Row Name 05/12/23 1213          Positioning and Restraints    Pre-Treatment Position in bed  -SM     Post Treatment Position bed  -SM     In Bed fowlers;call light within reach;encouraged to call for assist;notified nsg;with family/caregiver  -           User Key  (r) = Recorded By, (t) = Taken By, (c) = Cosigned By    Initials Name Provider Type    Perla Munroe OT Occupational Therapist               Outcome Measures     Row Name 05/12/23 1223          How much help from another is currently needed...    Putting on and taking off regular lower body clothing? 1  -SM     Bathing (including washing, rinsing, and drying) 2  -SM     Toileting (which includes using toilet bed pan or urinal) 1  -SM     Putting on and taking off regular upper body clothing 2  -SM     Taking care of personal grooming (such as brushing teeth) 3  -SM     Eating meals 4  -SM     AM-PAC 6 Clicks Score (OT) 13  -SM     Row Name 05/12/23 1141          How much help from another person do you currently need...    Turning from your back to your side while in flat bed without using bedrails? 3  -ST     Moving from lying on back to sitting on the side of a flat bed without bedrails? 2  -ST     Moving to and from a bed to a chair (including a wheelchair)? 2  -ST     Standing up from a chair using your arms (e.g., wheelchair, bedside chair)? 2  -ST     Climbing 3-5 steps with a railing? 1  -ST     To walk in hospital room? 2  -ST     AM-PAC 6 Clicks Score (PT) 12  -ST     Highest level of mobility 4 --> Transferred to chair/commode  -ST     Row Name 05/12/23 1223 05/12/23 1141       Functional Assessment    Outcome Measure Options AM-PAC 6 Clicks Daily  Activity (OT)  - AM-PAC 6 Clicks Basic Mobility (PT)  -          User Key  (r) = Recorded By, (t) = Taken By, (c) = Cosigned By    Initials Name Provider Type     Perla Carrasco, OT Occupational Therapist    Verena Peacock, SHIRLEY Physical Therapist                Occupational Therapy Education     Title: PT OT SLP Therapies (In Progress)     Topic: Occupational Therapy (In Progress)     Point: ADL training (Done)     Description:   Instruct learner(s) on proper safety adaptation and remediation techniques during self care or transfers.   Instruct in proper use of assistive devices.              Learning Progress Summary           Patient Acceptance, E, VU by  at 5/12/2023 1224    Comment: sitting up in bed to progress ability to sit, dc recommendations and POC for OT/goals.   Family Acceptance, E, VU by  at 5/12/2023 1224    Comment: sitting up in bed to progress ability to sit, dc recommendations and POC for OT/goals.                   Point: Home exercise program (Not Started)     Description:   Instruct learner(s) on appropriate technique for monitoring, assisting and/or progressing therapeutic exercises/activities.              Learner Progress:  Not documented in this visit.          Point: Precautions (Not Started)     Description:   Instruct learner(s) on prescribed precautions during self-care and functional transfers.              Learner Progress:  Not documented in this visit.          Point: Body mechanics (Not Started)     Description:   Instruct learner(s) on proper positioning and spine alignment during self-care, functional mobility activities and/or exercises.              Learner Progress:  Not documented in this visit.                      User Key     Initials Effective Dates Name Provider Type Discipline     04/02/20 -  Perla Carrasco OT Occupational Therapist OT              OT Recommendation and Plan  Planned Therapy Interventions (OT): activity tolerance training,  "adaptive equipment training, BADL retraining, functional balance retraining, occupation/activity based interventions, patient/caregiver education/training, transfer/mobility retraining, strengthening exercise, ROM/therapeutic exercise  Therapy Frequency (OT): 5 times/wk  Plan of Care Review  Plan of Care Reviewed With: patient, spouse  Outcome Evaluation: Pt is a 72 y.o female admitted [facility] on 5/2/23 with cough and SOB. Pt noted to have necrotizing PNA, thoracentesis performed 5/10, currently on 8L O2. She reports she is very independent at baseline and exercises at the gym 3 days per week. Today she is motivated to work with therapies. She was able to sit EOB today and briefly stand but then became very dizzy. BP taken initally at 139/74 then dropped to 92/53 after standing. Also pt noted to have a lot of coughing today and O2 desats following return to bed. Pt requires extended recovery time to return to 89% and increased O2 demands to 10L. She requires total A for all LB ADLs at current functioning and is globally weak. Following session today she reports \"I feel like I just ran a [place]\". She would benefit from acute rehab following dc prior to returning home with her spouse.     Time Calculation:    Time Calculation- OT     Row Name 05/12/23 1225             Time Calculation- OT    OT Start Time 0942  -      OT Stop Time 1012  -      OT Time Calculation (min) 30 min  -SM      Total Timed Code Minutes- OT 15 minute(s)  -SM      OT Received On 05/12/23  -      OT - Next Appointment 05/12/23  -      OT Goal Re-Cert Due Date 05/26/23  -         Timed Charges    94358 - OT Therapeutic Activity Minutes 15  -SM         Untimed Charges    OT Eval/Re-eval Minutes 15  -SM         Total Minutes    Timed Charges Total Minutes 15  -SM      Untimed Charges Total Minutes 15  -SM       Total Minutes 30  -SM            User Key  (r) = Recorded By, (t) = Taken By, (c) = Cosigned By    Initials Name Provider Type     " Perla Carrasco OT Occupational Therapist              Therapy Charges for Today     Code Description Service Date Service Provider Modifiers Qty    [id number]  OT THERAPEUTIC ACT EA 15 MIN 5/12/2023 Perla Carrasco OT GO 1    [id number]  OT EVAL MOD COMPLEXITY 2 5/12/2023 Perla Carrasco OT GO 1               Perla Carrasco OT  5/12/2023

## 2023-05-13 PROBLEM — R53.81 PHYSICAL DEBILITY: Status: ACTIVE | Noted: 2023-05-13

## 2023-05-13 PROBLEM — I51.89 DIASTOLIC DYSFUNCTION: Status: ACTIVE | Noted: 2023-05-13

## 2023-05-13 LAB
ANION GAP SERPL CALCULATED.3IONS-SCNC: 1 MMOL/L (ref 5–15)
BACTERIA FLD CULT: NORMAL
BASOPHILS # BLD AUTO: 0.03 10*3/MM3 (ref 0–0.2)
BASOPHILS NFR BLD AUTO: 0.3 % (ref 0–1.5)
BUN SERPL-MCNC: 13 MG/DL (ref 8–23)
BUN/CREAT SERPL: 65 (ref 7–25)
CALCIUM SPEC-SCNC: 8.3 MG/DL (ref 8.6–10.5)
CHLORIDE SERPL-SCNC: 100 MMOL/L (ref 98–107)
CO2 SERPL-SCNC: 33 MMOL/L (ref 22–29)
CREAT SERPL-MCNC: 0.2 MG/DL (ref 0.57–1)
DEPRECATED RDW RBC AUTO: 45.1 FL (ref 37–54)
EGFRCR SERPLBLD CKD-EPI 2021: 126 ML/MIN/1.73
EOSINOPHIL # BLD AUTO: 0.04 10*3/MM3 (ref 0–0.4)
EOSINOPHIL NFR BLD AUTO: 0.3 % (ref 0.3–6.2)
ERYTHROCYTE [DISTWIDTH] IN BLOOD BY AUTOMATED COUNT: 13.2 % (ref 12.3–15.4)
GLUCOSE SERPL-MCNC: 85 MG/DL (ref 65–99)
GRAM STN SPEC: NORMAL
GRAM STN SPEC: NORMAL
HCT VFR BLD AUTO: 30.1 % (ref 34–46.6)
HGB BLD-MCNC: 9.9 G/DL (ref 12–15.9)
IMM GRANULOCYTES # BLD AUTO: 0.12 10*3/MM3 (ref 0–0.05)
IMM GRANULOCYTES NFR BLD AUTO: 1 % (ref 0–0.5)
LYMPHOCYTES # BLD AUTO: 1.14 10*3/MM3 (ref 0.7–3.1)
LYMPHOCYTES NFR BLD AUTO: 9.7 % (ref 19.6–45.3)
MCH RBC QN AUTO: 30.6 PG (ref 26.6–33)
MCHC RBC AUTO-ENTMCNC: 32.9 G/DL (ref 31.5–35.7)
MCV RBC AUTO: 92.9 FL (ref 79–97)
MONOCYTES # BLD AUTO: 0.48 10*3/MM3 (ref 0.1–0.9)
MONOCYTES NFR BLD AUTO: 4.1 % (ref 5–12)
NEUTROPHILS NFR BLD AUTO: 84.6 % (ref 42.7–76)
NEUTROPHILS NFR BLD AUTO: 9.97 10*3/MM3 (ref 1.7–7)
NRBC BLD AUTO-RTO: 0 /100 WBC (ref 0–0.2)
PLATELET # BLD AUTO: 390 10*3/MM3 (ref 140–450)
PMV BLD AUTO: 10.1 FL (ref 6–12)
POTASSIUM SERPL-SCNC: 4.1 MMOL/L (ref 3.5–5.2)
RBC # BLD AUTO: 3.24 10*6/MM3 (ref 3.77–5.28)
SODIUM SERPL-SCNC: 134 MMOL/L (ref 136–145)
WBC NRBC COR # BLD: 11.78 10*3/MM3 (ref 3.4–10.8)

## 2023-05-13 PROCEDURE — 97530 THERAPEUTIC ACTIVITIES: CPT

## 2023-05-13 PROCEDURE — 94761 N-INVAS EAR/PLS OXIMETRY MLT: CPT

## 2023-05-13 PROCEDURE — 25010000002 ENOXAPARIN PER 10 MG: Performed by: INTERNAL MEDICINE

## 2023-05-13 PROCEDURE — 94799 UNLISTED PULMONARY SVC/PX: CPT

## 2023-05-13 PROCEDURE — 97110 THERAPEUTIC EXERCISES: CPT

## 2023-05-13 PROCEDURE — 80048 BASIC METABOLIC PNL TOTAL CA: CPT | Performed by: INTERNAL MEDICINE

## 2023-05-13 PROCEDURE — 25010000002 MEROPENEM PER 100 MG: Performed by: INTERNAL MEDICINE

## 2023-05-13 PROCEDURE — 94664 DEMO&/EVAL PT USE INHALER: CPT

## 2023-05-13 PROCEDURE — 85025 COMPLETE CBC W/AUTO DIFF WBC: CPT | Performed by: INTERNAL MEDICINE

## 2023-05-13 RX ORDER — MAGNESIUM SULFATE 1 G/100ML
1 INJECTION INTRAVENOUS AS NEEDED
Status: DISCONTINUED | OUTPATIENT
Start: 2023-05-13 | End: 2023-05-13

## 2023-05-13 RX ORDER — MAGNESIUM SULFATE HEPTAHYDRATE 40 MG/ML
4 INJECTION, SOLUTION INTRAVENOUS AS NEEDED
Status: DISCONTINUED | OUTPATIENT
Start: 2023-05-13 | End: 2023-05-13

## 2023-05-13 RX ORDER — MAGNESIUM SULFATE HEPTAHYDRATE 40 MG/ML
2 INJECTION, SOLUTION INTRAVENOUS AS NEEDED
Status: DISCONTINUED | OUTPATIENT
Start: 2023-05-13 | End: 2023-05-13

## 2023-05-13 RX ADMIN — ENOXAPARIN SODIUM 40 MG: 100 INJECTION SUBCUTANEOUS at 13:53

## 2023-05-13 RX ADMIN — DOXYCYCLINE 100 MG: 100 CAPSULE ORAL at 20:00

## 2023-05-13 RX ADMIN — OXYCODONE HYDROCHLORIDE AND ACETAMINOPHEN 500 MG: 500 TABLET ORAL at 09:04

## 2023-05-13 RX ADMIN — CITALOPRAM 20 MG: 20 TABLET, FILM COATED ORAL at 09:04

## 2023-05-13 RX ADMIN — Medication 1000 MCG: at 09:04

## 2023-05-13 RX ADMIN — DOXYCYCLINE 100 MG: 100 CAPSULE ORAL at 09:04

## 2023-05-13 RX ADMIN — IPRATROPIUM BROMIDE AND ALBUTEROL SULFATE 3 ML: 2.5; .5 SOLUTION RESPIRATORY (INHALATION) at 20:34

## 2023-05-13 RX ADMIN — MEROPENEM 1 G: 1 INJECTION, POWDER, FOR SOLUTION INTRAVENOUS at 20:21

## 2023-05-13 RX ADMIN — MEROPENEM 1 G: 1 INJECTION, POWDER, FOR SOLUTION INTRAVENOUS at 04:11

## 2023-05-13 RX ADMIN — LOSARTAN POTASSIUM 50 MG: 50 TABLET, FILM COATED ORAL at 09:04

## 2023-05-13 RX ADMIN — GUAIFENESIN 600 MG: 600 TABLET, EXTENDED RELEASE ORAL at 09:04

## 2023-05-13 RX ADMIN — METOPROLOL TARTRATE 12.5 MG: 25 TABLET, FILM COATED ORAL at 09:08

## 2023-05-13 RX ADMIN — MIRTAZAPINE 7.5 MG: 15 TABLET, FILM COATED ORAL at 20:00

## 2023-05-13 RX ADMIN — BISMUTH SUBSALICYLATE 30 ML: 525 LIQUID ORAL at 18:16

## 2023-05-13 RX ADMIN — GUAIFENESIN 600 MG: 600 TABLET, EXTENDED RELEASE ORAL at 20:00

## 2023-05-13 RX ADMIN — METOPROLOL TARTRATE 12.5 MG: 25 TABLET, FILM COATED ORAL at 20:00

## 2023-05-13 RX ADMIN — IPRATROPIUM BROMIDE AND ALBUTEROL SULFATE 3 ML: 2.5; .5 SOLUTION RESPIRATORY (INHALATION) at 07:14

## 2023-05-13 RX ADMIN — MEROPENEM 1 G: 1 INJECTION, POWDER, FOR SOLUTION INTRAVENOUS at 11:54

## 2023-05-13 RX ADMIN — Medication 10 ML: at 09:04

## 2023-05-13 RX ADMIN — IPRATROPIUM BROMIDE AND ALBUTEROL SULFATE 3 ML: 2.5; .5 SOLUTION RESPIRATORY (INHALATION) at 15:25

## 2023-05-13 NOTE — PLAN OF CARE
Goal Outcome Evaluation:              Outcome Evaluation: VSS. Pt denies pain. IV ABX continued. Q2hr turn. SCD. Low air loss matress. Heels elevated off bed. Adequate urine output. NSR. 4L NC. Safety maintained. No complaints or concerns per patient. Will continue to monitor.

## 2023-05-13 NOTE — PROGRESS NOTES
[facility] Medicine Services  PROGRESS NOTE    Patient Name: Katherine Williamson  : 1950  MRN: [id number]    Date of Admission: 2023  Primary Care Physician: Zelalem Flor APRN    Subjective   Subjective     CC:  Follow-up lung infection    Subjective:  Patient notes her breathing is drastically better over the last 48 hours.  She is now down to 4 L nasal cannula with saturations at 94%.  She was a little frustrated as she felt physical therapy was exceptionally challenged and noted they had to increase her supplemental oxygen greatly in order to get her to walk short distances.  She says she is motivated to continue to get stronger.  She agrees to get up to the chair today.    Review of Systems  No current fevers or chills  No current nausea, vomiting, or diarrhea  No current chest pain or palpitations      Objective   Objective     Vital Signs:   Temp:  [98.1 °F (36.7 °C)-98.3 °F (36.8 °C)] 98.3 °F (36.8 °C)  Heart Rate:  [] 105  Resp:  [18] 18  BP: ()/(53-73) 124/66        Physical Exam:  Constitutional:Awake, alert, acutely ill-appearing  HENT: NCAT, mucous membranes moist, neck supple  Respiratory: Cough is present, occasional coarse sounds, no wheezes, supplemental oxygen  Cardiovascular: Pulse rate is normal, palpable radial pulses  Gastrointestinal:  soft, nontender, nondistended  Musculoskeletal: Somewhat debilitated in appearance, mild lower extremity edema, BMI is 25  Psychiatric: Normal affect, cooperative, conversational  Neurologic: No slurred speech or facial droop, follows commands  Skin: No rashes or jaundice, warm      Results Reviewed:  Results from last 7 days   Lab Units 23  0431 23  0346 23  0251 05/10/23  0707 23  0714   WBC 10*3/mm3 11.78* 13.92* 19.36*   < > 19.56*   HEMOGLOBIN g/dL 9.9* 10.0* 10.3*   < > 12.2   HEMATOCRIT % 30.1* 29.6* 31.4*   < > 37.0   PLATELETS 10*3/mm3 390 344 296   < > 320   PROCALCITONIN ng/mL  --   --   --   --  0.23     < > = values in this interval not displayed.     Results from last 7 days   Lab Units 05/13/23  0431 05/12/23  0346 05/11/23  0251 05/10/23  0904   SODIUM mmol/L 134* 137 137 135*   POTASSIUM mmol/L 4.1 4.1 3.8 3.8   CHLORIDE mmol/L 100 102 102 100   CO2 mmol/L 33.0* 34.4* 32.0* 29.6*   BUN mg/dL 13 13 13 11   CREATININE mg/dL 0.20* 0.21* 0.23* 0.30*   GLUCOSE mg/dL 85 101* 93 141*   CALCIUM mg/dL 8.3* 8.9 8.3* 9.1   ALT (SGPT) U/L  --   --   --  42*   AST (SGOT) U/L  --   --   --  34*     Estimated Creatinine Clearance: 238.8 mL/min (A) (by C-G formula based on SCr of 0.2 mg/dL (L)).    Microbiology Results Abnormal     Procedure Component Value - Date/Time    Respiratory Culture - Sputum, Cough [[id number]] Collected: 05/10/23 0441    Lab Status: Final result Specimen: Sputum from Cough Updated: 05/12/23 1049     Respiratory Culture Rare Normal respiratory aishwarya. No S. aureus or Pseudomonas aeruginosa detected. Final report.     Gram Stain Many (4+) WBCs per low power field      Rare (1+) Epithelial cells per low power field      Many (4+) Yeast      Many (4+) Gram positive cocci    Body Fluid Culture - Body Fluid, Pleural Cavity [[id number]] Collected: 05/10/23 1110    Lab Status: Preliminary result Specimen: Body Fluid from Pleural Cavity Updated: 05/12/23 0813     Body Fluid Culture No growth at 2 days     Gram Stain Rare (1+) WBCs per low power field      No organisms seen    Blastomyces Antigen - Urine, Urine, Clean Catch [[id number]] Collected: 05/09/23 1555    Lab Status: Final result Specimen: Urine, Clean Catch Updated: 05/12/23 0807     Reference Lab Report --    Histoplasma Ag Ur - Urine, Urine, Clean Catch [[id number]] Collected: 05/09/23 1558    Lab Status: Final result Specimen: Urine, Clean Catch Updated: 05/12/23 0806     Reference Lab Report --    AFB Culture - Sputum, Cough [[id number]] Collected: 05/10/23 0441    Lab Status: Preliminary result Specimen: Sputum from Cough Updated: 05/11/23 0078      AFB Stain No acid fast bacilli seen on concentrated smear    AFB Culture - Body Fluid, Pleural Cavity [[id number]] Collected: 05/10/23 1110    Lab Status: Preliminary result Specimen: Body Fluid from Pleural Cavity Updated: 05/11/23 1417     AFB Stain No acid fast bacilli seen on concentrated smear    Blood Culture - Blood, Arm, Left [[id number]]  (Normal) Collected: 05/04/23 1815    Lab Status: Final result Specimen: Blood from Arm, Left Updated: 05/09/23 1845     Blood Culture No growth at 5 days    Narrative:      Less than seven (7) mL's of blood was collected.  Insufficient quantity may yield false negative results.    Blood Culture - Blood, Hand, Right [[id number]]  (Normal) Collected: 05/04/23 1811    Lab Status: Final result Specimen: Blood from Hand, Right Updated: 05/09/23 1845     Blood Culture No growth at 5 days    Narrative:      Less than seven (7) mL's of blood was collected.  Insufficient quantity may yield false negative results.    Respiratory Culture - Sputum, Cough [[id number]] Collected: 05/06/23 1402    Lab Status: Final result Specimen: Sputum from Cough Updated: 05/08/23 0908     Respiratory Culture Rare Normal respiratory aishwarya. No S. aureus or Pseudomonas aeruginosa detected. Final report.     Gram Stain Moderate (3+) WBCs seen      Rare (1+) Epithelial cells per low power field      Mixed bacterial morphotypes seen on Gram Stain    Blood Culture - Blood, Arm, Right [[id number]]  (Normal) Collected: 05/02/23 1433    Lab Status: Final result Specimen: Blood from Arm, Right Updated: 05/07/23 1445     Blood Culture No growth at 5 days    Clostridioides difficile Toxin - Stool, Per Rectum [[id number]]  (Normal) Collected: 05/05/23 2142    Lab Status: Final result Specimen: Stool from Per Rectum Updated: 05/05/23 2235    Narrative:      The following orders were created for panel order Clostridioides difficile Toxin - Stool, Per Rectum.  Procedure                               Abnormality          Status                     ---------                               -----------         ------                     Clostridioides difficile...[[id number]]  Normal              Final result                 Please view results for these tests on the individual orders.    Clostridioides difficile Toxin, PCR - Stool, Per Rectum [[id number]]  (Normal) Collected: 05/05/23 2142    Lab Status: Final result Specimen: Stool from Per Rectum Updated: 05/05/23 2235     C. Difficile Toxins by PCR Negative    Narrative:      The result indicates the absence of toxigenic C. difficile from stool specimen.     Respiratory Culture - Sputum, Cough [[id number]] Collected: 05/02/23 1739    Lab Status: Final result Specimen: Sputum from Cough Updated: 05/04/23 0717     Respiratory Culture Scant growth (1+) Normal respiratory aishwarya. No S. aureus or Pseudomonas aeruginosa detected. Final report.     Gram Stain Many (4+) WBCs per low power field      Many (4+) Mixed bacterial morphotypes seen on Gram Stain      Moderate (3+) Epithelial cells per low power field    MRSA Screen, PCR (Inpatient) - Swab, Nares [[id number]]  (Normal) Collected: 05/02/23 1741    Lab Status: Final result Specimen: Swab from Nares Updated: 05/02/23 1923     MRSA PCR No MRSA Detected    Narrative:      The negative predictive value of this diagnostic test is high and should only be used to consider de-escalating anti-MRSA therapy. A positive result may indicate colonization with MRSA and must be correlated clinically.    Legionella Antigen, Urine - Urine, Urine, Clean Catch [[id number]]  (Normal) Collected: 05/02/23 1754    Lab Status: Final result Specimen: Urine, Clean Catch Updated: 05/02/23 1834     LEGIONELLA ANTIGEN, URINE Negative    S. Pneumo Ag Urine or CSF - Urine, Urine, Clean Catch [[id number]]  (Normal) Collected: 05/02/23 1754    Lab Status: Final result Specimen: Urine, Clean Catch Updated: 05/02/23 1834     Strep Pneumo Ag Negative    Respiratory Panel PCR  w/COVID-19(SARS-CoV-2) ALEN/GATITO/DOREEN/PAD/[facility] In-House, NP Swab in UTM/VTM, 3-4 HR TAT - Swab, Nasopharynx [[id number]]  (Normal) Collected: 05/02/23 1427    Lab Status: Final result Specimen: Swab from Nasopharynx Updated: 05/02/23 1542     ADENOVIRUS, PCR Not Detected     Coronavirus 229E Not Detected     Coronavirus HKU1 Not Detected     Coronavirus NL63 Not Detected     Coronavirus OC43 Not Detected     COVID19 Not Detected     Human Metapneumovirus Not Detected     Human Rhinovirus/Enterovirus Not Detected     Influenza A PCR Not Detected     Influenza B PCR Not Detected     Parainfluenza Virus 1 Not Detected     Parainfluenza Virus 2 Not Detected     Parainfluenza Virus 3 Not Detected     Parainfluenza Virus 4 Not Detected     RSV, PCR Not Detected     Bordetella pertussis pcr Not Detected     Bordetella parapertussis PCR Not Detected     Chlamydophila pneumoniae PCR Not Detected     Mycoplasma pneumo by PCR Not Detected    Narrative:      In the setting of a positive respiratory panel with a viral infection PLUS a negative procalcitonin without other underlying concern for bacterial infection, consider observing off antibiotics or discontinuation of antibiotics and continue supportive care. If the respiratory panel is positive for atypical bacterial infection (Bordetella pertussis, Chlamydophila pneumoniae, or Mycoplasma pneumoniae), consider antibiotic de-escalation to target atypical bacterial infection.          Imaging Results (Last 24 Hours)     ** No results found for the last 24 hours. **          Results for orders placed during the hospital encounter of 05/13/20    Adult Transthoracic Echo Complete W/ Cont if Necessary Per Protocol    Interpretation Summary  · Left ventricular systolic function is normal.  · Left ventricular diastolic dysfunction (grade I) consistent with impaired relaxation.    Normal LV and RV size and function  EF estimated 65 to 70%  Normal atrial sizes  Normal RV size and  function  No significant valvulopathy seen  Bubble study negative for right to left interatrial shunt under resting and Valsalva conditions, slightly mobile interatrial septum without defect seen clearly  No masses  No effusion seen  Grade 1 diastolic dysfunction by criteria  Cannot estimate PA systolic pressure secondary to insufficient TR envelope      I have reviewed the medications:  Scheduled Meds:vitamin C, 500 mg, Oral, Daily  citalopram, 20 mg, Oral, Daily  doxycycline, 100 mg, Oral, Q12H  enoxaparin, 40 mg, Subcutaneous, Q24H  fluticasone, 1 spray, Each Nare, Daily  guaiFENesin, 600 mg, Oral, BID  ipratropium-albuterol, 3 mL, Nebulization, 4x Daily - RT  losartan, 50 mg, Oral, Q24H  meropenem, 1 g, Intravenous, Q8H  mirtazapine, 7.5 mg, Oral, Nightly  sodium chloride, 10 mL, Intravenous, Q12H  cyanocobalamin, 1,000 mcg, Oral, Daily      Continuous Infusions:hold, 1 each      PRN Meds:.•  acetaminophen **OR** acetaminophen **OR** acetaminophen  •  hold  •  loperamide  •  magnesium sulfate **OR** magnesium sulfate **OR** magnesium sulfate  •  nitroglycerin  •  ondansetron  •  potassium & sodium phosphates **OR** potassium & sodium phosphates  •  potassium chloride **OR** potassium chloride **OR** potassium chloride  •  [COMPLETED] Insert Peripheral IV **AND** sodium chloride  •  sodium chloride  •  sodium chloride    Assessment & Plan   Assessment & Plan     Active Hospital Problems    Diagnosis  POA   • **Acute respiratory failure with hypoxia [J96.01]  Yes   • Diastolic dysfunction, grade 1 [I51.89]  Yes   • Sepsis [A41.9]  Yes   • Pleural effusion, right [J90]  Yes   • Other emphysema [J43.8]  Yes   • Pulmonary nodule [R91.1]  Yes   • Pneumonia [J18.9]  Yes   • Tobacco abuse [Z72.0]  Yes   • Benign essential HTN [I10]  Yes      Resolved Hospital Problems   No resolved problems to display.        Brief Hospital Course to date:  Katherine Williamson is a 72 y.o. female presents to the hospital with acute hypoxic  respiratory failure, bacterial pneumonia, right lung abscess, parapneumonic pleural effusion which is now status postthoracentesis, emphysema, and tobacco use.  She required ICU level care due to severe hypoxia.    Discussion/plan:  Clinically patient is doing much better today.  I am planning to continue to wean her supplemental oxygen down today as much as possible.      Continue incentive spirometer.  Flutter valve added.  Spectrum antibiotics with doxycycline and meropenem.  I discussed infectious disease recommendations with need for repeat CT scan before completion of antibiotic course.  Patient understands she will need to follow-up in pulmonology clinic to make sure that infection clears.      Patient is significantly physically debilitated from her acute illness.  She has worsening hypoxia with exertion and needs to functionally improve.  Therapy recommending acute rehab at discharge.  Consult physical medicine team to evaluate all medical problems are new under my management today.  Continue PT OT.    Lung images reviewed and significant pneumonia and lung infection noted as well as emphysema.    Cultures reviewed.  1 of 2 blood cultures from 5/4 grew corynebacterium likely skin aishwarya.  Continue to follow remaining cultures.    Appreciate thoracic surgery recommendations on parapneumonic effusion.      Tobacco cessation counseled.    SSRI for depression.  Currently seems controlled.    Hypertension and diastolic dysfunction: Receiving losartan.  Normotensive.  Monitor and adjust as needed.  Add very low-dose metoprolol.    Treatment plan discussed with the patient is in agreement    DVT Prophylaxis: Mechanical      Disposition: Pending clinical course.    CODE STATUS:   Code Status and Medical Interventions:   Ordered at: 05/02/23 1800     Code Status (Patient has no pulse and is not breathing):    CPR (Attempt to Resuscitate)     Medical Interventions (Patient has pulse or is breathing):    Full Support        Issac Mendez MD  05/13/23

## 2023-05-13 NOTE — PLAN OF CARE
Goal Outcome Evaluation:  Plan of Care Reviewed With: patient        Progress: improving  Outcome Evaluation: Pt tolerated treatment fair this date. Limited d/t dizziness, though BP was much better today. BP was ~118 systolic in sreclined position in bed, then remained ~112/60s w/ sitting and standing. Required min A for bed mobility, then CGA-min A to stand twice. O2 NC was on 4L upon entering, though was turned up to 6L during session, maintaining between 88-95% throughout. Instructed pt on a few seated and supine LE exercises, and encouraged her to continue on own during the day, as well as to attempt chair transfer w/ nsg later today or tomorrow if feeling up to it.

## 2023-05-13 NOTE — PROGRESS NOTES
[facility]     Mar/chart reviewed  Follow up pneumonia, ahrf  Some cough    Vital Sign Min/Max for last 24 hours  Temp  Min: 98.1 °F (36.7 °C)  Max: 98.2 °F (36.8 °C)   BP  Min: 92/53  Max: 143/73   Pulse  Min: 80  Max: 111   Resp  Min: 18  Max: 18   SpO2  Min: 91 %  Max: 97 %   Flow (L/min)  Min: 4  Max: 15   No data recorded     Appears ill, axox3,   perrl, eomi, normal sclera,  mmm, no jvd, trachea midline, neck supple,  chest decreased right base bilaterally, + crackles, no wheezes,   rrr,   soft, nt, nd +bs,  no c/c/ e  Skin warm, dry no rashes    Labs: 5/13: reviewed:  Na 134  Bicarb 33  Wbc 11.8  hgb 9.9  plts 390    A/P:  1. Right lower lobe pneumonia/lung abscess -- continue antibiotics a per ID  2. Right sided pleural effusions -- s/p thoracentesis  3. Sepsis -- better  4. AHRF --improving nicely now  5. Emphysema -- outpatient pft when better  6. 2.4cm lung nodule 4/23 -- she will need repeat ct in 4-6 weeks  7. Tobacco abuse  8. Anemia    Patient is new to me today

## 2023-05-13 NOTE — THERAPY TREATMENT NOTE
Patient Name: Katherine Williamson  : 1950    MRN: [id number]                              Today's Date: 2023       Admit Date: 2023    Visit Dx:     ICD-10-CM ICD-9-CM   1. Acute respiratory failure with hypoxia  J96.01 518.81   2. Sepsis, due to unspecified organism, unspecified whether acute organ dysfunction present  A41.9 038.9     995.91   3. Community acquired pneumonia, unspecified laterality  J18.9 486   4. Abnormal CXR  R93.89 793.2   5. Hyperglycemia  R73.9 790.29   6. 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   • History of repair of hip joint   • History of operative procedure on hip   • Hyperglycemia   • Hepatic steatosis   • Diverticulosis   • Chest pain, atypical   • History of colon polyps   • Family history of colon cancer in mother   • Allergic rhinitis   • Lung nodule seen on imaging study   • Acute respiratory failure with hypoxia   • Pneumonia   • Empyema   • Sepsis   • Pleural effusion, right   • Other emphysema   • Pulmonary nodule   • Diastolic dysfunction, grade 1   • Physical debility     Past Medical History:   Diagnosis Date   • Arthritis    • Cataract    • Colon polyps     FOLLOWED BY DR. JOSEPH LAU   • Hypertension      Past Surgical History:   Procedure Laterality Date   • COLONOSCOPY  10/2015    Qserv-[id number]-Qm. Kaplan.  Follow-up in 5 years.   • COLONOSCOPY N/A 2022    2 BENIGN POLYPS IN DESCENDING, 5 MM BENIGN POLYP IN SIGMOID, MULTIPLE SMALL AND LARGE DIVERTICULA IN SIGMOID, RESCOPE IN 3 YRS, DR. JOSEPH LAU AT [facility]   • EYE SURGERY     • JOINT REPLACEMENT  ?    Left total hip replacement in .  In 2015 patient had left hip revision   • TONSILLECTOMY        General  Information     [facility] Name 05/13/23 1150          Physical Therapy Time and Intention    Document Type therapy note (daily note)  -     Mode of Treatment physical therapy  -[facility] Name 05/13/23 1150          General Information    Existing Precautions/Restrictions fall;oxygen therapy device and L/min  on 4L to begin, turned up to 6L for session  -     Row Name 05/13/23 1150          Cognition    Orientation Status (Cognition) oriented x 4  -           User Key  (r) = Recorded By, (t) = Taken By, (c) = Cosigned By    Initials Name Provider Type     Perla Ruvalcaba PTA Physical Therapist Assistant               Mobility     [facility] Name 05/13/23 1153          Bed Mobility    Bed Mobility supine-sit;sit-supine  -     Supine-Sit [place] (Bed Mobility) minimum assist (75% patient effort)  -     Sit-Supine [place] (Bed Mobility) minimum assist (75% patient effort)  -     Assistive Device (Bed Mobility) bed rails;head of bed elevated  -[facility] Name 05/13/23 1153          Sit-Stand Transfer    Sit-Stand [place] (Transfers) minimum assist (75% patient effort)  -     Assistive Device (Sit-Stand Transfers) walker, front-wheeled  -     Comment, (Sit-Stand Transfer) stood 2x, dizziness reported w/ each, though BP not dropping  -           User Key  (r) = Recorded By, (t) = Taken By, (c) = Cosigned By    Initials Name Provider Type     Perla Ruvalcaba PTA Physical Therapist Assistant               Obj/Interventions     [facility] Name 05/13/23 1155          Motor Skills    Therapeutic Exercise --  seated AP and LAQ x10 reps; supine AP and QS x10 reps  -           User Key  (r) = Recorded By, (t) = Taken By, (c) = Cosigned By    Initials Name Provider Type     Perla Ruvalcaba PTA Physical Therapist Assistant               Goals/Plan    No documentation.                Clinical Impression     [facility] Name 05/13/23 1155          Pain    Pretreatment Pain Rating 0/10 - no pain  -      Posttreatment Pain Rating 0/10 - no pain  -SM     Row Name 05/13/23 1155          Positioning and Restraints    Pre-Treatment Position in bed  -SM     Post Treatment Position bed  -SM     In Bed supine;call light within reach;encouraged to call for assist;exit alarm on;with family/caregiver;notified nsg  -           User Key  (r) = Recorded By, (t) = Taken By, (c) = Cosigned By    Initials Name Provider Type    Perla Ojeda PTA Physical Therapist Assistant               Outcome Measures     Row Name 05/13/23 1156          How much help from another person do you currently need...    Turning from your back to your side while in flat bed without using bedrails? 3  -SM     Moving from lying on back to sitting on the side of a flat bed without bedrails? 3  -SM     Moving to and from a bed to a chair (including a wheelchair)? 3  -SM     Standing up from a chair using your arms (e.g., wheelchair, bedside chair)? 3  -SM     Climbing 3-5 steps with a railing? 1  -SM     To walk in hospital room? 3  -SM     AM-PAC 6 Clicks Score (PT) 16  -SM     Highest level of mobility 5 --> Static standing  -SM     Row Name 05/13/23 1156          Functional Assessment    Outcome Measure Options AM-PAC 6 Clicks Basic Mobility (PT)  -           User Key  (r) = Recorded By, (t) = Taken By, (c) = Cosigned By    Initials Name Provider Type    Perla Ojeda PTA Physical Therapist Assistant                             Physical Therapy Education     Title: PT OT SLP Therapies (In Progress)     Topic: Physical Therapy (Done)     Point: Mobility training (Done)     Learning Progress Summary           Patient Acceptance, E,TB,D, VU,NR by  at 5/13/2023 1156    Acceptance, E,TB, VU,NR by ST at 5/12/2023 1141                   Point: Home exercise program (Done)     Learning Progress Summary           Patient Acceptance, E,TB,D, VU,NR by  at 5/13/2023 1156    Acceptance, E,TB, VU,NR by ST at 5/12/2023 1141                    Point: Body mechanics (Done)     Learning Progress Summary           Patient Acceptance, E,TB,D, VU,NR by  at 5/13/2023 1156    Acceptance, E,TB, VU,NR by  at 5/12/2023 1141                   Point: Precautions (Done)     Learning Progress Summary           Patient Acceptance, E,TB,D, VU,NR by  at 5/13/2023 1156                               User Key     Initials Effective Dates Name Provider Type Discipline     03/07/18 -  Perla Ruvalcaba PTA Physical Therapist Assistant PT     09/22/22 -  Verena Hess PT Physical Therapist PT              PT Recommendation and Plan     Plan of Care Reviewed With: patient  Progress: improving  Outcome Evaluation: Pt tolerated treatment fair this date. Limited d/t dizziness, though BP was much better today. BP was ~118 systolic in sreclined position in bed, then remained ~112/60s w/ sitting and standing. Required min A for bed mobility, then CGA-min A to stand twice. O2 NC was on 4L upon entering, though was turned up to 6L during session, maintaining between 88-95% throughout. Instructed pt on a few seated and supine LE exercises, and encouraged her to continue on own during the day, as well as to attempt chair transfer w/ nsg later today or tomorrow if feeling up to it.     Time Calculation:    PT Charges     Row Name 05/13/23 1201             Time Calculation    Start Time 1047  -      Stop Time 1117  -      Time Calculation (min) 30 min  -      PT Received On 05/13/23  -      PT - Next Appointment 05/15/23  -            User Key  (r) = Recorded By, (t) = Taken By, (c) = Cosigned By    Initials Name Provider Type     Perla Ruvalcaba PTA Physical Therapist Assistant              Therapy Charges for Today     Code Description Service Date Service Provider Modifiers Qty    24964785531 HC PT THER PROC EA 15 MIN 5/13/2023 Perla Ruvalcaba PTA GP 1    46460691938 HC PT THERAPEUTIC ACT EA 15 MIN 5/13/2023 Perla Ruvalcaba PTA GP 1           PT G-Codes  Outcome Measure Options: AM-PAC 6 Clicks Basic Mobility (PT)  AM-PAC 6 Clicks Score (PT): 16  AM-PAC 6 Clicks Score (OT): 13  PT Discharge Summary  Anticipated Discharge Disposition (PT): inpatient rehabilitation facility    Perla Ruvalcaba, PTA  5/13/2023

## 2023-05-13 NOTE — PLAN OF CARE
Goal Outcome Evaluation:   Vitals stable, HF NC 4 L, worked with PT this morning. Had 2 big BM this shift. C/O diarrhea, provider notified, PRN Pepto ordered and given. Pt had imodium a few days ago, meds still available on MAR as PRN if needed. Q 2 turns. ABX given per MAR. Added Metoprolol today. No other complains, needs met at this time, will continue to monitor.

## 2023-05-13 NOTE — PROGRESS NOTES
[facility]    Referral received, evaluation in progress.  Pt would have to be able to participate in 3 hours of therapy a day for acute rehab.  Will continue to monitor progress with therapies to determine most appropriate level of rehab for pt at time of DC.    Thank you,  Darling Duvall, RN  Rehab Admission Nurse

## 2023-05-14 ENCOUNTER — APPOINTMENT (OUTPATIENT)
Dept: GENERAL RADIOLOGY | Facility: HOSPITAL | Age: 73
DRG: 871 | End: 2023-05-14
Payer: COMMERCIAL

## 2023-05-14 ENCOUNTER — APPOINTMENT (OUTPATIENT)
Dept: CT IMAGING | Facility: HOSPITAL | Age: 73
DRG: 871 | End: 2023-05-14
Payer: COMMERCIAL

## 2023-05-14 DIAGNOSIS — J18.9 PNEUMONIA OF RIGHT LOWER LOBE DUE TO INFECTIOUS ORGANISM: Primary | ICD-10-CM

## 2023-05-14 LAB
ANION GAP SERPL CALCULATED.3IONS-SCNC: 2.5 MMOL/L (ref 5–15)
BASOPHILS # BLD AUTO: 0.02 10*3/MM3 (ref 0–0.2)
BASOPHILS NFR BLD AUTO: 0.2 % (ref 0–1.5)
BUN SERPL-MCNC: 12 MG/DL (ref 8–23)
BUN/CREAT SERPL: 46.2 (ref 7–25)
CALCIUM SPEC-SCNC: 8.4 MG/DL (ref 8.6–10.5)
CHLORIDE SERPL-SCNC: 100 MMOL/L (ref 98–107)
CO2 SERPL-SCNC: 32.5 MMOL/L (ref 22–29)
CREAT SERPL-MCNC: 0.26 MG/DL (ref 0.57–1)
CRP SERPL-MCNC: 3.52 MG/DL (ref 0–0.5)
DEPRECATED RDW RBC AUTO: 46.5 FL (ref 37–54)
EGFRCR SERPLBLD CKD-EPI 2021: 118.3 ML/MIN/1.73
EOSINOPHIL # BLD AUTO: 0.06 10*3/MM3 (ref 0–0.4)
EOSINOPHIL NFR BLD AUTO: 0.6 % (ref 0.3–6.2)
ERYTHROCYTE [DISTWIDTH] IN BLOOD BY AUTOMATED COUNT: 13.7 % (ref 12.3–15.4)
GLUCOSE BLDC GLUCOMTR-MCNC: 113 MG/DL (ref 70–130)
GLUCOSE BLDC GLUCOMTR-MCNC: 122 MG/DL (ref 70–130)
GLUCOSE BLDC GLUCOMTR-MCNC: 130 MG/DL (ref 70–130)
GLUCOSE BLDC GLUCOMTR-MCNC: 134 MG/DL (ref 70–130)
GLUCOSE BLDC GLUCOMTR-MCNC: 76 MG/DL (ref 70–130)
GLUCOSE SERPL-MCNC: 91 MG/DL (ref 65–99)
HCT VFR BLD AUTO: 31.3 % (ref 34–46.6)
HGB BLD-MCNC: 10.1 G/DL (ref 12–15.9)
IMM GRANULOCYTES # BLD AUTO: 0.07 10*3/MM3 (ref 0–0.05)
IMM GRANULOCYTES NFR BLD AUTO: 0.7 % (ref 0–0.5)
LYMPHOCYTES # BLD AUTO: 1.44 10*3/MM3 (ref 0.7–3.1)
LYMPHOCYTES NFR BLD AUTO: 14 % (ref 19.6–45.3)
MCH RBC QN AUTO: 29.8 PG (ref 26.6–33)
MCHC RBC AUTO-ENTMCNC: 32.3 G/DL (ref 31.5–35.7)
MCV RBC AUTO: 92.3 FL (ref 79–97)
MONOCYTES # BLD AUTO: 0.46 10*3/MM3 (ref 0.1–0.9)
MONOCYTES NFR BLD AUTO: 4.5 % (ref 5–12)
NEUTROPHILS NFR BLD AUTO: 8.21 10*3/MM3 (ref 1.7–7)
NEUTROPHILS NFR BLD AUTO: 80 % (ref 42.7–76)
NRBC BLD AUTO-RTO: 0 /100 WBC (ref 0–0.2)
PLATELET # BLD AUTO: 388 10*3/MM3 (ref 140–450)
PMV BLD AUTO: 9.8 FL (ref 6–12)
POTASSIUM SERPL-SCNC: 4.3 MMOL/L (ref 3.5–5.2)
RBC # BLD AUTO: 3.39 10*6/MM3 (ref 3.77–5.28)
SODIUM SERPL-SCNC: 135 MMOL/L (ref 136–145)
WBC NRBC COR # BLD: 10.26 10*3/MM3 (ref 3.4–10.8)

## 2023-05-14 PROCEDURE — 71045 X-RAY EXAM CHEST 1 VIEW: CPT

## 2023-05-14 PROCEDURE — 0042T HC CT CEREBRAL PERFUSION W/WO CONTRAST: CPT

## 2023-05-14 PROCEDURE — 94799 UNLISTED PULMONARY SVC/PX: CPT

## 2023-05-14 PROCEDURE — 80048 BASIC METABOLIC PNL TOTAL CA: CPT | Performed by: INTERNAL MEDICINE

## 2023-05-14 PROCEDURE — 86140 C-REACTIVE PROTEIN: CPT | Performed by: INTERNAL MEDICINE

## 2023-05-14 PROCEDURE — 25010000002 TENECTEPLASE PER 50 MG: Performed by: PSYCHIATRY & NEUROLOGY

## 2023-05-14 PROCEDURE — 82948 REAGENT STRIP/BLOOD GLUCOSE: CPT

## 2023-05-14 PROCEDURE — 99232 SBSQ HOSP IP/OBS MODERATE 35: CPT | Performed by: INTERNAL MEDICINE

## 2023-05-14 PROCEDURE — 87040 BLOOD CULTURE FOR BACTERIA: CPT | Performed by: INTERNAL MEDICINE

## 2023-05-14 PROCEDURE — 25010000002 MEROPENEM PER 100 MG: Performed by: INTERNAL MEDICINE

## 2023-05-14 PROCEDURE — 94664 DEMO&/EVAL PT USE INHALER: CPT

## 2023-05-14 PROCEDURE — 94761 N-INVAS EAR/PLS OXIMETRY MLT: CPT

## 2023-05-14 PROCEDURE — 70496 CT ANGIOGRAPHY HEAD: CPT

## 2023-05-14 PROCEDURE — 25010000002 PHENYLEPHRINE 10 MG/ML SOLUTION

## 2023-05-14 PROCEDURE — 70498 CT ANGIOGRAPHY NECK: CPT

## 2023-05-14 PROCEDURE — 25510000001 IOPAMIDOL PER 1 ML: Performed by: INTERNAL MEDICINE

## 2023-05-14 PROCEDURE — 99223 1ST HOSP IP/OBS HIGH 75: CPT | Performed by: PSYCHIATRY & NEUROLOGY

## 2023-05-14 PROCEDURE — 85025 COMPLETE CBC W/AUTO DIFF WBC: CPT | Performed by: INTERNAL MEDICINE

## 2023-05-14 PROCEDURE — 4A03X5D MEASUREMENT OF ARTERIAL FLOW, INTRACRANIAL, EXTERNAL APPROACH: ICD-10-PCS | Performed by: RADIOLOGY

## 2023-05-14 PROCEDURE — 70450 CT HEAD/BRAIN W/O DYE: CPT

## 2023-05-14 PROCEDURE — 3E03317 INTRODUCTION OF OTHER THROMBOLYTIC INTO PERIPHERAL VEIN, PERCUTANEOUS APPROACH: ICD-10-PCS | Performed by: PSYCHIATRY & NEUROLOGY

## 2023-05-14 RX ORDER — PHENYLEPHRINE HCL IN 0.9% NACL 0.5 MG/5ML
.5-3 SYRINGE (ML) INTRAVENOUS
Status: DISCONTINUED | OUTPATIENT
Start: 2023-05-14 | End: 2023-05-16

## 2023-05-14 RX ORDER — SODIUM CHLORIDE 0.9 % (FLUSH) 0.9 %
10 SYRINGE (ML) INJECTION EVERY 12 HOURS SCHEDULED
Status: CANCELLED | OUTPATIENT
Start: 2023-05-14

## 2023-05-14 RX ORDER — ASPIRIN 81 MG/1
81 TABLET, CHEWABLE ORAL DAILY
Status: DISCONTINUED | OUTPATIENT
Start: 2023-05-15 | End: 2023-05-15

## 2023-05-14 RX ORDER — ASPIRIN 300 MG/1
300 SUPPOSITORY RECTAL DAILY
Status: DISCONTINUED | OUTPATIENT
Start: 2023-05-15 | End: 2023-05-15

## 2023-05-14 RX ORDER — SODIUM CHLORIDE 0.9 % (FLUSH) 0.9 %
20 SYRINGE (ML) INJECTION AS NEEDED
Status: CANCELLED | OUTPATIENT
Start: 2023-05-14

## 2023-05-14 RX ORDER — SODIUM CHLORIDE 9 MG/ML
40 INJECTION, SOLUTION INTRAVENOUS AS NEEDED
Status: DISCONTINUED | OUTPATIENT
Start: 2023-05-14 | End: 2023-05-19 | Stop reason: HOSPADM

## 2023-05-14 RX ORDER — ATORVASTATIN CALCIUM 80 MG/1
80 TABLET, FILM COATED ORAL NIGHTLY
Status: DISCONTINUED | OUTPATIENT
Start: 2023-05-14 | End: 2023-05-19 | Stop reason: HOSPADM

## 2023-05-14 RX ORDER — SODIUM CHLORIDE 0.9 % (FLUSH) 0.9 %
10 SYRINGE (ML) INJECTION
Status: COMPLETED | OUTPATIENT
Start: 2023-05-14 | End: 2023-05-14

## 2023-05-14 RX ORDER — LABETALOL HYDROCHLORIDE 5 MG/ML
10 INJECTION, SOLUTION INTRAVENOUS
Status: DISCONTINUED | OUTPATIENT
Start: 2023-05-14 | End: 2023-05-19 | Stop reason: HOSPADM

## 2023-05-14 RX ORDER — SODIUM CHLORIDE 0.9 % (FLUSH) 0.9 %
10 SYRINGE (ML) INJECTION AS NEEDED
Status: CANCELLED | OUTPATIENT
Start: 2023-05-14

## 2023-05-14 RX ORDER — SODIUM CHLORIDE 0.9 % (FLUSH) 0.9 %
10 SYRINGE (ML) INJECTION ONCE
Status: COMPLETED | OUTPATIENT
Start: 2023-05-14 | End: 2023-05-14

## 2023-05-14 RX ORDER — CLOPIDOGREL BISULFATE 75 MG/1
75 TABLET ORAL DAILY
Status: DISCONTINUED | OUTPATIENT
Start: 2023-05-15 | End: 2023-05-15

## 2023-05-14 RX ORDER — SODIUM CHLORIDE 0.9 % (FLUSH) 0.9 %
10 SYRINGE (ML) INJECTION AS NEEDED
Status: DISCONTINUED | OUTPATIENT
Start: 2023-05-14 | End: 2023-05-19 | Stop reason: HOSPADM

## 2023-05-14 RX ORDER — SODIUM CHLORIDE 0.9 % (FLUSH) 0.9 %
10 SYRINGE (ML) INJECTION EVERY 12 HOURS SCHEDULED
Status: DISCONTINUED | OUTPATIENT
Start: 2023-05-14 | End: 2023-05-19 | Stop reason: HOSPADM

## 2023-05-14 RX ADMIN — IPRATROPIUM BROMIDE AND ALBUTEROL SULFATE 3 ML: 2.5; .5 SOLUTION RESPIRATORY (INHALATION) at 11:02

## 2023-05-14 RX ADMIN — Medication 10 ML: at 15:41

## 2023-05-14 RX ADMIN — Medication 1000 MCG: at 08:40

## 2023-05-14 RX ADMIN — GUAIFENESIN 600 MG: 600 TABLET, EXTENDED RELEASE ORAL at 08:39

## 2023-05-14 RX ADMIN — MEROPENEM 1 G: 1 INJECTION, POWDER, FOR SOLUTION INTRAVENOUS at 11:27

## 2023-05-14 RX ADMIN — MEROPENEM 1 G: 1 INJECTION, POWDER, FOR SOLUTION INTRAVENOUS at 20:32

## 2023-05-14 RX ADMIN — MEROPENEM 1 G: 1 INJECTION, POWDER, FOR SOLUTION INTRAVENOUS at 03:44

## 2023-05-14 RX ADMIN — PHENYLEPHRINE HYDROCHLORIDE 0.5 MCG/KG/MIN: 50 INJECTION INTRAVENOUS at 22:39

## 2023-05-14 RX ADMIN — LOSARTAN POTASSIUM 50 MG: 50 TABLET, FILM COATED ORAL at 08:39

## 2023-05-14 RX ADMIN — ACETAMINOPHEN 650 MG: 650 SUPPOSITORY RECTAL at 17:59

## 2023-05-14 RX ADMIN — METOPROLOL TARTRATE 12.5 MG: 25 TABLET, FILM COATED ORAL at 08:39

## 2023-05-14 RX ADMIN — Medication 10 ML: at 08:40

## 2023-05-14 RX ADMIN — IOPAMIDOL 150 ML: 755 INJECTION, SOLUTION INTRAVENOUS at 15:28

## 2023-05-14 RX ADMIN — Medication 10 ML: at 20:26

## 2023-05-14 RX ADMIN — IPRATROPIUM BROMIDE AND ALBUTEROL SULFATE 3 ML: 2.5; .5 SOLUTION RESPIRATORY (INHALATION) at 21:45

## 2023-05-14 RX ADMIN — DOXYCYCLINE 100 MG: 100 INJECTION, POWDER, LYOPHILIZED, FOR SOLUTION INTRAVENOUS at 19:35

## 2023-05-14 RX ADMIN — SODIUM CHLORIDE 500 ML: 9 INJECTION, SOLUTION INTRAVENOUS at 18:53

## 2023-05-14 RX ADMIN — SODIUM CHLORIDE 500 ML: 9 INJECTION, SOLUTION INTRAVENOUS at 19:54

## 2023-05-14 RX ADMIN — CITALOPRAM 20 MG: 20 TABLET, FILM COATED ORAL at 08:39

## 2023-05-14 RX ADMIN — Medication 10 ML: at 03:44

## 2023-05-14 RX ADMIN — Medication 10 ML: at 15:40

## 2023-05-14 RX ADMIN — IPRATROPIUM BROMIDE AND ALBUTEROL SULFATE 3 ML: 2.5; .5 SOLUTION RESPIRATORY (INHALATION) at 07:54

## 2023-05-14 RX ADMIN — TENECTEPLASE 16 MG: KIT at 15:41

## 2023-05-14 RX ADMIN — BISMUTH SUBSALICYLATE 30 ML: 525 LIQUID ORAL at 08:39

## 2023-05-14 RX ADMIN — OXYCODONE HYDROCHLORIDE AND ACETAMINOPHEN 500 MG: 500 TABLET ORAL at 08:39

## 2023-05-14 RX ADMIN — Medication 10 ML: at 20:27

## 2023-05-14 RX ADMIN — DOXYCYCLINE 100 MG: 100 CAPSULE ORAL at 08:39

## 2023-05-14 NOTE — PROGRESS NOTES
[facility]    Discussed with pt acute vs subacute rehab.  Pt willing to participate in therapy 3 hours a day.   is available to assist 24/7 upon DC from rehab.  Will continue to monitor progress with therapies to determine the most appropriate level of rehab for pt when medically stable.    Thank you,  Darling Duvall RN  Rehab Admission Nurse

## 2023-05-14 NOTE — PLAN OF CARE
Goal Outcome Evaluation:  Plan of Care Reviewed With: patient        Progress: improving  Outcome Evaluation: Vital signs stable. Pt denies pain. No shortness of breath. On 4LNC. Q2turn. Refuse SCD but on lovenox. New IV placed for IV ABX. No diarrhea or BM. Voiding without difficulty. NSR-ST. No chest pain. Cardio following and adjusted metoprolol. PT following. No complaints or concerns per patient. Safety maintained. Will continue to monitor.

## 2023-05-14 NOTE — PROGRESS NOTES
ID NOTE    CC: f/u pneumonia    Subj: No fever. WBC down to 10k. O2 needs at 4-5L. Trying to get out of bed to chair and do some exercises in bed. She says she feels like her strength is improving albeit slowly.       Medications:    Current Facility-Administered Medications:   •  acetaminophen (TYLENOL) tablet 650 mg, 650 mg, Oral, Q4H PRN, 650 mg at 05/12/23 1054 **OR** acetaminophen (TYLENOL) 160 MG/5ML solution 650 mg, 650 mg, Oral, Q4H PRN **OR** acetaminophen (TYLENOL) suppository 650 mg, 650 mg, Rectal, Q4H PRN, Gee Clement MD  •  ascorbic acid (VITAMIN C) tablet 500 mg, 500 mg, Oral, Daily, Gee Clement MD, 500 mg at 05/14/23 0839  •  bismuth subsalicylate (PEPTO BISMOL) 262 MG/15ML suspension 30 mL, 30 mL, Oral, TID PRN, Issac Mendez MD, 30 mL at 05/14/23 0839  •  citalopram (CeleXA) tablet 20 mg, 20 mg, Oral, Daily, Gee Clement MD, 20 mg at 05/14/23 0839  •  doxycycline (MONODOX) capsule 100 mg, 100 mg, Oral, Q12H, Rigoberto Chan MD, 100 mg at 05/14/23 0839  •  Enoxaparin Sodium (LOVENOX) syringe 40 mg, 40 mg, Subcutaneous, Q24H, Jennifer Hood MD, 40 mg at 05/13/23 1353  •  fluticasone (FLONASE) 50 MCG/ACT nasal spray 1 spray, 1 spray, Each Nare, Daily, Gee Clement MD, 1 spray at 05/08/23 0808  •  guaiFENesin (MUCINEX) 12 hr tablet 600 mg, 600 mg, Oral, BID, Gee Clement MD, 600 mg at 05/14/23 0839  •  Hold medication, 1 each, Does not apply, Continuous PRN, Jennifer Hood MD  •  ipratropium-albuterol (DUO-NEB) nebulizer solution 3 mL, 3 mL, Nebulization, 4x Daily - RT, Gee Clement MD, 3 mL at 05/14/23 0754  •  loperamide (IMODIUM) capsule 2 mg, 2 mg, Oral, Q6H PRN, Gee Clement MD, 2 mg at 05/07/23 1822  •  losartan (COZAAR) tablet 50 mg, 50 mg, Oral, Q24H, Gee Clement MD, 50 mg at 05/14/23 0839  •  Magnesium Sulfate - Total Dose 10 grams - Magnesium 1 or Less, 2 g, Intravenous, PRN **OR** Magnesium Sulfate - Total Dose 6 grams -  Magnesium 1.1 - 1.5, 2 g, Intravenous, PRN **OR** Magnesium Sulfate - Total Dose 4 grams - Magnesium 1.6 - 1.9, 4 g, Intravenous, PRN, Gee Clement MD  •  meropenem (MERREM) 1 g in sodium chloride 0.9 % 100 mL IVPB-VTB, 1 g, Intravenous, Q8H, Jennifer Hood MD, Last Rate: 33.3 mL/hr at 05/12/23 0449, 1 g at 05/14/23 0344  •  metoprolol tartrate (LOPRESSOR) tablet 12.5 mg, 12.5 mg, Oral, Q12H, Issac Mendez MD, 12.5 mg at 05/14/23 0839  •  mirtazapine (REMERON) tablet 7.5 mg, 7.5 mg, Oral, Nightly, Gee Clement MD, 7.5 mg at 05/13/23 2000  •  nitroglycerin (NITROSTAT) SL tablet 0.4 mg, 0.4 mg, Sublingual, Q5 Min PRN, Gee Clement MD  •  ondansetron (ZOFRAN) injection 4 mg, 4 mg, Intravenous, Q6H PRN, Gee Clement MD, 4 mg at 05/11/23 1855  •  potassium & sodium phosphates (PHOS-NAK) 280-160-250 MG packet - for Phosphorus less than 1.25 mg/dL, 2 packet, Oral, Q6H PRN **OR** potassium & sodium phosphates (PHOS-NAK) 280-160-250 MG packet - for Phosphorus 1.25 - 2.5 mg/dL, 2 packet, Oral, Q6H PRN, Gee Clement MD  •  potassium chloride (K-DUR,KLOR-CON) ER tablet 40 mEq, 40 mEq, Oral, PRN, 40 mEq at 05/07/23 0654 **OR** potassium chloride (KLOR-CON) packet 40 mEq, 40 mEq, Oral, PRN **OR** potassium chloride 10 mEq in 100 mL IVPB, 10 mEq, Intravenous, Q1H PRN, Gee Clement MD  •  [COMPLETED] Insert Peripheral IV, , , Once **AND** sodium chloride 0.9 % flush 10 mL, 10 mL, Intravenous, PRN, Gee Clement MD  •  sodium chloride 0.9 % flush 10 mL, 10 mL, Intravenous, Q12H, Gee Clement MD, 10 mL at 05/14/23 0840  •  sodium chloride 0.9 % flush 10 mL, 10 mL, Intravenous, PRN, Gee Clement MD  •  sodium chloride 0.9 % infusion 40 mL, 40 mL, Intravenous, PRN, Gee Clement MD  •  vitamin B-12 (CYANOCOBALAMIN) tablet 1,000 mcg, 1,000 mcg, Oral, Daily, Gee Clement MD, 1,000 mcg at 05/14/23 0840      Objective   Vital Signs   Temp:  [97.8 °F (36.6 °C)-99 °F (37.2 °C)]  97.8 °F (36.6 °C)  Heart Rate:  [77-98] 94  Resp:  [18-20] 20  BP: (121-132)/(68-84) 129/68    Physical Exam:   General: awake, alert, very nice but frail appearing  Eyes: no scleral icterus  ENT: no thrush; NC in nares  Cardiovascular: NR  Respiratory: no wheezing; normal WOB on 5L NC  GI: Abdomen is soft, not tender  :  no Snell catheter  Skin: No rashes  Neurological: Alert and oriented x 3  Psychiatric: Normal mood and affect     Labs:   CBC, BMP, fungal studies and cultures reviewed today  Lab Results   Component Value Date    WBC 10.26 05/14/2023    HGB 10.1 (L) 05/14/2023    HCT 31.3 (L) 05/14/2023    MCV 92.3 05/14/2023     05/14/2023     Lab Results   Component Value Date    GLUCOSE 91 05/14/2023    CALCIUM 8.4 (L) 05/14/2023     (L) 05/14/2023    K 4.3 05/14/2023    CO2 32.5 (H) 05/14/2023     05/14/2023    BUN 12 05/14/2023    CREATININE 0.26 (L) 05/14/2023    EGFR 118.3 05/14/2023    BCR 46.2 (H) 05/14/2023    ANIONGAP 2.5 (L) 05/14/2023     Procal 3.95--->2.6-->0.2  Crypto Ag negative  Urine Histo Ag negative  Serum Histo Ag pending  Urine Blasto Ag negative  Aspergillus Ag pending  ANCA negative  Histoplasma Ab negative    Microbiology:  5/2 RPP: negative  5/2 BCx: Corynebacterium in 1/2 sets  5/2 SpCx: normal aishwarya  5/2 MRSA nares: negative  5/2 Strep pneumo Ag; negative  5/2 Legionella Ag: negative  5/4 BCx: negative  5/5 C diff: negative  5/6 SpCx: normal aishwarya  5/9 AFB Cx: NGTD  5/9 Fungus Cx: NGTD  5/10 SpCx: NGTD  5/10 L Thoracentesis Cx: negative      ASSESSMENT/PLAN:  1. Pneumonia and right lung abscess  2. Pleural effusions  3. Acute hypoxic respiratory failure  4. Emphysema  5. Tobacco use    She remains afebrile and her WBC is finally down to normal. O2 needs overall much improved down to 4-5L NC. All of her cultures are negative. I recommend that we treat with antibiotics for a 4-week course with stop date 6/8/23. The regimen will be meropenem 1 g IV q8h and doxycycline  100 mg PO BID. I will go ahead and order a PICC today. I will also order a repeat CT to be done just prior to the stop date. She will need follow-up w/ pulmonologist as well.     CBC, CMP, and CRP weekly should be faxed to me at [phone].     Thank you for allowing me to be involved in the care of this patient. Infectious diseases will sign off at this time with antibiotics plan in place, but please call me at [phone] if any further ID questions or new ID concerns.

## 2023-05-14 NOTE — NURSING NOTE
Setting patient up for Left Arm PICC line and conversing with patient.  She was awake and alert,  Then she drifted off and turned to the right.  Unable to wake patient.  Pts nurse called into room.  Pt mumbling , unable to lift left arm. Rapid response team called.  Picc line on hold at this time.

## 2023-05-14 NOTE — PROGRESS NOTES
[facility]      Mar/chart reviewed  Follow up pneumonia, ahrf  Some cough, tyson    Vital Sign Min/Max for last 24 hours  Temp  Min: 97.8 °F (36.6 °C)  Max: 99 °F (37.2 °C)   BP  Min: 121/72  Max: 132/84   Pulse  Min: 77  Max: 103   Resp  Min: 18  Max: 20   SpO2  Min: 91 %  Max: 97 %   Flow (L/min)  Min: 4  Max: 4.5   No data recorded     Appears ill, axox3,   perrl, eomi, normal sclera,  mmm, no jvd, trachea midline, neck supple,  chest decreased right base bilaterally, + crackles, no wheezes,   rrr,   soft, nt, nd +bs,  no c/c/ e  Skin warm, dry no rashes    Labs: 5/14: reviewed:  Bun 12  Cr 0.26  Na 135  Bicarb 32  Wbc 10  hgb 10.1  plts 388    A/P:  1. Right lower lobe pneumonia/lung abscess -- continue antibiotics a per ID  2. Right sided pleural effusions -- s/p thoracentesis  3. Sepsis -- better  4. AHRF --improving nicely now  5. Emphysema -- outpatient pft when better  6. 2.4cm lung nodule 4/23 -- she will need repeat ct in 4-6 weeks  7. Tobacco abuse  8. Anemia    Will see prn while here.

## 2023-05-14 NOTE — CONSULTS
Neurology Consult Note    Referring Provider: Dr. Mendez  Reason for Consultation: stroke alert    History of present illness:    The patient is a 72 year old woman hospitalized since 5/2/23 with pneumonia. At approximately 2:45, PICC team nurse was with patient when she suddenly developed difficulty talking and left body weakness.  Rapid response was called and I was notified. Stroke alert was initiated.  NIHSS was 22.    I reviewed patient's chart rapidly and identified no absolute contra-indications for TNK. Blood pressure 124/67, glucose 150, platelets 390K. She is on no therapeutic anticoagulation, receives prophyactic Lovenox, last dose yesterday, no recent surgeries or intracranial hemorrhage.    I called contact number for Valentina Gallo and spoke to her along with another family member Odalys. I explained the circumstances and that if head CT showed no hemorrhage I recommend TNK. I advised them of potential risks and they both agreed to proceed if indicated.     Head CT showed no hemorrhage and TNK was ordered and administered at 15:41.       CTA and CT perfusion were reviewed and this showed significant right MCA perfusion deficit but no proximal occlusion. There appeared to be a distal M3 occlusion. I discussed this with Dr. Martinez who concurred. He did not recommend thrombectomy given the distal location.      Past Medical History  Past Medical History:   Diagnosis Date   • Arthritis    • Cataract    • Colon polyps     FOLLOWED BY DR. JOSEPH LAU   • Hypertension      Past Surgical History  Past Surgical History:   Procedure Laterality Date   • COLONOSCOPY  10/2015    [id number].  Follow-up in 5 years.   • COLONOSCOPY N/A 1/12/2022    2 BENIGN POLYPS IN DESCENDING, 5 MM BENIGN POLYP IN SIGMOID, MULTIPLE SMALL AND LARGE DIVERTICULA IN SIGMOID, RESCOPE IN 3 YRS, DR. JOSEPH LAU AT [facility]   • EYE SURGERY     • JOINT REPLACEMENT  2005?    Left total hip replacement in 2005.  In December 2015  patient had left hip revision   • TONSILLECTOMY         Family History  Family History   Problem Relation Age of Onset   • Cancer Mother    • Breast cancer Mother    • Arthritis Mother    • Deep vein thrombosis Mother    • Heart attack Father    • Heart disease Father         Had quadruple bypass   • Heart attack Maternal Grandmother    • Heart disease Maternal Grandmother    • Malig Hyperthermia Neg Hx        Allergies   Allergen Reactions   • Hydrocodone-Acetaminophen Itching       Social History  Social History     Socioeconomic History   • Marital status:    Tobacco Use   • Smoking status: Every Day     Packs/day: 1.00     Years: 51.00     Pack years: 51.00     Types: Cigarettes     Start date: 1/1/1970   • Smokeless tobacco: Never   Vaping Use   • Vaping Use: Never used   Substance and Sexual Activity   • Alcohol use: Yes     Alcohol/week: 30.0 standard drinks     Types: 30 Cans of beer per week     Comment: 4-6 per day, but very few in the past month   • Drug use: Never   • Sexual activity: Not Currently     Partners: Male     Birth control/protection: None       Review of Systems    Medications  Scheduled Meds:vitamin C, 500 mg, Oral, Daily  citalopram, 20 mg, Oral, Daily  doxycycline, 100 mg, Oral, Q12H  enoxaparin, 40 mg, Subcutaneous, Q24H  fluticasone, 1 spray, Each Nare, Daily  guaiFENesin, 600 mg, Oral, BID  ipratropium-albuterol, 3 mL, Nebulization, 4x Daily - RT  losartan, 50 mg, Oral, Q24H  meropenem, 1 g, Intravenous, Q8H  metoprolol tartrate, 12.5 mg, Oral, Q12H  mirtazapine, 7.5 mg, Oral, Nightly  sodium chloride, 10 mL, Intravenous, Q12H  cyanocobalamin, 1,000 mcg, Oral, Daily      Continuous Infusions:hold, 1 each      PRN Meds:.•  acetaminophen **OR** acetaminophen **OR** acetaminophen  •  bismuth subsalicylate  •  hold  •  loperamide  •  magnesium sulfate **OR** magnesium sulfate **OR** magnesium sulfate  •  nitroglycerin  •  ondansetron  •  potassium & sodium phosphates **OR**  potassium & sodium phosphates  •  potassium chloride **OR** potassium chloride **OR** potassium chloride  •  [COMPLETED] Insert Peripheral IV **AND** sodium chloride  •  sodium chloride  •  sodium chloride    Vital Signs   Temp:  [97.8 °F (36.6 °C)-100.7 °F (38.2 °C)] 100.7 °F (38.2 °C)  Heart Rate:  [] 112  Resp:  [16-20] 16  BP: (121-154)/(67-72) 154/67    Examination: performed after arrival to ICU  Constitutional: appears chronically ill  HENT:  normal  Eyes: Normal conjunctivae  CVS:  Tachycardia, regular rhythm.  No murmurs.  Good peripheral perfusion.   Resp :   Non labored respirations  Musculoskeletal:  No signs of peripheral edema, normal range, no deformities  Skin:  No rash, normal turgor  Neurologic:   Alert but inattentive   Right gaze preference   Speech mumbled, unintelligible  Cannot cross eyes midline to the left  Pupils symmetric and equally reactive  Face symmetric  LUE 3/5  LLE 2/5  Sensory neglect of left body  Psychiatric: No agitation    Results Review:  Results from last 7 days   Lab Units 05/14/23 0528 05/13/23  0431 05/12/23  0346   WBC 10*3/mm3 10.26 11.78* 13.92*   HEMOGLOBIN g/dL 10.1* 9.9* 10.0*   HEMATOCRIT % 31.3* 30.1* 29.6*   PLATELETS 10*3/mm3 388 390 344        Results from last 7 days   Lab Units 05/14/23  0528 05/13/23  0431 05/12/23  0346 05/11/23  0251 05/10/23  0904   SODIUM mmol/L 135* 134* 137   < > 135*   POTASSIUM mmol/L 4.3 4.1 4.1   < > 3.8   CHLORIDE mmol/L 100 100 102   < > 100   CO2 mmol/L 32.5* 33.0* 34.4*   < > 29.6*   BUN mg/dL 12 13 13   < > 11   CREATININE mg/dL 0.26* 0.20* 0.21*   < > 0.30*   CALCIUM mg/dL 8.4* 8.3* 8.9   < > 9.1   BILIRUBIN mg/dL  --   --   --   --  0.4   ALK PHOS U/L  --   --   --   --  158*   ALT (SGPT) U/L  --   --   --   --  42*   AST (SGOT) U/L  --   --   --   --  34*   GLUCOSE mg/dL 91 85 101*   < > 141*    < > = values in this interval not displayed.      Radiology    Images reviewed independently and with Dr. Clay of  radiology  Head CT shows no actue pathology, no hemorrhage      CT perfusion shows right MCA perfusion deficit with no core infarct    CTA head/neck shows distal right M3 occlusion, no other occlusions or significant stenosis      Medical Decision Making and Recommendations  Right MCA stroke in evolution  Improving exam after TNK    No retrievable thrombus    Vascular risk factors: age, tobacco use, HTN    Hold anti-hypertensives, allow permissive HTN for first 24 hours, treat SBP > 180    Brain MRI 24 hours after TNK    Start DAPT 24 hours after TNK with clopidogrel 75 mg and aspirin 81 mg    Check A1c and lipids    Echocardiogram and telemetry    Consider 15 day cardiac  Monitor at discharge    PT/OT/SLP    I discussed these findings and my recommendations with family, nursing staff and primary care team    Stroke order set completed.    Indigo Curry MD  05/14/23  16:04 EDT

## 2023-05-14 NOTE — NURSING NOTE
Call placed to Dr. Curry STAT at 1830 as patient was beginning to become slow to respond and mixing up her words at times.   and 134 for recheck.  Noted SBP lower than previously noted in 110's.      1850: Patricio returned call.  Condition unchanged.  Reported updated.  500cc bolus orders received.  Md to review Ct.

## 2023-05-14 NOTE — PLAN OF CARE
"Goal Outcome Evaluation:      At 1452 IV RN called this RN while this RN was in lunch break, stated\" Pt is mumbling, and not answering my questions anymore, is this her baseline\". After the phone, this RN came to pt's room right away, RN assessed pt, Pt was mumbling, couldn't lift her arms, and couldn't answer any questions. BP: 124/67, Sats 94% on NC4.5L, , B. This RN called Rapid at 1454. Rapid team came and took pt down for CTA. Daughter Valentina updated. Dr. Lira UPDATED.                  "

## 2023-05-14 NOTE — CODE DOCUMENTATION
In House Team D    1500 arrived at bedside LKN 1445, on Lovenox low dose daily- Has not received today's dose.   Left side flaccid with some minimal WD to pain. Left side sensation deficit present, and rt gaze.  Oriented to self and month.  Speech is very slurred and somewhat difficult to understand.  [facility] 22  1503 Team D pager called.  Updated Dr Curry.  New orders   For team D imaging.   1504  Iv therapist placed KIMBERLYN 20 g catheter for IV contrast  1518 arrived to CT scanner  1525 Dr Rendon updated. Orders to proceed with team D imaging  1534 New orders received to administer TNK   1539 TNK prepared and verified by myself and pharmacist Mira Gaviria.  /59  1541 TNK administered  1542 /77  1547 Dr Ford updated. Pt transferring to CCU room 327  1553 Arrived to CCU  1557 Dr Curry to bedside

## 2023-05-14 NOTE — PROGRESS NOTES
[facility]  [phone]  Dr. Darius Ford    Subjective:  LOS: 12    Chief Complaint: Acute stroke    Seen by Dr. Cristobal Mcmanus this morning for pneumonia.  Acute onset left-sided weakness.  She reports some difficulty with her breathing now and with swallowing and management of the secretions.  She denies any prior history of dysphagia or difficulty managing her secretions.  No prior history of stroke is reported.  Clarified with family at bedside.  She has a history of COPD and is a current cigarette smoker.  Came in with dense right lower lobe pneumonia and right lung abscess.  Currently on broad antibiotics per ID.    Objective   Vital Signs past 24hrs  Temp range: Temp (24hrs), Av.2 °F (37.3 °C), Min:97.8 °F (36.6 °C), Max:101.6 °F (38.7 °C)    BP range: BP: (121-155)/(59-78) 146/71  Pulse range: Heart Rate:  [] 130  Resp rate range: Resp:  [15-20] 16  Device (Oxygen Therapy): humidified;nasal cannulaFlow (L/min):  [4-4.5] 4  Oxygen range:SpO2:  [89 %-99 %] 94 %     Physical Exam  Eyes:      Pupils: Pupils are equal, round, and reactive to light.   Cardiovascular:      Rate and Rhythm: Normal rate and regular rhythm.      Heart sounds: No murmur heard.  Abdominal:      General: Bowel sounds are normal.      Palpations: Abdomen is soft. There is no mass.      Tenderness: There is no abdominal tenderness.   Musculoskeletal:         General: No swelling.   Neurological:      Mental Status: She is alert.      Motor: Weakness present.      Comments: Left-sided neglect  Left upper extremity and lower extremity move spontaneously but not on command       Results Review:    I have reviewed the laboratory and imaging data since the last note by [facility] physician.  My annotations are noted in assessment and plan.      Result Review:  I have personally reviewed the results from last note by [facility] physician to 2023 17:02 EDT and agree with these findings:  [x]  Laboratory list / accordion  [x]   Microbiology  [x]  Radiology  []  EKG/Telemetry   [x]  Cardiology/Vascular   []  Pathology  []  Old records  []  Other:    Medication Review:  I have reviewed the current MAR.  My annotations are noted in assessment and plan.    vitamin C, 500 mg, Oral, Daily  [START ON 5/15/2023] aspirin, 81 mg, Oral, Daily   Or  [START ON 5/15/2023] aspirin, 300 mg, Rectal, Daily  atorvastatin, 80 mg, Oral, Nightly  citalopram, 20 mg, Oral, Daily  [START ON 5/15/2023] clopidogrel, 75 mg, Oral, Daily  doxycycline, 100 mg, Oral, Q12H  fluticasone, 1 spray, Each Nare, Daily  guaiFENesin, 600 mg, Oral, BID  ipratropium-albuterol, 3 mL, Nebulization, 4x Daily - RT  losartan, 50 mg, Oral, Q24H  meropenem, 1 g, Intravenous, Q8H  metoprolol tartrate, 12.5 mg, Oral, Q12H  mirtazapine, 7.5 mg, Oral, Nightly  sodium chloride, 10 mL, Intravenous, Q12H  sodium chloride, 10 mL, Intravenous, Q12H  cyanocobalamin, 1,000 mcg, Oral, Daily        hold, 1 each  niCARdipine, 5-15 mg/hr      Lines, Drains & Airways     Active LDAs     Name Placement date Placement time Site Days    Peripheral IV 05/13/23 2021 Left Antecubital 05/13/23 2021  Antecubital  less than 1    Peripheral IV 05/14/23 1513 Anterior;Left;Upper Arm 05/14/23  1513  Arm  less than 1    External Urinary Catheter 05/07/23  --  --  7              No active isolations  Diet Orders (active) (From admission, onward)     Start     Ordered    05/14/23 1628  NPO Diet NPO Type: Strict NPO  Diet Effective Now        Comments: Strict NPO Until Nursing Dysphagia Screen Passed    05/14/23 1633    05/07/23 1800  Dietary Nutrition Supplements Boost Plus (Ensure Enlive, Ensure Plus)  Daily With Breakfast, Lunch & Dinner       05/07/23 1203                PCCM Problems  Acute right MCA stroke now status post TNK  Concern for airway protection and dysphagia  Acute hypoxic respiratory failure with worsening  Right lower lobe pneumonia and lung abscess with ongoing antibiotics  Right pleural effusion  without evidence for empyema  New right lower lobe 2.4 cm lung nodule on CT 4/3/2023  COPD, currently not wheezing  Current cigarette smoker  Acute hyponatremia  Anemia          THESE ARE NEW MEDICAL PROBLEMS TO ME.    Plan of Treatment    Left-sided weakness with acute right MCA stroke and status post TNK.  Await improvement.  Reviewed with neurologist.  Goal blood pressure keep less than 180.  I am going to discontinue her losartan for now    Her oxygen requirements have increased.  I am concerned about her ability to protect airway with acute stroke.  Monitor closely and keep NPO.  Will use cortrack for any meds as needed.  Currently requiring high flow cannula at 7 L.    Ongoing antibiotics for right lower lobe pneumonia.  Continue to direct as per ID.    Right lung nodule and now with right lung severe pneumonia.  Certainly sounds suspicious and needs follow-up imaging to exclude malignancy.    COPD and currently not wheezing.  Add nebulizer treatments to help with mobilization of secretions.    Patient will be counseled to quit smoking prior to discharge.    Hyponatremia from underlying lung disease likely.  Watch for now.    Anemia from recent acute illness.  Other etiologies not ruled out yet.    High risk for dysphagia.  Will need cortrack feeding tube for oral meds.    Guarded prognosis and at high risk for requiring intubation and mechanical ventilation.    I spent 45 mins critical care time in care of this patient outside of any procedures.     Darius Ford MD  05/14/23  17:02 EDT      Part of this note may be an electronic transcription/translation of spoken language to printed text using the Dragon Dictation System.

## 2023-05-14 NOTE — PROGRESS NOTES
[facility] Medicine Services  PROGRESS NOTE    Patient Name: Katherine Williamson  : 1950  MRN: [id number]    Date of Admission: 2023  Primary Care Physician: Zelalem Flor APRN    Subjective   Subjective     CC:  Follow-up lung infection    Subjective:  Patient continues to have some improvement in her breathing.  She continues to feel better.  I have asked her to try and get up to the chair today.  She says her diarrhea resolved with the Pepto-Bismol.  She has not had any further stools after that dose.      Review of Systems  No current fevers or chills  No current nausea, vomiting, or diarrhea  No current chest pain or palpitations      Objective   Objective     Vital Signs:   Temp:  [97.8 °F (36.6 °C)-99 °F (37.2 °C)] 97.8 °F (36.6 °C)  Heart Rate:  [77-98] 94  Resp:  [18-20] 20  BP: (121-132)/(68-84) 129/68        Physical Exam:  Constitutional:Awake, alert, acutely ill-appearing  HENT: NCAT, mucous membranes moist, neck supple  Respiratory: Cough is resolving, occasional coarse sounds, no wheezes, supplemental oxygen  Cardiovascular: Pulse rate is normal, palpable radial pulses  Gastrointestinal:  soft, nontender, nondistended  Musculoskeletal: Somewhat debilitated in appearance, mild lower extremity edema, BMI is 25  Psychiatric: Normal affect, cooperative, conversational  Neurologic: No slurred speech or facial droop, follows commands  Skin: No rashes or jaundice, warm      Results Reviewed:  Results from last 7 days   Lab Units 05/14/23  0528 05/13/23  0431 05/12/23  0346 05/10/23  0707 23  0714   WBC 10*3/mm3 10.26 11.78* 13.92*   < > 19.56*   HEMOGLOBIN g/dL 10.1* 9.9* 10.0*   < > 12.2   HEMATOCRIT % 31.3* 30.1* 29.6*   < > 37.0   PLATELETS 10*3/mm3 388 390 344   < > 320   PROCALCITONIN ng/mL  --   --   --   --  0.23    < > = values in this interval not displayed.     Results from last 7 days   Lab Units 236 23  0251 05/10/23  0904   SODIUM  mmol/L 135* 134* 137   < > 135*   POTASSIUM mmol/L 4.3 4.1 4.1   < > 3.8   CHLORIDE mmol/L 100 100 102   < > 100   CO2 mmol/L 32.5* 33.0* 34.4*   < > 29.6*   BUN mg/dL 12 13 13   < > 11   CREATININE mg/dL 0.26* 0.20* 0.21*   < > 0.30*   GLUCOSE mg/dL 91 85 101*   < > 141*   CALCIUM mg/dL 8.4* 8.3* 8.9   < > 9.1   ALT (SGPT) U/L  --   --   --   --  42*   AST (SGOT) U/L  --   --   --   --  34*    < > = values in this interval not displayed.     Estimated Creatinine Clearance: 183.7 mL/min (A) (by C-G formula based on SCr of 0.26 mg/dL (L)).    Microbiology Results Abnormal     Procedure Component Value - Date/Time    Body Fluid Culture - Body Fluid, Pleural Cavity [[id number]] Collected: 05/10/23 1110    Lab Status: Final result Specimen: Body Fluid from Pleural Cavity Updated: 05/13/23 0927     Body Fluid Culture No growth at 3 days     Gram Stain Rare (1+) WBCs per low power field      No organisms seen    Respiratory Culture - Sputum, Cough [[id number]] Collected: 05/10/23 0441    Lab Status: Final result Specimen: Sputum from Cough Updated: 05/12/23 1049     Respiratory Culture Rare Normal respiratory aishwarya. No S. aureus or Pseudomonas aeruginosa detected. Final report.     Gram Stain Many (4+) WBCs per low power field      Rare (1+) Epithelial cells per low power field      Many (4+) Yeast      Many (4+) Gram positive cocci    Blastomyces Antigen - Urine, Urine, Clean Catch [[id number]] Collected: 05/09/23 1558    Lab Status: Final result Specimen: Urine, Clean Catch Updated: 05/12/23 0807     Reference Lab Report --    Histoplasma Ag Ur - Urine, Urine, Clean Catch [[id number]] Collected: 05/09/23 1558    Lab Status: Final result Specimen: Urine, Clean Catch Updated: 05/12/23 0806     Reference Lab Report --    AFB Culture - Sputum, Cough [[id number]] Collected: 05/10/23 0441    Lab Status: Preliminary result Specimen: Sputum from Cough Updated: 05/11/23 8127     AFB Stain No acid fast bacilli seen on concentrated  smear    AFB Culture - Body Fluid, Pleural Cavity [[id number]] Collected: 05/10/23 1110    Lab Status: Preliminary result Specimen: Body Fluid from Pleural Cavity Updated: 05/11/23 1417     AFB Stain No acid fast bacilli seen on concentrated smear    Blood Culture - Blood, Arm, Left [[id number]]  (Normal) Collected: 05/04/23 1815    Lab Status: Final result Specimen: Blood from Arm, Left Updated: 05/09/23 1845     Blood Culture No growth at 5 days    Narrative:      Less than seven (7) mL's of blood was collected.  Insufficient quantity may yield false negative results.    Blood Culture - Blood, Hand, Right [[id number]]  (Normal) Collected: 05/04/23 1811    Lab Status: Final result Specimen: Blood from Hand, Right Updated: 05/09/23 1845     Blood Culture No growth at 5 days    Narrative:      Less than seven (7) mL's of blood was collected.  Insufficient quantity may yield false negative results.    Respiratory Culture - Sputum, Cough [[id number]] Collected: 05/06/23 1402    Lab Status: Final result Specimen: Sputum from Cough Updated: 05/08/23 0908     Respiratory Culture Rare Normal respiratory aishwarya. No S. aureus or Pseudomonas aeruginosa detected. Final report.     Gram Stain Moderate (3+) WBCs seen      Rare (1+) Epithelial cells per low power field      Mixed bacterial morphotypes seen on Gram Stain    Blood Culture - Blood, Arm, Right [[id number]]  (Normal) Collected: 05/02/23 1433    Lab Status: Final result Specimen: Blood from Arm, Right Updated: 05/07/23 1445     Blood Culture No growth at 5 days    Clostridioides difficile Toxin - Stool, Per Rectum [[id number]]  (Normal) Collected: 05/05/23 2142    Lab Status: Final result Specimen: Stool from Per Rectum Updated: 05/05/23 2235    Narrative:      The following orders were created for panel order Clostridioides difficile Toxin - Stool, Per Rectum.  Procedure                               Abnormality         Status                     ---------                                -----------         ------                     Clostridioides difficile...[[id number]]  Normal              Final result                 Please view results for these tests on the individual orders.    Clostridioides difficile Toxin, PCR - Stool, Per Rectum [[id number]]  (Normal) Collected: 05/05/23 2142    Lab Status: Final result Specimen: Stool from Per Rectum Updated: 05/05/23 2235     C. Difficile Toxins by PCR Negative    Narrative:      The result indicates the absence of toxigenic C. difficile from stool specimen.     Respiratory Culture - Sputum, Cough [[id number]] Collected: 05/02/23 1739    Lab Status: Final result Specimen: Sputum from Cough Updated: 05/04/23 0717     Respiratory Culture Scant growth (1+) Normal respiratory aishwarya. No S. aureus or Pseudomonas aeruginosa detected. Final report.     Gram Stain Many (4+) WBCs per low power field      Many (4+) Mixed bacterial morphotypes seen on Gram Stain      Moderate (3+) Epithelial cells per low power field    MRSA Screen, PCR (Inpatient) - Swab, Nares [[id number]]  (Normal) Collected: 05/02/23 1741    Lab Status: Final result Specimen: Swab from Nares Updated: 05/02/23 1923     MRSA PCR No MRSA Detected    Narrative:      The negative predictive value of this diagnostic test is high and should only be used to consider de-escalating anti-MRSA therapy. A positive result may indicate colonization with MRSA and must be correlated clinically.    Legionella Antigen, Urine - Urine, Urine, Clean Catch [[id number]]  (Normal) Collected: 05/02/23 1754    Lab Status: Final result Specimen: Urine, Clean Catch Updated: 05/02/23 1834     LEGIONELLA ANTIGEN, URINE Negative    S. Pneumo Ag Urine or CSF - Urine, Urine, Clean Catch [[id number]]  (Normal) Collected: 05/02/23 1754    Lab Status: Final result Specimen: Urine, Clean Catch Updated: 05/02/23 1834     Strep Pneumo Ag Negative    Respiratory Panel PCR w/COVID-19(SARS-CoV-2) ALEN/GATITO/DOREEN/PAD/[facility]  In-House, NP Swab in [facility], 3-4 HR TAT - Swab, Nasopharynx [[id number]]  (Normal) Collected: 05/02/23 1427    Lab Status: Final result Specimen: Swab from Nasopharynx Updated: 05/02/23 1542     ADENOVIRUS, PCR Not Detected     Coronavirus 229E Not Detected     Coronavirus HKU1 Not Detected     Coronavirus NL63 Not Detected     Coronavirus OC43 Not Detected     COVID19 Not Detected     Human Metapneumovirus Not Detected     Human Rhinovirus/Enterovirus Not Detected     Influenza A PCR Not Detected     Influenza B PCR Not Detected     Parainfluenza Virus 1 Not Detected     Parainfluenza Virus 2 Not Detected     Parainfluenza Virus 3 Not Detected     Parainfluenza Virus 4 Not Detected     RSV, PCR Not Detected     Bordetella pertussis pcr Not Detected     Bordetella parapertussis PCR Not Detected     Chlamydophila pneumoniae PCR Not Detected     Mycoplasma pneumo by PCR Not Detected    Narrative:      In the setting of a positive respiratory panel with a viral infection PLUS a negative procalcitonin without other underlying concern for bacterial infection, consider observing off antibiotics or discontinuation of antibiotics and continue supportive care. If the respiratory panel is positive for atypical bacterial infection (Bordetella pertussis, Chlamydophila pneumoniae, or Mycoplasma pneumoniae), consider antibiotic de-escalation to target atypical bacterial infection.          Imaging Results (Last 24 Hours)     ** No results found for the last 24 hours. **          Results for orders placed during the hospital encounter of 05/13/20    Adult Transthoracic Echo Complete W/ Cont if Necessary Per Protocol    Interpretation Summary  · Left ventricular systolic function is normal.  · Left ventricular diastolic dysfunction (grade I) consistent with impaired relaxation.    Normal LV and RV size and function  EF estimated 65 to 70%  Normal atrial sizes  Normal RV size and function  No significant valvulopathy seen  Bubble  study negative for right to left interatrial shunt under resting and Valsalva conditions, slightly mobile interatrial septum without defect seen clearly  No masses  No effusion seen  Grade 1 diastolic dysfunction by criteria  Cannot estimate PA systolic pressure secondary to insufficient TR envelope      I have reviewed the medications:  Scheduled Meds:vitamin C, 500 mg, Oral, Daily  citalopram, 20 mg, Oral, Daily  doxycycline, 100 mg, Oral, Q12H  enoxaparin, 40 mg, Subcutaneous, Q24H  fluticasone, 1 spray, Each Nare, Daily  guaiFENesin, 600 mg, Oral, BID  ipratropium-albuterol, 3 mL, Nebulization, 4x Daily - RT  losartan, 50 mg, Oral, Q24H  meropenem, 1 g, Intravenous, Q8H  metoprolol tartrate, 12.5 mg, Oral, Q12H  mirtazapine, 7.5 mg, Oral, Nightly  sodium chloride, 10 mL, Intravenous, Q12H  cyanocobalamin, 1,000 mcg, Oral, Daily      Continuous Infusions:hold, 1 each      PRN Meds:.•  acetaminophen **OR** acetaminophen **OR** acetaminophen  •  bismuth subsalicylate  •  hold  •  loperamide  •  magnesium sulfate **OR** magnesium sulfate **OR** magnesium sulfate  •  nitroglycerin  •  ondansetron  •  potassium & sodium phosphates **OR** potassium & sodium phosphates  •  potassium chloride **OR** potassium chloride **OR** potassium chloride  •  [COMPLETED] Insert Peripheral IV **AND** sodium chloride  •  sodium chloride  •  sodium chloride    Assessment & Plan   Assessment & Plan     Active Hospital Problems    Diagnosis  POA   • **Acute respiratory failure with hypoxia [J96.01]  Yes   • Diastolic dysfunction, grade 1 [I51.89]  Yes   • Physical debility [R53.81]  Clinically Undetermined   • Sepsis [A41.9]  Yes   • Pleural effusion, right [J90]  Yes   • Other emphysema [J43.8]  Yes   • Pulmonary nodule [R91.1]  Yes   • Pneumonia [J18.9]  Yes   • Hepatic steatosis [K76.0]  Yes   • Tobacco abuse [Z72.0]  Yes   • Benign essential HTN [I10]  Yes      Resolved Hospital Problems   No resolved problems to display.         Brief Hospital Course to date:  Katherine Williamson is a 72 y.o. female presents to the hospital with acute hypoxic respiratory failure, bacterial pneumonia, right lung abscess, parapneumonic pleural effusion which is now status postthoracentesis, emphysema, and tobacco use.  She required ICU level care due to severe hypoxia.    Discussion/plan for today:  I have ordered repeat chest x-ray for today.  Plan to follow-up on images.  Pepto-Bismol as needed started for diarrhea.  Patient has responded well.  Possibly antibiotic related diarrhea.  Continuing to wean supplemental oxygen as tolerated.  Otherwise per below    Pneumonia/abscess/sepsis: Improving  Antibiotics per ID.  Cultures reviewed.  1 of 2 blood cultures from 5/4 grew corynebacterium likely skin aishwarya.  Continue to follow remaining cultures.    Pleural effusions, parapneumonic: Status post thoracentesis.  Thoracic surgery is signed off.  Repeat chest x-ray    Respiratory failure: Weaning oxygen as needed.  Incentive spirometer and flutter valve.    Emphysema: Nebulizers, needs pulmonology clinic follow-up and PFTs outpatient.    2.4 cm lung nodule: Patient has been notified of these findings and agrees with plan to have repeat CT scan in pulmonology clinic in 4 to 6 weeks.  She understands this is very important.    Tobacco use: Cessation counseled.    Anemia: No active bleeding.  Likely multifactorial.  Reasonably stable.  Monitor intermittently    Patient is significantly physically debilitated from her acute illness.  She has worsening hypoxia with exertion and needs to functionally improve.  Therapy recommending acute rehab at discharge.  Consult physical medicine team to evaluate. Continue PT OT.    SSRI for depression.  Currently seems controlled.    Hypertension and diastolic dysfunction: Receiving losartan.  Normotensive.  Monitor and adjust as needed.  Add very low-dose metoprolol.    Treatment plan discussed with the patient is in agreement    DVT  Prophylaxis: Mechanical    Disposition: Pending clinical course.    CODE STATUS:   Code Status and Medical Interventions:   Ordered at: 05/02/23 1800     Code Status (Patient has no pulse and is not breathing):    CPR (Attempt to Resuscitate)     Medical Interventions (Patient has pulse or is breathing):    Full Support       Issac Mendez MD  05/14/23

## 2023-05-15 ENCOUNTER — APPOINTMENT (OUTPATIENT)
Dept: CT IMAGING | Facility: HOSPITAL | Age: 73
DRG: 871 | End: 2023-05-15
Payer: COMMERCIAL

## 2023-05-15 ENCOUNTER — APPOINTMENT (OUTPATIENT)
Dept: GENERAL RADIOLOGY | Facility: HOSPITAL | Age: 73
DRG: 871 | End: 2023-05-15
Payer: COMMERCIAL

## 2023-05-15 ENCOUNTER — APPOINTMENT (OUTPATIENT)
Dept: CARDIOLOGY | Facility: HOSPITAL | Age: 73
DRG: 871 | End: 2023-05-15
Payer: COMMERCIAL

## 2023-05-15 LAB
ALBUMIN SERPL-MCNC: 1.8 G/DL (ref 3.5–5.2)
ANION GAP SERPL CALCULATED.3IONS-SCNC: 4.1 MMOL/L (ref 5–15)
AORTIC DIMENSIONLESS INDEX: 0.9 (DI)
ASCENDING AORTA: 2.7 CM
BH CV ECHO MEAS - ACS: 1.9 CM
BH CV ECHO MEAS - AO MAX PG: 8.1 MMHG
BH CV ECHO MEAS - AO MEAN PG: 4 MMHG
BH CV ECHO MEAS - AO ROOT DIAM: 3.5 CM
BH CV ECHO MEAS - AO V2 MAX: 142 CM/SEC
BH CV ECHO MEAS - AO V2 VTI: 27.5 CM
BH CV ECHO MEAS - AVA(I,D): 3 CM2
BH CV ECHO MEAS - EDV(CUBED): 50.7 ML
BH CV ECHO MEAS - EDV(MOD-SP2): 66 ML
BH CV ECHO MEAS - EDV(MOD-SP4): 87 ML
BH CV ECHO MEAS - EF(MOD-BP): 66.1 %
BH CV ECHO MEAS - EF(MOD-SP2): 63.6 %
BH CV ECHO MEAS - EF(MOD-SP4): 67.8 %
BH CV ECHO MEAS - ESV(CUBED): 13.8 ML
BH CV ECHO MEAS - ESV(MOD-SP2): 24 ML
BH CV ECHO MEAS - ESV(MOD-SP4): 28 ML
BH CV ECHO MEAS - FS: 35.1 %
BH CV ECHO MEAS - IVS/LVPW: 1 CM
BH CV ECHO MEAS - IVSD: 1 CM
BH CV ECHO MEAS - LAT PEAK E' VEL: 14.9 CM/SEC
BH CV ECHO MEAS - LV MASS(C)D: 112.5 GRAMS
BH CV ECHO MEAS - LV MAX PG: 5 MMHG
BH CV ECHO MEAS - LV MEAN PG: 2 MMHG
BH CV ECHO MEAS - LV V1 MAX: 112 CM/SEC
BH CV ECHO MEAS - LV V1 VTI: 23.6 CM
BH CV ECHO MEAS - LVIDD: 3.7 CM
BH CV ECHO MEAS - LVIDS: 2.4 CM
BH CV ECHO MEAS - LVOT AREA: 3.5 CM2
BH CV ECHO MEAS - LVOT DIAM: 2.1 CM
BH CV ECHO MEAS - LVPWD: 1 CM
BH CV ECHO MEAS - MED PEAK E' VEL: 6.1 CM/SEC
BH CV ECHO MEAS - MV A DUR: 0.14 SEC
BH CV ECHO MEAS - MV A MAX VEL: 99.4 CM/SEC
BH CV ECHO MEAS - MV DEC SLOPE: 291 CM/SEC2
BH CV ECHO MEAS - MV DEC TIME: 0.36 MSEC
BH CV ECHO MEAS - MV E MAX VEL: 77.1 CM/SEC
BH CV ECHO MEAS - MV E/A: 0.78
BH CV ECHO MEAS - MV MAX PG: 5.1 MMHG
BH CV ECHO MEAS - MV MEAN PG: 2 MMHG
BH CV ECHO MEAS - MV P1/2T: 87.5 MSEC
BH CV ECHO MEAS - MV V2 VTI: 22.6 CM
BH CV ECHO MEAS - MVA(P1/2T): 2.5 CM2
BH CV ECHO MEAS - MVA(VTI): 3.6 CM2
BH CV ECHO MEAS - PA ACC TIME: 0.12 SEC
BH CV ECHO MEAS - PA PR(ACCEL): 23.2 MMHG
BH CV ECHO MEAS - PA V2 MAX: 67.4 CM/SEC
BH CV ECHO MEAS - PULM A REVS DUR: 0.12 SEC
BH CV ECHO MEAS - PULM A REVS VEL: 43.2 CM/SEC
BH CV ECHO MEAS - PULM DIAS VEL: 29 CM/SEC
BH CV ECHO MEAS - PULM S/D: 1.38
BH CV ECHO MEAS - PULM SYS VEL: 39.9 CM/SEC
BH CV ECHO MEAS - QP/QS: 0.23
BH CV ECHO MEAS - RV MAX PG: 2.1 MMHG
BH CV ECHO MEAS - RV V1 MAX: 72.5 CM/SEC
BH CV ECHO MEAS - RV V1 VTI: 14 CM
BH CV ECHO MEAS - RVOT DIAM: 1.3 CM
BH CV ECHO MEAS - SV(LVOT): 81.7 ML
BH CV ECHO MEAS - SV(MOD-SP2): 42 ML
BH CV ECHO MEAS - SV(MOD-SP4): 59 ML
BH CV ECHO MEAS - SV(RVOT): 18.6 ML
BH CV ECHO MEAS - TAPSE (>1.6): 1.63 CM
BH CV ECHO MEASUREMENTS AVERAGE E/E' RATIO: 7.34
BH CV XLRA - RV BASE: 2.34 CM
BH CV XLRA - RV MID: 2.06 CM
BH CV XLRA - TDI S': 13.8 CM/SEC
BH CV XLRA MEAS LEFT DIST CCA EDV: 24.1 CM/SEC
BH CV XLRA MEAS LEFT DIST CCA PSV: 123 CM/SEC
BH CV XLRA MEAS LEFT DIST ICA EDV: -19.1 CM/SEC
BH CV XLRA MEAS LEFT DIST ICA PSV: -93.6 CM/SEC
BH CV XLRA MEAS LEFT ICA/CCA RATIO: 0.85
BH CV XLRA MEAS LEFT MID ICA EDV: -28.6 CM/SEC
BH CV XLRA MEAS LEFT MID ICA PSV: -105 CM/SEC
BH CV XLRA MEAS LEFT PROX CCA EDV: 14.3 CM/SEC
BH CV XLRA MEAS LEFT PROX CCA PSV: 115 CM/SEC
BH CV XLRA MEAS LEFT PROX ECA EDV: -11.3 CM/SEC
BH CV XLRA MEAS LEFT PROX ECA PSV: -105 CM/SEC
BH CV XLRA MEAS LEFT PROX ICA EDV: -18.2 CM/SEC
BH CV XLRA MEAS LEFT PROX ICA PSV: -97.9 CM/SEC
BH CV XLRA MEAS LEFT PROX SCLA PSV: 131 CM/SEC
BH CV XLRA MEAS LEFT VERTEBRAL A EDV: 9.5 CM/SEC
BH CV XLRA MEAS LEFT VERTEBRAL A PSV: 69.3 CM/SEC
BH CV XLRA MEAS RIGHT DIST CCA EDV: 23.5 CM/SEC
BH CV XLRA MEAS RIGHT DIST CCA PSV: 94.9 CM/SEC
BH CV XLRA MEAS RIGHT DIST ICA EDV: -13.7 CM/SEC
BH CV XLRA MEAS RIGHT DIST ICA PSV: -63.3 CM/SEC
BH CV XLRA MEAS RIGHT ICA/CCA RATIO: 0.9
BH CV XLRA MEAS RIGHT MID ICA EDV: -19.6 CM/SEC
BH CV XLRA MEAS RIGHT MID ICA PSV: -85.5 CM/SEC
BH CV XLRA MEAS RIGHT PROX CCA EDV: 14.9 CM/SEC
BH CV XLRA MEAS RIGHT PROX CCA PSV: 132 CM/SEC
BH CV XLRA MEAS RIGHT PROX ECA PSV: -89.4 CM/SEC
BH CV XLRA MEAS RIGHT PROX ICA EDV: -17.2 CM/SEC
BH CV XLRA MEAS RIGHT PROX ICA PSV: -77.6 CM/SEC
BH CV XLRA MEAS RIGHT PROX SCLA PSV: 215 CM/SEC
BH CV XLRA MEAS RIGHT VERTEBRAL A EDV: 12.8 CM/SEC
BH CV XLRA MEAS RIGHT VERTEBRAL A PSV: 69.8 CM/SEC
BUN SERPL-MCNC: 13 MG/DL (ref 8–23)
BUN/CREAT SERPL: 61.9 (ref 7–25)
CALCIUM SPEC-SCNC: 8.3 MG/DL (ref 8.6–10.5)
CHLORIDE SERPL-SCNC: 102 MMOL/L (ref 98–107)
CHOLEST SERPL-MCNC: 94 MG/DL (ref 0–200)
CO2 SERPL-SCNC: 29.9 MMOL/L (ref 22–29)
CREAT SERPL-MCNC: 0.21 MG/DL (ref 0.57–1)
DEPRECATED RDW RBC AUTO: 46.7 FL (ref 37–54)
EGFRCR SERPLBLD CKD-EPI 2021: 124.5 ML/MIN/1.73
ERYTHROCYTE [DISTWIDTH] IN BLOOD BY AUTOMATED COUNT: 13.7 % (ref 12.3–15.4)
GLUCOSE BLDC GLUCOMTR-MCNC: 101 MG/DL (ref 70–130)
GLUCOSE BLDC GLUCOMTR-MCNC: 86 MG/DL (ref 70–130)
GLUCOSE BLDC GLUCOMTR-MCNC: 97 MG/DL (ref 70–130)
GLUCOSE SERPL-MCNC: 101 MG/DL (ref 65–99)
H CAPSUL AG SPEC QL: NORMAL
HBA1C MFR BLD: 6.2 % (ref 4.8–5.6)
HCT VFR BLD AUTO: 30.1 % (ref 34–46.6)
HDLC SERPL-MCNC: 32 MG/DL (ref 40–60)
HGB BLD-MCNC: 10 G/DL (ref 12–15.9)
LDLC SERPL CALC-MCNC: 47 MG/DL (ref 0–100)
LDLC/HDLC SERPL: 1.51 {RATIO}
LEFT ARM BP: NORMAL MMHG
LEFT ATRIUM VOLUME INDEX: 15.1 ML/M2
MAGNESIUM SERPL-MCNC: 1.8 MG/DL (ref 1.6–2.4)
MAXIMAL PREDICTED HEART RATE: 150 BPM
MAXIMAL PREDICTED HEART RATE: 150 BPM
MCH RBC QN AUTO: 30.8 PG (ref 26.6–33)
MCHC RBC AUTO-ENTMCNC: 33.2 G/DL (ref 31.5–35.7)
MCV RBC AUTO: 92.6 FL (ref 79–97)
PHOSPHATE SERPL-MCNC: 2.5 MG/DL (ref 2.5–4.5)
PLATELET # BLD AUTO: 333 10*3/MM3 (ref 140–450)
PMV BLD AUTO: 9.9 FL (ref 6–12)
POTASSIUM SERPL-SCNC: 3.8 MMOL/L (ref 3.5–5.2)
RBC # BLD AUTO: 3.25 10*6/MM3 (ref 3.77–5.28)
RIGHT ARM BP: NORMAL MMHG
SINUS: 2.8 CM
SODIUM SERPL-SCNC: 136 MMOL/L (ref 136–145)
STJ: 2.08 CM
STRESS TARGET HR: 128 BPM
STRESS TARGET HR: 128 BPM
TRIGL SERPL-MCNC: 68 MG/DL (ref 0–150)
VLDLC SERPL-MCNC: 15 MG/DL (ref 5–40)
WBC NRBC COR # BLD: 16.6 10*3/MM3 (ref 3.4–10.8)

## 2023-05-15 PROCEDURE — 80069 RENAL FUNCTION PANEL: CPT | Performed by: INTERNAL MEDICINE

## 2023-05-15 PROCEDURE — 94799 UNLISTED PULMONARY SVC/PX: CPT

## 2023-05-15 PROCEDURE — 99233 SBSQ HOSP IP/OBS HIGH 50: CPT | Performed by: INTERNAL MEDICINE

## 2023-05-15 PROCEDURE — 71045 X-RAY EXAM CHEST 1 VIEW: CPT

## 2023-05-15 PROCEDURE — 94760 N-INVAS EAR/PLS OXIMETRY 1: CPT

## 2023-05-15 PROCEDURE — 92610 EVALUATE SWALLOWING FUNCTION: CPT | Performed by: SPEECH-LANGUAGE PATHOLOGIST

## 2023-05-15 PROCEDURE — 25010000002 MEROPENEM PER 100 MG: Performed by: INTERNAL MEDICINE

## 2023-05-15 PROCEDURE — 74018 RADEX ABDOMEN 1 VIEW: CPT

## 2023-05-15 PROCEDURE — 83735 ASSAY OF MAGNESIUM: CPT | Performed by: INTERNAL MEDICINE

## 2023-05-15 PROCEDURE — 93306 TTE W/DOPPLER COMPLETE: CPT | Performed by: INTERNAL MEDICINE

## 2023-05-15 PROCEDURE — 83036 HEMOGLOBIN GLYCOSYLATED A1C: CPT | Performed by: PSYCHIATRY & NEUROLOGY

## 2023-05-15 PROCEDURE — 70450 CT HEAD/BRAIN W/O DYE: CPT

## 2023-05-15 PROCEDURE — 25510000001 PERFLUTREN (DEFINITY) 8.476 MG IN SODIUM CHLORIDE (PF) 0.9 % 10 ML INJECTION: Performed by: PSYCHIATRY & NEUROLOGY

## 2023-05-15 PROCEDURE — 82948 REAGENT STRIP/BLOOD GLUCOSE: CPT

## 2023-05-15 PROCEDURE — 93880 EXTRACRANIAL BILAT STUDY: CPT

## 2023-05-15 PROCEDURE — 94664 DEMO&/EVAL PT USE INHALER: CPT

## 2023-05-15 PROCEDURE — 94761 N-INVAS EAR/PLS OXIMETRY MLT: CPT

## 2023-05-15 PROCEDURE — 99233 SBSQ HOSP IP/OBS HIGH 50: CPT | Performed by: PSYCHIATRY & NEUROLOGY

## 2023-05-15 PROCEDURE — 85027 COMPLETE CBC AUTOMATED: CPT | Performed by: INTERNAL MEDICINE

## 2023-05-15 PROCEDURE — 80061 LIPID PANEL: CPT | Performed by: PSYCHIATRY & NEUROLOGY

## 2023-05-15 PROCEDURE — 93306 TTE W/DOPPLER COMPLETE: CPT

## 2023-05-15 RX ORDER — ASPIRIN 81 MG/1
81 TABLET, CHEWABLE ORAL DAILY
Status: DISCONTINUED | OUTPATIENT
Start: 2023-05-15 | End: 2023-05-19 | Stop reason: HOSPADM

## 2023-05-15 RX ORDER — ASPIRIN 300 MG/1
300 SUPPOSITORY RECTAL DAILY
Status: DISCONTINUED | OUTPATIENT
Start: 2023-05-16 | End: 2023-05-15

## 2023-05-15 RX ORDER — ASPIRIN 300 MG/1
300 SUPPOSITORY RECTAL DAILY
Status: DISCONTINUED | OUTPATIENT
Start: 2023-05-15 | End: 2023-05-19 | Stop reason: HOSPADM

## 2023-05-15 RX ORDER — ASPIRIN 81 MG/1
81 TABLET, CHEWABLE ORAL DAILY
Status: DISCONTINUED | OUTPATIENT
Start: 2023-05-16 | End: 2023-05-15

## 2023-05-15 RX ORDER — CLOPIDOGREL BISULFATE 75 MG/1
75 TABLET ORAL DAILY
Status: DISCONTINUED | OUTPATIENT
Start: 2023-05-16 | End: 2023-05-15

## 2023-05-15 RX ORDER — CLOPIDOGREL BISULFATE 75 MG/1
75 TABLET ORAL DAILY
Status: DISCONTINUED | OUTPATIENT
Start: 2023-05-15 | End: 2023-05-19 | Stop reason: HOSPADM

## 2023-05-15 RX ADMIN — MEROPENEM 1 G: 1 INJECTION, POWDER, FOR SOLUTION INTRAVENOUS at 04:44

## 2023-05-15 RX ADMIN — CLOPIDOGREL BISULFATE 75 MG: 75 TABLET, FILM COATED ORAL at 18:23

## 2023-05-15 RX ADMIN — GUAIFENESIN 600 MG: 600 TABLET, EXTENDED RELEASE ORAL at 21:11

## 2023-05-15 RX ADMIN — PERFLUTREN 3 ML: 6.52 INJECTION, SUSPENSION INTRAVENOUS at 13:02

## 2023-05-15 RX ADMIN — ANORECTAL OINTMENT 1 APPLICATION: 15.7; .44; 24; 20.6 OINTMENT TOPICAL at 18:23

## 2023-05-15 RX ADMIN — ZINC OXIDE 1 APPLICATION: 200 OINTMENT TOPICAL at 21:11

## 2023-05-15 RX ADMIN — IPRATROPIUM BROMIDE AND ALBUTEROL SULFATE 3 ML: 2.5; .5 SOLUTION RESPIRATORY (INHALATION) at 16:17

## 2023-05-15 RX ADMIN — Medication 10 ML: at 09:40

## 2023-05-15 RX ADMIN — ATORVASTATIN CALCIUM 80 MG: 80 TABLET, FILM COATED ORAL at 21:11

## 2023-05-15 RX ADMIN — METOPROLOL TARTRATE 12.5 MG: 25 TABLET, FILM COATED ORAL at 21:11

## 2023-05-15 RX ADMIN — MEROPENEM 1 G: 1 INJECTION, POWDER, FOR SOLUTION INTRAVENOUS at 21:11

## 2023-05-15 RX ADMIN — IPRATROPIUM BROMIDE AND ALBUTEROL SULFATE 3 ML: 2.5; .5 SOLUTION RESPIRATORY (INHALATION) at 11:39

## 2023-05-15 RX ADMIN — Medication 10 ML: at 21:12

## 2023-05-15 RX ADMIN — ANORECTAL OINTMENT 1 APPLICATION: 15.7; .44; 24; 20.6 OINTMENT TOPICAL at 21:11

## 2023-05-15 RX ADMIN — ASPIRIN 81 MG: 81 TABLET, CHEWABLE ORAL at 18:23

## 2023-05-15 RX ADMIN — IPRATROPIUM BROMIDE AND ALBUTEROL SULFATE 3 ML: 2.5; .5 SOLUTION RESPIRATORY (INHALATION) at 19:47

## 2023-05-15 RX ADMIN — MEROPENEM 1 G: 1 INJECTION, POWDER, FOR SOLUTION INTRAVENOUS at 12:33

## 2023-05-15 RX ADMIN — ANORECTAL OINTMENT 1 APPLICATION: 15.7; .44; 24; 20.6 OINTMENT TOPICAL at 14:59

## 2023-05-15 RX ADMIN — DOXYCYCLINE 100 MG: 100 INJECTION, POWDER, LYOPHILIZED, FOR SOLUTION INTRAVENOUS at 18:23

## 2023-05-15 RX ADMIN — ZINC OXIDE 1 APPLICATION: 200 OINTMENT TOPICAL at 14:59

## 2023-05-15 RX ADMIN — DOXYCYCLINE 100 MG: 100 INJECTION, POWDER, LYOPHILIZED, FOR SOLUTION INTRAVENOUS at 05:49

## 2023-05-15 RX ADMIN — IPRATROPIUM BROMIDE AND ALBUTEROL SULFATE 3 ML: 2.5; .5 SOLUTION RESPIRATORY (INHALATION) at 07:24

## 2023-05-15 NOTE — PLAN OF CARE
Goal Outcome Evaluation:                      Admitted to CCU s/p team D/TNK administration.  See flowsheet charting.  Dr. Curry notified after 1930 assessment indicated modified NIH and full NIH scale increase of 2 points each.  BP remains soft.  Additional 500cc fluid bolus ordered and started.  RN to discuss with intensivist if bolus not effective in keeping SBP above 120mmHg.

## 2023-05-15 NOTE — SIGNIFICANT NOTE
05/15/23 1134   OTHER   Discipline occupational therapist   Rehab Time/Intention   Session Not Performed unable to treat, medical status change  (hold OT today, pt recieved TNK yesterday at 1541. Will hold therapy 24 hours post TNK per protocol.)   Recommendation   OT - Next Appointment 05/16/23

## 2023-05-15 NOTE — PROGRESS NOTES
"DOS: 5/15/2023  NAME: Katherine Williamson   : 1950  PCP: Zelalem Flor APRN  Chief Complaint   Patient presents with   • Shortness of Breath       Chief complaint: stroke  Subjective: Patient is new to me today.  This is a 72-year-old female with hypertension who was admitted for pneumonia and hypoxic respiratory failure.  Her respiratory status had actually been improving but yesterday she developed acute right MCA syndrome.  She underwent CTA and CT perfusion and received tenecteplase.  Since that time she has been monitored in the CCU.  Neurologic deficits have resolved.  Currently she feels a little fatigued but otherwise back to her neurologic baseline.    Objective:  Vital signs: /74   Pulse 81   Temp 97.7 °F (36.5 °C) (Oral)   Resp 17   Ht 160 cm (63\")   Wt 65.8 kg (145 lb 1 oz)   SpO2 96%   BMI 25.70 kg/m²    Gen: NAD, vitals reviewed  MS: oriented x3, recent/remote memory intact, normal attention/concentration, language intact, no neglect.  CN: visual acuity grossly normal, PERRL, EOMI, no facial droop, no dysarthria  Motor: 5/5 throughout upper and lower extremities, normal tone  Sensory: intact to light touch all 4 ext.    Laboratory results:  Lab Results   Component Value Date    GLUCOSE 101 (H) 05/15/2023    CALCIUM 8.3 (L) 05/15/2023     05/15/2023    K 3.8 05/15/2023    CO2 29.9 (H) 05/15/2023     05/15/2023    BUN 13 05/15/2023    CREATININE 0.21 (L) 05/15/2023    EGFRIFAFRI 93 2018    EGFRIFNONA 76 2018    BCR 61.9 (H) 05/15/2023    ANIONGAP 4.1 (L) 05/15/2023     Lab Results   Component Value Date    WBC 16.60 (H) 05/15/2023    HGB 10.0 (L) 05/15/2023    HCT 30.1 (L) 05/15/2023    MCV 92.6 05/15/2023     05/15/2023     Lab Results   Component Value Date    LDL 47 05/15/2023     (H) 2018     (H) 2017         Lab 05/15/23  0309   HEMOGLOBIN A1C 6.20*        Review of labs: WBC 16, sodium 136, LDL 47    Review and interpretation " of imaging: I personally reviewed her CT head, CT perfusion and CTA performed yesterday.  CT perfusion showed an area of tissue at risk in the right MCA inferior division.  CTA showed a fairly distal right inferior M2 occlusion versus right M3 occlusion.  There is some artifact limiting the proximal carotids bilaterally but there is some suggestion of stenosis.  Radiology report reviewed.    Diagnoses:  Stroke, right middle cerebral artery, embolic  Received tenecteplase in the inpatient setting  Chronic obstructive pulmonary disease  Tobacco use  Pneumonia  Lung nodule    Comment: Embolic right MCA stroke.  Differential diagnosis includes cardiac source versus artery to artery embolus from the right carotid.  Her CTA is somewhat limited by artifact and I will check a carotid duplex to reevaluate the right carotid.    Plan:  1.  Start aspirin, Plavix depending on 24-hour MRI result  2.  MRI brain prior to tenecteplase time for possible, otherwise check 24-hour CT  3.  Likely could be downgraded to the floor this afternoon after post tenecteplase imaging  4.  Bilateral carotid duplex  5.  2D echo    Management discussed with Dr. Ford, nursing staff

## 2023-05-15 NOTE — PROGRESS NOTES
ID NOTE    CC: f/u pneumonia    Subj: Yesterday afternoon she developed a R MCA stroke. She has improved w/ TNK. She is now in the CCU. She had a fever yesterday but it is now resolved. O2 needs temporarily increased but now back to 4L NC.       Medications:    Current Facility-Administered Medications:   •  acetaminophen (TYLENOL) tablet 650 mg, 650 mg, Oral, Q4H PRN, 650 mg at 05/12/23 1054 **OR** acetaminophen (TYLENOL) 160 MG/5ML solution 650 mg, 650 mg, Oral, Q4H PRN **OR** acetaminophen (TYLENOL) suppository 650 mg, 650 mg, Rectal, Q4H PRN, Gee Clement MD, 650 mg at 05/14/23 1759  •  aspirin chewable tablet 81 mg, 81 mg, Oral, Daily **OR** aspirin suppository 300 mg, 300 mg, Rectal, Daily, Indigo Curry MD  •  atorvastatin (LIPITOR) tablet 80 mg, 80 mg, Oral, Nightly, Indigo Curry MD  •  bismuth subsalicylate (PEPTO BISMOL) 262 MG/15ML suspension 30 mL, 30 mL, Oral, TID PRN, Issac Mendez MD, 30 mL at 05/14/23 0839  •  citalopram (CeleXA) tablet 20 mg, 20 mg, Oral, Daily, Gee Clement MD, 20 mg at 05/14/23 0839  •  clopidogrel (PLAVIX) tablet 75 mg, 75 mg, Oral, Daily, Indigo Curry MD  •  doxycycline (VIBRAMYCIN) 100 mg in sodium chloride 0.9 % 100 mL IVPB-VTB, 100 mg, Intravenous, Q12H, Darius Ford MD, 100 mg at 05/15/23 0549  •  fluticasone (FLONASE) 50 MCG/ACT nasal spray 1 spray, 1 spray, Each Nare, Daily, Gee Clement MD, 1 spray at 05/08/23 0808  •  guaiFENesin (MUCINEX) 12 hr tablet 600 mg, 600 mg, Oral, BID, Gee Clement MD, 600 mg at 05/14/23 0839  •  Hold medication, 1 each, Does not apply, Continuous PRN, Jennifer Hood MD  •  ipratropium-albuterol (DUO-NEB) nebulizer solution 3 mL, 3 mL, Nebulization, 4x Daily - RT, Gee Clement MD, 3 mL at 05/15/23 0724  •  labetalol (NORMODYNE,TRANDATE) injection 10 mg, 10 mg, Intravenous, Q10 Min PRN, Indigo Curry MD  •  Magnesium Sulfate - Total Dose 10 grams - Magnesium 1 or Less, 2 g,  Intravenous, PRN **OR** Magnesium Sulfate - Total Dose 6 grams - Magnesium 1.1 - 1.5, 2 g, Intravenous, PRN **OR** Magnesium Sulfate - Total Dose 4 grams - Magnesium 1.6 - 1.9, 4 g, Intravenous, PRN, Gee Cleemnt MD  •  meropenem (MERREM) 1 g in sodium chloride 0.9 % 100 mL IVPB-VTB, 1 g, Intravenous, Q8H, Rigoberto Chan MD, Last Rate: 33.3 mL/hr at 05/12/23 0449, 1 g at 05/15/23 0444  •  metoprolol tartrate (LOPRESSOR) tablet 12.5 mg, 12.5 mg, Oral, Q12H, Issac Mendez MD, 12.5 mg at 05/14/23 0839  •  niCARdipine (CARDENE) 25 mg in 250 mL NS infusion kit, 5-15 mg/hr, Intravenous, Titrated, Indigo Curry MD  •  nitroglycerin (NITROSTAT) SL tablet 0.4 mg, 0.4 mg, Sublingual, Q5 Min PRN, Gee Clement MD  •  ondansetron (ZOFRAN) injection 4 mg, 4 mg, Intravenous, Q6H PRN, Gee Clement MD, 4 mg at 05/11/23 1855  •  phenylephrine (JOSELINE-SYNEPHRINE) 50 mg in 250 mL NS infusion, 0.5-3 mcg/kg/min, Intravenous, Titrated, Ernestine Cain, APRN, Last Rate: 9.87 mL/hr at 05/14/23 2347, 0.5 mcg/kg/min at 05/14/23 2347  •  potassium & sodium phosphates (PHOS-NAK) 280-160-250 MG packet - for Phosphorus less than 1.25 mg/dL, 2 packet, Oral, Q6H PRN **OR** potassium & sodium phosphates (PHOS-NAK) 280-160-250 MG packet - for Phosphorus 1.25 - 2.5 mg/dL, 2 packet, Oral, Q6H PRN, Gee Clement MD  •  potassium chloride (K-DUR,KLOR-CON) ER tablet 40 mEq, 40 mEq, Oral, PRN, 40 mEq at 05/07/23 0654 **OR** potassium chloride (KLOR-CON) packet 40 mEq, 40 mEq, Oral, PRN **OR** potassium chloride 10 mEq in 100 mL IVPB, 10 mEq, Intravenous, Q1H PRN, Gee Clement MD  •  [COMPLETED] Insert Peripheral IV, , , Once **AND** sodium chloride 0.9 % flush 10 mL, 10 mL, Intravenous, PRN, Gee Clement MD  •  sodium chloride 0.9 % flush 10 mL, 10 mL, Intravenous, Q12H, Gee Clement MD, 10 mL at 05/15/23 0940  •  sodium chloride 0.9 % flush 10 mL, 10 mL, Intravenous, PRN, Gee Clement MD  •   sodium chloride 0.9 % flush 10 mL, 10 mL, Intravenous, Q12H, Indigo Curry MD, 10 mL at 05/15/23 0940  •  sodium chloride 0.9 % flush 10 mL, 10 mL, Intravenous, PRN, Indigo Curry MD  •  sodium chloride 0.9 % infusion 40 mL, 40 mL, Intravenous, PRN, Gee Clement MD  •  sodium chloride 0.9 % infusion 40 mL, 40 mL, Intravenous, PRN, Indigo Curry MD  •  vitamin B-12 (CYANOCOBALAMIN) tablet 1,000 mcg, 1,000 mcg, Oral, Daily, Gee Clement MD, 1,000 mcg at 05/14/23 0840      Objective   Vital Signs   Temp:  [97.7 °F (36.5 °C)-101.6 °F (38.7 °C)] 97.7 °F (36.5 °C)  Heart Rate:  [] 81  Resp:  [14-20] 17  BP: ()/(51-78) 150/74    Physical Exam:   General: awake though appears fatigued, very nice  Eyes: no scleral icterus  ENT: no thrush; NC in nares  Cardiovascular: NR  Respiratory: clearer today; no wheezing; normal WOB on 4L NC  GI: Abdomen is soft, not tender  Skin: No rashes  Psychiatric: Normal mood and affect     Labs:   CBC, BMP, and thoracentesis and sputum cultures reviewed today  Lab Results   Component Value Date    WBC 16.60 (H) 05/15/2023    HGB 10.0 (L) 05/15/2023    HCT 30.1 (L) 05/15/2023    MCV 92.6 05/15/2023     05/15/2023     Lab Results   Component Value Date    GLUCOSE 101 (H) 05/15/2023    CALCIUM 8.3 (L) 05/15/2023     05/15/2023    K 3.8 05/15/2023    CO2 29.9 (H) 05/15/2023     05/15/2023    BUN 13 05/15/2023    CREATININE 0.21 (L) 05/15/2023    EGFR 124.5 05/15/2023    BCR 61.9 (H) 05/15/2023    ANIONGAP 4.1 (L) 05/15/2023     Procal 3.95--->2.6-->0.2  Crypto Ag negative  Urine Histo Ag negative  Serum Histo Ag pending  Urine Blasto Ag negative  Aspergillus Ag pending  ANCA negative  Histoplasma Ab negative    Microbiology:  5/2 RPP: negative  5/2 BCx: Corynebacterium in 1/2 sets  5/2 SpCx: normal aishwarya  5/2 MRSA nares: negative  5/2 Strep pneumo Ag; negative  5/2 Legionella Ag: negative  5/4 BCx: negative  5/5 C diff: negative  5/6 SpCx:  normal aishwarya  5/9 AFB Cx: NGTD  5/9 Fungus Cx: NGTD  5/10 SpCx: rare normal aishwarya  5/10 L Thoracentesis Cx: negative  5/14 BCx: pending    New radiology:  CXR personally reviewed and shows persistent B infiltrates    CT head w/ no acute process    MRI brain: planned    ASSESSMENT/PLAN:  1. Pneumonia and right lung abscess  2. Pleural effusions  3. Acute hypoxic respiratory failure  4. Emphysema  5. Tobacco use  6. Acute R MCA stroke - new    Thanks to neurologist and ICU team for their care of Ms. Williamson in regards to her stroke.     With fever yesterday, I did repeat a seat of blood cultures which is negative to date.     From a pneumonia standpoint,  I still recommend that we treat with antibiotics for a 4-week course with stop date 6/8/23. The regimen will be meropenem 1 g IV q8h and doxycycline 100 mg PO BID. I ordered a repeat CT to be done just prior to the stop date. She will need follow-up w/ pulmonologist as well.     Hold off on PICC for. I will re-order closer to time of discharge.     ID will follow. D/W Dr Ford.

## 2023-05-15 NOTE — PLAN OF CARE
Goal Outcome Evaluation:            NIH 13 (waxes and wanes)see charting, Pressor started to maintain patients SBP greater than or equal to 120 see MAR (neuro exam sensitive to pressures), see AM labs/XR,

## 2023-05-15 NOTE — SIGNIFICANT NOTE
05/15/23 1143   OTHER   Discipline physical therapist   Rehab Time/Intention   Session Not Performed other (see comments);unable to treat, medical status change  (hold PT today, pt recieved TNK yesterday at 1541. Will hold therapy 24 hours post TNK per protocol.)   Recommendation   PT - Next Appointment 05/16/23

## 2023-05-15 NOTE — NURSING NOTE
05/15/23 1101   Wound 05/14/23 1556 Right gluteal Pressure Injury   Placement Date/Time: 05/14/23 1556   Present on Hospital Admission: No  Side: Right  Location: gluteal  Primary Wound Type: Pressure Injury   Base non-blanchable;maroon/purple;pink   Periwound dry;blanchable   Periwound Temperature warm   Wound Length (cm) 1.6 cm   Wound Width (cm) 0.8 cm   Wound Surface Area (cm^2) 1.28 cm^2   Drainage Amount none   Care, Wound barrier applied   Dressing Care open to air   Skin Interventions   Pressure Reduction Devices specialty bed utilized;positioning supports utilized   Pressure Reduction Techniques heels elevated off bed;positioned off wounds;pressure points protected     CWON note: pt seen for evaluation of sacral and perineal skin issues. Pt is currently in the ICU on a Progressa bed for adequate pressure redistribution. She is alert, incontinent of bowel and bladder, currently soiled with a large BM, requiring a full bed change. Robin buttocks with blanchable erythema, central area near coccyx has nonblanchable maroon discoloration. She has a confluent maculopapular rash to the perianal and perineal skin secondary to IAD with yeast component. Will begin Magic Barrier cream to alternate with Calmoseptine ointment, do not use Optifoam dressings at this time due to loose BM's and keep skin open to air, no briefs on pt. Heels are negative. Wound care and prevention standing orders placed into Epic. Will plan to follow up later this week to reassess her skin.

## 2023-05-15 NOTE — CONSULTS
Iv team consulted per Dr Chan for a single lumen picc for post discharge IV ABX , Pt has been moved to CCU due to Acute Rt MCA yesterday. Discontinuing the picc for discharge at this time due to her critical status, please re order after pt improves and discharge has been determined.

## 2023-05-15 NOTE — PLAN OF CARE
Goal Outcome Evaluation:  Plan of Care Reviewed With: patient           Outcome Evaluation: Clinical swallow evaluation completed recommend NPO and VFSS 5/16. Meds via alternate route and okay for ice chips and spoonful sips of water.

## 2023-05-15 NOTE — PAYOR COMM NOTE
"Clay Katherine CAST (72 y.o. Female)                                     ATTENTION; CONTINUED CLINICALS CASE REF #   [id number] STARTING 5/12/23                                  REPLY TO [facility] DEPT  [phone] OR CALL   JOHANNA GOODEN LPN           Date of Birth   1950    Social Security Number       Address   [address] [address]    Home Phone   [phone]    MRN   [id number]       Synagogue   Orthodox    Marital Status                               Admission Date   5/2/23    Admission Type   Emergency    Admitting Provider   Olga Palm MD    Attending Provider   Bharat Calabrese MD    Department, Room/Bed   [facility], N327/1       Discharge Date       Discharge Disposition       Discharge Destination                               Attending Provider: Bharat Calabrese MD    Allergies: Hydrocodone-acetaminophen    Isolation: None   Infection: None   Code Status: CPR    Ht: 160 cm (62.99\")   Wt: 66 kg (145 lb 8.1 oz)    Admission Cmt: None   Principal Problem: Acute respiratory failure with hypoxia [J96.01]                 Active Insurance as of 5/2/2023     Primary Coverage     Payor Plan Insurance Group Employer/Plan Group    ANTHEM BLUE CROSS ANTHEM BLUE CROSS BLUE SHIELD PPO [id number]     Payor Plan Address Payor Plan Phone Number Payor Plan Fax Number Effective Dates    [address] [phone]  3/24/2013 - None Entered    [facility] [id number]       Subscriber Name Subscriber Birth Date Member ID       SAMANTHA CERDA III 3/3/1958 [id number]           Secondary Coverage     Payor Plan Insurance Group Employer/Plan Group    MEDICARE MEDICARE A ONLY      Payor Plan Address Payor Plan Phone Number Payor Plan Fax Number Effective Dates    [address] [phone]  12/1/2017 - None Entered    [facility] [id number]       Subscriber Name Subscriber Birth Date Member ID       KATHERINE CERDA 1950 [id number]                 Emergency Contacts     "  (Rel.) Home Phone Work Phone Mobile Phone    Ronnie Williamson III (POA) (Spouse) [phone] -- --    KERRI lauren (Daughter) [phone] -- --    JanakOdalys (Other) -- -- [phone]            Vital Signs     Date/Time Temp Temp src Pulse Resp BP Patient Position SpO2    05/15/23 1301 -- -- 91 -- 123/64 -- --    05/15/23 1139 -- -- -- 18 -- -- --    05/15/23 0732 97.7 (36.5) Oral -- -- -- -- --    05/15/23 0724 -- -- 81 17 -- -- 96    05/15/23 0700 -- -- 89 16 150/74 -- 96    05/15/23 0600 -- -- 75 16 143/71 -- 92    05/15/23 0545 -- -- 78 -- 138/70 -- 94    05/15/23 0530 -- -- 69 -- 145/72 -- 96    05/15/23 0515 -- -- 78 -- 134/67 -- 96    05/15/23 0500 -- -- 76 14 129/67 -- 96    05/15/23 0445 -- -- 76 -- 134/71 -- 96    05/15/23 0430 -- -- 85 -- 117/62 -- 98    05/15/23 0415 -- -- 66 -- 126/61 -- 97    05/15/23 0400 -- -- 74 -- 131/70 -- 96    05/15/23 0345 -- -- 76 -- 141/69 -- 96    05/15/23 0330 -- -- 80 -- 127/77 -- 95    05/15/23 0320 98.1 (36.7) Oral -- -- -- -- --    05/15/23 0315 -- -- 80 -- 132/74 -- 94    05/15/23 0300 -- -- 79 16 131/67 -- 94    05/15/23 0245 -- -- 85 -- 123/69 -- 92    05/15/23 0230 -- -- 80 -- 122/67 -- 96    05/15/23 0215 -- -- 82 -- 124/65 -- 95    05/15/23 0200 -- -- 83 14 118/64 -- 96    05/15/23 0145 -- -- 84 -- 121/65 -- 97    05/15/23 0130 -- -- 83 -- 143/76 -- 98    05/15/23 0115 -- -- 75 -- 126/70 -- 99    05/15/23 0100 -- -- 74 16 123/63 -- 100    05/15/23 0045 -- -- 76 -- 121/66 -- 98    05/15/23 0030 -- -- 79 -- 119/65 -- 98    05/15/23 0015 -- -- 76 -- 130/73 -- 97    05/15/23 0000 -- -- 76 14 118/58 -- 98    05/14/23 2345 -- -- 81 -- 103/54 -- 97    05/14/23 2330 -- -- 75 14 141/71 -- 100    05/14/23 2315 -- -- 78 -- 133/74 -- 98    05/14/23 2311 98.3 (36.8) Oral -- -- -- -- --    05/14/23 2300 -- -- 76 14 133/67 -- 100    05/14/23 2245 -- -- 85 -- 113/58 -- 99    05/14/23 2230 -- -- 84 14 99/57 -- 98    05/14/23 2200 -- -- 87 16 108/61 -- 98    05/14/23  2130 -- -- 90 16 112/59 -- 96    05/14/23 2100 -- -- 92 16 106/64 -- 94    05/14/23 2030 -- -- 103 16 122/68 -- 95    05/14/23 2025 -- -- 101 -- -- -- 93    05/14/23 2020 -- -- 98 -- -- -- 93    05/14/23 2015 -- -- 101 -- 117/59 -- 93    05/14/23 2010 -- -- 101 -- -- -- 94    05/14/23 2005 -- -- 104 -- -- -- 94    05/14/23 2000 99.2 (37.3) Axillary 103 18 116/67 -- 96    05/14/23 1955 -- -- 102 -- 114/68 -- 96    05/14/23 1950 -- -- 101 -- 105/61 -- 94    05/14/23 1945 -- -- 101 -- 108/55 -- 94    05/14/23 1940 -- -- 109 -- 115/51 -- 97    05/14/23 1935 -- -- 110 -- 119/67 -- 96    05/14/23 1930 -- -- 101 16 115/51 -- 94    05/14/23 1925 -- -- 113 -- 119/68 -- 98    05/14/23 1920 -- -- 115 -- 111/66 -- 99    05/14/23 1915 -- -- 115 -- 117/67 -- 99    05/14/23 1910 -- -- 116 -- 120/65 -- 99    05/14/23 1905 -- -- 116 -- 108/66 -- 98    05/14/23 1900 -- -- 117 -- 124/69 -- 95    05/14/23 1855 -- -- 121 -- 112/67 -- 96    05/14/23 1850 -- -- 122 -- 117/67 -- 95    05/14/23 1845 -- -- 123 -- 101/72 -- 96    05/14/23 1840 -- -- 122 -- 119/63 -- 98    05/14/23 1835 -- -- 124 -- 118/62 -- 97    05/14/23 1830 -- -- 122 -- 121/65 -- 96    05/14/23 1825 -- -- 126 -- 111/75 -- 95    05/14/23 1820 -- -- 131 -- -- -- 94    05/14/23 1815 -- -- 126 -- 131/71 -- 92    05/14/23 1810 -- -- 123 -- -- -- 92    05/14/23 1805 -- -- 124 -- -- -- 94    05/14/23 1800 -- -- 126 -- 119/66 -- 92    05/14/23 1755 -- -- 123 -- -- -- 93    05/14/23 1750 -- -- 120 -- -- -- 91    05/14/23 1745 -- -- 121 -- 128/66 -- 92    05/14/23 1740 -- -- 119 -- 135/69 -- 92    05/14/23 1730 -- -- 120 18 121/69 Lying 90    05/14/23 1720 -- -- 125 -- -- -- 94    05/14/23 1715 -- -- 126 -- 121/76 -- 93    05/14/23 1710 -- -- 128 -- -- -- 94    05/14/23 1705 -- -- 128 -- -- -- 93    05/14/23 1700 -- -- 130 -- 109/67 -- 93    05/14/23 1655 -- -- 130 -- -- -- 94    05/14/23 1650 -- -- 123 -- -- -- 93    05/14/23 1645 -- -- 126 -- 146/71 -- 92    05/14/23 1640 -- --  118 -- -- -- 89    05/14/23 1635 -- -- 123 -- -- -- --    05/14/23 1630 -- -- 113 -- -- -- --    05/14/23 1625 -- -- 133 -- 129/78 -- 97    05/14/23 1624 101.6 (38.7) Oral -- -- -- -- --    05/14/23 1620 -- -- 141 -- -- -- 91    05/14/23 1615 -- -- 117 -- -- -- 91    05/14/23 1610 -- -- 120 -- 149/77 -- --    05/14/23 1605 -- -- 113 -- 136/78 -- 99    05/14/23 1554 100.7 (38.2) -- 112 16 154/67 -- 94    05/14/23 1542 -- -- 114 17 155/77 -- 95    05/14/23 1534 -- -- 110 15 145/59 -- 96    05/14/23 1510 -- -- 99 -- -- -- 95    05/14/23 1505 -- -- 102 -- -- -- 94    05/14/23 1500 -- -- 100 -- -- -- 92    05/14/23 1458 -- -- -- 18 -- -- --    05/14/23 1456 -- -- 103 -- 124/67 -- 94    05/14/23 1107 -- -- 86 20 -- -- 95    05/14/23 1102 -- -- 81 20 124/67 Lying 91    05/14/23 0839 -- -- 94 -- -- -- --    05/14/23 0809 -- -- 96 20 -- -- 91    05/14/23 0800 -- -- -- 20 -- -- --    05/14/23 0754 -- -- 89 20 -- -- --    05/14/23 0739 97.8 (36.6) Oral 77 20 129/68 Lying 92    05/13/23 2300 97.8 (36.6) Oral 88 20 121/72 Lying 93    05/13/23 2034 -- -- 98 20 -- -- 93    05/13/23 1900 98.3 (36.8) Oral 97 20 128/68 Lying 94    05/13/23 1531 -- -- 90 18 -- -- 97    05/13/23 1525 -- -- 89 18 -- -- 92    05/13/23 1310 99 (37.2) Oral 86 18 132/84 Lying 94    05/13/23 0908 -- -- 103 -- 124/66 -- --    05/13/23 0806 98.3 (36.8) Oral 105 18 124/66 Lying 92    05/13/23 0721 -- -- 89 18 -- -- 97    05/13/23 0714 -- -- 80 18 -- -- 95    05/12/23 2315 98.1 (36.7) Oral 98 18 143/73 Lying 95    05/12/23 2003 98.2 (36.8) Oral 111 18 123/65 Lying 91    05/12/23 1511 -- -- 94 18 -- -- --    05/12/23 1500 -- -- 91 18 -- -- 97    05/12/23 1320 98.1 (36.7) Oral 98 18 129/73 Lying 94    05/12/23 1242 -- -- 90 -- -- -- 95    05/12/23 1122 -- -- 89 18 -- -- 94    05/12/23 1116 -- -- 87 18 -- -- --    05/12/23 1111 -- -- 85 18 -- -- 95    05/12/23 1033 -- -- 91 -- -- -- 93    05/12/23 1012 -- -- 90 -- 117/62 -- --    05/12/23 0959 -- -- 109 -- 124/57  -- --    05/12/23 0955 -- -- 102 -- 92/53 -- --    05/12/23 0738 -- -- 92 18 139/74 Lying 92    05/12/23 0736 -- -- 89 18 -- -- --    05/12/23 0732 -- -- 90 18 -- -- 93          Oxygen Therapy (last 3 days)     Date/Time SpO2 Device (Oxygen Therapy) Flow (L/min) Oxygen Concentration (%) ETCO2 (mmHg)    05/15/23 1139 -- nasal cannula 4 -- --    05/15/23 0724 96 nasal cannula 4 -- --    05/15/23 0700 96 nasal cannula 4 -- --    05/15/23 0600 92 nasal cannula 4 -- --    05/15/23 0545 94 -- -- -- --    05/15/23 0530 96 -- -- -- --    05/15/23 0515 96 -- -- -- --    05/15/23 0500 96 nasal cannula 4 -- --    05/15/23 0445 96 -- -- -- --    05/15/23 0430 98 -- -- -- --    05/15/23 0415 97 -- -- -- --    05/15/23 0400 96 nasal cannula 4 -- --    05/15/23 0345 96 -- -- -- --    05/15/23 0330 95 -- -- -- --    05/15/23 0315 94 -- -- -- --    05/15/23 0300 94 nasal cannula 4 -- --    05/15/23 0245 92 -- -- -- --    05/15/23 0230 96 -- -- -- --    05/15/23 0215 95 -- -- -- --    05/15/23 0200 96 nasal cannula 2 -- --    05/15/23 0145 97 -- -- -- --    05/15/23 0130 98 -- -- -- --    05/15/23 0115 99 -- -- -- --    05/15/23 0100 100 nasal cannula 4 -- --    05/15/23 0045 98 -- -- -- --    05/15/23 0030 98 -- -- -- --    05/15/23 0015 97 -- -- -- --    05/15/23 0000 98 nasal cannula 4 -- --    05/14/23 2345 97 -- -- -- --    05/14/23 2330 100 nasal cannula 4 -- --    05/14/23 2315 98 -- -- -- --    05/14/23 2300 100 nasal cannula 4 -- --    05/14/23 2245 99 -- -- -- --    05/14/23 2230 98 nasal cannula 4 -- --    05/14/23 2200 98 nasal cannula 4 -- --    05/14/23 2130 96 nasal cannula 4 -- --    05/14/23 2100 94 nasal cannula 4 -- --    05/14/23 2030 95 nasal cannula 4 -- --    05/14/23 2025 93 -- -- -- --    05/14/23 2020 93 -- -- -- --    05/14/23 2015 93 -- -- -- --    05/14/23 2010 94 -- -- -- --    05/14/23 2005 94 -- -- -- --    05/14/23 2000 96 humidified;high-flow nasal cannula 6 -- --    05/14/23 1955 96 -- -- -- --     05/14/23 1950 94 -- -- -- --    05/14/23 1945 94 -- -- -- --    05/14/23 1940 97 -- -- -- --    05/14/23 1935 96 -- -- -- --    05/14/23 1930 94 humidified;high-flow nasal cannula 6 -- --    05/14/23 1925 98 -- -- -- --    05/14/23 1920 99 -- -- -- --    05/14/23 1915 99 -- -- -- --    05/14/23 1910 99 -- -- -- --    05/14/23 1905 98 -- -- -- --    05/14/23 1900 95 -- -- -- --    05/14/23 1855 96 -- -- -- --    05/14/23 1850 95 -- -- -- --    05/14/23 1845 96 -- -- -- --    05/14/23 1840 98 -- -- -- --    05/14/23 1835 97 -- -- -- --    05/14/23 1830 96 -- -- -- --    05/14/23 1825 95 -- -- -- --    05/14/23 1820 94 -- -- -- --    05/14/23 1815 92 -- -- -- --    05/14/23 1810 92 -- -- -- --    05/14/23 1805 94 -- -- -- --    05/14/23 1800 92 -- -- -- --    05/14/23 1755 93 -- -- -- --    05/14/23 1750 91 -- -- -- --    05/14/23 1745 92 -- -- -- --    05/14/23 1740 92 -- -- -- --    05/14/23 1730 90 humidified;high-flow nasal cannula 6 -- --    05/14/23 1720 94 -- -- -- --    05/14/23 1715 93 -- -- -- --    05/14/23 1710 94 -- -- -- --    05/14/23 1705 93 -- -- -- --    05/14/23 1700 93 -- -- -- --    05/14/23 1655 94 -- -- -- --    05/14/23 1650 93 -- -- -- --    05/14/23 1645 92 -- -- -- --    05/14/23 1640 89 humidified;high-flow nasal cannula 7 -- --    05/14/23 1625 97 -- -- -- --    05/14/23 1620 91 -- -- -- --    05/14/23 1615 91 -- -- -- --    05/14/23 1605 99 -- -- -- --    05/14/23 1554 94 nasal cannula 4 -- --    05/14/23 1542 95 -- -- -- --    05/14/23 1534 96 -- -- -- --    05/14/23 1510 95 -- -- -- --    05/14/23 1505 94 -- -- -- --    05/14/23 1500 92 -- -- -- --    05/14/23 1456 94 -- -- -- --    05/14/23 1230 -- humidified;nasal cannula 4.5 -- --    05/14/23 1107 95 -- -- -- --    05/14/23 1102 91 humidified;nasal cannula 4.5 -- --    05/14/23 0809 91 nasal cannula;humidified 4.5 -- --    05/14/23 0800 -- humidified;nasal cannula 4.5 -- --    05/14/23 0754 -- high-flow nasal cannula;humidified 4 --  --    05/14/23 0739 92 nasal cannula 4 -- --    05/14/23 0030 -- nasal cannula 4 -- --    05/13/23 2300 93 nasal cannula 4 -- --    05/13/23 2034 93 nasal cannula 4 -- --    05/13/23 2021 -- nasal cannula 4 -- --    05/13/23 1900 94 nasal cannula 4 -- --    05/13/23 1531 97 -- -- -- --    05/13/23 1525 92 high-flow nasal cannula 4 -- --    05/13/23 1310 94 high-flow nasal cannula;humidified 4 -- --    05/13/23 1230 -- high-flow nasal cannula 4 -- --    05/13/23 0806 92 humidified;high-flow nasal cannula 4 -- --    05/13/23 0800 -- high-flow nasal cannula 4 -- --    05/13/23 0721 97 -- -- -- --    05/13/23 0714 95 humidified;high-flow nasal cannula 4 -- --    05/13/23 0055 -- high-flow nasal cannula;humidified 4 -- --    05/12/23 2315 95 humidified;high-flow nasal cannula 4 -- --    05/12/23 2055 -- high-flow nasal cannula;humidified 4 -- --    05/12/23 2003 91 humidified;high-flow nasal cannula 4 -- --    05/12/23 1511 -- humidified;high-flow nasal cannula 4 -- --    05/12/23 1500 97 humidified;high-flow nasal cannula 5 -- --    05/12/23 1320 94 humidified;high-flow nasal cannula -- -- --    05/12/23 1242 95 high-flow nasal cannula 7 -- --    05/12/23 1122 94 high-flow nasal cannula;humidified 12 -- --    05/12/23 1111 95 humidified;high-flow nasal cannula 15 -- --    05/12/23 1033 93 high-flow nasal cannula 15 -- --    05/12/23 0738 92  system;humidified;high-flow nasal cannula -- -- --    05/12/23 0732 93 high-flow nasal cannula;humidified 8 -- --        {  Orders (last 72 hrs)      Start     Ordered    05/15/23 1630  aspirin chewable tablet 81 mg  Daily        See Hyperspace for full Linked Orders Report.    05/14/23 1633    05/15/23 1630  aspirin suppository 300 mg  Daily        See Hyperspace for full Linked Orders Report.    05/14/23 1633    05/15/23 1630  clopidogrel (PLAVIX) tablet 75 mg  Daily         05/14/23 1633    05/15/23 1600  MRI Brain Without Contrast  1 Time Imaging         05/14/23 1634     05/15/23 1500  CT Head Without Contrast  1 Time Imaging         05/15/23 0946    05/15/23 1400  perflutren (DEFINITY) 8.476 mg in Sodium Chloride (PF) 0.9 % 10 mL injection  Once in Imaging         05/15/23 1302    05/15/23 1300  Apply Moisture Barrier After Any Incontinence  4 Times Daily      Comments: Apply Magic Barrier Cream to perineal/ perianal skin BID, alternate with Calmoseptine ointment. Leave briefs off the pt so skin can get air.    05/15/23 1120    05/15/23 1235  POC Glucose Once  PROCEDURE ONCE         05/15/23 1233    05/15/23 1215  hydrocortisone-bacitracin-zinc oxide-nystatin (MAGIC BARRIER) ointment 1 application  2 Times Daily         05/15/23 1120    05/15/23 1215  Menthol-Zinc Oxide 1 application  4 Times Daily         05/15/23 1120    05/15/23 1121  Please do not use briefs on the pt unless she is getting up to the chair.  Nursing Communication  Once        Comments: Please do not use briefs on the pt unless she is getting up to the chair.    05/15/23 1120    05/15/23 1120  Follow Pressure Ulcer Prevention Measures Policy  Continuous        Comments: Implement Appropriate Pressure Ulcer Prevention Measures  - Open Order Report to View Full Instructions  Enter Wound LDA & Document Assessment  Add Wound Care Plan  Add Patient Education Per Policy    05/15/23 1120    05/15/23 1120  Turn Patient  Now Then Every 2 Hours         05/15/23 1120    05/15/23 1120  Head of Bed 30 Degrees or Less (Unless Contraindicated)  Until Discontinued         05/15/23 1120    05/15/23 1120  Elevate Heels Off of Bed  Until Discontinued         05/15/23 1120    05/15/23 1120  Use Seat Cushion When Up In Chair  Continuous         05/15/23 1120    05/15/23 1120  Apply Heel Protector Boot Until Discontinued  Until Discontinued         05/15/23 1120    05/15/23 1120  Use Repositioning Wedge to Position Patient  Continuous         05/15/23 1120    05/15/23 1116  Adult Transthoracic Echo Complete W/ Cont if Necessary Per  Protocol  Once         05/15/23 1115    05/15/23 1113  Duplex Carotid Ultrasound CAR  Once         05/15/23 1112    05/15/23 0900  XR Chest 1 View  1 Time Imaging         05/14/23 1717    05/15/23 0600  Up In Chair  Daily,   Status:  Canceled       05/14/23 0618    05/15/23 0600  Up In Chair  Daily       05/14/23 0929    05/15/23 0600  Hemoglobin A1c  Morning Draw         05/14/23 1633    05/15/23 0600  Lipid Panel  Morning Draw         05/14/23 1633    05/15/23 0600  Renal Function Panel  Daily       05/14/23 1717    05/15/23 0600  CBC (No Diff)  Daily       05/14/23 1717    05/15/23 0600  Magnesium  Morning Draw         05/14/23 1717    05/15/23 0550  POC Glucose Once  PROCEDURE ONCE         05/15/23 0547    05/14/23 2330  phenylephrine (JOSELINE-SYNEPHRINE) 50 mg in 250 mL NS infusion  Titrated         05/14/23 2234    05/14/23 2314  POC Glucose Once  PROCEDURE ONCE         05/14/23 2311    05/14/23 2100  sodium chloride 0.9 % flush 10 mL  Every 12 Hours Scheduled         05/14/23 1633    05/14/23 2100  atorvastatin (LIPITOR) tablet 80 mg  Nightly         05/14/23 1633    05/14/23 2045  sodium chloride 0.9 % bolus 500 mL  Once         05/14/23 1954    05/14/23 2016  Wound Ostomy Eval & Treat  Once         05/14/23 2015 05/14/23 2000  Intake and Output  Every 4 Hours       05/14/23 1633    05/14/23 2000  XR Chest 1 View  1 Time Imaging         05/14/23 1717    05/14/23 1945  sodium chloride 0.9 % bolus 500 mL  Once         05/14/23 1852    05/14/23 1852  Notify Provider (Specify)  Until Discontinued         05/14/23 1852    05/14/23 1833  POC Glucose Once  PROCEDURE ONCE         05/14/23 1831    05/14/23 1831  POC Glucose Once  PROCEDURE ONCE         05/14/23 1829    05/14/23 1815  doxycycline (VIBRAMYCIN) 100 mg in sodium chloride 0.9 % 100 mL IVPB-VTB  Every 12 Hours         05/14/23 1717    05/14/23 1800  POC Glucose Q6H  Every 6 Hours      Comments: May Discontinue After 2 Consecutive Readings Less Than  140Notify Provider if 2 Readings Greater Than 140      05/14/23 1633    05/14/23 1730  niCARdipine (CARDENE) 25 mg in 250 mL NS infusion kit  Titrated         05/14/23 1633    05/14/23 1718  CT Head Without Contrast  1 Time Imaging         05/14/23 1718    05/14/23 1646  Blood Culture - Blood, Arm, Left  STAT         05/14/23 1645    05/14/23 1645  tenecteplase for stroke (TNKASE) injection 16 mg  Once        See Hyperspace for full Linked Orders Report.    05/14/23 1540    05/14/23 1645  sodium chloride 0.9 % flush 10 mL  Once        See Hyperspace for full Linked Orders Report.    05/14/23 1540    05/14/23 1639  SLP Consult: Eval & Treat RN Dysphagia Screen Failed  Once         05/14/23 1639    05/14/23 1634  NIHSS Assessment  Every 12 Hours        Comments: Turn off all sedation medications prior to performing assessment. Assessment to be performed upon admission, transfer to another unit, discharge, and with neurological decline. If NIHSS change is greater than or equal to 4 and/or neurological decline is noted notify physician.    05/14/23 1633    05/14/23 1630  labetalol (NORMODYNE,TRANDATE) injection 10 mg  Every 10 Minutes PRN         05/14/23 1633    05/14/23 1630  sodium chloride 0.9 % flush 10 mL  Every 5 Minutes        See Hyperspace for full Linked Orders Report.    05/14/23 1540    05/14/23 1629  Activity - Strict Bed Rest with HOB Flat  Until Discontinued         05/14/23 1633    05/14/23 1629  Inpatient Intensivist Consult  Once        Provider:  Darius Ford MD    05/14/23 1633    05/14/23 1628  Vital Signs Thrombolytic Monitoring  Per Order Details        Comments: Every 15 Minutes x2 Hours  Every 30 Minutes x6 Hours  Every Hour x16 Hours;  Then Per Unit Routine    05/14/23 1633    05/14/23 1628  Vital Signs After Thrombolytic Monitoring  Per Order Details        Comments: In ICU: Every 2 Hours,  In Telemetry Unit: Every 4 Hours    05/14/23 1633    05/14/23 1628  Pulse Oximetry, Continuous   Continuous         05/14/23 1633    05/14/23 1628  Cardiac Monitoring  Continuous        Comments: Follow Standing Orders As Outlined in Process Instructions (Open Order Report to View Full Instructions)    05/14/23 1633    05/14/23 1628  Notify Provider  Until Discontinued        Comments: BP should be maintained <180/105 mm Hg for at least the first 24 hours after IV thrombolytic treatment    05/14/23 1633    05/14/23 1628  NPO Diet NPO Type: Strict NPO  Diet Effective Now        Comments: Strict NPO Until Nursing Dysphagia Screen Passed    05/14/23 1633    05/14/23 1628  Nursing Dysphagia Screening (Complete Prior to Giving Anything By Mouth)  Once         05/14/23 1633    05/14/23 1628  RN to Place Order SLP Consult - Eval & Treat Choosing Reason of RN Dysphagia Screen Failed  Per Order Details        Comments: RN to Place Order SLP Consult - Eval & Treat Choosing Reason of RN Dysphagia Screen Failed    05/14/23 1633    05/14/23 1628  Nurse to Call MD or Nutrition Services for Diet if Patient Passes Dysphagia Screen  Once         05/14/23 1633    05/14/23 1628  Neuro Checks  Per Order Details        Comments: Per Thrombolytic Flowsheet for Stroke/PE:  Every 15 Minutes x2 Hours,  Every 30 Minutes x6 Hours,  Every Hour x16 Hours;  Then Per Unit Routine    05/14/23 1633    05/14/23 1628  Provide Stroke Education Material  Prior to Discharge        Comments: Educate patient PRN and daily during hospitalization.    05/14/23 1633    05/14/23 1628  Tobacco Cessation Education  Once         05/14/23 1633    05/14/23 1628  Notify Stroke Coordinator  Once        Provider:  (Not yet assigned)    05/14/23 1633    05/14/23 1628  Inpatient Rehab Admission Consult  Once        Provider:  (Not yet assigned)    05/14/23 1633    05/14/23 1628  OT Consult: Eval & Treat  Once         05/14/23 1633    05/14/23 1628  PT Consult: Eval & Treat As Tolerated  Once         05/14/23 1633    05/14/23 1628  SLP Consult: Eval & Treat  Communication Disorder  Once        Comments: Per stroke protocol.    05/14/23 1633    05/14/23 1628  Inpatient Case Management  Consult  Once        Provider:  (Not yet assigned)    05/14/23 1633    05/14/23 1628  Inpatient Diabetes Educator Consult  Once,   Status:  Canceled        Provider:  (Not yet assigned)    05/14/23 1633    05/14/23 1628  Inpatient Neurology Consult Stroke  Once,   Status:  Canceled        Specialty:  Neurology  Provider:  (Not yet assigned)    05/14/23 1633    05/14/23 1628  Assessed for Rehabilitation Services  Once         05/14/23 1633    05/14/23 1628  Repeat NIHSS Immediately After Thrombolytic Infusion Complete  Once         05/14/23 1633    05/14/23 1628  AVOID Indwelling Urinary Catheterization During Thrombolytic Infusion & For At Least 24 Hours Post Infusion  Continuous         05/14/23 1633    05/14/23 1628  Bleeding Precautions  Continuous         05/14/23 1633    05/14/23 1628  Post Thrombolytic Precautions  Continuous         05/14/23 1633    05/14/23 1628  Avoid venipuncture, if venipuncture necessary, attempt to use an upper extremity vessel that can be manually compressed  Continuous         05/14/23 1633    05/14/23 1628  Insert Peripheral IV  Once         05/14/23 1633    05/14/23 1628  Saline Lock & Maintain IV Access  Continuous         05/14/23 1633    05/14/23 1628  Place Sequential Compression Device  Once         05/14/23 1633    05/14/23 1628  Maintain Sequential Compression Device  Continuous         05/14/23 1633    05/14/23 1627  sodium chloride 0.9 % flush 10 mL  As Needed         05/14/23 1633    05/14/23 1627  sodium chloride 0.9 % infusion 40 mL  As Needed         05/14/23 1633    05/14/23 1615  iopamidol (ISOVUE-370) 76 % injection 150 mL  Once in Imaging         05/14/23 1524    05/14/23 1558  POC Glucose Once  PROCEDURE ONCE         05/14/23 1556    05/14/23 1541  Notify Provider and Activate Thrombolytic Induced Angioedema Order Set (see  comments)  Until Discontinued        Comments: If thrombolytic induced angioedema is suspected, activate the thrombolytic induced angioedema order set. These symptoms include orolingual, hemifacial swelling, often contralateral to ischemic cerebral hemisphere. Onset may range from 5 - 180 minutes following medication administration.    05/14/23 1540    05/14/23 1508  Follow All Orders For Acute Change in Level Of Consciousness  Once         05/14/23 1508    05/14/23 1508  Perform NIH Stroke Scale & Follow Facility Process  Once         05/14/23 1508    05/14/23 1508  NPO Diet NPO Type: Strict NPO  Diet Effective Now,   Status:  Canceled        Comments: Until Evaluation Complete    05/14/23 1508    05/14/23 1508  CT Angiogram Neck  1 Time Imaging         05/14/23 1508    05/14/23 1508  CT Head Without Contrast Stroke Protocol  1 Time Imaging,   Status:  Canceled         05/14/23 1508    05/14/23 1508  CT CEREBRAL PERFUSION WITH & WITHOUT CONTRAST  1 Time Imaging         05/14/23 1508    05/14/23 1508  CT Angiogram Head w AI Analysis of LVO  1 Time Imaging         05/14/23 1508    05/14/23 1508  NPO Diet NPO Type: Strict NPO  Diet Effective Now,   Status:  Canceled        Comments: Until Dysphagia Screen Complete    05/14/23 1508    05/14/23 1459  POC Glucose Once  PROCEDURE ONCE         05/14/23 1457    05/14/23 1424  Oxygen Therapy- Nasal Cannula; Titrate for SPO2: 90% - 95%  Continuous         05/14/23 1423    05/14/23 0937  Inpatient IV Team Consult PICC 1 Lumen  Once        Provider:  (Not yet assigned)    05/14/23 0936    05/14/23 0937  RN To Release PICC Line Care Orders Once Line in Place  Once         05/14/23 0936    05/14/23 0937  C-reactive Protein  Once         05/14/23 0936    05/14/23 0930  Up In Chair  Daily       05/14/23 0929    05/14/23 0923  XR Chest 1 View  1 Time Imaging         05/14/23 0923    05/14/23 0600  CBC Auto Differential  PROCEDURE ONCE         05/13/23 2201    05/13/23 1629  bismuth  subsalicylate (PEPTO BISMOL) 262 MG/15ML suspension 30 mL  3 Times Daily PRN         05/13/23 1629    05/13/23 1206  Patient Currently On Electrolyte Replacement Protocol - Please Refer to MAR for Protocol Details  Misc Nursing Order (Specify)  Daily      Comments: Patient Currently On Electrolyte Replacement Protocol - Please Refer to MAR for Protocol Details    05/13/23 1205    05/13/23 1204  Magnesium Sulfate - Total Dose 4 grams - Magnesium 1 or Less  As Needed,   Status:  Discontinued        See Hyperspace for full Linked Orders Report.    05/13/23 1205    05/13/23 1204  Magnesium Sulfate - Total Dose 3 grams - Magnesium 1.1 - 1.5  As Needed,   Status:  Discontinued        See Hyperspace for full Linked Orders Report.    05/13/23 1205    05/13/23 1204  Magnesium Sulfate - Total Dose 2 grams - Magnesium 1.6 - 1.9  As Needed,   Status:  Discontinued        See Hyperspace for full Linked Orders Report.    05/13/23 1205    05/13/23 1204  Magnesium  As Needed,   Status:  Canceled      Comments: In AM After Magnesium Supplementation      05/13/23 1205    05/13/23 1203  Magnesium  As Needed,   Status:  Canceled       05/13/23 1205    05/13/23 1203  Potassium  As Needed,   Status:  Canceled       05/13/23 1205    05/13/23 0930  metoprolol tartrate (LOPRESSOR) tablet 12.5 mg  Every 12 Hours Scheduled         05/13/23 0841    05/13/23 0838  Inpatient Rehab Admission Consult  Once        Provider:  (Not yet assigned)    05/13/23 0837    05/13/23 0600  CBC Auto Differential  PROCEDURE ONCE         05/12/23 2201    05/12/23 1944  Inpatient Rehab Admission Consult  Once        Provider:  (Not yet assigned)    05/12/23 1944    05/12/23 1100  doxycycline (MONODOX) capsule 100 mg  Every 12 Hours Scheduled,   Status:  Discontinued         05/12/23 0948    05/11/23 1400  Enoxaparin Sodium (LOVENOX) syringe 40 mg  Every 24 Hours,   Status:  Discontinued         05/11/23 1131    05/09/23 2000  meropenem (MERREM) 1 g in sodium  chloride 0.9 % 100 mL IVPB-VTB  Every 8 Hours         05/09/23 1312    05/09/23 1543  Hold medication  Continuous PRN         05/09/23 1550    05/07/23 1800  Dietary Nutrition Supplements Boost Plus (Ensure Enlive, Ensure Plus)  Daily With Breakfast, Lunch & Dinner       05/07/23 1203    05/07/23 1306  loperamide (IMODIUM) capsule 2 mg  Every 6 Hours PRN,   Status:  Discontinued         05/07/23 1307    05/06/23 2100  mirtazapine (REMERON) tablet 7.5 mg  Nightly,   Status:  Discontinued         05/06/23 1539    05/05/23 1500  citalopram (CeleXA) tablet 20 mg  Daily         05/05/23 1316    05/04/23 0600  Basic Metabolic Panel  Daily,   Status:  Canceled       05/03/23 0743    05/04/23 0600  CBC & Differential  Daily,   Status:  Canceled       05/03/23 0743    05/03/23 2200  Incentive Spirometry  Every 4 Hours While Awake       05/03/23 1833    05/03/23 1400  guaiFENesin (MUCINEX) 12 hr tablet 600 mg  2 Times Daily         05/03/23 1210    05/03/23 0900  losartan (COZAAR) tablet 50 mg  Every 24 Hours Scheduled,   Status:  Discontinued         05/02/23 2007 05/03/23 0900  vitamin B-12 (CYANOCOBALAMIN) tablet 1,000 mcg  Daily         05/02/23 2007 05/03/23 0900  ascorbic acid (VITAMIN C) tablet 500 mg  Daily,   Status:  Discontinued         05/02/23 2007 05/03/23 0900  fluticasone (FLONASE) 50 MCG/ACT nasal spray 1 spray  Daily         05/02/23 2007 05/03/23 0800  Oral Care  2 Times Daily       05/02/23 2007 05/03/23 0800  Strict Intake & Output  Every Hour       05/03/23 0743    05/03/23 0744  Daily Weights  Daily       05/03/23 0743    05/03/23 0744  Patient Currently On Electrolyte Replacement Protocol - Please Refer to MAR for Protocol Details  Misc Nursing Order (Specify)  Daily      Comments: Patient Currently On Electrolyte Replacement Protocol - Please Refer to MAR for Protocol Details    05/03/23 0743    05/03/23 0742  potassium & sodium phosphates (PHOS-NAK) 280-160-250 MG packet - for  Phosphorus less than 1.25 mg/dL  Every 6 Hours PRN        See Hyperspace for full Linked Orders Report.    05/03/23 0743    05/03/23 0742  potassium & sodium phosphates (PHOS-NAK) 280-160-250 MG packet - for Phosphorus 1.25 - 2.5 mg/dL  Every 6 Hours PRN        See Hyperspace for full Linked Orders Report.    05/03/23 0743    05/03/23 0742  Magnesium Sulfate - Total Dose 10 grams - Magnesium 1 or Less  As Needed        See Hyperspace for full Linked Orders Report.    05/03/23 0743    05/03/23 0742  Magnesium Sulfate - Total Dose 6 grams - Magnesium 1.1 - 1.5  As Needed        See Hyperspace for full Linked Orders Report.    05/03/23 0743    05/03/23 0742  Magnesium Sulfate - Total Dose 4 grams - Magnesium 1.6 - 1.9  As Needed        See Hyperspace for full Linked Orders Report.    05/03/23 0743    05/03/23 0742  potassium chloride (K-DUR,KLOR-CON) ER tablet 40 mEq  As Needed        See Hyperspace for full Linked Orders Report.    05/03/23 0743    05/03/23 0742  potassium chloride (KLOR-CON) packet 40 mEq  As Needed        See Hyperspace for full Linked Orders Report.    05/03/23 0743    05/03/23 0742  potassium chloride 10 mEq in 100 mL IVPB  Every 1 Hour PRN        See Hyperspace for full Linked Orders Report.    05/03/23 0743    05/03/23 0000  Vital Signs  Every 4 Hours       05/02/23 2007 05/02/23 2100  sodium chloride 0.9 % flush 10 mL  Every 12 Hours Scheduled         05/02/23 2007 05/02/23 2008  Intake & Output  Every Shift       05/02/23 2007 05/02/23 2007  sodium chloride 0.9 % flush 10 mL  As Needed         05/02/23 2007 05/02/23 2007  sodium chloride 0.9 % infusion 40 mL  As Needed         05/02/23 2007 05/02/23 2007  Telemetry - Pulse Oximetry  Continuous PRN,   Status:  Canceled      Comments: If Patient Develops Unresponsiveness, Acute Dyspnea, Cyanosis or Suspected Hypoxemia Start Continuous Pulse Ox Monitoring, Apply Oxygen & Notify Provider    05/02/23 2007 05/02/23 2007   nitroglycerin (NITROSTAT) SL tablet 0.4 mg  Every 5 Minutes PRN         05/02/23 2007 05/02/23 2007  Magnesium  As Needed,   Status:  Canceled      Comments: For Ventricular Arrhythmias      05/02/23 2007 05/02/23 2007  acetaminophen (TYLENOL) tablet 650 mg  Every 4 Hours PRN        See Hyperspace for full Linked Orders Report.    05/02/23 2007 05/02/23 2007  acetaminophen (TYLENOL) 160 MG/5ML solution 650 mg  Every 4 Hours PRN        See Hyperspace for full Linked Orders Report.    05/02/23 2007 05/02/23 2007  acetaminophen (TYLENOL) suppository 650 mg  Every 4 Hours PRN        See Hyperspace for full Linked Orders Report.    05/02/23 2007 05/02/23 2007  ondansetron (ZOFRAN) injection 4 mg  Every 6 Hours PRN         05/02/23 2007 05/02/23 1630  ipratropium-albuterol (DUO-NEB) nebulizer solution 3 mL  4 Times Daily - RT         05/02/23 1619    05/02/23 1403  sodium chloride 0.9 % flush 10 mL  As Needed        See Hyperspace for full Linked Orders Report.    05/02/23 1403    Unscheduled  ECG 12 Lead Chest Pain  As Needed      Comments: Nurse to Release if Patient Expericences Acute Chest Pain or Dysrhythmias    05/02/23 2007    Unscheduled  Potassium  As Needed      Comments: For Ventricular Arrhythmias      05/02/23 2007    Unscheduled  High Sensitivity Troponin T  As Needed      Comments: For Chest Pain      05/02/23 2007    Unscheduled  Blood Gas, Arterial -  As Needed      Comments: Draw for Acute Dyspnea, Cyanosis, Suspected Hypoxemia or UnresponsivenessNotify Provider of Results      05/02/23 2007    Unscheduled  Initiate & Follow Electrolyte Replacement Protocol  As Needed       05/03/23 0743    Unscheduled  Initiate & Follow Electrolyte Replacement Protocol  As Needed       05/13/23 1205    Unscheduled  Order CT Head Without Contrast for Neurological Decline  As Needed      Comments: Notify provider after receiving the radiology reading    05/14/23 1633    --  SCANNED - TELEMETRY            05/02/23 0000    --  SCANNED - TELEMETRY           05/02/23 0000    --  SCANNED - TELEMETRY           05/02/23 0000    --  omeprazole (priLOSEC) 40 MG capsule  Daily         05/09/23 0958    --  SCANNED - TELEMETRY           05/02/23 0000    --  SCANNED - TELEMETRY           05/02/23 0000    --  SCANNED - TELEMETRY           05/02/23 0000    --  SCANNED - TELEMETRY           05/02/23 0000    --  SCANNED - TELEMETRY           05/02/23 0000    --  SCANNED - TELEMETRY           05/02/23 0000    --  SCANNED - TELEMETRY           05/02/23 0000    --  SCANNED - TELEMETRY           05/02/23 0000    Signed and Held  sodium chloride 0.9 % flush 10 mL  Every 12 Hours Scheduled         Signed and Held    Signed and Held  sodium chloride 0.9 % flush 10 mL  As Needed         Signed and Held    Signed and Held  sodium chloride 0.9 % flush 20 mL  As Needed         Signed and Held    Signed and Held  PICC LINE CARE - Change Transparent Dressing With CHG Disk & Securement Device Every 7 Days  Weekly        Comments: Per [facility] Policy    Signed and Held    Signed and Held  PICC LINE CARE - Change Dressing As Needed When Damp, Loose or Soiled  As Needed         Signed and Held    Signed and Held  PICC LINE CARE - Connectors / Hubs Must Be Scrubbed 15 Seconds Using 70% Alcohol & Allowed to Dry Before Accessing Line  Continuous         Signed and Held    Signed and Held  PICC LINE CARE - Change Needleless Connectors  Per Order Details        Comments: Per [facility] Policy    Signed and Held    --  SCANNED - TELEMETRY           05/02/23 0000    --  SCANNED - TELEMETRY           05/02/23 0000    --  SCANNED - TELEMETRY           05/02/23 0000    --  SCANNED - TELEMETRY           05/02/23 0000    --  SCANNED - TELEMETRY           05/02/23 0000                   Physician Progress Notes           Darius Ford MD at 05/14/23 1702              [facility]  [phone]  Dr. Darius Ford    Subjective:  LOS: 12    Chief Complaint: Acute  stroke    Seen by Dr. Cristobal Mcmanus this morning for pneumonia.  Acute onset left-sided weakness.  She reports some difficulty with her breathing now and with swallowing and management of the secretions.  She denies any prior history of dysphagia or difficulty managing her secretions.  No prior history of stroke is reported.  Clarified with family at bedside.  She has a history of COPD and is a current cigarette smoker.  Came in with dense right lower lobe pneumonia and right lung abscess.  Currently on broad antibiotics per ID.    Objective   Vital Signs past 24hrs  Temp range: Temp (24hrs), Av.2 °F (37.3 °C), Min:97.8 °F (36.6 °C), Max:101.6 °F (38.7 °C)    BP range: BP: (121-155)/(59-78) 146/71  Pulse range: Heart Rate:  [] 130  Resp rate range: Resp:  [15-20] 16  Device (Oxygen Therapy): humidified;nasal cannulaFlow (L/min):  [4-4.5] 4  Oxygen range:SpO2:  [89 %-99 %] 94 %     Physical Exam  Eyes:      Pupils: Pupils are equal, round, and reactive to light.   Cardiovascular:      Rate and Rhythm: Normal rate and regular rhythm.      Heart sounds: No murmur heard.  Abdominal:      General: Bowel sounds are normal.      Palpations: Abdomen is soft. There is no mass.      Tenderness: There is no abdominal tenderness.   Musculoskeletal:         General: No swelling.   Neurological:      Mental Status: She is alert.      Motor: Weakness present.      Comments: Left-sided neglect  Left upper extremity and lower extremity move spontaneously but not on command       Results Review:    I have reviewed the laboratory and imaging data since the last note by [facility] physician.  My annotations are noted in assessment and plan.      Result Review:  I have personally reviewed the results from last note by [facility] physician to 2023 17:02 EDT and agree with these findings:  [x]  Laboratory list / accordion  [x]  Microbiology  [x]  Radiology  []  EKG/Telemetry   [x]  Cardiology/Vascular   []  Pathology  []  Old records  []   Other:    Medication Review:  I have reviewed the current MAR.  My annotations are noted in assessment and plan.    vitamin C, 500 mg, Oral, Daily  [START ON 5/15/2023] aspirin, 81 mg, Oral, Daily   Or  [START ON 5/15/2023] aspirin, 300 mg, Rectal, Daily  atorvastatin, 80 mg, Oral, Nightly  citalopram, 20 mg, Oral, Daily  [START ON 5/15/2023] clopidogrel, 75 mg, Oral, Daily  doxycycline, 100 mg, Oral, Q12H  fluticasone, 1 spray, Each Nare, Daily  guaiFENesin, 600 mg, Oral, BID  ipratropium-albuterol, 3 mL, Nebulization, 4x Daily - RT  losartan, 50 mg, Oral, Q24H  meropenem, 1 g, Intravenous, Q8H  metoprolol tartrate, 12.5 mg, Oral, Q12H  mirtazapine, 7.5 mg, Oral, Nightly  sodium chloride, 10 mL, Intravenous, Q12H  sodium chloride, 10 mL, Intravenous, Q12H  cyanocobalamin, 1,000 mcg, Oral, Daily        hold, 1 each  niCARdipine, 5-15 mg/hr          PCCM Problems  Acute right MCA stroke now status post TNK  Concern for airway protection and dysphagia  Acute hypoxic respiratory failure with worsening  Right lower lobe pneumonia and lung abscess with ongoing antibiotics  Right pleural effusion without evidence for empyema  New right lower lobe 2.4 cm lung nodule on CT 4/3/2023  COPD, currently not wheezing  Current cigarette smoker  Acute hyponatremia  Anemia          THESE ARE NEW MEDICAL PROBLEMS TO ME.    Plan of Treatment    Left-sided weakness with acute right MCA stroke and status post TNK.  Await improvement.  Reviewed with neurologist.  Goal blood pressure keep less than 180.  I am going to discontinue her losartan for now    Her oxygen requirements have increased.  I am concerned about her ability to protect airway with acute stroke.  Monitor closely and keep NPO.  Will use cortrack for any meds as needed.  Currently requiring high flow cannula at 7 L.    Ongoing antibiotics for right lower lobe pneumonia.  Continue to direct as per ID.    Right lung nodule and now with right lung severe pneumonia.  Certainly  sounds suspicious and needs follow-up imaging to exclude malignancy.    COPD and currently not wheezing.  Add nebulizer treatments to help with mobilization of secretions.    Patient will be counseled to quit smoking prior to discharge.    Hyponatremia from underlying lung disease likely.  Watch for now.    Anemia from recent acute illness.  Other etiologies not ruled out yet.    High risk for dysphagia.  Will need cortrack feeding tube for oral meds.    Guarded prognosis and at high risk for requiring intubation and mechanical ventilation.    I spent 45 mins critical care time in care of this patient outside of any procedures.     Darius Ford MD  05/14/23  17:02 EDT      Part of this note may be an electronic transcription/translation of spoken language to printed text using the Dragon Dictation System.      Electronically signed by Darius Ford MD at 05/14/23 7000     Rigoberto Chan MD at 05/14/23 0932          ID NOTE    CC: f/u pneumonia    Subj: No fever. WBC down to 10k. O2 needs at 4-5L. Trying to get out of bed to chair and do some exercises in bed. She says she feels like her strength is improving albeit slowly.       Medications:    Current Facility-Administered Medications:   •  acetaminophen (TYLENOL) tablet 650 mg, 650 mg, Oral, Q4H PRN, 650 mg at 05/12/23 1054 **OR** acetaminophen (TYLENOL) 160 MG/5ML solution 650 mg, 650 mg, Oral, Q4H PRN **OR** acetaminophen (TYLENOL) suppository 650 mg, 650 mg, Rectal, Q4H PRN, Gee Clement MD  •  ascorbic acid (VITAMIN C) tablet 500 mg, 500 mg, Oral, Daily, Gee Clement MD, 500 mg at 05/14/23 0839  •  bismuth subsalicylate (PEPTO BISMOL) 262 MG/15ML suspension 30 mL, 30 mL, Oral, TID PRN, Issac Mendez MD, 30 mL at 05/14/23 0839  •  citalopram (CeleXA) tablet 20 mg, 20 mg, Oral, Daily, Gee Clement MD, 20 mg at 05/14/23 0839  •  doxycycline (MONODOX) capsule 100 mg, 100 mg, Oral, Q12H, Rigoberto Chan MD, 100 mg at  05/14/23 0839  •  Enoxaparin Sodium (LOVENOX) syringe 40 mg, 40 mg, Subcutaneous, Q24H, Jennifer Hood MD, 40 mg at 05/13/23 1353  •  fluticasone (FLONASE) 50 MCG/ACT nasal spray 1 spray, 1 spray, Each Nare, Daily, Gee Clement MD, 1 spray at 05/08/23 0808  •  guaiFENesin (MUCINEX) 12 hr tablet 600 mg, 600 mg, Oral, BID, Gee Clement MD, 600 mg at 05/14/23 0839  •  Hold medication, 1 each, Does not apply, Continuous PRN, Jennifer Hood MD  •  ipratropium-albuterol (DUO-NEB) nebulizer solution 3 mL, 3 mL, Nebulization, 4x Daily - RT, Gee Clement MD, 3 mL at 05/14/23 0754  •  loperamide (IMODIUM) capsule 2 mg, 2 mg, Oral, Q6H PRN, Gee Clement MD, 2 mg at 05/07/23 1822  •  losartan (COZAAR) tablet 50 mg, 50 mg, Oral, Q24H, Gee Clement MD, 50 mg at 05/14/23 0839  •  Magnesium Sulfate - Total Dose 10 grams - Magnesium 1 or Less, 2 g, Intravenous, PRN **OR** Magnesium Sulfate - Total Dose 6 grams - Magnesium 1.1 - 1.5, 2 g, Intravenous, PRN **OR** Magnesium Sulfate - Total Dose 4 grams - Magnesium 1.6 - 1.9, 4 g, Intravenous, PRN, Gee Clement MD  •  meropenem (MERREM) 1 g in sodium chloride 0.9 % 100 mL IVPB-VTB, 1 g, Intravenous, Q8H, Jennifer Hood MD, Last Rate: 33.3 mL/hr at 05/12/23 0449, 1 g at 05/14/23 0344  •  metoprolol tartrate (LOPRESSOR) tablet 12.5 mg, 12.5 mg, Oral, Q12H, Issac Mendez MD, 12.5 mg at 05/14/23 0839  •  mirtazapine (REMERON) tablet 7.5 mg, 7.5 mg, Oral, Nightly, Gee Clement MD, 7.5 mg at 05/13/23 2000  •  nitroglycerin (NITROSTAT) SL tablet 0.4 mg, 0.4 mg, Sublingual, Q5 Min PRN, Gee Clement MD  •  ondansetron (ZOFRAN) injection 4 mg, 4 mg, Intravenous, Q6H PRN, Gee Clement MD, 4 mg at 05/11/23 1855  •  potassium & sodium phosphates (PHOS-NAK) 280-160-250 MG packet - for Phosphorus less than 1.25 mg/dL, 2 packet, Oral, Q6H PRN **OR** potassium & sodium phosphates (PHOS-NAK) 280-160-250 MG packet - for Phosphorus 1.25 - 2.5  mg/dL, 2 packet, Oral, Q6H PRN, Gee Clement MD  •  potassium chloride (K-DUR,KLOR-CON) ER tablet 40 mEq, 40 mEq, Oral, PRN, 40 mEq at 05/07/23 0654 **OR** potassium chloride (KLOR-CON) packet 40 mEq, 40 mEq, Oral, PRN **OR** potassium chloride 10 mEq in 100 mL IVPB, 10 mEq, Intravenous, Q1H PRN, Gee Clement MD  •  [COMPLETED] Insert Peripheral IV, , , Once **AND** sodium chloride 0.9 % flush 10 mL, 10 mL, Intravenous, PRN, Gee Clement MD  •  sodium chloride 0.9 % flush 10 mL, 10 mL, Intravenous, Q12H, Gee Clement MD, 10 mL at 05/14/23 0840  •  sodium chloride 0.9 % flush 10 mL, 10 mL, Intravenous, PRN, Gee Clement MD  •  sodium chloride 0.9 % infusion 40 mL, 40 mL, Intravenous, PRN, Gee Clement MD  •  vitamin B-12 (CYANOCOBALAMIN) tablet 1,000 mcg, 1,000 mcg, Oral, Daily, Gee Clement MD, 1,000 mcg at 05/14/23 0840      Objective   Vital Signs   Temp:  [97.8 °F (36.6 °C)-99 °F (37.2 °C)] 97.8 °F (36.6 °C)  Heart Rate:  [77-98] 94  Resp:  [18-20] 20  BP: (121-132)/(68-84) 129/68    Physical Exam:   General: awake, alert, very nice but frail appearing  Eyes: no scleral icterus  ENT: no thrush; NC in nares  Cardiovascular: NR  Respiratory: no wheezing; normal WOB on 5L NC  GI: Abdomen is soft, not tender  :  no Snell catheter  Skin: No rashes  Neurological: Alert and oriented x 3  Psychiatric: Normal mood and affect     Labs:   CBC, BMP, fungal studies and cultures reviewed today  Lab Results   Component Value Date    WBC 10.26 05/14/2023    HGB 10.1 (L) 05/14/2023    HCT 31.3 (L) 05/14/2023    MCV 92.3 05/14/2023     05/14/2023     Lab Results   Component Value Date    GLUCOSE 91 05/14/2023    CALCIUM 8.4 (L) 05/14/2023     (L) 05/14/2023    K 4.3 05/14/2023    CO2 32.5 (H) 05/14/2023     05/14/2023    BUN 12 05/14/2023    CREATININE 0.26 (L) 05/14/2023    EGFR 118.3 05/14/2023    BCR 46.2 (H) 05/14/2023    ANIONGAP 2.5 (L) 05/14/2023     Procal  3.95--->2.6-->0.2  Crypto Ag negative  Urine Histo Ag negative  Serum Histo Ag pending  Urine Blasto Ag negative  Aspergillus Ag pending  ANCA negative  Histoplasma Ab negative    Microbiology:   RPP: negative   BCx: Corynebacterium in 1/2 sets   SpCx: normal aishwarya   MRSA nares: negative   Strep pneumo Ag; negative   Legionella Ag: negative   BCx: negative   C diff: negative   SpCx: normal aishwarya   AFB Cx: NGTD   Fungus Cx: NGTD  5/10 SpCx: NGTD  5/10 L Thoracentesis Cx: negative      ASSESSMENT/PLAN:  1. Pneumonia and right lung abscess  2. Pleural effusions  3. Acute hypoxic respiratory failure  4. Emphysema  5. Tobacco use    She remains afebrile and her WBC is finally down to normal. O2 needs overall much improved down to 4-5L NC. All of her cultures are negative. I recommend that we treat with antibiotics for a 4-week course with stop date 23. The regimen will be meropenem 1 g IV q8h and doxycycline 100 mg PO BID. I will go ahead and order a PICC today. I will also order a repeat CT to be done just prior to the stop date. She will need follow-up w/ pulmonologist as well.     CBC, CMP, and CRP weekly should be faxed to me at [phone].     Thank you for allowing me to be involved in the care of this patient. Infectious diseases will sign off at this time with antibiotics plan in place, but please call me at [phone] if any further ID questions or new ID concerns.      Electronically signed by Rigoberto Chan MD at 23 0938     Issac Mendez MD at 23 0919              [facility] Medicine Services  PROGRESS NOTE    Patient Name: Katherine Williamson  : 1950  MRN: [id number]    Date of Admission: 2023  Primary Care Physician: Zelalem Flor APRN    Subjective   Subjective     CC:  Follow-up lung infection    Subjective:  Patient continues to have some improvement in her breathing.  She continues to feel better.  I have asked her to try and  get up to the chair today.  She says her diarrhea resolved with the Pepto-Bismol.  She has not had any further stools after that dose.      Review of Systems  No current fevers or chills  No current nausea, vomiting, or diarrhea  No current chest pain or palpitations      Objective   Objective     Vital Signs:   Temp:  [97.8 °F (36.6 °C)-99 °F (37.2 °C)] 97.8 °F (36.6 °C)  Heart Rate:  [77-98] 94  Resp:  [18-20] 20  BP: (121-132)/(68-84) 129/68        Physical Exam:  Constitutional:Awake, alert, acutely ill-appearing  HENT: NCAT, mucous membranes moist, neck supple  Respiratory: Cough is resolving, occasional coarse sounds, no wheezes, supplemental oxygen  Cardiovascular: Pulse rate is normal, palpable radial pulses  Gastrointestinal:  soft, nontender, nondistended  Musculoskeletal: Somewhat debilitated in appearance, mild lower extremity edema, BMI is 25  Psychiatric: Normal affect, cooperative, conversational  Neurologic: No slurred speech or facial droop, follows commands  Skin: No rashes or jaundice, warm      Results Reviewed:  Results from last 7 days   Lab Units 05/14/23 0528 05/13/23 0431 05/12/23  0346 05/10/23  0707 05/09/23  0714   WBC 10*3/mm3 10.26 11.78* 13.92*   < > 19.56*   HEMOGLOBIN g/dL 10.1* 9.9* 10.0*   < > 12.2   HEMATOCRIT % 31.3* 30.1* 29.6*   < > 37.0   PLATELETS 10*3/mm3 388 390 344   < > 320   PROCALCITONIN ng/mL  --   --   --   --  0.23    < > = values in this interval not displayed.     Results from last 7 days   Lab Units 05/14/23  0528 05/13/23  0431 05/12/23  0346 05/11/23  0251 05/10/23  0904   SODIUM mmol/L 135* 134* 137   < > 135*   POTASSIUM mmol/L 4.3 4.1 4.1   < > 3.8   CHLORIDE mmol/L 100 100 102   < > 100   CO2 mmol/L 32.5* 33.0* 34.4*   < > 29.6*   BUN mg/dL 12 13 13   < > 11   CREATININE mg/dL 0.26* 0.20* 0.21*   < > 0.30*   GLUCOSE mg/dL 91 85 101*   < > 141*   CALCIUM mg/dL 8.4* 8.3* 8.9   < > 9.1   ALT (SGPT) U/L  --   --   --   --  42*   AST (SGOT) U/L  --   --   --    --  34*    < > = values in this interval not displayed.     Estimated Creatinine Clearance: 183.7 mL/min (A) (by C-G formula based on SCr of 0.26 mg/dL (L)).    Microbiology Results Abnormal     Procedure Component Value - Date/Time    Body Fluid Culture - Body Fluid, Pleural Cavity [[id number]] Collected: 05/10/23 1110    Lab Status: Final result Specimen: Body Fluid from Pleural Cavity Updated: 05/13/23 0927     Body Fluid Culture No growth at 3 days     Gram Stain Rare (1+) WBCs per low power field      No organisms seen    Respiratory Culture - Sputum, Cough [[id number]] Collected: 05/10/23 0441    Lab Status: Final result Specimen: Sputum from Cough Updated: 05/12/23 1049     Respiratory Culture Rare Normal respiratory aishwarya. No S. aureus or Pseudomonas aeruginosa detected. Final report.     Gram Stain Many (4+) WBCs per low power field      Rare (1+) Epithelial cells per low power field      Many (4+) Yeast      Many (4+) Gram positive cocci    Blastomyces Antigen - Urine, Urine, Clean Catch [[id number]] Collected: 05/09/23 1558    Lab Status: Final result Specimen: Urine, Clean Catch Updated: 05/12/23 0807     Reference Lab Report --    Histoplasma Ag Ur - Urine, Urine, Clean Catch [[id number]] Collected: 05/09/23 1558    Lab Status: Final result Specimen: Urine, Clean Catch Updated: 05/12/23 0806     Reference Lab Report --    AFB Culture - Sputum, Cough [[id number]] Collected: 05/10/23 0441    Lab Status: Preliminary result Specimen: Sputum from Cough Updated: 05/11/23 1419     AFB Stain No acid fast bacilli seen on concentrated smear    AFB Culture - Body Fluid, Pleural Cavity [[id number]] Collected: 05/10/23 1110    Lab Status: Preliminary result Specimen: Body Fluid from Pleural Cavity Updated: 05/11/23 1417     AFB Stain No acid fast bacilli seen on concentrated smear    Blood Culture - Blood, Arm, Left [[id number]]  (Normal) Collected: 05/04/23 1815    Lab Status: Final result Specimen: Blood from Arm, Left  Updated: 05/09/23 1845     Blood Culture No growth at 5 days    Narrative:      Less than seven (7) mL's of blood was collected.  Insufficient quantity may yield false negative results.    Blood Culture - Blood, Hand, Right [[id number]]  (Normal) Collected: 05/04/23 1811    Lab Status: Final result Specimen: Blood from Hand, Right Updated: 05/09/23 1845     Blood Culture No growth at 5 days    Narrative:      Less than seven (7) mL's of blood was collected.  Insufficient quantity may yield false negative results.    Respiratory Culture - Sputum, Cough [[id number]] Collected: 05/06/23 1402    Lab Status: Final result Specimen: Sputum from Cough Updated: 05/08/23 0908     Respiratory Culture Rare Normal respiratory aishwarya. No S. aureus or Pseudomonas aeruginosa detected. Final report.     Gram Stain Moderate (3+) WBCs seen      Rare (1+) Epithelial cells per low power field      Mixed bacterial morphotypes seen on Gram Stain    Blood Culture - Blood, Arm, Right [[id number]]  (Normal) Collected: 05/02/23 1433    Lab Status: Final result Specimen: Blood from Arm, Right Updated: 05/07/23 1445     Blood Culture No growth at 5 days    Clostridioides difficile Toxin - Stool, Per Rectum [[id number]]  (Normal) Collected: 05/05/23 2142    Lab Status: Final result Specimen: Stool from Per Rectum Updated: 05/05/23 2235    Narrative:      The following orders were created for panel order Clostridioides difficile Toxin - Stool, Per Rectum.  Procedure                               Abnormality         Status                     ---------                               -----------         ------                     Clostridioides difficile...[[id number]]  Normal              Final result                 Please view results for these tests on the individual orders.    Clostridioides difficile Toxin, PCR - Stool, Per Rectum [[id number]]  (Normal) Collected: 05/05/23 2142    Lab Status: Final result Specimen: Stool from Per Rectum Updated:  05/05/23 2235     C. Difficile Toxins by PCR Negative    Narrative:      The result indicates the absence of toxigenic C. difficile from stool specimen.     Respiratory Culture - Sputum, Cough [[id number]] Collected: 05/02/23 1739    Lab Status: Final result Specimen: Sputum from Cough Updated: 05/04/23 0717     Respiratory Culture Scant growth (1+) Normal respiratory aishwarya. No S. aureus or Pseudomonas aeruginosa detected. Final report.     Gram Stain Many (4+) WBCs per low power field      Many (4+) Mixed bacterial morphotypes seen on Gram Stain      Moderate (3+) Epithelial cells per low power field    MRSA Screen, PCR (Inpatient) - Swab, Nares [[id number]]  (Normal) Collected: 05/02/23 1741    Lab Status: Final result Specimen: Swab from Nares Updated: 05/02/23 1923     MRSA PCR No MRSA Detected    Narrative:      The negative predictive value of this diagnostic test is high and should only be used to consider de-escalating anti-MRSA therapy. A positive result may indicate colonization with MRSA and must be correlated clinically.    Legionella Antigen, Urine - Urine, Urine, Clean Catch [[id number]]  (Normal) Collected: 05/02/23 1754    Lab Status: Final result Specimen: Urine, Clean Catch Updated: 05/02/23 1834     LEGIONELLA ANTIGEN, URINE Negative    S. Pneumo Ag Urine or CSF - Urine, Urine, Clean Catch [[id number]]  (Normal) Collected: 05/02/23 1754    Lab Status: Final result Specimen: Urine, Clean Catch Updated: 05/02/23 1834     Strep Pneumo Ag Negative    Respiratory Panel PCR w/COVID-19(SARS-CoV-2) ALEN/GATITO/DOREEN/[facility] In-House, NP Swab in [facility], 3-4 HR TAT - Swab, Nasopharynx [[id number]]  (Normal) Collected: 05/02/23 1427    Lab Status: Final result Specimen: Swab from Nasopharynx Updated: 05/02/23 1542     ADENOVIRUS, PCR Not Detected     Coronavirus 229E Not Detected     Coronavirus HKU1 Not Detected     Coronavirus NL63 Not Detected     Coronavirus OC43 Not Detected     COVID19 Not Detected      Human Metapneumovirus Not Detected     Human Rhinovirus/Enterovirus Not Detected     Influenza A PCR Not Detected     Influenza B PCR Not Detected     Parainfluenza Virus 1 Not Detected     Parainfluenza Virus 2 Not Detected     Parainfluenza Virus 3 Not Detected     Parainfluenza Virus 4 Not Detected     RSV, PCR Not Detected     Bordetella pertussis pcr Not Detected     Bordetella parapertussis PCR Not Detected     Chlamydophila pneumoniae PCR Not Detected     Mycoplasma pneumo by PCR Not Detected    Narrative:      In the setting of a positive respiratory panel with a viral infection PLUS a negative procalcitonin without other underlying concern for bacterial infection, consider observing off antibiotics or discontinuation of antibiotics and continue supportive care. If the respiratory panel is positive for atypical bacterial infection (Bordetella pertussis, Chlamydophila pneumoniae, or Mycoplasma pneumoniae), consider antibiotic de-escalation to target atypical bacterial infection.          Imaging Results (Last 24 Hours)     ** No results found for the last 24 hours. **          Results for orders placed during the hospital encounter of 05/13/20    Adult Transthoracic Echo Complete W/ Cont if Necessary Per Protocol    Interpretation Summary  · Left ventricular systolic function is normal.  · Left ventricular diastolic dysfunction (grade I) consistent with impaired relaxation.    Normal LV and RV size and function  EF estimated 65 to 70%  Normal atrial sizes  Normal RV size and function  No significant valvulopathy seen  Bubble study negative for right to left interatrial shunt under resting and Valsalva conditions, slightly mobile interatrial septum without defect seen clearly  No masses  No effusion seen  Grade 1 diastolic dysfunction by criteria  Cannot estimate PA systolic pressure secondary to insufficient TR envelope      I have reviewed the medications:  Scheduled Meds:vitamin C, 500 mg, Oral,  Daily  citalopram, 20 mg, Oral, Daily  doxycycline, 100 mg, Oral, Q12H  enoxaparin, 40 mg, Subcutaneous, Q24H  fluticasone, 1 spray, Each Nare, Daily  guaiFENesin, 600 mg, Oral, BID  ipratropium-albuterol, 3 mL, Nebulization, 4x Daily - RT  losartan, 50 mg, Oral, Q24H  meropenem, 1 g, Intravenous, Q8H  metoprolol tartrate, 12.5 mg, Oral, Q12H  mirtazapine, 7.5 mg, Oral, Nightly  sodium chloride, 10 mL, Intravenous, Q12H  cyanocobalamin, 1,000 mcg, Oral, Daily      Continuous Infusions:hold, 1 each      PRN Meds:.•  acetaminophen **OR** acetaminophen **OR** acetaminophen  •  bismuth subsalicylate  •  hold  •  loperamide  •  magnesium sulfate **OR** magnesium sulfate **OR** magnesium sulfate  •  nitroglycerin  •  ondansetron  •  potassium & sodium phosphates **OR** potassium & sodium phosphates  •  potassium chloride **OR** potassium chloride **OR** potassium chloride  •  [COMPLETED] Insert Peripheral IV **AND** sodium chloride  •  sodium chloride  •  sodium chloride    Assessment & Plan   Assessment & Plan     Active Hospital Problems    Diagnosis  POA   • **Acute respiratory failure with hypoxia [J96.01]  Yes   • Diastolic dysfunction, grade 1 [I51.89]  Yes   • Physical debility [R53.81]  Clinically Undetermined   • Sepsis [A41.9]  Yes   • Pleural effusion, right [J90]  Yes   • Other emphysema [J43.8]  Yes   • Pulmonary nodule [R91.1]  Yes   • Pneumonia [J18.9]  Yes   • Hepatic steatosis [K76.0]  Yes   • Tobacco abuse [Z72.0]  Yes   • Benign essential HTN [I10]  Yes      Resolved Hospital Problems   No resolved problems to display.        Brief Hospital Course to date:  Katherine Williamson is a 72 y.o. female presents to the hospital with acute hypoxic respiratory failure, bacterial pneumonia, right lung abscess, parapneumonic pleural effusion which is now status postthoracentesis, emphysema, and tobacco use.  She required ICU level care due to severe hypoxia.    Discussion/plan for today:  I have ordered repeat chest  x-ray for today.  Plan to follow-up on images.  Pepto-Bismol as needed started for diarrhea.  Patient has responded well.  Possibly antibiotic related diarrhea.  Continuing to wean supplemental oxygen as tolerated.  Otherwise per below    Pneumonia/abscess/sepsis: Improving  Antibiotics per ID.  Cultures reviewed.  1 of 2 blood cultures from 5/4 grew corynebacterium likely skin aishwarya.  Continue to follow remaining cultures.    Pleural effusions, parapneumonic: Status post thoracentesis.  Thoracic surgery is signed off.  Repeat chest x-ray    Respiratory failure: Weaning oxygen as needed.  Incentive spirometer and flutter valve.    Emphysema: Nebulizers, needs pulmonology clinic follow-up and PFTs outpatient.    2.4 cm lung nodule: Patient has been notified of these findings and agrees with plan to have repeat CT scan in pulmonology clinic in 4 to 6 weeks.  She understands this is very important.    Tobacco use: Cessation counseled.    Anemia: No active bleeding.  Likely multifactorial.  Reasonably stable.  Monitor intermittently    Patient is significantly physically debilitated from her acute illness.  She has worsening hypoxia with exertion and needs to functionally improve.  Therapy recommending acute rehab at discharge.  Consult physical medicine team to evaluate. Continue PT OT.    SSRI for depression.  Currently seems controlled.    Hypertension and diastolic dysfunction: Receiving losartan.  Normotensive.  Monitor and adjust as needed.  Add very low-dose metoprolol.    Treatment plan discussed with the patient is in agreement    DVT Prophylaxis: Mechanical    Disposition: Pending clinical course.    CODE STATUS:   Code Status and Medical Interventions:   Ordered at: 05/02/23 3915     Code Status (Patient has no pulse and is not breathing):    CPR (Attempt to Resuscitate)     Medical Interventions (Patient has pulse or is breathing):    Full Support       Issac Mendez,  MD  23        Electronically signed by Issac Mendez MD at 23 0927     Cristobal Barrett MD at 23 0836          [facility]      Mar/chart reviewed  Follow up pneumonia, ahrf  Some cough, tyson    Vital Sign Min/Max for last 24 hours  Temp  Min: 97.8 °F (36.6 °C)  Max: 99 °F (37.2 °C)   BP  Min: 121/72  Max: 132/84   Pulse  Min: 77  Max: 103   Resp  Min: 18  Max: 20   SpO2  Min: 91 %  Max: 97 %   Flow (L/min)  Min: 4  Max: 4.5   No data recorded     Appears ill, axox3,   perrl, eomi, normal sclera,  mmm, no jvd, trachea midline, neck supple,  chest decreased right base bilaterally, + crackles, no wheezes,   rrr,   soft, nt, nd +bs,  no c/c/ e  Skin warm, dry no rashes    Labs: : reviewed:  Bun 12  Cr 0.26  Na 135  Bicarb 32  Wbc 10  hgb 10.1  plts 388    A/P:  1. Right lower lobe pneumonia/lung abscess -- continue antibiotics a per ID  2. Right sided pleural effusions -- s/p thoracentesis  3. Sepsis -- better  4. AHRF --improving nicely now  5. Emphysema -- outpatient pft when better  6. 2.4cm lung nodule  -- she will need repeat ct in 4-6 weeks  7. Tobacco abuse  8. Anemia    Will see prn while here.     Electronically signed by Cristobal Barrett MD at 23 1057     Issac Mendez MD at 23 0833              [facility]  PROGRESS NOTE    Patient Name: Katherine Williamson  : 1950  MRN: [id number]    Date of Admission: 2023  Primary Care Physician: Zelalem Flor APRN    Subjective   Subjective     CC:  Follow-up lung infection    Subjective:  Patient notes her breathing is drastically better over the last 48 hours.  She is now down to 4 L nasal cannula with saturations at 94%.  She was a little frustrated as she felt physical therapy was exceptionally challenged and noted they had to increase her supplemental oxygen greatly in order to get her to walk short distances.  She says she is motivated to continue to get stronger.  She  agrees to get up to the chair today.    Review of Systems  No current fevers or chills  No current nausea, vomiting, or diarrhea  No current chest pain or palpitations      Objective   Objective     Vital Signs:   Temp:  [98.1 °F (36.7 °C)-98.3 °F (36.8 °C)] 98.3 °F (36.8 °C)  Heart Rate:  [] 105  Resp:  [18] 18  BP: ()/(53-73) 124/66        Physical Exam:  Constitutional:Awake, alert, acutely ill-appearing  HENT: NCAT, mucous membranes moist, neck supple  Respiratory: Cough is present, occasional coarse sounds, no wheezes, supplemental oxygen  Cardiovascular: Pulse rate is normal, palpable radial pulses  Gastrointestinal:  soft, nontender, nondistended  Musculoskeletal: Somewhat debilitated in appearance, mild lower extremity edema, BMI is 25  Psychiatric: Normal affect, cooperative, conversational  Neurologic: No slurred speech or facial droop, follows commands  Skin: No rashes or jaundice, warm      Results Reviewed:  Results from last 7 days   Lab Units 05/13/23  0431 05/12/23  0346 05/11/23  0251 05/10/23  0707 05/09/23  0714   WBC 10*3/mm3 11.78* 13.92* 19.36*   < > 19.56*   HEMOGLOBIN g/dL 9.9* 10.0* 10.3*   < > 12.2   HEMATOCRIT % 30.1* 29.6* 31.4*   < > 37.0   PLATELETS 10*3/mm3 390 344 296   < > 320   PROCALCITONIN ng/mL  --   --   --   --  0.23    < > = values in this interval not displayed.     Results from last 7 days   Lab Units 05/13/23 0431 05/12/23 0346 05/11/23  0251 05/10/23  0904   SODIUM mmol/L 134* 137 137 135*   POTASSIUM mmol/L 4.1 4.1 3.8 3.8   CHLORIDE mmol/L 100 102 102 100   CO2 mmol/L 33.0* 34.4* 32.0* 29.6*   BUN mg/dL 13 13 13 11   CREATININE mg/dL 0.20* 0.21* 0.23* 0.30*   GLUCOSE mg/dL 85 101* 93 141*   CALCIUM mg/dL 8.3* 8.9 8.3* 9.1   ALT (SGPT) U/L  --   --   --  42*   AST (SGOT) U/L  --   --   --  34*     Estimated Creatinine Clearance: 238.8 mL/min (A) (by C-G formula based on SCr of 0.2 mg/dL (L)).           Results for orders placed during the hospital encounter  of 05/13/20    Adult Transthoracic Echo Complete W/ Cont if Necessary Per Protocol    Interpretation Summary  · Left ventricular systolic function is normal.  · Left ventricular diastolic dysfunction (grade I) consistent with impaired relaxation.    Normal LV and RV size and function  EF estimated 65 to 70%  Normal atrial sizes  Normal RV size and function  No significant valvulopathy seen  Bubble study negative for right to left interatrial shunt under resting and Valsalva conditions, slightly mobile interatrial septum without defect seen clearly  No masses  No effusion seen  Grade 1 diastolic dysfunction by criteria  Cannot estimate PA systolic pressure secondary to insufficient TR envelope      I have reviewed the medications:  Scheduled Meds:vitamin C, 500 mg, Oral, Daily  citalopram, 20 mg, Oral, Daily  doxycycline, 100 mg, Oral, Q12H  enoxaparin, 40 mg, Subcutaneous, Q24H  fluticasone, 1 spray, Each Nare, Daily  guaiFENesin, 600 mg, Oral, BID  ipratropium-albuterol, 3 mL, Nebulization, 4x Daily - RT  losartan, 50 mg, Oral, Q24H  meropenem, 1 g, Intravenous, Q8H  mirtazapine, 7.5 mg, Oral, Nightly  sodium chloride, 10 mL, Intravenous, Q12H  cyanocobalamin, 1,000 mcg, Oral, Daily      Continuous Infusions:hold, 1 each      Assessment & Plan   Assessment & Plan     Active Hospital Problems    Diagnosis  POA   • **Acute respiratory failure with hypoxia [J96.01]  Yes   • Diastolic dysfunction, grade 1 [I51.89]  Yes   • Sepsis [A41.9]  Yes   • Pleural effusion, right [J90]  Yes   • Other emphysema [J43.8]  Yes   • Pulmonary nodule [R91.1]  Yes   • Pneumonia [J18.9]  Yes   • Tobacco abuse [Z72.0]  Yes   • Benign essential HTN [I10]  Yes      Resolved Hospital Problems   No resolved problems to display.        Brief Hospital Course to date:  Katherine Williamson is a 72 y.o. female presents to the hospital with acute hypoxic respiratory failure, bacterial pneumonia, right lung abscess, parapneumonic pleural effusion which is  now status postthoracentesis, emphysema, and tobacco use.  She required ICU level care due to severe hypoxia.    Discussion/plan:  Clinically patient is doing much better today.  I am planning to continue to wean her supplemental oxygen down today as much as possible.      Continue incentive spirometer.  Flutter valve added.  Spectrum antibiotics with doxycycline and meropenem.  I discussed infectious disease recommendations with need for repeat CT scan before completion of antibiotic course.  Patient understands she will need to follow-up in pulmonology clinic to make sure that infection clears.      Patient is significantly physically debilitated from her acute illness.  She has worsening hypoxia with exertion and needs to functionally improve.  Therapy recommending acute rehab at discharge.  Consult physical medicine team to evaluate all medical problems are new under my management today.  Continue PT OT.    Lung images reviewed and significant pneumonia and lung infection noted as well as emphysema.    Cultures reviewed.  1 of 2 blood cultures from 5/4 grew corynebacterium likely skin aishwarya.  Continue to follow remaining cultures.    Appreciate thoracic surgery recommendations on parapneumonic effusion.      Tobacco cessation counseled.    SSRI for depression.  Currently seems controlled.    Hypertension and diastolic dysfunction: Receiving losartan.  Normotensive.  Monitor and adjust as needed.  Add very low-dose metoprolol.    Treatment plan discussed with the patient is in agreement    DVT Prophylaxis: Mechanical      Disposition: Pending clinical course.    CODE STATUS:   Code Status and Medical Interventions:   Ordered at: 05/02/23 1800     Code Status (Patient has no pulse and is not breathing):    CPR (Attempt to Resuscitate)     Medical Interventions (Patient has pulse or is breathing):    Full Support       Issac Mendez MD  05/13/23        Electronically signed by Issac Mendez MD  at 23 0841     Cristobal Barrett MD at 23 0745          [facility]     Mar/chart reviewed  Follow up pneumonia, ahrf  Some cough    Vital Sign Min/Max for last 24 hours  Temp  Min: 98.1 °F (36.7 °C)  Max: 98.2 °F (36.8 °C)   BP  Min: 92/53  Max: 143/73   Pulse  Min: 80  Max: 111   Resp  Min: 18  Max: 18   SpO2  Min: 91 %  Max: 97 %   Flow (L/min)  Min: 4  Max: 15   No data recorded     Appears ill, axox3,   perrl, eomi, normal sclera,  mmm, no jvd, trachea midline, neck supple,  chest decreased right base bilaterally, + crackles, no wheezes,   rrr,   soft, nt, nd +bs,  no c/c/ e  Skin warm, dry no rashes    Labs: : reviewed:  Na 134  Bicarb 33  Wbc 11.8  hgb 9.9  plts 390    A/P:  1. Right lower lobe pneumonia/lung abscess -- continue antibiotics a per ID  2. Right sided pleural effusions -- s/p thoracentesis  3. Sepsis -- better  4. AHRF --improving nicely now  5. Emphysema -- outpatient pft when better  6. 2.4cm lung nodule  -- she will need repeat ct in 4-6 weeks  7. Tobacco abuse  8. Anemia    Patient is new to me today    Electronically signed by Cristobal Barrett MD at 23 1012     Jennifer Hood MD at 23 1326            PROGRESS NOTE  Patient Name: Katherine Williamson  Age/Sex: 72 y.o. female  : 1950  MRN: [id number]    Date of Admission: 2023  Date of Encounter Visit: 23   LOS: 10 days   Patient Care Team:  Zelalem Flor APRN as PCP - General (Internal Medicine)  Brandon Mitchell MD as Consulting Physician (Orthopedic Surgery)    Chief Complaint: Acute progressive respiratory failure    Hospital course: Patient has been progressing despite being treated with Rocephin and vancomycin initially and then switched to Zosyn with increased to density and the extent of the consolidation and later transition to imipenem/doxycycline since she continued to progress despite the Zosyn  On the new regimen the patient is showing clinical improvement  She had  "pleural effusion that was drained on 5/10/2023 with 600 cc removed of straw-colored fluid was low protein consistency and normal glucose, the rest of the fluid analysis is still pending  Autoimmune panel came back negative  Fungal serology is pending  Patient has been afebrile for the last 48 hours along with parallel drop in the white blood cell count and improvement in the oxygen requirements  Tolerating p.o., feeling better    REVIEW OF SYSTEMS:   CONSTITUTIONAL: Low-grade fever  CARDIOVASCULAR: No chest pain, chest pressure or chest discomfort. No palpitations or edema.   RESPIRATORY: Less shortness of breath, less productive cough  GASTROINTESTINAL: No anorexia, nausea, vomiting or diarrhea. No abdominal pain or blood.   HEMATOLOGIC: No bleeding or bruising.     Ventilator/Non-Invasive Ventilation Settings (From admission, onward)    6 L nasal cannula-            Vital Signs  Temp:  [98.1 °F (36.7 °C)-98.7 °F (37.1 °C)] 98.1 °F (36.7 °C)  Heart Rate:  [] 98  Resp:  [18-20] 18  BP: ()/(53-74) 129/73  SpO2:  [92 %-96 %] 94 %  on  Flow (L/min):  [7-15] 7 Device (Oxygen Therapy): humidified;high-flow nasal cannula    Intake/Output Summary (Last 24 hours) at 5/12/2023 1326  Last data filed at 5/12/2023 1234  Gross per 24 hour   Intake 100 ml   Output 600 ml   Net -500 ml     Flowsheet Rows    Flowsheet Row First Filed Value   Admission Height 160 cm (63\") Documented at 05/02/2023 1900   Admission Weight 61.2 kg (135 lb) Documented at 05/02/2023 1900        Body mass index is 25.7 kg/m².      05/10/23  0414 05/11/23  0600 05/12/23  0500   Weight: 65.8 kg (145 lb 1 oz) 65.8 kg (145 lb 1 oz) (Bed is broken)       Physical Exam:  GEN: Looking better, in no distress, awake and responsive, on high flow nasal cannula oxygen  EYES:   Sclerae clear. No icterus. PERRL. Normal EOM  ENT:   External ears/nose normal, no oral lesions, no thrush, mucous membranes moist  NECK:  Supple, midline trachea, no JVD  LUNGS: " Normal chest on inspection, no wheezes on quiet breathing still having some wheezes on coughing, no much copious secretion, diminished breath sounds, very faint crackles more so on the right  CV:  Regular rhythm and rate. Normal S1/S2. No murmurs, gallops, or rubs noted.  ABD:  Soft, nontender and nondistended. Normal bowel sounds. No guarding  EXT:  Moves all extremities well. No cyanosis. No redness.  Trace lower extremities edema.   Skin: Dry, intact, no bleeding    Results Review:    Results From Last 14 Days   Lab Units 05/03/23  0607 05/02/23  1743 05/02/23  1433   LACTATE mmol/L 1.9 3.3* 6.3*     Results from last 7 days   Lab Units 05/12/23  0346 05/11/23  0251 05/10/23  0904 05/09/23  0714 05/08/23  0605 05/07/23  0435 05/06/23  0627   SODIUM mmol/L 137 137 135* 135* 137 142 140   POTASSIUM mmol/L 4.1 3.8 3.8 4.7 3.5 3.4* 3.7   CHLORIDE mmol/L 102 102 100 100 101 102 105   CO2 mmol/L 34.4* 32.0* 29.6* 30.0* 31.6* 31.3* 29.7*   BUN mg/dL 13 13 11 12 11 12 16   CREATININE mg/dL 0.21* 0.23* 0.30* 0.31* 0.30* 0.37* 0.37*   CALCIUM mg/dL 8.9 8.3* 9.1 8.5* 8.7 8.9 9.2   AST (SGOT) U/L  --   --  34*  --   --   --   --    ALT (SGPT) U/L  --   --  42*  --   --   --   --    ANION GAP mmol/L 0.6* 3.0* 5.4 5.0 4.4* 8.7 5.3   ALBUMIN g/dL  --   --  2.0*  --   --   --   --                  Results from last 7 days   Lab Units 05/12/23  0346 05/11/23  0251 05/10/23  0707 05/09/23  0714 05/08/23  0605 05/07/23  0435 05/06/23  0627   WBC 10*3/mm3 13.92* 19.36* 28.06* 19.56* 18.04* 19.72* 19.80*   HEMOGLOBIN g/dL 10.0* 10.3* 11.0* 12.2 11.1* 11.5* 11.2*   HEMATOCRIT % 29.6* 31.4* 32.6* 37.0 33.5* 33.6* 32.8*   PLATELETS 10*3/mm3 344 296 317 320 288 313 316   MCV fL 90.5 91.8 91.6 93.2 91.8 90.3 90.1   NEUTROPHIL % % 86.8* 86.3* 86.2* 85.7* 86.6* 84.3*  --    LYMPHOCYTE % % 9.0* 8.3* 7.6* 9.3* 7.6* 8.8*  --    MONOCYTES % % 3.3* 3.7* 3.2* 2.5* 2.5* 3.0*  --    EOSINOPHIL % % 0.2* 0.3 0.3 0.4 0.4 0.2*  --    BASOPHIL % % 0.1  0.2 0.2 0.2 0.2 0.4  --    IMM GRAN % % 0.6* 1.2* 2.5*  --  2.7* 3.3*  --       Latest Reference Range & Units Most Recent   RNP Antibodies 0.0 - 0.9 AI <0.2  5/9/23 14:00   Vann Antibodies 0.0 - 0.9 AI <0.2  5/9/23 14:00   Sjogren's Anti-SS-A 0.0 - 0.9 AI <0.2  5/9/23 14:00   Sjogren's Anti-SS-B 0.0 - 0.9 AI <0.2  5/9/23 14:00   DUTCH-1 IgG 0.0 - 0.9 AI <0.2  5/9/23 14:00      Latest Reference Range & Units Most Recent   Anti-Centromere B Antibodies 0.0 - 0.9 AI <0.2  5/9/23 14:00   Antichromatin Antibodies 0.0 - 0.9 AI <0.2  5/9/23 14:00   Anti-DNA (DS) Ab Qn 0 - 9 IU/mL <1  5/9/23 14:00   C-ANCA Neg:<1:20 titer <1:20  5/9/23 14:00   DUTCH-1 IgG 0.0 - 0.9 AI <0.2  5/9/23 14:00   P-ANCA Neg:<1:20 titer <1:20  5/9/23 14:00   Atypical pANCA Neg:<1:20 titer <1:20  5/9/23 14:00      Latest Reference Range & Units Most Recent   Antiscleroderma-70 Antibodies 0.0 - 0.9 AI <0.2  5/9/23 14:00           Latest Reference Range & Units Most Recent   pH, Fluid  8.03  5/10/23 11:10   Glucose, Fluid mg/dL 142  5/10/23 11:10   Protein, Total, Fluid g/dL 1.4  5/10/23 11:10      Latest Reference Range & Units Most Recent   ANTI-MPO ANTIBODIES 0.0 - 0.9 units <0.2  5/9/23 14:00   ANTI-PR3 ANTIBODIES 0.0 - 0.9 units <0.2  5/9/23 14:00               Invalid input(s): LDLCALC  Results from last 7 days   Lab Units 05/08/23  1303   PH, ARTERIAL pH units 7.509*   PCO2, ARTERIAL mm Hg 42.7   PO2 ART mm Hg 60.0*   HCO3 ART mmol/L 34.0*         Glucose   Date/Time Value Ref Range Status   05/10/2023 1801 135 (H) 70 - 130 mg/dL Final     Comment:     Meter: [id number] : [id number] Della Martinez KORY     Results from last 7 days   Lab Units 05/09/23  0714   PROCALCITONIN ng/mL 0.23     Results from last 7 days   Lab Units 05/10/23  1110 05/10/23  0441 05/06/23  1402   BODYFLDCX  No growth at 2 days  --   --    RESPCX   --  Rare Normal respiratory aishwarya. No S. aureus or Pseudomonas aeruginosa detected. Final report. Rare Normal respiratory aishwarya.  No S. aureus or Pseudomonas aeruginosa detected. Final report.                     I reviewed the patient's new clinical results.  I personally viewed and interpreted the patient's imaging results: Bilateral dense pneumonia with some cavitation on the right side, multiples chest x-ray from today compared to the one done 3 days ago showed no significant changes        Medication Review:   vitamin C, 500 mg, Oral, Daily  citalopram, 20 mg, Oral, Daily  doxycycline, 100 mg, Oral, Q12H  enoxaparin, 40 mg, Subcutaneous, Q24H  fluticasone, 1 spray, Each Nare, Daily  guaiFENesin, 600 mg, Oral, BID  ipratropium-albuterol, 3 mL, Nebulization, 4x Daily - RT  losartan, 50 mg, Oral, Q24H  meropenem, 1 g, Intravenous, Q8H  mirtazapine, 7.5 mg, Oral, Nightly  sodium chloride, 10 mL, Intravenous, Q12H  cyanocobalamin, 1,000 mcg, Oral, Daily        hold, 1 each        ASSESSMENT:   1. Right lower lobe lung abscess/pneumonia  2. Sepsis  3. Right loculated pleural effusion  4. Acute hypoxic respiratory failure, worse  5. Sepsis  6. Emphysema, upper lobe predominant  7. Peripheral pleural-based nodule, 2.4 cm on CT chest 4/3/23, could have been related to early pneumonia.  Underlying malignancy cannot be excluded  8. Tobacco abuse  9. Essential hypertension    PLAN:  Continues to improve with improving in the vital signs, oxygen requirements, and we will do a follow-up chest x-ray tomorrow to see if there is a pattern improvement in that 1 as well, which is expected  Continue with the meropenem and the doxycycline, may change the doxycycline to p.o.  Continue with the pulmonary hygiene measures  We will order the chest x-ray for tomorrow  Patient failed to respond to other antibiotic courses and she was switched to meropenem and doxycycline with improvement afterwards, there is no positive culture to help guide our antibiotic selection or de-escalation.  Infectious disease following  Follow-up on the pleural fluid cytology and  culture  Most of the fungal serology are so far negative      Disposition: Per primary team    Jennifer Hood MD  05/12/23  13:26 EDT          Dictated utilizing Dragon dictation    Electronically signed by Jennifer Hood MD at 05/12/23 1330     Rigoberto Chan MD at 05/12/23 1051          ID NOTE    CC: f/u pneumonia    Subj: No fever. Worked w/ PT just before my arrival so currently on 15L NC. She looks and feels better again today. All cultures remain NGTD. Out of CICU to med-surg floor.       Medications:    Current Facility-Administered Medications:   •  acetaminophen (TYLENOL) tablet 650 mg, 650 mg, Oral, Q4H PRN, 650 mg at 05/10/23 2014 **OR** acetaminophen (TYLENOL) 160 MG/5ML solution 650 mg, 650 mg, Oral, Q4H PRN **OR** acetaminophen (TYLENOL) suppository 650 mg, 650 mg, Rectal, Q4H PRN, Gee Clement MD  •  ascorbic acid (VITAMIN C) tablet 500 mg, 500 mg, Oral, Daily, Gee Clement MD, 500 mg at 05/12/23 0944  •  citalopram (CeleXA) tablet 20 mg, 20 mg, Oral, Daily, Gee Clement MD, 20 mg at 05/12/23 0944  •  doxycycline (MONODOX) capsule 100 mg, 100 mg, Oral, Q12H, Rigoberto Chan MD  •  Enoxaparin Sodium (LOVENOX) syringe 40 mg, 40 mg, Subcutaneous, Q24H, Jennifer Hood MD, 40 mg at 05/11/23 1545  •  fluticasone (FLONASE) 50 MCG/ACT nasal spray 1 spray, 1 spray, Each Nare, Daily, Gee Clement MD, 1 spray at 05/08/23 0808  •  guaiFENesin (MUCINEX) 12 hr tablet 600 mg, 600 mg, Oral, BID, Gee Clement MD, 600 mg at 05/12/23 0944  •  Hold medication, 1 each, Does not apply, Continuous PRN, Jennifer Hood MD  •  ipratropium-albuterol (DUO-NEB) nebulizer solution 3 mL, 3 mL, Nebulization, 4x Daily - RT, Gee Clement MD, 3 mL at 05/12/23 0732  •  loperamide (IMODIUM) capsule 2 mg, 2 mg, Oral, Q6H PRN, Gee Clement MD, 2 mg at 05/07/23 1822  •  losartan (COZAAR) tablet 50 mg, 50 mg, Oral, Q24H, Gee Clement MD, 50 mg at 05/12/23 0996  •  Magnesium  Sulfate - Total Dose 10 grams - Magnesium 1 or Less, 2 g, Intravenous, PRN **OR** Magnesium Sulfate - Total Dose 6 grams - Magnesium 1.1 - 1.5, 2 g, Intravenous, PRN **OR** Magnesium Sulfate - Total Dose 4 grams - Magnesium 1.6 - 1.9, 4 g, Intravenous, PRN, Gee Clement MD  •  meropenem (MERREM) 1 g in sodium chloride 0.9 % 100 mL IVPB-VTB, 1 g, Intravenous, Q8H, Jennifer Hood MD, Last Rate: 33.3 mL/hr at 05/12/23 0449, 1 g at 05/12/23 0449  •  mirtazapine (REMERON) tablet 7.5 mg, 7.5 mg, Oral, Nightly, Gee Clement MD, 7.5 mg at 05/11/23 2027  •  nitroglycerin (NITROSTAT) SL tablet 0.4 mg, 0.4 mg, Sublingual, Q5 Min PRN, Gee Clement MD  •  ondansetron (ZOFRAN) injection 4 mg, 4 mg, Intravenous, Q6H PRN, Gee Clement MD, 4 mg at 05/11/23 1855  •  potassium & sodium phosphates (PHOS-NAK) 280-160-250 MG packet - for Phosphorus less than 1.25 mg/dL, 2 packet, Oral, Q6H PRN **OR** potassium & sodium phosphates (PHOS-NAK) 280-160-250 MG packet - for Phosphorus 1.25 - 2.5 mg/dL, 2 packet, Oral, Q6H PRN, Gee Clement MD  •  potassium chloride (K-DUR,KLOR-CON) ER tablet 40 mEq, 40 mEq, Oral, PRN, 40 mEq at 05/07/23 0654 **OR** potassium chloride (KLOR-CON) packet 40 mEq, 40 mEq, Oral, PRN **OR** potassium chloride 10 mEq in 100 mL IVPB, 10 mEq, Intravenous, Q1H PRN, Gee Clement MD  •  [COMPLETED] Insert Peripheral IV, , , Once **AND** sodium chloride 0.9 % flush 10 mL, 10 mL, Intravenous, PRN, Gee Clement MD  •  sodium chloride 0.9 % flush 10 mL, 10 mL, Intravenous, Q12H, Gee Clement MD, 10 mL at 05/12/23 0944  •  sodium chloride 0.9 % flush 10 mL, 10 mL, Intravenous, PRN, Gee Clement MD  •  sodium chloride 0.9 % infusion 40 mL, 40 mL, Intravenous, PRN, Gee Clement MD  •  vitamin B-12 (CYANOCOBALAMIN) tablet 1,000 mcg, 1,000 mcg, Oral, Daily, Gee Clement MD, 1,000 mcg at 05/12/23 0944      Objective   Vital Signs   Temp:  [97.7 °F (36.5 °C)-98.7 °F (37.1 °C)]  98.7 °F (37.1 °C)  Heart Rate:  [] 91  Resp:  [18-20] 18  BP: ()/(53-74) 117/62    Physical Exam:   General: awake, alert, very nice but frail appearing  Eyes: no scleral icterus  ENT: no thrush; HFNC in nares  Cardiovascular: NR  Respiratory: no wheezing; normal WOB on 15L HFNC  GI: Abdomen is soft, not tender  :  no Snell catheter  Skin: No rashes  Neurological: Alert and oriented x 3  Psychiatric: Normal mood and affect     Labs:   CBC, BMP, fungal studies and cultures reviewed today  Lab Results   Component Value Date    WBC 13.92 (H) 05/12/2023    HGB 10.0 (L) 05/12/2023    HCT 29.6 (L) 05/12/2023    MCV 90.5 05/12/2023     05/12/2023     Lab Results   Component Value Date    GLUCOSE 101 (H) 05/12/2023    CALCIUM 8.9 05/12/2023     05/12/2023    K 4.1 05/12/2023    CO2 34.4 (H) 05/12/2023     05/12/2023    BUN 13 05/12/2023    CREATININE 0.21 (L) 05/12/2023    EGFR 124.5 05/12/2023    BCR 61.9 (H) 05/12/2023    ANIONGAP 0.6 (L) 05/12/2023     Procal 3.95--->2.6-->0.2  Crypto Ag negative  Urine Histo Ag negative  Serum Histo Ag pending  Urine Blasto Ag negative  Aspergillus Ag pending    Microbiology:  5/2 RPP: negative  5/2 BCx: Corynebacterium in 1/2 sets  5/2 SpCx: normal aishwarya  5/2 MRSA nares: negative  5/2 Strep pneumo Ag; negative  5/2 Legionella Ag: negatie  5/4 BCx: negative  5/5 C diff: negative  5/6 SpCx: normal aishwarya  5/9 AFB Cx: NGTD  5/9 Fungus Cx: NGTD  5/10 SpCx: NGTD  5/10 L Thoracentesis Cx: NGTD      ASSESSMENT/PLAN:  6. Pneumonia and right lung abscess  7. Pleural effusions  8. Acute hypoxic respiratory failure  9. Emphysema  10. Tobacco use    She remains afebrile and her WBC is trending down. Her O2 needs are gradually improving. It is only higher at the moment due to the fact she just worked w/ PT. Her cultures are negative. At this point, I am tentatively planning meropenem 1 g IV q8h x 4 weeks with stop date ~6/6/23 and doxycycline 100 mg PO BID for the  same duration. I'll also order a repeat CT to be done just prior to the stop date. She will need follow-up w/ pulmonologist as well.     ID will follow.     Electronically signed by Rigoberto Chan MD at 05/12/23 9530

## 2023-05-15 NOTE — PROGRESS NOTES
[facility]  [phone]  Dr. Darius Ford    Subjective:  LOS: 13    Chief Complaint: Acute stroke     Acute onset left-sided weakness yesterday.  She has a history of COPD and is a current cigarette smoker.  Came in with dense right lower lobe pneumonia and right lung abscess.  Currently on broad antibiotics per ID.  She feels much better today.  Her weakness is much improved as is her neglect.      Objective   Vital Signs past 24hrs  Temp range: Temp (24hrs), Av.3 °F (37.4 °C), Min:97.7 °F (36.5 °C), Max:101.6 °F (38.7 °C)    BP range: BP: ()/(51-78) 150/74  Pulse range: Heart Rate:  [] 81  Resp rate range: Resp:  [14-20] 17  Device (Oxygen Therapy): nasal cannulaFlow (L/min):  [2-7] 4  Oxygen range:SpO2:  [89 %-100 %] 96 %     Physical Exam  Eyes:      Pupils: Pupils are equal, round, and reactive to light.   Cardiovascular:      Rate and Rhythm: Normal rate and regular rhythm.      Heart sounds: No murmur heard.  Abdominal:      General: Bowel sounds are normal.      Palpations: Abdomen is soft. There is no mass.      Tenderness: There is no abdominal tenderness.   Musculoskeletal:         General: No swelling.   Neurological:      Mental Status: She is alert.      Motor: Weakness (mild left-sided only now) present.       Results Review:    I have reviewed the laboratory and imaging data since the last note by [facility] physician.  My annotations are noted in assessment and plan.      Result Review:  I have personally reviewed the results from last note by [facility] physician to 5/15/2023 11:01 EDT and agree with these findings:  [x]  Laboratory list / accordion  [x]  Microbiology  [x]  Radiology  []  EKG/Telemetry   [x]  Cardiology/Vascular   []  Pathology  []  Old records  []  Other:    Medication Review:  I have reviewed the current MAR.  My annotations are noted in assessment and plan.    aspirin, 81 mg, Oral, Daily   Or  aspirin, 300 mg, Rectal, Daily  atorvastatin, 80 mg, Oral,  Nightly  citalopram, 20 mg, Oral, Daily  clopidogrel, 75 mg, Oral, Daily  doxycycline, 100 mg, Intravenous, Q12H  fluticasone, 1 spray, Each Nare, Daily  guaiFENesin, 600 mg, Oral, BID  ipratropium-albuterol, 3 mL, Nebulization, 4x Daily - RT  meropenem, 1 g, Intravenous, Q8H  metoprolol tartrate, 12.5 mg, Oral, Q12H  sodium chloride, 10 mL, Intravenous, Q12H  sodium chloride, 10 mL, Intravenous, Q12H  cyanocobalamin, 1,000 mcg, Oral, Daily        hold, 1 each  niCARdipine, 5-15 mg/hr  phenylephrine, 0.5-3 mcg/kg/min, Last Rate: 0.5 mcg/kg/min (05/14/23 7653)      Lines, Drains & Airways     Active LDAs     Name Placement date Placement time Site Days    Peripheral IV 05/13/23 2021 Left Antecubital 05/13/23 2021  Antecubital  less than 1    Peripheral IV 05/14/23 1513 Anterior;Left;Upper Arm 05/14/23  1513  Arm  less than 1    External Urinary Catheter 05/07/23  --  --  7              No active isolations  Diet Orders (active) (From admission, onward)     Start     Ordered    05/14/23 1628  NPO Diet NPO Type: Strict NPO  Diet Effective Now        Comments: Strict NPO Until Nursing Dysphagia Screen Passed    05/14/23 1633    05/07/23 1800  Dietary Nutrition Supplements Boost Plus (Ensure Enlive, Ensure Plus)  Daily With Breakfast, Lunch & Dinner       05/07/23 1203                PCCM Problems  Acute right MCA stroke now status post TNK  Concern for airway protection and dysphagia  Acute hypoxic respiratory failure with worsening  Right lower lobe pneumonia and lung abscess with ongoing antibiotics  Right pleural effusion without evidence for empyema  New right lower lobe 2.4 cm lung nodule on CT 4/3/2023  COPD, currently not wheezing  Current cigarette smoker  Acute hyponatremia  Anemia        Plan of Treatment    Left-sided weakness with acute right MCA stroke and status post TNK.  Much improved.  Discussed with neurologist today.  Plan for MRI.  Possible transfer out of unit later today.    Oxygen requirements  have stabilized.  Not tolerating low-flow oxygen.    Ongoing antibiotics for right lower lobe pneumonia.  Continue to direct as per ID.    Right lung nodule and now with right lung severe pneumonia.  Certainly sounds suspicious and needs follow-up imaging to exclude malignancy.    COPD and currently not wheezing.  Now on nebulizer treatments to help with mobilization of secretions.    Patient will be counseled to quit smoking prior to discharge.    Hyponatremia from underlying lung disease likely.  Improved.    Anemia from recent acute illness.  Other etiologies not ruled out yet.    High risk for dysphagia.  Pending swallow evaluation.  We will try to see if he can give her aspirin and Plavix.        Darius Ford MD  05/15/23  11:01 EDT      Part of this note may be an electronic transcription/translation of spoken language to printed text using the Dragon Dictation System.

## 2023-05-15 NOTE — PROGRESS NOTES
[facility]    Noted rapid response called last night for right MCA stroke.  Will monitor pt's progress with therapies to determine most appropriate level of rehab for pt upon DC.     Thank you,  Darling Duvall, RN  Rehab Admission Nurse

## 2023-05-15 NOTE — THERAPY EVALUATION
Acute Care - Speech Language Pathology   Swallow Initial Evaluation [facility]     Patient Name: Katherine Williamson  : 1950  MRN: [id number]  Today's Date: 5/15/2023               Admit Date: 2023    Visit Dx:     ICD-10-CM ICD-9-CM   1. Acute respiratory failure with hypoxia  J96.01 518.81   2. Sepsis, due to unspecified organism, unspecified whether acute organ dysfunction present  A41.9 038.9     995.91   3. Community acquired pneumonia, unspecified laterality  J18.9 486   4. Abnormal CXR  R93.89 793.2   5. Hyperglycemia  R73.9 790.29   6. 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 JOSEPH LAU    Hypertension      Past Surgical History:   Procedure Laterality Date    COLONOSCOPY  10/2015    Ywzem-odlqstztgfuy-[facility]. [facility].  Follow-up in 5 years.    COLONOSCOPY N/A 2022    2 BENIGN POLYPS IN DESCENDING, 5 MM BENIGN POLYP IN SIGMOID, MULTIPLE SMALL AND LARGE DIVERTICULA IN SIGMOID, RESCOPE IN 3 YRS, DR. JOSEPH LAU AT [facility]    EYE SURGERY      JOINT REPLACEMENT  ?    Left total hip replacement in .  In 2015 patient had left hip revision     TONSILLECTOMY         SLP Recommendation and Plan  SLP Swallowing Diagnosis: R/O pharyngeal dysphagia (05/15/23 0900)  SLP Diet Recommendation: NPO, ice chips between meals after oral care, with supervision, water between meals after oral care, with supervision (05/15/23 0900)     SLP Rec. for Method of Medication Administration: meds via alternate route (05/15/23 0900)     Monitor for Signs of Aspiration: yes (05/15/23 0900)  Recommended Diagnostics: VFSS ([facility]) (05/15/23 0900)  Swallow Criteria for Skilled Therapeutic Interventions Met: demonstrates skilled criteria (05/15/23 0900)  Anticipated Discharge Disposition (SLP): anticipate therapy at next level of care (05/15/23 0900)  Rehab Potential/Prognosis, Swallowing: good, to achieve stated therapy goals (05/15/23 0900)  Therapy Frequency (Swallow): PRN (05/15/23 0900)  Predicted Duration Therapy Intervention (Days): until discharge (05/15/23 0900)                                        Plan of Care Reviewed With: patient  Outcome Evaluation: Clinical swallow evaluation completed recommend NPO and VFSS 5/16. Meds via alternate route and okay for ice chips and spoonful sips of water.      SWALLOW EVALUATION (last 72 hours)       SLP Adult Swallow Evaluation       Row Name 05/15/23 0900 05/12/23 1500                Rehab Evaluation    Document Type evaluation  -KA discharge treatment  -AB       Subjective Information no complaints  -KA no complaints  -AB       Patient Observations alert;cooperative  -KA alert;cooperative;agree to therapy  -AB       Patient/Family/Caregiver Comments/Observations -- seen in room 4east, 467, O2 weaned to 5L.  -AB       Patient Effort good  -KA good  -AB       Symptoms Noted During/After Treatment none  -KA none  -AB          General Information    Patient Profile Reviewed yes  -KA --       Pertinent History Of Current Problem New orders received, new acute left sided weakness right MCA stroke s/p TNK.  Neuro deficits resolved. Patient  initially admitted with PNA and VFSS on 5/6 recommended regular solids and thin liquids.  -KA --       Current Method of Nutrition NPO  -KA --       Precautions/Limitations, Vision WFL;for purposes of eval  -KA --       Precautions/Limitations, Hearing WFL  -KA --       Prior Level of Function-Swallowing no diet consistency restrictions  -KA --       Plans/Goals Discussed with patient;agreed upon  -KA --       Barriers to Rehab medically complex  -KA --       Patient's Goals for Discharge return home  -KA --          Oral Motor Structure and Function    Dentition Assessment natural, present and adequate  -KA --       Secretion Management WNL/WFL  -KA --       Mucosal Quality moist, healthy  -KA --          Oral Musculature and Cranial Nerve Assessment    Oral Motor General Assessment WFL  -KA --       Oral Labial or Buccal Impairment, Detail, Cranial Nerve VII (Facial): --  -KA --          General Eating/Swallowing Observations    Respiratory Support Currently in Use nasal cannula  -KA --       Eating/Swallowing Skills fed by SLP  -KA --       Positioning During Eating upright in bed  -KA --       Utensils Used spoon;cup  -KA --       Consistencies Trialed ice chips;thin liquids;pureed;mixed consistency  -KA --       Pre SpO2 (%) 95  -KA --       Post SpO2 (%) 94  -KA --          Clinical Swallow Eval    Clinical Swallow Evaluation Summary Patient initially tolerated trials of ice chips, thins via spoon and cup, puree and mechanical soft mixed without overt s/s of pen/asp. [place] through evaluation patient 02 decreased to 88 to 90% with PO trials and overt cough end of evaluation. 02 did return to 94% after trials. Recommend NPO and VFSS to further assess. Discussed with patient and RN. Okay for ice chips and small spoonful trials of water.  -KA --          SLP Evaluation Clinical Impression    SLP Swallowing Diagnosis R/O pharyngeal dysphagia  -KA --       Functional Impact risk of aspiration/pneumonia  -KA --        Rehab Potential/Prognosis, Swallowing good, to achieve stated therapy goals  -KA --       Swallow Criteria for Skilled Therapeutic Interventions Met demonstrates skilled criteria  -KA --          Recommendations    Therapy Frequency (Swallow) PRN  -KA --       Predicted Duration Therapy Intervention (Days) until discharge  -KA --       SLP Diet Recommendation NPO;ice chips between meals after oral care, with supervision;water between meals after oral care, with supervision  -KA --       Recommended Diagnostics VFSS ([facility])  -KA --       Oral Care Recommendations Oral Care BID/PRN  -KA --       SLP Rec. for Method of Medication Administration meds via alternate route  -KA --       Monitor for Signs of Aspiration yes  -KA --       Anticipated Discharge Disposition (SLP) anticipate therapy at next level of care  -KA --                 User Key  (r) = Recorded By, (t) = Taken By, (c) = Cosigned By      Initials Name Effective Dates    Regan Javier MA,[facility]3]-SLP 06/02/22 -     AB Marce Alvarado, MS CCC-SLP 12/27/22 -                     EDUCATION  The patient has been educated in the following areas:   Dysphagia (Swallowing Impairment).        SLP GOALS       Row Name 05/12/23 1500             (LTG) Patient will demonstrate functional swallow for    Diet Texture (Demonstrate functional swallow) regular textures  -AB      Liquid viscosity (Demonstrate functional swallow) thin liquids  -AB      [place] (Demonstrate functional swallow) independently (over 90% accuracy)  -AB      Progress/Outcomes (Demonstrate functional swallow) goal met  -AB         ([facility]) Swallow Management Recall Goal 1 (SLP)    Activity (Swallow Management Recall Goal 1, SLP) independent recall of;aspiration precautions;reflux precautions  -AB      [place]/Accuracy (Swallow Management Recall Goal 1, SLP) independently (over 90% accuracy)  -AB      Progress/Outcomes (Swallow Management Recall Goal 1, SLP) goal met  -AB      Comment  (Swallow Management Recall Goal 1, SLP) VFSS follow up completed. Friend at bedside.  Verbal review of VFSS results 5.6. Discussed mild deficits and compensations to aid. Pt reports no current difficulties with swallow function, decreased appetite. Has already completed lunch (Filet O Fish) but amenable to consume thins by straw for evaluation tolerance. No s/s aspiration. Written education for diet/liquid levels and compensations, and reflux precautions. Following teaching, 100% comprehension, written education hung on wall for pt reference. Discussed plan of care, available as needed, however swallow functional with utilization of compensatory strategies.     Recommend: continue regular and thins. Medications: with thins. Compensations: GERD precautions which include: consumption of small meals, utilization of thin liquid wash or extra sauces/gravies as indicated, remaining upright for all PO consumption and at least 30 minutes s/p, avoid eating at reasonable time frame based on sleep schedule, avoid dry/sticky/dense foods as needed.    SLP to sign off at this juncture - instrumental completed, follow up education with 100% comprehension. Please reconsult if difficulties arise.  -AB                User Key  (r) = Recorded By, (t) = Taken By, (c) = Cosigned By      Initials Name Provider Type    Marce Sanderson, MS CCC-SLP Speech and Language Pathologist                       Time Calculation:    Time Calculation- SLP       Row Name 05/15/23 1124 05/15/23 1025          Time Calculation- SLP    SLP Start Time 0830  -KA --     SLP Received On 05/15/23  -KA 05/15/23  -KA        Untimed Charges    SLP Eval/Re-eval  ST Eval Oral Pharyng Swallow - 04783  -KA --     [id number] Eval Oral Pharyng Swallow Minutes 60  -KA --        Total Minutes    Untimed Charges Total Minutes 60  -KA --      Total Minutes 60  -KA --               User Key  (r) = Recorded By, (t) = Taken By, (c) = Cosigned By      Initials Name Provider  Type    KA Regan Leon MA,CCC-SLP Speech and Language Pathologist                    Therapy Charges for Today       Code Description Service Date Service Provider Modifiers Qty    [id number]  ST EVAL ORAL PHARYNG SWALLOW 4 5/15/2023 Regan Leon MA,CCC-SLP GN 1                 Regan Leon MA,CCC-SLP  5/15/2023

## 2023-05-16 ENCOUNTER — APPOINTMENT (OUTPATIENT)
Dept: MRI IMAGING | Facility: HOSPITAL | Age: 73
DRG: 871 | End: 2023-05-16
Payer: COMMERCIAL

## 2023-05-16 ENCOUNTER — APPOINTMENT (OUTPATIENT)
Dept: GENERAL RADIOLOGY | Facility: HOSPITAL | Age: 73
DRG: 871 | End: 2023-05-16
Payer: COMMERCIAL

## 2023-05-16 LAB
ALBUMIN SERPL-MCNC: 2.1 G/DL (ref 3.5–5.2)
ANION GAP SERPL CALCULATED.3IONS-SCNC: 3 MMOL/L (ref 5–15)
BUN SERPL-MCNC: 13 MG/DL (ref 8–23)
BUN/CREAT SERPL: 59.1 (ref 7–25)
CALCIUM SPEC-SCNC: 8.3 MG/DL (ref 8.6–10.5)
CHLORIDE SERPL-SCNC: 101 MMOL/L (ref 98–107)
CO2 SERPL-SCNC: 31 MMOL/L (ref 22–29)
CREAT SERPL-MCNC: 0.22 MG/DL (ref 0.57–1)
DEPRECATED RDW RBC AUTO: 43.8 FL (ref 37–54)
EGFRCR SERPLBLD CKD-EPI 2021: 123.2 ML/MIN/1.73
ERYTHROCYTE [DISTWIDTH] IN BLOOD BY AUTOMATED COUNT: 13.3 % (ref 12.3–15.4)
GLUCOSE BLDC GLUCOMTR-MCNC: 106 MG/DL (ref 70–130)
GLUCOSE BLDC GLUCOMTR-MCNC: 116 MG/DL (ref 70–130)
GLUCOSE BLDC GLUCOMTR-MCNC: 95 MG/DL (ref 70–130)
GLUCOSE SERPL-MCNC: 80 MG/DL (ref 65–99)
HCT VFR BLD AUTO: 29 % (ref 34–46.6)
HGB BLD-MCNC: 10 G/DL (ref 12–15.9)
MCH RBC QN AUTO: 30.8 PG (ref 26.6–33)
MCHC RBC AUTO-ENTMCNC: 34.5 G/DL (ref 31.5–35.7)
MCV RBC AUTO: 89.2 FL (ref 79–97)
PHOSPHATE SERPL-MCNC: 2.4 MG/DL (ref 2.5–4.5)
PLATELET # BLD AUTO: 365 10*3/MM3 (ref 140–450)
PMV BLD AUTO: 9.9 FL (ref 6–12)
POTASSIUM SERPL-SCNC: 3.9 MMOL/L (ref 3.5–5.2)
RBC # BLD AUTO: 3.25 10*6/MM3 (ref 3.77–5.28)
SODIUM SERPL-SCNC: 135 MMOL/L (ref 136–145)
WBC NRBC COR # BLD: 11.81 10*3/MM3 (ref 3.4–10.8)

## 2023-05-16 PROCEDURE — 94664 DEMO&/EVAL PT USE INHALER: CPT

## 2023-05-16 PROCEDURE — 94799 UNLISTED PULMONARY SVC/PX: CPT

## 2023-05-16 PROCEDURE — 97110 THERAPEUTIC EXERCISES: CPT

## 2023-05-16 PROCEDURE — 99222 1ST HOSP IP/OBS MODERATE 55: CPT | Performed by: INTERNAL MEDICINE

## 2023-05-16 PROCEDURE — 99232 SBSQ HOSP IP/OBS MODERATE 35: CPT | Performed by: INTERNAL MEDICINE

## 2023-05-16 PROCEDURE — 82948 REAGENT STRIP/BLOOD GLUCOSE: CPT

## 2023-05-16 PROCEDURE — 99232 SBSQ HOSP IP/OBS MODERATE 35: CPT | Performed by: PSYCHIATRY & NEUROLOGY

## 2023-05-16 PROCEDURE — 92611 MOTION FLUOROSCOPY/SWALLOW: CPT | Performed by: SPEECH-LANGUAGE PATHOLOGIST

## 2023-05-16 PROCEDURE — 80069 RENAL FUNCTION PANEL: CPT | Performed by: INTERNAL MEDICINE

## 2023-05-16 PROCEDURE — 74230 X-RAY XM SWLNG FUNCJ C+: CPT

## 2023-05-16 PROCEDURE — 94761 N-INVAS EAR/PLS OXIMETRY MLT: CPT

## 2023-05-16 PROCEDURE — 97168 OT RE-EVAL EST PLAN CARE: CPT

## 2023-05-16 PROCEDURE — 70551 MRI BRAIN STEM W/O DYE: CPT

## 2023-05-16 PROCEDURE — 97530 THERAPEUTIC ACTIVITIES: CPT

## 2023-05-16 PROCEDURE — 85027 COMPLETE CBC AUTOMATED: CPT | Performed by: INTERNAL MEDICINE

## 2023-05-16 PROCEDURE — 25010000002 MEROPENEM PER 100 MG: Performed by: INTERNAL MEDICINE

## 2023-05-16 PROCEDURE — 94760 N-INVAS EAR/PLS OXIMETRY 1: CPT

## 2023-05-16 PROCEDURE — 97164 PT RE-EVAL EST PLAN CARE: CPT

## 2023-05-16 RX ADMIN — Medication 10 ML: at 12:23

## 2023-05-16 RX ADMIN — ANORECTAL OINTMENT 1 APPLICATION: 15.7; .44; 24; 20.6 OINTMENT TOPICAL at 11:21

## 2023-05-16 RX ADMIN — ASPIRIN 81 MG: 81 TABLET, CHEWABLE ORAL at 12:13

## 2023-05-16 RX ADMIN — IPRATROPIUM BROMIDE AND ALBUTEROL SULFATE 3 ML: 2.5; .5 SOLUTION RESPIRATORY (INHALATION) at 07:00

## 2023-05-16 RX ADMIN — ZINC OXIDE 1 APPLICATION: 200 OINTMENT TOPICAL at 22:17

## 2023-05-16 RX ADMIN — BARIUM SULFATE 50 ML: 400 SUSPENSION ORAL at 11:01

## 2023-05-16 RX ADMIN — CITALOPRAM 20 MG: 20 TABLET, FILM COATED ORAL at 12:14

## 2023-05-16 RX ADMIN — MEROPENEM 1 G: 1 INJECTION, POWDER, FOR SOLUTION INTRAVENOUS at 12:14

## 2023-05-16 RX ADMIN — BARIUM SULFATE 1 TEASPOON(S): 0.6 CREAM ORAL at 11:01

## 2023-05-16 RX ADMIN — METOPROLOL TARTRATE 12.5 MG: 25 TABLET, FILM COATED ORAL at 22:13

## 2023-05-16 RX ADMIN — IPRATROPIUM BROMIDE AND ALBUTEROL SULFATE 3 ML: 2.5; .5 SOLUTION RESPIRATORY (INHALATION) at 19:19

## 2023-05-16 RX ADMIN — CLOPIDOGREL BISULFATE 75 MG: 75 TABLET, FILM COATED ORAL at 12:14

## 2023-05-16 RX ADMIN — ZINC OXIDE 1 APPLICATION: 200 OINTMENT TOPICAL at 11:21

## 2023-05-16 RX ADMIN — BARIUM SULFATE 55 ML: 0.81 POWDER, FOR SUSPENSION ORAL at 11:01

## 2023-05-16 RX ADMIN — GUAIFENESIN 600 MG: 600 TABLET, EXTENDED RELEASE ORAL at 22:10

## 2023-05-16 RX ADMIN — METOPROLOL TARTRATE 12.5 MG: 25 TABLET, FILM COATED ORAL at 12:14

## 2023-05-16 RX ADMIN — Medication 10 ML: at 22:00

## 2023-05-16 RX ADMIN — ANORECTAL OINTMENT 1 APPLICATION: 15.7; .44; 24; 20.6 OINTMENT TOPICAL at 18:25

## 2023-05-16 RX ADMIN — BARIUM SULFATE 4 ML: 980 POWDER, FOR SUSPENSION ORAL at 11:01

## 2023-05-16 RX ADMIN — ANORECTAL OINTMENT 1 APPLICATION: 15.7; .44; 24; 20.6 OINTMENT TOPICAL at 12:24

## 2023-05-16 RX ADMIN — ATORVASTATIN CALCIUM 80 MG: 80 TABLET, FILM COATED ORAL at 22:10

## 2023-05-16 RX ADMIN — IPRATROPIUM BROMIDE AND ALBUTEROL SULFATE 3 ML: 2.5; .5 SOLUTION RESPIRATORY (INHALATION) at 15:56

## 2023-05-16 RX ADMIN — MEROPENEM 1 G: 1 INJECTION, POWDER, FOR SOLUTION INTRAVENOUS at 05:07

## 2023-05-16 RX ADMIN — GUAIFENESIN 600 MG: 600 TABLET, EXTENDED RELEASE ORAL at 12:14

## 2023-05-16 RX ADMIN — ANORECTAL OINTMENT 1 APPLICATION: 15.7; .44; 24; 20.6 OINTMENT TOPICAL at 22:16

## 2023-05-16 RX ADMIN — Medication 1000 MCG: at 12:14

## 2023-05-16 RX ADMIN — MEROPENEM 1 G: 1 INJECTION, POWDER, FOR SOLUTION INTRAVENOUS at 21:03

## 2023-05-16 RX ADMIN — DOXYCYCLINE 100 MG: 100 INJECTION, POWDER, LYOPHILIZED, FOR SOLUTION INTRAVENOUS at 18:19

## 2023-05-16 RX ADMIN — DOXYCYCLINE 100 MG: 100 INJECTION, POWDER, LYOPHILIZED, FOR SOLUTION INTRAVENOUS at 06:10

## 2023-05-16 NOTE — MBS/VFSS/FEES
Acute Care - Speech Language Pathology   Swallow Initial Evaluation [facility]     Patient Name: Katherine Williamson  : 1950  MRN: [id number]  Today's Date: 2023               Admit Date: 2023    Visit Dx:     ICD-10-CM ICD-9-CM   1. Acute respiratory failure with hypoxia  J96.01 518.81   2. Sepsis, due to unspecified organism, unspecified whether acute organ dysfunction present  A41.9 038.9     995.91   3. Community acquired pneumonia, unspecified laterality  J18.9 486   4. Abnormal CXR  R93.89 793.2   5. Hyperglycemia  R73.9 790.29   6. 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   • History of repair of hip joint   • History of operative procedure on hip   • Hyperglycemia   • Hepatic steatosis   • Diverticulosis   • Chest pain, atypical   • History of colon polyps   • Family history of colon cancer in mother   • Allergic rhinitis   • Lung nodule seen on imaging study   • Acute respiratory failure with hypoxia   • Pneumonia   • Empyema   • Sepsis   • Pleural effusion, right   • Other emphysema   • Pulmonary nodule   • Diastolic dysfunction, grade 1   • Physical debility     Past Medical History:   Diagnosis Date   • Arthritis    • Cataract    • Colon polyps     FOLLOWED BY DR. JOSEPH LAU   • Hypertension      Past Surgical History:   Procedure Laterality Date   • COLONOSCOPY  10/2015    Uajeo-lxabhbidusyl-[facility]. [facility].  Follow-up in 5 years.   • COLONOSCOPY N/A 2022    2 BENIGN POLYPS IN DESCENDING, 5 MM BENIGN POLYP IN SIGMOID, MULTIPLE SMALL AND LARGE DIVERTICULA IN SIGMOID, RESCOPE IN 3 YRS, DR. JOSEPH LAU AT [facility]   • EYE SURGERY     • JOINT REPLACEMENT  ?    Left total hip replacement in .  In 2015  patient had left hip revision   • TONSILLECTOMY         SLP Recommendation and Plan                                                                            Plan of Care Reviewed With: patient  Outcome Evaluation: VFSS commpleted with Dr Esquivel.  Pt demonstrated absent epiglottic deflection initially with penetration of thin and nectar and severe vallecular residue.  Improved delfection and vallecular clearance with use of chin tuck.  Penetration eliminated with chin tuck.  Later trials w/o chin tuck only mild vallecular residue and no penetration.  Premature spillage of soft solids and mixed consistencies.  REC: soft diet with ground meats, thin liquids.  Upright with all po, chin tuck, alternate solids/liquids.  Meds as tolerated.      SWALLOW EVALUATION (last 72 hours)     SLP Adult Swallow Evaluation     Row Name 05/16/23 1100 05/15/23 0900                Rehab Evaluation    Document Type -- evaluation  -KA       Subjective Information no complaints  -SA no complaints  -KA       Patient Observations alert;cooperative  -SA alert;cooperative  -KA       Patient Effort good  -SA good  -KA       Symptoms Noted During/After Treatment none  -SA none  -KA          General Information    Patient Profile Reviewed yes  -SA yes  -KA       Pertinent History Of Current Problem left sided weakness right MCA stroke s/p TNK  -SA New orders received, new acute left sided weakness right MCA stroke s/p TNK.  Neuro deficits resolved. Patient initially admitted with PNA and VFSS on 5/6 recommended regular solids and thin liquids.  -KA       Current Method of Nutrition NPO  -SA NPO  -KA       Precautions/Limitations, Vision WFL;for purposes of eval  -SA WFL;for purposes of eval  -KA       Precautions/Limitations, Hearing WFL  -SA WFL  -KA       Prior Level of Function-Communication WFL  -SA --       Prior Level of Function-Swallowing no diet consistency restrictions  -SA no diet consistency restrictions  -KA       Plans/Goals  Discussed with patient;agreed upon  -SA patient;agreed upon  -       Barriers to Rehab medically complex  -SA medically complex  -KA       Patient's Goals for Discharge return to PO diet  -SA return home  -KA          Pain    Additional Documentation Pain Scale: Numbers Pre/Post-Treatment (Group)  -SA --          Pain Scale: Numbers Pre/Post-Treatment    Pretreatment Pain Rating 0/10 - no pain  -SA --       Posttreatment Pain Rating 0/10 - no pain  -SA --          Oral Motor Structure and Function    Dentition Assessment -- natural, present and adequate  -       Secretion Management -- WNL/WFL  -KA       Mucosal Quality -- moist, healthy  -          Oral Musculature and Cranial Nerve Assessment    Oral Motor General Assessment -- WFL  -       Oral Labial or Buccal Impairment, Detail, Cranial Nerve VII (Facial): -- --  -          General Eating/Swallowing Observations    Respiratory Support Currently in Use -- nasal cannula  -       Eating/Swallowing Skills -- fed by SLP  -       Positioning During Eating -- upright in bed  -       Utensils Used -- spoon;cup  -       Consistencies Trialed -- ice chips;thin liquids;pureed;mixed consistency  -       Pre SpO2 (%) -- 95  -       Post SpO2 (%) -- 94  -KA          Clinical Swallow Eval    Clinical Swallow Evaluation Summary -- Patient initially tolerated trials of ice chips, thins via spoon and cup, puree and mechanical soft mixed without overt s/s of pen/asp. skilled nursing through evaluation patient 02 decreased to 88 to 90% with PO trials and overt cough end of evaluation. 02 did return to 94% after trials. Recommend NPO and VFSS to further assess. Discussed with patient and RN. Okay for ice chips and small spoonful trials of water.  -KA          MBS/VFSS    Utensils Used spoon;straw  -SA --       Consistencies Trialed regular textures;soft to chew textures;mixed consistency;pureed;thin liquids;nectar/syrup-thick liquids  - --          MBS/VFSS  Interpretation    Oral Prep Phase WFL  -SA --       Oral Transit Phase impaired  -SA --       Oral Residue WFL  -SA --          Oral Transit Phase    Impaired Oral Transit Phase premature spillage of liquids into pharynx  -SA --       Premature Spillage of Liquids into Pharynx mechanical soft;mixed consistency  -SA --          Initiation of Pharyngeal Swallow    Pharyngeal Phase impaired pharyngeal phase of swallowing  -SA --          SLP Communication to Radiology    Summary Statement VFSS commpleted with Dr Esquivel.  Pt demonstrated absent epiglottic deflection initially with penetration of thin and nectar and severe vallecular residue.  Improved delfection and vallecular clearance with use of chin tuck.  Penetration eliminated with chin tuck.  Later trials w/o chin tuck only mild vallecular residue and no penetration.  Premature spillage of soft solids and mixed consistencies.  -SA --          SLP Evaluation Clinical Impression    SLP Swallowing Diagnosis -- R/O pharyngeal dysphagia  -KA       Functional Impact -- risk of aspiration/pneumonia  -       Rehab Potential/Prognosis, Swallowing -- good, to achieve stated therapy goals  -       Swallow Criteria for Skilled Therapeutic Interventions Met -- demonstrates skilled criteria  -KA          Recommendations    Therapy Frequency (Swallow) -- PRN  -KA       Predicted Duration Therapy Intervention (Days) -- until discharge  -       SLP Diet Recommendation -- NPO;ice chips between meals after oral care, with supervision;water between meals after oral care, with supervision  -       Recommended Diagnostics -- [place]FSS ([facility]  -       Oral Care Recommendations -- Oral Care BID/PRN  -KA       SLP Rec. for Method of Medication Administration -- meds via alternate route  -       Monitor for Signs of Aspiration -- yes  -       Anticipated Discharge Disposition (SLP) -- anticipate therapy at next level of care  -             User Key  (r) = Recorded By, (t) =  Taken By, (c) = Cosigned By    Initials Name Effective Dates    Sanaz Beaver MS CCC-SLP 06/01/22 -     Regan Javier MA,CCC-SLP 06/02/22 -                 EDUCATION  The patient has been educated in the following areas:   Dysphagia (Swallowing Impairment) Oral Care/Hydration Modified Diet Instruction.              Time Calculation:    Time Calculation- SLP     Row Name 05/16/23 1259             Time Calculation- SLP    SLP Start Time 1015  -            User Key  (r) = Recorded By, (t) = Taken By, (c) = Cosigned By    Initials Name Provider Type    Sanaz Beaver MS CCC-SLP Speech and Language Pathologist                Therapy Charges for Today     Code Description Service Date Service Provider Modifiers Qty    [id number] HC ST MOTION FLUORO EVAL SWALLOW 5 5/16/2023 Sanaz Nielsen MS CCC-SLP GN 1               Sanaz Nielsen MS CCC-JALEEL  5/16/2023

## 2023-05-16 NOTE — PROGRESS NOTES
"Nutrition Services    Patient Name:  Katherine Williamson  YOB: 1950  MRN: [id number]  Admit Date:  5/2/2023  Assessment Date:  05/16/23    Comment: Nutrition follow up. Now in CCU and has been NPO due to new CVA, S/P TNK on 5/14. Failed her swallow eval yesterday and plan is for a VFSS today around 10:00. A Cortrak was placed yesterday evening for meds. KUB reviewed. Labs, meds, skin reviewed. New PI noted on R gluteal area. Phos 2.3, Alb 2.21, crp 3.52.  Await po.    If fails VFSS, recommend we start TF's.    Will continue to follow clinical course and monitor nutritional needs.     CLINICAL NUTRITION ASSESSMENT      Reason for Assessment Follow-up Protocol     Diagnosis/Problem   5/14 Acute right MCA stroke now  S/P TNK  Acute progressive respiratory failure, Right lower lobe lung abscess/pneumonia, sepsis   Medical/Surgical History Past Medical History:   Diagnosis Date   • Arthritis    • Cataract    • Colon polyps     FOLLOWED BY DR. JOSEPH LAU   • Hypertension        Past Surgical History:   Procedure Laterality Date   • COLONOSCOPY  10/2015    Krgkr-phbligaigxal-[facility]. [facility].  Follow-up in 5 years.   • COLONOSCOPY N/A 1/12/2022    2 BENIGN POLYPS IN DESCENDING, 5 MM BENIGN POLYP IN SIGMOID, MULTIPLE SMALL AND LARGE DIVERTICULA IN SIGMOID, RESCOPE IN 3 YRS, DR. JOSEPH LAU AT [facility]   • EYE SURGERY     • JOINT REPLACEMENT  2005?    Left total hip replacement in 2005.  In December 2015 patient had left hip revision   • TONSILLECTOMY          Encounter Information        Nutrition History:     Food Preferences:    Supplements:    Factors Affecting Intake: decreased appetite, swallow impairment     Anthropometrics        Current Height  Current Weight  BMI kg/m2 Height: 160 cm (62.99\")  Weight: 63.1 kg (139 lb 1.8 oz) (05/16/23 0600)  Body mass index is 24.65 kg/m².   Adjusted BMI (if applicable)    BMI Category Overweight (25 - 29.9)       Admission Weight        Ideal Body Weight (IBW) 52.4 kg   Adjusted " IBW (if applicable)        Usual Body Weight (UBW) See wt hx   Weight Change/Trend Stable       Weight History Wt Readings from Last 30 Encounters:   05/16/23 0600 63.1 kg (139 lb 1.8 oz)   05/15/23 1301 66 kg (145 lb 8.1 oz)   05/11/23 0600 65.8 kg (145 lb 1 oz)   05/10/23 0414 65.8 kg (145 lb 1 oz)   05/09/23 0527 65.8 kg (145 lb 1 oz)   05/08/23 0500 66.9 kg (147 lb 7.8 oz)   05/07/23 0300 64 kg (141 lb)   05/06/23 0300 62 kg (136 lb 11 oz)   05/05/23 0300 65.8 kg (145 lb)   05/04/23 0500 66.2 kg (145 lb 15.1 oz)   05/03/23 0917 63.5 kg (139 lb 15.9 oz)   05/02/23 1900 61.2 kg (135 lb)   04/24/23 1356 61.7 kg (136 lb)   04/10/23 0657 61.7 kg (136 lb)   04/03/23 1134 63.5 kg (140 lb)   01/24/23 0957 67.3 kg (148 lb 6.4 oz)   01/09/23 0854 67 kg (147 lb 12.8 oz)   10/11/22 0928 68.5 kg (151 lb)   10/05/22 0920 67.6 kg (149 lb)   08/30/22 1324 67.6 kg (149 lb)   06/06/22 1343 67.1 kg (148 lb)   01/11/22 1743 67.1 kg (148 lb)   10/07/21 0923 67.4 kg (148 lb 9.6 oz)   09/03/20 0941 67.1 kg (148 lb)   05/13/20 1049 67 kg (147 lb 11.3 oz)   04/30/20 1219 67 kg (147 lb 12.8 oz)   10/24/19 0840 65.6 kg (144 lb 9.6 oz)   05/10/19 0419 66 kg (145 lb 8 oz)   04/18/19 0844 64.5 kg (142 lb 3.2 oz)   10/18/18 0803 65.3 kg (144 lb)   04/18/18 0911 68.5 kg (151 lb)   10/18/17 0851 68.1 kg (150 lb 3.2 oz)   04/18/17 0758 68.5 kg (151 lb)   10/03/16 0907 69.4 kg (153 lb)   04/05/16 1023 67.6 kg (149 lb)   10/06/15 1058 68 kg (150 lb)   07/22/15 1337 68 kg (150 lb)   06/23/15 1303 68.5 kg (150 lb 15.9 oz)           --  Tests/Procedures        Tests/Procedures Clinical Swallow Evaluation, MRI, VFSS, X-Ray     Labs       Pertinent Labs    Results from last 7 days   Lab Units 05/16/23  0340 05/15/23  0309 05/14/23  0528 05/11/23  0251 05/10/23  0904   SODIUM mmol/L 135* 136 135*   < > 135*   POTASSIUM mmol/L 3.9 3.8 4.3   < > 3.8   CHLORIDE mmol/L 101 102 100   < > 100   CO2 mmol/L 31.0* 29.9* 32.5*   < > 29.6*   BUN mg/dL 13 13 12   <  > 11   CREATININE mg/dL 0.22* 0.21* 0.26*   < > 0.30*   CALCIUM mg/dL 8.3* 8.3* 8.4*   < > 9.1   BILIRUBIN mg/dL  --   --   --   --  0.4   ALK PHOS U/L  --   --   --   --  158*   ALT (SGPT) U/L  --   --   --   --  42*   AST (SGOT) U/L  --   --   --   --  34*   GLUCOSE mg/dL 80 101* 91   < > 141*    < > = values in this interval not displayed.     Results from last 7 days   Lab Units 05/16/23  0340 05/15/23  0309   MAGNESIUM mg/dL  --  1.8   PHOSPHORUS mg/dL 2.4* 2.5   HEMOGLOBIN g/dL 10.0* 10.0*   HEMATOCRIT % 29.0* 30.1*   WBC 10*3/mm3 11.81* 16.60*   TRIGLYCERIDES mg/dL  --  68   ALBUMIN g/dL 2.1* 1.8*     Results from last 7 days   Lab Units 05/16/23  0340 05/15/23  0309 05/14/23  0528 05/13/23  0431 05/12/23  0346   PLATELETS 10*3/mm3 365 333 388 390 344     COVID19   Date Value Ref Range Status   05/02/2023 Not Detected Not Detected - Ref. Range Final     Lab Results   Component Value Date    HGBA1C 6.20 (H) 05/15/2023          Medications           Scheduled Medications aspirin, 81 mg, Nasogastric, Daily   Or  aspirin, 300 mg, Rectal, Daily  atorvastatin, 80 mg, Oral, Nightly  citalopram, 20 mg, Oral, Daily  clopidogrel, 75 mg, Nasogastric, Daily  doxycycline, 100 mg, Intravenous, Q12H  fluticasone, 1 spray, Each Nare, Daily  guaiFENesin, 600 mg, Oral, BID  hydrocortisone-bacitracin-zinc oxide-nystatin, 1 application, Topical, BID  ipratropium-albuterol, 3 mL, Nebulization, 4x Daily - RT  Menthol-Zinc Oxide, 1 application, Topical, 4x Daily  meropenem, 1 g, Intravenous, Q8H  metoprolol tartrate, 12.5 mg, Oral, Q12H  sodium chloride, 10 mL, Intravenous, Q12H  sodium chloride, 10 mL, Intravenous, Q12H  cyanocobalamin, 1,000 mcg, Oral, Daily       Infusions hold, 1 each  niCARdipine, 5-15 mg/hr  phenylephrine, 0.5-3 mcg/kg/min, Last Rate: 0.5 mcg/kg/min (05/14/23 2489)       PRN Medications •  acetaminophen **OR** acetaminophen **OR** acetaminophen  •  bismuth subsalicylate  •  hold  •  labetalol  •  magnesium  sulfate **OR** magnesium sulfate **OR** magnesium sulfate  •  nitroglycerin  •  ondansetron  •  potassium & sodium phosphates **OR** potassium & sodium phosphates  •  potassium chloride **OR** potassium chloride **OR** potassium chloride  •  [COMPLETED] Insert Peripheral IV **AND** sodium chloride  •  sodium chloride  •  sodium chloride  •  sodium chloride  •  sodium chloride     Physical Findings          Physical Appearance alert, oriented, on oxygen therapy facial asymmetry   Oral/Mouth Cavity WNL   Edema  no edema   Gastrointestinal last bowel movement: 5/14   Skin  excoriation, pressure injury R Gluteal area   Tubes/Drains/Lines Cortrak, NG tube   NFPE No clinical signs of muscle wasting or fat loss   -  Estimated/Assessed Needs       Energy Requirements    Weight for Calculation 65.8 kg   Method for Estimation  30 kcal/kg   EST Needs (kcal/day) 1974       Protein Requirements    Weight for Calculation 65.8 kg   EST Protein Needs (g/kg) 1.0 - 1.2 gm/kg   EST Daily Needs (g/day) 65-78       Fluid Requirements     Method for Estimation 1 mL/kcal    Estimated Needs (mL/day)    -  Current Nutrition Orders & Evaluation of Intake       Oral Nutrition     Food Allergies NKFA   Current PO Diet NPO Diet NPO Type: Strict NPO   Supplement n/a   PO Evaluation     % PO Intake NPO x 2 days    # of Days Evaluated    --  PES STATEMENT / NUTRITION DIAGNOSIS      Nutrition Dx Problem  Problem: Predicted Suboptimal Intake  Etiology: Medical Diagnosis and Factors Affecting Nutrition decrease appetite, CVA, Dysphagia  Signs/Symptoms: Report/Observation    Comment:    --  NUTRITION INTERVENTION / PLAN OF CARE      Intervention Goal(s) Maintain nutrition status, Reduce/improve symptoms, Meet estimated needs, Initiate feeding/diet, Tolerate PO , Maintain weight and PO intake goal %: 75         RD Intervention/Action Await begin PO diet, Follow Tx Progress and Care plan reviewed     --      Monitor/Evaluation Per protocol   Discharge  Plan/Needs Pending clinical course   Education Will instruct as appropriate   --    RD to follow per protocol.      Electronically signed by:  Adriana Ingram RD  05/16/23 09:06 EDT

## 2023-05-16 NOTE — CONSULTS
Patient Name: Katherine Williamson  :1950  72 y.o.    Date of Admission: 2023  Encounter Provider: Talya Clemons MD  Date of Encounter Visit: 23  Place of Service: [facility] CARDIOLOGY  Referring Provider: No ref. provider found  Patient Care Team:  Zelalem Flor APRN as PCP - General (Internal Medicine)  Stephen, Brandon Meyer MD as Consulting Physician (Orthopedic Surgery)      Chief complaint: shortness of breath      History of Present Illness:    This patient has emphysema, tobacco use, hypertension, hyperlipidemia.  She has been seen by Dr. Nixon before in 2020 for chest pain stress.  Nuclear stress test in May 2020 was normal.  Echocardiogram May 2020 showed normal LV function, grade 1 diastolic dysfunction and no significant valve disease.  Carotid Doppler on May 15, 2023 showed a less than 50% right internal carotid artery stenosis and no significant stenosis of the left carotid artery.  Transthoracic echo on May 15, 2023 showed normal LV function, grade 1 diastolic dysfunction, negative saline study and no valve disease.    She was admitted on May 2 with shortness of breath, cough and brown sputum proBNP of 1345.  Chest x-ray showed pneumonia and/or neoplasm and she was admitted for community-acquired pneumonia.  On May 14 she developed an acute right MCA stroke and was treated with tenecteplase.    She is sleepy but wakes up and answers simple questions.    ECHO 5/15/23   Left ventricular systolic function is normal. Left ventricular ejection fraction appears to be 66 - 70%.  •  Left ventricular diastolic function is consistent with (grade I) impaired relaxation.  •  Normal right ventricular cavity size and systolic function noted.  •  Saline test results are negative.  •  There is no evidence of pericardial effusion.        Stress test 20    • Findings consistent with a normal ECG stress test.  • Breast attenuation artifact is present.  • Left  ventricular ejection fraction is hyperdynamic (Calculated EF > 70%).  • Impressions are consistent with an intermediate risk study.  • Cannot exclude ischemia in the anteroseptal wall SSS is 14 and SDS is 2.    Past Medical History:   Diagnosis Date   • Arthritis    • Cataract    • Colon polyps     FOLLOWED BY DR. JOSEPH LAU   • Hypertension        Past Surgical History:   Procedure Laterality Date   • COLONOSCOPY  10/2015    [facility]ut[facility].  Follow-up in 5 years.   • COLONOSCOPY N/A 1/12/2022    2 BENIGN POLYPS IN DESCENDING, 5 MM BENIGN POLYP IN SIGMOID, MULTIPLE SMALL AND LARGE DIVERTICULA IN SIGMOID, RESCOPE IN 3 YRS, DR. JOSEPH LAU AT [facility]   • EYE SURGERY     • JOINT REPLACEMENT  2005?    Left total hip replacement in 2005.  In December 2015 patient had left hip revision   • TONSILLECTOMY           Prior to Admission medications    Medication Sig Start Date End Date Taking? Authorizing Provider   Cholecalciferol (VITAMIN D) 1000 UNITS tablet Take 1 tablet by mouth.   Yes ProviderAngela MD   cyanocobalamin (VITAMIN B-12) 500 MCG tablet Take  by mouth.   Yes ProviderAngela MD   irbesartan (Avapro) 150 MG tablet Take 1 tablet by mouth Every Night. 1/9/23  Yes Zelalem Flor APRN   vitamin C (ASCORBIC ACID) 500 MG tablet Take 1 tablet by mouth Daily.   Yes ProviderAngela MD   omeprazole (priLOSEC) 40 MG capsule Take 1 capsule by mouth Daily.    ProviderAngela MD       Allergies   Allergen Reactions   • Hydrocodone-Acetaminophen Itching       Social History     Socioeconomic History   • Marital status:    Tobacco Use   • Smoking status: Every Day     Packs/day: 1.00     Years: 51.00     Pack years: 51.00     Types: Cigarettes     Start date: 1/1/1970   • Smokeless tobacco: Never   Vaping Use   • Vaping Use: Never used   Substance and Sexual Activity   • Alcohol use: Yes     Alcohol/week: 30.0 standard drinks     Types: 30 Cans of beer per week     Comment: 4-6 per  day, but very few in the past month   • Drug use: Never   • Sexual activity: Not Currently     Partners: Male     Birth control/protection: None       Family History   Problem Relation Age of Onset   • Cancer Mother    • Breast cancer Mother    • Arthritis Mother    • Deep vein thrombosis Mother    • Heart attack Father    • Heart disease Father         Had quadruple bypass   • Heart attack Maternal Grandmother    • Heart disease Maternal Grandmother    • Malig Hyperthermia Neg Hx        REVIEW OF SYSTEMS:   All other systems reviewed and negative.        Objective:     Vitals:    05/16/23 0600 05/16/23 0700 05/16/23 0800 05/16/23 0900   BP: 132/69 140/76 128/70 138/77   Pulse: 83 84 85 96   Resp:  20     Temp:  98.7 °F (37.1 °C)     TempSrc:       SpO2: 96% 99% 100% 98%   Weight: 63.1 kg (139 lb 1.8 oz)      Height:         Body mass index is 24.65 kg/m².    Intake/Output Summary (Last 24 hours) at 5/16/2023 1006  Last data filed at 5/16/2023 0400  Gross per 24 hour   Intake 859 ml   Output 500 ml   Net 359 ml       Constitutional: Sleeping.  Wakes up and answers questions.  Chronically ill-appearing.  HENT:   Head: Normocephalic and atraumatic. Head is without contusion.   Right Ear: Hearing normal. No drainage.   Left Ear: Hearing normal. No drainage.   Nose: No nasal deformity. No epistaxis.   Eyes: Lids are normal. Right eye exhibits no exudate. Left eye exhibits no exudate.  Neck: No JVD present. Carotid bruit is not present. No tracheal deviation present. No thyroid mass and no thyromegaly present.   Cardiovascular: Normal rate, regular rhythm and normal heart sounds.    Pulses:       Posterior tibial pulses are 2+ on the right side, and 2+ on the left side.   Pulmonary/Chest: Effort normal and breath sounds normal.   Abdominal: Soft. Normal appearance and bowel sounds are normal. There is no tenderness.   Musculoskeletal: Normal range of motion.        Right shoulder: She exhibits no deformity.        Left  shoulder: She exhibits no deformity.    Skin: Skin is warm, dry and intact. No rash noted.    Vitals reviewed      Lab Review:     Results from last 7 days   Lab Units 05/16/23  0340 05/11/23  0251 05/10/23  0904   SODIUM mmol/L 135*   < > 135*   POTASSIUM mmol/L 3.9   < > 3.8   CHLORIDE mmol/L 101   < > 100   CO2 mmol/L 31.0*   < > 29.6*   BUN mg/dL 13   < > 11   CREATININE mg/dL 0.22*   < > 0.30*   CALCIUM mg/dL 8.3*   < > 9.1   BILIRUBIN mg/dL  --   --  0.4   ALK PHOS U/L  --   --  158*   ALT (SGPT) U/L  --   --  42*   AST (SGOT) U/L  --   --  34*   GLUCOSE mg/dL 80   < > 141*    < > = values in this interval not displayed.         Results from last 7 days   Lab Units 05/16/23  0340   WBC 10*3/mm3 11.81*   HEMOGLOBIN g/dL 10.0*   HEMATOCRIT % 29.0*   PLATELETS 10*3/mm3 365         Results from last 7 days   Lab Units 05/15/23  0309   MAGNESIUM mg/dL 1.8     Results from last 7 days   Lab Units 05/15/23  0309   CHOLESTEROL mg/dL 94   TRIGLYCERIDES mg/dL 68   HDL CHOL mg/dL 32*   LDL CHOL mg/dL 47     I personally viewed and interpreted the patient's EKG/Telemetry data.                Assessment and Plan:       1.  Acute stroke with bilateral infarcts.  2.  Pneumonia with right lung abscess  3.  Emphysema/tobacco use.  4.  Carotid artery disease.  5.  Hypertension    I think a ELIGIO is warranted to look for cardiac source of embolus and any evidence of endocarditis given her infectious issues.  She had tenecteplase on May 14 at approximately 1500.  N.p.o. after midnight.  ELIGIO tomorrow.  Okay with transfer to the floor from my standpoint.    Talya Clemons MD  05/16/23  10:06 EDT

## 2023-05-16 NOTE — PROGRESS NOTES
"DOS: 2023  NAME: Katherine Williamson   : 1950  PCP: Zelalem Flor APRN  Chief Complaint   Patient presents with   • Shortness of Breath       Chief complaint: Stroke  Subjective: A little more fatigued today but otherwise no significant neurologic change    Objective:  Vital signs: /70   Pulse 91   Temp 98.7 °F (37.1 °C)   Resp 20   Ht 160 cm (62.99\")   Wt 63.1 kg (139 lb 1.8 oz)   SpO2 100%   BMI 24.65 kg/m²    Gen: NAD, vitals reviewed  MS: oriented x2, recent/remote memory intact, mildly impaired attention/concentration, language intact, no neglect.  CN: visual acuity grossly normal, PERRL, EOMI, no facial droop, no dysarthria  Motor: 5/5 throughout upper and lower extremities, normal tone  Sensory: intact to light touch all 4 ext.    Laboratory results:  Lab Results   Component Value Date    GLUCOSE 80 2023    CALCIUM 8.3 (L) 2023     (L) 2023    K 3.9 2023    CO2 31.0 (H) 2023     2023    BUN 13 2023    CREATININE 0.22 (L) 2023    EGFRIFAFRI 93 2018    EGFRIFNONA 76 2018    BCR 59.1 (H) 2023    ANIONGAP 3.0 (L) 2023     Lab Results   Component Value Date    WBC 11.81 (H) 2023    HGB 10.0 (L) 2023    HCT 29.0 (L) 2023    MCV 89.2 2023     2023     Lab Results   Component Value Date    LDL 47 05/15/2023     (H) 2018     (H) 2017         Lab 05/15/23  0309   HEMOGLOBIN A1C 6.20*        Review of labs: Sodium 135, WBC 12    Review and interpretation of imaging: I personally reviewed her brain MRI performed yesterday which shows right MCA and left FARZANEH infarcts.  Radiology report reviewed.    Diagnoses:  Stroke, right middle cerebral artery, embolic  Stroke, left anterior cerebral artery, embolic  Received tenecteplase in the inpatient setting  Cryptogenic stroke  Pneumonia, right lung abscess  COPD    Comment: Cryptogenic bilateral infarcts status post " inpatient tenecteplase with good improvement.  2D echo negative.  CTA and ultrasound negative.    Plan:  1.  Aspirin, statin  2.  Cardiology consult for ELIGIO for cryptogenic embolic stroke

## 2023-05-16 NOTE — PROGRESS NOTES
[facility] Acute Inpt Rehab    Noted unable to do therapy yesterday due to TNK administration.  Once therapies start we can determine the most appropriate level of rehab for pt.      Thank you,  Darling Duvall RN  Rehab Admission Nurse

## 2023-05-16 NOTE — PROGRESS NOTES
Continued Stay Note  [facility]     Patient Name: Katherine Williamson  MRN: [id number]  Today's Date: 5/16/2023    Admit Date: 5/2/2023    Plan: [facility] vs [place]   Discharge Plan     Row Name 05/16/23 0949       Plan    Plan [facility] vs [facility]    Plan Comments [facility] evaluating for possible acute rehab  Pt must be 4 L or less oxygen.  [place] following               Discharge Codes    No documentation.               Expected Discharge Date and Time     Expected Discharge Date Expected Discharge Time    May 15, 2023             Rachel Preciado RN

## 2023-05-16 NOTE — THERAPY RE-EVALUATION
Patient Name: Katherine Williamson  : 1950    MRN: [id number]                              Today's Date: 2023       Admit Date: 2023    Visit Dx:     ICD-10-CM ICD-9-CM   1. Acute respiratory failure with hypoxia  J96.01 518.81   2. Sepsis, due to unspecified organism, unspecified whether acute organ dysfunction present  A41.9 038.9     995.91   3. Community acquired pneumonia, unspecified laterality  J18.9 486   4. Abnormal CXR  R93.89 793.2   5. Hyperglycemia  R73.9 790.29   6. 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   • History of repair of hip joint   • History of operative procedure on hip   • Hyperglycemia   • Hepatic steatosis   • Diverticulosis   • Chest pain, atypical   • History of colon polyps   • Family history of colon cancer in mother   • Allergic rhinitis   • Lung nodule seen on imaging study   • Acute respiratory failure with hypoxia   • Pneumonia   • Empyema   • Sepsis   • Pleural effusion, right   • Other emphysema   • Pulmonary nodule   • Diastolic dysfunction, grade 1   • Physical debility     Past Medical History:   Diagnosis Date   • Arthritis    • Cataract    • Colon polyps     FOLLOWED BY DR. JOSEPH LAU   • Hypertension      Past Surgical History:   Procedure Laterality Date   • COLONOSCOPY  10/2015    [facility].  Follow-up in 5 years.   • COLONOSCOPY N/A 2022    2 BENIGN POLYPS IN DESCENDING, 5 MM BENIGN POLYP IN SIGMOID, MULTIPLE SMALL AND LARGE DIVERTICULA IN SIGMOID, RESCOPE IN 3 YRS, DR. JOSEPH LAU AT [facility]   • EYE SURGERY     • JOINT REPLACEMENT  ?    Left total hip replacement in .  In 2015 patient had left hip revision   • TONSILLECTOMY        General  Information     Row Name 05/16/23 1327          Physical Therapy Time and Intention    Document Type re-evaluation  -EJ     Mode of Treatment occupational therapy;co-treatment;physical therapy  -EJ     Row Name 05/16/23 1327          General Information    Patient Profile Reviewed yes  -EJ     Existing Precautions/Restrictions fall;oxygen therapy device and L/min  -EJ     Barriers to Rehab medically complex  -EJ     Row Name 05/16/23 1327          Cognition    Orientation Status (Cognition) oriented x 4  -EJ     Row Name 05/16/23 1327          Safety Issues, Functional Mobility    Impairments Affecting Function (Mobility) balance;endurance/activity tolerance;strength;shortness of breath;coordination  -EJ           User Key  (r) = Recorded By, (t) = Taken By, (c) = Cosigned By    Initials Name Provider Type    Kallie Sesay, PT Physical Therapist               Mobility     Row Name 05/16/23 1328          Bed Mobility    Supine-Sit [place] (Bed Mobility) moderate assist (50% patient effort);maximum assist (25% patient effort);2 person assist;verbal cues  -EJ     Sit-Supine [place] (Bed Mobility) moderate assist (50% patient effort);maximum assist (25% patient effort);2 person assist;verbal cues  -EJ     Assistive Device (Bed Mobility) bed rails;head of bed elevated  -EJ     Row Name 05/16/23 1328          Transfers    Comment, (Transfers) unable to attempt today, pt w increased c/o dizziness  -EJ           User Key  (r) = Recorded By, (t) = Taken By, (c) = Cosigned By    Initials Name Provider Type    Kallie Sesay, PT Physical Therapist               Obj/Interventions     Row Name 05/16/23 1401          Balance    Static Sitting Balance contact guard;verbal cues  -EJ     Position, Sitting Balance sitting edge of bed  -EJ           User Key  (r) = Recorded By, (t) = Taken By, (c) = Cosigned By    Initials Name Provider Type    Kallie Sesay, PT Physical Therapist                Goals/Plan     Row Name 05/16/23 1406          Bed Mobility Goal 1 (PT)    Activity/Assistive Device (Bed Mobility Goal 1, PT) bed mobility activities, all  -EJ     [place] Level/Cues Needed (Bed Mobility Goal 1, PT) minimum assist (75% or more patient effort)  -EJ     Time Frame (Bed Mobility Goal 1, PT) 10 days  -EJ     Progress/Outcomes (Bed Mobility Goal 1, PT) goal revised this date  -EJ     Row Name 05/16/23 1406          Transfer Goal 1 (PT)    Activity/Assistive Device (Transfer Goal 1, PT) sit-to-stand/stand-to-sit;bed-to-chair/chair-to-bed;walker, rolling  -EJ     [place] Level/Cues Needed (Transfer Goal 1, PT) minimum assist (75% or more patient effort)  -EJ     Time Frame (Transfer Goal 1, PT) 10 days  -EJ     Progress/Outcome (Transfer Goal 1, PT) goal revised this date  -EJ     Row Name 05/16/23 1406          Gait Training Goal 1 (PT)    Activity/Assistive Device (Gait Training Goal 1, PT) gait (walking locomotion);assistive device use  -EJ     [place] Level (Gait Training Goal 1, PT) minimum assist (75% or more patient effort)  -EJ     Distance (Gait Training Goal 1, PT) 50  -EJ     Time Frame (Gait Training Goal 1, PT) 10 days  -EJ     Row Name 05/16/23 1406          Therapy Assessment/Plan (PT)    Planned Therapy Interventions (PT) balance training;bed mobility training;gait training;home exercise program;transfer training;patient/family education;stretching;strengthening;stair training;ROM (range of motion)  -EJ           User Key  (r) = Recorded By, (t) = Taken By, (c) = Cosigned By    Initials Name Provider Type    EJ Kallie Bui, PT Physical Therapist               Clinical Impression     Row Name 05/16/23 1402          Pain    Pretreatment Pain Rating 0/10 - no pain  -EJ     Posttreatment Pain Rating 0/10 - no pain  -EJ     Row Name 05/16/23 1402          Plan of Care Review    Plan of Care Reviewed With patient  -EJ     Outcome Evaluation Pt seen for PT re-eval today.   She had been previously working w PT for weakness and decreased mobility during hospitalization for PNA, acute respiratory failure, R lung abscess requiring intubation. Pt developed L sided weakness and neglect on 5/14 and recieved TNK for R MCA stroke. Today pt able to sit EOB w mod/max A x 2,  but reported increased dizziness with sitting, BP taken supine 128/70 and 138/77 sitting EOB. Pt does have L sided weakness, but was able to raise L extremities against gravity.  Pt tolerated sitting EOB for several minutes before returning to supine. She will continue to benefit from skilled PT to maximize safety, strength, and independence. Recommend acute rehab at [place]. WIll continue to progress.  -     Row Name 05/16/23 1402          Therapy Assessment/Plan (PT)    Therapy Frequency (PT) 6 times/wk  -EJ     Row Name 05/16/23 1402          Positioning and Restraints    Pre-Treatment Position in bed  -EJ     Post Treatment Position bed  -EJ     In Bed notified nsg;supine;call light within reach;encouraged to call for assist;exit alarm on  -EJ           User Key  (r) = Recorded By, (t) = Taken By, (c) = Cosigned By    Initials Name Provider Type    Kallie Sesay, PT Physical Therapist               Outcome Measures     Row Name 05/16/23 1407          How much help from another person do you currently need...    Turning from your back to your side while in flat bed without using bedrails? 2  -EJ     Moving from lying on back to sitting on the side of a flat bed without bedrails? 2  -EJ     Moving to and from a bed to a chair (including a wheelchair)? 1  -EJ     Standing up from a chair using your arms (e.g., wheelchair, bedside chair)? 1  -EJ     Climbing 3-5 steps with a railing? 1  -EJ     To walk in hospital room? 1  -EJ     AM-PAC 6 Clicks Score (PT) 8  -EJ     Highest level of mobility 3 --> Sat at edge of bed  -EJ     Row Name 05/16/23 1220 05/16/23 0700       Modified [place] Scale    Modified [place] Scale 4 -  Moderately severe disability.  Unable to walk without assistance, and unable to attend to own bodily needs without assistance.  - 4 - Moderately severe disability.  Unable to walk without assistance, and unable to attend to own bodily needs without assistance.  -CC    Row Name 05/16/23 1220          Functional Assessment    Outcome Measure Options AM-PAC 6 Clicks Daily Activity (OT)  -SM           User Key  (r) = Recorded By, (t) = Taken By, (c) = Cosigned By    Initials Name Provider Type    CC Ruba Comer, OTR Occupational Therapist    Kallie Sesay, PT Physical Therapist    Perla Munroe, OT Occupational Therapist                             Physical Therapy Education     Title: PT OT SLP Therapies (Done)     Topic: Physical Therapy (Done)     Point: Mobility training (Done)     Learning Progress Summary           Patient Acceptance, E,TB, VU by XO at 5/14/2023 0953    Acceptance, E,TB, VU by XO at 5/13/2023 1528    Acceptance, E,TB,D, VU,NR by  at 5/13/2023 1156    Acceptance, E,TB, VU,NR by  at 5/12/2023 1141                   Point: Home exercise program (Done)     Learning Progress Summary           Patient Acceptance, E,TB, VU by XO at 5/14/2023 0953    Acceptance, E,TB, VU by XO at 5/13/2023 1528    Acceptance, E,TB,D, VU,NR by  at 5/13/2023 1156    Acceptance, E,TB, VU,NR by  at 5/12/2023 1141                   Point: Body mechanics (Done)     Learning Progress Summary           Patient Acceptance, E,TB, VU by XO at 5/14/2023 0953    Acceptance, E,TB, VU by XO at 5/13/2023 1528    Acceptance, E,TB,D, VU,NR by  at 5/13/2023 1156    Acceptance, E,TB, VU,NR by  at 5/12/2023 1141                   Point: Precautions (Done)     Learning Progress Summary           Patient Acceptance, E,TB, VU by XO at 5/14/2023 0953    Acceptance, E,TB, VU by XO at 5/13/2023 1528    Acceptance, E,TB,D, VU,NR by  at 5/13/2023 1156                               User Key     Initials Effective  Dates Name Provider Type Discipline     03/07/18 -  Perla Ruvalcaba, PTA Physical Therapist Assistant PT    XO 09/22/22 -  Marley Cruz, RN Registered Nurse Nurse    ST 09/22/22 -  Verena Hess, PT Physical Therapist PT              PT Recommendation and Plan  Planned Therapy Interventions (PT): balance training, bed mobility training, gait training, home exercise program, transfer training, patient/family education, stretching, strengthening, stair training, ROM (range of motion)  Plan of Care Reviewed With: patient  Outcome Evaluation: Pt seen for PT re-eval today.  She had been previously working w PT for weakness and decreased mobility during hospitalization for PNA, acute respiratory failure, R lung abscess requiring intubation. Pt developed L sided weakness and neglect on 5/14 and recieved TNK for R MCA stroke. Today pt able to sit EOB w mod/max A x 2,  but reported increased dizziness with sitting, BP taken supine 128/70 and 138/77 sitting EOB. Pt does have L sided weakness, but was able to raise L extremities against gravity.  Pt tolerated sitting EOB for several minutes before returning to supine. She will continue to benefit from skilled PT to maximize safety, strength, and independence. Recommend acute rehab at [place]. WIll continue to progress.     Time Calculation:    PT Charges     Row Name 05/16/23 1408             Time Calculation    Start Time 0853  -EJ      Stop Time 0907  -EJ      Time Calculation (min) 14 min  -EJ      PT Received On 05/16/23  -EJ      PT - Next Appointment 05/17/23  -EJ      PT Goal Re-Cert Due Date 05/26/23  -EJ         Time Calculation- PT    Total Timed Code Minutes- PT 9 minute(s)  -EJ            User Key  (r) = Recorded By, (t) = Taken By, (c) = Cosigned By    Initials Name Provider Type    Kallie Sesay, PT Physical Therapist              Therapy Charges for Today     Code Description Service Date Service Provider Modifiers Qty    07682750912  PT  RE-EVAL ESTABLISHED PLAN 2 5/16/2023 Kallie Bui, PT GP 1    86154296592 HC PT THER PROC EA 15 MIN 5/16/2023 Kallie Bui, PT GP 1    47563563649 HC PT THER SUPP EA 15 MIN 5/16/2023 Kallie Bui, PT GP 1          PT G-Codes  Outcome Measure Options: AM-PAC 6 Clicks Daily Activity (OT)  AM-PAC 6 Clicks Score (PT): 8  AM-PAC 6 Clicks Score (OT): 11  Modified [place] Scale: 4 - Moderately severe disability.  Unable to walk without assistance, and unable to attend to own bodily needs without assistance.  PT Discharge Summary  Anticipated Discharge Disposition (PT): inpatient rehabilitation facility    Kallie Bui, PT  5/16/2023

## 2023-05-16 NOTE — PROGRESS NOTES
[facility]  [phone]  Dr. Darius Ford    Subjective:  LOS: 14    Chief Complaint: Acute stroke    She is weak overall but generally improved from the standpoint of stroke.  Denies wheezing or cough.      Objective   Vital Signs past 24hrs  Temp range: Temp (24hrs), Av.9 °F (36.6 °C), Min:97.3 °F (36.3 °C), Max:98.7 °F (37.1 °C)    BP range: BP: (114-140)/(63-79) 136/70  Pulse range: Heart Rate:  [] 91  Resp rate range: Resp:  [18-20] 20  Device (Oxygen Therapy): nasal cannulaFlow (L/min):  [4-5] 4  Oxygen range:SpO2:  [86 %-100 %] 100 %     Physical Exam  Eyes:      Pupils: Pupils are equal, round, and reactive to light.   Cardiovascular:      Rate and Rhythm: Normal rate and regular rhythm.      Heart sounds: No murmur heard.  Pulmonary:      Effort: Pulmonary effort is normal.      Breath sounds: Decreased breath sounds present.      Comments: I could not hear any adventitious breath sounds but patient is unable to sit up for me  Abdominal:      General: Bowel sounds are normal.      Palpations: Abdomen is soft. There is no mass.      Tenderness: There is no abdominal tenderness.   Musculoskeletal:         General: No swelling.   Neurological:      Mental Status: She is alert.      Motor: Weakness (mild left-sided only now) present.       Results Review:    I have reviewed the laboratory and imaging data since the last note by [facility] physician.  My annotations are noted in assessment and plan.      Result Review:  I have personally reviewed the results from last note by [facility] physician to 2023 13:46 EDT and agree with these findings:  [x]  Laboratory list / accordion  [x]  Microbiology  [x]  Radiology  []  EKG/Telemetry   [x]  Cardiology/Vascular   []  Pathology  []  Old records  []  Other:    Medication Review:  I have reviewed the current MAR.  My annotations are noted in assessment and plan.    aspirin, 81 mg, Nasogastric, Daily   Or  aspirin, 300 mg, Rectal, Daily  atorvastatin,  80 mg, Oral, Nightly  citalopram, 20 mg, Oral, Daily  clopidogrel, 75 mg, Nasogastric, Daily  doxycycline, 100 mg, Intravenous, Q12H  fluticasone, 1 spray, Each Nare, Daily  guaiFENesin, 600 mg, Oral, BID  hydrocortisone-bacitracin-zinc oxide-nystatin, 1 application, Topical, BID  ipratropium-albuterol, 3 mL, Nebulization, 4x Daily - RT  Menthol-Zinc Oxide, 1 application, Topical, 4x Daily  meropenem, 1 g, Intravenous, Q8H  metoprolol tartrate, 12.5 mg, Oral, Q12H  sodium chloride, 10 mL, Intravenous, Q12H  sodium chloride, 10 mL, Intravenous, Q12H  cyanocobalamin, 1,000 mcg, Oral, Daily        hold, 1 each  niCARdipine, 5-15 mg/hr  phenylephrine, 0.5-3 mcg/kg/min, Last Rate: 0.5 mcg/kg/min (05/14/23 4531)      Lines, Drains & Airways     Active LDAs     Name Placement date Placement time Site Days    Peripheral IV 05/13/23 2021 Left Antecubital 05/13/23 2021  Antecubital  less than 1    Peripheral IV 05/14/23 1513 Anterior;Left;Upper Arm 05/14/23  1513  Arm  less than 1    External Urinary Catheter 05/07/23  --  --  7              No active isolations  Diet Orders (active) (From admission, onward)     Start     Ordered    05/14/23 1628  NPO Diet NPO Type: Strict NPO  Diet Effective Now        Comments: Strict NPO Until Nursing Dysphagia Screen Passed    05/14/23 1633    05/07/23 1800  Dietary Nutrition Supplements Boost Plus (Ensure Enlive, Ensure Plus)  Daily With Breakfast, Lunch & Dinner       05/07/23 1203                PCCM Problems  Acute right MCA stroke now status post TNK  Bilateral strokes on MRI  Concern for airway protection and dysphagia  Acute hypoxic respiratory failure with worsening  Right lower lobe pneumonia and lung abscess with ongoing antibiotics  Right pleural effusion without evidence for empyema  New right lower lobe 2.4 cm lung nodule on CT 4/3/2023  COPD, currently not wheezing  Current cigarette smoker  Acute hyponatremia  Anemia        Plan of Treatment    Left-sided weakness with  acute right MCA stroke and status post TNK.  Much improved.  Discussed with neurologist today.  MRI noted and bilateral strokes.  Eventually needs ELIGIO.  Cardiology is consulted.  Will be on DAPT.    Oxygen requirements have stabilized.  Now tolerating low-flow oxygen.    Ongoing antibiotics for right lower lobe pneumonia.  Continue as per ID.    Right lung nodule and now with right lung severe pneumonia.  Needs follow-up imaging to exclude malignancy.    COPD and currently not wheezing.  Now on nebulizer treatments to help with mobilization of secretions.    Patient will be counseled to quit smoking prior to discharge.    Hyponatremia from underlying lung disease likely.    Anemia from recent acute illness.  Other etiologies not ruled out yet.    Now with jose alberto for tube feeding. Will see how she does with modified consistency diet.    Transfer out.    Darius Ford MD  05/16/23  13:46 EDT      Part of this note may be an electronic transcription/translation of spoken language to printed text using the Dragon Dictation System.

## 2023-05-16 NOTE — PLAN OF CARE
Goal Outcome Evaluation:  Plan of Care Reviewed With: patient           Outcome Evaluation: Pt seen for PT re-eval today.  She had been previously working w PT for weakness and decreased mobility during hospitalization for PNA, acute respiratory failure, R lung abscess requiring intubation. Pt developed L sided weakness and neglect on 5/14 and recieved TNK for R MCA stroke. Today pt able to sit EOB w mod/max A x 2,  but reported increased dizziness with sitting, BP taken supine 128/70 and 138/77 sitting EOB. Pt does have L sided weakness, but was able to raise L extremities against gravity.  Pt tolerated sitting EOB for several minutes before returning to supine. She will continue to benefit from skilled PT to maximize safety, strength, and independence. Recommend acute rehab at [place]. WIll continue to progress.

## 2023-05-16 NOTE — THERAPY RE-EVALUATION
Patient Name: Katherine Williamson  : 1950    MRN: [id number]                              Today's Date: 2023       Admit Date: 2023    Visit Dx:     ICD-10-CM ICD-9-CM   1. Acute respiratory failure with hypoxia  J96.01 518.81   2. Sepsis, due to unspecified organism, unspecified whether acute organ dysfunction present  A41.9 038.9     995.91   3. Community acquired pneumonia, unspecified laterality  J18.9 486   4. Abnormal CXR  R93.89 793.2   5. Hyperglycemia  R73.9 790.29   6. 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   • History of repair of hip joint   • History of operative procedure on hip   • Hyperglycemia   • Hepatic steatosis   • Diverticulosis   • Chest pain, atypical   • History of colon polyps   • Family history of colon cancer in mother   • Allergic rhinitis   • Lung nodule seen on imaging study   • Acute respiratory failure with hypoxia   • Pneumonia   • Empyema   • Sepsis   • Pleural effusion, right   • Other emphysema   • Pulmonary nodule   • Diastolic dysfunction, grade 1   • Physical debility     Past Medical History:   Diagnosis Date   • Arthritis    • Cataract    • Colon polyps     FOLLOWED BY DR. JOSEPH LAU   • Hypertension      Past Surgical History:   Procedure Laterality Date   • COLONOSCOPY  10/2015    Rgupx-bnimjopllzbp-[id number]. Kaplan.  Follow-up in 5 years.   • COLONOSCOPY N/A 2022    2 BENIGN POLYPS IN DESCENDING, 5 MM BENIGN POLYP IN SIGMOID, MULTIPLE SMALL AND LARGE DIVERTICULA IN SIGMOID, RESCOPE IN 3 YRS, DR. JOSEPH LAU AT [facility]   • EYE SURGERY     • JOINT REPLACEMENT  ?    Left total hip replacement in .  In 2015 patient had left hip revision   • TONSILLECTOMY        General  Information     Row Name 05/16/23 1041          OT Time and Intention    Document Type re-evaluation  -     Mode of Treatment occupational therapy;co-treatment  -     Row Name 05/16/23 1041          General Information    Patient Profile Reviewed yes  -     Prior Level of Function independent:;ADL's;all household mobility  -     Existing Precautions/Restrictions fall;oxygen therapy device and L/min  -     Row Name 05/16/23 1041          Living Environment    People in Home spouse  -[facility] Name 05/16/23 1041          Home Main Entrance    Number of Stairs, Main Entrance three  -[facility] Name 05/16/23 1041          Cognition    Orientation Status (Cognition) oriented x 4  -[facility] Name 05/16/23 1041          Safety Issues, Functional Mobility    Impairments Affecting Function (Mobility) balance;endurance/activity tolerance;strength;shortness of breath;coordination  -           User Key  (r) = Recorded By, (t) = Taken By, (c) = Cosigned By    Initials Name Provider Type     Perla Carrasco, OT Occupational Therapist                 Mobility/ADL's     [facility] Name 05/16/23 1042          Bed Mobility    Supine-Sit [place] (Bed Mobility) moderate assist (50% patient effort);maximum assist (25% patient effort);2 person assist  -     Sit-Supine [place] (Bed Mobility) moderate assist (50% patient effort);maximum assist (25% patient effort);2 person assist  -     Assistive Device (Bed Mobility) bed rails;head of bed elevated  -[facility] Name 05/16/23 1042          Transfers    Comment, (Transfers) pt to dizzy to complete today  -[facility] Name 05/16/23 1042          Activities of Daily Living    BADL Assessment/Intervention toileting  -[facility] Name 05/16/23 1042          Lower Body Dressing Assessment/Training    [place] Level (Lower Body Dressing) don;socks;dependent (less than 25% patient effort)  -     Position (Lower Body Dressing) supine  -[facility] Name 05/16/23 1042           Toileting Assessment/Training    [place] Level (Toileting) dependent (less than 25% patient effort)  -     Position (Toileting) supine  -           User Key  (r) = Recorded By, (t) = Taken By, (c) = Cosigned By    Initials Name Provider Type    Perla Munroe OT Occupational Therapist               Obj/Interventions     [facility] Name 05/16/23 1042          Sensory Assessment (Somatosensory)    Sensory Assessment (Somatosensory) UE sensation intact  -SM     Row Name 05/16/23 1042          Vision Assessment/Intervention    Visual Impairment/Limitations WFL  -[facility] Name 05/16/23 1042          Range of Motion Comprehensive    Comment, General Range of Motion BUE AROM WFL tested in supine  -[facility] Name 05/16/23 1042          Strength Comprehensive (MMT)    Comment, General Manual Muscle Testing (MMT) Assessment LUE slightly weaker than RUE, L elbow flexion 3/5, L shoulder flexion 3-/5, R elbow flexion 3+/5, L shoulder flexion 3/5  -[facility] Name 05/16/23 1042          Motor Skills    Motor Skills coordination  -     Coordination left;upper extremity;minimal impairment;ataxia  -     Functional Endurance poor, tolerated sitting EOB 5 minutes today  -[facility] Name 05/16/23 1042          Balance    Static Sitting Balance contact guard  -           User Key  (r) = Recorded By, (t) = Taken By, (c) = Cosigned By    Initials Name Provider Type    Perla Munroe OT Occupational Therapist               Goals/Plan     [facility] Name 05/16/23 1212          Transfer Goal 1 (OT)    Activity/Assistive Device (Transfer Goal 1, OT) sit-to-stand/stand-to-sit;bed-to-chair/chair-to-bed;commode, bedside without drop arms  -     [place] Level/Cues Needed (Transfer Goal 1, OT) moderate assist (50-74% patient effort)  -     Time Frame (Transfer Goal 1, OT) short term goal (STG);2 weeks  -     Progress/Outcome (Transfer Goal 1, OT) goal revised this date  -[facility] Name 05/16/23 3194           Dressing Goal 1 (OT)    Activity/Device (Dressing Goal 1, OT) upper body dressing;lower body dressing  -SM     [place]/Cues Needed (Dressing Goal 1, OT) minimum assist (75% or more patient effort);moderate assist (50-74% patient effort)  -SM     Time Frame (Dressing Goal 1, OT) short term goal (STG);2 weeks  -SM     Progress/Outcome (Dressing Goal 1, OT) goal revised this date  -     Row Name 05/16/23 1219          Toileting Goal 1 (OT)    Activity/Device (Toileting Goal 1, OT) toileting skills, all  -SM     [place] Level/Cues Needed (Toileting Goal 1, OT) maximum assist (25-49% patient effort)  -SM     Time Frame (Toileting Goal 1, OT) short term goal (STG);2 weeks  -SM     Progress/Outcome (Toileting Goal 1, OT) goal ongoing  -     Row Name 05/16/23 1219          Strength Goal 1 (OT)    Strength Goal 1 (OT) Pt to increase BUE shouler strength to 3+/5 to increase ability to perform reaching and ADLs.  -SM     Time Frame (Strength Goal 1, OT) short term goal (STG);2 weeks  -SM     Progress/Outcome (Strength Goal 1, OT) goal ongoing  -     Row Name 05/16/23 1219          Problem Specific Goal 1 (OT)    Problem Specific Goal 1 (OT) Pt to complete LUE coordination activity (bottons, snaps, pincer grasp for small item, container opening) with min-no reported difficulty.  -SM     Time Frame (Problem Specific Goal 1, OT) short term goal (STG);2 weeks  -SM     Progress/Outcome (Problem Specific Goal 1, OT) new goal  -     Row Name 05/16/23 1219          Therapy Assessment/Plan (OT)    Planned Therapy Interventions (OT) activity tolerance training;IADL retraining;adaptive equipment training;cognitive/visual perception retraining;neuromuscular control/coordination retraining;patient/caregiver education/training;transfer/mobility retraining;strengthening exercise;occupation/activity based interventions;functional balance retraining;ROM/therapeutic exercise;BADL retraining  -           User Key  (r) =  Recorded By, (t) = Taken By, (c) = Cosigned By    Initials Name Provider Type     Perla Carrasco, [place] Occupational Therapist               Clinical Impression     Row Name 05/16/23 1045          Pain Assessment    Pretreatment Pain Rating 0/10 - no pain  -     Posttreatment Pain Rating 0/10 - no pain  -     Row Name 05/16/23 1045          Plan of Care Review    Plan of Care Reviewed With patient  -     Outcome Evaluation OT re-eval completed today, pt had been working with OT for weakness, ADLs during hospitalization for PNA, acute respiratory failure, R lung abscess requiring intubation. Pt developed L sided weakness and neglect on 5/14 and recieved TNK for R MCA stroke. Today pt able to sit EOB but reported increased dizziness with sitting, BP taken supine 128/70 and 138/77 sitting EOB. Pt does have LUE weakness and ataxia but is able to raise UE against gravity. She is currently dependent for all LB ADLs and would benefit from continued OT with recommended dc to acute rehab.  -     Row Name 05/16/23 1045          Therapy Assessment/Plan (OT)    Rehab Potential (OT) good, to achieve stated therapy goals  -     Therapy Frequency (OT) 5 times/wk  -     Row Name 05/16/23 1045          Therapy Plan Review/Discharge Plan (OT)    Anticipated Discharge Disposition (OT) inpatient rehabilitation facility  -     Row Name 05/16/23 1045          Vital Signs    Pre Systolic BP Rehab 128  -SM     Pre Treatment Diastolic BP 70  -SM     Intra Systolic BP Rehab 138  -SM     Intra Treatment Diastolic BP 77  -SM     Pretreatment Heart Rate (beats/min) 95  -SM     Posttreatment Heart Rate (beats/min) 86  -SM     Pre SpO2 (%) 95  4L  -SM     O2 Delivery Pre Treatment supplemental O2  -SM     Post SpO2 (%) 98  -SM     O2 Delivery Post Treatment supplemental O2  -SM     Row Name 05/16/23 1045          Positioning and Restraints    Pre-Treatment Position in bed  -SM     Post Treatment Position bed  -SM     In Bed  fowlers;call light within reach;encouraged to call for assist;exit alarm on;notified nsg  -           User Key  (r) = Recorded By, (t) = Taken By, (c) = Cosigned By    Initials Name Provider Type    Perla Munroe OT Occupational Therapist               Outcome Measures     Row Name 05/16/23 1220          How much help from another is currently needed...    Putting on and taking off regular lower body clothing? 1  -SM     Bathing (including washing, rinsing, and drying) 2  -SM     Toileting (which includes using toilet bed pan or urinal) 1  -SM     Putting on and taking off regular upper body clothing 2  -SM     Taking care of personal grooming (such as brushing teeth) 2  -SM     Eating meals 3  -SM     AM-PAC 6 Clicks Score (OT) 11  -     Row Name 05/16/23 1220 05/16/23 0700       Modified Zaida Scale    Modified Heidrick Scale 4 - Moderately severe disability.  Unable to walk without assistance, and unable to attend to own bodily needs without assistance.  -SM 4 - Moderately severe disability.  Unable to walk without assistance, and unable to attend to own bodily needs without assistance.  -CC    Row Name 05/16/23 1220          Functional Assessment    Outcome Measure Options AM-PAC 6 Clicks Daily Activity (OT)  -           User Key  (r) = Recorded By, (t) = Taken By, (c) = Cosigned By    Initials Name Provider Type    Ruba Juan, OTR Occupational Therapist     Perla Carrasco OT Occupational Therapist                Occupational Therapy Education     Title: PT OT SLP Therapies (Done)     Topic: Occupational Therapy (Done)     Point: ADL training (Done)     Description:   Instruct learner(s) on proper safety adaptation and remediation techniques during self care or transfers.   Instruct in proper use of assistive devices.              Learning Progress Summary           Patient Acceptance, E, VU by  at 5/16/2023 1221    Comment: updated OT goals/POC dc recommendations discussed     Acceptance, E,TB, VU by XO at 5/14/2023 0953    Acceptance, E,TB, VU by XO at 5/13/2023 1528    Acceptance, E, VU by  at 5/12/2023 1224    Comment: sitting up in bed to progress ability to sit, dc recommendations and POC for OT/goals.   Family Acceptance, E, VU by SM at 5/12/2023 1224    Comment: sitting up in bed to progress ability to sit, dc recommendations and POC for OT/goals.                   Point: Home exercise program (Done)     Description:   Instruct learner(s) on appropriate technique for monitoring, assisting and/or progressing therapeutic exercises/activities.              Learning Progress Summary           Patient Acceptance, E,TB, VU by XO at 5/14/2023 0953    Acceptance, E,TB, VU by XO at 5/13/2023 1528                   Point: Precautions (Done)     Description:   Instruct learner(s) on prescribed precautions during self-care and functional transfers.              Learning Progress Summary           Patient Acceptance, E,TB, VU by XO at 5/14/2023 0953    Acceptance, E,TB, VU by XO at 5/13/2023 1528                   Point: Body mechanics (Done)     Description:   Instruct learner(s) on proper positioning and spine alignment during self-care, functional mobility activities and/or exercises.              Learning Progress Summary           Patient Acceptance, E,TB, VU by XO at 5/14/2023 0953    Acceptance, E,TB, VU by XO at 5/13/2023 1528                               User Key     Initials Effective Dates Name Provider Type Discipline     04/02/20 -  Perla Carrasco OT Occupational Therapist OT    XO 09/22/22 -  Marley Cruz RN Registered Nurse Nurse              OT Recommendation and Plan  Planned Therapy Interventions (OT): activity tolerance training, IADL retraining, adaptive equipment training, cognitive/visual perception retraining, neuromuscular control/coordination retraining, patient/caregiver education/training, transfer/mobility retraining, strengthening exercise,  occupation/activity based interventions, functional balance retraining, ROM/therapeutic exercise, BADL retraining  Therapy Frequency (OT): 5 times/wk  Plan of Care Review  Plan of Care Reviewed With: patient  Outcome Evaluation: OT re-eval completed today, pt had been working with OT for weakness, ADLs during hospitalization for PNA, acute respiratory failure, R lung abscess requiring intubation. Pt developed L sided weakness and neglect on 5/14 and recieved TNK for R MCA stroke. Today pt able to sit EOB but reported increased dizziness with sitting, BP taken supine 128/70 and 138/77 sitting EOB. Pt does have LUE weakness and ataxia but is able to raise UE against gravity. She is currently dependent for all LB ADLs and would benefit from continued OT with recommended dc to acute rehab.     Time Calculation:    Time Calculation- OT     Row Name 05/16/23 1222             Time Calculation- OT    OT Start Time 0853  -      OT Stop Time 0907  -      OT Time Calculation (min) 14 min  -SM      Total Timed Code Minutes- OT 8 minute(s)  -SM      OT Received On 05/16/23  -      OT - Next Appointment 05/17/23  -      OT Goal Re-Cert Due Date 05/30/23  -         Timed Charges    50574 - OT Therapeutic Activity Minutes 8  -SM         Untimed Charges    OT Eval/Re-eval Minutes 6  -SM         Total Minutes    Timed Charges Total Minutes 8  -SM      Untimed Charges Total Minutes 6  -SM       Total Minutes 14  -SM            User Key  (r) = Recorded By, (t) = Taken By, (c) = Cosigned By    Initials Name Provider Type     Perla Carrasco OT Occupational Therapist              Therapy Charges for Today     Code Description Service Date Service Provider Modifiers Qty    96368961477  OT THERAPEUTIC ACT EA 15 MIN 5/16/2023 Perla Carrasco OT GO 1    [id number] HC OT RE-EVAL 2 5/16/2023 Perla Carrasco OT GO 1               Perla Carrasco OT  5/16/2023

## 2023-05-16 NOTE — DISCHARGE PLACEMENT REQUEST
"Katherine Cerda (72 y.o. Female)     Date of Birth   1950    Social Security Number       Address   [address] [address]    Home Phone   [phone]    MRN   [id number]       [place]   [place]    Marital Status                               Admission Date   5/2/23    Admission Type   Emergency    Admitting Provider   Olga Palm MD    Attending Provider   Bharat Calabrese MD    Department, Room/Bed   [facility], N327/1       Discharge Date       Discharge Disposition       Discharge Destination                               Attending Provider: Bharat Calabrese MD    Allergies: Hydrocodone-acetaminophen    Isolation: None   Infection: None   Code Status: CPR    Ht: 160 cm (62.99\")   Wt: 63.1 kg (139 lb 1.8 oz)    Admission Cmt: None   Principal Problem: Acute respiratory failure with hypoxia [J96.01]                 Active Insurance as of 5/2/2023     Primary Coverage     Payor Plan Insurance Group Employer/Plan Group    [employer] BLUE CROSS [employer] BLUE CROSS BLUE SHIELD PPO [id number]     Payor Plan Address Payor Plan Phone Number Payor Plan Fax Number Effective Dates    [address] [phone]  3/24/2013 - None Entered    [facility] [id number]       Subscriber Name Subscriber Birth Date Member ID       SAMANTHA CERDA III [birth year]1960 [id number]           Secondary Coverage     Payor Plan Insurance Group Employer/Plan Group    MEDICARE MEDICARE A ONLY      Payor Plan Address Payor Plan Phone Number Payor Plan Fax Number Effective Dates    [address] [phone]  12/1/2017 - None Entered    [facility] [id number]       Subscriber Name Subscriber Birth Date Member ID       KATHERINE CERDA 1950 [id number]                 Emergency Contacts      (Rel.) Home Phone Work Phone Mobile Phone    Samantha Cerda III (POA) (Spouse) [phone] -- --    KERRI lauren (Daughter) [phone] -- --    Odalys Briceno (Other) -- -- [phone]              "

## 2023-05-16 NOTE — PROGRESS NOTES
ID NOTE    CC: f/u pneumonia (right worse than left) w/ possible abscess    Subj: Pt resting comfortably. Does stir to voice. D/w her . She is on 4L NC. MRI done yesterday (see below). Tolerating meropenem and doxy w/o rash. No fever. WBC trending down.       Medications:    Current Facility-Administered Medications:   •  acetaminophen (TYLENOL) tablet 650 mg, 650 mg, Oral, Q4H PRN, 650 mg at 05/12/23 1054 **OR** acetaminophen (TYLENOL) 160 MG/5ML solution 650 mg, 650 mg, Oral, Q4H PRN **OR** acetaminophen (TYLENOL) suppository 650 mg, 650 mg, Rectal, Q4H PRN, Gee Clement MD, 650 mg at 05/14/23 1759  •  aspirin chewable tablet 81 mg, 81 mg, Nasogastric, Daily, 81 mg at 05/15/23 1823 **OR** aspirin suppository 300 mg, 300 mg, Rectal, Daily, Darius Ford MD  •  atorvastatin (LIPITOR) tablet 80 mg, 80 mg, Oral, Nightly, Indigo Curry MD, 80 mg at 05/15/23 2111  •  bismuth subsalicylate (PEPTO BISMOL) 262 MG/15ML suspension 30 mL, 30 mL, Oral, TID PRN, Issac Mendez MD, 30 mL at 05/14/23 0839  •  citalopram (CeleXA) tablet 20 mg, 20 mg, Oral, Daily, Gee Clement MD, 20 mg at 05/14/23 0839  •  clopidogrel (PLAVIX) tablet 75 mg, 75 mg, Nasogastric, Daily, Darius Ford MD, 75 mg at 05/15/23 1823  •  doxycycline (VIBRAMYCIN) 100 mg in sodium chloride 0.9 % 100 mL IVPB-VTB, 100 mg, Intravenous, Q12H, Darius Ford MD, 100 mg at 05/16/23 0610  •  fluticasone (FLONASE) 50 MCG/ACT nasal spray 1 spray, 1 spray, Each Nare, Daily, Gee Clement MD, 1 spray at 05/08/23 0808  •  guaiFENesin (MUCINEX) 12 hr tablet 600 mg, 600 mg, Oral, BID, Gee Clement MD, 600 mg at 05/15/23 2111  •  Hold medication, 1 each, Does not apply, Continuous PRN, Jennifer Hood MD  •  hydrocortisone-bacitracin-zinc oxide-nystatin (MAGIC BARRIER) ointment 1 application, 1 application, Topical, BID, Bharat Calabrese MD, 1 application at 05/15/23 2111  •  ipratropium-albuterol (DUO-NEB) nebulizer solution 3 mL, 3 mL,  Nebulization, 4x Daily - RT, Gee Clement MD, 3 mL at 05/16/23 0700  •  labetalol (NORMODYNE,TRANDATE) injection 10 mg, 10 mg, Intravenous, Q10 Min PRN, Indigo Curry MD  •  Magnesium Sulfate - Total Dose 10 grams - Magnesium 1 or Less, 2 g, Intravenous, PRN **OR** Magnesium Sulfate - Total Dose 6 grams - Magnesium 1.1 - 1.5, 2 g, Intravenous, PRN **OR** Magnesium Sulfate - Total Dose 4 grams - Magnesium 1.6 - 1.9, 4 g, Intravenous, PRN, Gee Clement MD  •  Menthol-Zinc Oxide 1 application, 1 application, Topical, 4x Daily, Bharat Calabrese MD, 1 application at 05/15/23 2111  •  meropenem (MERREM) 1 g in sodium chloride 0.9 % 100 mL IVPB-VTB, 1 g, Intravenous, Q8H, Rigoberto Chan MD, Last Rate: 33.3 mL/hr at 05/12/23 0449, 1 g at 05/16/23 0507  •  metoprolol tartrate (LOPRESSOR) tablet 12.5 mg, 12.5 mg, Oral, Q12H, Issac Mendez MD, 12.5 mg at 05/15/23 2111  •  niCARdipine (CARDENE) 25 mg in 250 mL NS infusion kit, 5-15 mg/hr, Intravenous, Titrated, Indigo Curry MD  •  nitroglycerin (NITROSTAT) SL tablet 0.4 mg, 0.4 mg, Sublingual, Q5 Min PRN, Gee Clement MD  •  ondansetron (ZOFRAN) injection 4 mg, 4 mg, Intravenous, Q6H PRN, Gee Clement MD, 4 mg at 05/11/23 1855  •  phenylephrine (JOSELINE-SYNEPHRINE) 50 mg in 250 mL NS infusion, 0.5-3 mcg/kg/min, Intravenous, Titrated, Ernestine Cain APRN, Last Rate: 9.87 mL/hr at 05/14/23 2347, 0.5 mcg/kg/min at 05/14/23 2347  •  potassium & sodium phosphates (PHOS-NAK) 280-160-250 MG packet - for Phosphorus less than 1.25 mg/dL, 2 packet, Oral, Q6H PRN **OR** potassium & sodium phosphates (PHOS-NAK) 280-160-250 MG packet - for Phosphorus 1.25 - 2.5 mg/dL, 2 packet, Oral, Q6H PRN, Gee Clement MD  •  potassium chloride (K-DUR,KLOR-CON) ER tablet 40 mEq, 40 mEq, Oral, PRN, 40 mEq at 05/07/23 0654 **OR** potassium chloride (KLOR-CON) packet 40 mEq, 40 mEq, Oral, PRN **OR** potassium chloride 10 mEq in 100 mL IVPB, 10 mEq,  Intravenous, Q1H PRN, Gee Clement MD  •  [COMPLETED] Insert Peripheral IV, , , Once **AND** sodium chloride 0.9 % flush 10 mL, 10 mL, Intravenous, PRN, Gee Clement MD  •  sodium chloride 0.9 % flush 10 mL, 10 mL, Intravenous, Q12H, Gee Clement MD, 10 mL at 05/15/23 2112  •  sodium chloride 0.9 % flush 10 mL, 10 mL, Intravenous, PRN, Gee Clement MD  •  sodium chloride 0.9 % flush 10 mL, 10 mL, Intravenous, Q12H, Indigo Curry MD, 10 mL at 05/15/23 2112  •  sodium chloride 0.9 % flush 10 mL, 10 mL, Intravenous, PRN, Indigo Curry MD  •  sodium chloride 0.9 % infusion 40 mL, 40 mL, Intravenous, PRNUrbano Abhishek, MD  •  sodium chloride 0.9 % infusion 40 mL, 40 mL, Intravenous, PRN, Indigo Curry MD  •  vitamin B-12 (CYANOCOBALAMIN) tablet 1,000 mcg, 1,000 mcg, Oral, Daily, Gee Clement MD, 1,000 mcg at 05/14/23 0840      Objective   Vital Signs   Temp:  [97.3 °F (36.3 °C)-98.7 °F (37.1 °C)] 98.7 °F (37.1 °C)  Heart Rate:  [] 84  Resp:  [18-20] 20  BP: (114-140)/(61-79) 140/76    Physical Exam:   General: NAD  Eyes: no scleral icterus  ENT: no thrush; NC in nares; CorTrak in nares  Cardiovascular: NR  Respiratory: clearer again today; no wheezing; normal WOB on 4L NC  GI: Abdomen is soft, not tender  Skin: No rashes  Psychiatric: Normal mood and affect     Labs:   CBC, BMP, CRP, and blood cultures reviewed today  Lab Results   Component Value Date    WBC 11.81 (H) 05/16/2023    HGB 10.0 (L) 05/16/2023    HCT 29.0 (L) 05/16/2023    MCV 89.2 05/16/2023     05/16/2023     Lab Results   Component Value Date    GLUCOSE 80 05/16/2023    CALCIUM 8.3 (L) 05/16/2023     (L) 05/16/2023    K 3.9 05/16/2023    CO2 31.0 (H) 05/16/2023     05/16/2023    BUN 13 05/16/2023    CREATININE 0.22 (L) 05/16/2023    EGFR 123.2 05/16/2023    BCR 59.1 (H) 05/16/2023    ANIONGAP 3.0 (L) 05/16/2023     Lab Results   Component Value Date    CRP 3.52 (H) 05/14/2023  "      Procal 3.95--->2.6-->0.2  Crypto Ag negative  Urine Histo Ag negative  Serum Histo Ag negative  Histoplasma Ab negative  Urine Blasto Ag negative  Aspergillus Ag pending  ANCA negative      Microbiology:  5/2 RPP: negative  5/2 BCx: Corynebacterium in 1/2 sets  5/2 SpCx: normal aishwarya  5/2 MRSA nares: negative  5/2 Strep pneumo Ag; negative  5/2 Legionella Ag: negative  5/4 BCx: negative  5/5 C diff: negative  5/6 SpCx: normal aishwarya  5/9 AFB Cx: NGTD  5/9 Fungus Cx: NGTD  5/10 SpCx: rare normal aishwarya  5/10 L Thoracentesis Cx: negative  5/14 BCx: NGTD    New radiology:  MRI brain: \"Bilateral multifocal areas of infarction.  No evidence of hemorrhagic   transformation. \"    ASSESSMENT/PLAN:  1. Pneumonia and right lung abscess  2. Pleural effusions  3. Acute hypoxic respiratory failure  4. Emphysema  5. Tobacco use  6. Acute R MCA stroke and bilateral infarcts    Thanks to neurologist and ICU team for their care of Ms. Williamson in regards to her stroke. MRI as above. Blood cultures negative. TTE negative for vegetations. Doubt endocarditis.     Her fever is resolved. She is on 4L NC. WBC trending down.  I still recommend that we treat with antibiotics for a 4-week course with stop date 6/8/23. The regimen will be meropenem 1 g IV q8h and doxycycline 100 mg PO BID. I ordered a repeat CT to be done just prior to the stop date. She will need follow-up w/ pulmonologist as well.     Hold off on PICC for. I will re-order closer to time of discharge.     ID will follow.   "

## 2023-05-16 NOTE — PLAN OF CARE
Goal Outcome Evaluation:  Plan of Care Reviewed With: patient           Outcome Evaluation: VFSS commpleted with Dr Esquivel.  Pt demonstrated absent epiglottic deflection initially with penetration of thin and nectar and severe vallecular residue.  Improved delfection and vallecular clearance with use of chin tuck.  Penetration eliminated with chin tuck.  Later trials w/o chin tuck only mild vallecular residue and no penetration.  Premature spillage of soft solids and mixed consistencies.  REC: soft diet with ground meats, thin liquids.  Upright with all po, chin tuck, alternate solids/liquids.  Meds as tolerated.

## 2023-05-16 NOTE — PLAN OF CARE
Goal Outcome Evaluation:  Plan of Care Reviewed With: patient           Outcome Evaluation: OT re-eval completed today, pt had been working with OT for weakness, ADLs during hospitalization for PNA, acute respiratory failure, R lung abscess requiring intubation. Pt developed L sided weakness and neglect on 5/14 and recieved TNK for R MCA stroke. Today pt able to sit EOB but reported increased dizziness with sitting, BP taken supine 128/70 and 138/77 sitting EOB. Pt does have LUE weakness and ataxia but is able to raise UE against gravity. She is currently dependent for all LB ADLs and would benefit from continued OT with recommended dc to acute rehab.

## 2023-05-17 ENCOUNTER — APPOINTMENT (OUTPATIENT)
Dept: CARDIOLOGY | Facility: HOSPITAL | Age: 73
DRG: 871 | End: 2023-05-17
Payer: COMMERCIAL

## 2023-05-17 PROBLEM — J85.1 ABSCESS OF RIGHT LUNG WITH PNEUMONIA: Status: ACTIVE | Noted: 2023-05-02

## 2023-05-17 PROBLEM — I63.9 CRYPTOGENIC STROKE: Status: ACTIVE | Noted: 2023-05-17

## 2023-05-17 LAB
BH CV ECHO MEAS - TR MAX PG: 25.5 MMHG
BH CV ECHO MEAS - TR MAX VEL: 252.7 CM/SEC
BH CV ECHO SHUNT ASSESSMENT PERFORMED (HIDDEN SCRIPTING): 1
FUNGUS WND CULT: NORMAL
GALACTOMANNAN AG SPEC IA-ACNC: 0.07 INDEX (ref 0–0.49)
GLUCOSE BLDC GLUCOMTR-MCNC: 114 MG/DL (ref 70–130)
GLUCOSE BLDC GLUCOMTR-MCNC: 122 MG/DL (ref 70–130)
GLUCOSE BLDC GLUCOMTR-MCNC: 94 MG/DL (ref 70–130)
MAXIMAL PREDICTED HEART RATE: 150 BPM
MYCOBACTERIUM SPEC CULT: NORMAL
MYCOBACTERIUM SPEC CULT: NORMAL
NIGHT BLUE STAIN TISS: NORMAL
NIGHT BLUE STAIN TISS: NORMAL
STRESS TARGET HR: 128 BPM

## 2023-05-17 PROCEDURE — 93321 DOPPLER ECHO F-UP/LMTD STD: CPT | Performed by: INTERNAL MEDICINE

## 2023-05-17 PROCEDURE — 93312 ECHO TRANSESOPHAGEAL: CPT

## 2023-05-17 PROCEDURE — 94664 DEMO&/EVAL PT USE INHALER: CPT

## 2023-05-17 PROCEDURE — 25010000002 MEROPENEM PER 100 MG: Performed by: INTERNAL MEDICINE

## 2023-05-17 PROCEDURE — 94761 N-INVAS EAR/PLS OXIMETRY MLT: CPT

## 2023-05-17 PROCEDURE — 97530 THERAPEUTIC ACTIVITIES: CPT

## 2023-05-17 PROCEDURE — 93325 DOPPLER ECHO COLOR FLOW MAPG: CPT

## 2023-05-17 PROCEDURE — 82948 REAGENT STRIP/BLOOD GLUCOSE: CPT

## 2023-05-17 PROCEDURE — 25010000002 MIDAZOLAM PER 1 MG: Performed by: INTERNAL MEDICINE

## 2023-05-17 PROCEDURE — 94799 UNLISTED PULMONARY SVC/PX: CPT

## 2023-05-17 PROCEDURE — 25010000002 FENTANYL CITRATE (PF) 50 MCG/ML SOLUTION: Performed by: INTERNAL MEDICINE

## 2023-05-17 PROCEDURE — 93312 ECHO TRANSESOPHAGEAL: CPT | Performed by: INTERNAL MEDICINE

## 2023-05-17 PROCEDURE — 93321 DOPPLER ECHO F-UP/LMTD STD: CPT

## 2023-05-17 PROCEDURE — 93325 DOPPLER ECHO COLOR FLOW MAPG: CPT | Performed by: INTERNAL MEDICINE

## 2023-05-17 PROCEDURE — 99233 SBSQ HOSP IP/OBS HIGH 50: CPT | Performed by: PHYSICIAN ASSISTANT

## 2023-05-17 PROCEDURE — 99232 SBSQ HOSP IP/OBS MODERATE 35: CPT | Performed by: INTERNAL MEDICINE

## 2023-05-17 PROCEDURE — B245ZZ4 ULTRASONOGRAPHY OF LEFT HEART, TRANSESOPHAGEAL: ICD-10-PCS | Performed by: INTERNAL MEDICINE

## 2023-05-17 RX ORDER — ACETAMINOPHEN 325 MG/1
650 TABLET ORAL EVERY 6 HOURS PRN
Status: DISCONTINUED | OUTPATIENT
Start: 2023-05-17 | End: 2023-05-19 | Stop reason: HOSPADM

## 2023-05-17 RX ORDER — SODIUM CHLORIDE 9 MG/ML
INJECTION, SOLUTION INTRAVENOUS
Status: COMPLETED | OUTPATIENT
Start: 2023-05-17 | End: 2023-05-17

## 2023-05-17 RX ORDER — FENTANYL CITRATE 50 UG/ML
INJECTION, SOLUTION INTRAMUSCULAR; INTRAVENOUS
Status: COMPLETED | OUTPATIENT
Start: 2023-05-17 | End: 2023-05-17

## 2023-05-17 RX ORDER — LIDOCAINE HYDROCHLORIDE 20 MG/ML
SOLUTION OROPHARYNGEAL
Status: COMPLETED | OUTPATIENT
Start: 2023-05-17 | End: 2023-05-17

## 2023-05-17 RX ORDER — MIDAZOLAM HYDROCHLORIDE 1 MG/ML
INJECTION INTRAMUSCULAR; INTRAVENOUS
Status: COMPLETED | OUTPATIENT
Start: 2023-05-17 | End: 2023-05-17

## 2023-05-17 RX ADMIN — Medication 10 ML: at 20:19

## 2023-05-17 RX ADMIN — MIDAZOLAM 1 MG: 1 INJECTION INTRAMUSCULAR; INTRAVENOUS at 10:52

## 2023-05-17 RX ADMIN — CLOPIDOGREL BISULFATE 75 MG: 75 TABLET, FILM COATED ORAL at 08:33

## 2023-05-17 RX ADMIN — IPRATROPIUM BROMIDE AND ALBUTEROL SULFATE 3 ML: 2.5; .5 SOLUTION RESPIRATORY (INHALATION) at 16:02

## 2023-05-17 RX ADMIN — ACETAMINOPHEN 650 MG: 325 TABLET, FILM COATED ORAL at 22:25

## 2023-05-17 RX ADMIN — ZINC OXIDE 1 APPLICATION: 200 OINTMENT TOPICAL at 08:35

## 2023-05-17 RX ADMIN — DOXYCYCLINE 100 MG: 100 INJECTION, POWDER, LYOPHILIZED, FOR SOLUTION INTRAVENOUS at 17:47

## 2023-05-17 RX ADMIN — DOXYCYCLINE 100 MG: 100 INJECTION, POWDER, LYOPHILIZED, FOR SOLUTION INTRAVENOUS at 08:33

## 2023-05-17 RX ADMIN — ANORECTAL OINTMENT 1 APPLICATION: 15.7; .44; 24; 20.6 OINTMENT TOPICAL at 18:03

## 2023-05-17 RX ADMIN — BENZOCAINE, BUTAMBEN, AND TETRACAINE HYDROCHLORIDE 1 SPRAY: .028; .004; .004 AEROSOL, SPRAY TOPICAL at 10:46

## 2023-05-17 RX ADMIN — IPRATROPIUM BROMIDE AND ALBUTEROL SULFATE 3 ML: 2.5; .5 SOLUTION RESPIRATORY (INHALATION) at 12:02

## 2023-05-17 RX ADMIN — ZINC OXIDE 1 APPLICATION: 200 OINTMENT TOPICAL at 20:20

## 2023-05-17 RX ADMIN — IPRATROPIUM BROMIDE AND ALBUTEROL SULFATE 3 ML: 2.5; .5 SOLUTION RESPIRATORY (INHALATION) at 07:30

## 2023-05-17 RX ADMIN — METOPROLOL TARTRATE 12.5 MG: 25 TABLET, FILM COATED ORAL at 20:20

## 2023-05-17 RX ADMIN — METOPROLOL TARTRATE 12.5 MG: 25 TABLET, FILM COATED ORAL at 08:33

## 2023-05-17 RX ADMIN — LIDOCAINE HYDROCHLORIDE 10 ML: 20 SOLUTION ORAL; TOPICAL at 10:42

## 2023-05-17 RX ADMIN — SODIUM CHLORIDE 50 ML/HR: 9 INJECTION, SOLUTION INTRAVENOUS at 10:42

## 2023-05-17 RX ADMIN — MEROPENEM 1 G: 1 INJECTION, POWDER, FOR SOLUTION INTRAVENOUS at 14:32

## 2023-05-17 RX ADMIN — GUAIFENESIN 600 MG: 600 TABLET, EXTENDED RELEASE ORAL at 20:21

## 2023-05-17 RX ADMIN — MEROPENEM 1 G: 1 INJECTION, POWDER, FOR SOLUTION INTRAVENOUS at 04:54

## 2023-05-17 RX ADMIN — ANORECTAL OINTMENT 1 APPLICATION: 15.7; .44; 24; 20.6 OINTMENT TOPICAL at 20:20

## 2023-05-17 RX ADMIN — ANORECTAL OINTMENT 1 APPLICATION: 15.7; .44; 24; 20.6 OINTMENT TOPICAL at 14:32

## 2023-05-17 RX ADMIN — IPRATROPIUM BROMIDE AND ALBUTEROL SULFATE 3 ML: 2.5; .5 SOLUTION RESPIRATORY (INHALATION) at 21:38

## 2023-05-17 RX ADMIN — Medication 10 ML: at 08:34

## 2023-05-17 RX ADMIN — FENTANYL CITRATE 25 MCG: 50 INJECTION, SOLUTION INTRAMUSCULAR; INTRAVENOUS at 10:52

## 2023-05-17 RX ADMIN — MEROPENEM 1 G: 1 INJECTION, POWDER, FOR SOLUTION INTRAVENOUS at 20:20

## 2023-05-17 RX ADMIN — ATORVASTATIN CALCIUM 80 MG: 80 TABLET, FILM COATED ORAL at 20:21

## 2023-05-17 RX ADMIN — ANORECTAL OINTMENT 1 APPLICATION: 15.7; .44; 24; 20.6 OINTMENT TOPICAL at 08:34

## 2023-05-17 NOTE — PROGRESS NOTES
"DOS: 2023  NAME: Katherine Williamson   : 1950  PCP: Zelalem Flor APRN  Chief Complaint   Patient presents with   • Shortness of Breath       Chief complaint: team d, problems talking and L sided symptoms  Subjective: Patient is complaining of pain of the left arm with she had a IV previously.  No new symptoms.  She says that she cannot lift her left leg that well and that is about the same.  She just got back from [facility]    Objective:  Vital signs: /66 (BP Location: Right arm)   Pulse 82   Temp 98 °F (36.7 °C) (Oral)   Resp 18   Ht 157.5 cm (62\")   Wt 63 kg (139 lb)   SpO2 94%   BMI 25.42 kg/m²      Gen: NAD, vitals reviewed, 2L o2  MS: oriented x3, recent/remote memory intact, normal attention/concentration, language intact, no neglect.  CN: visual acuity grossly normal, PERRL, EOMI, R facial , no dysarthria  Motor: no pronator drift, b/l drift LLE, normal tone  Sensory: intact to light touch all 4 ext.    ROS:  No weakness, numbness  No fevers, chills      Laboratory results:  Lab Results   Component Value Date    GLUCOSE 80 2023    CALCIUM 8.3 (L) 2023     (L) 2023    K 3.9 2023    CO2 31.0 (H) 2023     2023    BUN 13 2023    CREATININE 0.22 (L) 2023    EGFRIFAFRI 93 2018    EGFRIFNONA 76 2018    BCR 59.1 (H) 2023    ANIONGAP 3.0 (L) 2023     Lab Results   Component Value Date    WBC 11.81 (H) 2023    HGB 10.0 (L) 2023    HCT 29.0 (L) 2023    MCV 89.2 2023     2023     Lab Results   Component Value Date    LDL 47 05/15/2023     (H) 2018     (H) 2017         Lab 05/15/23  0309   HEMOGLOBIN A1C 6.20*        Review of labs:     Review and interpretation of imaging: MRI brain   FINDINGS:  Multiplanar images of the head were obtained without  gadolinium. The patient has multifocal areas of restricted diffusion  within both cerebral hemispheres. These " include areas within the  bilateral frontal and parietal medial cortex, right parietal cortex,  left frontoparietal centrum semiovale, bilateral basal ganglia, right  frontoparietal cortex and right parietal corona radiata, as well as the  left genu of the corpus callosum. Additional foci are noted within the  right temporal lobe and right occipital lobe. There is atrophy. There is  periventricular and deep white matter microangiopathic change. There is  no midline shift. I don't see any evidence of hemorrhagic  transformation. There is extensive opacification of the right mastoid  air cells. This was also present on prior study. There is a trace fluid  within the left mastoid air cells. Mucosal thickening is noted within  the paranasal sinuses.     IMPRESSION:  1. Bilateral multifocal areas of infarction.  No evidence of hemorrhagic  transformation.     CTA head/neck  IMPRESSION:  1. There is cut off of the opacification of a posterior division right  middle cerebral artery branch in the posterior aspect of the right  sylvian fissure/posterior parietal lobe and this is consistent with an  acute occlusion.  2. Small to moderate size area of prolonged Tmax in the right posterior  parietal lobe with a volume of approximately 54 cc. No completed infarct  is seen on the CBF images.  3. The proximal bilateral internal carotid arteries appear somewhat  small with segments of slightly diminished opacification as discussed  above. Some of this could be artifact. More distally the internal  carotid arteries appear patent. No definite NASCET criteria stenosis is  seen.    Workup to date:  TTE      •  Left ventricular systolic function is normal. Left ventricular ejection fraction appears to be 66 - 70%.  •  Left ventricular diastolic function is consistent with (grade I) impaired relaxation.  •  Normal right ventricular cavity size and systolic function noted.  •  Saline test results are negative.  •  There is no evidence of  pericardial effusion.       Diagnoses:  1.  Embolic appearing stroke  2.  Pneumonia    Impression: 72-year-old right-handed female admitted with abscess of the right lung with pneumonia followed by pulmonology and ID for whom developed acute onset of left hemiparesis and speech disturbance for which she received TNK on 5/14.  CTA showed right MCA perfusion deficit but no proximal occlusion appeared to have a distal M3 occlusion report and perfusion deficit of the right posterior parietal lobe.  She has had no events on telemetry and TTE was unrevealing.  For strong suspicion of embolic etiology she had [facility] which was done this morning    Plan:  1.  Patient on DAPT baby aspirin and Plavix 75 and atorvastatin 80  2.  Continue on telemetry while inpatient  3.  Follow-up on [facility] result  4.  APS sent- will f/u  4.  Will need long-term cardiac monitor at discharge    Neuro exam stable if [facility] negative no further inpatient neurology recommendations.  She can follow-up with neurology as an outpatient we will send electronic message for appointment      Thank you for this consultation.  Discussed above plan with neuro attending, Dr. Ramos who agrees with above plan.  Neurology team is available for concerns or questions.

## 2023-05-17 NOTE — NURSING NOTE
Note pt transferred to [facility] at this time with all belongings including cellphone and purse.  Note pt did not want me to call and notify her  at this hour.

## 2023-05-17 NOTE — PLAN OF CARE
Goal Outcome Evaluation:  Plan of Care Reviewed With: patient        Progress: improving  Outcome Evaluation: Pt seen for OT/PT tx session in AM, pt awake and agreeable. Pt reports dizziness with position changes this date, BP remained stable with supine <> sit and following func mob to chair, 134/75. Pt required mod Ax2 for STS and min-mod A x2 for steps to chair, short distance as pt unable to tolerate more. Pt UIC, encouraged importance of OOB activity to BSC/BR when needing to void for improved (I) with ADLs. Continue to progress. Acute vs SNF rec.

## 2023-05-17 NOTE — CASE MANAGEMENT/SOCIAL WORK
Continued Stay Note  [facility]     Patient Name: Katherine Williamson  MRN: [id number]  Today's Date: 5/17/2023    Admit Date: 5/2/2023    Plan: [facility] vs rehab - referrals pending   Discharge Plan     Row Name 05/17/23 1505       Plan    Plan [facility] vs [facility]hab - referrals pending    Patient/Family in Agreement with Plan yes    Plan Comments [facility] evaluating. Per [facility] is following. Patient is agreeable to broad SNF referrals near [facility] as a backup. CCP following for most appropriate level of care when patient medically stable. Kendra HICKS LCSW               Discharge Codes    No documentation.               Expected Discharge Date and Time     Expected Discharge Date Expected Discharge Time    May 22, 2023             Kendra Garcia

## 2023-05-17 NOTE — PLAN OF CARE
Goal Outcome Evaluation:         Pt has no deficits from recent RMCA infarct. Pt is at baseline prior to infarct. Pt  is responsive to the treatment for Pnuemonia.

## 2023-05-17 NOTE — PROGRESS NOTES
ID NOTE    CC: f/u pneumonia (right worse than left) w/ possible abscess    Subj: Pt resting comfortably. Wakes up and answers questions better today. Down to just 1-2L NC today. No fever.       Medications:    Current Facility-Administered Medications:   •  acetaminophen (TYLENOL) tablet 650 mg, 650 mg, Oral, Q4H PRN, 650 mg at 05/12/23 1054 **OR** acetaminophen (TYLENOL) 160 MG/5ML solution 650 mg, 650 mg, Oral, Q4H PRN **OR** acetaminophen (TYLENOL) suppository 650 mg, 650 mg, Rectal, Q4H PRN, Gee Clement MD, 650 mg at 05/14/23 1759  •  aspirin chewable tablet 81 mg, 81 mg, Nasogastric, Daily, 81 mg at 05/16/23 1213 **OR** aspirin suppository 300 mg, 300 mg, Rectal, Daily, Darius Ford MD  •  atorvastatin (LIPITOR) tablet 80 mg, 80 mg, Oral, Nightly, Indigo Curry MD, 80 mg at 05/16/23 2210  •  bismuth subsalicylate (PEPTO BISMOL) 262 MG/15ML suspension 30 mL, 30 mL, Oral, TID PRN, Issac Mendez MD, 30 mL at 05/14/23 0839  •  citalopram (CeleXA) tablet 20 mg, 20 mg, Oral, Daily, Gee Clement MD, 20 mg at 05/16/23 1214  •  clopidogrel (PLAVIX) tablet 75 mg, 75 mg, Nasogastric, Daily, Darius Ford MD, 75 mg at 05/17/23 0833  •  doxycycline (VIBRAMYCIN) 100 mg in sodium chloride 0.9 % 100 mL IVPB-VTB, 100 mg, Intravenous, Q12H, Darius Ford MD, 100 mg at 05/17/23 0833  •  fluticasone (FLONASE) 50 MCG/ACT nasal spray 1 spray, 1 spray, Each Nare, Daily, Gee Clement MD, 1 spray at 05/08/23 0808  •  guaiFENesin (MUCINEX) 12 hr tablet 600 mg, 600 mg, Oral, BID, Gee Clement MD, 600 mg at 05/16/23 2210  •  Hold medication, 1 each, Does not apply, Continuous PRN, Jennifer Hood MD  •  hydrocortisone-bacitracin-zinc oxide-nystatin (MAGIC BARRIER) ointment 1 application, 1 application, Topical, BID, Bharat Calabrese MD, 1 application at 05/17/23 0835  •  ipratropium-albuterol (DUO-NEB) nebulizer solution 3 mL, 3 mL, Nebulization, 4x Daily - RT, Gee Clement MD, 3 mL at 05/17/23  0730  •  labetalol (NORMODYNE,TRANDATE) injection 10 mg, 10 mg, Intravenous, Q10 Min PRN, Indigo Curry MD  •  Magnesium Cardiology Dose Replacement - Follow Nurse / BPA Driven Protocol, , Does not apply, PRN, Darius Ford MD  •  Menthol-Zinc Oxide 1 application, 1 application, Topical, 4x Daily, Bharat Calabrese MD, 1 application at 05/17/23 0834  •  meropenem (MERREM) 1 g in sodium chloride 0.9 % 100 mL IVPB-VTB, 1 g, Intravenous, Q8H, Rigoberto Chan MD, Last Rate: 33.3 mL/hr at 05/12/23 0449, 1 g at 05/17/23 0454  •  metoprolol tartrate (LOPRESSOR) tablet 12.5 mg, 12.5 mg, Oral, Q12H, Issac Mendez MD, 12.5 mg at 05/17/23 0833  •  nitroglycerin (NITROSTAT) SL tablet 0.4 mg, 0.4 mg, Sublingual, Q5 Min PRN, Gee Clement MD  •  ondansetron (ZOFRAN) injection 4 mg, 4 mg, Intravenous, Q6H PRN, Gee Clement MD, 4 mg at 05/11/23 1855  •  Phosphorus Replacement - Follow Nurse / BPA Driven Protocol, , Does not apply, Logan VENEGAS Subin, MD  •  Potassium Replacement - Follow Nurse / BPA Driven Protocol, , Does not apply, Logan VENEGAS Subin, MD  •  [COMPLETED] Insert Peripheral IV, , , Once **AND** sodium chloride 0.9 % flush 10 mL, 10 mL, Intravenous, PRN, Gee Clement MD  •  sodium chloride 0.9 % flush 10 mL, 10 mL, Intravenous, Q12H, Gee Clement MD, 10 mL at 05/16/23 2200  •  sodium chloride 0.9 % flush 10 mL, 10 mL, Intravenous, PRN, Gee Clement MD  •  sodium chloride 0.9 % flush 10 mL, 10 mL, Intravenous, Q12H, Indigo Curry MD, 10 mL at 05/17/23 0834  •  sodium chloride 0.9 % flush 10 mL, 10 mL, Intravenous, PRN, Indigo Curry MD  •  sodium chloride 0.9 % infusion 40 mL, 40 mL, Intravenous, PRN, Gee Clement MD  •  sodium chloride 0.9 % infusion 40 mL, 40 mL, Intravenous, PRN, Indigo Curry MD  •  vitamin B-12 (CYANOCOBALAMIN) tablet 1,000 mcg, 1,000 mcg, Oral, Daily, Gee Clement MD, 1,000 mcg at 05/16/23 1214      Objective   Vital Signs  "  Temp:  [98 °F (36.7 °C)-98.8 °F (37.1 °C)] 98.1 °F (36.7 °C)  Heart Rate:  [] 78  Resp:  [16-20] 16  BP: (121-161)/(64-86) 125/64    Physical Exam:   General: NAD  Eyes: no scleral icterus  ENT: no thrush; NC in nares  Cardiovascular: NR  Respiratory: clearer again today; no wheezing; normal WOB on 2L NC  GI: Abdomen is soft, not tender  Skin: No rashes  Psychiatric: Normal mood and affect     Labs:   Serum Histo Ag and blood cultures reviewed today  Lab Results   Component Value Date    WBC 11.81 (H) 05/16/2023    HGB 10.0 (L) 05/16/2023    HCT 29.0 (L) 05/16/2023    MCV 89.2 05/16/2023     05/16/2023     Lab Results   Component Value Date    GLUCOSE 80 05/16/2023    CALCIUM 8.3 (L) 05/16/2023     (L) 05/16/2023    K 3.9 05/16/2023    CO2 31.0 (H) 05/16/2023     05/16/2023    BUN 13 05/16/2023    CREATININE 0.22 (L) 05/16/2023    EGFR 123.2 05/16/2023    BCR 59.1 (H) 05/16/2023    ANIONGAP 3.0 (L) 05/16/2023     Lab Results   Component Value Date    CRP 3.52 (H) 05/14/2023     Procal 3.95--->2.6-->0.2  Crypto Ag negative  Urine Histo Ag negative  Serum Histo Ag negative  Histoplasma Ab negative  Urine Blasto Ag negative  Aspergillus Ag pending  ANCA negative      Microbiology:  5/2 RPP: negative  5/2 BCx: Corynebacterium in 1/2 sets  5/2 SpCx: normal aishwarya  5/2 MRSA nares: negative  5/2 Strep pneumo Ag; negative  5/2 Legionella Ag: negative  5/4 BCx: negative  5/5 C diff: negative  5/6 SpCx: normal aishwarya  5/9 AFB Cx: NGTD  5/9 Fungus Cx: NGTD  5/10 SpCx: rare normal aishwarya  5/10 L Thoracentesis Cx: negative  5/14 BCx: NGTD    Prior radiology:  MRI brain: \"Bilateral multifocal areas of infarction.  No evidence of hemorrhagic transformation. \"    ASSESSMENT/PLAN:  1. Pneumonia and right lung abscess  2. Pleural effusions  3. Acute hypoxic respiratory failure  4. Emphysema  5. Tobacco use  6. Acute R MCA stroke and bilateral infarcts    Thanks to neurology team for their care of Ms. Williamson in " regards to her stroke. MRI as above. Blood cultures negative. TTE negative for vegetations. [facility] planned. Doubt bacterial endocarditis.     Her fever is resolved. She is down to just 2L NC. WBC trending down.  I still recommend that we treat with antibiotics for a 4-week course with stop date 6/8/23. The regimen will be meropenem 1 g IV q8h and doxycycline 100 mg PO BID. I ordered a repeat CT to be done just prior to the stop date. She will need follow-up w/ pulmonologist as well.     Hold off on PICC for. I will re-order closer to time of discharge.     ID will follow.

## 2023-05-17 NOTE — THERAPY TREATMENT NOTE
Patient Name: Katherine Williamson  : 1950    MRN: [id number]                              Today's Date: 2023       Admit Date: 2023    Visit Dx:     ICD-10-CM ICD-9-CM   1. Acute respiratory failure with hypoxia  J96.01 518.81   2. Sepsis, due to unspecified organism, unspecified whether acute organ dysfunction present  A41.9 038.9     995.91   3. Community acquired pneumonia, unspecified laterality  J18.9 486   4. Abnormal CXR  R93.89 793.2   5. Hyperglycemia  R73.9 790.29   6. 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   • History of repair of hip joint   • History of operative procedure on hip   • Hyperglycemia   • Hepatic steatosis   • Diverticulosis   • Chest pain, atypical   • History of colon polyps   • Family history of colon cancer in mother   • Allergic rhinitis   • Lung nodule seen on imaging study   • Acute respiratory failure with hypoxia   • Abscess of right lung with pneumonia   • Empyema   • Sepsis   • Pleural effusion, right   • Other emphysema   • Pulmonary nodule (RLL)   • Diastolic dysfunction, grade 1   • Physical debility   • Cryptogenic stroke     Past Medical History:   Diagnosis Date   • Arthritis    • Cataract    • Colon polyps     FOLLOWED BY DR. JOSEPH LAU   • Hypertension      Past Surgical History:   Procedure Laterality Date   • COLONOSCOPY  10/2015    Hhibm-ijvtzbyg[facility]. [facility].  Follow-up in 5 years.   • COLONOSCOPY N/A 2022    2 BENIGN POLYPS IN DESCENDING, 5 MM BENIGN POLYP IN SIGMOID, MULTIPLE SMALL AND LARGE DIVERTICULA IN SIGMOID, RESCOPE IN 3 YRS, DR. JOSEPH LAU AT [facility]   • EYE SURGERY     • JOINT REPLACEMENT  ?    Left total hip replacement in .  In 2015 patient had  "left hip revision   • TONSILLECTOMY        General Information     Row Name 05/17/23 1535          OT Time and Intention    Document Type therapy note (daily note)  -     Mode of Treatment occupational therapy  -     Row Name 05/17/23 1535          General Information    Patient Profile Reviewed yes  -     Existing Precautions/Restrictions fall;oxygen therapy device and L/min  -     Row Name 05/17/23 1535          Cognition    Orientation Status (Cognition) oriented x 4  -     Row Name 05/17/23 1535          Safety Issues, Functional Mobility    Impairments Affecting Function (Mobility) balance;endurance/activity tolerance;strength;range of motion (ROM)  -           User Key  (r) = Recorded By, (t) = Taken By, (c) = Cosigned By    Initials Name Provider Type     Naomi Ferreira OT Occupational Therapist                 Mobility/ADL's     Row Name 05/17/23 1536          Bed Mobility    Bed Mobility supine-sit  -     Supine-Sit Beason (Bed Mobility) minimum assist (75% patient effort);verbal cues;nonverbal cues (demo/gesture)  -     Sit-Supine Beason (Bed Mobility) not tested  -     Assistive Device (Bed Mobility) head of bed elevated;bed rails  -     Comment, (Bed Mobility) [facility] end of session, education on importance of OOB activity  -     Row Name 05/17/23 1536          Transfers    Transfers sit-stand transfer  -     Row Name 05/17/23 1536          Sit-Stand Transfer    Sit-Stand Beason (Transfers) moderate assist (50% patient effort);2 person assist;verbal cues;nonverbal cues (demo/gesture)  -     Assistive Device (Sit-Stand Transfers) walker, front-wheeled  -     Comment, (Sit-Stand Transfer) x2 STS as pt unable to progress with mobility on first stand due to stating her \"L leg was going to give out\"  -     Row Name 05/17/23 1536          Functional Mobility    Functional Mobility- Ind. Level minimum assist (75% patient effort);moderate assist (50% patient " effort);2 person assist required  -     Functional Mobility- Comment short distance to chair req min-mod a x2 with Rwx, dizziness reported  -     Row Name 05/17/23 1536          Activities of Daily Living    BADL Assessment/Intervention lower body dressing;toileting  -     Row Name 05/17/23 1536          Lower Body Dressing Assessment/Training    [place] Level (Lower Body Dressing) don;socks;dependent (less than 25% patient effort)  -     Row Name 05/17/23 1536          Toileting Assessment/Training    [place] Level (Toileting) dependent (less than 25% patient effort)  -     Comment, (Toileting) brief donned, encouraged up to BSC and BR when needing to void and pt agreeable  -           User Key  (r) = Recorded By, (t) = Taken By, (c) = Cosigned By    Initials Name Provider Type    Naomi Caceres OT Occupational Therapist               Obj/Interventions     Row Name 05/17/23 1537          Balance    Balance Assessment sitting static balance;sitting dynamic balance;sit to stand dynamic balance;standing dynamic balance;standing static balance  -     Static Sitting Balance supervision  -     Dynamic Sitting Balance supervision;standby assist  -     Position, Sitting Balance sitting edge of bed  -     Sit to Stand Dynamic Balance moderate assist;2-person assist  -     Static Standing Balance minimal assist;2-person assist;moderate assist;verbal cues  -     Dynamic Standing Balance moderate assist;minimal assist;2-person assist;verbal cues  -     Position/Device Used, Standing Balance supported;walker, front-wheeled  -           User Key  (r) = Recorded By, (t) = Taken By, (c) = Cosigned By    Initials Name Provider Type    Naomi Caceres OT Occupational Therapist               Goals/Plan    No documentation.                Clinical Impression     Row Name 05/17/23 1538          Pain Assessment    Pretreatment Pain Rating 0/10 - no pain  -     Posttreatment Pain  Rating 0/10 - no pain  -MW     Pre/Posttreatment Pain Comment RUE IV site  -     Row Name 05/17/23 1538          Plan of Care Review    Plan of Care Reviewed With patient  -     Progress improving  -     Outcome Evaluation Pt seen for OT/PT tx session in AM, pt awake and agreeable. Pt reports dizziness with position changes this date, BP remained stable with supine <> sit and following func mob to chair, 134/75. Pt required mod Ax2 for STS and min-mod A x2 for steps to chair, short distance as pt unable to tolerate more. Pt UIC, encouraged importance of OOB activity to BSC/BR when needing to void for improved (I) with ADLs. Continue to progress. Acute vs SNF rec.  -     Row Name 05/17/23 1538          Therapy Plan Review/Discharge Plan (OT)    Anticipated Discharge Disposition (OT) inpatient rehabilitation facility;skilled nursing facility  -     Row Name 05/17/23 1538          Vital Signs    O2 Delivery Pre Treatment supplemental O2  -MW     O2 Delivery Intra Treatment supplemental O2  -MW     Post SpO2 (%) 98  -MW     O2 Delivery Post Treatment supplemental O2  -MW     Pre Patient Position Supine  -MW     Intra Patient Position Standing  -MW     Post Patient Position Sitting  -MW     Row Name 05/17/23 1538          Positioning and Restraints    Pre-Treatment Position in bed  -MW     Post Treatment Position chair  -MW     In Chair notified nsg;reclined;call light within reach;encouraged to call for assist;exit alarm on;with family/caregiver  -           User Key  (r) = Recorded By, (t) = Taken By, (c) = Cosigned By    Initials Name Provider Type    MW Naomi Ferreira, [place] Occupational Therapist               Outcome Measures     Row Name 05/17/23 0506          How much help from another is currently needed...    Putting on and taking off regular lower body clothing? 2  -MW     Bathing (including washing, rinsing, and drying) 2  -MW     Toileting (which includes using toilet bed pan or urinal) 1  -MW      Putting on and taking off regular upper body clothing 2  -MW     Taking care of personal grooming (such as brushing teeth) 3  -MW     Eating meals 3  -MW     AM-PAC 6 Clicks Score (OT) 13  -MW     Row Name 05/17/23 1201 05/17/23 0800       How much help from another person do you currently need...    Turning from your back to your side while in flat bed without using bedrails? 3  -PH 2  -TS    Moving from lying on back to sitting on the side of a flat bed without bedrails? 3  -PH 2  -TS    Moving to and from a bed to a chair (including a wheelchair)? 2  -PH 2  -TS    Standing up from a chair using your arms (e.g., wheelchair, bedside chair)? 2  -PH 2  -TS    Climbing 3-5 steps with a railing? 1  -PH 2  -TS    To walk in hospital room? 2  -PH 2  -TS    AM-PAC 6 Clicks Score (PT) 13  -PH 12  -TS    Highest level of mobility 4 --> Transferred to chair/commode  -PH 4 --> Transferred to chair/commode  -TS    Row Name 05/17/23 0400          How much help from another person do you currently need...    Turning from your back to your side while in flat bed without using bedrails? 2  -LL     Moving from lying on back to sitting on the side of a flat bed without bedrails? 2  -LL     Moving to and from a bed to a chair (including a wheelchair)? 2  -LL     Standing up from a chair using your arms (e.g., wheelchair, bedside chair)? 1  -LL     Climbing 3-5 steps with a railing? 1  -LL     To walk in hospital room? 1  -LL     AM-PAC 6 Clicks Score (PT) 9  -LL     Highest level of mobility 3 --> Sat at edge of bed  -LL     Row Name 05/17/23 1540 05/17/23 1201       Functional Assessment    Outcome Measure Options AM-PAC 6 Clicks Daily Activity (OT)  -MW AM-PAC 6 Clicks Basic Mobility (PT)  -PH          User Key  (r) = Recorded By, (t) = Taken By, (c) = Cosigned By    Initials Name Provider Type    TS Javon Ellis, RN Registered Nurse    PH Shanell Cruz PTA Physical Therapist Assistant    MW Naomi Ferreira, OT  Occupational Therapist    Christina Herrera, RN Registered Nurse                Occupational Therapy Education     Title: PT OT SLP Therapies (Done)     Topic: Occupational Therapy (Done)     Point: ADL training (Done)     Description:   Instruct learner(s) on proper safety adaptation and remediation techniques during self care or transfers.   Instruct in proper use of assistive devices.              Learning Progress Summary           Patient Acceptance, E, VU by SM at 5/16/2023 1221    Comment: updated OT goals/POC, dc recommendations discussed    Acceptance, E,TB, VU by XO at 5/14/2023 0953    Acceptance, E,TB, VU by XO at 5/13/2023 1528    Acceptance, E, VU by SM at 5/12/2023 1224    Comment: sitting up in bed to progress ability to sit, dc recommendations and POC for OT/goals.   Family Acceptance, E, VU by SM at 5/12/2023 1224    Comment: sitting up in bed to progress ability to sit, dc recommendations and POC for OT/goals.                   Point: Home exercise program (Done)     Description:   Instruct learner(s) on appropriate technique for monitoring, assisting and/or progressing therapeutic exercises/activities.              Learning Progress Summary           Patient Acceptance, E,TB, VU by XO at 5/14/2023 0953    Acceptance, E,TB, VU by XO at 5/13/2023 1528                   Point: Precautions (Done)     Description:   Instruct learner(s) on prescribed precautions during self-care and functional transfers.              Learning Progress Summary           Patient Acceptance, E,TB, VU by XO at 5/14/2023 0953    Acceptance, E,TB, VU by XO at 5/13/2023 1528                   Point: Body mechanics (Done)     Description:   Instruct learner(s) on proper positioning and spine alignment during self-care, functional mobility activities and/or exercises.              Learning Progress Summary           Patient Acceptance, E,TB, VU by XO at 5/14/2023 0953    Acceptance, E,TB, VU by XO at 5/13/2023 1528                                User Key     Initials Effective Dates Name Provider Type Discipline    SM 04/02/20 -  Perla Carrasco OT Occupational Therapist OT    XO 09/22/22 -  Marley Cruz, RN Registered Nurse Nurse              OT Recommendation and Plan     Plan of Care Review  Plan of Care Reviewed With: patient  Progress: improving  Outcome Evaluation: Pt seen for OT/PT tx session in AM, pt awake and agreeable. Pt reports dizziness with position changes this date, BP remained stable with supine <> sit and following func mob to chair, 134/75. Pt required mod Ax2 for STS and min-mod A x2 for steps to chair, short distance as pt unable to tolerate more. Pt UIC, encouraged importance of OOB activity to BSC/BR when needing to void for improved (I) with ADLs. Continue to progress. Acute vs SNF rec.     Time Calculation:    Time Calculation- OT     Row Name 05/17/23 1540             Time Calculation- OT    OT Start Time 0923  -MW      OT Stop Time 0947  -MW      OT Time Calculation (min) 24 min  -MW      Total Timed Code Minutes- OT 24 minute(s)  -MW      OT Received On 05/17/23  -MW      OT - Next Appointment 05/18/23  -MW         Timed Charges    84271 - OT Therapeutic Activity Minutes 24  -MW         Total Minutes    Timed Charges Total Minutes 24  -MW       Total Minutes 24  -MW            User Key  (r) = Recorded By, (t) = Taken By, (c) = Cosigned By    Initials Name Provider Type    MW Naomi Ferreira OT Occupational Therapist              Therapy Charges for Today     Code Description Service Date Service Provider Modifiers Qty    14217700111 HC OT THERAPEUTIC ACT EA 15 MIN 5/17/2023 Naomi Ferreira OT GO 2               Naomi Ferreira OT  5/17/2023

## 2023-05-17 NOTE — PROGRESS NOTES
Name: Katherine Williamson ADMIT: 2023   : 1950  PCP: Zelalem Flor APRN    MRN: [id number] LOS: 15 days   AGE/SEX: 72 y.o. female  ROOM: [facility]     Subjective   Subjective   Sitting up in chair.  Breathing okay.  Weak on the left side.    Review of Systems     Objective   Objective   Vital Signs  Temp:  [97.8 °F (36.6 °C)-98.8 °F (37.1 °C)] 98 °F (36.7 °C)  Heart Rate:  [] 82  Resp:  [16-20] 18  BP: (119-161)/(62-86) 119/66  SpO2:  [91 %-100 %] 94 %  on  Flow (L/min):  [1-2] 2;   Device (Oxygen Therapy): nasal cannula  Body mass index is 25.42 kg/m².  Physical Exam  Vitals and nursing note reviewed.   Constitutional:       General: She is not in acute distress.     Appearance: She is ill-appearing.   Cardiovascular:      Rate and Rhythm: Normal rate and regular rhythm.   Pulmonary:      Effort: Pulmonary effort is normal.      Breath sounds: Normal breath sounds.   Abdominal:      General: Bowel sounds are normal.      Palpations: Abdomen is soft.      Tenderness: There is no abdominal tenderness.   Musculoskeletal:         General: No swelling.   Skin:     General: Skin is warm and dry.   Neurological:      Mental Status: She is alert. Mental status is at baseline.      Comments: Mild left-sided weakness   Psychiatric:         Cognition and Memory: Memory is impaired.       Results Review     I reviewed the patient's new clinical results.  Results from last 7 days   Lab Units 23  0340 05/15/23  0309 05/14/23  0528 05/13/23  0431   WBC 10*3/mm3 11.81* 16.60* 10.26 11.78*   HEMOGLOBIN g/dL 10.0* 10.0* 10.1* 9.9*   PLATELETS 10*3/mm3 365 333 388 390     Results from last 7 days   Lab Units 23  0340 05/15/23  03023  0431   SODIUM mmol/L 135* 136 135* 134*   POTASSIUM mmol/L 3.9 3.8 4.3 4.1   CHLORIDE mmol/L 101 102 100 100   CO2 mmol/L 31.0* 29.9* 32.5* 33.0*   BUN mg/dL 13 13 12 13   CREATININE mg/dL 0.22* 0.21* 0.26* 0.20*   GLUCOSE mg/dL 80 101* 91 85   EGFR  mL/min/1.73 123.2 124.5 118.3 126.0     Results from last 7 days   Lab Units 05/16/23  0340 05/15/23  0309   ALBUMIN g/dL 2.1* 1.8*     Results from last 7 days   Lab Units 05/16/23  0340 05/15/23  0309 05/14/23  0528 05/13/23  0431   CALCIUM mg/dL 8.3* 8.3* 8.4* 8.3*   ALBUMIN g/dL 2.1* 1.8*  --   --    MAGNESIUM mg/dL  --  1.8  --   --    PHOSPHORUS mg/dL 2.4* 2.5  --   --        Hemoglobin A1C   Date/Time Value Ref Range Status   05/15/2023 0309 6.20 (H) 4.80 - 5.60 % Final     Glucose   Date/Time Value Ref Range Status   05/17/2023 1203 94 70 - 130 mg/dL Final     Comment:     Meter: [id number] : [id number] Meme HORN   05/17/2023 0234 114 70 - 130 mg/dL Final     Comment:     Meter: [id number] : [id number] Ramya HORN   05/17/2023 0050 122 70 - 130 mg/dL Final     Comment:     Meter: [id number] : rell Mueller RN   05/16/2023 1734 116 70 - 130 mg/dL Final     Comment:     Meter: [id number] : [id number] Graham Aryeon NA   05/16/2023 1159 106 70 - 130 mg/dL Final     Comment:     Meter: [id number] : [id number] Germán Quach CNA   05/16/2023 0016 95 70 - 130 mg/dL Final     Comment:     Meter: [id number] : thumphrjaneen Brooks RN   05/15/2023 1748 86 70 - 130 mg/dL Final     Comment:     Meter: [id number] : [id number] Graham Aryeon NA       CT Head Without Contrast    Result Date: 5/15/2023   No acute intracranial hemorrhage or hydrocephalus. If there is further clinical concern, MRI could be considered for further evaluation.  This report was finalized on 5/15/2023 3:58 PM by Dr. Ronnie Connor M.D.      MRI Brain Without Contrast    Result Date: 5/16/2023  1. Bilateral multifocal areas of infarction.  No evidence of hemorrhagic transformation.  This report was finalized on 5/16/2023 5:26 AM by Dr. Katherine Faustin M.D.      FL Video Swallow With Speech Single Contrast    Result Date: 5/16/2023  Fluoroscopy was provided for the speech pathologist  during a video swallow study. For full details please see the speech pathology report  This report was finalized on 5/16/2023 2:12 PM by Dr. Tena Esquivel M.D.      XR Abdomen KUB    Result Date: 5/15/2023   As described.  This report was finalized on 5/15/2023 5:54 PM by Dr. Ronnie Connor M.D.      I have personally reviewed all medications:  Scheduled Medications  aspirin, 81 mg, Nasogastric, Daily   Or  aspirin, 300 mg, Rectal, Daily  atorvastatin, 80 mg, Oral, Nightly  citalopram, 20 mg, Oral, Daily  clopidogrel, 75 mg, Nasogastric, Daily  doxycycline, 100 mg, Intravenous, Q12H  fluticasone, 1 spray, Each Nare, Daily  guaiFENesin, 600 mg, Oral, BID  hydrocortisone-bacitracin-zinc oxide-nystatin, 1 application, Topical, BID  ipratropium-albuterol, 3 mL, Nebulization, 4x Daily - RT  Menthol-Zinc Oxide, 1 application, Topical, 4x Daily  meropenem, 1 g, Intravenous, Q8H  metoprolol tartrate, 12.5 mg, Oral, Q12H  sodium chloride, 10 mL, Intravenous, Q12H  sodium chloride, 10 mL, Intravenous, Q12H  cyanocobalamin, 1,000 mcg, Oral, Daily    Infusions  hold, 1 each    Diet  NPO Diet NPO Type: Strict NPO    I have personally reviewed:  [x]  Laboratory   [x]  Microbiology   [x]  Radiology   [x]  EKG/Telemetry  [x]  Cardiology/Vascular   []  Pathology    [x]  Records       Assessment/Plan     Active Hospital Problems    Diagnosis  POA   • **Abscess of right lung with pneumonia [J85.1]  Yes   • Cryptogenic stroke [I63.9]  No   • Diastolic dysfunction, grade 1 [I51.89]  Yes   • Physical debility [R53.81]  Clinically Undetermined   • Sepsis [A41.9]  Yes   • Pleural effusion, right [J90]  Yes   • Other emphysema [J43.8]  Yes   • Pulmonary nodule (RLL) [R91.1]  Yes   • Acute respiratory failure with hypoxia [J96.01]  Yes   • Hepatic steatosis [K76.0]  Yes   • Tobacco abuse [Z72.0]  Yes   • Benign essential HTN [I10]  Yes      Resolved Hospital Problems   No resolved problems to display.       72 y.o. female admitted with  Abscess of right lung with pneumonia.  She is currently on IV meropenem and doxycycline per infectious disease through 6/8/2023.  During this hospitalization she has suffered bilateral embolic appearing strokes for which she received TNK.    Patient transferred out of ICU yesterday.  Neurologically much improved.  ELIGIO was performed today and was negative for source of stroke.  Patient is on DAPT per neurology.  She will be discharged with a Zio patch in place.    Continue antibiotics for lung abscess per ID and pulmonology.  Respiratory status much improved and she has been weaned down to just 1 or 2 L/min nasal cannula.  Core track has been removed and speech therapy planning swallow evaluation.      Hyponatremia not clinically relevant.    She is normotensive.  Currently receiving metoprolol.  Was on irbesartan before admission.    We will resume diet      · SCDs for DVT prophylaxis.  · Full code.  · Discussed with patient.  · Anticipate discharge to [facility] vs [facility] timing yet to be determined..      Bharat Calabrese MD  [facility]  05/17/23  14:28 EDT

## 2023-05-17 NOTE — PROGRESS NOTES
[facility] Acute [facility]  Continuing to follow progress with therapy. Pt was unable to tsfr out of bed yesterday due to dizziness. Will continue to follow for progress and tolerance of therapy    Elisha Harris RN  Acute Rehab Admission Nurse

## 2023-05-17 NOTE — NURSING NOTE
05/17/23 1001   Wound 05/14/23 1556 Right gluteal Pressure Injury   Placement Date/Time: 05/14/23 1556   Present on Hospital Admission: No  Side: Right  Location: gluteal  Primary Wound Type: Pressure Injury   Pressure Injury Stage DTPI   Dressing Appearance dry;intact   Base purple   Periwound intact;blanchable   Edges irregular   Wound Length (cm) 1.6 cm   Wound Width (cm) 0.8 cm   Wound Surface Area (cm^2) 1.28 cm^2   Drainage Amount none   Care, Wound   (silicone border dressing /moisture barrier cream)     WOCN assessment: DTI pressure injury right gluteal. Adjacent to wound area of shear and friction.   Low air loss mattress ordered for redistribution. Plan to assess next week. Prevention and skin care orders had been placed in epic.

## 2023-05-17 NOTE — THERAPY TREATMENT NOTE
Patient Name: Katherine Williamson  : 1950    MRN: [id number]                              Today's Date: 2023       Admit Date: 2023    Visit Dx:     ICD-10-CM ICD-9-CM   1. Acute respiratory failure with hypoxia  J96.01 518.81   2. Sepsis, due to unspecified organism, unspecified whether acute organ dysfunction present  A41.9 038.9     995.91   3. Community acquired pneumonia, unspecified laterality  J18.9 486   4. Abnormal CXR  R93.89 793.2   5. Hyperglycemia  R73.9 790.29   6. 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   • History of repair of hip joint   • History of operative procedure on hip   • Hyperglycemia   • Hepatic steatosis   • Diverticulosis   • Chest pain, atypical   • History of colon polyps   • Family history of colon cancer in mother   • Allergic rhinitis   • Lung nodule seen on imaging study   • Acute respiratory failure with hypoxia   • Pneumonia   • Empyema   • Sepsis   • Pleural effusion, right   • Other emphysema   • Pulmonary nodule   • Diastolic dysfunction, grade 1   • Physical debility     Past Medical History:   Diagnosis Date   • Arthritis    • Cataract    • Colon polyps     FOLLOWED BY DR. JOSEPH LAU   • Hypertension      Past Surgical History:   Procedure Laterality Date   • COLONOSCOPY  10/2015    Ocyml-muhyxnrnhgib-[facility]t. [facility].  Follow-up in 5 years.   • COLONOSCOPY N/A 2022    2 BENIGN POLYPS IN DESCENDING, 5 MM BENIGN POLYP IN SIGMOID, MULTIPLE SMALL AND LARGE DIVERTICULA IN SIGMOID, RESCOPE IN 3 YRS, DR. JOSEPH LAU AT [facility]   • EYE SURGERY     • JOINT REPLACEMENT  ?    Left total hip replacement in .  In 2015 patient had left hip revision   • TONSILLECTOMY        General  Information     Row Name 05/17/23 1049          Physical Therapy Time and Intention    Document Type therapy note (daily note)  -PH     Mode of Treatment physical therapy  -PH     Row Name 05/17/23 1049          Cognition    Orientation Status (Cognition) oriented x 4  -PH     Row Name 05/17/23 1153 05/17/23 1049       Safety Issues, Functional Mobility    Impairments Affecting Function (Mobility) -- balance;endurance/activity tolerance;strength;range of motion (ROM)  -PH    Comment, Safety Issues/Impairments (Mobility) gt belt and non  -PH gt belt and non skid socks donned  -PH          User Key  (r) = Recorded By, (t) = Taken By, (c) = Cosigned By    Initials Name Provider Type    PH Shanell Cruz PTA Physical Therapist Assistant               Mobility     Row Name 05/17/23 1050          Bed Mobility    Bed Mobility supine-sit  -PH     Supine-Sit [place] (Bed Mobility) minimum assist (75% patient effort);verbal cues;nonverbal cues (demo/gesture)  -PH     Assistive Device (Bed Mobility) head of bed elevated;bed rails  -PH     Comment, (Bed Mobility) cues for se  -PH     [facility] Name 05/17/23 1050          Transfers    Comment, (Transfers) pt reporting dizziness at EOB  -PH     [facility] Name 05/17/23 1050          Sit-Stand Transfer    Sit-Stand [place] (Transfers) moderate assist (50% patient effort);2 person assist;verbal cues;nonverbal cues (demo/gesture)  -PH     Assistive Device (Sit-Stand Transfers) walker, front-wheeled  -PH     Comment, (Sit-Stand Transfer) 2x;  -[facility] Name 05/17/23 1050          Gait/Stairs (Locomotion)    [place] Level (Gait) minimum assist (75% patient effort);moderate assist (50% patient effort);2 person assist;verbal cues;nonverbal cues (demo/gesture)  -PH     Assistive Device (Gait) walker, front-wheeled  -PH     Distance in Feet (Gait) a few steps fwd then requested to sit on bed; 4' to chair  -PH     Deviations/Abnormal Patterns (Gait) raina decreased;gait  speed decreased;festinating/shuffling;stride length decreased  -PH     Bilateral Gait Deviations forward flexed posture;heel strike decreased  -PH     [place] Level (Stairs) unable to assess  -PH     Comment, (Gait/Stairs) unsteady w/ no overt LOB; slow sequencing of B LE; fatigue limiting distance.  -PH           User Key  (r) = Recorded By, (t) = Taken By, (c) = Cosigned By    Initials Name Provider Type     Shanell Cruz PTA Physical Therapist Assistant               Obj/Interventions     Row Name 05/17/23 1157          Balance    Balance Assessment sitting static balance;standing static balance  -PH     Static Sitting Balance supervision  -PH     Static Standing Balance moderate assist;2-person assist;verbal cues;non-verbal cues (demo/gesture)  -PH     Position/Device Used, Standing Balance walker, front-wheeled  -PH           User Key  (r) = Recorded By, (t) = Taken By, (c) = Cosigned By    Initials Name Provider Type    PH Shanell Cruz PTA Physical Therapist Assistant               Goals/Plan    No documentation.                Clinical Impression     Row Name 05/17/23 1157          Pain    Pre/Posttreatment Pain Comment pain in R UE at IV insertion site w/ R UE painful when touched  -PH     Pain Intervention(s) Repositioned;Rest;Elevated  -PH     Row Name 05/17/23 1157          Plan of Care Review    Plan of Care Reviewed With patient  -PH     Progress improving  -PH     Outcome Evaluation Pt seen by PT/OT for tx this date. PT sat up to EOB req extra time and min A. Pt stood 2x from EOB req mod A x 2 and use of fww. Pt took a few steps fwd then requested to sit at EOB and was returned to sitting. Pt then stood and amb 4' to chair req mod A x 2 and use of fww. Fatigue limiting distance w/ pt unsteady although no overt LOB. Pt UIC at end of session and encouraged to sit in chair at least 3x daily. Notified RN who was verbally agreeable. PT will prog as pt rosalia. Rec SNF at [place].  -PH      Row Name 05/17/23 1157          Vital Signs    O2 Delivery Pre Treatment supplemental O2  -PH     O2 Delivery Intra Treatment supplemental O2  -PH     O2 Delivery Post Treatment supplemental O2  -PH     Row Name 05/17/23 1157          Positioning and Restraints    Pre-Treatment Position in bed  -PH     Post Treatment Position chair  -PH     In Chair reclined;call light within reach;encouraged to call for assist;exit alarm on;notified nsg;with family/caregiver  -PH           User Key  (r) = Recorded By, (t) = Taken By, (c) = Cosigned By    Initials Name Provider Type    Shanell De La Garza PTA Physical Therapist Assistant               Outcome Measures     Row Name 05/17/23 1201 05/17/23 0800       How much help from another person do you currently need...    Turning from your back to your side while in flat bed without using bedrails? 3  -PH 2  -TS    Moving from lying on back to sitting on the side of a flat bed without bedrails? 3  -PH 2  -TS    Moving to and from a bed to a chair (including a wheelchair)? 2  -PH 2  -TS    Standing up from a chair using your arms (e.g., wheelchair, bedside chair)? 2  -PH 2  -TS    Climbing 3-5 steps with a railing? 1  -PH 2  -TS    To walk in hospital room? 2  -PH 2  -TS    AM-PAC 6 Clicks Score (PT) 13  -PH 12  -TS    Highest level of mobility 4 --> Transferred to chair/commode  -PH 4 --> Transferred to chair/commode  -TS    Row Name 05/17/23 0400          How much help from another person do you currently need...    Turning from your back to your side while in flat bed without using bedrails? 2  -LL     Moving from lying on back to sitting on the side of a flat bed without bedrails? 2  -LL     Moving to and from a bed to a chair (including a wheelchair)? 2  -LL     Standing up from a chair using your arms (e.g., wheelchair, bedside chair)? 1  -LL     Climbing 3-5 steps with a railing? 1  -LL     To walk in hospital room? 1  -LL     AM-PAC 6 Clicks Score (PT) 9  -LL      Highest level of mobility 3 --> Sat at edge of bed  -LL     Row Name 05/17/23 1201          Functional Assessment    Outcome Measure Options AM-PAC 6 Clicks Basic Mobility (PT)  -PH           User Key  (r) = Recorded By, (t) = Taken By, (c) = Cosigned By    Initials Name Provider Type    Javon Avalos, RN Registered Nurse    PH Shanell Cruz PTA Physical Therapist Assistant    Christina Herrera RN Registered Nurse                             Physical Therapy Education     Title: PT OT SLP Therapies (Done)     Topic: Physical Therapy (Done)     Point: Mobility training (Done)     Learning Progress Summary           Patient Acceptance, E,TB, VU,NR by PH at 5/17/2023 1201    Acceptance, E,TB, VU by XO at 5/14/2023 0953    Acceptance, E,TB, VU by XO at 5/13/2023 1528    Acceptance, E,TB,D, VU,NR by  at 5/13/2023 1156    Acceptance, E,TB, VU,NR by ST at 5/12/2023 1141                   Point: Home exercise program (Done)     Learning Progress Summary           Patient Acceptance, E,TB, VU by XO at 5/14/2023 0953    Acceptance, E,TB, VU by XO at 5/13/2023 1528    Acceptance, E,TB,D, VU,NR by  at 5/13/2023 1156    Acceptance, E,TB, VU,NR by ST at 5/12/2023 1141                   Point: Body mechanics (Done)     Learning Progress Summary           Patient Acceptance, E,TB, VU,NR by PH at 5/17/2023 1201    Acceptance, E,TB, VU by XO at 5/14/2023 0953    Acceptance, E,TB, VU by XO at 5/13/2023 1528    Acceptance, E,TB,D, VU,NR by  at 5/13/2023 1156    Acceptance, E,TB, VU,NR by ST at 5/12/2023 1141                   Point: Precautions (Done)     Learning Progress Summary           Patient Acceptance, E,TB, VU,NR by PH at 5/17/2023 1201    Acceptance, E,TB, VU by XO at 5/14/2023 0953    Acceptance, E,TB, VU by XO at 5/13/2023 1528    Acceptance, E,TB,D, VU,NR by LEONEL at 5/13/2023 1156                               User Key     Initials Effective Dates Name Provider Type Discipline    LEONEL 03/07/18 -  Jordon  Perla Moreno PTA Physical Therapist Assistant PT    PH 06/16/21 -  Shanell Cruz PTA Physical Therapist Assistant PT    XO 09/22/22 -  Marley Cruz, RN Registered Nurse Nurse    ST 09/22/22 -  Verena Hess PT Physical Therapist PT              PT Recommendation and Plan     Plan of Care Reviewed With: patient  Progress: improving  Outcome Evaluation: Pt seen by PT/OT for tx this date. PT sat up to EOB req extra time and min A. Pt stood 2x from EOB req mod A x 2 and use of fww. Pt took a few steps fwd then requested to sit at EOB and was returned to sitting. Pt then stood and amb 4' to chair req mod A x 2 and use of fww. Fatigue limiting distance w/ pt unsteady although no overt LOB. Pt UIC at end of session and encouraged to sit in chair at least 3x daily. Notified RN who was verbally agreeable. PT will prog as pt rosalia. Rec SNF at [place].     Time Calculation:    PT Charges     Row Name 05/17/23 1201             Time Calculation    Start Time 0924  -PH      Stop Time 0947  -PH      Time Calculation (min) 23 min  -PH      PT Received On 05/17/23  -PH      PT - Next Appointment 05/18/23  -PH         Timed Charges    40431 - PT Therapeutic Activity Minutes 23  -PH         Total Minutes    Timed Charges Total Minutes 23  -PH       Total Minutes 23  -PH            User Key  (r) = Recorded By, (t) = Taken By, (c) = Cosigned By    Initials Name Provider Type    PH Shanell Cruz PTA Physical Therapist Assistant              Therapy Charges for Today     Code Description Service Date Service Provider Modifiers Qty    [id number]  PT THERAPEUTIC ACT EA 15 MIN 5/17/2023 Shanell Cruz PTA GP 2          PT G-Codes  Outcome Measure Options: AM-PAC 6 Clicks Basic Mobility (PT)  AM-PAC 6 Clicks Score (PT): 13  AM-PAC 6 Clicks Score (OT): 11  Modified Tift Scale: 4 - Moderately severe disability.  Unable to walk without assistance, and unable to attend to own bodily needs without  assistance.  PT Discharge Summary  Anticipated Discharge Disposition (PT): inpatient rehabilitation facility    Shanell Cruz, PTA  5/17/2023

## 2023-05-17 NOTE — PLAN OF CARE
Problem: Adult Inpatient Plan of Care  Goal: Patient-Specific Goal (Individualized)  Outcome: Ongoing, Progressing  Goal: Absence of Hospital-Acquired Illness or Injury  Outcome: Ongoing, Progressing  Intervention: Identify and Manage Fall Risk  Recent Flowsheet Documentation  Taken 5/17/2023 1800 by Javon Ellis RN  Safety Promotion/Fall Prevention:   activity supervised   assistive device/personal items within reach   clutter free environment maintained   fall prevention program maintained   gait belt   lighting adjusted   mobility aid in reach   muscle strengthening facilitated   nonskid shoes/slippers when out of bed   room organization consistent   safety round/check completed  Taken 5/17/2023 1600 by Javon Ellis RN  Safety Promotion/Fall Prevention:   activity supervised   assistive device/personal items within reach   clutter free environment maintained   fall prevention program maintained   gait belt   lighting adjusted   mobility aid in reach   muscle strengthening facilitated   nonskid shoes/slippers when out of bed   room organization consistent   safety round/check completed  Taken 5/17/2023 1400 by Javon Ellis RN  Safety Promotion/Fall Prevention:   activity supervised   assistive device/personal items within reach   clutter free environment maintained   fall prevention program maintained   gait belt   lighting adjusted   mobility aid in reach   muscle strengthening facilitated   nonskid shoes/slippers when out of bed   room organization consistent   safety round/check completed  Taken 5/17/2023 1200 by Javon Ellis RN  Safety Promotion/Fall Prevention: patient off unit  Taken 5/17/2023 1000 by Javon Ellis RN  Safety Promotion/Fall Prevention: patient off unit  Taken 5/17/2023 0800 by Javon Ellis RN  Safety Promotion/Fall Prevention:   activity supervised   assistive device/personal items within reach   clutter free environment maintained   fall prevention program  How Severe Are Your Spot(S)?: mild What Type Of Note Output Would You Prefer (Optional)?: Bullet Format maintained   gait belt   lighting adjusted   mobility aid in reach   muscle strengthening facilitated   nonskid shoes/slippers when out of bed   safety round/check completed   room organization consistent  Intervention: Prevent Skin Injury  Recent Flowsheet Documentation  Taken 5/17/2023 0800 by Javon Ellis RN  Skin Protection: skin sealant/moisture barrier applied  Intervention: Prevent and Manage VTE (Venous Thromboembolism) Risk  Recent Flowsheet Documentation  Taken 5/17/2023 0800 by Javon Ellis RN  Range of Motion: active ROM (range of motion) encouraged  Goal: Readiness for Transition of Care  Outcome: Ongoing, Progressing  Goal: Plan of Care Review  Outcome: Ongoing, Progressing     Problem: Hypertension Comorbidity  Goal: Blood Pressure in Desired Range  Outcome: Ongoing, Progressing  Intervention: Maintain Blood Pressure Management  Recent Flowsheet Documentation  Taken 5/17/2023 0800 by Javon Ellis RN  Medication Review/Management: medications reviewed     Problem: Gas Exchange Impaired  Goal: Optimal Gas Exchange  Outcome: Ongoing, Progressing     Problem: Infection  Goal: Absence of Infection Signs and Symptoms  Outcome: Ongoing, Progressing     Problem: Fall Injury Risk  Goal: Absence of Fall and Fall-Related Injury  Outcome: Ongoing, Progressing  Intervention: Identify and Manage Contributors  Recent Flowsheet Documentation  Taken 5/17/2023 0800 by Javon Ellis RN  Medication Review/Management: medications reviewed  Self-Care Promotion: independence encouraged  Intervention: Promote Injury-Free Environment  Recent Flowsheet Documentation  Taken 5/17/2023 1800 by Javon Ellis RN  Safety Promotion/Fall Prevention:   activity supervised   assistive device/personal items within reach   clutter free environment maintained   fall prevention program maintained   gait belt   lighting adjusted   mobility aid in reach   muscle strengthening facilitated   nonskid shoes/slippers when  What Is The Reason For Today's Visit?: Full Body Skin Examination out of bed   room organization consistent   safety round/check completed  Taken 5/17/2023 1600 by Javon Ellis RN  Safety Promotion/Fall Prevention:   activity supervised   assistive device/personal items within reach   clutter free environment maintained   fall prevention program maintained   gait belt   lighting adjusted   mobility aid in reach   muscle strengthening facilitated   nonskid shoes/slippers when out of bed   room organization consistent   safety round/check completed  Taken 5/17/2023 1400 by Javon Ellis RN  Safety Promotion/Fall Prevention:   activity supervised   assistive device/personal items within reach   clutter free environment maintained   fall prevention program maintained   gait belt   lighting adjusted   mobility aid in reach   muscle strengthening facilitated   nonskid shoes/slippers when out of bed   room organization consistent   safety round/check completed  Taken 5/17/2023 1200 by Javon Ellis RN  Safety Promotion/Fall Prevention: patient off unit  Taken 5/17/2023 1000 by Javon Ellis RN  Safety Promotion/Fall Prevention: patient off unit  Taken 5/17/2023 0800 by Javon Ellis RN  Safety Promotion/Fall Prevention:   activity supervised   assistive device/personal items within reach   clutter free environment maintained   fall prevention program maintained   gait belt   lighting adjusted   mobility aid in reach   muscle strengthening facilitated   nonskid shoes/slippers when out of bed   safety round/check completed   room organization consistent     Problem: Skin Injury Risk Increased  Goal: Skin Health and Integrity  Outcome: Ongoing, Progressing  Intervention: Optimize Skin Protection  Recent Flowsheet Documentation  Taken 5/17/2023 0800 by Javon Ellis RN  Pressure Reduction Techniques:   frequent weight shift encouraged   heels elevated off bed  Pressure Reduction Devices:   alternating pressure pump (ADD)   pressure-redistributing mattress  What Is The Reason For Today's Visit? (Being Monitored For X): the re-examination of lesions previously examined utilized  Skin Protection: skin sealant/moisture barrier applied     Problem: Adjustment to Illness (Stroke, Ischemic/Transient Ischemic Attack)  Goal: Optimal Coping  Outcome: Ongoing, Progressing  Intervention: Support Psychosocial Response to Stroke  Recent Flowsheet Documentation  Taken 5/17/2023 0800 by Javon Ellis RN  Supportive Measures: active listening utilized     Problem: Cerebral Tissue Perfusion (Stroke, Ischemic/Transient Ischemic Attack)  Goal: Optimal Cerebral Tissue Perfusion  Outcome: Ongoing, Progressing     Problem: Functional Ability Impaired (Stroke, Ischemic/Transient Ischemic Attack)  Goal: Optimal Functional Ability  Outcome: Ongoing, Progressing  Intervention: Optimize Functional Ability  Recent Flowsheet Documentation  Taken 5/17/2023 0800 by Javon Ellis RN  Self-Care Promotion: independence encouraged     Problem: Swallowing Impairment (Stroke, Ischemic/Transient Ischemic Attack)  Goal: Optimal Eating and Swallowing without Aspiration  Outcome: Ongoing, Progressing     Problem: Adjustment to Illness (Sepsis/Septic Shock)  Goal: Optimal Coping  Outcome: Ongoing, Progressing  Intervention: Optimize Psychosocial Adjustment to Illness  Recent Flowsheet Documentation  Taken 5/17/2023 0800 by Javon Ellis RN  Supportive Measures: active listening utilized     Problem: Bleeding (Sepsis/Septic Shock)  Goal: Absence of Bleeding  Outcome: Ongoing, Progressing     Problem: Glycemic Control Impaired (Sepsis/Septic Shock)  Goal: Blood Glucose Level Within Desired Range  Outcome: Ongoing, Progressing     Problem: Infection Progression (Sepsis/Septic Shock)  Goal: Absence of Infection Signs and Symptoms  Outcome: Ongoing, Progressing     Problem: Nutrition Impaired (Sepsis/Septic Shock)  Goal: Optimal Nutrition Intake  Outcome: Ongoing, Progressing   Goal Outcome Evaluation:

## 2023-05-17 NOTE — PROGRESS NOTES
"      [facility]         Dr Hull Sayied   LOS: 15 days   Patient Care Team:  Zelaelm Flor APRN as PCP - General (Internal Medicine)  Brandon Mitchell MD as Consulting Physician (Orthopedic Surgery)    Chief Complaint: Pneumonia with lung abscess pleural effusion right MCA stroke    Interval History: Events noted chart reviewed.  Currently on 2 L oxygen nasal cannula.  Resting comfortably.  Patient was n.p.o. for ELIGIO did not show any cardiac source for embolus.    REVIEW OF SYSTEMS:   CARDIOVASCULAR: No chest pain, chest pressure or chest discomfort. No palpitations or edema.   RESPIRATORY: Short of breath with activity  GASTROINTESTINAL: No anorexia, nausea, vomiting or diarrhea. No abdominal pain or blood.   HEMATOLOGIC: No bleeding or bruising.     Ventilator/Non-Invasive Ventilation Settings (From admission, onward)    None            Vital Signs  Temp:  [97.8 °F (36.6 °C)-98.8 °F (37.1 °C)] 98 °F (36.7 °C)  Heart Rate:  [] 82  Resp:  [16-20] 18  BP: (119-161)/(62-86) 119/66    Intake/Output Summary (Last 24 hours) at 5/17/2023 1456  Last data filed at 5/17/2023 0600  Gross per 24 hour   Intake 240 ml   Output 1300 ml   Net -1060 ml     Flowsheet Rows    Flowsheet Row First Filed Value   Admission Height 160 cm (63\") Documented at 05/02/2023 1900   Admission Weight 61.2 kg (135 lb) Documented at 05/02/2023 1900          Physical Exam:  Patient is examined using the personal protective equipment as per guidelines from infection control for this particular patient as enacted.  Hand hygiene was performed before and after patient interaction.   General Appearance:    Alert, cooperative, in no acute distress.  Following simple commands  ENT Mallampati between 3 and 4 no nasal congestion  Neck midline trachea, no thyromegaly   Lungs:    Diminished breath sounds with crackles diffuse right lower lobe    Heart:    Regular rhythm and normal rate, normal S1 and S2, no            murmur, no " gallop, no rub, no click   Chest Wall:    No abnormalities observed   Abdomen:     Normal bowel sounds, no masses, no organomegaly, soft        nontender, nondistended, no guarding, no rebound                tenderness   Extremities:   Moves all extremities well, no edema, no cyanosis, no             redness  CNS left-sided weakness  Skin no rashes no nodules  Musculoskeletal no cyanosis no clubbing normal range of motion                Results Review:        Results from last 7 days   Lab Units 05/16/23  0340 05/15/23  0309 05/14/23  0528   SODIUM mmol/L 135* 136 135*   POTASSIUM mmol/L 3.9 3.8 4.3   CHLORIDE mmol/L 101 102 100   CO2 mmol/L 31.0* 29.9* 32.5*   BUN mg/dL 13 13 12   CREATININE mg/dL 0.22* 0.21* 0.26*   GLUCOSE mg/dL 80 101* 91   CALCIUM mg/dL 8.3* 8.3* 8.4*         Results from last 7 days   Lab Units 05/16/23  0340 05/15/23  0309 05/14/23  0528   WBC 10*3/mm3 11.81* 16.60* 10.26   HEMOGLOBIN g/dL 10.0* 10.0* 10.1*   HEMATOCRIT % 29.0* 30.1* 31.3*   PLATELETS 10*3/mm3 365 333 388         Results from last 7 days   Lab Units 05/15/23  0309   CHOLESTEROL mg/dL 94     Results from last 7 days   Lab Units 05/15/23  0309   MAGNESIUM mg/dL 1.8     Results from last 7 days   Lab Units 05/15/23  0309   CHOLESTEROL mg/dL 94   TRIGLYCERIDES mg/dL 68   HDL CHOL mg/dL 32*   LDL CHOL mg/dL 47           I reviewed the patient's new clinical results.  I personally viewed and interpreted the patient's chest x-ray.        Medication Review:   aspirin, 81 mg, Nasogastric, Daily   Or  aspirin, 300 mg, Rectal, Daily  atorvastatin, 80 mg, Oral, Nightly  citalopram, 20 mg, Oral, Daily  clopidogrel, 75 mg, Nasogastric, Daily  doxycycline, 100 mg, Intravenous, Q12H  fluticasone, 1 spray, Each Nare, Daily  guaiFENesin, 600 mg, Oral, BID  hydrocortisone-bacitracin-zinc oxide-nystatin, 1 application, Topical, BID  ipratropium-albuterol, 3 mL, Nebulization, 4x Daily - RT  Menthol-Zinc Oxide, 1 application, Topical, 4x  Daily  meropenem, 1 g, Intravenous, Q8H  metoprolol tartrate, 12.5 mg, Oral, Q12H  sodium chloride, 10 mL, Intravenous, Q12H  cyanocobalamin, 1,000 mcg, Oral, Daily             ASSESSMENT:   Acute right MCA stroke now status post TNK  Bilateral strokes on MRI  Concern for airway protection and dysphagia  Acute hypoxic respiratory failure with worsening  Right lower lobe pneumonia and lung abscess with ongoing antibiotics  Right pleural effusion without evidence for empyema  New right lower lobe 2.4 cm lung nodule on CT 4/3/2023  COPD, currently not wheezing  Current cigarette smoker  Acute hyponatremia  Anemia    PLAN:  Management acute CVA per neurology.  ELIGIO did not show any cardiac source for embolus.  Plans for Zio patch per cardiology.  Antibiotics per infectious diseases.  Recommendations noted with 4-week course with stop date 6/8/2023.  The regimen will be meropenem and doxycycline and plans for repeat CT chest just prior to the stop date.  Oxygen weaned down to maintain sats above 90%  Mobilize ambulate  Diet per speech  Watch for aspiration  Bronchodilators for COPD  Discussed plan of care in detail with neurology and with  at bedside      Della Andrew MD  05/17/23  14:56 EDT

## 2023-05-17 NOTE — PLAN OF CARE
Goal Outcome Evaluation:              Outcome Evaluation: Attempted to see pt this AM for VFSS follow up and tx and speech/language eval, however, pt off the floor for ELIGIO.

## 2023-05-17 NOTE — PROGRESS NOTES
"Patient Name: Katherine Williamson  :1950  72 y.o.      Patient Care Team:  Zelalem Flor APRN as PCP - General (Internal Medicine)  Stephen, Brandon Meyer MD as Consulting Physician (Orthopedic Surgery)    Interval History:   S/p ELIGIO     Subjective:  Following for CVA    Objective   Vital Signs  Temp:  [97.8 °F (36.6 °C)-98.8 °F (37.1 °C)] 97.8 °F (36.6 °C)  Heart Rate:  [] 74  Resp:  [16-20] 19  BP: (121-161)/(62-86) 127/62    Intake/Output Summary (Last 24 hours) at 2023 1051  Last data filed at 2023 0600  Gross per 24 hour   Intake 240 ml   Output 1800 ml   Net -1560 ml     Flowsheet Rows    Flowsheet Row First Filed Value   Admission Height 160 cm (63\") Documented at 2023 1900   Admission Weight 61.2 kg (135 lb) Documented at 2023 1900          Physical Exam:   General Appearance:    Alert, cooperative, in no acute distress   Lungs:     Clear to auscultation.  Normal respiratory effort and rate.      Heart:    Regular rhythm and normal rate, normal S1 and S2, no murmurs, gallops or rubs.     Chest Wall:    No abnormalities observed   Abdomen:     Soft, nontender, positive bowel sounds.     Extremities:   no cyanosis, clubbing or edema.  No marked joint deformities.  Adequate musculoskeletal strength.       Results Review:    Results from last 7 days   Lab Units 23  0340   SODIUM mmol/L 135*   POTASSIUM mmol/L 3.9   CHLORIDE mmol/L 101   CO2 mmol/L 31.0*   BUN mg/dL 13   CREATININE mg/dL 0.22*   GLUCOSE mg/dL 80   CALCIUM mg/dL 8.3*         Results from last 7 days   Lab Units 23  0340   WBC 10*3/mm3 11.81*   HEMOGLOBIN g/dL 10.0*   HEMATOCRIT % 29.0*   PLATELETS 10*3/mm3 365         Results from last 7 days   Lab Units 05/15/23  0309   CHOLESTEROL mg/dL 94     Results from last 7 days   Lab Units 05/15/23  0309   MAGNESIUM mg/dL 1.8     Results from last 7 days   Lab Units 05/15/23  0309   CHOLESTEROL mg/dL 94   TRIGLYCERIDES mg/dL 68   HDL CHOL mg/dL 32*   LDL CHOL " mg/dL 47         Medication Review:   aspirin, 81 mg, Nasogastric, Daily   Or  aspirin, 300 mg, Rectal, Daily  atorvastatin, 80 mg, Oral, Nightly  citalopram, 20 mg, Oral, Daily  clopidogrel, 75 mg, Nasogastric, Daily  doxycycline, 100 mg, Intravenous, Q12H  fluticasone, 1 spray, Each Nare, Daily  guaiFENesin, 600 mg, Oral, BID  hydrocortisone-bacitracin-zinc oxide-nystatin, 1 application, Topical, BID  ipratropium-albuterol, 3 mL, Nebulization, 4x Daily - RT  Menthol-Zinc Oxide, 1 application, Topical, 4x Daily  meropenem, 1 g, Intravenous, Q8H  metoprolol tartrate, 12.5 mg, Oral, Q12H  sodium chloride, 10 mL, Intravenous, Q12H  sodium chloride, 10 mL, Intravenous, Q12H  cyanocobalamin, 1,000 mcg, Oral, Daily         hold, 1 each  sodium chloride, , Last Rate: 50 mL/hr (05/17/23 1042)        Assessment & Plan     1.  Acute stroke with bilateral infarcts.  2.  Pneumonia with right lung abscess  3.  Emphysema/tobacco use.  4.  Carotid artery disease.  5.  Hypertension     ELIGIO does not show a cardiac source of embolus.  Discharged with a Zio patch.    Talya Clemons MD, [facility]  05/17/23  10:51 EDT

## 2023-05-18 LAB
ALBUMIN SERPL-MCNC: 2.1 G/DL (ref 3.5–5.2)
ANION GAP SERPL CALCULATED.3IONS-SCNC: 4 MMOL/L (ref 5–15)
BUN SERPL-MCNC: 19 MG/DL (ref 8–23)
BUN/CREAT SERPL: 100 (ref 7–25)
CALCIUM SPEC-SCNC: 8.4 MG/DL (ref 8.6–10.5)
CHLORIDE SERPL-SCNC: 104 MMOL/L (ref 98–107)
CO2 SERPL-SCNC: 31 MMOL/L (ref 22–29)
CREAT SERPL-MCNC: 0.19 MG/DL (ref 0.57–1)
DEPRECATED RDW RBC AUTO: 44.3 FL (ref 37–54)
EGFRCR SERPLBLD CKD-EPI 2021: 127.6 ML/MIN/1.73
ERYTHROCYTE [DISTWIDTH] IN BLOOD BY AUTOMATED COUNT: 13.8 % (ref 12.3–15.4)
GLUCOSE SERPL-MCNC: 100 MG/DL (ref 65–99)
HCT VFR BLD AUTO: 29.9 % (ref 34–46.6)
HGB BLD-MCNC: 10 G/DL (ref 12–15.9)
MCH RBC QN AUTO: 29.9 PG (ref 26.6–33)
MCHC RBC AUTO-ENTMCNC: 33.4 G/DL (ref 31.5–35.7)
MCV RBC AUTO: 89.5 FL (ref 79–97)
PHOSPHATE SERPL-MCNC: 2.9 MG/DL (ref 2.5–4.5)
PLATELET # BLD AUTO: 387 10*3/MM3 (ref 140–450)
PMV BLD AUTO: 10.2 FL (ref 6–12)
POTASSIUM SERPL-SCNC: 3.8 MMOL/L (ref 3.5–5.2)
RBC # BLD AUTO: 3.34 10*6/MM3 (ref 3.77–5.28)
SODIUM SERPL-SCNC: 139 MMOL/L (ref 136–145)
WBC NRBC COR # BLD: 16.03 10*3/MM3 (ref 3.4–10.8)

## 2023-05-18 PROCEDURE — 94799 UNLISTED PULMONARY SVC/PX: CPT

## 2023-05-18 PROCEDURE — 99232 SBSQ HOSP IP/OBS MODERATE 35: CPT | Performed by: INTERNAL MEDICINE

## 2023-05-18 PROCEDURE — 86147 CARDIOLIPIN ANTIBODY EA IG: CPT | Performed by: PSYCHIATRY & NEUROLOGY

## 2023-05-18 PROCEDURE — 92523 SPEECH SOUND LANG COMPREHEN: CPT

## 2023-05-18 PROCEDURE — 86146 BETA-2 GLYCOPROTEIN ANTIBODY: CPT | Performed by: PSYCHIATRY & NEUROLOGY

## 2023-05-18 PROCEDURE — 85732 THROMBOPLASTIN TIME PARTIAL: CPT | Performed by: PSYCHIATRY & NEUROLOGY

## 2023-05-18 PROCEDURE — 85613 RUSSELL VIPER VENOM DILUTED: CPT | Performed by: PSYCHIATRY & NEUROLOGY

## 2023-05-18 PROCEDURE — 97530 THERAPEUTIC ACTIVITIES: CPT

## 2023-05-18 PROCEDURE — 94664 DEMO&/EVAL PT USE INHALER: CPT

## 2023-05-18 PROCEDURE — 85598 HEXAGNAL PHOSPH PLTLT NEUTRL: CPT | Performed by: PSYCHIATRY & NEUROLOGY

## 2023-05-18 PROCEDURE — 85027 COMPLETE CBC AUTOMATED: CPT | Performed by: INTERNAL MEDICINE

## 2023-05-18 PROCEDURE — 94761 N-INVAS EAR/PLS OXIMETRY MLT: CPT

## 2023-05-18 PROCEDURE — 25010000002 MEROPENEM PER 100 MG: Performed by: INTERNAL MEDICINE

## 2023-05-18 PROCEDURE — 85610 PROTHROMBIN TIME: CPT | Performed by: PSYCHIATRY & NEUROLOGY

## 2023-05-18 PROCEDURE — 80069 RENAL FUNCTION PANEL: CPT | Performed by: INTERNAL MEDICINE

## 2023-05-18 PROCEDURE — 85730 THROMBOPLASTIN TIME PARTIAL: CPT | Performed by: PSYCHIATRY & NEUROLOGY

## 2023-05-18 PROCEDURE — 85670 THROMBIN TIME PLASMA: CPT | Performed by: PSYCHIATRY & NEUROLOGY

## 2023-05-18 RX ORDER — SODIUM CHLORIDE 0.9 % (FLUSH) 0.9 %
20 SYRINGE (ML) INJECTION AS NEEDED
Status: CANCELLED | OUTPATIENT
Start: 2023-05-18

## 2023-05-18 RX ORDER — SODIUM CHLORIDE 0.9 % (FLUSH) 0.9 %
10 SYRINGE (ML) INJECTION EVERY 12 HOURS SCHEDULED
Status: CANCELLED | OUTPATIENT
Start: 2023-05-18

## 2023-05-18 RX ORDER — SODIUM CHLORIDE 0.9 % (FLUSH) 0.9 %
10 SYRINGE (ML) INJECTION AS NEEDED
Status: CANCELLED | OUTPATIENT
Start: 2023-05-18

## 2023-05-18 RX ORDER — DOXYCYCLINE 100 MG/1
100 CAPSULE ORAL EVERY 12 HOURS SCHEDULED
Status: DISCONTINUED | OUTPATIENT
Start: 2023-05-18 | End: 2023-05-19 | Stop reason: HOSPADM

## 2023-05-18 RX ADMIN — IPRATROPIUM BROMIDE AND ALBUTEROL SULFATE 3 ML: 2.5; .5 SOLUTION RESPIRATORY (INHALATION) at 16:27

## 2023-05-18 RX ADMIN — ANORECTAL OINTMENT 1 APPLICATION: 15.7; .44; 24; 20.6 OINTMENT TOPICAL at 18:33

## 2023-05-18 RX ADMIN — IPRATROPIUM BROMIDE AND ALBUTEROL SULFATE 3 ML: 2.5; .5 SOLUTION RESPIRATORY (INHALATION) at 11:05

## 2023-05-18 RX ADMIN — CLOPIDOGREL BISULFATE 75 MG: 75 TABLET, FILM COATED ORAL at 09:00

## 2023-05-18 RX ADMIN — ZINC OXIDE 1 APPLICATION: 200 OINTMENT TOPICAL at 20:23

## 2023-05-18 RX ADMIN — ASPIRIN 81 MG: 81 TABLET, CHEWABLE ORAL at 09:00

## 2023-05-18 RX ADMIN — ANORECTAL OINTMENT 1 APPLICATION: 15.7; .44; 24; 20.6 OINTMENT TOPICAL at 08:00

## 2023-05-18 RX ADMIN — MEROPENEM 1 G: 1 INJECTION, POWDER, FOR SOLUTION INTRAVENOUS at 13:15

## 2023-05-18 RX ADMIN — GUAIFENESIN 600 MG: 600 TABLET, EXTENDED RELEASE ORAL at 20:23

## 2023-05-18 RX ADMIN — ANORECTAL OINTMENT 1 APPLICATION: 15.7; .44; 24; 20.6 OINTMENT TOPICAL at 13:15

## 2023-05-18 RX ADMIN — IPRATROPIUM BROMIDE AND ALBUTEROL SULFATE 3 ML: 2.5; .5 SOLUTION RESPIRATORY (INHALATION) at 19:50

## 2023-05-18 RX ADMIN — Medication 10 ML: at 20:23

## 2023-05-18 RX ADMIN — ZINC OXIDE 1 APPLICATION: 200 OINTMENT TOPICAL at 08:00

## 2023-05-18 RX ADMIN — Medication 10 ML: at 09:00

## 2023-05-18 RX ADMIN — DOXYCYCLINE 100 MG: 100 CAPSULE ORAL at 18:34

## 2023-05-18 RX ADMIN — Medication 1000 MCG: at 09:00

## 2023-05-18 RX ADMIN — IPRATROPIUM BROMIDE AND ALBUTEROL SULFATE 3 ML: 2.5; .5 SOLUTION RESPIRATORY (INHALATION) at 07:18

## 2023-05-18 RX ADMIN — CITALOPRAM 20 MG: 20 TABLET, FILM COATED ORAL at 09:00

## 2023-05-18 RX ADMIN — MEROPENEM 1 G: 1 INJECTION, POWDER, FOR SOLUTION INTRAVENOUS at 04:23

## 2023-05-18 RX ADMIN — ATORVASTATIN CALCIUM 80 MG: 80 TABLET, FILM COATED ORAL at 20:23

## 2023-05-18 RX ADMIN — METOPROLOL TARTRATE 12.5 MG: 25 TABLET, FILM COATED ORAL at 08:58

## 2023-05-18 RX ADMIN — METOPROLOL TARTRATE 12.5 MG: 25 TABLET, FILM COATED ORAL at 20:23

## 2023-05-18 RX ADMIN — GUAIFENESIN 600 MG: 600 TABLET, EXTENDED RELEASE ORAL at 09:00

## 2023-05-18 RX ADMIN — MEROPENEM 1 G: 1 INJECTION, POWDER, FOR SOLUTION INTRAVENOUS at 20:23

## 2023-05-18 RX ADMIN — ANORECTAL OINTMENT 1 APPLICATION: 15.7; .44; 24; 20.6 OINTMENT TOPICAL at 20:23

## 2023-05-18 RX ADMIN — DOXYCYCLINE 100 MG: 100 INJECTION, POWDER, LYOPHILIZED, FOR SOLUTION INTRAVENOUS at 08:58

## 2023-05-18 NOTE — THERAPY EVALUATION
Acute Care - Speech Language Pathology Initial Evaluation  [facility]     Patient Name: Katherine Williamson  : 1950  MRN: [id number]  Today's Date: 2023               Admit Date: 2023     Visit Dx:    ICD-10-CM ICD-9-CM   1. Acute respiratory failure with hypoxia  J96.01 518.81   2. Sepsis, due to unspecified organism, unspecified whether acute organ dysfunction present  A41.9 038.9     995.91   3. Community acquired pneumonia, unspecified laterality  J18.9 486   4. Abnormal CXR  R93.89 793.2   5. Hyperglycemia  R73.9 790.29   6. 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   • History of repair of hip joint   • History of operative procedure on hip   • Hyperglycemia   • Hepatic steatosis   • Diverticulosis   • Chest pain, atypical   • History of colon polyps   • Family history of colon cancer in mother   • Allergic rhinitis   • Lung nodule seen on imaging study   • Acute respiratory failure with hypoxia   • Abscess of right lung with pneumonia   • Empyema   • Sepsis   • Pleural effusion, right   • Other emphysema   • Pulmonary nodule (RLL)   • Diastolic dysfunction, grade 1   • Physical debility   • Cryptogenic stroke     Past Medical History:   Diagnosis Date   • Arthritis    • Cataract    • Colon polyps     FOLLOWED BY DR. JOSEPH LAU   • Hypertension      Past Surgical History:   Procedure Laterality Date   • COLONOSCOPY  10/2015    [id number]. [facility].  Follow-up in 5 years.   • COLONOSCOPY N/A 2022    2 BENIGN POLYPS IN DESCENDING, 5 MM BENIGN POLYP IN SIGMOID, MULTIPLE SMALL AND LARGE DIVERTICULA IN SIGMOID, RESCOPE IN 3 YRS, DR. JOSEPH LAU AT [facility]   • EYE SURGERY     • JOINT REPLACEMENT  ?    Left total  "hip replacement in 2005.  In December 2015 patient had left hip revision   • TONSILLECTOMY         SLP Recommendation and Plan  SLP Diagnosis: mild, cognitive-linguistic disorder (05/18/23 1200)           [facility] Criteria for Skilled Therapy Interventions Met: yes (05/18/23 1200)  Anticipated Discharge Disposition (SLP): inpatient rehabilitation facility (05/18/23 1200)        Predicted Duration Therapy Intervention (Days): until discharge (05/18/23 1200)  SLC Diagnostic Follow-Up Needed: auditory comprehension, reading comprehension, verbal expression, graphic expression, higher-level cognitive-linguistic (05/18/23 1200)                       Plan of Care Reviewed With: patient (05/18/23 1323)  Outcome Evaluation: Pt is alert and pleasant.  Pt frustrated with some events with hospital stay.  \"You can call me bitch\" pt stated.  Pt oriented except to month.  Pt required min cues for sustained attention.  Pt able to relay personal past and details of admission however no family present to verify accuracy.  Pt able to listen to short narrative discourse and answer 4/4 (100%) questions accurately.  Pt able to recall 3/5 (60%) of words after sesveral minute delay.  Pt presents with some visual-spatial deficits.  Pt able to write the hands on a mock clock face however unable to write the correct target time.  Pt states she feels her cognition was not effected after CVA, however feel pt may lack insigh and awareness into her deficits.  Cognition was assessed via the SLUMS.  Agree with transfer to inpatient rehab setting for therapy/ (05/18/23 1323)      SLP EVALUATION (last 72 hours)     SLP SLC Evaluation     Row Name 05/18/23 1200 05/16/23 1100                Communication Assessment/Intervention    Document Type evaluation  -NR evaluation  -SA       Patient Observations alert;cooperative;agree to therapy  -NR --       Patient Effort good  -NR --       Symptoms Noted During/After Treatment none  -NR --          General " "Information    Pertinent History Of Current Problem right MCA stroke s/p TNK  -NR --       Precautions/Limitations, Vision WFL;for purposes of eval  -NR --       Precautions/Limitations, Hearing WFL  -NR --       Plans/Goals Discussed with patient;agreed upon  -NR --       Barriers to Rehab none identified  -NR --          Pain Scale: Numbers Pre/Post-Treatment    Pretreatment Pain Rating 0/10 - no pain  -NR --       Posttreatment Pain Rating 0/10 - no pain  -NR --          Oral Musculature and Cranial Nerve Assessment    Oral Motor General Assessment WFL  -NR --          Motor Speech Assessment/Intervention    Motor Speech Function WNL  -NR --          Cognitive Assessment Intervention- SLP    Cognitive Function (Cognition) mild impairment  -NR --       Orientation Status (Cognition) person;place;situation;mild impairment  Pt stated it was June  -NR --       Memory (Cognitive) mild impairment  -NR --       Attention (Cognitive) mild impairment  -NR --       Functional Math (Cognitive) money calculation;mild impairment  -NR --       Right Hemisphere Function visuo-spatial;mild impairment  -NR --       Cognition, Comment Pt is alert and pleasant.  Pt frustrated with some events with hospital stay.  \"You can call me bitch\" pt stated.  Pt oriented except to month.  Pt required min cues for sustained attention.  Pt able to relay personal past and details of admission however no family present to verify accuracy.  Pt able to listen to short narrative discourse and answer 4/4 (100%) questions accurately.  Pt able to recall 3/5 (60%) of words after sesveral minute delay.  Pt presents with some visual-spatial deficits.  Pt able to write the hands on a mock clock face however unable to write the correct target time.  Pt states she feels her cognition was not effected after CVA, however feel pt may lack insigh and awareness into her deficits.  Cognition was assessed via the SLUMS.  Agree with transfer to inpatient rehab " setting for therapy/  -NR --          Standardized Tests    Cognitive/Memory Tests SLUMS: [facility] Mental Status Examination  -NR --          SLUMS: [facility] Mental Status Examination    SLUMS Score 25  -NR --       SLUMS Range 21-26: Mild Neurocognitive Disorder (High school education or higher)  -NR --       SLUMS Comments See abpve fpr ,pre information regarding SLUMS results.  Pt stated her highest education is a sophomore in high school.  -NR --          SLP Evaluation Clinical Impressions    SLP Diagnosis mild;cognitive-linguistic disorder  -NR --       Rehab Potential/Prognosis excellent  -NR --       SLC Criteria for Skilled Therapy Interventions Met yes  -NR --       Functional Impact Poor Judgement;difficulty completing home management task  -NR --          Recommendations    Therapy Frequency (SLP SLC) PRN  -NR --       Predicted Duration Therapy Intervention (Days) until discharge  -NR --       Anticipated Discharge Disposition (SLP) inpatient rehabilitation facility  -NR --       SLC Diagnostic Follow-Up Needed auditory comprehension;reading comprehension;verbal expression;graphic expression;higher-level cognitive-linguistic  -NR --          Communication Treatment Objective and Progress Goals (SLP)    SLC LTGs Patient will demonstrate functional cognitive-linguistic skills for return to discharge environment  -NR --       SLC STGs Additional Goals (Group)  -NR --       Diagnostic Treatment Objective Diagnostic Treatment Objectives (Group)  -NR --       Cognitive Linguistic Treatment Objectives Cognitive Linguistic Treatment Objectives (Group)  -NR --          Diagnostic Treatment Objectives    Diagnostic Treatment Objective Selection Diagnostic Treatment Objectives  -NR --          SLP Diagnostic Treatment     Patient will participate in further assessment in the following areas auditory comprehension;reading comprehension;verbal expression;graphic expression;higher-level  cognitive-linguistic  -NR --       Time Frame (Diagnostic) by discharge  -NR --          Cognitive Linguistic Treatment Objectives    Attention Selection attention, SLP goal 1  -NR --       Orientation Selection orientation, SLP goal 1  -NR --       Memory Skills Selection memory skills, SLP goal 1  -NR --       Functional Math Skills Selection functional math skills, SLP goal 1  -NR --       Right Hemisphere Function Selection right hemisphere function, SLP goal 1  -NR --          Attention Goal 1 (SLP)    Improve Attention by Goal 1 (SLP) complete selective attention task;complete divided attention task;90%;with minimal cues (75-90%)  -NR --       Time Frame (Attention Goal 1, SLP) by discharge  -NR --          Orientation Goal 1 (SLP)    Improve Orientation Through Goal 1 (SLP) demonstrating orientation to month;100%;independently (over 90% accuracy)  -NR --       Time Frame (Orientation Goal 1, SLP) by discharge  -NR --          Memory Skills Goal 1 (SLP)    Improve Memory Skills Through Goal 1 (SLP) recalling unrelated word lists with an imposed delay;listen to multiple paragraphs and answer questions;use external memory aid;recall details of the day;90%;with minimal cues (75-90%)  -NR --       Time Frame (Memory Skills Goal 1, SLP) by discharge  -NR --          Functional Math Skills Goal 1 (SLP)    Improve Functional Math Skills Through Goal 1 (SLP) complete word problems involving money;complete functional math task;90%;with minimal cues (75-90%)  -NR --       Time Frame (Functional Math Skills Goal 1, SLP) by discharge  -NR --          Right Hemisphere Function Goal 1 (SLP)    Improve Right Hemisphere Function Through Goal 1 (SLP) complete visuo-perceptual activities (L/R discrimination, spatial concepts);perform self-monitoring;90%;with minimal cues (75-90%)  -NR --             User Key  (r) = Recorded By, (t) = Taken By, (c) = Cosigned By    Initials Name Effective Dates    Sanaz Beaver MS CCC-SLP  06/01/22 -     NR Teodora Marsh MA,CCC-SLP 06/16/21 -                    EDUCATION  The patient has been educated in the following areas:     Cognitive Impairment Communication Impairment.           SLP GOALS     Row Name 05/18/23 1200             Attention Goal 1 (SLP)    Improve Attention by Goal 1 (SLP) complete selective attention task;complete divided attention task;90%;with minimal cues (75-90%)  -NR      Time Frame (Attention Goal 1, SLP) by discharge  -NR         Orientation Goal 1 (SLP)    Improve Orientation Through Goal 1 (SLP) demonstrating orientation to month;100%;independently (over 90% accuracy)  -NR      Time Frame (Orientation Goal 1, SLP) by discharge  -NR         Memory Skills Goal 1 (SLP)    Improve Memory Skills Through Goal 1 (SLP) recalling unrelated word lists with an imposed delay;listen to multiple paragraphs and answer questions;use external memory aid;recall details of the day;90%;with minimal cues (75-90%)  -NR      Time Frame (Memory Skills Goal 1, SLP) by discharge  -NR         Functional Math Skills Goal 1 (SLP)    Improve Functional Math Skills Through Goal 1 (SLP) complete word problems involving money;complete functional math task;90%;with minimal cues (75-90%)  -NR      Time Frame (Functional Math Skills Goal 1, SLP) by discharge  -NR         Right Hemisphere Function Goal 1 (SLP)    Improve Right Hemisphere Function Through Goal 1 (SLP) complete visuo-perceptual activities (L/R discrimination, spatial concepts);perform self-monitoring;90%;with minimal cues (75-90%)  -NR            User Key  (r) = Recorded By, (t) = Taken By, (c) = Cosigned By    Initials Name Provider Type    NR Teodora Marsh MA,CCC-SLP Speech and Language Pathologist                SLP Outcome Measures (last 72 hours)     SLP Outcome Measures     Row Name 05/18/23 1300             SLP Outcome Measures    Outcome Measure Used? Adult NOMS  -NR         Adult FCM Scores    FCM Chosen Memory  -NR      Memory FCM  Score 4  -NR            User Key  (r) = Recorded By, (t) = Taken By, (c) = Cosigned By    Initials Name Effective Dates    NR Teodora Marsh MA,CCC-SLP 06/16/21 -                     Time Calculation:      Time Calculation- SLP     Row Name 05/18/23 1325             Time Calculation- SLP    SLP Start Time 1000  -NR      SLP Stop Time 1100  -NR      SLP Time Calculation (min) 60 min  -NR            User Key  (r) = Recorded By, (t) = Taken By, (c) = Cosigned By    Initials Name Provider Type    NR Teodora Marsh MA,CCC-SLP Speech and Language Pathologist                Therapy Charges for Today     Code Description Service Date Service Provider Modifiers Qty    [id number] HC ST EVAL SPEECH AND PROD W LANG  4 5/18/2023 Teodora Marsh MA,CCC-SLP GN 1          ADULT NOMS (last 72 hours)     Adult NOMS     Row Name 05/18/23 1300                   Adult FCM Scores    FCM Chosen Memory  -NR        Memory FCM Score 4  -NR              User Key  (r) = Recorded By, (t) = Taken By, (c) = Cosigned By    Initials Name Effective Dates    Teodora Quiroga MA,CCC-SLP 06/16/21 -                        Teodora Marsh MA,CCC-SLP  5/18/2023

## 2023-05-18 NOTE — PLAN OF CARE
"  Problem: Adult Inpatient Plan of Care  Goal: Plan of Care Review  Flowsheets (Taken 5/18/2023 1323)  Plan of Care Reviewed With: patient  Outcome Evaluation: Pt is alert and pleasant.  Pt frustrated with some events with hospital stay.  \"You can call me bitch\" pt stated.  Pt oriented except to month.  Pt required min cues for sustained attention.  Pt able to relay personal past and details of admission however no family present to verify accuracy.  Pt able to listen to short narrative discourse and answer 4/4 (100%) questions accurately.  Pt able to recall 3/5 (60%) of words after sesveral minute delay.  Pt presents with some visual-spatial deficits.  Pt able to write the hands on a mock clock face however unable to write the correct target time.  Pt states she feels her cognition was not effected after CVA, however feel pt may lack insigh and awareness into her deficits.  Cognition was assessed via the SLUMS.  Agree with transfer to inpatient rehab setting for therapy/   Goal Outcome Evaluation:  Plan of Care Reviewed With: patient           Outcome Evaluation: Pt is alert and pleasant.  Pt frustrated with some events with hospital stay.  \"You can call me bitch\" pt stated.  Pt oriented except to month.  Pt required min cues for sustained attention.  Pt able to relay personal past and details of admission however no family present to verify accuracy.  Pt able to listen to short narrative discourse and answer 4/4 (100%) questions accurately.  Pt able to recall 3/5 (60%) of words after sesveral minute delay.  Pt presents with some visual-spatial deficits.  Pt able to write the hands on a mock clock face however unable to write the correct target time.  Pt states she feels her cognition was not effected after CVA, however feel pt may lack insigh and awareness into her deficits.  Cognition was assessed via the SLUMS.  Agree with transfer to inpatient rehab setting for therapy/         "

## 2023-05-18 NOTE — PROGRESS NOTES
Name: Katherine Williamson ADMIT: 2023   : 1950  PCP: Zelalem Flor APRN    MRN: [id number] LOS: 16 days   AGE/SEX: 72 y.o. female  ROOM: [facility]     Subjective   Subjective   Sitting up on side of the bed with physical therapy.  Irritable today.  No new complaints.  Slight cough.  Loose stools are not new.  Tolerated diet last evening.    Review of Systems     Objective   Objective   Vital Signs  Temp:  [97.6 °F (36.4 °C)-98.3 °F (36.8 °C)] 98.3 °F (36.8 °C)  Heart Rate:  [] 90  Resp:  [16-19] 18  BP: (111-157)/(54-78) 131/69  SpO2:  [91 %-100 %] 92 %  on  Flow (L/min):  [2] 2;   Device (Oxygen Therapy): nasal cannula  Body mass index is 26.37 kg/m².  Physical Exam  Vitals and nursing note reviewed.   Constitutional:       General: She is not in acute distress.     Appearance: She is ill-appearing.   Cardiovascular:      Rate and Rhythm: Normal rate and regular rhythm.   Pulmonary:      Effort: Pulmonary effort is normal.      Breath sounds: Decreased breath sounds present.   Abdominal:      General: Bowel sounds are normal.      Palpations: Abdomen is soft.      Tenderness: There is no abdominal tenderness.   Musculoskeletal:         General: No swelling.   Skin:     General: Skin is warm and dry.   Neurological:      Mental Status: She is alert. Mental status is at baseline.      Comments: Mild left-sided weakness   Psychiatric:         Cognition and Memory: Memory is impaired.       Results Review     I reviewed the patient's new clinical results.  Results from last 7 days   Lab Units 23  0628 23  0340 05/15/23  0309 23  0528   WBC 10*3/mm3 16.03* 11.81* 16.60* 10.26   HEMOGLOBIN g/dL 10.0* 10.0* 10.0* 10.1*   PLATELETS 10*3/mm3 387 365 333 388     Results from last 7 days   Lab Units 23  0628 23  0340 05/15/23  0309 23  0528   SODIUM mmol/L 139 135* 136 135*   POTASSIUM mmol/L 3.8 3.9 3.8 4.3   CHLORIDE mmol/L 104 101 102 100   CO2 mmol/L 31.0* 31.0* 29.9* 32.5*    BUN mg/dL 19 13 13 12   CREATININE mg/dL 0.19* 0.22* 0.21* 0.26*   GLUCOSE mg/dL 100* 80 101* 91   EGFR mL/min/1.73 127.6 123.2 124.5 118.3     Results from last 7 days   Lab Units 05/18/23  0628 05/16/23  0340 05/15/23  0309   ALBUMIN g/dL 2.1* 2.1* 1.8*     Results from last 7 days   Lab Units 05/18/23  0628 05/16/23  0340 05/15/23  0309 05/14/23  0528   CALCIUM mg/dL 8.4* 8.3* 8.3* 8.4*   ALBUMIN g/dL 2.1* 2.1* 1.8*  --    MAGNESIUM mg/dL  --   --  1.8  --    PHOSPHORUS mg/dL 2.9 2.4* 2.5  --        Glucose   Date/Time Value Ref Range Status   05/17/2023 1203 94 70 - 130 mg/dL Final     Comment:     Meter: [id number] : [id number] Meme Conte NA   05/17/2023 0234 114 70 - 130 mg/dL Final     Comment:     Meter: [id number] : [id number] Ramya Awan NA   05/17/2023 0050 122 70 - 130 mg/dL Final     Comment:     Meter: [id number] : rell Mueller RN   05/16/2023 1734 116 70 - 130 mg/dL Final     Comment:     Meter: [id number] : [id number] Graham Aryeon NA   05/16/2023 1159 106 70 - 130 mg/dL Final     Comment:     Meter: [id number] : [id number] Germán Quach CNA   05/16/2023 0016 95 70 - 130 mg/dL Final     Comment:     Meter: [id number] : corie Brooks RN   05/15/2023 1748 86 70 - 130 mg/dL Final     Comment:     Meter: [id number] : [id number] Graham Aryeon NA       FL Video Swallow With Speech Single Contrast    Result Date: 5/16/2023  Fluoroscopy was provided for the speech pathologist during a video swallow study. For full details please see the speech pathology report  This report was finalized on 5/16/2023 2:12 PM by Dr. Tena Esquivel M.D.      I have personally reviewed all medications:  Scheduled Medications  aspirin, 81 mg, Nasogastric, Daily   Or  aspirin, 300 mg, Rectal, Daily  atorvastatin, 80 mg, Oral, Nightly  citalopram, 20 mg, Oral, Daily  clopidogrel, 75 mg, Nasogastric, Daily  doxycycline, 100 mg, Intravenous, Q12H  fluticasone, 1 spray,  Each Nare, Daily  guaiFENesin, 600 mg, Oral, BID  hydrocortisone-bacitracin-zinc oxide-nystatin, 1 application, Topical, BID  ipratropium-albuterol, 3 mL, Nebulization, 4x Daily - RT  Menthol-Zinc Oxide, 1 application, Topical, 4x Daily  meropenem, 1 g, Intravenous, Q8H  metoprolol tartrate, 12.5 mg, Oral, Q12H  sodium chloride, 10 mL, Intravenous, Q12H  cyanocobalamin, 1,000 mcg, Oral, Daily    Infusions   Diet  Diet: Regular/House Diet; Texture: Soft to Chew (NDD 3); Soft to Chew: Ground Meat; Fluid Consistency: Thin (IDDSI 0)    I have personally reviewed:  [x]  Laboratory   []  Microbiology   [x]  Radiology   []  EKG/Telemetry  [x]  Cardiology/Vascular   []  Pathology    []  Records       Assessment/Plan     Active Hospital Problems    Diagnosis  POA   • **Abscess of right lung with pneumonia [J85.1]  Yes   • Cryptogenic stroke [I63.9]  No   • Diastolic dysfunction, grade 1 [I51.89]  Yes   • Physical debility [R53.81]  Clinically Undetermined   • Sepsis [A41.9]  Yes   • Pleural effusion, right [J90]  Yes   • Other emphysema [J43.8]  Yes   • Pulmonary nodule (RLL) [R91.1]  Yes   • Acute respiratory failure with hypoxia [J96.01]  Yes   • Hepatic steatosis [K76.0]  Yes   • Tobacco abuse [Z72.0]  Yes   • Benign essential HTN [I10]  Yes      Resolved Hospital Problems   No resolved problems to display.       72 y.o. female admitted with Abscess of right lung with pneumonia.  She is currently on IV meropenem and doxycycline per infectious disease through 6/8/2023.  During this hospitalization she has suffered bilateral embolic appearing strokes for which she received TNK.    Uncertain significance of elevated WBC today.  Clinically patient seems stable and showing gradual improvement.  She is afebrile and has been weaned down to 2 L of oxygen.  ID following.    Neurologically stable.  ELIGIO yesterday negative.  Plan to continue DAPT and high intensity statin at discharge.  She will be discharged with a Zio patch in  place.    She is normotensive.  Currently receiving metoprolol.  Her home irbesartan has been on hold.  Likely resume at discharge.      · SCDs for DVT prophylaxis.  · Full code.  · Discussed with patient and CCP.  · Anticipate discharge to [facility] vs [facility] timing yet to be determined..      Bharat Calabrese MD  [facility]  05/18/23  09:30 EDT

## 2023-05-18 NOTE — PROGRESS NOTES
ID NOTE    CC: f/u pneumonia (right worse than left) w/ possible abscess    Subj: No fever. WBC a little higher today but no new symptoms to report. Tolerating meropenem and doxycycline without rash. Hoping for discharge to rehab soon. Remains on 2L NC.       Medications:    Current Facility-Administered Medications:   •  acetaminophen (TYLENOL) tablet 650 mg, 650 mg, Oral, Q6H PRN, Yanna Griffin, APRN, 650 mg at 05/17/23 2225  •  aspirin chewable tablet 81 mg, 81 mg, Nasogastric, Daily, 81 mg at 05/18/23 0900 **OR** aspirin suppository 300 mg, 300 mg, Rectal, Daily, Darius Ford MD  •  atorvastatin (LIPITOR) tablet 80 mg, 80 mg, Oral, Nightly, Darius Ford MD, 80 mg at 05/17/23 2021  •  bismuth subsalicylate (PEPTO BISMOL) 262 MG/15ML suspension 30 mL, 30 mL, Oral, TID PRN, Darius Ford MD, 30 mL at 05/14/23 0839  •  citalopram (CeleXA) tablet 20 mg, 20 mg, Oral, Daily, Darius Ford MD, 20 mg at 05/18/23 0900  •  clopidogrel (PLAVIX) tablet 75 mg, 75 mg, Nasogastric, Daily, Darius Ford MD, 75 mg at 05/18/23 0900  •  doxycycline (VIBRAMYCIN) 100 mg in sodium chloride 0.9 % 100 mL IVPB-VTB, 100 mg, Intravenous, Q12H, Darius Ford MD, 100 mg at 05/18/23 0858  •  fluticasone (FLONASE) 50 MCG/ACT nasal spray 1 spray, 1 spray, Each Nare, Daily, Darius Ford MD, 1 spray at 05/08/23 0808  •  guaiFENesin (MUCINEX) 12 hr tablet 600 mg, 600 mg, Oral, BID, Darius Ford MD, 600 mg at 05/18/23 0900  •  hydrocortisone-bacitracin-zinc oxide-nystatin (MAGIC BARRIER) ointment 1 application, 1 application, Topical, BID, Darius Ford MD, 1 application at 05/18/23 0800  •  ipratropium-albuterol (DUO-NEB) nebulizer solution 3 mL, 3 mL, Nebulization, 4x Daily - RT, Darius Ford MD, 3 mL at 05/18/23 0718  •  labetalol (NORMODYNE,TRANDATE) injection 10 mg, 10 mg, Intravenous, Q10 Min PRN, Darius Ford MD  •  Magnesium Cardiology Dose Replacement - Follow Nurse / BPA Driven Protocol, , Does not apply, PRLogan RIVERA Subin, MD  •   Menthol-Zinc Oxide 1 application, 1 application, Topical, 4x Daily, Darius Ford MD, 1 application at 05/18/23 0800  •  meropenem (MERREM) 1 g in sodium chloride 0.9 % 100 mL IVPB-VTB, 1 g, Intravenous, Q8H, Darius Ford MD, Last Rate: 33.3 mL/hr at 05/12/23 0449, 1 g at 05/18/23 0423  •  metoprolol tartrate (LOPRESSOR) tablet 12.5 mg, 12.5 mg, Oral, Q12H, Darius Ford MD, 12.5 mg at 05/18/23 0858  •  Phosphorus Replacement - Follow Nurse / BPA Driven Protocol, , Does not apply, PRNLogan Subin, MD  •  Potassium Replacement - Follow Nurse / BPA Driven Protocol, , Does not apply, THEO, Darius Ford MD  •  sodium chloride 0.9 % flush 10 mL, 10 mL, Intravenous, Q12H, Darius Ford MD, 10 mL at 05/18/23 0900  •  sodium chloride 0.9 % flush 10 mL, 10 mL, Intravenous, PRN, Darius Ford MD  •  sodium chloride 0.9 % infusion 40 mL, 40 mL, Intravenous, SARIN, Darius Ford MD  •  vitamin B-12 (CYANOCOBALAMIN) tablet 1,000 mcg, 1,000 mcg, Oral, Daily, Darius Ford MD, 1,000 mcg at 05/18/23 0900      Objective   Vital Signs   Temp:  [97.6 °F (36.4 °C)-98.3 °F (36.8 °C)] 98.3 °F (36.8 °C)  Heart Rate:  [] 90  Resp:  [16-19] 18  BP: (111-157)/(54-78) 131/69    Physical Exam:   General: awake, alert, NAD  Eyes: no scleral icterus  ENT:  NC in nares  Cardiovascular: NR  Respiratory: clear anteriorly; no wheezing; normal WOB on 2L NC  GI: Abdomen is soft, not tender or distended  Skin: No rashes  Psychiatric: Normal mood and affect     Labs:   CBC, BMP, and blood cultures reviewed today  Lab Results   Component Value Date    WBC 16.03 (H) 05/18/2023    HGB 10.0 (L) 05/18/2023    HCT 29.9 (L) 05/18/2023    MCV 89.5 05/18/2023     05/18/2023     Lab Results   Component Value Date    GLUCOSE 100 (H) 05/18/2023    CALCIUM 8.4 (L) 05/18/2023     05/18/2023    K 3.8 05/18/2023    CO2 31.0 (H) 05/18/2023     05/18/2023    BUN 19 05/18/2023    CREATININE 0.19 (L) 05/18/2023    EGFR 127.6 05/18/2023    .0 (H)  "05/18/2023    ANIONGAP 4.0 (L) 05/18/2023     Lab Results   Component Value Date    CRP 3.52 (H) 05/14/2023     Procal 3.95--->2.6-->0.2  Crypto Ag negative  Urine Histo Ag negative  Serum Histo Ag negative  Histoplasma Ab negative  Urine Blasto Ag negative  Aspergillus Ag pending  ANCA negative      Microbiology:  5/2 RPP: negative  5/2 BCx: Corynebacterium in 1/2 sets  5/2 SpCx: normal aishwarya  5/2 MRSA nares: negative  5/2 Strep pneumo Ag; negative  5/2 Legionella Ag: negative  5/4 BCx: negative  5/5 C diff: negative  5/6 SpCx: normal aishwarya  5/9 AFB Cx: NGTD  5/9 Fungus Cx: NGTD  5/10 SpCx: rare normal aishwarya  5/10 L Thoracentesis Cx: negative  5/14 BCx: negative    New Radiology:  ELIGIO negative for vegetations    Prior radiology:  MRI brain: \"Bilateral multifocal areas of infarction.  No evidence of hemorrhagic transformation. \"    ASSESSMENT/PLAN:  1. Pneumonia and right lung abscess  2. Pleural effusions  3. Acute hypoxic respiratory failure  4. Emphysema  5. Tobacco use  6. Acute R MCA stroke and bilateral infarcts    Thanks to neurology team for their care of Ms. Williamson in regards to her stroke. MRI as above. Blood cultures negative. TTE and ELIGIO both negative for vegetations.     Her fever is resolved. She is down to just 2L NC. WBC increased slightly today but no new symptoms. She is definitely trending for the better. I still recommend that we treat with antibiotics for a 4-week course with stop date 6/8/23. The regimen will be meropenem 1 g IV q8h and doxycycline 100 mg PO BID. I ordered a repeat CT to be done just prior to the stop date. It is scheduled for 6/6/23. She will need follow-up w/ pulmonologist as well.     I will go ahead and re-order her PICC line.     D/W CCP.     Thank you for allowing me to be involved in the care of this patient. Infectious diseases will sign off at this time with antibiotics plan in place, but please call me at [phone] if any further ID questions or new ID concerns.      "

## 2023-05-18 NOTE — PROGRESS NOTES
Continued Stay Note  [facility]     Patient Name: Katherine Williamson  MRN: [id number]  Today's Date: 5/18/2023    Admit Date: 5/2/2023    Plan: [facility] acute rehab pending [place] pre-cert, initiated today   Discharge Plan     Row Name 05/18/23 1100       Plan    Plan [facility] acute rehab pending [place] pre-cert, initiated today    Patient/Family in Agreement with Plan yes    Plan Comments Per [facility] acute rehab can accept pt pending [place] pre-cert, initiated today. CCP to follow. [facility] APRN agreeable to complete [place] documents, address and instruction to drop of paperwork/fee provided to  at bedside. Emilia Su LCSW               Discharge Codes    No documentation.               Expected Discharge Date and Time     Expected Discharge Date Expected Discharge Time    May 22, 2023             Danni Su LCSW

## 2023-05-18 NOTE — PROGRESS NOTES
"Nutrition Services    Patient Name:  Katherine Williamson  YOB: 1950  MRN: [id number]  Admit Date:  5/2/2023  Assessment Date:  05/18/23    Comment: Nutrition follow up. Pt transferred out of ICU. Passed VFSS 5/16 for soft to chew/ground meats and thin liquids. Cortrak removed. ELIGIO did not show any cardiac source for embolus. Pt reports she does not like the diet texture, but appetite is fair. Consumed oatmeal for breakfast and most of her pasta for lunch. Boost Plus ordered but pt does not like, reports it gives her diarrhea. Agreeable to try Boost Breeze with breakfast instead. Encouraged PO intake as tolerated.     Will continue to follow clinical course and monitor nutritional needs.     CLINICAL NUTRITION ASSESSMENT      Reason for Assessment Follow-up Protocol     Diagnosis/Problem   5/14 Acute right MCA stroke now  S/P TNK  Acute progressive respiratory failure, Right lower lobe lung abscess/pneumonia, sepsis   Medical/Surgical History Past Medical History:   Diagnosis Date   • Arthritis    • Cataract    • Colon polyps     FOLLOWED BY DR. JOSEPH LAU   • Hypertension        Past Surgical History:   Procedure Laterality Date   • COLONOSCOPY  10/2015    Eqgxu-gernukrodgsn-Ss. Kaplan.  Follow-up in 5 years.   • COLONOSCOPY N/A 1/12/2022    2 BENIGN POLYPS IN DESCENDING, 5 MM BENIGN POLYP IN SIGMOID, MULTIPLE SMALL AND LARGE DIVERTICULA IN SIGMOID, RESCOPE IN 3 YRS, DR. JOSEPH LAU AT [facility]   • EYE SURGERY     • JOINT REPLACEMENT  2005?    Left total hip replacement in 2005.  In December 2015 patient had left hip revision   • TONSILLECTOMY          Encounter Information        Nutrition History:     Food Preferences:    Supplements:    Factors Affecting Intake: decreased appetite, swallow impairment     Anthropometrics        Current Height  Current Weight  BMI kg/m2 Height: 157.5 cm (62\")  Weight: 65.4 kg (144 lb 2.9 oz) (05/18/23 0553)  Body mass index is 26.37 kg/m².   Adjusted BMI (if applicable)  "   BMI Category Overweight (25 - 29.9)       Admission Weight        Ideal Body Weight (IBW) 52.4 kg   Adjusted IBW (if applicable)        Usual Body Weight (UBW) See wt hx   Weight Change/Trend Stable       Weight History Wt Readings from Last 30 Encounters:   05/18/23 0553 65.4 kg (144 lb 2.9 oz)   05/17/23 1128 63 kg (139 lb)   05/16/23 0600 63.1 kg (139 lb 1.8 oz)   05/15/23 1301 66 kg (145 lb 8.1 oz)   05/11/23 0600 65.8 kg (145 lb 1 oz)   05/10/23 0414 65.8 kg (145 lb 1 oz)   05/09/23 0527 65.8 kg (145 lb 1 oz)   05/08/23 0500 66.9 kg (147 lb 7.8 oz)   05/07/23 0300 64 kg (141 lb)   05/06/23 0300 62 kg (136 lb 11 oz)   05/05/23 0300 65.8 kg (145 lb)   05/04/23 0500 66.2 kg (145 lb 15.1 oz)   05/03/23 0917 63.5 kg (139 lb 15.9 oz)   05/02/23 1900 61.2 kg (135 lb)   04/24/23 1356 61.7 kg (136 lb)   04/10/23 0657 61.7 kg (136 lb)   04/03/23 1134 63.5 kg (140 lb)   01/24/23 0957 67.3 kg (148 lb 6.4 oz)   01/09/23 0854 67 kg (147 lb 12.8 oz)   10/11/22 0928 68.5 kg (151 lb)   10/05/22 0920 67.6 kg (149 lb)   08/30/22 1324 67.6 kg (149 lb)   06/06/22 1343 67.1 kg (148 lb)   01/11/22 1743 67.1 kg (148 lb)   10/07/21 0923 67.4 kg (148 lb 9.6 oz)   09/03/20 0941 67.1 kg (148 lb)   05/13/20 1049 67 kg (147 lb 11.3 oz)   04/30/20 1219 67 kg (147 lb 12.8 oz)   10/24/19 0840 65.6 kg (144 lb 9.6 oz)   05/10/19 0419 66 kg (145 lb 8 oz)   04/18/19 0844 64.5 kg (142 lb 3.2 oz)   10/18/18 0803 65.3 kg (144 lb)   04/18/18 0911 68.5 kg (151 lb)   10/18/17 0851 68.1 kg (150 lb 3.2 oz)   04/18/17 0758 68.5 kg (151 lb)   10/03/16 0907 69.4 kg (153 lb)   04/05/16 1023 67.6 kg (149 lb)   10/06/15 1058 68 kg (150 lb)   07/22/15 1337 68 kg (150 lb)   06/23/15 1303 68.5 kg (150 lb 15.9 oz)           --  Tests/Procedures        Tests/Procedures ELIGIO     Labs       Pertinent Labs    Results from last 7 days   Lab Units 05/18/23  0628 05/16/23  0340 05/15/23  0309   SODIUM mmol/L 139 135* 136   POTASSIUM mmol/L 3.8 3.9 3.8   CHLORIDE mmol/L  104 101 102   CO2 mmol/L 31.0* 31.0* 29.9*   BUN mg/dL 19 13 13   CREATININE mg/dL 0.19* 0.22* 0.21*   CALCIUM mg/dL 8.4* 8.3* 8.3*   GLUCOSE mg/dL 100* 80 101*     Results from last 7 days   Lab Units 05/18/23  0628 05/16/23  0340 05/15/23  0309   MAGNESIUM mg/dL  --   --  1.8   PHOSPHORUS mg/dL 2.9   < > 2.5   HEMOGLOBIN g/dL 10.0*   < > 10.0*   HEMATOCRIT % 29.9*   < > 30.1*   WBC 10*3/mm3 16.03*   < > 16.60*   TRIGLYCERIDES mg/dL  --   --  68   ALBUMIN g/dL 2.1*   < > 1.8*    < > = values in this interval not displayed.     Results from last 7 days   Lab Units 05/18/23  0628 05/16/23  0340 05/15/23  0309 05/14/23  0528 05/13/23  0431   PLATELETS 10*3/mm3 387 365 333 388 390     COVID19   Date Value Ref Range Status   05/02/2023 Not Detected Not Detected - Ref. Range Final     Lab Results   Component Value Date    HGBA1C 6.20 (H) 05/15/2023          Medications           Scheduled Medications aspirin, 81 mg, Nasogastric, Daily   Or  aspirin, 300 mg, Rectal, Daily  atorvastatin, 80 mg, Oral, Nightly  citalopram, 20 mg, Oral, Daily  clopidogrel, 75 mg, Nasogastric, Daily  doxycycline, 100 mg, Oral, Q12H  fluticasone, 1 spray, Each Nare, Daily  guaiFENesin, 600 mg, Oral, BID  hydrocortisone-bacitracin-zinc oxide-nystatin, 1 application, Topical, BID  ipratropium-albuterol, 3 mL, Nebulization, 4x Daily - RT  Menthol-Zinc Oxide, 1 application, Topical, 4x Daily  meropenem, 1 g, Intravenous, Q8H  metoprolol tartrate, 12.5 mg, Oral, Q12H  sodium chloride, 10 mL, Intravenous, Q12H  cyanocobalamin, 1,000 mcg, Oral, Daily       Infusions     PRN Medications •  acetaminophen  •  bismuth subsalicylate  •  labetalol  •  Magnesium Cardiology Dose Replacement - Follow Nurse / BPA Driven Protocol  •  Phosphorus Replacement - Follow Nurse / BPA Driven Protocol  •  Potassium Replacement - Follow Nurse / BPA Driven Protocol  •  sodium chloride  •  sodium chloride     Physical Findings          Physical Appearance alert, oriented,  on oxygen therapy    Oral/Mouth Cavity WNL   Edema  1+ (trace)   Gastrointestinal last bowel movement: 5/17   Skin  excoriation, pressure injury R Gluteal area DT   Tubes/Drains/Lines none   NFPE No clinical signs of muscle wasting or fat loss   -  Estimated/Assessed Needs       Energy Requirements    Weight for Calculation 65.8 kg   Method for Estimation  30 kcal/kg   EST Needs (kcal/day) 1974       Protein Requirements    Weight for Calculation 65.8 kg   EST Protein Needs (g/kg) 1.0 - 1.2 gm/kg   EST Daily Needs (g/day) 65-78       Fluid Requirements     Method for Estimation 1 mL/kcal    Estimated Needs (mL/day)    -  Current Nutrition Orders & Evaluation of Intake       Oral Nutrition     Food Allergies NKFA   Current PO Diet Diet: Regular/House Diet; Texture: Soft to Chew (NDD 3); Soft to Chew: Ground Meat; Fluid Consistency: Thin (IDDSI 0)   Supplement n/a   PO Evaluation     % PO Intake 25-75% per pt    # of Days Evaluated 1   --  PES STATEMENT / NUTRITION DIAGNOSIS      Nutrition Dx Problem  Problem: Predicted Suboptimal Intake  Etiology: Medical Diagnosis and Factors Affecting Nutrition decrease appetite, CVA, Dysphagia  Signs/Symptoms: Report/Observation    Comment:    --  NUTRITION INTERVENTION / PLAN OF CARE      Intervention Goal(s) Maintain nutrition status, Reduce/improve symptoms, Meet estimated needs, Initiate feeding/diet, Tolerate PO , Maintain weight and PO intake goal %: 75         RD Intervention/Action Await begin PO diet, Follow Tx Progress and Care plan reviewed     --      Monitor/Evaluation Per protocol   Discharge Plan/Needs Pending clinical course   Education Will instruct as appropriate   --    RD to follow per protocol.      Electronically signed by:  Elsy Rodriguez RD  05/18/23 15:01 EDT

## 2023-05-18 NOTE — PROGRESS NOTES
"      Post PULMONARY CARE         Dr Hull Sayied   LOS: 16 days   Patient Care Team:  Zelalem Flor APRN as PCP - General (Internal Medicine)  Brandon Mitchell MD as Consulting Physician (Orthopedic Surgery)    Chief Complaint: Pneumonia with lung abscess pleural effusion right MCA stroke    Interval History: Remains on 2 L oxygen nasal cannula sats 96% on the monitor.  I weaned her down to 1 L and she is currently tolerating well.    REVIEW OF SYSTEMS:   CARDIOVASCULAR: No chest pain, chest pressure or chest discomfort. No palpitations or edema.   RESPIRATORY: Short of breath with activity  GASTROINTESTINAL: No anorexia, nausea, vomiting or diarrhea. No abdominal pain or blood.   HEMATOLOGIC: No bleeding or bruising.     Ventilator/Non-Invasive Ventilation Settings (From admission, onward)    None            Vital Signs  Temp:  [97.6 °F (36.4 °C)-98.3 °F (36.8 °C)] 98.3 °F (36.8 °C)  Heart Rate:  [] 84  Resp:  [16-18] 18  BP: (111-144)/(54-90) 137/90    Intake/Output Summary (Last 24 hours) at 5/18/2023 1402  Last data filed at 5/18/2023 0423  Gross per 24 hour   Intake 200 ml   Output 500 ml   Net -300 ml     Flowsheet Rows    Flowsheet Row First Filed Value   Admission Height 160 cm (63\") Documented at 05/02/2023 1900   Admission Weight 61.2 kg (135 lb) Documented at 05/02/2023 1900          Physical Exam:  Patient is examined using the personal protective equipment as per guidelines from infection control for this particular patient as enacted.  Hand hygiene was performed before and after patient interaction.   General Appearance:    Alert, cooperative, in no acute distress.  Following simple commands  ENT Mallampati between 3 and 4 no nasal congestion  Neck midline trachea, no thyromegaly   Lungs:    Diminished breath sounds with crackles diffuse right lower lobe    Heart:    Regular rhythm and normal rate, normal S1 and S2, no            murmur, no gallop, no rub, no click   Chest Wall:    " No abnormalities observed   Abdomen:     Normal bowel sounds, no masses, no organomegaly, soft        nontender, nondistended, no guarding, no rebound                tenderness   Extremities:   Moves all extremities well, no edema, no cyanosis, no             redness  CNS left-sided weakness  Skin no rashes no nodules  Musculoskeletal no cyanosis no clubbing normal range of motion                Results Review:        Results from last 7 days   Lab Units 05/18/23  0628 05/16/23  0340 05/15/23  0309   SODIUM mmol/L 139 135* 136   POTASSIUM mmol/L 3.8 3.9 3.8   CHLORIDE mmol/L 104 101 102   CO2 mmol/L 31.0* 31.0* 29.9*   BUN mg/dL 19 13 13   CREATININE mg/dL 0.19* 0.22* 0.21*   GLUCOSE mg/dL 100* 80 101*   CALCIUM mg/dL 8.4* 8.3* 8.3*         Results from last 7 days   Lab Units 05/18/23  0628 05/16/23  0340 05/15/23  0309   WBC 10*3/mm3 16.03* 11.81* 16.60*   HEMOGLOBIN g/dL 10.0* 10.0* 10.0*   HEMATOCRIT % 29.9* 29.0* 30.1*   PLATELETS 10*3/mm3 387 365 333         Results from last 7 days   Lab Units 05/15/23  0309   CHOLESTEROL mg/dL 94     Results from last 7 days   Lab Units 05/15/23  0309   MAGNESIUM mg/dL 1.8     Results from last 7 days   Lab Units 05/15/23  0309   CHOLESTEROL mg/dL 94   TRIGLYCERIDES mg/dL 68   HDL CHOL mg/dL 32*   LDL CHOL mg/dL 47           I reviewed the patient's new clinical results.  I personally viewed and interpreted the patient's chest x-ray.        Medication Review:   aspirin, 81 mg, Nasogastric, Daily   Or  aspirin, 300 mg, Rectal, Daily  atorvastatin, 80 mg, Oral, Nightly  citalopram, 20 mg, Oral, Daily  clopidogrel, 75 mg, Nasogastric, Daily  doxycycline, 100 mg, Oral, Q12H  fluticasone, 1 spray, Each Nare, Daily  guaiFENesin, 600 mg, Oral, BID  hydrocortisone-bacitracin-zinc oxide-nystatin, 1 application, Topical, BID  ipratropium-albuterol, 3 mL, Nebulization, 4x Daily - RT  Menthol-Zinc Oxide, 1 application, Topical, 4x Daily  meropenem, 1 g, Intravenous, Q8H  metoprolol  tartrate, 12.5 mg, Oral, Q12H  sodium chloride, 10 mL, Intravenous, Q12H  cyanocobalamin, 1,000 mcg, Oral, Daily             ASSESSMENT:   Acute right MCA stroke now status post TNK  Bilateral strokes on MRI  Concern for airway protection and dysphagia  Acute hypoxic respiratory failure with worsening  Right lower lobe pneumonia and lung abscess with ongoing antibiotics  Right pleural effusion without evidence for empyema  New right lower lobe 2.4 cm lung nodule on CT 4/3/2023  COPD, currently not wheezing  Current cigarette smoker  Acute hyponatremia  Anemia    PLAN:  Management acute CVA per neurology.  ELIGIO did not show any cardiac source for embolus.  Plans for Zio patch per cardiology.  Antibiotics per infectious diseases.  Recommendations noted with 4-week course with stop date 6/8/2023.  The regimen will be meropenem and doxycycline and plans for repeat CT chest just prior to the stop date.  Clinically respiratory status stable and improving  Oxygen weaned down to maintain sats above 90%.  Down to 1 L oxygen nasal cannula  Mobilize ambulate  Diet per speech  Watch for aspiration  Bronchodilators for COPD  Discussed plan of care in detail with  and patient at bedside in detail      Della Andrew MD  05/18/23  14:02 EDT

## 2023-05-18 NOTE — PROGRESS NOTES
[facility]  Therapies noted. Spoke with spouse yesterday regarding dc plan. He is in the process of applying for FMLA. Discussed with Dr. Kaba (on call for Dr. Collins) who has accepted. Will initiate precert with Roddy Harris RN  Acute Rehab Admission Nurse

## 2023-05-18 NOTE — PLAN OF CARE
Goal Outcome Evaluation:  Plan of Care Reviewed With: patient        Progress: no change  Outcome Evaluation: Pt seen by PT for tx this AM. Pt sat up to EOB w/ improved SV. Pt reported beg to have BM w/ noted leak to mattress after standing. Pt req min A x 2 w/ use of fww for STS. Pt returned to bed w/ min A for B LE. Aide notified of pt need for brief change. PT will prog as pt rosalia. Rec IPR vs SNF based on progress.

## 2023-05-18 NOTE — THERAPY TREATMENT NOTE
Patient Name: Katherine Williamson  : 1950    MRN: [id number]                              Today's Date: 2023       Admit Date: 2023    Visit Dx:     ICD-10-CM ICD-9-CM   1. Acute respiratory failure with hypoxia  J96.01 518.81   2. Sepsis, due to unspecified organism, unspecified whether acute organ dysfunction present  A41.9 038.9     995.91   3. Community acquired pneumonia, unspecified laterality  J18.9 486   4. Abnormal CXR  R93.89 793.2   5. Hyperglycemia  R73.9 790.29   6. 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   • History of repair of hip joint   • History of operative procedure on hip   • Hyperglycemia   • Hepatic steatosis   • Diverticulosis   • Chest pain, atypical   • History of colon polyps   • Family history of colon cancer in mother   • Allergic rhinitis   • Lung nodule seen on imaging study   • Acute respiratory failure with hypoxia   • Abscess of right lung with pneumonia   • Empyema   • Sepsis   • Pleural effusion, right   • Other emphysema   • Pulmonary nodule (RLL)   • Diastolic dysfunction, grade 1   • Physical debility   • Cryptogenic stroke     Past Medical History:   Diagnosis Date   • Arthritis    • Cataract    • Colon polyps     FOLLOWED BY DR. JOSEPH LAU   • Hypertension      Past Surgical History:   Procedure Laterality Date   • COLONOSCOPY  10/2015    Meemd-[id number]-Dn. Kaplan.  Follow-up in 5 years.   • COLONOSCOPY N/A 2022    2 BENIGN POLYPS IN DESCENDING, 5 MM BENIGN POLYP IN SIGMOID, MULTIPLE SMALL AND LARGE DIVERTICULA IN SIGMOID, RESCOPE IN 3 YRS, DR. JOSEPH LAU AT [facility]   • EYE SURGERY     • JOINT REPLACEMENT  ?    Left total hip replacement in .  In 2015 patient had  left hip revision   • TONSILLECTOMY        General Information     Row Name 05/18/23 0939          Physical Therapy Time and Intention    Document Type therapy note (daily note)  -     Mode of Treatment physical therapy  -     Row Name 05/18/23 0939          General Information    Existing Precautions/Restrictions fall;oxygen therapy device and L/min  -     Row Name 05/18/23 0939          Cognition    Orientation Status (Cognition) oriented x 4  -     Row Name 05/18/23 0939          Safety Issues, Functional Mobility    Impairments Affecting Function (Mobility) balance;endurance/activity tolerance;range of motion (ROM)  -     Comment, Safety Issues/Impairments (Mobility) gt belt and non skid socks donned  -           User Key  (r) = Recorded By, (t) = Taken By, (c) = Cosigned By    Initials Name Provider Type     Shanell Cruz PTA Physical Therapist Assistant               Mobility     Row Name 05/18/23 0939          Bed Mobility    Bed Mobility supine-sit  -     Supine-Sit Dawson (Bed Mobility) supervision;verbal cues  -     Sit-Supine Dawson (Bed Mobility) minimum assist (75% patient effort);verbal cues;nonverbal cues (demo/gesture)  -     Comment, (Bed Mobility) extra time to sit up to EOB; incr time EOB w/ MD in room; Assist for B LE to bed  -     Row Name 05/18/23 0939          Transfers    Comment, (Transfers) pt reported beg to have BM when standing w/ pt returned to bed after noted BM on bed.  -     Row Name 05/18/23 0939          Sit-Stand Transfer    Sit-Stand Dawson (Transfers) minimum assist (75% patient effort);2 person assist;verbal cues;nonverbal cues (demo/gesture)  -     Assistive Device (Sit-Stand Transfers) walker, front-wheeled  -           User Key  (r) = Recorded By, (t) = Taken By, (c) = Cosigned By    Initials Name Provider Type     Shanell Cruz PTA Physical Therapist Assistant               Obj/Interventions     Row Name  05/18/23 0941          Balance    Balance Assessment sitting static balance;standing static balance  -PH     Static Sitting Balance standby assist  -PH     Static Standing Balance contact guard;2-person assist;minimal assist  -PH     Position/Device Used, Standing Balance walker, front-wheeled  -PH           User Key  (r) = Recorded By, (t) = Taken By, (c) = Cosigned By    Initials Name Provider Type     Shanell Cruz PTA Physical Therapist Assistant               Goals/Plan    No documentation.                Clinical Impression     Row Name 05/18/23 0942          Pain    Pretreatment Pain Rating 0/10 - no pain  -PH     Posttreatment Pain Rating 0/10 - no pain  -PH     Pain Intervention(s) Repositioned;Rest  -PH     Row Name 05/18/23 0942          Plan of Care Review    Plan of Care Reviewed With patient  -PH     Progress no change  -PH     Outcome Evaluation Pt seen by PT for tx this AM. Pt sat up to EOB w/ improved SV. Pt reported beg to have BM w/ noted leak to mattress after standing. Pt req min A x 2 w/ use of fww for STS. Pt returned to bed w/ min A for B LE. Aide notified of pt need for brief change. PT will prog as pt rosalia. Rec IPR vs SNF based on progress.  -PH     Row Name 05/18/23 0942          Vital Signs    O2 Delivery Pre Treatment supplemental O2  -PH     O2 Delivery Intra Treatment supplemental O2  -PH     O2 Delivery Post Treatment supplemental O2  -PH     Row Name 05/18/23 0942          Positioning and Restraints    Pre-Treatment Position in bed  -PH     Post Treatment Position bed  -PH     In Bed fowlers;call light within reach;encouraged to call for assist;exit alarm on;notified nsg  -PH           User Key  (r) = Recorded By, (t) = Taken By, (c) = Cosigned By    Initials Name Provider Type     Shanell Cruz PTA Physical Therapist Assistant               Outcome Measures     Row Name 05/18/23 0944          How much help from another person do you currently need...     Turning from your back to your side while in flat bed without using bedrails? 4  -PH     Moving from lying on back to sitting on the side of a flat bed without bedrails? 3  -PH     Moving to and from a bed to a chair (including a wheelchair)? 2  -PH     Standing up from a chair using your arms (e.g., wheelchair, bedside chair)? 2  -PH     Climbing 3-5 steps with a railing? 1  -PH     To walk in hospital room? 2  -PH     AM-PAC 6 Clicks Score (PT) 14  -PH     Highest level of mobility 4 --> Transferred to chair/commode  -PH     Row Name 05/18/23 0944          Functional Assessment    Outcome Measure Options AM-PAC 6 Clicks Basic Mobility (PT)  -PH           User Key  (r) = Recorded By, (t) = Taken By, (c) = Cosigned By    Initials Name Provider Type    PH Shanell Cruz PTA Physical Therapist Assistant                             Physical Therapy Education     Title: PT OT SLP Therapies (Done)     Topic: Physical Therapy (Done)     Point: Mobility training (Done)     Learning Progress Summary           Patient Acceptance, E,TB, VU,DU,NR by  at 5/18/2023 0945    Acceptance, E,TB, VU,NR by  at 5/17/2023 1201    Acceptance, E,TB, VU by XO at 5/14/2023 0953    Acceptance, E,TB, VU by XO at 5/13/2023 1528    Acceptance, E,TB,D, VU,NR by  at 5/13/2023 1156    Acceptance, E,TB, VU,NR by ST at 5/12/2023 1141                   Point: Home exercise program (Done)     Learning Progress Summary           Patient Acceptance, E,TB, VU by XO at 5/14/2023 0953    Acceptance, E,TB, VU by XO at 5/13/2023 1528    Acceptance, E,TB,D, VU,NR by  at 5/13/2023 1156    Acceptance, E,TB, VU,NR by ST at 5/12/2023 1141                   Point: Body mechanics (Done)     Learning Progress Summary           Patient Acceptance, E,TB, VU,DU,NR by  at 5/18/2023 0945    Acceptance, E,TB, VU,NR by  at 5/17/2023 1201    Acceptance, E,TB, VU by XO at 5/14/2023 0953    Acceptance, E,TB, VU by XO at 5/13/2023 1528    Acceptance,  E,TB,D, VU,NR by  at 5/13/2023 1156    Acceptance, E,TB, VU,NR by ST at 5/12/2023 1141                   Point: Precautions (Done)     Learning Progress Summary           Patient Acceptance, E,TB, VU,DU,NR by  at 5/18/2023 0945    Acceptance, E,TB, VU,NR by PH at 5/17/2023 1201    Acceptance, E,TB, VU by XO at 5/14/2023 0953    Acceptance, E,TB, VU by XO at 5/13/2023 1528    Acceptance, E,TB,D, VU,NR by  at 5/13/2023 1156                               User Key     Initials Effective Dates Name Provider Type Discipline     03/07/18 -  Perla Ruvalcaba PTA Physical Therapist Assistant PT    PH 06/16/21 -  Shanell Cruz PTA Physical Therapist Assistant PT    XO 09/22/22 -  Marley Cruz RN Registered Nurse Nurse     09/22/22 -  Verena Hess PT Physical Therapist PT              PT Recommendation and Plan     Plan of Care Reviewed With: patient  Progress: no change  Outcome Evaluation: Pt seen by PT for tx this AM. Pt sat up to EOB w/ improved SV. Pt reported beg to have BM w/ noted leak to mattress after standing. Pt req min A x 2 w/ use of fww for STS. Pt returned to bed w/ min A for B LE. Aide notified of pt need for brief change. PT will prog as pt rosalia. Rec IPR vs SNF based on progress.     Time Calculation:    PT Charges     Row Name 05/18/23 0945             Time Calculation    Start Time 0906  -PH      Stop Time 0921  -PH      Time Calculation (min) 15 min  -PH      PT Received On 05/18/23  -PH      PT - Next Appointment 05/19/23  -PH         Timed Charges    89975 - PT Therapeutic Activity Minutes 15  -PH         Total Minutes    Timed Charges Total Minutes 15  -PH       Total Minutes 15  -PH            User Key  (r) = Recorded By, (t) = Taken By, (c) = Cosigned By    Initials Name Provider Type     Shanell Cruz PTA Physical Therapist Assistant              Therapy Charges for Today     Code Description Service Date Service Provider Modifiers Qty    18524642953   PT THERAPEUTIC ACT EA 15 MIN 5/17/2023 Shanell Cruz, PTA GP 2    15635816451 HC PT THERAPEUTIC ACT EA 15 MIN 5/18/2023 Shanell Cruz, PTA GP 1    12454872994 HC PT THER SUPP EA 15 MIN 5/18/2023 AnkitdalilaShanell covington, PTA GP 1          PT G-Codes  Outcome Measure Options: AM-PAC 6 Clicks Basic Mobility (PT)  AM-PAC 6 Clicks Score (PT): 14  AM-PAC 6 Clicks Score (OT): 13  Modified [place] Scale: 4 - Moderately severe disability.  Unable to walk without assistance, and unable to attend to own bodily needs without assistance.  PT Discharge Summary  Anticipated Discharge Disposition (PT): inpatient rehabilitation facility, skilled nursing facility    Shanell RACHEAL Cruz  5/18/2023

## 2023-05-19 ENCOUNTER — APPOINTMENT (OUTPATIENT)
Dept: CARDIOLOGY | Facility: HOSPITAL | Age: 73
DRG: 871 | End: 2023-05-19
Payer: COMMERCIAL

## 2023-05-19 ENCOUNTER — HOSPITAL ENCOUNTER (INPATIENT)
Facility: HOSPITAL | Age: 73
DRG: 056 | End: 2023-05-19
Attending: PHYSICAL MEDICINE & REHABILITATION | Admitting: PHYSICAL MEDICINE & REHABILITATION
Payer: COMMERCIAL

## 2023-05-19 VITALS
OXYGEN SATURATION: 95 % | HEIGHT: 62 IN | RESPIRATION RATE: 20 BRPM | HEART RATE: 92 BPM | SYSTOLIC BLOOD PRESSURE: 117 MMHG | BODY MASS INDEX: 27.34 KG/M2 | TEMPERATURE: 97.8 F | WEIGHT: 148.59 LBS | DIASTOLIC BLOOD PRESSURE: 65 MMHG

## 2023-05-19 DIAGNOSIS — J90 PLEURAL EFFUSION, RIGHT: ICD-10-CM

## 2023-05-19 DIAGNOSIS — Z74.09 DECREASED FUNCTIONAL MOBILITY AND ENDURANCE: Primary | ICD-10-CM

## 2023-05-19 DIAGNOSIS — J85.1 ABSCESS OF LOWER LOBE OF RIGHT LUNG WITH PNEUMONIA: ICD-10-CM

## 2023-05-19 PROBLEM — I63.511 ACUTE RIGHT MCA STROKE: Status: ACTIVE | Noted: 2023-05-19

## 2023-05-19 LAB
ALBUMIN SERPL-MCNC: 1.9 G/DL (ref 3.5–5.2)
ANION GAP SERPL CALCULATED.3IONS-SCNC: 4.5 MMOL/L (ref 5–15)
BACTERIA SPEC AEROBE CULT: NORMAL
BUN SERPL-MCNC: 12 MG/DL (ref 8–23)
BUN/CREAT SERPL: 52.2 (ref 7–25)
CALCIUM SPEC-SCNC: 8.5 MG/DL (ref 8.6–10.5)
CHLORIDE SERPL-SCNC: 104 MMOL/L (ref 98–107)
CO2 SERPL-SCNC: 30.5 MMOL/L (ref 22–29)
CREAT SERPL-MCNC: 0.23 MG/DL (ref 0.57–1)
DEPRECATED RDW RBC AUTO: 43.3 FL (ref 37–54)
EGFRCR SERPLBLD CKD-EPI 2021: 121.8 ML/MIN/1.73
ERYTHROCYTE [DISTWIDTH] IN BLOOD BY AUTOMATED COUNT: 13.4 % (ref 12.3–15.4)
GLUCOSE SERPL-MCNC: 85 MG/DL (ref 65–99)
HCT VFR BLD AUTO: 29.5 % (ref 34–46.6)
HGB BLD-MCNC: 10 G/DL (ref 12–15.9)
MAXIMAL PREDICTED HEART RATE: 150 BPM
MCH RBC QN AUTO: 29.8 PG (ref 26.6–33)
MCHC RBC AUTO-ENTMCNC: 33.9 G/DL (ref 31.5–35.7)
MCV RBC AUTO: 87.8 FL (ref 79–97)
PHOSPHATE SERPL-MCNC: 2.6 MG/DL (ref 2.5–4.5)
PLATELET # BLD AUTO: 351 10*3/MM3 (ref 140–450)
PMV BLD AUTO: 10.2 FL (ref 6–12)
POTASSIUM SERPL-SCNC: 4.1 MMOL/L (ref 3.5–5.2)
RBC # BLD AUTO: 3.36 10*6/MM3 (ref 3.77–5.28)
SODIUM SERPL-SCNC: 139 MMOL/L (ref 136–145)
STRESS TARGET HR: 128 BPM
WBC NRBC COR # BLD: 9.51 10*3/MM3 (ref 3.4–10.8)

## 2023-05-19 PROCEDURE — 25010000002 ENOXAPARIN PER 10 MG: Performed by: PHYSICAL MEDICINE & REHABILITATION

## 2023-05-19 PROCEDURE — 93246 EXT ECG>7D<15D RECORDING: CPT

## 2023-05-19 PROCEDURE — 02HV33Z INSERTION OF INFUSION DEVICE INTO SUPERIOR VENA CAVA, PERCUTANEOUS APPROACH: ICD-10-PCS | Performed by: HOSPITALIST

## 2023-05-19 PROCEDURE — 94761 N-INVAS EAR/PLS OXIMETRY MLT: CPT

## 2023-05-19 PROCEDURE — 25010000002 MEROPENEM PER 100 MG: Performed by: HOSPITALIST

## 2023-05-19 PROCEDURE — 97530 THERAPEUTIC ACTIVITIES: CPT

## 2023-05-19 PROCEDURE — C1751 CATH, INF, PER/CENT/MIDLINE: HCPCS

## 2023-05-19 PROCEDURE — 94799 UNLISTED PULMONARY SVC/PX: CPT

## 2023-05-19 PROCEDURE — 85027 COMPLETE CBC AUTOMATED: CPT | Performed by: INTERNAL MEDICINE

## 2023-05-19 PROCEDURE — 94664 DEMO&/EVAL PT USE INHALER: CPT

## 2023-05-19 PROCEDURE — 97129 THER IVNTJ 1ST 15 MIN: CPT

## 2023-05-19 PROCEDURE — 25010000002 MEROPENEM PER 100 MG: Performed by: INTERNAL MEDICINE

## 2023-05-19 PROCEDURE — 80069 RENAL FUNCTION PANEL: CPT | Performed by: INTERNAL MEDICINE

## 2023-05-19 PROCEDURE — 97130 THER IVNTJ EA ADDL 15 MIN: CPT

## 2023-05-19 RX ORDER — SODIUM CHLORIDE 0.9 % (FLUSH) 0.9 %
10 SYRINGE (ML) INJECTION AS NEEDED
Status: DISCONTINUED | OUTPATIENT
Start: 2023-05-19 | End: 2023-06-09 | Stop reason: HOSPADM

## 2023-05-19 RX ORDER — ATORVASTATIN CALCIUM 80 MG/1
80 TABLET, FILM COATED ORAL NIGHTLY
Status: DISCONTINUED | OUTPATIENT
Start: 2023-05-19 | End: 2023-06-09 | Stop reason: HOSPADM

## 2023-05-19 RX ORDER — SODIUM CHLORIDE 0.9 % (FLUSH) 0.9 %
10 SYRINGE (ML) INJECTION AS NEEDED
Status: CANCELLED | OUTPATIENT
Start: 2023-05-19

## 2023-05-19 RX ORDER — FLUTICASONE PROPIONATE 50 MCG
1 SPRAY, SUSPENSION (ML) NASAL DAILY
Status: DISCONTINUED | OUTPATIENT
Start: 2023-05-20 | End: 2023-05-23

## 2023-05-19 RX ORDER — ACETAMINOPHEN 325 MG/1
650 TABLET ORAL EVERY 6 HOURS PRN
Status: DISCONTINUED | OUTPATIENT
Start: 2023-05-19 | End: 2023-06-09 | Stop reason: HOSPADM

## 2023-05-19 RX ORDER — IPRATROPIUM BROMIDE AND ALBUTEROL SULFATE 2.5; .5 MG/3ML; MG/3ML
3 SOLUTION RESPIRATORY (INHALATION) EVERY 4 HOURS PRN
Status: CANCELLED | OUTPATIENT
Start: 2023-05-19

## 2023-05-19 RX ORDER — SODIUM CHLORIDE 0.9 % (FLUSH) 0.9 %
10 SYRINGE (ML) INJECTION EVERY 12 HOURS SCHEDULED
Status: DISCONTINUED | OUTPATIENT
Start: 2023-05-19 | End: 2023-06-09 | Stop reason: HOSPADM

## 2023-05-19 RX ORDER — SODIUM CHLORIDE 0.9 % (FLUSH) 0.9 %
20 SYRINGE (ML) INJECTION AS NEEDED
Status: DISCONTINUED | OUTPATIENT
Start: 2023-05-19 | End: 2023-06-09 | Stop reason: HOSPADM

## 2023-05-19 RX ORDER — CITALOPRAM 20 MG/1
20 TABLET ORAL DAILY
Status: CANCELLED | OUTPATIENT
Start: 2023-05-20

## 2023-05-19 RX ORDER — SODIUM CHLORIDE 9 MG/ML
40 INJECTION, SOLUTION INTRAVENOUS AS NEEDED
Status: DISCONTINUED | OUTPATIENT
Start: 2023-05-19 | End: 2023-06-09 | Stop reason: HOSPADM

## 2023-05-19 RX ORDER — ACETAMINOPHEN 325 MG/1
650 TABLET ORAL EVERY 6 HOURS PRN
Status: CANCELLED | OUTPATIENT
Start: 2023-05-19

## 2023-05-19 RX ORDER — DOXYCYCLINE 100 MG/1
100 CAPSULE ORAL EVERY 12 HOURS SCHEDULED
Status: CANCELLED | OUTPATIENT
Start: 2023-05-19 | End: 2023-06-09

## 2023-05-19 RX ORDER — SODIUM CHLORIDE 0.9 % (FLUSH) 0.9 %
20 SYRINGE (ML) INJECTION AS NEEDED
Status: CANCELLED | OUTPATIENT
Start: 2023-05-19

## 2023-05-19 RX ORDER — CHOLECALCIFEROL (VITAMIN D3) 125 MCG
1000 CAPSULE ORAL DAILY
Status: CANCELLED | OUTPATIENT
Start: 2023-05-20

## 2023-05-19 RX ORDER — CITALOPRAM 20 MG/1
20 TABLET ORAL DAILY
Status: DISCONTINUED | OUTPATIENT
Start: 2023-05-20 | End: 2023-06-09 | Stop reason: HOSPADM

## 2023-05-19 RX ORDER — ATORVASTATIN CALCIUM 80 MG/1
80 TABLET, FILM COATED ORAL NIGHTLY
Status: CANCELLED | OUTPATIENT
Start: 2023-05-19

## 2023-05-19 RX ORDER — SODIUM CHLORIDE 0.9 % (FLUSH) 0.9 %
10 SYRINGE (ML) INJECTION EVERY 12 HOURS SCHEDULED
Status: CANCELLED | OUTPATIENT
Start: 2023-05-19

## 2023-05-19 RX ORDER — ENOXAPARIN SODIUM 100 MG/ML
40 INJECTION SUBCUTANEOUS EVERY 24 HOURS
Status: DISCONTINUED | OUTPATIENT
Start: 2023-05-19 | End: 2023-06-03

## 2023-05-19 RX ORDER — CLOPIDOGREL BISULFATE 75 MG/1
75 TABLET ORAL DAILY
Status: DISCONTINUED | OUTPATIENT
Start: 2023-05-20 | End: 2023-06-09 | Stop reason: HOSPADM

## 2023-05-19 RX ORDER — GUAIFENESIN 600 MG/1
600 TABLET, EXTENDED RELEASE ORAL 2 TIMES DAILY
Status: CANCELLED | OUTPATIENT
Start: 2023-05-19

## 2023-05-19 RX ORDER — CHOLECALCIFEROL (VITAMIN D3) 125 MCG
1000 CAPSULE ORAL DAILY
Status: DISCONTINUED | OUTPATIENT
Start: 2023-05-20 | End: 2023-06-09 | Stop reason: HOSPADM

## 2023-05-19 RX ORDER — GUAIFENESIN 600 MG/1
600 TABLET, EXTENDED RELEASE ORAL 2 TIMES DAILY
Status: DISCONTINUED | OUTPATIENT
Start: 2023-05-19 | End: 2023-06-09 | Stop reason: HOSPADM

## 2023-05-19 RX ORDER — IPRATROPIUM BROMIDE AND ALBUTEROL SULFATE 2.5; .5 MG/3ML; MG/3ML
3 SOLUTION RESPIRATORY (INHALATION) EVERY 4 HOURS PRN
Status: DISCONTINUED | OUTPATIENT
Start: 2023-05-19 | End: 2023-06-09

## 2023-05-19 RX ORDER — DOXYCYCLINE 100 MG/1
100 CAPSULE ORAL EVERY 12 HOURS SCHEDULED
Status: DISCONTINUED | OUTPATIENT
Start: 2023-05-19 | End: 2023-06-09 | Stop reason: HOSPADM

## 2023-05-19 RX ORDER — ASPIRIN 81 MG/1
81 TABLET, CHEWABLE ORAL EVERY 24 HOURS
Status: DISCONTINUED | OUTPATIENT
Start: 2023-05-20 | End: 2023-06-09 | Stop reason: HOSPADM

## 2023-05-19 RX ORDER — CLOPIDOGREL BISULFATE 75 MG/1
75 TABLET ORAL DAILY
Status: CANCELLED | OUTPATIENT
Start: 2023-05-20

## 2023-05-19 RX ORDER — FLUTICASONE PROPIONATE 50 MCG
1 SPRAY, SUSPENSION (ML) NASAL DAILY
Status: CANCELLED | OUTPATIENT
Start: 2023-05-20

## 2023-05-19 RX ORDER — SODIUM CHLORIDE 9 MG/ML
40 INJECTION, SOLUTION INTRAVENOUS AS NEEDED
Status: CANCELLED | OUTPATIENT
Start: 2023-05-19

## 2023-05-19 RX ADMIN — IPRATROPIUM BROMIDE AND ALBUTEROL SULFATE 3 ML: 2.5; .5 SOLUTION RESPIRATORY (INHALATION) at 14:52

## 2023-05-19 RX ADMIN — Medication 10 ML: at 20:56

## 2023-05-19 RX ADMIN — CLOPIDOGREL BISULFATE 75 MG: 75 TABLET, FILM COATED ORAL at 09:53

## 2023-05-19 RX ADMIN — ASPIRIN 81 MG: 81 TABLET, CHEWABLE ORAL at 09:53

## 2023-05-19 RX ADMIN — ZINC OXIDE 1 APPLICATION: 200 OINTMENT TOPICAL at 21:08

## 2023-05-19 RX ADMIN — MEROPENEM 1 G: 1 INJECTION, POWDER, FOR SOLUTION INTRAVENOUS at 12:47

## 2023-05-19 RX ADMIN — Medication 10 ML: at 09:54

## 2023-05-19 RX ADMIN — IPRATROPIUM BROMIDE AND ALBUTEROL SULFATE 3 ML: 2.5; .5 SOLUTION RESPIRATORY (INHALATION) at 11:09

## 2023-05-19 RX ADMIN — MEROPENEM 1 G: 1 INJECTION, POWDER, FOR SOLUTION INTRAVENOUS at 03:41

## 2023-05-19 RX ADMIN — ATORVASTATIN CALCIUM 80 MG: 80 TABLET, FILM COATED ORAL at 21:06

## 2023-05-19 RX ADMIN — DOXYCYCLINE 100 MG: 100 CAPSULE ORAL at 09:53

## 2023-05-19 RX ADMIN — METOPROLOL TARTRATE 12.5 MG: 25 TABLET, FILM COATED ORAL at 21:06

## 2023-05-19 RX ADMIN — ENOXAPARIN SODIUM 40 MG: 100 INJECTION SUBCUTANEOUS at 21:08

## 2023-05-19 RX ADMIN — GUAIFENESIN 600 MG: 600 TABLET, EXTENDED RELEASE ORAL at 21:06

## 2023-05-19 RX ADMIN — ZINC OXIDE 1 APPLICATION: 200 OINTMENT TOPICAL at 09:54

## 2023-05-19 RX ADMIN — DOXYCYCLINE 100 MG: 100 CAPSULE ORAL at 21:06

## 2023-05-19 RX ADMIN — CITALOPRAM 20 MG: 20 TABLET, FILM COATED ORAL at 09:53

## 2023-05-19 RX ADMIN — METOPROLOL TARTRATE 12.5 MG: 25 TABLET, FILM COATED ORAL at 09:53

## 2023-05-19 RX ADMIN — ANORECTAL OINTMENT 1 APPLICATION: 15.7; .44; 24; 20.6 OINTMENT TOPICAL at 12:47

## 2023-05-19 RX ADMIN — MEROPENEM 1 G: 1 INJECTION, POWDER, FOR SOLUTION INTRAVENOUS at 20:55

## 2023-05-19 RX ADMIN — ANORECTAL OINTMENT 1 APPLICATION: 15.7; .44; 24; 20.6 OINTMENT TOPICAL at 09:54

## 2023-05-19 RX ADMIN — GUAIFENESIN 600 MG: 600 TABLET, EXTENDED RELEASE ORAL at 09:53

## 2023-05-19 RX ADMIN — Medication 1000 MCG: at 09:53

## 2023-05-19 NOTE — PROGRESS NOTES
Name: Katherine Williamson ADMIT: 2023   : 1950  PCP: Zelalem Flor APRN    MRN: [id number] LOS: 17 days   AGE/SEX: 72 y.o. female  ROOM: [facility]     Subjective   Subjective   No new complaints.  PICC line placed earlier today.      Review of Systems     Objective   Objective   Vital Signs  Temp:  [97.5 °F (36.4 °C)-98.3 °F (36.8 °C)] 98 °F (36.7 °C)  Heart Rate:  [73-98] 76  Resp:  [16-18] 16  BP: (122-137)/(62-90) 136/72  SpO2:  [94 %-98 %] 96 %  on  Flow (L/min):  [1-3] 1;   Device (Oxygen Therapy): humidified;nasal cannula  Body mass index is 27.18 kg/m².  Physical Exam  Vitals and nursing note reviewed.   Constitutional:       General: She is not in acute distress.     Appearance: She is ill-appearing.   Cardiovascular:      Rate and Rhythm: Normal rate and regular rhythm.   Pulmonary:      Effort: Pulmonary effort is normal.      Breath sounds: Decreased breath sounds present.   Abdominal:      General: Bowel sounds are normal.      Palpations: Abdomen is soft.      Tenderness: There is no abdominal tenderness.   Musculoskeletal:         General: No swelling.      Comments: PICC line present RUE   Skin:     General: Skin is warm and dry.   Neurological:      Mental Status: She is alert. Mental status is at baseline.      Comments: Mild left-sided weakness   Psychiatric:         Cognition and Memory: Memory is impaired.       Results Review     I reviewed the patient's new clinical results.  Results from last 7 days   Lab Units 230 05/15/23  0309   WBC 10*3/mm3 9.51 16.03* 11.81* 16.60*   HEMOGLOBIN g/dL 10.0* 10.0* 10.0* 10.0*   PLATELETS 10*3/mm3 351 387 365 333     Results from last 7 days   Lab Units 23  0340 05/15/23  0309   SODIUM mmol/L 139 139 135* 136   POTASSIUM mmol/L 4.1 3.8 3.9 3.8   CHLORIDE mmol/L 104 104 101 102   CO2 mmol/L 30.5* 31.0* 31.0* 29.9*   BUN mg/dL 12 19 13 13   CREATININE mg/dL 0.23* 0.19* 0.22*  0.21*   GLUCOSE mg/dL 85 100* 80 101*   EGFR mL/min/1.73 121.8 127.6 123.2 124.5     Results from last 7 days   Lab Units 05/19/23  0425 05/18/23  0628 05/16/23  0340 05/15/23  0309   ALBUMIN g/dL 1.9* 2.1* 2.1* 1.8*     Results from last 7 days   Lab Units 05/19/23  0425 05/18/23  0628 05/16/23  0340 05/15/23  0309   CALCIUM mg/dL 8.5* 8.4* 8.3* 8.3*   ALBUMIN g/dL 1.9* 2.1* 2.1* 1.8*   MAGNESIUM mg/dL  --   --   --  1.8   PHOSPHORUS mg/dL 2.6 2.9 2.4* 2.5       Glucose   Date/Time Value Ref Range Status   05/17/2023 1203 94 70 - 130 mg/dL Final     Comment:     Meter: [id number] : [id number] Meme Conte NA   05/17/2023 0234 114 70 - 130 mg/dL Final     Comment:     Meter: [id number] : [id number] Ramya HORN   05/17/2023 0050 122 70 - 130 mg/dL Final     Comment:     Meter: [id number] : rell Mueller RN   05/16/2023 1734 116 70 - 130 mg/dL Final     Comment:     Meter: [id number] : [id number] Tiagoshea Milleron NA   05/16/2023 1159 106 70 - 130 mg/dL Final     Comment:     Meter: [id number] : [id number] Germán Quach CNA       No radiology results for the last day  I have personally reviewed all medications:  Scheduled Medications  aspirin, 81 mg, Nasogastric, Daily   Or  aspirin, 300 mg, Rectal, Daily  atorvastatin, 80 mg, Oral, Nightly  citalopram, 20 mg, Oral, Daily  clopidogrel, 75 mg, Nasogastric, Daily  doxycycline, 100 mg, Oral, Q12H  fluticasone, 1 spray, Each Nare, Daily  guaiFENesin, 600 mg, Oral, BID  hydrocortisone-bacitracin-zinc oxide-nystatin, 1 application, Topical, BID  ipratropium-albuterol, 3 mL, Nebulization, 4x Daily - RT  Menthol-Zinc Oxide, 1 application, Topical, 4x Daily  meropenem, 1 g, Intravenous, Q8H  metoprolol tartrate, 12.5 mg, Oral, Q12H  sodium chloride, 10 mL, Intravenous, Q12H  cyanocobalamin, 1,000 mcg, Oral, Daily    Infusions   Diet  Diet: Regular/House Diet; Texture: Soft to Chew (NDD 3); Soft to Chew: Ground Meat; Fluid Consistency: Thin  (IDDSI 0)    I have personally reviewed:  [x]  Laboratory   []  Microbiology   [x]  Radiology   []  EKG/Telemetry  [x]  Cardiology/Vascular   []  Pathology    []  Records       Assessment/Plan     Active Hospital Problems    Diagnosis  POA   • **Abscess of right lung with pneumonia [J85.1]  Yes   • Cryptogenic stroke [I63.9]  No   • Diastolic dysfunction, grade 1 [I51.89]  Yes   • Physical debility [R53.81]  Clinically Undetermined   • Sepsis [A41.9]  Yes   • Pleural effusion, right [J90]  Yes   • Other emphysema [J43.8]  Yes   • Pulmonary nodule (RLL) [R91.1]  Yes   • Acute respiratory failure with hypoxia [J96.01]  Yes   • Hepatic steatosis [K76.0]  Yes   • Tobacco abuse [Z72.0]  Yes   • Benign essential HTN [I10]  Yes      Resolved Hospital Problems   No resolved problems to display.       72 y.o. female admitted with Abscess of right lung with pneumonia.  She is currently on IV meropenem and doxycycline per infectious disease through 6/8/2023.  During this hospitalization she has suffered bilateral embolic appearing strokes for which she received TNK.    WBC back to normal  Precert has been obtained and we will transfer her to [facility] today      · SCDs for DVT prophylaxis.  · Full code.  · Discussed with patient, nursing staff and CCP.  · Anticipate discharge to [facility] today.      Bharat Calabrese MD  [facility]  05/19/23  09:04 EDT

## 2023-05-19 NOTE — PROGRESS NOTES
"      [facility]         Dr Hull Sayied   LOS: 17 days   Patient Care Team:  Zelalem Flor APRN as PCP - General (Internal Medicine)  Brandon Mitchell MD as Consulting Physician (Orthopedic Surgery)    Chief Complaint: Pneumonia with lung abscess pleural effusion right MCA stroke    Interval History: Resting comfortably no overnight issues.  Remains on 2 L oxygen nasal cannula.  Nursing staff reports desaturation on 1 L oxygen.    REVIEW OF SYSTEMS:   CARDIOVASCULAR: No chest pain, chest pressure or chest discomfort. No palpitations or edema.   RESPIRATORY: Short of breath with activity  GASTROINTESTINAL: No anorexia, nausea, vomiting or diarrhea. No abdominal pain or blood.   HEMATOLOGIC: No bleeding or bruising.     Ventilator/Non-Invasive Ventilation Settings (From admission, onward)    None            Vital Signs  Temp:  [97.5 °F (36.4 °C)-98.3 °F (36.8 °C)] 97.8 °F (36.6 °C)  Heart Rate:  [73-98] 86  Resp:  [16-20] 20  BP: (122-137)/(62-90) 122/65    Intake/Output Summary (Last 24 hours) at 5/19/2023 1016  Last data filed at 5/19/2023 0341  Gross per 24 hour   Intake 200 ml   Output 700 ml   Net -500 ml     Flowsheet Rows    Flowsheet Row First Filed Value   Admission Height 160 cm (63\") Documented at 05/02/2023 1900   Admission Weight 61.2 kg (135 lb) Documented at 05/02/2023 1900          Physical Exam:  Patient is examined using the personal protective equipment as per guidelines from infection control for this particular patient as enacted.  Hand hygiene was performed before and after patient interaction.   General Appearance:    Alert, cooperative, in no acute distress.  Following simple commands  ENT Mallampati between 3 and 4 no nasal congestion  Neck midline trachea, no thyromegaly   Lungs:    Diminished breath sounds with crackles diffuse right lower lobe    Heart:    Regular rhythm and normal rate, normal S1 and S2, no            murmur, no gallop, no rub, no click   Chest " Wall:    No abnormalities observed   Abdomen:     Normal bowel sounds, no masses, no organomegaly, soft        nontender, nondistended, no guarding, no rebound                tenderness   Extremities:   Moves all extremities well, no edema, no cyanosis, no             redness  CNS left-sided weakness  Skin no rashes no nodules  Musculoskeletal no cyanosis no clubbing normal range of motion                Results Review:        Results from last 7 days   Lab Units 05/19/23  0425 05/18/23  0628 05/16/23  0340   SODIUM mmol/L 139 139 135*   POTASSIUM mmol/L 4.1 3.8 3.9   CHLORIDE mmol/L 104 104 101   CO2 mmol/L 30.5* 31.0* 31.0*   BUN mg/dL 12 19 13   CREATININE mg/dL 0.23* 0.19* 0.22*   GLUCOSE mg/dL 85 100* 80   CALCIUM mg/dL 8.5* 8.4* 8.3*         Results from last 7 days   Lab Units 05/19/23  0425 05/18/23  0628 05/16/23  0340   WBC 10*3/mm3 9.51 16.03* 11.81*   HEMOGLOBIN g/dL 10.0* 10.0* 10.0*   HEMATOCRIT % 29.5* 29.9* 29.0*   PLATELETS 10*3/mm3 351 387 365         Results from last 7 days   Lab Units 05/15/23  0309   CHOLESTEROL mg/dL 94     Results from last 7 days   Lab Units 05/15/23  0309   MAGNESIUM mg/dL 1.8     Results from last 7 days   Lab Units 05/15/23  0309   CHOLESTEROL mg/dL 94   TRIGLYCERIDES mg/dL 68   HDL CHOL mg/dL 32*   LDL CHOL mg/dL 47           I reviewed the patient's new clinical results.  I personally viewed and interpreted the patient's chest x-ray.        Medication Review:   aspirin, 81 mg, Nasogastric, Daily   Or  aspirin, 300 mg, Rectal, Daily  atorvastatin, 80 mg, Oral, Nightly  citalopram, 20 mg, Oral, Daily  clopidogrel, 75 mg, Nasogastric, Daily  doxycycline, 100 mg, Oral, Q12H  fluticasone, 1 spray, Each Nare, Daily  guaiFENesin, 600 mg, Oral, BID  hydrocortisone-bacitracin-zinc oxide-nystatin, 1 application, Topical, BID  ipratropium-albuterol, 3 mL, Nebulization, 4x Daily - RT  Menthol-Zinc Oxide, 1 application, Topical, 4x Daily  meropenem, 1 g, Intravenous, Q8H  metoprolol  tartrate, 12.5 mg, Oral, Q12H  sodium chloride, 10 mL, Intravenous, Q12H  cyanocobalamin, 1,000 mcg, Oral, Daily             ASSESSMENT:   Acute right MCA stroke now status post TNK  Bilateral strokes on MRI  Concern for airway protection and dysphagia  Acute hypoxic respiratory failure with worsening  Right lower lobe pneumonia and lung abscess with ongoing antibiotics  Right pleural effusion without evidence for empyema  New right lower lobe 2.4 cm lung nodule on CT 4/3/2023  COPD, currently not wheezing  Current cigarette smoker  Acute hyponatremia  Anemia    PLAN:  Management acute CVA per neurology.  ELIGIO did not show any cardiac source for embolus.  Plans for Zio patch per cardiology.  Antibiotics per infectious diseases.  Recommendations noted with 4-week course with stop date 6/8/2023.  The regimen will be meropenem and doxycycline and plans for repeat CT chest just prior to the stop date.  Clinically respiratory status stable and improving.  Will attempt to wean down oxygen to maintain sats above 90%.  Mobilize ambulate as much as patient can tolerate  Diet per speech  Watch for aspiration  Bronchodilators for COPD  Discussed plan of care in detail with  and patient at bedside in detail      Della Andrew MD  05/19/23  10:16 EDT

## 2023-05-19 NOTE — PAYOR COMM NOTE
"Clay Katherine CAST (72 y.o. Female)     PLEASE SEE ATTACHED FOR INPT AUTH     REF #   [id number]    PLEASE CALL ASHANTI KNIGHT RN/ DEPT @ [phone]  OR FAX  DEPARTMENT @  [phone]    THANK YOU   ASHANTI KNIGHT RN  [facility]          Date of Birth   1950    Social Security Number       Address   [address] [address]    Home Phone   [phone]    MRN   [id number]       Nondenominational   Lutheran    Marital Status                               Admission Date   5/2/23    Admission Type   Emergency    Admitting Provider   Olga Palm MD    Attending Provider   Bharat Calabrese MD    Department, Room/Bed   [address], 99/1       Discharge Date       Discharge Disposition       Discharge Destination                               Attending Provider: Bharat Calabrese MD    Allergies: Hydrocodone-acetaminophen    Isolation: None   Infection: None   Code Status: CPR    Ht: 157.5 cm (62\")   Wt: 67.4 kg (148 lb 9.4 oz)    Admission Cmt: None   Principal Problem: Abscess of right lung with pneumonia [J85.1]                 Active Insurance as of 5/2/2023     Primary Coverage     Payor Plan Insurance Group Employer/Plan Group    ANTHEM BLUE CROSS ANTHEM BLUE CROSS BLUE SHIELD PPO [id number]     Payor Plan Address Payor Plan Phone Number Payor Plan Fax Number Effective Dates    [address] [phone]  3/24/2013 - None Entered    [facility] [id number]       Subscriber Name Subscriber Birth Date Member ID       SAMANTHA CERDA III 3/3/1958 [id number]           Secondary Coverage     Payor Plan Insurance Group Employer/Plan Group    MEDICARE MEDICARE A ONLY      Payor Plan Address Payor Plan Phone Number Payor Plan Fax Number Effective Dates    [address] [phone]  12/1/2017 - None Entered    [facility] [id number]       Subscriber Name Subscriber Birth Date Member ID       KATHERINE CERDA 1950 [id number]                 Emergency Contacts      " "(Rel.) Home Phone Work Phone Mobile Phone    Ronnie Williamson III (POA) (Spouse) [phone] -- --    KERRI lauren (Daughter) [phone] -- --            [place]: [facility] [id number]  Tax ID [id number]  Lines, Drains & Airways     Active LDAs     Name Placement date Placement time Site Days    Peripheral IV Anterior;Proximal;Right Antecubital --  --  Antecubital  --    External Urinary Catheter 05/07/23  --  --  12                     Physician Progress Notes (last 72 hours)      Della Andrew MD at 05/18/23 1402                [facility]         Dr Della Andrew   LOS: 16 days   Patient Care Team:  Zelalem Flor APRN as PCP - General (Internal Medicine)  Brandon Mitchell MD as Consulting Physician (Orthopedic Surgery)    Chief Complaint: Pneumonia with lung abscess pleural effusion right MCA stroke    Interval History: Remains on 2 L oxygen nasal cannula sats 96% on the monitor.  I weaned her down to 1 L and she is currently tolerating well.    REVIEW OF SYSTEMS:   CARDIOVASCULAR: No chest pain, chest pressure or chest discomfort. No palpitations or edema.   RESPIRATORY: Short of breath with activity  GASTROINTESTINAL: No anorexia, nausea, vomiting or diarrhea. No abdominal pain or blood.   HEMATOLOGIC: No bleeding or bruising.     Ventilator/Non-Invasive Ventilation Settings (From admission, onward)    None            Vital Signs  Temp:  [97.6 °F (36.4 °C)-98.3 °F (36.8 °C)] 98.3 °F (36.8 °C)  Heart Rate:  [] 84  Resp:  [16-18] 18  BP: (111-144)/(54-90) 137/90    Intake/Output Summary (Last 24 hours) at 5/18/2023 1402  Last data filed at 5/18/2023 0423  Gross per 24 hour   Intake 200 ml   Output 500 ml   Net -300 ml     Flowsheet Rows    Flowsheet Row First Filed Value   Admission Height 160 cm (63\") Documented at 05/02/2023 1900   Admission Weight 61.2 kg (135 lb) Documented at 05/02/2023 1900          Physical Exam:  Patient is examined using the personal protective equipment as per " guidelines from infection control for this particular patient as enacted.  Hand hygiene was performed before and after patient interaction.   General Appearance:    Alert, cooperative, in no acute distress.  Following simple commands  ENT Mallampati between 3 and 4 no nasal congestion  Neck midline trachea, no thyromegaly   Lungs:    Diminished breath sounds with crackles diffuse right lower lobe    Heart:    Regular rhythm and normal rate, normal S1 and S2, no            murmur, no gallop, no rub, no click   Chest Wall:    No abnormalities observed   Abdomen:     Normal bowel sounds, no masses, no organomegaly, soft        nontender, nondistended, no guarding, no rebound                tenderness   Extremities:   Moves all extremities well, no edema, no cyanosis, no             redness  CNS left-sided weakness  Skin no rashes no nodules  Musculoskeletal no cyanosis no clubbing normal range of motion                Results Review:        Results from last 7 days   Lab Units 05/18/23  0628 05/16/23  0340 05/15/23  0309   SODIUM mmol/L 139 135* 136   POTASSIUM mmol/L 3.8 3.9 3.8   CHLORIDE mmol/L 104 101 102   CO2 mmol/L 31.0* 31.0* 29.9*   BUN mg/dL 19 13 13   CREATININE mg/dL 0.19* 0.22* 0.21*   GLUCOSE mg/dL 100* 80 101*   CALCIUM mg/dL 8.4* 8.3* 8.3*         Results from last 7 days   Lab Units 05/18/23  0628 05/16/23  0340 05/15/23  0309   WBC 10*3/mm3 16.03* 11.81* 16.60*   HEMOGLOBIN g/dL 10.0* 10.0* 10.0*   HEMATOCRIT % 29.9* 29.0* 30.1*   PLATELETS 10*3/mm3 387 365 333         Results from last 7 days   Lab Units 05/15/23  0309   CHOLESTEROL mg/dL 94     Results from last 7 days   Lab Units 05/15/23  0309   MAGNESIUM mg/dL 1.8     Results from last 7 days   Lab Units 05/15/23  0309   CHOLESTEROL mg/dL 94   TRIGLYCERIDES mg/dL 68   HDL CHOL mg/dL 32*   LDL CHOL mg/dL 47           I reviewed the patient's new clinical results.  I personally viewed and interpreted the patient's chest x-ray.        Medication  Review:   aspirin, 81 mg, Nasogastric, Daily   Or  aspirin, 300 mg, Rectal, Daily  atorvastatin, 80 mg, Oral, Nightly  citalopram, 20 mg, Oral, Daily  clopidogrel, 75 mg, Nasogastric, Daily  doxycycline, 100 mg, Oral, Q12H  fluticasone, 1 spray, Each Nare, Daily  guaiFENesin, 600 mg, Oral, BID  hydrocortisone-bacitracin-zinc oxide-nystatin, 1 application, Topical, BID  ipratropium-albuterol, 3 mL, Nebulization, 4x Daily - RT  Menthol-Zinc Oxide, 1 application, Topical, 4x Daily  meropenem, 1 g, Intravenous, Q8H  metoprolol tartrate, 12.5 mg, Oral, Q12H  sodium chloride, 10 mL, Intravenous, Q12H  cyanocobalamin, 1,000 mcg, Oral, Daily             ASSESSMENT:   Acute right MCA stroke now status post TNK  Bilateral strokes on MRI  Concern for airway protection and dysphagia  Acute hypoxic respiratory failure with worsening  Right lower lobe pneumonia and lung abscess with ongoing antibiotics  Right pleural effusion without evidence for empyema  New right lower lobe 2.4 cm lung nodule on CT 4/3/2023  COPD, currently not wheezing  Current cigarette smoker  Acute hyponatremia  Anemia    PLAN:  Management acute CVA per neurology.  ELIGIO did not show any cardiac source for embolus.  Plans for Zio patch per cardiology.  Antibiotics per infectious diseases.  Recommendations noted with 4-week course with stop date 6/8/2023.  The regimen will be meropenem and doxycycline and plans for repeat CT chest just prior to the stop date.  Clinically respiratory status stable and improving  Oxygen weaned down to maintain sats above 90%.  Down to 1 L oxygen nasal cannula  Mobilize ambulate  Diet per speech  Watch for aspiration  Bronchodilators for COPD  Discussed plan of care in detail with  and patient at bedside in detail      Della Andrew MD  05/18/23  14:02 EDT            Electronically signed by Della Andrwe MD at 05/18/23 1403     Rigoberto Chan MD at 05/18/23 1015          ID NOTE    CC: f/u pneumonia (right  worse than left) w/ possible abscess    Subj: No fever. WBC a little higher today but no new symptoms to report. Tolerating meropenem and doxycycline without rash. Hoping for discharge to rehab soon. Remains on 2L NC.       Medications:    Current Facility-Administered Medications:   •  acetaminophen (TYLENOL) tablet 650 mg, 650 mg, Oral, Q6H PRN, Yanna Griffin, APRN, 650 mg at 05/17/23 2225  •  aspirin chewable tablet 81 mg, 81 mg, Nasogastric, Daily, 81 mg at 05/18/23 0900 **OR** aspirin suppository 300 mg, 300 mg, Rectal, Daily, Darius Ford MD  •  atorvastatin (LIPITOR) tablet 80 mg, 80 mg, Oral, Nightly, Darius Ford MD, 80 mg at 05/17/23 2021  •  bismuth subsalicylate (PEPTO BISMOL) 262 MG/15ML suspension 30 mL, 30 mL, Oral, TID PRN, Darius Ford MD, 30 mL at 05/14/23 0839  •  citalopram (CeleXA) tablet 20 mg, 20 mg, Oral, Daily, Darius Ford MD, 20 mg at 05/18/23 0900  •  clopidogrel (PLAVIX) tablet 75 mg, 75 mg, Nasogastric, Daily, Darius Ford MD, 75 mg at 05/18/23 0900  •  doxycycline (VIBRAMYCIN) 100 mg in sodium chloride 0.9 % 100 mL IVPB-VTB, 100 mg, Intravenous, Q12H, Darius Ford MD, 100 mg at 05/18/23 0858  •  fluticasone (FLONASE) 50 MCG/ACT nasal spray 1 spray, 1 spray, Each Nare, Daily, Darius Ford MD, 1 spray at 05/08/23 0808  •  guaiFENesin (MUCINEX) 12 hr tablet 600 mg, 600 mg, Oral, BID, Darius Ford MD, 600 mg at 05/18/23 0900  •  hydrocortisone-bacitracin-zinc oxide-nystatin (MAGIC BARRIER) ointment 1 application, 1 application, Topical, BID, Darius Ford MD, 1 application at 05/18/23 0800  •  ipratropium-albuterol (DUO-NEB) nebulizer solution 3 mL, 3 mL, Nebulization, 4x Daily - RT, Darius Ford MD, 3 mL at 05/18/23 0718  •  labetalol (NORMODYNE,TRANDATE) injection 10 mg, 10 mg, Intravenous, Q10 Min PRN, Darius Ford MD  •  Magnesium Cardiology Dose Replacement - Follow Nurse / BPA Driven Protocol, , Does not apply, PRNLogan Subin, MD  •  Menthol-Zinc Oxide 1 application, 1  application, Topical, 4x Daily, Darius Ford MD, 1 application at 05/18/23 0800  •  meropenem (MERREM) 1 g in sodium chloride 0.9 % 100 mL IVPB-VTB, 1 g, Intravenous, Q8H, Darius Ford MD, Last Rate: 33.3 mL/hr at 05/12/23 0449, 1 g at 05/18/23 0423  •  metoprolol tartrate (LOPRESSOR) tablet 12.5 mg, 12.5 mg, Oral, Q12H, Darius Ford MD, 12.5 mg at 05/18/23 0858  •  Phosphorus Replacement - Follow Nurse / BPA Driven Protocol, , Does not apply, PRNLogan Subin, MD  •  Potassium Replacement - Follow Nurse / BPA Driven Protocol, , Does not apply, Logan VENEGAS Subin, MD  •  sodium chloride 0.9 % flush 10 mL, 10 mL, Intravenous, Q12H, Darius Ford MD, 10 mL at 05/18/23 0900  •  sodium chloride 0.9 % flush 10 mL, 10 mL, Intravenous, PRN, Darius Ford MD  •  sodium chloride 0.9 % infusion 40 mL, 40 mL, Intravenous, PRN, Darius Ford MD  •  vitamin B-12 (CYANOCOBALAMIN) tablet 1,000 mcg, 1,000 mcg, Oral, Daily, Darius Ford MD, 1,000 mcg at 05/18/23 0900      Objective   Vital Signs   Temp:  [97.6 °F (36.4 °C)-98.3 °F (36.8 °C)] 98.3 °F (36.8 °C)  Heart Rate:  [] 90  Resp:  [16-19] 18  BP: (111-157)/(54-78) 131/69    Physical Exam:   General: awake, alert, NAD  Eyes: no scleral icterus  ENT:  NC in nares  Cardiovascular: NR  Respiratory: clear anteriorly; no wheezing; normal WOB on 2L NC  GI: Abdomen is soft, not tender or distended  Skin: No rashes  Psychiatric: Normal mood and affect     Labs:   CBC, BMP, and blood cultures reviewed today  Lab Results   Component Value Date    WBC 16.03 (H) 05/18/2023    HGB 10.0 (L) 05/18/2023    HCT 29.9 (L) 05/18/2023    MCV 89.5 05/18/2023     05/18/2023     Lab Results   Component Value Date    GLUCOSE 100 (H) 05/18/2023    CALCIUM 8.4 (L) 05/18/2023     05/18/2023    K 3.8 05/18/2023    CO2 31.0 (H) 05/18/2023     05/18/2023    BUN 19 05/18/2023    CREATININE 0.19 (L) 05/18/2023    EGFR 127.6 05/18/2023    .0 (H) 05/18/2023    ANIONGAP 4.0 (L)  "05/18/2023     Lab Results   Component Value Date    CRP 3.52 (H) 05/14/2023     Procal 3.95--->2.6-->0.2  Crypto Ag negative  Urine Histo Ag negative  Serum Histo Ag negative  Histoplasma Ab negative  Urine Blasto Ag negative  Aspergillus Ag pending  ANCA negative      Microbiology:  5/2 RPP: negative  5/2 BCx: Corynebacterium in 1/2 sets  5/2 SpCx: normal aishwarya  5/2 MRSA nares: negative  5/2 Strep pneumo Ag; negative  5/2 Legionella Ag: negative  5/4 BCx: negative  5/5 C diff: negative  5/6 SpCx: normal aishwarya  5/9 AFB Cx: NGTD  5/9 Fungus Cx: NGTD  5/10 SpCx: rare normal aishwarya  5/10 L Thoracentesis Cx: negative  5/14 BCx: negative    New Radiology:  ELIGIO negative for vegetations    Prior radiology:  MRI brain: \"Bilateral multifocal areas of infarction.  No evidence of hemorrhagic transformation. \"    ASSESSMENT/PLAN:  1. Pneumonia and right lung abscess  2. Pleural effusions  3. Acute hypoxic respiratory failure  4. Emphysema  5. Tobacco use  6. Acute R MCA stroke and bilateral infarcts    Thanks to neurology team for their care of Ms. Williamson in regards to her stroke. MRI as above. Blood cultures negative. TTE and ELIGIO both negative for vegetations.     Her fever is resolved. She is down to just 2L NC. WBC increased slightly today but no new symptoms. She is definitely trending for the better. I still recommend that we treat with antibiotics for a 4-week course with stop date 6/8/23. The regimen will be meropenem 1 g IV q8h and doxycycline 100 mg PO BID. I ordered a repeat CT to be done just prior to the stop date. It is scheduled for 6/6/23. She will need follow-up w/ pulmonologist as well.     I will go ahead and re-order her PICC line.     D/W CCP.     Thank you for allowing me to be involved in the care of this patient. Infectious diseases will sign off at this time with antibiotics plan in place, but please call me at [phone] if any further ID questions or new ID concerns.        Electronically signed by " Rigoberto Chan MD at 23 1017     Bharat Calabrese MD at 23 0930              Name: Katherine Williamson ADMIT: 2023   : 1950  PCP: Zelalem Flor APRN    MRN: [id number] LOS: 16 days   AGE/SEX: 72 y.o. female  ROOM: [facility]     Subjective   Subjective   Sitting up on side of the bed with physical therapy.  Irritable today.  No new complaints.  Slight cough.  Loose stools are not new.  Tolerated diet last evening.    Review of Systems    Objective   Objective   Vital Signs  Temp:  [97.6 °F (36.4 °C)-98.3 °F (36.8 °C)] 98.3 °F (36.8 °C)  Heart Rate:  [] 90  Resp:  [16-19] 18  BP: (111-157)/(54-78) 131/69  SpO2:  [91 %-100 %] 92 %  on  Flow (L/min):  [2] 2;   Device (Oxygen Therapy): nasal cannula  Body mass index is 26.37 kg/m².  Physical Exam  Vitals and nursing note reviewed.   Constitutional:       General: She is not in acute distress.     Appearance: She is ill-appearing.   Cardiovascular:      Rate and Rhythm: Normal rate and regular rhythm.   Pulmonary:      Effort: Pulmonary effort is normal.      Breath sounds: Decreased breath sounds present.   Abdominal:      General: Bowel sounds are normal.      Palpations: Abdomen is soft.      Tenderness: There is no abdominal tenderness.   Musculoskeletal:         General: No swelling.   Skin:     General: Skin is warm and dry.   Neurological:      Mental Status: She is alert. Mental status is at baseline.      Comments: Mild left-sided weakness   Psychiatric:         Cognition and Memory: Memory is impaired.       Results Review     I reviewed the patient's new clinical results.  Results from last 7 days   Lab Units 23  0628 23  0340 05/15/23  0309 23  05   WBC 10*3/mm3 16.03* 11.81* 16.60* 10.26   HEMOGLOBIN g/dL 10.0* 10.0* 10.0* 10.1*   PLATELETS 10*3/mm3 387 365 333 388     Results from last 7 days   Lab Units 23  0628 23  0340 05/15/23  0309 23  0528   SODIUM mmol/L 139 135* 136 135*    POTASSIUM mmol/L 3.8 3.9 3.8 4.3   CHLORIDE mmol/L 104 101 102 100   CO2 mmol/L 31.0* 31.0* 29.9* 32.5*   BUN mg/dL 19 13 13 12   CREATININE mg/dL 0.19* 0.22* 0.21* 0.26*   GLUCOSE mg/dL 100* 80 101* 91   EGFR mL/min/1.73 127.6 123.2 124.5 118.3     Results from last 7 days   Lab Units 05/18/23  0628 05/16/23  0340 05/15/23  0309   ALBUMIN g/dL 2.1* 2.1* 1.8*     Results from last 7 days   Lab Units 05/18/23  0628 05/16/23  0340 05/15/23  0309 05/14/23  0528   CALCIUM mg/dL 8.4* 8.3* 8.3* 8.4*   ALBUMIN g/dL 2.1* 2.1* 1.8*  --    MAGNESIUM mg/dL  --   --  1.8  --    PHOSPHORUS mg/dL 2.9 2.4* 2.5  --        Glucose   Date/Time Value Ref Range Status   05/17/2023 1203 94 70 - 130 mg/dL Final     Comment:     Meter: [id number] : [id number] Meme Conte NA   05/17/2023 0234 114 70 - 130 mg/dL Final     Comment:     Meter: [id number] : [id number] Ramya Awan NA   05/17/2023 0050 122 70 - 130 mg/dL Final     Comment:     Meter: [id number] : rell Mueller RN   05/16/2023 1734 116 70 - 130 mg/dL Final     Comment:     Meter: [id number] : [id number] Graham Aryeon NA   05/16/2023 1159 106 70 - 130 mg/dL Final     Comment:     Meter: [id number] : [id number] Germán Quach CNA   05/16/2023 0016 95 70 - 130 mg/dL Final     Comment:     Meter: [id number] : thumpvilla Brooks RN   05/15/2023 1748 86 70 - 130 mg/dL Final     Comment:     Meter: [id number] : [id number] Graham Aryeon KORY       FL Video Swallow With Speech Single Contrast    Result Date: 5/16/2023  Fluoroscopy was provided for the speech pathologist during a video swallow study. For full details please see the speech pathology report  This report was finalized on 5/16/2023 2:12 PM by Dr. Tena Esquivel M.D.      I have personally reviewed all medications:  Scheduled Medications  aspirin, 81 mg, Nasogastric, Daily   Or  aspirin, 300 mg, Rectal, Daily  atorvastatin, 80 mg, Oral, Nightly  citalopram, 20 mg, Oral,  Daily  clopidogrel, 75 mg, Nasogastric, Daily  doxycycline, 100 mg, Intravenous, Q12H  fluticasone, 1 spray, Each Nare, Daily  guaiFENesin, 600 mg, Oral, BID  hydrocortisone-bacitracin-zinc oxide-nystatin, 1 application, Topical, BID  ipratropium-albuterol, 3 mL, Nebulization, 4x Daily - RT  Menthol-Zinc Oxide, 1 application, Topical, 4x Daily  meropenem, 1 g, Intravenous, Q8H  metoprolol tartrate, 12.5 mg, Oral, Q12H  sodium chloride, 10 mL, Intravenous, Q12H  cyanocobalamin, 1,000 mcg, Oral, Daily    Infusions   Diet  Diet: Regular/House Diet; Texture: Soft to Chew (NDD 3); Soft to Chew: Ground Meat; Fluid Consistency: Thin (IDDSI 0)    I have personally reviewed:  [x]  Laboratory   []  Microbiology   [x]  Radiology   []  EKG/Telemetry  [x]  Cardiology/Vascular   []  Pathology    []  Records      Assessment/Plan     Active Hospital Problems    Diagnosis  POA   • **Abscess of right lung with pneumonia [J85.1]  Yes   • Cryptogenic stroke [I63.9]  No   • Diastolic dysfunction, grade 1 [I51.89]  Yes   • Physical debility [R53.81]  Clinically Undetermined   • Sepsis [A41.9]  Yes   • Pleural effusion, right [J90]  Yes   • Other emphysema [J43.8]  Yes   • Pulmonary nodule (RLL) [R91.1]  Yes   • Acute respiratory failure with hypoxia [J96.01]  Yes   • Hepatic steatosis [K76.0]  Yes   • Tobacco abuse [Z72.0]  Yes   • Benign essential HTN [I10]  Yes      Resolved Hospital Problems   No resolved problems to display.       72 y.o. female admitted with Abscess of right lung with pneumonia.  She is currently on IV meropenem and doxycycline per infectious disease through 6/8/2023.  During this hospitalization she has suffered bilateral embolic appearing strokes for which she received TNK.    Uncertain significance of elevated WBC today.  Clinically patient seems stable and showing gradual improvement.  She is afebrile and has been weaned down to 2 L of oxygen.  ID following.    Neurologically stable.  ELIGIO yesterday negative.   "Plan to continue DAPT and high intensity statin at discharge.  She will be discharged with a Zio patch in place.    She is normotensive.  Currently receiving metoprolol.  Her home irbesartan has been on hold.  Likely resume at discharge.      · SCDs for DVT prophylaxis.  · Full code.  · Discussed with patient and CCP.  · Anticipate discharge to [facility] vs [place] timing yet to be determined..      Bharat Calabrese MD  [facility]  23  09:30 EDT        Electronically signed by Bharat Calabrese MD at 23 0921     Marsah Ferris PA at 23 1508     Attestation signed by Ant Ramos MD at 23 0777    I have reviewed this documentation and agree.                  DOS: 2023  NAME: Katherine Williamson   : 1950  PCP: Zelalem Flor APRN  Chief Complaint   Patient presents with   • Shortness of Breath       Chief complaint: team d, problems talking and L sided symptoms  Subjective: Patient is complaining of pain of the left arm with she had a IV previously.  No new symptoms.  She says that she cannot lift her left leg that well and that is about the same.  She just got back from [facility]    Objective:  Vital signs: /66 (BP Location: Right arm)   Pulse 82   Temp 98 °F (36.7 °C) (Oral)   Resp 18   Ht 157.5 cm (62\")   Wt 63 kg (139 lb)   SpO2 94%   BMI 25.42 kg/m²      Gen: NAD, vitals reviewed, 2L o2  MS: oriented x3, recent/remote memory intact, normal attention/concentration, language intact, no neglect.  CN: visual acuity grossly normal, PERRL, EOMI, R facial , no dysarthria  Motor: no pronator drift, b/l drift LLE, normal tone  Sensory: intact to light touch all 4 ext.    ROS:  No weakness, numbness  No fevers, chills      Laboratory results:  Lab Results   Component Value Date    GLUCOSE 80 2023    CALCIUM 8.3 (L) 2023     (L) 2023    K 3.9 2023    CO2 31.0 (H) 2023     2023    BUN 13 2023    CREATININE " 0.22 (L) 05/16/2023    EGFRIFAFRI 93 04/18/2018    EGFRIFNONA 76 04/18/2018    BCR 59.1 (H) 05/16/2023    ANIONGAP 3.0 (L) 05/16/2023     Lab Results   Component Value Date    WBC 11.81 (H) 05/16/2023    HGB 10.0 (L) 05/16/2023    HCT 29.0 (L) 05/16/2023    MCV 89.2 05/16/2023     05/16/2023     Lab Results   Component Value Date    LDL 47 05/15/2023     (H) 04/18/2018     (H) 04/18/2017         Lab 05/15/23  0309   HEMOGLOBIN A1C 6.20*        Review of labs:     Review and interpretation of imaging: MRI brain 5/16  FINDINGS:  Multiplanar images of the head were obtained without  gadolinium. The patient has multifocal areas of restricted diffusion  within both cerebral hemispheres. These include areas within the  bilateral frontal and parietal medial cortex, right parietal cortex,  left frontoparietal centrum semiovale, bilateral basal ganglia, right  frontoparietal cortex and right parietal corona radiata, as well as the  left genu of the corpus callosum. Additional foci are noted within the  right temporal lobe and right occipital lobe. There is atrophy. There is  periventricular and deep white matter microangiopathic change. There is  no midline shift. I don't see any evidence of hemorrhagic  transformation. There is extensive opacification of the right mastoid  air cells. This was also present on prior study. There is a trace fluid  within the left mastoid air cells. Mucosal thickening is noted within  the paranasal sinuses.     IMPRESSION:  1. Bilateral multifocal areas of infarction.  No evidence of hemorrhagic  transformation.     CTA head/neck  IMPRESSION:  1. There is cut off of the opacification of a posterior division right  middle cerebral artery branch in the posterior aspect of the right  sylvian fissure/posterior parietal lobe and this is consistent with an  acute occlusion.  2. Small to moderate size area of prolonged Tmax in the right posterior  parietal lobe with a volume of  approximately 54 cc. No completed infarct  is seen on the CBF images.  3. The proximal bilateral internal carotid arteries appear somewhat  small with segments of slightly diminished opacification as discussed  above. Some of this could be artifact. More distally the internal  carotid arteries appear patent. No definite NASCET criteria stenosis is  seen.    Workup to date:  TTE      •  Left ventricular systolic function is normal. Left ventricular ejection fraction appears to be 66 - 70%.  •  Left ventricular diastolic function is consistent with (grade I) impaired relaxation.  •  Normal right ventricular cavity size and systolic function noted.  •  Saline test results are negative.  •  There is no evidence of pericardial effusion.       Diagnoses:  1.  Embolic appearing stroke  2.  Pneumonia    Impression: 72-year-old right-handed female admitted with abscess of the right lung with pneumonia followed by pulmonology and ID for whom developed acute onset of left hemiparesis and speech disturbance for which she received TNK on 5/14.  CTA showed right MCA perfusion deficit but no proximal occlusion appeared to have a distal M3 occlusion report and perfusion deficit of the right posterior parietal lobe.  She has had no events on telemetry and TTE was unrevealing.  For strong suspicion of embolic etiology she had ELIGIO which was done this morning    Plan:  1.  Patient on DAPT baby aspirin and Plavix 75 and atorvastatin 80  2.  Continue on telemetry while inpatient  3.  Follow-up on ELIGIO result  4.  APS sent- will f/u  4.  Will need long-term cardiac monitor at discharge    Neuro exam stable if ELIGIO negative no further inpatient neurology recommendations.  She can follow-up with neurology as an outpatient we will send electronic message for appointment      Thank you for this consultation.  Discussed above plan with neuro attending, Dr. Ramos who agrees with above plan.  Neurology team is available for concerns or  "questions.            Electronically signed by Ant Ramos MD at 05/18/23 0722     Della Andrew MD at 05/17/23 1456                [facility]         Dr Della Andrew   LOS: 15 days   Patient Care Team:  Zelalem Flor APRN as PCP - General (Internal Medicine)  Brandon Mitchell MD as Consulting Physician (Orthopedic Surgery)    Chief Complaint: Pneumonia with lung abscess pleural effusion right MCA stroke    Interval History: Events noted chart reviewed.  Currently on 2 L oxygen nasal cannula.  Resting comfortably.  Patient was n.p.o. for ELIGIO did not show any cardiac source for embolus.    REVIEW OF SYSTEMS:   CARDIOVASCULAR: No chest pain, chest pressure or chest discomfort. No palpitations or edema.   RESPIRATORY: Short of breath with activity  GASTROINTESTINAL: No anorexia, nausea, vomiting or diarrhea. No abdominal pain or blood.   HEMATOLOGIC: No bleeding or bruising.     Ventilator/Non-Invasive Ventilation Settings (From admission, onward)    None            Vital Signs  Temp:  [97.8 °F (36.6 °C)-98.8 °F (37.1 °C)] 98 °F (36.7 °C)  Heart Rate:  [] 82  Resp:  [16-20] 18  BP: (119-161)/(62-86) 119/66    Intake/Output Summary (Last 24 hours) at 5/17/2023 1456  Last data filed at 5/17/2023 0600  Gross per 24 hour   Intake 240 ml   Output 1300 ml   Net -1060 ml     Flowsheet Rows    Flowsheet Row First Filed Value   Admission Height 160 cm (63\") Documented at 05/02/2023 1900   Admission Weight 61.2 kg (135 lb) Documented at 05/02/2023 1900          Physical Exam:  Patient is examined using the personal protective equipment as per guidelines from infection control for this particular patient as enacted.  Hand hygiene was performed before and after patient interaction.   General Appearance:    Alert, cooperative, in no acute distress.  Following simple commands  ENT Mallampati between 3 and 4 no nasal congestion  Neck midline trachea, no thyromegaly   Lungs:    Diminished " breath sounds with crackles diffuse right lower lobe    Heart:    Regular rhythm and normal rate, normal S1 and S2, no            murmur, no gallop, no rub, no click   Chest Wall:    No abnormalities observed   Abdomen:     Normal bowel sounds, no masses, no organomegaly, soft        nontender, nondistended, no guarding, no rebound                tenderness   Extremities:   Moves all extremities well, no edema, no cyanosis, no             redness  CNS left-sided weakness  Skin no rashes no nodules  Musculoskeletal no cyanosis no clubbing normal range of motion                Results Review:        Results from last 7 days   Lab Units 05/16/23  0340 05/15/23  0309 05/14/23  0528   SODIUM mmol/L 135* 136 135*   POTASSIUM mmol/L 3.9 3.8 4.3   CHLORIDE mmol/L 101 102 100   CO2 mmol/L 31.0* 29.9* 32.5*   BUN mg/dL 13 13 12   CREATININE mg/dL 0.22* 0.21* 0.26*   GLUCOSE mg/dL 80 101* 91   CALCIUM mg/dL 8.3* 8.3* 8.4*         Results from last 7 days   Lab Units 05/16/23  0340 05/15/23  0309 05/14/23  0528   WBC 10*3/mm3 11.81* 16.60* 10.26   HEMOGLOBIN g/dL 10.0* 10.0* 10.1*   HEMATOCRIT % 29.0* 30.1* 31.3*   PLATELETS 10*3/mm3 365 333 388         Results from last 7 days   Lab Units 05/15/23  0309   CHOLESTEROL mg/dL 94     Results from last 7 days   Lab Units 05/15/23  0309   MAGNESIUM mg/dL 1.8     Results from last 7 days   Lab Units 05/15/23  0309   CHOLESTEROL mg/dL 94   TRIGLYCERIDES mg/dL 68   HDL CHOL mg/dL 32*   LDL CHOL mg/dL 47           I reviewed the patient's new clinical results.  I personally viewed and interpreted the patient's chest x-ray.        Medication Review:   aspirin, 81 mg, Nasogastric, Daily   Or  aspirin, 300 mg, Rectal, Daily  atorvastatin, 80 mg, Oral, Nightly  citalopram, 20 mg, Oral, Daily  clopidogrel, 75 mg, Nasogastric, Daily  doxycycline, 100 mg, Intravenous, Q12H  fluticasone, 1 spray, Each Nare, Daily  guaiFENesin, 600 mg, Oral, BID  hydrocortisone-bacitracin-zinc oxide-nystatin, 1  application, Topical, BID  ipratropium-albuterol, 3 mL, Nebulization, 4x Daily - RT  Menthol-Zinc Oxide, 1 application, Topical, 4x Daily  meropenem, 1 g, Intravenous, Q8H  metoprolol tartrate, 12.5 mg, Oral, Q12H  sodium chloride, 10 mL, Intravenous, Q12H  cyanocobalamin, 1,000 mcg, Oral, Daily             ASSESSMENT:   Acute right MCA stroke now status post TNK  Bilateral strokes on MRI  Concern for airway protection and dysphagia  Acute hypoxic respiratory failure with worsening  Right lower lobe pneumonia and lung abscess with ongoing antibiotics  Right pleural effusion without evidence for empyema  New right lower lobe 2.4 cm lung nodule on CT 4/3/2023  COPD, currently not wheezing  Current cigarette smoker  Acute hyponatremia  Anemia    PLAN:  Management acute CVA per neurology.  ELIGIO did not show any cardiac source for embolus.  Plans for Zio patch per cardiology.  Antibiotics per infectious diseases.  Recommendations noted with 4-week course with stop date 2023.  The regimen will be meropenem and doxycycline and plans for repeat CT chest just prior to the stop date.  Oxygen weaned down to maintain sats above 90%  Mobilize ambulate  Diet per speech  Watch for aspiration  Bronchodilators for COPD  Discussed plan of care in detail with neurology and with  at bedside      Della Andrew MD  23  14:56 EDT            Electronically signed by Della Andrew MD at 23 1511     Bharat Calabrese MD at 23 1328              Name: Katherine Williamson ADMIT: 2023   : 1950  PCP: Zelalem Flor APRN    MRN: [id number] LOS: 15 days   AGE/SEX: 72 y.o. female  ROOM: [facility]     Subjective   Subjective   Sitting up in chair.  Breathing okay.  Weak on the left side.    Review of Systems    Objective   Objective   Vital Signs  Temp:  [97.8 °F (36.6 °C)-98.8 °F (37.1 °C)] 98 °F (36.7 °C)  Heart Rate:  [] 82  Resp:  [16-20] 18  BP: (119-161)/(62-86) 119/66  SpO2:  [91 %-100 %] 94 %  on  Flow  (L/min):  [1-2] 2;   Device (Oxygen Therapy): nasal cannula  Body mass index is 25.42 kg/m².  Physical Exam  Vitals and nursing note reviewed.   Constitutional:       General: She is not in acute distress.     Appearance: She is ill-appearing.   Cardiovascular:      Rate and Rhythm: Normal rate and regular rhythm.   Pulmonary:      Effort: Pulmonary effort is normal.      Breath sounds: Normal breath sounds.   Abdominal:      General: Bowel sounds are normal.      Palpations: Abdomen is soft.      Tenderness: There is no abdominal tenderness.   Musculoskeletal:         General: No swelling.   Skin:     General: Skin is warm and dry.   Neurological:      Mental Status: She is alert. Mental status is at baseline.      Comments: Mild left-sided weakness   Psychiatric:         Cognition and Memory: Memory is impaired.       Results Review     I reviewed the patient's new clinical results.  Results from last 7 days   Lab Units 05/16/23  0340 05/15/23  0309 05/14/23  0528 05/13/23  0431   WBC 10*3/mm3 11.81* 16.60* 10.26 11.78*   HEMOGLOBIN g/dL 10.0* 10.0* 10.1* 9.9*   PLATELETS 10*3/mm3 365 333 388 390     Results from last 7 days   Lab Units 05/16/23  0340 05/15/23  0309 05/14/23  0528 05/13/23  0431   SODIUM mmol/L 135* 136 135* 134*   POTASSIUM mmol/L 3.9 3.8 4.3 4.1   CHLORIDE mmol/L 101 102 100 100   CO2 mmol/L 31.0* 29.9* 32.5* 33.0*   BUN mg/dL 13 13 12 13   CREATININE mg/dL 0.22* 0.21* 0.26* 0.20*   GLUCOSE mg/dL 80 101* 91 85   EGFR mL/min/1.73 123.2 124.5 118.3 126.0     Results from last 7 days   Lab Units 05/16/23  0340 05/15/23  0309   ALBUMIN g/dL 2.1* 1.8*     Results from last 7 days   Lab Units 05/16/23  0340 05/15/23  0309 05/14/23  0528 05/13/23  0431   CALCIUM mg/dL 8.3* 8.3* 8.4* 8.3*   ALBUMIN g/dL 2.1* 1.8*  --   --    MAGNESIUM mg/dL  --  1.8  --   --    PHOSPHORUS mg/dL 2.4* 2.5  --   --        Hemoglobin A1C   Date/Time Value Ref Range Status   05/15/2023 0300 6.20 (H) 4.80 - 5.60 % Final      Glucose   Date/Time Value Ref Range Status   05/17/2023 1203 94 70 - 130 mg/dL Final     Comment:     Meter: [id number] : [id number] Meme Conte NA   05/17/2023 0234 114 70 - 130 mg/dL Final     Comment:     Meter: [id number] : [id number] Ramya Awan NA   05/17/2023 0050 122 70 - 130 mg/dL Final     Comment:     Meter: [id number] : rell Mueller RN   05/16/2023 1734 116 70 - 130 mg/dL Final     Comment:     Meter: [id number] : [id number] Tiagoam Aryeon NA   05/16/2023 1159 106 70 - 130 mg/dL Final     Comment:     Meter: [id number] : [id number] Germán Quach CNA   05/16/2023 0016 95 70 - 130 mg/dL Final     Comment:     Meter: [id number] : thumphrjaneen Brooks RN   05/15/2023 1748 86 70 - 130 mg/dL Final     Comment:     Meter: [id number] : [id number] Graham Aryeon NA       CT Head Without Contrast    Result Date: 5/15/2023   No acute intracranial hemorrhage or hydrocephalus. If there is further clinical concern, MRI could be considered for further evaluation.  This report was finalized on 5/15/2023 3:58 PM by Dr. Ronnie Connor M.D.      MRI Brain Without Contrast    Result Date: 5/16/2023  1. Bilateral multifocal areas of infarction.  No evidence of hemorrhagic transformation.  This report was finalized on 5/16/2023 5:26 AM by Dr. Katherine Faustin M.D.      FL Video Swallow With Speech Single Contrast    Result Date: 5/16/2023  Fluoroscopy was provided for the speech pathologist during a video swallow study. For full details please see the speech pathology report  This report was finalized on 5/16/2023 2:12 PM by Dr. Tena Esquivel M.D.      XR Abdomen KUB    Result Date: 5/15/2023   As described.  This report was finalized on 5/15/2023 5:54 PM by Dr. Ronnie Connor M.D.      I have personally reviewed all medications:  Scheduled Medications  aspirin, 81 mg, Nasogastric, Daily   Or  aspirin, 300 mg, Rectal, Daily  atorvastatin, 80 mg, Oral,  Nightly  citalopram, 20 mg, Oral, Daily  clopidogrel, 75 mg, Nasogastric, Daily  doxycycline, 100 mg, Intravenous, Q12H  fluticasone, 1 spray, Each Nare, Daily  guaiFENesin, 600 mg, Oral, BID  hydrocortisone-bacitracin-zinc oxide-nystatin, 1 application, Topical, BID  ipratropium-albuterol, 3 mL, Nebulization, 4x Daily - RT  Menthol-Zinc Oxide, 1 application, Topical, 4x Daily  meropenem, 1 g, Intravenous, Q8H  metoprolol tartrate, 12.5 mg, Oral, Q12H  sodium chloride, 10 mL, Intravenous, Q12H  sodium chloride, 10 mL, Intravenous, Q12H  cyanocobalamin, 1,000 mcg, Oral, Daily    Infusions  hold, 1 each    Diet  NPO Diet NPO Type: Strict NPO    I have personally reviewed:  [x]  Laboratory   [x]  Microbiology   [x]  Radiology   [x]  EKG/Telemetry  [x]  Cardiology/Vascular   []  Pathology    [x]  Records      Assessment/Plan     Active Hospital Problems    Diagnosis  POA   • **Abscess of right lung with pneumonia [J85.1]  Yes   • Cryptogenic stroke [I63.9]  No   • Diastolic dysfunction, grade 1 [I51.89]  Yes   • Physical debility [R53.81]  Clinically Undetermined   • Sepsis [A41.9]  Yes   • Pleural effusion, right [J90]  Yes   • Other emphysema [J43.8]  Yes   • Pulmonary nodule (RLL) [R91.1]  Yes   • Acute respiratory failure with hypoxia [J96.01]  Yes   • Hepatic steatosis [K76.0]  Yes   • Tobacco abuse [Z72.0]  Yes   • Benign essential HTN [I10]  Yes      Resolved Hospital Problems   No resolved problems to display.       72 y.o. female admitted with Abscess of right lung with pneumonia.  She is currently on IV meropenem and doxycycline per infectious disease through 6/8/2023.  During this hospitalization she has suffered bilateral embolic appearing strokes for which she received TNK.    Patient transferred out of ICU yesterday.  Neurologically much improved.  ELIGIO was performed today and was negative for source of stroke.  Patient is on DAPT per neurology.  She will be discharged with a Zio patch in place.    Continue  "antibiotics for lung abscess per ID and pulmonology.  Respiratory status much improved and she has been weaned down to just 1 or 2 L/min nasal cannula.  Core track has been removed and speech therapy planning swallow evaluation.      Hyponatremia not clinically relevant.    She is normotensive.  Currently receiving metoprolol.  Was on irbesartan before admission.    We will resume diet      · SCDs for DVT prophylaxis.  · Full code.  · Discussed with patient.  · Anticipate discharge to [facility] vs [facility] timing yet to be determined..      Bharat Calabrese MD  [facility]  23  14:28 EDT        Electronically signed by Bharat Calabrese MD at 23 1432     Talya Clemons MD at 23 1051          Patient Name: Katherine Williamson  :1950  72 y.o.      Patient Care Team:  Zelalem Flor APRN as PCP - General (Internal Medicine)  Stephen, Brandon Meeyr MD as Consulting Physician (Orthopedic Surgery)    Interval History:   S/p ELIGIO     Subjective:  Following for CVA    Objective   Vital Signs  Temp:  [97.8 °F (36.6 °C)-98.8 °F (37.1 °C)] 97.8 °F (36.6 °C)  Heart Rate:  [] 74  Resp:  [16-20] 19  BP: (121-161)/(62-86) 127/62    Intake/Output Summary (Last 24 hours) at 2023 1051  Last data filed at 2023 0600  Gross per 24 hour   Intake 240 ml   Output 1800 ml   Net -1560 ml     Flowsheet Rows    Flowsheet Row First Filed Value   Admission Height 160 cm (63\") Documented at 2023 1900   Admission Weight 61.2 kg (135 lb) Documented at 2023 1900          Physical Exam:   General Appearance:    Alert, cooperative, in no acute distress   Lungs:     Clear to auscultation.  Normal respiratory effort and rate.      Heart:    Regular rhythm and normal rate, normal S1 and S2, no murmurs, gallops or rubs.     Chest Wall:    No abnormalities observed   Abdomen:     Soft, nontender, positive bowel sounds.     Extremities:   no cyanosis, clubbing or edema.  No marked joint deformities. "  Adequate musculoskeletal strength.       Results Review:    Results from last 7 days   Lab Units 05/16/23  0340   SODIUM mmol/L 135*   POTASSIUM mmol/L 3.9   CHLORIDE mmol/L 101   CO2 mmol/L 31.0*   BUN mg/dL 13   CREATININE mg/dL 0.22*   GLUCOSE mg/dL 80   CALCIUM mg/dL 8.3*         Results from last 7 days   Lab Units 05/16/23  0340   WBC 10*3/mm3 11.81*   HEMOGLOBIN g/dL 10.0*   HEMATOCRIT % 29.0*   PLATELETS 10*3/mm3 365         Results from last 7 days   Lab Units 05/15/23  0309   CHOLESTEROL mg/dL 94     Results from last 7 days   Lab Units 05/15/23  0309   MAGNESIUM mg/dL 1.8     Results from last 7 days   Lab Units 05/15/23  0309   CHOLESTEROL mg/dL 94   TRIGLYCERIDES mg/dL 68   HDL CHOL mg/dL 32*   LDL CHOL mg/dL 47         Medication Review:   aspirin, 81 mg, Nasogastric, Daily   Or  aspirin, 300 mg, Rectal, Daily  atorvastatin, 80 mg, Oral, Nightly  citalopram, 20 mg, Oral, Daily  clopidogrel, 75 mg, Nasogastric, Daily  doxycycline, 100 mg, Intravenous, Q12H  fluticasone, 1 spray, Each Nare, Daily  guaiFENesin, 600 mg, Oral, BID  hydrocortisone-bacitracin-zinc oxide-nystatin, 1 application, Topical, BID  ipratropium-albuterol, 3 mL, Nebulization, 4x Daily - RT  Menthol-Zinc Oxide, 1 application, Topical, 4x Daily  meropenem, 1 g, Intravenous, Q8H  metoprolol tartrate, 12.5 mg, Oral, Q12H  sodium chloride, 10 mL, Intravenous, Q12H  sodium chloride, 10 mL, Intravenous, Q12H  cyanocobalamin, 1,000 mcg, Oral, Daily         hold, 1 each  sodium chloride, , Last Rate: 50 mL/hr (05/17/23 1042)        Assessment & Plan     1.  Acute stroke with bilateral infarcts.  2.  Pneumonia with right lung abscess  3.  Emphysema/tobacco use.  4.  Carotid artery disease.  5.  Hypertension     ELIGIO does not show a cardiac source of embolus.  Discharged with a Zio patch.    Talya Clemons MD, [facility]  05/17/23  10:51 EDT        Electronically signed by Talya Clemons MD at 05/17/23 3342      Rigoberto Chan MD at 05/17/23 0846          ID NOTE    CC: f/u pneumonia (right worse than left) w/ possible abscess    Subj: Pt resting comfortably. Wakes up and answers questions better today. Down to just 1-2L NC today. No fever.       Medications:    Current Facility-Administered Medications:   •  acetaminophen (TYLENOL) tablet 650 mg, 650 mg, Oral, Q4H PRN, 650 mg at 05/12/23 1054 **OR** acetaminophen (TYLENOL) 160 MG/5ML solution 650 mg, 650 mg, Oral, Q4H PRN **OR** acetaminophen (TYLENOL) suppository 650 mg, 650 mg, Rectal, Q4H PRN, Gee Clement MD, 650 mg at 05/14/23 1759  •  aspirin chewable tablet 81 mg, 81 mg, Nasogastric, Daily, 81 mg at 05/16/23 1213 **OR** aspirin suppository 300 mg, 300 mg, Rectal, Daily, Darius Ford MD  •  atorvastatin (LIPITOR) tablet 80 mg, 80 mg, Oral, Nightly, Indigo Curry MD, 80 mg at 05/16/23 2210  •  bismuth subsalicylate (PEPTO BISMOL) 262 MG/15ML suspension 30 mL, 30 mL, Oral, TID PRN, Issac Mendez MD, 30 mL at 05/14/23 0839  •  citalopram (CeleXA) tablet 20 mg, 20 mg, Oral, Daily, Gee Clement MD, 20 mg at 05/16/23 1214  •  clopidogrel (PLAVIX) tablet 75 mg, 75 mg, Nasogastric, Daily, Darius Ford MD, 75 mg at 05/17/23 0833  •  doxycycline (VIBRAMYCIN) 100 mg in sodium chloride 0.9 % 100 mL IVPB-VTB, 100 mg, Intravenous, Q12H, Darius Ford MD, 100 mg at 05/17/23 0833  •  fluticasone (FLONASE) 50 MCG/ACT nasal spray 1 spray, 1 spray, Each Nare, Daily, Gee Clement MD, 1 spray at 05/08/23 0808  •  guaiFENesin (MUCINEX) 12 hr tablet 600 mg, 600 mg, Oral, BID, Gee Clement MD, 600 mg at 05/16/23 2210  •  Hold medication, 1 each, Does not apply, Continuous PRN, Jennifer Hood MD  •  hydrocortisone-bacitracin-zinc oxide-nystatin (MAGIC BARRIER) ointment 1 application, 1 application, Topical, BID, Bharat Calabrese MD, 1 application at 05/17/23 0835  •  ipratropium-albuterol (DUO-NEB) nebulizer solution 3 mL, 3 mL, Nebulization,  4x Daily - RT, Gee Clement MD, 3 mL at 05/17/23 0730  •  labetalol (NORMODYNE,TRANDATE) injection 10 mg, 10 mg, Intravenous, Q10 Min PRN, Indigo Curry MD  •  Magnesium Cardiology Dose Replacement - Follow Nurse / BPA Driven Protocol, , Does not apply, PRN, Darius Ford MD  •  Menthol-Zinc Oxide 1 application, 1 application, Topical, 4x Daily, Bharat Calabrese MD, 1 application at 05/17/23 0834  •  meropenem (MERREM) 1 g in sodium chloride 0.9 % 100 mL IVPB-VTB, 1 g, Intravenous, Q8H, Rigoberto Chan MD, Last Rate: 33.3 mL/hr at 05/12/23 0449, 1 g at 05/17/23 0454  •  metoprolol tartrate (LOPRESSOR) tablet 12.5 mg, 12.5 mg, Oral, Q12H, Issac Mendez MD, 12.5 mg at 05/17/23 0833  •  nitroglycerin (NITROSTAT) SL tablet 0.4 mg, 0.4 mg, Sublingual, Q5 Min PRN, Gee Clement MD  •  ondansetron (ZOFRAN) injection 4 mg, 4 mg, Intravenous, Q6H PRN, Gee Clement MD, 4 mg at 05/11/23 1855  •  Phosphorus Replacement - Follow Nurse / BPA Driven Protocol, , Does not apply, PRLogan RIVERA Subin, MD  •  Potassium Replacement - Follow Nurse / BPA Driven Protocol, , Does not apply, Logan VENEGAS Subin, MD  •  [COMPLETED] Insert Peripheral IV, , , Once **AND** sodium chloride 0.9 % flush 10 mL, 10 mL, Intravenous, PRN, Gee Clement MD  •  sodium chloride 0.9 % flush 10 mL, 10 mL, Intravenous, Q12H, Gee Clement MD, 10 mL at 05/16/23 2200  •  sodium chloride 0.9 % flush 10 mL, 10 mL, Intravenous, PRN, Gee Clement MD  •  sodium chloride 0.9 % flush 10 mL, 10 mL, Intravenous, Q12H, Indigo Curry MD, 10 mL at 05/17/23 0834  •  sodium chloride 0.9 % flush 10 mL, 10 mL, Intravenous, PRN, Indigo Curry MD  •  sodium chloride 0.9 % infusion 40 mL, 40 mL, Intravenous, PRN, Gee Clement MD  •  sodium chloride 0.9 % infusion 40 mL, 40 mL, Intravenous, PRN, Indigo Curry MD  •  vitamin B-12 (CYANOCOBALAMIN) tablet 1,000 mcg, 1,000 mcg, Oral, Daily, Gee Clement MD,  "1,000 mcg at 05/16/23 1214      Objective   Vital Signs   Temp:  [98 °F (36.7 °C)-98.8 °F (37.1 °C)] 98.1 °F (36.7 °C)  Heart Rate:  [] 78  Resp:  [16-20] 16  BP: (121-161)/(64-86) 125/64    Physical Exam:   General: NAD  Eyes: no scleral icterus  ENT: no thrush; NC in nares  Cardiovascular: NR  Respiratory: clearer again today; no wheezing; normal WOB on 2L NC  GI: Abdomen is soft, not tender  Skin: No rashes  Psychiatric: Normal mood and affect     Labs:   Serum Histo Ag and blood cultures reviewed today  Lab Results   Component Value Date    WBC 11.81 (H) 05/16/2023    HGB 10.0 (L) 05/16/2023    HCT 29.0 (L) 05/16/2023    MCV 89.2 05/16/2023     05/16/2023     Lab Results   Component Value Date    GLUCOSE 80 05/16/2023    CALCIUM 8.3 (L) 05/16/2023     (L) 05/16/2023    K 3.9 05/16/2023    CO2 31.0 (H) 05/16/2023     05/16/2023    BUN 13 05/16/2023    CREATININE 0.22 (L) 05/16/2023    EGFR 123.2 05/16/2023    BCR 59.1 (H) 05/16/2023    ANIONGAP 3.0 (L) 05/16/2023     Lab Results   Component Value Date    CRP 3.52 (H) 05/14/2023     Procal 3.95--->2.6-->0.2  Crypto Ag negative  Urine Histo Ag negative  Serum Histo Ag negative  Histoplasma Ab negative  Urine Blasto Ag negative  Aspergillus Ag pending  ANCA negative      Microbiology:  5/2 RPP: negative  5/2 BCx: Corynebacterium in 1/2 sets  5/2 SpCx: normal aishwarya  5/2 MRSA nares: negative  5/2 Strep pneumo Ag; negative  5/2 Legionella Ag: negative  5/4 BCx: negative  5/5 C diff: negative  5/6 SpCx: normal aishwarya  5/9 AFB Cx: NGTD  5/9 Fungus Cx: NGTD  5/10 SpCx: rare normal aishwarya  5/10 L Thoracentesis Cx: negative  5/14 BCx: NGTD    Prior radiology:  MRI brain: \"Bilateral multifocal areas of infarction.  No evidence of hemorrhagic transformation. \"    ASSESSMENT/PLAN:  7. Pneumonia and right lung abscess  8. Pleural effusions  9. Acute hypoxic respiratory failure  10. Emphysema  11. Tobacco use  12. Acute R MCA stroke and bilateral " infarcts    Thanks to neurology team for their care of Ms. Williamson in regards to her stroke. MRI as above. Blood cultures negative. TTE negative for vegetations. ELIGIO planned. Doubt bacterial endocarditis.     Her fever is resolved. She is down to just 2L NC. WBC trending down.  I still recommend that we treat with antibiotics for a 4-week course with stop date 23. The regimen will be meropenem 1 g IV q8h and doxycycline 100 mg PO BID. I ordered a repeat CT to be done just prior to the stop date. She will need follow-up w/ pulmonologist as well.     Hold off on PICC for. I will re-order closer to time of discharge.     ID will follow.     Electronically signed by Rigoberto Chan MD at 23 [id number]     Darius Ford MD at 23 9921              [facility]  [phone]  Dr. Darius Ford    Subjective:  LOS: 14    Chief Complaint: Acute stroke    She is weak overall but generally improved from the standpoint of stroke.  Denies wheezing or cough.      Objective   Vital Signs past 24hrs  Temp range: Temp (24hrs), Av.9 °F (36.6 °C), Min:97.3 °F (36.3 °C), Max:98.7 °F (37.1 °C)    BP range: BP: (114-140)/(63-79) 136/70  Pulse range: Heart Rate:  [] 91  Resp rate range: Resp:  [18-20] 20  Device (Oxygen Therapy): nasal cannulaFlow (L/min):  [4-5] 4  Oxygen range:SpO2:  [86 %-100 %] 100 %     Physical Exam  Eyes:      Pupils: Pupils are equal, round, and reactive to light.   Cardiovascular:      Rate and Rhythm: Normal rate and regular rhythm.      Heart sounds: No murmur heard.  Pulmonary:      Effort: Pulmonary effort is normal.      Breath sounds: Decreased breath sounds present.      Comments: I could not hear any adventitious breath sounds but patient is unable to sit up for me  Abdominal:      General: Bowel sounds are normal.      Palpations: Abdomen is soft. There is no mass.      Tenderness: There is no abdominal tenderness.   Musculoskeletal:         General: No swelling.    Neurological:      Mental Status: She is alert.      Motor: Weakness (mild left-sided only now) present.       Results Review:    I have reviewed the laboratory and imaging data since the last note by [facility] physician.  My annotations are noted in assessment and plan.      Result Review:  I have personally reviewed the results from last note by [facility] physician to 5/16/2023 13:46 EDT and agree with these findings:  [x]  Laboratory list / accordion  [x]  Microbiology  [x]  Radiology  []  EKG/Telemetry   [x]  Cardiology/Vascular   []  Pathology  []  Old records  []  Other:    Medication Review:  I have reviewed the current MAR.  My annotations are noted in assessment and plan.    aspirin, 81 mg, Nasogastric, Daily   Or  aspirin, 300 mg, Rectal, Daily  atorvastatin, 80 mg, Oral, Nightly  citalopram, 20 mg, Oral, Daily  clopidogrel, 75 mg, Nasogastric, Daily  doxycycline, 100 mg, Intravenous, Q12H  fluticasone, 1 spray, Each Nare, Daily  guaiFENesin, 600 mg, Oral, BID  hydrocortisone-bacitracin-zinc oxide-nystatin, 1 application, Topical, BID  ipratropium-albuterol, 3 mL, Nebulization, 4x Daily - RT  Menthol-Zinc Oxide, 1 application, Topical, 4x Daily  meropenem, 1 g, Intravenous, Q8H  metoprolol tartrate, 12.5 mg, Oral, Q12H  sodium chloride, 10 mL, Intravenous, Q12H  sodium chloride, 10 mL, Intravenous, Q12H  cyanocobalamin, 1,000 mcg, Oral, Daily        hold, 1 each  niCARdipine, 5-15 mg/hr  phenylephrine, 0.5-3 mcg/kg/min, Last Rate: 0.5 mcg/kg/min (05/14/23 5844)      Lines, Drains & Airways     Active LDAs     Name Placement date Placement time Site Days    Peripheral IV 05/13/23 2021 Left Antecubital 05/13/23 2021  Antecubital  less than 1    Peripheral IV 05/14/23 1513 Anterior;Left;Upper Arm 05/14/23  1513  Arm  less than 1    External Urinary Catheter 05/07/23  --  --  7              No active isolations  Diet Orders (active) (From admission, onward)     Start     Ordered    05/14/23 1628  NPO Diet NPO Type:  Strict NPO  Diet Effective Now        Comments: Strict NPO Until Nursing Dysphagia Screen Passed    05/14/23 1633    05/07/23 1800  Dietary Nutrition Supplements Boost Plus (Ensure Enlive, Ensure Plus)  Daily With Breakfast, Lunch & Dinner       05/07/23 1203                PCCM Problems  Acute right MCA stroke now status post TNK  Bilateral strokes on MRI  Concern for airway protection and dysphagia  Acute hypoxic respiratory failure with worsening  Right lower lobe pneumonia and lung abscess with ongoing antibiotics  Right pleural effusion without evidence for empyema  New right lower lobe 2.4 cm lung nodule on CT 4/3/2023  COPD, currently not wheezing  Current cigarette smoker  Acute hyponatremia  Anemia        Plan of Treatment    Left-sided weakness with acute right MCA stroke and status post TNK.  Much improved.  Discussed with neurologist today.  MRI noted and bilateral strokes.  Eventually needs ELIGIO.  Cardiology is consulted.  Will be on DAPT.    Oxygen requirements have stabilized.  Now tolerating low-flow oxygen.    Ongoing antibiotics for right lower lobe pneumonia.  Continue as per ID.    Right lung nodule and now with right lung severe pneumonia.  Needs follow-up imaging to exclude malignancy.    COPD and currently not wheezing.  Now on nebulizer treatments to help with mobilization of secretions.    Patient will be counseled to quit smoking prior to discharge.    Hyponatremia from underlying lung disease likely.    Anemia from recent acute illness.  Other etiologies not ruled out yet.    Now with jose alberto for tube feeding. Will see how she does with modified consistency diet.    Transfer out.    Darius Ford MD  05/16/23  13:46 EDT      Part of this note may be an electronic transcription/translation of spoken language to printed text using the Dragon Dictation System.      Electronically signed by Darius Ford MD at 05/16/23 1353     Ant Ramos MD at 05/16/23 1036          DOS:  "2023  NAME: Katherine Williamson   : 1950  PCP: Zelalem Flor APRN  Chief Complaint   Patient presents with   • Shortness of Breath       Chief complaint: Stroke  Subjective: A little more fatigued today but otherwise no significant neurologic change    Objective:  Vital signs: /70   Pulse 91   Temp 98.7 °F (37.1 °C)   Resp 20   Ht 160 cm (62.99\")   Wt 63.1 kg (139 lb 1.8 oz)   SpO2 100%   BMI 24.65 kg/m²    Gen: NAD, vitals reviewed  MS: oriented x2, recent/remote memory intact, mildly impaired attention/concentration, language intact, no neglect.  CN: visual acuity grossly normal, PERRL, EOMI, no facial droop, no dysarthria  Motor: 5/5 throughout upper and lower extremities, normal tone  Sensory: intact to light touch all 4 ext.    Laboratory results:  Lab Results   Component Value Date    GLUCOSE 80 2023    CALCIUM 8.3 (L) 2023     (L) 2023    K 3.9 2023    CO2 31.0 (H) 2023     2023    BUN 13 2023    CREATININE 0.22 (L) 2023    EGFRIFAFRI 93 2018    EGFRIFNONA 76 2018    BCR 59.1 (H) 2023    ANIONGAP 3.0 (L) 2023     Lab Results   Component Value Date    WBC 11.81 (H) 2023    HGB 10.0 (L) 2023    HCT 29.0 (L) 2023    MCV 89.2 2023     2023     Lab Results   Component Value Date    LDL 47 05/15/2023     (H) 2018     (H) 2017         Lab 05/15/23  0309   HEMOGLOBIN A1C 6.20*        Review of labs: Sodium 135, WBC 12    Review and interpretation of imaging: I personally reviewed her brain MRI performed yesterday which shows right MCA and left FARZANEH infarcts.  Radiology report reviewed.    Diagnoses:  Stroke, right middle cerebral artery, embolic  Stroke, left anterior cerebral artery, embolic  Received tenecteplase in the inpatient setting  Cryptogenic stroke  Pneumonia, right lung abscess  COPD    Comment: Cryptogenic bilateral infarcts status post " inpatient tenecteplase with good improvement.  2D echo negative.  CTA and ultrasound negative.    Plan:  1.  Aspirin, statin  2.  Cardiology consult for ELIGIO for cryptogenic embolic stroke      Electronically signed by Ant Ramos MD at 05/16/23 1038

## 2023-05-19 NOTE — DISCHARGE SUMMARY
Patient Name: Katherine Williamson  : 1950  MRN: [id number]    Date of Admission: 2023  Date of Discharge:  2023  Primary Care Physician: Zelalem Flor APRN      Hospital Course     Chief Complaint:   Shortness of Breath      Active Hospital Problems    Diagnosis  POA   • **Abscess of right lung with pneumonia [J85.1]  Yes   • Cryptogenic stroke [I63.9]  No   • Diastolic dysfunction, grade 1 [I51.89]  Yes   • Physical debility [R53.81]  Clinically Undetermined   • Sepsis [A41.9]  Yes   • Pleural effusion, right [J90]  Yes   • Other emphysema [J43.8]  Yes   • Pulmonary nodule (RLL) [R91.1]  Yes   • Acute respiratory failure with hypoxia [J96.01]  Yes   • Hepatic steatosis [K76.0]  Yes   • Tobacco abuse [Z72.0]  Yes   • Benign essential HTN [I10]  Yes      Resolved Hospital Problems   No resolved problems to display.        Hospital Course:  Ms. Williamson is a 72 y.o. female who presented to [facility] initially complaining of weakness, shortness of breath and cough. Please see the admitting history and physical for further details. She was found to have respiratory failure, pneumonia and lung abscess and was admitted to the hospital for further evaluation and treatment.  She was seen by pulmonology as well as infectious disease and placed on broad-spectrum antibiotics.  From a respiratory standpoint she did show signs of improvement and did not require mechanical ventilation or surgical intervention.  She is now breathing comfortably on 2 L/min and hopefully this can be further tapered.  ELIGIO was negative for vegetations.  Infectious disease has recommended 4-week course of antibiotics as outlined below with stop date of 2023.  PICC line has been placed.  She will need repeat CT scan chest prior to stopping antibiotics.    Unfortunately her hospital stay was complicated by development of an acute stroke that required tPA and transferred to the intensive care unit.  This has resulted in  some left hemiparesis and mild speech disturbance but the symptoms have improved after administering TNK on 5/14.  CTA showed right MCA perfusion deficit but no proximal occlusion appeared to have a distal M3 occlusion report and perfusion deficit of the right posterior parietal lobe.  She has had no events on telemetry and TTE and ELIGIO were unrevealing.    Neurology has recommended DAPT with baby aspirin and Plavix.  She will continue on high intensity statin therapy with atorvastatin 80 mg.  She is to wear a long-term cardiac monitor at discharge.  Plan is for her to go to [facility] acute rehab for further aggressive rehabilitative care.  She will follow-up with neurology as outpatient.      Day of Discharge     HPI:   See today's progress note    Physical Exam:  Temp:  [97.5 °F (36.4 °C)-98.1 °F (36.7 °C)] 97.8 °F (36.6 °C)  Heart Rate:  [73-98] 84  Resp:  [16-20] 20  BP: (110-136)/(59-72) 110/59  Physical Exam      Consultants     Pulmonology  Cardiothoracic surgery  Psychiatry  Infectious disease  Neurology  Cardiology      Discharge Details     Inter-facility transfer medications (From admission, onward)             ipratropium-albuterol (DUO-NEB) nebulizer solution 3 mL  Every 4 Hours PRN             vitamin B-12 (CYANOCOBALAMIN) tablet 1,000 mcg  Daily             fluticasone (FLONASE) 50 MCG/ACT nasal spray 1 spray  Daily             guaiFENesin (MUCINEX) 12 hr tablet 600 mg  2 Times Daily             citalopram (CeleXA) tablet 20 mg  Daily             meropenem (MERREM) 1 g in sodium chloride 0.9 % 100 mL IVPB-VTB  (Meropenem IV Extended Infusion)  Every 8 Hours             metoprolol tartrate (LOPRESSOR) tablet 12.5 mg  Every 12 Hours Scheduled             atorvastatin (LIPITOR) tablet 80 mg  Nightly             hydrocortisone-bacitracin-zinc oxide-nystatin (MAGIC BARRIER) ointment 1 application  2 Times Daily             Menthol-Zinc Oxide 1 application  4 Times Daily             clopidogrel (PLAVIX) tablet  75 mg  Daily             acetaminophen (TYLENOL) tablet 650 mg  Every 6 Hours PRN             doxycycline (MONODOX) capsule 100 mg  Every 12 Hours Scheduled                        Allergies   Allergen Reactions   • Hydrocodone-Acetaminophen Itching       Discharge Disposition:  [facility]      CODE STATUS  Full code      Time Spent on Discharge:  Greater than 30 minutes      Electronically signed by Bharat Calabrese MD, 5/19/2023, 15:13 EDT

## 2023-05-19 NOTE — SIGNIFICANT NOTE
"   05/19/23 1208   PICC Single Lumen 05/19/23 Right Basilic   Placement date: If unknown, DO NOT use \"Add Comment\" note/Placement time: If unknown, DO NOT use \"Add Comment\" note: 05/19/23 1200   Hand Hygiene Completed: Yes  Size (Fr): 4  Description (optional): single lumen picc  Length (cm): 27 cm  Orientation:...   Site Assessment Clean;Dry;Intact   #1 Lumen Status Blood return noted;Capped;Flushed;Normal saline locked   Length dung (cm) 0 cm   Extremity Circumference (cm) 27 cm   Dressing Type Border Dressing;Securing device;Antimicrobial dressing/disc   Dressing Status Clean;Dry;Intact   Dressing Intervention New dressing   Liquid Adhesive Contraindicated (comment)   Dressing Change Due 05/26/23   Indication/Daily Review of Necessity long-term IV access >7 days;other (see comments)  (Meropenem until 6/8/23)     LOT#[id number] EXPIRES 2024-06-30    Placed single lumen picc without difficulty. Tip was verified SVC via sherlock 3cg. Reported to Cecilia RN picc is ok to use now.    3 needles, 2 wires, and 1 scalpel counted and disposed of properly following line placement.      "

## 2023-05-19 NOTE — PROGRESS NOTES
Case Management Discharge Note      Final Note: Per [facility] has obtained [place] pre-cert and can accept pt today. FAVIO MAKI notified via liaison.    Provided Post Acute Provider List?: Yes  Post Acute Provider List: Nursing Home, Home Health  Delivered To: Patient  Method of Delivery: In person    Selected Continued Care - Admitted Since 5/2/2023     Destination Coordination complete.    Service Provider Selected Services Address Phone Fax Patient Preferred    [facility] Inpatient Rehabilitation [address] [id number] [phone] -- --       Internal Comment last updated by Kt Ontiveros, RN 5/12/2023 1944    Order placed                      Durable Medical Equipment    No services have been selected for the patient.              Dialysis/Infusion    No services have been selected for the patient.              Home Medical Care Coordination complete.    Service Provider Selected Services Address Phone Fax Patient Preferred    [facility] [address] [address] [phone] --          Therapy    No services have been selected for the patient.              Community Resources    No services have been selected for the patient.              Community & DME    No services have been selected for the patient.                       Final Discharge Disposition Code: 62 - inpatient rehab facility

## 2023-05-19 NOTE — PROGRESS NOTES
Continued Stay Note  [facility]     Patient Name: Katherine Williamson  MRN: [id number]  Today's Date: 5/19/2023    Admit Date: 5/2/2023    Plan: [facility] acute rehab pending [place] pre-cert, initiated 5/18   Discharge Plan     Row Name 05/19/23 1440       Plan    Plan [facility] acute rehab pending [place] pre-cert, initiated 5/18    Patient/Family in Agreement with Plan yes    Plan Comments Per [facility] acute rehab continues to await [place] pre-cert. Emilia Su LCSW               Discharge Codes    No documentation.               Expected Discharge Date and Time     Expected Discharge Date Expected Discharge Time    May 19, 2023             Danni Su LCSW

## 2023-05-19 NOTE — THERAPY TREATMENT NOTE
Patient Name: Katherine Williamson  : 1950    MRN: [id number]                              Today's Date: 2023       Admit Date: 2023    Visit Dx:     ICD-10-CM ICD-9-CM   1. Acute respiratory failure with hypoxia  J96.01 518.81   2. Sepsis, due to unspecified organism, unspecified whether acute organ dysfunction present  A41.9 038.9     995.91   3. Community acquired pneumonia, unspecified laterality  J18.9 486   4. Abnormal CXR  R93.89 793.2   5. Hyperglycemia  R73.9 790.29   6. 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   • History of repair of hip joint   • History of operative procedure on hip   • Hyperglycemia   • Hepatic steatosis   • Diverticulosis   • Chest pain, atypical   • History of colon polyps   • Family history of colon cancer in mother   • Allergic rhinitis   • Lung nodule seen on imaging study   • Acute respiratory failure with hypoxia   • Abscess of right lung with pneumonia   • Empyema   • Sepsis   • Pleural effusion, right   • Other emphysema   • Pulmonary nodule (RLL)   • Diastolic dysfunction, grade 1   • Physical debility   • Cryptogenic stroke     Past Medical History:   Diagnosis Date   • Arthritis    • Cataract    • Colon polyps     FOLLOWED BY DR. JOSEPH LAU   • Hypertension      Past Surgical History:   Procedure Laterality Date   • COLONOSCOPY  10/2015    [facility].  Follow-up in 5 years.   • COLONOSCOPY N/A 2022    2 BENIGN POLYPS IN DESCENDING, 5 MM BENIGN POLYP IN SIGMOID, MULTIPLE SMALL AND LARGE DIVERTICULA IN SIGMOID, RESCOPE IN 3 YRS, DR. JOSEPH LAU AT [facility]   • EYE SURGERY     • JOINT REPLACEMENT  ?    Left total hip replacement in .  In 2015 patient had  left hip revision   • TONSILLECTOMY        General Information     [facility] Name 05/19/23 1435          Physical Therapy Time and Intention    Document Type therapy note (daily note)  -     Mode of Treatment physical therapy  -[facility] Name 05/19/23 1435          General Information    Patient Profile Reviewed yes  -MW     Existing Precautions/Restrictions fall;oxygen therapy device and L/min  2L O2 NC  -MW     [facility] Name 05/19/23 1435          Cognition    Orientation Status (Cognition) oriented x 4  -MW     Row Name 05/19/23 1435          Safety Issues, Functional Mobility    Impairments Affecting Function (Mobility) balance;endurance/activity tolerance;range of motion (ROM)  -     Comment, Safety Issues/Impairments (Mobility) gait belt and nonslip socks used for safety  -           User Key  (r) = Recorded By, (t) = Taken By, (c) = Cosigned By    Initials Name Provider Type    Caridad Martin PT Physical Therapist               Mobility     [facility] Name 05/19/23 1504          Bed Mobility    Bed Mobility supine-sit  -     Supine-Sit [place] (Bed Mobility) contact guard;minimum assist (75% patient effort)  -     Sit-Supine [place] (Bed Mobility) minimum assist (75% patient effort);verbal cues;2 person assist  -     Assistive Device (Bed Mobility) head of bed elevated;bed rails  -     Comment, (Bed Mobility) Moves slowly, cues to reach for rail. Returned to bed after c/o lightheadedness and hypotension so A x 2 used.  -[facility] Name 05/19/23 1504          Sit-Stand Transfer    Sit-Stand [place] (Transfers) not tested  -MW     Row Name 05/19/23 1504          Gait/Stairs (Locomotion)    [place] Level (Gait) not tested  -     Comment, (Gait/Stairs) Standing and gait deferred due to orthostatics  -           User Key  (r) = Recorded By, (t) = Taken By, (c) = Cosigned By    Initials Name Provider Type    Caridad Martin PT Physical Therapist               Obj/Interventions    No  documentation.                Goals/Plan    No documentation.                Clinical Impression     Row Name 05/19/23 1438          Pain    Pretreatment Pain Rating 0/10 - no pain  -MW     Posttreatment Pain Rating 0/10 - no pain  -MW     Row Name 05/19/23 1438          Plan of Care Review    Plan of Care Reviewed With patient  -MW     Progress no change  -MW     Outcome Evaluation Patient was agreeable to PT tx this PM. States that she prefers to be seen during the AM. She sat up at EOB with CG/Min A. BP taken in supine then again when sitting EOB. Diastolic dropped and she began to c/o lightheadedness so returned to supine. BP was back to BL once supine with head elevated. O2 sats remained WFL. RN notified.  -MW     Row Name 05/19/23 1438          Vital Signs    Pre Systolic BP Rehab 120  -MW     Pre Treatment Diastolic BP 59  -MW     Intra Systolic BP Rehab 109  -MW     Intra Treatment Diastolic BP 59  -MW     Post Systolic BP Rehab 124  -MW     Post Treatment Diastolic BP 68  -MW     Pretreatment Heart Rate (beats/min) 83  -MW     Posttreatment Heart Rate (beats/min) 90  -MW     Pre SpO2 (%) 92  2L  -MW     O2 Delivery Pre Treatment nasal cannula  -MW     O2 Delivery Intra Treatment nasal cannula  -MW     Post SpO2 (%) 95  -MW     O2 Delivery Post Treatment nasal cannula  -MW     Pre Patient Position Supine  -MW     Intra Patient Position Sitting  -MW     Post Patient Position Supine  -MW     Row Name 05/19/23 1438          Positioning and Restraints    Pre-Treatment Position in bed  -MW     Post Treatment Position bed  -MW     In Bed supine;notified nsg;call light within reach;encouraged to call for assist;exit alarm on;SCD pump applied;with other staff  RT in room; needs met, lines intact  -MW           User Key  (r) = Recorded By, (t) = Taken By, (c) = Cosigned By    Initials Name Provider Type    Caridad Martin, SHIRLEY Physical Therapist               Outcome Measures     Row Name 05/19/23 1511          How  much help from another person do you currently need...    Turning from your back to your side while in flat bed without using bedrails? 4  -MW     Moving from lying on back to sitting on the side of a flat bed without bedrails? 3  -MW     Moving to and from a bed to a chair (including a wheelchair)? 2  -MW     Standing up from a chair using your arms (e.g., wheelchair, bedside chair)? 2  -MW     Climbing 3-5 steps with a railing? 1  -MW     To walk in hospital room? 2  -MW     AM-PAC 6 Clicks Score (PT) 14  -MW     Highest level of mobility 4 --> Transferred to chair/commode  -     Row Name 05/19/23 1511          Modified [place] Scale    Modified [place] Scale 4 - Moderately severe disability.  Unable to walk without assistance, and unable to attend to own bodily needs without assistance.  -     Row Name 05/19/23 1511          Functional Assessment    Outcome Measure Options AM-PAC 6 Clicks Basic Mobility (PT)  -           User Key  (r) = Recorded By, (t) = Taken By, (c) = Cosigned By    Initials Name Provider Type    Caridad Martin, PT Physical Therapist                             Physical Therapy Education     Title: PT OT SLP Therapies (Done)     Topic: Physical Therapy (Done)     Point: Mobility training (Done)     Learning Progress Summary           Patient Acceptance, E, VU,NR by SUSAN at 5/19/2023 1512    Eager, E,TB,D, VU,DU,NR by NR at 5/18/2023 1323    Acceptance, E,TB, VU,DU,NR by PH at 5/18/2023 0945    Acceptance, E,TB, VU,NR by PH at 5/17/2023 1201    Acceptance, E,TB, VU by XO at 5/14/2023 0953    Acceptance, E,TB, VU by XO at 5/13/2023 1528    Acceptance, E,TB,D, VU,NR by SM at 5/13/2023 1156    Acceptance, E,TB, VU,NR by ST at 5/12/2023 1141                   Point: Home exercise program (Done)     Learning Progress Summary           Patient Eager, E,TB,D, VU,DU,NR by NR at 5/18/2023 1323    Acceptance, E,TB, VU by XO at 5/14/2023 0953    Acceptance, E,TB, VU by XO at 5/13/2023 1528     Acceptance, E,TB,D, VU,NR by  at 5/13/2023 1156    Acceptance, E,TB, VU,NR by ST at 5/12/2023 1141                   Point: Body mechanics (Done)     Learning Progress Summary           Patient Acceptance, E, VU,NR by  at 5/19/2023 1512    Eager, E,TB,D, VU,DU,NR by NR at 5/18/2023 1323    Acceptance, E,TB, VU,DU,NR by PH at 5/18/2023 0945    Acceptance, E,TB, VU,NR by PH at 5/17/2023 1201    Acceptance, E,TB, VU by XO at 5/14/2023 0953    Acceptance, E,TB, VU by XO at 5/13/2023 1528    Acceptance, E,TB,D, VU,NR by  at 5/13/2023 1156    Acceptance, E,TB, VU,NR by ST at 5/12/2023 1141                   Point: Precautions (Done)     Learning Progress Summary           Patient Acceptance, E, VU,NR by  at 5/19/2023 1512    Eager, E,TB,D, VU,DU,NR by NR at 5/18/2023 1323    Acceptance, E,TB, VU,DU,NR by PH at 5/18/2023 0945    Acceptance, E,TB, VU,NR by PH at 5/17/2023 1201    Acceptance, E,TB, VU by XO at 5/14/2023 0953    Acceptance, E,TB, VU by XO at 5/13/2023 1528    Acceptance, E,TB,D, VU,NR by  at 5/13/2023 1156                               User Key     Initials Effective Dates Name Provider Type Discipline    NR 06/16/21 -  Teodora Marsh MA,CCC-SLP Speech and Language Pathologist SLP     03/07/18 -  Perla Ruvalcaba PTA Physical Therapist Assistant PT    PH 06/16/21 -  Shanell Cruz PTA Physical Therapist Assistant PT     06/16/21 -  Caridad Mina, PT Physical Therapist PT    XO 09/22/22 -  Marley Cruz, RN Registered Nurse Nurse    ST 09/22/22 -  Verena Hess, PT Physical Therapist PT              PT Recommendation and Plan     Plan of Care Reviewed With: patient  Progress: no change  Outcome Evaluation: Patient was agreeable to PT tx this PM. States that she prefers to be seen during the AM. She sat up at EOB with CG/Min A. BP taken in supine then again when sitting EOB. Diastolic dropped and she began to c/o lightheadedness so returned to supine. BP was back to BL once  supine with head elevated. O2 sats remained WFL. RN notified.     Time Calculation:    PT Charges     Row Name 05/19/23 1458             Time Calculation    Start Time 1428  -MW      Stop Time 1449  -MW      Time Calculation (min) 21 min  -MW      PT Received On 05/19/23  -MW      PT - Next Appointment 05/20/23  -MW         Time Calculation- PT    Total Timed Code Minutes- PT 19 minute(s)  -MW            User Key  (r) = Recorded By, (t) = Taken By, (c) = Cosigned By    Initials Name Provider Type    Caridad Martin PT Physical Therapist              Therapy Charges for Today     Code Description Service Date Service Provider Modifiers Qty    92626786782 HC PT THERAPEUTIC ACT EA 15 MIN 5/19/2023 Caridad Mina, PT GP 1    23795720677 HC PT THER SUPP EA 15 MIN 5/19/2023 Caridad Mina, PT GP 1          PT G-Codes  Outcome Measure Options: AM-PAC 6 Clicks Basic Mobility (PT)  AM-PAC 6 Clicks Score (PT): 14  AM-PAC 6 Clicks Score (OT): 13  Modified Hale Scale: 4 - Moderately severe disability.  Unable to walk without assistance, and unable to attend to own bodily needs without assistance.       Caridad Mina PT  5/19/2023

## 2023-05-19 NOTE — NURSING NOTE
Iv team consulted to place a picc for meropenem until 6/8/23. Labs, H&P, and xray clear for picc placement.   Discharge pending precert to [facility]. Cecilia CLAYTON Will complete missing consent paperwork,  will call if discharge orders are placed.

## 2023-05-19 NOTE — THERAPY TREATMENT NOTE
Acute Care - Speech Language Pathology Treatment Note  [facility]     Patient Name: Katherine Williamson  : 1950  MRN: [id number]  Today's Date: 2023               Admit Date: 2023     Visit Dx:    ICD-10-CM ICD-9-CM   1. Acute respiratory failure with hypoxia  J96.01 518.81   2. Sepsis, due to unspecified organism, unspecified whether acute organ dysfunction present  A41.9 038.9     995.91   3. Community acquired pneumonia, unspecified laterality  J18.9 486   4. Abnormal CXR  R93.89 793.2   5. Hyperglycemia  R73.9 790.29   6. 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   • History of repair of hip joint   • History of operative procedure on hip   • Hyperglycemia   • Hepatic steatosis   • Diverticulosis   • Chest pain, atypical   • History of colon polyps   • Family history of colon cancer in mother   • Allergic rhinitis   • Lung nodule seen on imaging study   • Acute respiratory failure with hypoxia   • Abscess of right lung with pneumonia   • Empyema   • Sepsis   • Pleural effusion, right   • Other emphysema   • Pulmonary nodule (RLL)   • Diastolic dysfunction, grade 1   • Physical debility   • Cryptogenic stroke     Past Medical History:   Diagnosis Date   • Arthritis    • Cataract    • Colon polyps     FOLLOWED BY DR. JOSEPH LAU   • Hypertension      Past Surgical History:   Procedure Laterality Date   • COLONOSCOPY  10/2015    [facility].  Follow-up in 5 years.   • COLONOSCOPY N/A 2022    2 BENIGN POLYPS IN DESCENDING, 5 MM BENIGN POLYP IN SIGMOID, MULTIPLE SMALL AND LARGE DIVERTICULA IN SIGMOID, RESCOPE IN 3 YRS, DR. JOSEPH LAU AT [facility]   • EYE SURGERY     • JOINT REPLACEMENT  ?    Left total hip  "replacement in 2005.  In December 2015 patient had left hip revision   • TONSILLECTOMY         SLP Recommendation and Plan                                                            SLP EVALUATION (last 72 hours)     SLP SLC Evaluation     Row Name 05/19/23 1500 05/18/23 1200                Communication Assessment/Intervention    Document Type therapy note (daily note)  -TH evaluation  -NR       Patient Observations alert;cooperative;agree to therapy  -TH alert;cooperative;agree to therapy  -NR       Patient Effort excellent  -TH good  -NR       Symptoms Noted During/After Treatment -- none  -NR          General Information    Pertinent History Of Current Problem -- right MCA stroke s/p TNK  -NR       Precautions/Limitations, Vision -- WFL;for purposes of eval  -NR       Precautions/Limitations, Hearing -- WFL  -NR       Plans/Goals Discussed with -- patient;agreed upon  -NR       Barriers to Rehab -- none identified  -NR          Pain Scale: Numbers Pre/Post-Treatment    Pretreatment Pain Rating -- 0/10 - no pain  -NR       Posttreatment Pain Rating -- 0/10 - no pain  -NR          Oral Musculature and Cranial Nerve Assessment    Oral Motor General Assessment -- WFL  -NR          Motor Speech Assessment/Intervention    Motor Speech Function -- WNL  -NR          Cognitive Assessment Intervention- SLP    Cognitive Function (Cognition) -- mild impairment  -NR       Orientation Status (Cognition) -- person;place;situation;mild impairment  Pt stated it was June  -NR       Memory (Cognitive) -- mild impairment  -NR       Attention (Cognitive) -- mild impairment  -NR       Functional Math (Cognitive) -- money calculation;mild impairment  -NR       Right Hemisphere Function -- visuo-spatial;mild impairment  -NR       Cognition, Comment -- Pt is alert and pleasant.  Pt frustrated with some events with hospital stay.  \"You can call me bitch\" pt stated.  Pt oriented except to month.  Pt required min cues for sustained " attention.  Pt able to relay personal past and details of admission however no family present to verify accuracy.  Pt able to listen to short narrative discourse and answer 4/4 (100%) questions accurately.  Pt able to recall 3/5 (60%) of words after sesveral minute delay.  Pt presents with some visual-spatial deficits.  Pt able to write the hands on a mock clock face however unable to write the correct target time.  Pt states she feels her cognition was not effected after CVA, however feel pt may lack insigh and awareness into her deficits.  Cognition was assessed via the SLUMS.  Agree with transfer to inpatient rehab setting for therapy/  -NR          Standardized Tests    Cognitive/Memory Tests -- SLUMS: [facility] Mental Status Examination  -NR          SLUMS: [facility] Mental Status Examination    SLUMS Score -- 25  -NR       SLUMS Range -- 21-26: Mild Neurocognitive Disorder (High school education or higher)  -NR       SLUMS Comments -- See abpve fpr ,pre information regarding SLUMS results.  Pt stated her highest education is a sophomore in high school.  -NR          SLP Evaluation Clinical Impressions    SLP Diagnosis -- mild;cognitive-linguistic disorder  -NR       Rehab Potential/Prognosis -- excellent  -NR       SLC Criteria for Skilled Therapy Interventions Met -- yes  -NR       Functional Impact -- Poor Judgement;difficulty completing home management task  -NR          Recommendations    Therapy Frequency (SLP SLC) -- PRN  -NR       Predicted Duration Therapy Intervention (Days) -- until discharge  -NR       Anticipated Discharge Disposition (SLP) -- inpatient rehabilitation facility  -NR       SLC Diagnostic Follow-Up Needed -- auditory comprehension;reading comprehension;verbal expression;graphic expression;higher-level cognitive-linguistic  -NR          Communication Treatment Objective and Progress Goals (SLP)    SLC LTGs -- Patient will demonstrate functional  cognitive-linguistic skills for return to discharge environment  -NR       SLC STGs -- Additional Goals (Group)  -NR       Diagnostic Treatment Objective -- Diagnostic Treatment Objectives (Group)  -NR       Cognitive Linguistic Treatment Objectives -- Cognitive Linguistic Treatment Objectives (Group)  -NR          Diagnostic Treatment Objectives    Diagnostic Treatment Objective Selection -- Diagnostic Treatment Objectives  -NR          SLP Diagnostic Treatment     Patient will participate in further assessment in the following areas -- auditory comprehension;reading comprehension;verbal expression;graphic expression;higher-level cognitive-linguistic  -NR       Time Frame (Diagnostic) -- by discharge  -NR          Cognitive Linguistic Treatment Objectives    Attention Selection -- attention, SLP goal 1  -NR       Orientation Selection -- orientation, SLP goal 1  -NR       Memory Skills Selection -- memory skills, SLP goal 1  -NR       Functional Math Skills Selection -- functional math skills, SLP goal 1  -NR       Right Hemisphere Function Selection -- right hemisphere function, SLP goal 1  -NR          Attention Goal 1 (SLP)    Improve Attention by Goal 1 (SLP) -- complete selective attention task;complete divided attention task;90%;with minimal cues (75-90%)  -NR       Time Frame (Attention Goal 1, SLP) -- by discharge  -NR          Orientation Goal 1 (SLP)    Improve Orientation Through Goal 1 (SLP) -- demonstrating orientation to month;100%;independently (over 90% accuracy)  -NR       Time Frame (Orientation Goal 1, SLP) -- by discharge  -NR          Memory Skills Goal 1 (SLP)    Improve Memory Skills Through Goal 1 (SLP) -- recalling unrelated word lists with an imposed delay;listen to multiple paragraphs and answer questions;use external memory aid;recall details of the day;90%;with minimal cues (75-90%)  -NR       Time Frame (Memory Skills Goal 1, SLP) -- by discharge  -NR          Functional Math Skills  Goal 1 (SLP)    Improve Functional Math Skills Through Goal 1 (SLP) -- complete word problems involving money;complete functional math task;90%;with minimal cues (75-90%)  -NR       Time Frame (Functional Math Skills Goal 1, SLP) -- by discharge  -NR          Right Hemisphere Function Goal 1 (SLP)    Improve Right Hemisphere Function Through Goal 1 (SLP) -- complete visuo-perceptual activities (L/R discrimination, spatial concepts);perform self-monitoring;90%;with minimal cues (75-90%)  -NR             User Key  (r) = Recorded By, (t) = Taken By, (c) = Cosigned By    Initials Name Effective Dates    NR Teodora Marsh MA,CCC-SLP 06/16/21 -     TH Rosa Arrieta 06/16/21 -                    EDUCATION  The patient has been educated in the following areas:     Cognitive Impairment.           SLP GOALS     Row Name 05/19/23 1500 05/18/23 1200          Attention Goal 1 (SLP)    Improve Attention by Goal 1 (SLP) complete selective attention task;complete divided attention task;90%;with minimal cues (75-90%)  -TH complete selective attention task;complete divided attention task;90%;with minimal cues (75-90%)  -NR     Time Frame (Attention Goal 1, SLP) -- by discharge  -NR     Comment (Attention Goal 1, SLP) Written directions completed with 90% accuracy on this date  -TH --        Orientation Goal 1 (SLP)    Improve Orientation Through Goal 1 (SLP) demonstrating orientation to month;100%;independently (over 90% accuracy)  -TH demonstrating orientation to month;100%;independently (over 90% accuracy)  -NR     Time Frame (Orientation Goal 1, SLP) -- by discharge  -NR     Comment (Orientation Goal 1, SLP) Patient oriented to place, reason for being here,and date/year with 100% accuracy  -TH --        Memory Skills Goal 1 (SLP)    Improve Memory Skills Through Goal 1 (SLP) recalling unrelated word lists with an imposed delay;listen to multiple paragraphs and answer questions;use external memory aid;recall details of the  day;90%;with minimal cues (75-90%)  -TH recalling unrelated word lists with an imposed delay;listen to multiple paragraphs and answer questions;use external memory aid;recall details of the day;90%;with minimal cues (75-90%)  -NR     Time Frame (Memory Skills Goal 1, SLP) -- by discharge  -NR     Comment (Memory Skills Goal 1, SLP) patient able to sustain novel information about SLP for 5-10 min delay with 100% accuracy  -TH --        Functional Math Skills Goal 1 (SLP)    Improve Functional Math Skills Through Goal 1 (SLP) -- complete word problems involving money;complete functional math task;90%;with minimal cues (75-90%)  -NR     Time Frame (Functional Math Skills Goal 1, SLP) -- by discharge  -NR        Right Hemisphere Function Goal 1 (SLP)    Improve Right Hemisphere Function Through Goal 1 (SLP) complete visuo-perceptual activities (L/R discrimination, spatial concepts);perform self-monitoring;90%;with minimal cues (75-90%)  -TH complete visuo-perceptual activities (L/R discrimination, spatial concepts);perform self-monitoring;90%;with minimal cues (75-90%)  -NR     Comment (Right Hemisphere Function Goal 1, SLP) she completed visual scanning/cancelation task with approximately 80% accuracy- given min cues completed with 100% accuracy  -TH --           User Key  (r) = Recorded By, (t) = Taken By, (c) = Cosigned By    Initials Name Provider Type    NR Teodora Marsh MA,CCC-SLP Speech and Language Pathologist     Rosa Arrieta Speech and Language Pathologist                SLP Outcome Measures (last 72 hours)     SLP Outcome Measures     Row Name 05/18/23 1300             SLP Outcome Measures    Outcome Measure Used? Adult NOMS  -NR         Adult FCM Scores    FCM Chosen Memory  -NR      Memory FCM Score 4  -NR            User Key  (r) = Recorded By, (t) = Taken By, (c) = Cosigned By    Initials Name Effective Dates    NR Teodora Marsh MA,CCC-SLP 06/16/21 -                     Time Calculation:      Time  Calculation- SLP     Row Name 05/19/23 1556             Time Calculation- SLP    SLP Start Time 1245  -TH      SLP Received On 05/19/23  -TH            User Key  (r) = Recorded By, (t) = Taken By, (c) = Cosigned By    Initials Name Provider Type    TH Rosa Arrieta Speech and Language Pathologist                Therapy Charges for Today     Code Description Service Date Service Provider Modifiers Qty    35341163922 HC ST DEV OF COGN SKILLS INITIAL 15 MIN 5/19/2023 Rosa Arrieta  1    08701624999 HC ST DEV OF COGN SKILLS EACH ADDT'L 15 MIN 5/19/2023 Rosa Arrieta  1          ADULT NOMS (last 72 hours)     Adult NOMS     Row Name 05/18/23 1300                   Adult FCM Scores    FCM Chosen Memory  -NR        Memory FCM Score 4  -NR              User Key  (r) = Recorded By, (t) = Taken By, (c) = Cosigned By    Initials Name Effective Dates    NR Teodora Marsh MA,CCC-SLP 06/16/21 -                        Rosa Arrieta  5/19/2023

## 2023-05-19 NOTE — PROGRESS NOTES
[facility] Acute Rehab    Awaiting pre-cert from Roddy.  Can admit when pre-cert obtained and medically ready.    1454- Addendum: Pre-cert obtained.  Can admit today if medically ready. Please complete DC/Readmit orders and DC summary.    Spoke with PATRICIA Nieto.    Thank you,  Darling Boucher, RN  Rehab Admission Nurse

## 2023-05-19 NOTE — PLAN OF CARE
Goal Outcome Evaluation:  Plan of Care Reviewed With: patient        Progress: no change  Outcome Evaluation: Patient was agreeable to PT tx this PM. States that she prefers to be seen during the AM. She sat up at EOB with CG/Min A. BP taken in supine then again when sitting EOB. Diastolic dropped and she began to c/o lightheadedness so returned to supine. BP was back to BL once supine with head elevated. O2 sats remained WFL. RN notified.

## 2023-05-19 NOTE — PLAN OF CARE
Goal Outcome Evaluation:      No significant complaints noted during the night, VSS.

## 2023-05-20 LAB
ALBUMIN SERPL-MCNC: 1.7 G/DL (ref 3.5–5.2)
ANION GAP SERPL CALCULATED.3IONS-SCNC: 3.3 MMOL/L (ref 5–15)
BASOPHILS # BLD AUTO: 0.03 10*3/MM3 (ref 0–0.2)
BASOPHILS NFR BLD AUTO: 0.4 % (ref 0–1.5)
BUN SERPL-MCNC: 14 MG/DL (ref 8–23)
BUN/CREAT SERPL: 70 (ref 7–25)
CALCIUM SPEC-SCNC: 8.6 MG/DL (ref 8.6–10.5)
CHLORIDE SERPL-SCNC: 105 MMOL/L (ref 98–107)
CO2 SERPL-SCNC: 28.7 MMOL/L (ref 22–29)
CREAT SERPL-MCNC: 0.2 MG/DL (ref 0.57–1)
DEPRECATED RDW RBC AUTO: 43.1 FL (ref 37–54)
EGFRCR SERPLBLD CKD-EPI 2021: 126 ML/MIN/1.73
EOSINOPHIL # BLD AUTO: 0.09 10*3/MM3 (ref 0–0.4)
EOSINOPHIL NFR BLD AUTO: 1.1 % (ref 0.3–6.2)
ERYTHROCYTE [DISTWIDTH] IN BLOOD BY AUTOMATED COUNT: 13.4 % (ref 12.3–15.4)
GLUCOSE SERPL-MCNC: 89 MG/DL (ref 65–99)
HCT VFR BLD AUTO: 30.3 % (ref 34–46.6)
HGB BLD-MCNC: 10.3 G/DL (ref 12–15.9)
IMM GRANULOCYTES # BLD AUTO: 0.03 10*3/MM3 (ref 0–0.05)
IMM GRANULOCYTES NFR BLD AUTO: 0.4 % (ref 0–0.5)
LYMPHOCYTES # BLD AUTO: 2.23 10*3/MM3 (ref 0.7–3.1)
LYMPHOCYTES NFR BLD AUTO: 26.7 % (ref 19.6–45.3)
MCH RBC QN AUTO: 30 PG (ref 26.6–33)
MCHC RBC AUTO-ENTMCNC: 34 G/DL (ref 31.5–35.7)
MCV RBC AUTO: 88.3 FL (ref 79–97)
MONOCYTES # BLD AUTO: 0.44 10*3/MM3 (ref 0.1–0.9)
MONOCYTES NFR BLD AUTO: 5.3 % (ref 5–12)
NEUTROPHILS NFR BLD AUTO: 5.52 10*3/MM3 (ref 1.7–7)
NEUTROPHILS NFR BLD AUTO: 66.1 % (ref 42.7–76)
NRBC BLD AUTO-RTO: 0 /100 WBC (ref 0–0.2)
PHOSPHATE SERPL-MCNC: 2.6 MG/DL (ref 2.5–4.5)
PLATELET # BLD AUTO: 366 10*3/MM3 (ref 140–450)
PMV BLD AUTO: 10.4 FL (ref 6–12)
POTASSIUM SERPL-SCNC: 4 MMOL/L (ref 3.5–5.2)
RBC # BLD AUTO: 3.43 10*6/MM3 (ref 3.77–5.28)
SODIUM SERPL-SCNC: 137 MMOL/L (ref 136–145)
WBC NRBC COR # BLD: 8.34 10*3/MM3 (ref 3.4–10.8)

## 2023-05-20 PROCEDURE — 25010000002 ENOXAPARIN PER 10 MG: Performed by: PHYSICAL MEDICINE & REHABILITATION

## 2023-05-20 PROCEDURE — 85025 COMPLETE CBC W/AUTO DIFF WBC: CPT | Performed by: PHYSICAL MEDICINE & REHABILITATION

## 2023-05-20 PROCEDURE — 97162 PT EVAL MOD COMPLEX 30 MIN: CPT

## 2023-05-20 PROCEDURE — 96125 COGNITIVE TEST BY HC PRO: CPT

## 2023-05-20 PROCEDURE — 25010000002 MEROPENEM PER 100 MG: Performed by: HOSPITALIST

## 2023-05-20 PROCEDURE — 97110 THERAPEUTIC EXERCISES: CPT

## 2023-05-20 PROCEDURE — 80069 RENAL FUNCTION PANEL: CPT | Performed by: PHYSICAL MEDICINE & REHABILITATION

## 2023-05-20 PROCEDURE — 97166 OT EVAL MOD COMPLEX 45 MIN: CPT | Performed by: OCCUPATIONAL THERAPIST

## 2023-05-20 PROCEDURE — 97535 SELF CARE MNGMENT TRAINING: CPT | Performed by: OCCUPATIONAL THERAPIST

## 2023-05-20 RX ORDER — LOPERAMIDE HCL 1 MG/7.5ML
2 SOLUTION ORAL 4 TIMES DAILY PRN
Status: DISCONTINUED | OUTPATIENT
Start: 2023-05-20 | End: 2023-06-09

## 2023-05-20 RX ADMIN — ASPIRIN 81 MG: 81 TABLET, CHEWABLE ORAL at 09:19

## 2023-05-20 RX ADMIN — Medication 10 ML: at 08:32

## 2023-05-20 RX ADMIN — CLOPIDOGREL BISULFATE 75 MG: 75 TABLET, FILM COATED ORAL at 09:19

## 2023-05-20 RX ADMIN — Medication 10 ML: at 20:34

## 2023-05-20 RX ADMIN — LOPERAMIDE HCL 2 MG: 1 SOLUTION ORAL at 17:48

## 2023-05-20 RX ADMIN — MEROPENEM 1 G: 1 INJECTION, POWDER, FOR SOLUTION INTRAVENOUS at 03:21

## 2023-05-20 RX ADMIN — ZINC OXIDE 1 APPLICATION: 200 OINTMENT TOPICAL at 20:34

## 2023-05-20 RX ADMIN — ENOXAPARIN SODIUM 40 MG: 100 INJECTION SUBCUTANEOUS at 20:33

## 2023-05-20 RX ADMIN — Medication 1000 MCG: at 09:19

## 2023-05-20 RX ADMIN — DOXYCYCLINE 100 MG: 100 CAPSULE ORAL at 20:33

## 2023-05-20 RX ADMIN — CITALOPRAM 20 MG: 20 TABLET, FILM COATED ORAL at 09:19

## 2023-05-20 RX ADMIN — GUAIFENESIN 600 MG: 600 TABLET, EXTENDED RELEASE ORAL at 11:00

## 2023-05-20 RX ADMIN — DOXYCYCLINE 100 MG: 100 CAPSULE ORAL at 09:19

## 2023-05-20 RX ADMIN — Medication 10 ML: at 20:35

## 2023-05-20 RX ADMIN — Medication 1 APPLICATION: at 09:21

## 2023-05-20 RX ADMIN — Medication 1 APPLICATION: at 20:34

## 2023-05-20 RX ADMIN — METOPROLOL TARTRATE 12.5 MG: 25 TABLET, FILM COATED ORAL at 20:33

## 2023-05-20 RX ADMIN — Medication 1 APPLICATION: at 13:48

## 2023-05-20 RX ADMIN — GUAIFENESIN 600 MG: 600 TABLET, EXTENDED RELEASE ORAL at 20:33

## 2023-05-20 RX ADMIN — MEROPENEM 1 G: 1 INJECTION, POWDER, FOR SOLUTION INTRAVENOUS at 13:43

## 2023-05-20 RX ADMIN — METOPROLOL TARTRATE 12.5 MG: 25 TABLET, FILM COATED ORAL at 09:19

## 2023-05-20 RX ADMIN — ATORVASTATIN CALCIUM 80 MG: 80 TABLET, FILM COATED ORAL at 20:33

## 2023-05-20 RX ADMIN — ZINC OXIDE 1 APPLICATION: 200 OINTMENT TOPICAL at 09:20

## 2023-05-20 RX ADMIN — MEROPENEM 1 G: 1 INJECTION, POWDER, FOR SOLUTION INTRAVENOUS at 20:32

## 2023-05-20 RX ADMIN — Medication 1 APPLICATION: at 17:46

## 2023-05-20 NOTE — THERAPY EVALUATION
Inpatient Rehabilitation - Physical Therapy Initial Evaluation       [facility]     Patient Name: Katherine Williamson  : 1950  MRN: [id number]    Today's Date: 2023                    Admit Date: 2023      Visit Dx:     ICD-10-CM ICD-9-CM   1. Decreased functional mobility and endurance  Z74.09 780.99       Patient Active Problem List   Diagnosis   • Benign essential HTN   • Tobacco abuse   • Dislocation of hip joint prosthesis   • Fracture of proximal humerus   • Mechanical complication of internal orthopedic device   • Wear of articular bearing surface of internal prosthetic joint   • History of repair of hip joint   • History of operative procedure on hip   • Hyperglycemia   • Hepatic steatosis   • Diverticulosis   • Chest pain, atypical   • History of colon polyps   • Family history of colon cancer in mother   • Allergic rhinitis   • Lung nodule seen on imaging study   • Acute respiratory failure with hypoxia   • Abscess of right lung with pneumonia   • Empyema   • Sepsis   • Pleural effusion, right   • Other emphysema   • Pulmonary nodule (RLL)   • Diastolic dysfunction, grade 1   • Physical debility   • Cryptogenic stroke   • Acute right MCA stroke       Past Medical History:   Diagnosis Date   • Arthritis    • Cataract    • Colon polyps     FOLLOWED BY DR. JOSEPH LAU   • Hypertension        Past Surgical History:   Procedure Laterality Date   • COLONOSCOPY  10/2015    Btsad-yedsnqggotaj-Ds. Kaplan.  Follow-up in 5 years.   • COLONOSCOPY N/A 2022    2 BENIGN POLYPS IN DESCENDING, 5 MM BENIGN POLYP IN SIGMOID, MULTIPLE SMALL AND LARGE DIVERTICULA IN SIGMOID, RESCOPE IN 3 YRS, DR. JOSEPH LAU AT [facility]   • EYE SURGERY     • JOINT REPLACEMENT  ?    Left total hip replacement in .  In 2015 patient had left hip revision   • TONSILLECTOMY         PT ASSESSMENT (last 12 hours)     IRF PT Evaluation and Treatment     Row Name 23 0823          PT Time and Intention    Document  Type initial evaluation  -     Mode of Treatment physical therapy  -     Patient/Family/Caregiver Comments/Observations pt seated in WC no acute distress  -     Row Name 05/20/23 0823          General Information    Patient Profile Reviewed yes  -     Existing Precautions/Restrictions fall;oxygen therapy device and L/min  -     Limitations/Impairments safety/cognitive  -     Row Name 05/20/23 0823          Living Environment    Current Living Arrangements home  -     People in Home spouse  -     Row Name 05/20/23 0823          Home Main Entrance    Number of Stairs, Main Entrance three  -     Stair Railings, Main Entrance none  -     Row Name 05/20/23 0823          Pain Assessment    Pretreatment Pain Rating 0/10 - no pain  -     Posttreatment Pain Rating 0/10 - no pain  -[facility] Name 05/20/23 0823          Cognition/Psychosocial    Affect/Mental Status (Cognition) confused  noted in conservation  -     Orientation Status (Cognition) oriented x 4  -     Personal Safety Interventions gait belt;fall prevention program maintained;supervised activity  -[facility] Name 05/20/23 0823          Range of Motion Comprehensive    Comment, General Range of Motion BLEs AROM WFL  -[facility] Name 05/20/23 0823          Strength Comprehensive (MMT)    Comment, General Manual Muscle Testing (MMT) Assessment santiago hip flexion grossly weak 3/5, LLE grossly 3+/4- knee and ankle, RLE 4/5 grossly knee and ankle  -     Row Name 05/20/23 0823          Bed Mobility    Bed Mobility supine-sit;sit-supine;sidelying-sit;sit-sidelying  -     Supine-Sit [place] (Bed Mobility) minimum assist (75% patient effort)  -     Sit-Supine [place] (Bed Mobility) minimum assist (75% patient effort)  -     Sidelying-Sit [place] (Bed Mobility) minimum assist (75% patient effort)  -     Sit-Sidelying [place] (Bed Mobility) minimum assist (75% patient effort)  -     Comment, (Bed Mobility) tx mat  -      Row Name 05/20/23 0823          Transfer Assessment/Treatment    Transfers stand-sit transfer;sit-stand transfer;chair-bed transfer;bed-chair transfer  -     Row Name 05/20/23 0823          Bed-Chair Transfer    Bed-Chair [place] (Transfers) minimum assist (75% patient effort);moderate assist (50% patient effort)  -     Assistive Device (Bed-Chair Transfers) wheelchair  -     Row Name 05/20/23 0823          Chair-Bed Transfer    Chair-Bed [place] (Transfers) minimum assist (75% patient effort);moderate assist (50% patient effort)  -     Assistive Device (Chair-Bed Transfers) wheelchair  -     Row Name 05/20/23 0823          Sit-Stand Transfer    Sit-Stand [place] (Transfers) moderate assist (50% patient effort);maximum assist (25% patient effort)  Max A PM w fatigue  -     Assistive Device (Sit-Stand Transfers) walker, front-wheeled  -     Row Name 05/20/23 0823          Stand-Sit Transfer    Stand-Sit [place] (Transfers) minimum assist (75% patient effort)  -     Assistive Device (Stand-Sit Transfers) walker, front-wheeled  -     Row Name 05/20/23 0823          Gait/Stairs (Locomotion)    [place] Level (Gait) minimum assist (75% patient effort);1 person to manage equipment  -     Assistive Device (Gait) walker, front-wheeled  -     Distance in Feet (Gait) 20 am, 30 pm  -     Pattern (Gait) step-through  -     Deviations/Abnormal Patterns (Gait) bilateral deviations;raina decreased  -     Bilateral Gait Deviations forward flexed posture;heel strike decreased;weight shift ability decreased  -     Left Sided Gait Deviations heel strike decreased  -     Comment, (Gait/Stairs) following w WC for safety due to weakness and fatigue  -     Row Name 05/20/23 0823          Balance    Static Sitting Balance standby assist  -     Position, Sitting Balance unsupported;sitting in chair  -     Static Standing Balance moderate assist  -     Position/Device Used,  Standing Balance supported;walker, front-wheeled  -     Row Name 05/20/23 0823          Motor Skills    Therapeutic Exercise --  APs, LAQs, MIP x 10  -     Row Name 05/20/23 0823          Positioning and Restraints    Pre-Treatment Position sitting in chair/recliner  -     Post Treatment Position wheelchair  -     In Wheelchair sitting;call light within reach;encouraged to call for assist;exit alarm on  -     Row Name 05/20/23 0823          Vital Signs    Pre SpO2 (%) 90  -     O2 Delivery Pre Treatment room air  -     Intra SpO2 (%) 86  -     O2 Delivery Intra Treatment room air  -     Post SpO2 (%) 95  -     O2 Delivery Post Treatment nasal cannula  1L placed during PT session due to noted desat w activity  -     Row Name 05/20/23 0823          Therapy Assessment/Plan (PT)    Rehab Potential/Prognosis (PT) good, to achieve stated therapy goals  -     Frequency of Treatment (PT) 60 minutes per session  -     Comment, Therapy Assessment/Plan (PT) Pt 73 yo female [facility] w c/o SOB for 2 wks; noted necrotizing PNA, pleural effusion, sepsis, [facility]. Pt baseline  independent no DME lives w spouse 3 DORYS. On PT exam pt w poor endurance w functional tasks, placed on 1L to maintain sa02 = or > 90%. Pt may benefit from skilled neuro PT To address functional deficits and allow for optimal independence and safety in the home/community.  -     Row Name 05/20/23 0823          IRF PT Goals    Bed Mobility Goal Selection (PT-IRF) bed mobility, PT goal 1  -     Transfer Goal Selection (PT-IRF) transfers, PT goal 1;transfers, PT goal 2  -     Gait (Walking Locomotion) Goal Selection (PT-IRF) gait, PT goal 1  -     Stairs Goal Selection (PT-IRF) stairs, PT goal 1  -     Row Name 05/20/23 0823          Bed Mobility Goal 1 (PT-IRF)    Activity/Assistive Device (Bed Mobility Goal 1, PT-IRF) bed mobility activities, all  -     [place]  Level (Bed Mobility Goal 1, PT-IRF) standby assist  -     Time Frame (Bed Mobility Goal 1, PT-IRF) 2 weeks  -     Row Name 05/20/23 0823          Transfer Goal 1 (PT-IRF)    Activity/Assistive Device (Transfer Goal 1, PT-IRF) bed-to-chair/chair-to-bed;sit-to-stand/stand-to-sit;walker, rolling  -     [place] Level (Transfer Goal 1, PT-IRF) standby assist  -     Time Frame (Transfer Goal 1, PT-IRF) 2 weeks  -     Row Name 05/20/23 0823          Transfer Goal 2 (PT-IRF)    Activity/Assistive Device (Transfer Goal 2, PT-IRF) car transfer  -     [place] Level (Transfer Goal 2, PT-IRF) contact guard required  -     Time Frame (Transfer Goal 2, PT-IRF) 2 weeks  -     Row Name 05/20/23 0823          Gait/Walking Locomotion Goal 1 (PT-IRF)    Activity/Assistive Device (Gait/Walking Locomotion Goal 1, PT-IRF) assistive device use;walker, rolling  -     Gait/Walking Locomotion Distance Goal 1 (PT-IRF) 80  -     [place] Level (Gait/Walking Locomotion Goal 1, PT-IRF) standby assist  -     Time Frame (Gait/Walking Locomotion Goal 1, PT-IRF) 2 weeks  -     Row Name 05/20/23 0823          Stairs Goal 1 (PT-IRF)    Activity/Assistive Device (Stairs Goal 1, PT-IRF) descending stairs;ascending stairs  -     Number of Stairs (Stairs Goal 1, PT-IRF) 4  -     [place] Level (Stairs Goal 1, PT-IRF) contact guard required  -     Time Frame (Stairs Goal 1, PT-IRF) 2 weeks  -     Strategies/Barriers (Stairs Goal 1, PT-IRF) no HR at home per pt report  -           User Key  (r) = Recorded By, (t) = Taken By, (c) = Cosigned By    Initials Name Provider Type     Jess Wang, PT Physical Therapist              Wound 05/19/23 2055 Bilateral gluteal (Active)   Wound Image     05/19/23 2100   Dressing Appearance dry;intact 05/19/23 2108   Closure None 05/19/23 2108   Base pink;moist 05/19/23 2108   Periwound Temperature warm 05/19/23 2100   Edges irregular 05/19/23 2100   Dressing Care foam  05/19/23 2108     Physical Therapy Education     Title: PT OT SLP Therapies (In Progress)     Topic: Physical Therapy (In Progress)     Point: Mobility training (In Progress)     Learning Progress Summary           Patient Acceptance, E, NR by  at 5/20/2023 1216                   Point: Home exercise program (In Progress)     Learning Progress Summary           Patient Acceptance, E, NR by  at 5/20/2023 1216                   Point: Body mechanics (In Progress)     Learning Progress Summary           Patient Acceptance, E, NR by  at 5/20/2023 1216                   Point: Precautions (In Progress)     Learning Progress Summary           Patient Acceptance, E, NR by  at 5/20/2023 1216                               User Key     Initials Effective Dates Name Provider Type [facility] 06/16/21 -  Jess Wang, PT Physical Therapist PT                PT Recommendation and Plan    Frequency of Treatment (PT): 60 minutes per session                     Time Calculation:      PT Charges     Row Name 05/20/23 1216 05/20/23 1215          Time Calculation    Start Time 1300  - 0830  -     Stop Time 1330  - 0900  -     Time Calculation (min) 30 min  - 30 min  -     PT Received On -- 05/20/23  -     PT - Next Appointment -- 05/22/23  -     PT Goal Re-Cert Due Date -- 05/26/23  -        Time Calculation- PT    Total Timed Code Minutes- PT 30 minute(s)  - 30 minute(s)  -           User Key  (r) = Recorded By, (t) = Taken By, (c) = Cosigned By    Initials Name Provider Type     Jess Wang PT Physical Therapist                Therapy Charges for Today     Code Description Service Date Service Provider Modifiers Qty    17573656980 HC PT EVAL MOD COMPLEXITY 3 5/20/2023 Jess Wang, PT GP 1    98337020024 HC PT THER PROC EA 15 MIN 5/20/2023 Jess Wang, PT GP 3                   Jess Wang, PT  5/20/2023

## 2023-05-20 NOTE — PROGRESS NOTES
Inpatient Rehabilitation Plan of Care Note    Plan of Care  Care Plan Reviewed - Updates as Follows    Safety    [RN] Potential for Injury(Active)  Current Status(05/20/2023): Patient at risk for injury related to mobility  issues.  Weekly Goal(05/27/2023): Patient will use call light appropriately and have no  injury while on Rehab.  Discharge Goal: Patient will have no injury while on Rehab.        Psychosocial    [RN] Coping/Adjustment(Active)  Current Status(05/20/2023): Patient at risk for ineffective coping, but has a  supportive .  Weekly Goal(05/27/2023): Patient will verbalize needs and concerns related to  current situation.  Discharge Goal: Patient will have healthy coping skills at time of discharge.        Sphincter Control    [RN] Bladder Management(Active)  Current Status(05/20/2023): Patient continent 75% of the time.  Weekly Goal(05/27/2023): Patient will be continent 100%  Discharge Goal: Patient will be continent 100% of the time.    [RN] Bowel Management(Active)  Current Status(05/20/2023): Patient incontinent 100% of the time.  Weekly Goal(05/27/2023): Patient will be continent 50% of the time.  Discharge Goal: Patient will be continent 100% of the time.        Body Systems    [RN] Integumentary(Active)  Current Status(05/20/2023): Patient has stage 2 pressure injury to cocyx  Weekly Goal(05/27/2023): Patients skin will be healing with no more deteriotion.    Discharge Goal: Patients skin will be healed with no further injury.    Signed by: Bia Muhammad RN

## 2023-05-20 NOTE — H&P
Patient Identification:  Name: Katherine Williamson  Age: 72 y.o.  Sex: female  :  1950  MRN: [id number]                     Primary Care Physician: Zelalem Flor APRN                               Date of acute hospital admission:2023       History of Present Illness:   Patient is a 72-year-old female with past medical history remarkable for arthritis, hypertension was in her usual state of her health until  i.e. Friday afternoon when she developed sudden onset of right mid back/chest discomfort that gets worse with coughing and taking deep breath.  Her symptoms started after she helped her friend cleaning her home.  Besides this discomfort that she had she was able to carry out routines of her activities including yard work and pulling weeds over the weekend since the onset of pain.  Her symptoms continued to get worse to the point that she went to see her primary care provider and was evaluated with plans to do CT scan of the chest PE protocol as her D-dimer came back positive.  CT scan of chest PE protocol on 4/3/2023 revealed severe emphysematous changes in the lung without any consolidation pleural effusion or pneumothorax she was noted to have triangular peripheral right lower lobe 2.4 x 2.1 cm nodule with no pleural effusion or pneumothorax.  Differential diagnosis for this nodule based on shape and location included: Pneumonia, infarct or neoplasm.  Follow-up examination in 3 months was recommended.  Patient was started on prednisone Dosepak on 4/3/2023 and follow-up call back/communication from the patient on 2023 suggested improvement in her discomfort.  After she completed her Dosepak her symptoms recurred so she came back to the emergency room at our facility on 4/10/2023.  Review of system documented at that time shows she denies fever or chill or decrease in appetite.  A work-up revealed white blood cell count of 13,000 and fairly unremarkable CMP.  An assessment of pleurisy/thoracic  radiculopathy versus zoster versus pulmonary embolism versus intercostal strain was raised.  She was given IV Toradol with improvement and was subsequently discharged on 5-day course of oral Toradol along with omeprazole for GI prophylaxis with instructions to follow-up with PCP and if she continues to get better may consider alternative such as meloxicam.  Patient had a follow-up visit with her primary care provider for back pain dyspnea on exertion on 4/24/2023 with persistent discomfort in her back along with shortness of breath and swelling of bilateral lower extremity left greater than right.  She he was also noted to have lost 4 pounds since beginning of the April.  It is documented that she continues to smoke.  Arrangements were made for echocardiogram and pulmonary function test and consideration for thoracic x-ray and MRI if her back pain does not improve with conservative management.  Review system performed that day shows fatigue weight change shortness of breath but no fever or chills.  Patient is also scheduled to have CT scan of the chest and pulmonary function test.  It appears that patient CT scan of the chest performed-imaging on 4/26/2023.  Patient called and communicated with the care provider on 5/1/2023 but with a result of her CT chest that was not uploaded in her [employer].  It was at that point recommended the patient and the need to come in and see her provider in the office to see the extent of her discomfort and declining function and associated shortness of breath or if she is not well may have to go to the emergency room.  Earlier today [facility] imaging called about the result of the CT scan of the chest performed on 4/26/2023 and findings were concerning for thick-walled pleural effusion with gas in it concerning for empyema as well as worsening pneumonia in the right lower lobe with previously noted lung nodule of 3 mm increased to 7 mm for which PET scan was recommended.  Patient also  had in the meantime got herself a pulse ox meter and reported that her oxygen levels are less than 90 and she was recommended to go to the ER immediately.  In the emergency room she was noted to be hypoxic with worsening lung infiltrate and parameters positive for sepsis.  Pulmonary service was consulted patient had blood cultures drawn and was started on Zithromax and ceftriaxone.  Patient is significant foul-smelling breath that has been going on for the last couple of weeks.      On 5/14/23 at approximately 2:45, PICC team nurse was with patient when she suddenly developed difficulty talking and left body weakness.  Rapid response was called and I was notified. Stroke alert was initiated.  NIHSS was 22.     The head CT showed no hemorrhage TNK recommended and administered at 15:41.  CTA and CT perfusion were reviewed and this showed significant right MCA perfusion deficit but no proximal occlusion. There appeared to be a distal M3 occlusion. I discussed this with Dr. Martinez who concurred. He did not recommend thrombectomy given the distal location.    After medical stabilization pt was felt to be a good candidate for comprehensive acute inpatient rehab     Past Medical History:  Medical History[]Expand by Default        Past Medical History:   Diagnosis Date   • Arthritis     • Cataract     • Colon polyps       FOLLOWED BY DR. JOSEPH LAU   • Hypertension           Past Surgical History:  Surgical History[]Expand by Default         Past Surgical History:   Procedure Laterality Date   • COLONOSCOPY   10/2015     Kibkf-bieukfxcgvzn-Nx. Kaplan.  Follow-up in 5 years.   • COLONOSCOPY N/A 1/12/2022     2 BENIGN POLYPS IN DESCENDING, 5 MM BENIGN POLYP IN SIGMOID, MULTIPLE SMALL AND LARGE DIVERTICULA IN SIGMOID, RESCOPE IN 3 YRS, DR. JOSEPH LAU AT [facility]   • EYE SURGERY       • JOINT REPLACEMENT   2005?     Left total hip replacement in 2005.  In December 2015 patient had left hip revision   • TONSILLECTOMY                  Allergies:       Allergies   Allergen Reactions   • Hydrocodone-Acetaminophen Itching      Social History:   Social History            Tobacco Use   • Smoking status: Every Day       Packs/day: 1.00       Years: 51.00       Pack years: 51.00       Types: Cigarettes       Start date: 1/1/1970   • Smokeless tobacco: Never   Substance Use Topics   • Alcohol use: Yes       Alcohol/week: 30.0 standard drinks       Types: 30 Cans of beer per week       Comment: 4-6 per day       Family History:        Family History   Problem Relation Age of Onset   • Cancer Mother     • Breast cancer Mother     • Arthritis Mother     • Deep vein thrombosis Mother     • Heart attack Father     • Heart disease Father           Had quadruple bypass   • Heart attack Maternal Grandmother     • Heart disease Maternal Grandmother     • Malig Hyperthermia Neg Hx        ROS - No H/a, Vision or hearing changes  No change in appetite   No CP, SOB, N/V, F/C  + LHP     Exam:  Patient is examined using the personal protective equipment as per guidelines from infection control for this particular patient as enacted.  Hand washing was performed before and after patient interaction.  General Appearance:    Alert, cooperative, in no acute distress.  Following simple commands  ENT - mild left facial weakness  Neck midline trachea, no thyromegaly   Lungs:    Diminished breath sounds with crackles diffuse right lower lobe    Heart:    Regular rhythm and normal rate, normal S1 and S2, no            murmur, no gallop, no rub, no click   Chest Wall:    No abnormalities observed   Abdomen:     Normal bowel sounds, no masses, no organomegaly, soft        nontender, nondistended, no guarding, no rebound                tenderness   Extremities:   Moves all extremities well, no edema, no cyanosis, no             redness  Skin - no rashes no nodules  Musculoskeletal no cyanosis no clubbing normal range of motion  Neuro - Ox 3  Language intact  Left-sided  "weakness = 4/5, 5/5 on right       New Radiology:  ELIGIO negative for vegetations     Prior radiology:  MRI brain: \"Bilateral multifocal areas of infarction.  No evidence of hemorrhagic transformation. \"     ASSESSMENT/PLAN:  1. Acute R MCA stroke and bilateral infarcts  - 5/20 - Admit for inpt rehab w/ PT, OT, SLP, psychology and rehab medical and nursing care. Continue secondary stroke prophylaxis    2. Pneumonia and right lung abscess and Pleural effusions  - 5/20 - Continue IV antibx. ID following    3. Acute hypoxic respiratory failure    4. Emphysema d/t Tobacco use    5. Hypertension-continue antihypertensive regimen and avoid hypotensive episodes.    6. DVT prophylaxis -   - 5/20 - Start Lovenox SQ daily                                                                        "

## 2023-05-20 NOTE — PLAN OF CARE
Goal Outcome Evaluation:  Plan of Care Reviewed With: patient, spouse           Outcome Evaluation: Patient alert and oriented x4, assist x1, and medication with water. She has been continent/incontinent of b/b- loose stool-Imodium PRN ordered. PICC line to RUE, flushes good and good blood return- antibiotics hung today. Zio in place, VS stable, and Labs today. Patient had very poor appetite,  concerned about intake and encouraged patient to eat more

## 2023-05-20 NOTE — THERAPY EVALUATION
Inpatient Rehabilitation - Speech Language Pathology Initial Evaluation    [facility]     Patient Name: Katherine Williamson  : 1950  MRN: [id number]    Today's Date: 2023                   Admit Date: 2023       Visit Dx:      ICD-10-CM ICD-9-CM   1. Decreased functional mobility and endurance  Z74.09 780.99       Patient Active Problem List   Diagnosis   • Benign essential HTN   • Tobacco abuse   • Dislocation of hip joint prosthesis   • Fracture of proximal humerus   • Mechanical complication of internal orthopedic device   • Wear of articular bearing surface of internal prosthetic joint   • History of repair of hip joint   • History of operative procedure on hip   • Hyperglycemia   • Hepatic steatosis   • Diverticulosis   • Chest pain, atypical   • History of colon polyps   • Family history of colon cancer in mother   • Allergic rhinitis   • Lung nodule seen on imaging study   • Acute respiratory failure with hypoxia   • Abscess of right lung with pneumonia   • Empyema   • Sepsis   • Pleural effusion, right   • Other emphysema   • Pulmonary nodule (RLL)   • Diastolic dysfunction, grade 1   • Physical debility   • Cryptogenic stroke   • Acute right MCA stroke       Past Medical History:   Diagnosis Date   • Arthritis    • Cataract    • Colon polyps     FOLLOWED BY DR. JOSEPH LAU   • Hypertension        Past Surgical History:   Procedure Laterality Date   • COLONOSCOPY  10/2015    [facility].  Follow-up in 5 years.   • COLONOSCOPY N/A 2022    2 BENIGN POLYPS IN DESCENDING, 5 MM BENIGN POLYP IN SIGMOID, MULTIPLE SMALL AND LARGE DIVERTICULA IN SIGMOID, RESCOPE IN 3 YRS, DR. JOSEPH LAU AT [facility]   • EYE SURGERY     • JOINT REPLACEMENT  ?    Left total hip replacement in .  In 2015 patient had left hip revision   • TONSILLECTOMY         SLP Recommendation and Plan                   Anticipated Discharge Disposition (SLP): unknown (23 1000)        Predicted  Duration Therapy Intervention (Days): until discharge (05/20/23 1000)                               SLP EVALUATION (last 72 hours)     SLP SLC Evaluation     Row Name 05/20/23 1000                   Communication Assessment/Intervention    Patient Observations alert;cooperative;agree to therapy  -LS        Patient Effort excellent  -LS           General Information    Patient Profile Reviewed yes  -LS        Pertinent History Of Current Problem Ms. Williamson is a 72 y.o. female who presented to [facility] initially complaining of weakness, shortness of breath and cough. She was found to have respiratory failure, pneumonia and lung abscess and was admitted to the hospital for further evaluation and treatment. Unfortunately her hospital stay was complicated by development of an acute stroke that required tPA and transferred to the intensive care unit.  This has resulted in some left hemiparesis and mild speech disturbance but the symptoms have improved after administering TNK on 5/14.  CTA showed right MCA perfusion deficit but no proximal occlusion appeared to have a distal M3 occlusion report and perfusion deficit of the right posterior parietal lobe.  -LS        Precautions/Limitations, Vision WFL  -LS        Precautions/Limitations, Hearing WFL  -LS        Patient Level of Education some college  -LS        Prior Level of Function-Communication WFL  -LS        Barriers to Rehab none identified  -LS           Cognitive Assessment Intervention- SLP    Cognitive Function (Cognition) WNL  -LS        Orientation Status (Cognition) WNL  -LS           Standardized Tests    Cognitive/Memory Tests CLQT: Cognitive Linguistic Quick Test  -LS           CLQT (The Cognitive Linguistic Quick Test)    Attention Domain Score 108  -LS        Attention Severity Rating 4: WNL  -LS        Memory Severity Rating 4: WNL  -LS        Executive Function Severity Rating 4: WNL  -LS        Language Severity Rating 4: WNL  -LS         Visuospatial Severity Rating 3: Mild  -LS        Clock Drawing Severity Rating WNL  -LS        Composite Severity Rating Range 4.0 - 3.5: WNL  Pt scored WFL's on the functional quick test.  Very mild executive function deficits present.  -LS           Recommendations    Therapy Frequency (SLP SLC) 5 days per week  -LS        Predicted Duration Therapy Intervention (Days) until discharge  -LS        Anticipated Discharge Disposition (SLP) unknown  -LS           Communication Treatment Objective and Progress Goals (SLP)    SLC LTGs Patient will demonstrate functional cognitive-linguistic skills for return to discharge environment  -LS        SLC STGs Additional Goals (Group)  -LS        Diagnostic Treatment Objective Diagnostic Treatment Objectives (Group)  -LS           Patient will demonstrate functional cognitive-linguistic skills for return to discharge environment    [place] Independently  -LS        Time frame by discharge  -LS           SLP Diagnostic Treatment     Patient will participate in further assessment in the following areas higher-level cognitive-linguistic  -LS        Time Frame (Diagnostic) 3 days  -LS           Cognitive Linguistic Treatment Objectives    Executive Function Skills Selection executive function skills, SLP goal 1  -LS        Pragmatic Skills Selection pragmatic skills, SLP goal 2  -LS           Memory Skills Goal 1 (SLP)    Improve Memory Skills Through Goal 1 (SLP) recalling unrelated word lists with an imposed delay  -LS        Time Frame (Memory Skills Goal 1, SLP) by discharge  -LS           Functional Math Skills Goal 1 (SLP)    Improve Functional Math Skills Through Goal 1 (SLP) complete moderately complex math problems;50%  -LS        Time Frame (Functional Math Skills Goal 1, SLP) by discharge  -LS           Executive Functional Skills Goal 1 (SLP)    Improve Executive Function Skills Goal 1 (SLP) organization/planning activity  -LS        Time Frame (Executive  Function Skills Goal 1, SLP) by discharge  -LS              User Key  (r) = Recorded By, (t) = Taken By, (c) = Cosigned By    Initials Name Effective Dates    LAURA Cole MS TANK Stearns-SLP 06/16/21 -                    EDUCATION    The patient has been educated in the following areas:       Cognitive Impairment.             SLP GOALS     Row Name 05/20/23 1000             Patient will demonstrate functional cognitive-linguistic skills for return to discharge environment    [place] Independently  -LS      Time frame by discharge  -LS         Memory Skills Goal 1 (SLP)    Improve Memory Skills Through Goal 1 (SLP) recalling unrelated word lists with an imposed delay  -LS      Time Frame (Memory Skills Goal 1, SLP) by discharge  -LS         Functional Math Skills Goal 1 (SLP)    Improve Functional Math Skills Through Goal 1 (SLP) complete moderately complex math problems;50%  -LS      Time Frame (Functional Math Skills Goal 1, SLP) by discharge  -LS         Executive Functional Skills Goal 1 (SLP)    Improve Executive Function Skills Goal 1 (SLP) organization/planning activity  -LS      Time Frame (Executive Function Skills Goal 1, SLP) by discharge  -LS            User Key  (r) = Recorded By, (t) = Taken By, (c) = Cosigned By    Initials Name Provider Type    Daryn Arvizu MS CCC-SLP Speech and Language Pathologist                            Time Calculation:        Time Calculation- SLP     Row Name 05/20/23 1547 05/20/23 1538          Time Calculation- SLP    SLP Start Time -- 1000  -     SLP Stop Time -- 1100  -     SLP Time Calculation (min) -- 60 min  -        Timed Charges    96125-Standardized cognitive performance testing --  extra time used to score and document on test finding.  -LS --           User Key  (r) = Recorded By, (t) = Taken By, (c) = Cosigned By    Initials Name Provider Type    Daryn Arvizu MS CCC-SLP Speech and Language Pathologist                  Therapy Charges for  Today     Code Description Service Date Service Provider Modifiers Qty    48073006010 HC ST STD COG PERF TEST PER HOUR 5/20/2023 Daryn Cole, MS CCC-SLP GN 2                           Daryn Cole MS CCC-SLP  5/20/2023

## 2023-05-20 NOTE — THERAPY EVALUATION
Inpatient Rehabilitation - Occupational Therapy Initial Evaluation    [facility]     Patient Name: Katherine Williamson  : 1950  MRN: [id number]    Today's Date: 2023                 Admit Date: 2023         ICD-10-CM ICD-9-CM   1. Decreased functional mobility and endurance  Z74.09 780.99       Patient Active Problem List   Diagnosis   • Benign essential HTN   • Tobacco abuse   • Dislocation of hip joint prosthesis   • Fracture of proximal humerus   • Mechanical complication of internal orthopedic device   • Wear of articular bearing surface of internal prosthetic joint   • History of repair of hip joint   • History of operative procedure on hip   • Hyperglycemia   • Hepatic steatosis   • Diverticulosis   • Chest pain, atypical   • History of colon polyps   • Family history of colon cancer in mother   • Allergic rhinitis   • Lung nodule seen on imaging study   • Acute respiratory failure with hypoxia   • Abscess of right lung with pneumonia   • Empyema   • Sepsis   • Pleural effusion, right   • Other emphysema   • Pulmonary nodule (RLL)   • Diastolic dysfunction, grade 1   • Physical debility   • Cryptogenic stroke   • Acute right MCA stroke       Past Medical History:   Diagnosis Date   • Arthritis    • Cataract    • Colon polyps     FOLLOWED BY DR. JOSEPH LAU   • Hypertension        Past Surgical History:   Procedure Laterality Date   • COLONOSCOPY  10/2015    Nddpo-ncyhoyetynnr-[facility]x. [facility].  Follow-up in 5 years.   • COLONOSCOPY N/A 2022    2 BENIGN POLYPS IN DESCENDING, 5 MM BENIGN POLYP IN SIGMOID, MULTIPLE SMALL AND LARGE DIVERTICULA IN SIGMOID, RESCOPE IN 3 YRS, DR. JOSEPH LAU AT [facility]   • EYE SURGERY     • JOINT REPLACEMENT  ?    Left total hip replacement in .  In 2015 patient had left hip revision   • TONSILLECTOMY               IRF OT ASSESSMENT FLOWSHEET (last 12 hours)     IRF OT Evaluation and Treatment     Row Name 23 1449          OT Time and Intention     Document Type initial evaluation  -     Mode of Treatment individual therapy;occupational therapy  -SG     Patient Effort adequate  -SG     Symptoms Noted During/After Treatment fatigue  -     Row Name 05/20/23 144          General Information    Patient Profile Reviewed yes  -SG     Patient/Family/Caregiver Comments/Observations Pt supine in bed in am and pm, spouse present in am  -SG     Existing Precautions/Restrictions fall;oxygen therapy device and L/min  -     Row Name 05/20/23 1444          Living Environment    Current Living Arrangements home  -     People in Home spouse  -     Row Name 05/20/23 1449          Pain Assessment    Pretreatment Pain Rating 0/10 - no pain  -     Row Name 05/20/23 1449          Cognition/Psychosocial    Orientation Status (Cognition) oriented x 4  -SG     Follows Commands (Cognition) follows one-step commands;verbal cues/prompting required  -     Personal Safety Interventions gait belt;fall prevention program maintained  -     Row Name 05/20/23 1440          Range of Motion Comprehensive    Comment, General Range of Motion BUE WFL  -     Row Name 05/20/23 1446          Strength (Manual Muscle Testing)    Left Hand, Setting 2 (Dynamometer Testing) 19  -     Right Hand, Setting 2 (Dynamometer Testing) 10  -SG     Left Hand: Tip (Pincer) Pinch Strength (Pinch Dynamometer Testing) 2  -SG     Left Hand: Lateral (Key) Pinch Strength (Pinch Dynamometer Testing) 6  -SG     Right Hand: Tip (Pincer) Pinch Strength (Pinch Dynamometer Testing) 4  -SG     Right Hand: Lateral (Key) Pinch Strength (Pinch Dynamometer Testing) 5  -     Row Name 05/20/23 1442          Strength Comprehensive (MMT)    General Manual Muscle Testing (MMT) Assessment hand strength deficits identified  -     Comment, General Manual Muscle Testing (MMT) Assessment BUE gross general weakness 3+/6  -     Hand Strength Assessment hand  strength  -     Row Name 05/20/23 0687          Basic  Activities of Daily Living (BADLs)    Basic Activities of Daily Living bathing;upper body dressing;lower body dressing;grooming;toileting  -     Row Name 05/20/23 1449          Bathing    [place] Level (Bathing) upper body;set up;verbal cues  -SG     Position (Bathing) sink side;supported sitting  -SG     Set-up Assistance (Bathing) adjust water temperature;obtain supplies;open containers  -     Row Name 05/20/23 1449          Upper Body Dressing    [place] Level (Upper Body Dressing) doff;don;pull over garment;minimum assist (75% or more patient effort);verbal cues  -SG     Position (Upper Body Dressing) supported sitting  -SG     Set-up Assistance (Upper Body Dressing) obtain clothing  -     Row Name 05/20/23 1449          Lower Body Dressing    [place] Level (Lower Body Dressing) doff;don;pants/bottoms;shoes/slippers;maximum assist (25% patient effort)  -SG     Position (Lower Body Dressing) supported sitting;supported standing  -     Row Name 05/20/23 1449          Grooming    [place] Level (Grooming) oral care regimen;hair care, combing/brushing;set up;verbal cues  -SG     Position (Grooming) sink side;supported sitting  -     Row Name 05/20/23 1449          Toileting    [place] Level (Toileting) adjust/manage clothing;perform perineal hygiene;maximum assist (25% patient effort)  -SG     Position (Toileting) supported sitting;supported standing  -     Row Name 05/20/23 1449          Bed Mobility    Supine-Sit [place] (Bed Mobility) minimum assist (75% patient effort)  -     Sit-Supine [place] (Bed Mobility) minimum assist (75% patient effort)  -     Row Name 05/20/23 1449          Transfer Assessment/Treatment    Transfers sit-stand transfer;stand-sit transfer  -     Row Name 05/20/23 1449          Sit-Stand Transfer    Sit-Stand [place] (Transfers) moderate assist (50% patient effort);verbal cues  -SG     Assistive Device (Sit-Stand Transfers)  wheelchair  -     Row Name 05/20/23 1449          Stand-Sit Transfer    Stand-Sit [place] (Transfers) moderate assist (50% patient effort);verbal cues  -     Assistive Device (Stand-Sit Transfers) wheelchair  -     Row Name 05/20/23 1449          Safety Issues, Functional Mobility    Safety Issues Affecting Function (Mobility) ability to follow commands;awareness of need for assistance;insight into deficits/self-awareness;positioning of assistive device  -     Impairments Affecting Function (Mobility) balance;endurance/activity tolerance;range of motion (ROM)  -     Row Name 05/20/23 1449          Motor Skills    Coordination 9 Hole Peg Test of Fine Motor Coordination Results  -     Results, 9 Hole Peg Test of Fine Motor Coordination BOX AND BLOCKS R35 L37; 9 HOLE R31sec L32sec  -     Row Name 05/20/23 1449          Positioning and Restraints    Pre-Treatment Position --  Bed in am and pm  -     Post Treatment Position --  W/c in am and bed in pm  -     Row Name 05/20/23 1449          Therapy Assessment/Plan (OT)    OT Diagnosis Pt seen for OT eval this date following R MCA infarct, necrotizing PNA, pleural effusion, and sepsis  -     Rehab Potential/Prognosis (OT) good, to achieve stated therapy goals  -     Frequency of Treatment (OT) 60 minutes per session  -     Estimated Duration of Therapy (OT) 2 weeks  -     Problem List (OT) problems related to;balance;cognition;coordination;mobility;range of motion (ROM);strength;postural control  -     Planned Therapy Interventions (OT) adaptive equipment training;activity tolerance training;functional balance retraining;transfer/mobility retraining;strengthening exercise;ROM/therapeutic exercise;patient/caregiver education/training;neuromuscular control/coordination retraining  -     Row Name 05/20/23 1449          Therapy Plan Review/Discharge Plan (OT)    Therapy Plan Review (OT) evaluation/treatment results reviewed;care  plan/treatment goals reviewed;patient  -SG     Anticipated Discharge Disposition (OT) home with 24/7 care;home with assist  -SG     Row Name 05/20/23 1449          IRF OT Goals    Transfer Goal Selection (OT-IRF) transfers, OT goal 1;transfers, OT goal 2;transfers, OT goal 3;transfers, OT goal 4  -SG     UB Dressing Goal Selection (OT-IRF) UB dressing, OT goal 1  -SG     LB Dressing Goal Selection (OT-IRF) LB dressing, OT goal 1;LB dressing, OT goal 2  -SG     Toileting Goal Selection (OT-IRF) toileting, OT goal 1  -SG     Strength Goal Selection (OT-IRF) strength, OT goal 1 (free text)  -SG     Functional Mobility Goal Selection (OT) functional mobility, OT goal 1  -SG     Caregiver Training Goal Selection (OT-IRF) caregiver training, OT goal 1  -SG     Row Name 05/20/23 9521          Transfer Goal 1 (OT-IRF)    Activity/Assistive Device (Transfer Goal 1, OT-IRF) toilet  -SG     [place] Level (Transfer Goal 1, OT-IRF) minimum assist (75% or more patient effort)  -SG     Time Frame (Transfer Goal 1, OT-IRF) short-term goal (STG);1 week  -SG     Progress/Outcomes (Transfer Goal 1, OT-IRF) goal ongoing  -SG     Row Name 05/20/23 5981          Transfer Goal 2 (OT-IRF)    Activity/Assistive Device (Transfer Goal 2, OT-IRF) toilet  -SG     [place] Level (Transfer Goal 2, OT-IRF) contact guard required  -SG     Time Frame (Transfer Goal 2, OT-IRF) long-term goal (LTG);by discharge  -SG     Progress/Outcomes (Transfer Goal 2, OT-IRF) goal ongoing  -SG     Row Name 05/20/23 2646          Transfer Goal 3 (OT-IRF)    Activity/Assistive Device (Transfer Goal 3, OT-IRF) tub  -SG     [place] Level (Transfer Goal 3, OT-IRF) minimum assist (75% or more patient effort)  -SG     Time Frame (Transfer Goal 3, OT-IRF) short-term goal (STG);1 week  -SG     Progress/Outcomes (Transfer Goal 3, OT-IRF) goal ongoing  -SG     Row Name 05/20/23 8911          Transfer Goal 4 (OT-IRF)    Activity/Assistive Device (Transfer Goal  4, OT-IRF) tub  -SG     [place] Level (Transfer Goal 4, OT-IRF) contact guard required  -SG     Time Frame (Transfer Goal 4, OT-IRF) long-term goal (LTG);by discharge  -SG     Progress/Outcomes (Transfer Goal 4, OT-IRF) goal ongoing  -SG     Row Name 05/20/23 1448          UB Dressing Goal 1 (OT-IRF)    Activity/Device (UB Dressing Goal 1, OT-IRF) upper body dressing  -SG     [place] (UB Dress Goal 1, OT-IRF) set-up required  -SG     Time Frame (UB Dressing Goal 1, OT-IRF) long-term goal (LTG);by discharge  -SG     Progress/Outcomes (UB Dressing Goal 1, OT-IRF) goal ongoing  -SG     Row Name 05/20/23 1440          LB Dressing Goal 1 (OT-IRF)    Activity/Device (LB Dressing Goal 1, OT-IRF) lower body dressing  -SG     [place] (LB Dressing Goal 1, OT-IRF) minimum assist (75% or more patient effort)  -SG     Time Frame (LB Dressing Goal 1, OT-IRF) short-term goal (STG);1 week  -SG     Progress/Outcomes (LB Dressing Goal 1, OT-IRF) goal ongoing  -SG     Row Name 05/20/23 1442          LB Dressing Goal 2 (OT-IRF)    Activity/Device (LB Dressing Goal 2, OT-IRF) lower body dressing  -SG     [place] (LB Dressing Goal 2, OT-IRF) contact guard required  -SG     Time Frame (LB Dressing Goal 2, OT-IRF) long-term goal (LTG);by discharge  -SG     Progress/Outcomes (LB Dressing Goal 2, OT-IRF) goal ongoing  -SG     Row Name 05/20/23 1444          Toileting Goal 1 (OT-IRF)    Activity/Device (Toileting Goal 1, OT-IRF) toileting skills, all  -SG     [place] Level (Toileting Goal 1, OT-IRF) contact guard required  -SG     Progress/Outcomes (Toileting Goal 1, OT-IRF) goal ongoing  -SG     Time Frame (Toileting Goal 1, OT-IRF) long-term goal (LTG);by discharge  -SG     Row Name 05/20/23 1443          Strength Goal 1 (OT-IRF)    Strength Goal 1 (OT-IRF) Pt to increase bilateral  strengths by 5#.  -SG     Time Frame (Strength Goal 1, OT-IRF) long-term goal (LTG);by discharge  -SG     Progress/Outcomes  (Strength Goal 1, OT-IRF) goal ongoing  -     Row Name 05/20/23 1449          Functional Mobility Goal 1 (OT)    Activity/Assistive Device (Functional Mobility Goal 1, OT) walker, rolling  -SG     [place] Level/Cues Needed (Functional Mobility Goal 1, OT) contact guard assist  -SG     Distance Goal 1 (Functional Mobility, OT) Bathroom mobility  -SG     Time Frame (Functional Mobility Goal 1, OT) long term goal (LTG);by discharge  -SG     Progress/Outcome (Functional Mobility Goal 1, OT) goal ongoing  -     Row Name 05/20/23 1449          Caregiver Training Goal 1 (OT-IRF)    Caregiver Training Goal 1 (OT-IRF) Pt and family to be indep with ADL, AD/AE, HEP and transfers for safe d/c home.  -SG     Time Frame (Caregiver Training Goal 1, OT-IRF) long-term goal (LTG);by discharge  -SG     Progress/Outcomes (Caregiver Training Goal 1, OT-IRF) goal ongoing  -           User Key  (r) = Recorded By, (t) = Taken By, (c) = Cosigned By    Initials Name Effective Dates     Verena Hewitt, OTR 06/16/21 -                  Occupational Therapy Education     Title: PT OT SLP Therapies (In Progress)     Topic: Occupational Therapy (In Progress)     Point: ADL training (Done)     Description:   Instruct learner(s) on proper safety adaptation and remediation techniques during self care or transfers.   Instruct in proper use of assistive devices.              Learning Progress Summary           Patient Acceptance, E,TB, VU by  at 5/20/2023 1516                   Point: Home exercise program (Not Started)     Description:   Instruct learner(s) on appropriate technique for monitoring, assisting and/or progressing therapeutic exercises/activities.              Learner Progress:  Not documented in this visit.          Point: Precautions (Not Started)     Description:   Instruct learner(s) on prescribed precautions during self-care and functional transfers.              Learner Progress:  Not documented in this visit.           Point: Body mechanics (Not Started)     Description:   Instruct learner(s) on proper positioning and spine alignment during self-care, functional mobility activities and/or exercises.              Learner Progress:  Not documented in this visit.                      User Key     Initials Effective Dates Name Provider Type Discipline     06/16/21 -  Verena Hewitt OTR Occupational Therapist OT                    OT Recommendation and Plan    Planned Therapy Interventions (OT): adaptive equipment training, activity tolerance training, functional balance retraining, transfer/mobility retraining, strengthening exercise, ROM/therapeutic exercise, patient/caregiver education/training, neuromuscular control/coordination retraining                    Time Calculation:      Time Calculation- OT     Row Name 05/20/23 1518 05/20/23 1516          Time Calculation- OT    OT Start Time 1400  - 0800  -     OT Stop Time 1430  - 0830  -     OT Time Calculation (min) 30 min  - 30 min  -     OT Received On 05/20/23  - 05/20/23  -     OT Goal Re-Cert Due Date 05/27/23  - --           User Key  (r) = Recorded By, (t) = Taken By, (c) = Cosigned By    Initials Name Provider Type     Verena Hewitt OTR Occupational Therapist              Therapy Charges for Today     Code Description Service Date Service Provider Modifiers Qty    [id number] HC OT SELF CARE/MGMT/TRAIN EA 15 MIN 5/20/2023 Verena Hewitt OTR GO 2    [id number]  OT EVAL MOD COMPLEXITY 3 5/20/2023 Verena Hewitt OTR GO 1                   SONG Anguiano  5/20/2023

## 2023-05-20 NOTE — PROGRESS NOTES
SECTION GG    Eating Performance: [place] sets up or cleans up; patient completes activity.  [place] assists only prior to or following the activity.    Eating Discharge Goals: Patient completed the activities by themself with no  assistance from a helper.    Section B. Hearing and Vision  Ability to Hear:  Adequate - no difficulty in normal conversation, social  interaction, listening to TV  Ability to See in Adequate Light:  Adequate - sees fine detail, such as regular  print in newspapers/books    Section B. Health Literacy  Frequency of Needing Assistance Reading:  Never    Section D. Mood  Presence of little interest or pleasure in doing things:   No  Frequency of having little interest or pleasure in doing things:   Never or 1  day  Presence of feeling down, depressed, or hopeless:   No  Frequency of feeling down, depressed, or hopeless:   Never or 1 day   Interview Ended. Above responses do not meet criteria to continue.  Total Severity Score:   0    Section D. Social Isolation  [place] of Feeling Lonely or Isolated:  Never    Section J. Health Conditions (Pain Effect on Sleep)  Pain Effect on Sleep:   Does not apply - Patient has not had any pain or hurting  in the past 5 days    Signed by: Bia Muhammad RN

## 2023-05-20 NOTE — PLAN OF CARE
Problem: Rehabilitation ([facility]) Plan of Care  Goal: Plan of Care Review  Outcome: Ongoing, Progressing   Goal Outcome Evaluation:            Cognitive -Linguistic evaluation performed this date.  Pt presents with very mild higher level executive function deficits. Pt is in agreement to participate in speech therapy.

## 2023-05-20 NOTE — PROGRESS NOTES
"  PROGRESS NOTE  Patient Name: Katherine Williamson  Age/Sex: 72 y.o. female  : 1950  MRN: [id number]    Date of Admission: 2023  Date of Encounter Visit: 23   LOS: 1 day   Patient Care Team:  Zelalem Flor APRN as PCP - General (Internal Medicine)  Stephen, Brandon Meyer MD as Consulting Physician (Orthopedic Surgery)    Chief Complaint: Acute progressive respiratory failure    Hospital course: Patient has been doing gradually better.  She is afebrile, oxygen requirements are much better since my last encounter with her, she is denying any focal neurological deficit at this point.  She is working with physical therapy and feels exhausted, she i denies any significant pleurisy or chest pain  She is complaining about the diarrhea with up to 4 watery bowel movement today causing incontinence  Tolerating p.o., feeling better    REVIEW OF SYSTEMS:   CONSTITUTIONAL: Low-grade fever  CARDIOVASCULAR: No chest pain, chest pressure or chest discomfort. No palpitations or edema.   RESPIRATORY: Less shortness of breath, less productive cough  GASTROINTESTINAL: No anorexia, nausea, vomiting, positive diarrhea. No abdominal pain or blood.   HEMATOLOGIC: No bleeding or bruising.     Ventilator/Non-Invasive Ventilation Settings (From admission, onward)    2 L nasal cannula-            Vital Signs  Temp:  [97.8 °F (36.6 °C)-98.3 °F (36.8 °C)] 98 °F (36.7 °C)  Heart Rate:  [82-92] 91  Resp:  [18-20] 18  BP: (103-132)/(59-81) 103/65  SpO2:  [92 %-96 %] 95 %  on  Flow (L/min):  [1-2] 1 Device (Oxygen Therapy): nasal cannula    Intake/Output Summary (Last 24 hours) at 2023 1129  Last data filed at 2023 0554  Gross per 24 hour   Intake --   Output 350 ml   Net -350 ml     Flowsheet Rows    Flowsheet Row First Filed Value   Admission Height 160 cm (63\") Documented at 2023 190   Admission Weight 61.2 kg (135 lb) Documented at 2023 190        Body mass index is 23.43 kg/m².      23  1700 " 05/20/23  0554   Weight: 59.7 kg (131 lb 9.8 oz) 60 kg (132 lb 4.4 oz)       Physical Exam:  GEN: Looking better, in no distress, awake and responsive, on low-flow nasal cannula oxygen  EYES:   Sclerae clear. No icterus. PERRL. Normal EOM  ENT:   External ears/nose normal, no oral lesions, no thrush, mucous membranes moist  NECK:  Supple, midline trachea, no JVD  LUNGS: Normal chest on inspection, diminished breath sounds bilaterally, she does have faint crackles posteriorly, breathing is nonlabored  CV:  Regular rhythm and rate. Normal S1/S2. No murmurs, gallops, or rubs noted.  ABD:  Soft, nontender and nondistended. Normal bowel sounds. No guarding  EXT:  Moves all extremities well. No cyanosis. No redness.  Trace lower extremities edema.   Skin: Dry, intact, no bleeding    Results Review:    Results From Last 14 Days   Lab Units 05/15/23  0309 05/14/23 0528   CRP mg/dL  --  3.52*   TRIGLYCERIDES mg/dL 68  --      Results from last 7 days   Lab Units 05/20/23  0740 05/19/23  0425 05/18/23  0628 05/16/23  0340 05/15/23  0309 05/14/23  0528   SODIUM mmol/L 137 139 139 135* 136 135*   POTASSIUM mmol/L 4.0 4.1 3.8 3.9 3.8 4.3   CHLORIDE mmol/L 105 104 104 101 102 100   CO2 mmol/L 28.7 30.5* 31.0* 31.0* 29.9* 32.5*   BUN mg/dL 14 12 19 13 13 12   CREATININE mg/dL 0.20* 0.23* 0.19* 0.22* 0.21* 0.26*   CALCIUM mg/dL 8.6 8.5* 8.4* 8.3* 8.3* 8.4*   ANION GAP mmol/L 3.3* 4.5* 4.0* 3.0* 4.1* 2.5*   ALBUMIN g/dL 1.7* 1.9* 2.1* 2.1* 1.8*  --                  Results from last 7 days   Lab Units 05/20/23  0740 05/19/23  0425 05/18/23  0628 05/16/23  0340 05/15/23  0309 05/14/23  0528   WBC 10*3/mm3 8.34 9.51 16.03* 11.81* 16.60* 10.26   HEMOGLOBIN g/dL 10.3* 10.0* 10.0* 10.0* 10.0* 10.1*   HEMATOCRIT % 30.3* 29.5* 29.9* 29.0* 30.1* 31.3*   PLATELETS 10*3/mm3 366 351 387 365 333 388   MCV fL 88.3 87.8 89.5 89.2 92.6 92.3   NEUTROPHIL % % 66.1  --   --   --   --  80.0*   LYMPHOCYTE % % 26.7  --   --   --   --  14.0*   MONOCYTES  % % 5.3  --   --   --   --  4.5*   EOSINOPHIL % % 1.1  --   --   --   --  0.6   BASOPHIL % % 0.4  --   --   --   --  0.2   IMM GRAN % % 0.4  --   --   --   --  0.7*      Latest Reference Range & Units Most Recent   RNP Antibodies 0.0 - 0.9 AI <0.2  5/9/23 14:00   Vann Antibodies 0.0 - 0.9 AI <0.2  5/9/23 14:00   Sjogren's Anti-SS-A 0.0 - 0.9 AI <0.2  5/9/23 14:00   Sjogren's Anti-SS-B 0.0 - 0.9 AI <0.2  5/9/23 14:00   DUTCH-1 IgG 0.0 - 0.9 AI <0.2  5/9/23 14:00      Latest Reference Range & Units Most Recent   Anti-Centromere B Antibodies 0.0 - 0.9 AI <0.2  5/9/23 14:00   Antichromatin Antibodies 0.0 - 0.9 AI <0.2  5/9/23 14:00   Anti-DNA (DS) Ab Qn 0 - 9 IU/mL <1  5/9/23 14:00   C-ANCA Neg:<1:20 titer <1:20  5/9/23 14:00   DUTCH-1 IgG 0.0 - 0.9 AI <0.2  5/9/23 14:00   P-ANCA Neg:<1:20 titer <1:20  5/9/23 14:00   Atypical pANCA Neg:<1:20 titer <1:20  5/9/23 14:00      Latest Reference Range & Units Most Recent   Antiscleroderma-70 Antibodies 0.0 - 0.9 AI <0.2  5/9/23 14:00           Latest Reference Range & Units Most Recent   pH, Fluid  8.03  5/10/23 11:10   Glucose, Fluid mg/dL 142  5/10/23 11:10   Protein, Total, Fluid g/dL 1.4  5/10/23 11:10      Latest Reference Range & Units Most Recent   ANTI-MPO ANTIBODIES 0.0 - 0.9 units <0.2  5/9/23 14:00   ANTI-PR3 ANTIBODIES 0.0 - 0.9 units <0.2  5/9/23 14:00     Results from last 7 days   Lab Units 05/15/23  0309   MAGNESIUM mg/dL 1.8     Results from last 7 days   Lab Units 05/15/23  0309   CHOLESTEROL mg/dL 94   TRIGLYCERIDES mg/dL 68   HDL CHOL mg/dL 32*         Results from last 7 days   Lab Units 05/15/23  0309   HEMOGLOBIN A1C % 6.20*     Glucose   Date/Time Value Ref Range Status   05/17/2023 1203 94 70 - 130 mg/dL Final     Comment:     Meter: [id number] : [id number] Meme HORN         Results from last 7 days   Lab Units 05/14/23  1755   BLOODCX  No growth at 5 days                       Imaging:   Imaging Results (All)     Procedure Component Value Units  Date/Time            I reviewed the patient's new clinical results.  I personally viewed and interpreted the patient's imaging results: Bilateral dense pneumonia with some cavitation on the right side, multiples chest x-ray from today compared to the one done 3 days ago showed no significant changes        Medication Review:   aspirin, 81 mg, Oral, Q24H  atorvastatin, 80 mg, Oral, Nightly  citalopram, 20 mg, Oral, Daily  clopidogrel, 75 mg, Oral, Daily  doxycycline, 100 mg, Oral, Q12H  enoxaparin, 40 mg, Subcutaneous, Q24H  fluticasone, 1 spray, Each Nare, Daily  guaiFENesin, 600 mg, Oral, BID  hydrocortisone-bacitracin-zinc oxide-nystatin, 1 application, Topical, BID  Menthol-Zinc Oxide, 1 application, Topical, 4x Daily  meropenem, 1 g, Intravenous, Q8H  metoprolol tartrate, 12.5 mg, Oral, Q12H  sodium chloride, 10 mL, Intravenous, Q12H  sodium chloride, 10 mL, Intravenous, Q12H  cyanocobalamin, 1,000 mcg, Oral, Daily             ASSESSMENT:   1. Right lower lobe lung abscess/pneumonia, on meropenem, planned for 4 weeks with stop date 6/8/2023  2. Sepsis  3. Right loculated pleural effusion, s/p thoracentesis  4. Acute hypoxic respiratory failure, worse  5. Sepsis, resolved  6. Emphysema, upper lobe predominant, COPD but no exacerbation  7. Peripheral pleural-based nodule, 2.4 cm on CT chest 4/3/23, could have been related to early pneumonia.  Underlying malignancy cannot be excluded  8. Tobacco abuse  9. Essential hypertension  10. Acute right MCA stroke status post TNK  11. Bilateral stroke and MRI looking for possible embolic so far no evidence on echo  12. Diarrhea    PLAN:  Continues to improve  We will repeat chest x-ray in a.m. to follow-up on the right-sided infiltrate  The diarrhea is not associated with any fever or leukocytosis and it is disturbing to the patient and she will benefit from adding antidiarrhea medication  Antibiotic course will be completed on 6/8/2023 per ID recommendation  Continue the  deep breathing exercises and continue with the pulmonary hygiene measures  PT per rehab team      Disposition: Per primary team    Jennifer Hood MD  05/20/23  11:29 EDT          Dictated utilizing Dragon dictation

## 2023-05-21 ENCOUNTER — APPOINTMENT (OUTPATIENT)
Dept: GENERAL RADIOLOGY | Facility: HOSPITAL | Age: 73
DRG: 056 | End: 2023-05-21
Payer: COMMERCIAL

## 2023-05-21 PROCEDURE — 71046 X-RAY EXAM CHEST 2 VIEWS: CPT

## 2023-05-21 PROCEDURE — 25010000002 MEROPENEM PER 100 MG: Performed by: HOSPITALIST

## 2023-05-21 PROCEDURE — 25010000002 ENOXAPARIN PER 10 MG: Performed by: PHYSICAL MEDICINE & REHABILITATION

## 2023-05-21 RX ADMIN — MEROPENEM 1 G: 1 INJECTION, POWDER, FOR SOLUTION INTRAVENOUS at 11:57

## 2023-05-21 RX ADMIN — Medication 1 APPLICATION: at 17:52

## 2023-05-21 RX ADMIN — Medication 10 ML: at 08:59

## 2023-05-21 RX ADMIN — CITALOPRAM 20 MG: 20 TABLET, FILM COATED ORAL at 08:57

## 2023-05-21 RX ADMIN — ZINC OXIDE 1 APPLICATION: 200 OINTMENT TOPICAL at 21:46

## 2023-05-21 RX ADMIN — ASPIRIN 81 MG: 81 TABLET, CHEWABLE ORAL at 08:57

## 2023-05-21 RX ADMIN — ENOXAPARIN SODIUM 40 MG: 100 INJECTION SUBCUTANEOUS at 21:35

## 2023-05-21 RX ADMIN — ATORVASTATIN CALCIUM 80 MG: 80 TABLET, FILM COATED ORAL at 21:34

## 2023-05-21 RX ADMIN — Medication 1 APPLICATION: at 11:58

## 2023-05-21 RX ADMIN — CLOPIDOGREL BISULFATE 75 MG: 75 TABLET, FILM COATED ORAL at 08:57

## 2023-05-21 RX ADMIN — MEROPENEM 1 G: 1 INJECTION, POWDER, FOR SOLUTION INTRAVENOUS at 04:39

## 2023-05-21 RX ADMIN — Medication 1000 MCG: at 08:57

## 2023-05-21 RX ADMIN — MEROPENEM 1 G: 1 INJECTION, POWDER, FOR SOLUTION INTRAVENOUS at 21:35

## 2023-05-21 RX ADMIN — GUAIFENESIN 600 MG: 600 TABLET, EXTENDED RELEASE ORAL at 08:57

## 2023-05-21 RX ADMIN — Medication 1 APPLICATION: at 21:46

## 2023-05-21 RX ADMIN — METOPROLOL TARTRATE 12.5 MG: 25 TABLET, FILM COATED ORAL at 08:57

## 2023-05-21 RX ADMIN — ZINC OXIDE 1 APPLICATION: 200 OINTMENT TOPICAL at 08:58

## 2023-05-21 RX ADMIN — Medication 1 APPLICATION: at 08:57

## 2023-05-21 RX ADMIN — DOXYCYCLINE 100 MG: 100 CAPSULE ORAL at 21:34

## 2023-05-21 RX ADMIN — Medication 10 ML: at 21:35

## 2023-05-21 RX ADMIN — GUAIFENESIN 600 MG: 600 TABLET, EXTENDED RELEASE ORAL at 21:34

## 2023-05-21 RX ADMIN — METOPROLOL TARTRATE 12.5 MG: 25 TABLET, FILM COATED ORAL at 21:34

## 2023-05-21 RX ADMIN — DOXYCYCLINE 100 MG: 100 CAPSULE ORAL at 08:57

## 2023-05-21 NOTE — PLAN OF CARE
Goal Outcome Evaluation:  Plan of Care Reviewed With: patient        Progress: improving  Outcome Evaluation: Ms Williamson rested well this shift.  IV antibiotics q 8hours.  No c/o of pain.  Picc to RUE.  Zio patch in place.  purewick in place overnight. All meds whole w/thins

## 2023-05-21 NOTE — PROGRESS NOTES
Inpatient Rehabilitation Plan of Care Note    Plan of Care  Care Plan Reviewed - No updates at this time.    Safety    Performed Intervention(s)  Items within reach, environmental set-up for reduce risk of fall  Bed alarms and chair alarms and safety rounds hourly      Psychosocial    Performed Intervention(s)  Medication as ordered and PRN  Verbalize needs and concerns  Therapeutic environmental set up      Sphincter Control    Performed Intervention(s)  Incontinent care as needed and ointment applied as ordered.  Take to bathroom in a timely manner  Proper diet and adequate fluid intake.      Body Systems    Performed Intervention(s)  Medication, turn every 2 hours and prper nutrition.    Signed by: Aanbella Estrada RN

## 2023-05-21 NOTE — PROGRESS NOTES
"Patient Identification:  Name: Katherine Williamson  Age: 72 y.o.  Sex: female  :  1950  MRN: [id number]       Subjective - Pt c/o LHP. Slept and ate OK. Wants diet to be upgraded. Denies CP, SOB, N/V, F/C    Objective - O2 sats = 90's,   Vital Signs  Temp:  [97.5 °F (36.4 °C)-98.1 °F (36.7 °C)] 97.9 °F (36.6 °C)  Heart Rate:  [77-87] 84  Resp:  [18] 18  BP: (106-134)/(59-68) 130/59  SpO2:  [91 %-98 %] 91 %  on  Flow (L/min):  [1] 1 Device (Oxygen Therapy): nasal cannula    Exam:  Patient is examined using the personal protective equipment as per guidelines from infection control for this particular patient as enacted.  Hand washing was performed before and after patient interaction.  General Appearance:    Alert, cooperative, in no acute distress.  Following simple commands  ENT - mild left facial weakness  Neck midline trachea, no thyromegaly   Lungs:    Diminished breath sounds with crackles diffuse right lower lobe    Heart:    Regular rhythm and normal rate, normal S1 and S2, no            murmur, no gallop, no rub, no click   Chest Wall:    No abnormalities observed   Abdomen:     Normal bowel sounds, no masses, no organomegaly, soft        nontender, nondistended, no guarding, no rebound                tenderness   Extremities:   Moves all extremities well, no edema, no cyanosis, no             redness  Skin - no rashes no nodules  Musculoskeletal no cyanosis no clubbing normal range of motion  Neuro - Ox 3  Language intact  Left-sided weakness = 4/5, 5/5 on right         New Radiology:  ELIGIO negative for vegetations     Prior radiology:  MRI brain: \"Bilateral multifocal areas of infarction.  No evidence of hemorrhagic transformation. \"     ASSESSMENT/PLAN:  1. Acute R MCA stroke and bilateral infarcts  -  - Admit for inpt rehab w/ PT, OT, SLP, psychology and rehab medical and nursing care. Continue secondary stroke prophylaxis     2. Pneumonia and right lung abscess and Pleural effusions  -  - " Continue IV antibx. ID following    - 5/21 - Continues to improve   The chest x-ray is reassuring   Expect her to be able to come off the oxygen in the next few days   She will need to have a follow-up chest imaging 6 weeks down the road   Finish the antibiotic course per ID, last day 6/8/2023      3. Acute hypoxic respiratory failure     4. Emphysema d/t Tobacco use     5. Hypertension-continue antihypertensive regimen and avoid hypotensive episodes.     6. DVT prophylaxis -   - 5/20 - Start Lovenox SQ daily

## 2023-05-21 NOTE — PROGRESS NOTES
"  PROGRESS NOTE  Patient Name: Katherine Williamson  Age/Sex: 72 y.o. female  : 1950  MRN: [id number]    Date of Admission: 2023  Date of Encounter Visit: 23   LOS: 2 days   Patient Care Team:  Zelalem Flor APRN as PCP - General (Internal Medicine)  Stephen, Brandon Meyer MD as Consulting Physician (Orthopedic Surgery)    Chief Complaint: Acute progressive respiratory failure    Hospital course: Patient continues to improve  The diarrhea is better on the antidiarrhea medication  Her appetite is not the best and she would like to switch to a normal diet and that would be deferred to the speech therapy based on her assessment  She denies any dyspnea but she did have some band of tightness around the chest earlier this morning that was self-limited  No productive cough  Tolerating p.o., feeling better    REVIEW OF SYSTEMS:   CONSTITUTIONAL: Low-grade fever  CARDIOVASCULAR: No chest pain, chest pressure or chest discomfort. No palpitations or edema.   RESPIRATORY: Less shortness of breath, less productive cough  GASTROINTESTINAL: No anorexia, nausea, vomiting, positive diarrhea. No abdominal pain or blood.   HEMATOLOGIC: No bleeding or bruising.     Ventilator/Non-Invasive Ventilation Settings (From admission, onward)    1 L nasal cannula-            Vital Signs  Temp:  [97.5 °F (36.4 °C)-98.1 °F (36.7 °C)] 97.9 °F (36.6 °C)  Heart Rate:  [77-87] 84  Resp:  [18] 18  BP: (106-134)/(59-68) 130/59  SpO2:  [91 %-98 %] 91 %  on  Flow (L/min):  [1] 1 Device (Oxygen Therapy): nasal cannula    Intake/Output Summary (Last 24 hours) at 2023 1038  Last data filed at 2023 0853  Gross per 24 hour   Intake 460 ml   Output 600 ml   Net -140 ml     Flowsheet Rows    Flowsheet Row First Filed Value   Admission Height 160 cm (63\") Documented at 2023   Admission Weight 61.2 kg (135 lb) Documented at 2023 190        Body mass index is 23.43 kg/m².      23  1700 23  0554   Weight: 59.7 " kg (131 lb 9.8 oz) 60 kg (132 lb 4.4 oz)       Physical Exam:  GEN: Looking better, in no distress, awake and responsive, on low-flow nasal cannula oxygen  EYES:   Sclerae clear. No icterus. PERRL. Normal EOM  ENT:   External ears/nose normal, no oral lesions, no thrush, mucous membranes moist  NECK:  Supple, midline trachea, no JVD  LUNGS: Normal chest on inspection, diminished breath sounds bilaterally, she does have faint crackles posteriorly, breathing is nonlabored  CV:  Regular rhythm and rate. Normal S1/S2. No murmurs, gallops, or rubs noted.  ABD:  Soft, nontender and nondistended. Normal bowel sounds. No guarding  EXT:  Moves all extremities well. No cyanosis. No redness.  Trace lower extremities edema.   Skin: Dry, intact, no bleeding    Results Review:    Results From Last 14 Days   Lab Units 05/15/23  0309 05/14/23  0528   CRP mg/dL  --  3.52*   TRIGLYCERIDES mg/dL 68  --      Results from last 7 days   Lab Units 05/20/23  0740 05/19/23  0425 05/18/23  0628 05/16/23  0340 05/15/23  0309   SODIUM mmol/L 137 139 139 135* 136   POTASSIUM mmol/L 4.0 4.1 3.8 3.9 3.8   CHLORIDE mmol/L 105 104 104 101 102   CO2 mmol/L 28.7 30.5* 31.0* 31.0* 29.9*   BUN mg/dL 14 12 19 13 13   CREATININE mg/dL 0.20* 0.23* 0.19* 0.22* 0.21*   CALCIUM mg/dL 8.6 8.5* 8.4* 8.3* 8.3*   ANION GAP mmol/L 3.3* 4.5* 4.0* 3.0* 4.1*   ALBUMIN g/dL 1.7* 1.9* 2.1* 2.1* 1.8*                 Results from last 7 days   Lab Units 05/20/23  0740 05/19/23 0425 05/18/23 0628 05/16/23  0340 05/15/23  0309   WBC 10*3/mm3 8.34 9.51 16.03* 11.81* 16.60*   HEMOGLOBIN g/dL 10.3* 10.0* 10.0* 10.0* 10.0*   HEMATOCRIT % 30.3* 29.5* 29.9* 29.0* 30.1*   PLATELETS 10*3/mm3 366 351 387 365 333   MCV fL 88.3 87.8 89.5 89.2 92.6   NEUTROPHIL % % 66.1  --   --   --   --    LYMPHOCYTE % % 26.7  --   --   --   --    MONOCYTES % % 5.3  --   --   --   --    EOSINOPHIL % % 1.1  --   --   --   --    BASOPHIL % % 0.4  --   --   --   --    IMM GRAN % % 0.4  --   --   --    --       Latest Reference Range & Units Most Recent   RNP Antibodies 0.0 - 0.9 AI <0.2  5/9/23 14:00   Vann Antibodies 0.0 - 0.9 AI <0.2  5/9/23 14:00   Sjogren's Anti-SS-A 0.0 - 0.9 AI <0.2  5/9/23 14:00   Sjogren's Anti-SS-B 0.0 - 0.9 AI <0.2  5/9/23 14:00   DUTCH-1 IgG 0.0 - 0.9 AI <0.2  5/9/23 14:00      Latest Reference Range & Units Most Recent   Anti-Centromere B Antibodies 0.0 - 0.9 AI <0.2  5/9/23 14:00   Antichromatin Antibodies 0.0 - 0.9 AI <0.2  5/9/23 14:00   Anti-DNA (DS) Ab Qn 0 - 9 IU/mL <1  5/9/23 14:00   C-ANCA Neg:<1:20 titer <1:20  5/9/23 14:00   DUTCH-1 IgG 0.0 - 0.9 AI <0.2  5/9/23 14:00   P-ANCA Neg:<1:20 titer <1:20  5/9/23 14:00   Atypical pANCA Neg:<1:20 titer <1:20  5/9/23 14:00      Latest Reference Range & Units Most Recent   Antiscleroderma-70 Antibodies 0.0 - 0.9 AI <0.2  5/9/23 14:00           Latest Reference Range & Units Most Recent   pH, Fluid  8.03  5/10/23 11:10   Glucose, Fluid mg/dL 142  5/10/23 11:10   Protein, Total, Fluid g/dL 1.4  5/10/23 11:10      Latest Reference Range & Units Most Recent   ANTI-MPO ANTIBODIES 0.0 - 0.9 units <0.2  5/9/23 14:00   ANTI-PR3 ANTIBODIES 0.0 - 0.9 units <0.2  5/9/23 14:00     Results from last 7 days   Lab Units 05/15/23  0309   MAGNESIUM mg/dL 1.8     Results from last 7 days   Lab Units 05/15/23  0309   CHOLESTEROL mg/dL 94   TRIGLYCERIDES mg/dL 68   HDL CHOL mg/dL 32*         Results from last 7 days   Lab Units 05/15/23  0309   HEMOGLOBIN A1C % 6.20*     No results found for: POCGLU      Results from last 7 days   Lab Units 05/14/23  1755   BLOODCX  No growth at 5 days                       Imaging:   Imaging Results (All)     Procedure Component Value Units Date/Time              I reviewed the patient's new clinical results.  I personally viewed and interpreted the patient's imaging results: Ongoing improvement in the right lower lobe infiltrate and right-sided effusion        Medication Review:   aspirin, 81 mg, Oral, Q24H  atorvastatin,  80 mg, Oral, Nightly  citalopram, 20 mg, Oral, Daily  clopidogrel, 75 mg, Oral, Daily  doxycycline, 100 mg, Oral, Q12H  enoxaparin, 40 mg, Subcutaneous, Q24H  fluticasone, 1 spray, Each Nare, Daily  guaiFENesin, 600 mg, Oral, BID  hydrocortisone-bacitracin-zinc oxide-nystatin, 1 application, Topical, BID  Menthol-Zinc Oxide, 1 application, Topical, 4x Daily  meropenem, 1 g, Intravenous, Q8H  metoprolol tartrate, 12.5 mg, Oral, Q12H  sodium chloride, 10 mL, Intravenous, Q12H  sodium chloride, 10 mL, Intravenous, Q12H  cyanocobalamin, 1,000 mcg, Oral, Daily             ASSESSMENT:   1. Right lower lobe lung abscess/pneumonia, on meropenem, planned for 4 weeks with stop date 6/8/2023  2. Sepsis  3. Right loculated pleural effusion, s/p thoracentesis  4. Acute hypoxic respiratory failure, worse  5. Sepsis, resolved  6. Emphysema, upper lobe predominant, COPD but no exacerbation  7. Peripheral pleural-based nodule, 2.4 cm on CT chest 4/3/23, could have been related to early pneumonia.  Underlying malignancy cannot be excluded  8. Tobacco abuse  9. Essential hypertension  10. Acute right MCA stroke status post TNK  11. Bilateral stroke and MRI looking for possible embolic so far no evidence on echo  12. Diarrhea    PLAN:  Continues to improve  The chest x-ray is reassuring  Expect her to be able to come off the oxygen in the next few days  She will need to have a follow-up chest imaging 6 weeks down the road  Finish the antibiotic course per ID, last day 6/8/2023  Continue with the as needed antidiarrhea medication  Encouraged to increase p.o. intake given her protein malnutrition  We will defer advancing her diet to the speech therapy based on her reassessment hopefully tomorrow  Reassured about the improvement on the chest x-ray and discussed with the patient in details      Disposition: Per primary team    Jennifer Hood MD  05/21/23  10:38 EDT          Dictated utilizing Dragon dictation

## 2023-05-21 NOTE — PROGRESS NOTES
Inpatient Rehabilitation Plan of Care Note    Plan of Care  Care Plan Reviewed - No updates at this time.    Safety    Performed Intervention(s)  Items within reach, environmental set-up for reduce risk of fall  Bed alarms and chair alarms and safety rounds hourly      Psychosocial    Performed Intervention(s)  Medication as ordered and PRN  Verbalize needs and concerns  Therapeutic environmental set up      Sphincter Control    Performed Intervention(s)  Incontinent care as needed and ointment applied as ordered.  Take to bathroom in a timely manner  Proper diet and adequate fluid intake.      Body Systems    Performed Intervention(s)  Medication, turn every 2 hours and prper nutrition.    Signed by: Bia Muhammad RN

## 2023-05-21 NOTE — PLAN OF CARE
Goal Outcome Evaluation:  Plan of Care Reviewed With: patient           Outcome Evaluation: Patient alert and oriented x4, assist x1, and medication with water. She has been continent/incontinent of b/b-Imodium PRN ordered, but not used today. PICC line to RUE, flushes good and good blood return- antibiotics hung today. Zio in place, VS stable, and rested most of the day with a few visitors. Patient had very poor appetite,  concerned about intake and encouraged patient to eat more. Note left for MD regarding poor nutritional intake, low air loss mattress delivered today, and refused waffle boots.

## 2023-05-22 PROCEDURE — 92610 EVALUATE SWALLOWING FUNCTION: CPT

## 2023-05-22 PROCEDURE — 25010000002 MEROPENEM PER 100 MG: Performed by: HOSPITALIST

## 2023-05-22 PROCEDURE — 25010000002 ENOXAPARIN PER 10 MG: Performed by: PHYSICAL MEDICINE & REHABILITATION

## 2023-05-22 PROCEDURE — 97129 THER IVNTJ 1ST 15 MIN: CPT

## 2023-05-22 PROCEDURE — 97530 THERAPEUTIC ACTIVITIES: CPT

## 2023-05-22 PROCEDURE — 97535 SELF CARE MNGMENT TRAINING: CPT

## 2023-05-22 PROCEDURE — 97110 THERAPEUTIC EXERCISES: CPT

## 2023-05-22 PROCEDURE — 97130 THER IVNTJ EA ADDL 15 MIN: CPT

## 2023-05-22 RX ADMIN — CITALOPRAM 20 MG: 20 TABLET, FILM COATED ORAL at 08:24

## 2023-05-22 RX ADMIN — ATORVASTATIN CALCIUM 80 MG: 80 TABLET, FILM COATED ORAL at 20:34

## 2023-05-22 RX ADMIN — GUAIFENESIN 600 MG: 600 TABLET, EXTENDED RELEASE ORAL at 20:34

## 2023-05-22 RX ADMIN — MEROPENEM 1 G: 1 INJECTION, POWDER, FOR SOLUTION INTRAVENOUS at 20:44

## 2023-05-22 RX ADMIN — Medication 1000 MCG: at 08:24

## 2023-05-22 RX ADMIN — Medication 1 APPLICATION: at 11:53

## 2023-05-22 RX ADMIN — DOXYCYCLINE 100 MG: 100 CAPSULE ORAL at 20:34

## 2023-05-22 RX ADMIN — CLOPIDOGREL BISULFATE 75 MG: 75 TABLET, FILM COATED ORAL at 08:24

## 2023-05-22 RX ADMIN — METOPROLOL TARTRATE 12.5 MG: 25 TABLET, FILM COATED ORAL at 08:24

## 2023-05-22 RX ADMIN — Medication 1 APPLICATION: at 20:36

## 2023-05-22 RX ADMIN — GUAIFENESIN 600 MG: 600 TABLET, EXTENDED RELEASE ORAL at 08:24

## 2023-05-22 RX ADMIN — MEROPENEM 1 G: 1 INJECTION, POWDER, FOR SOLUTION INTRAVENOUS at 11:49

## 2023-05-22 RX ADMIN — DOXYCYCLINE 100 MG: 100 CAPSULE ORAL at 08:24

## 2023-05-22 RX ADMIN — ZINC OXIDE 1 APPLICATION: 200 OINTMENT TOPICAL at 16:30

## 2023-05-22 RX ADMIN — METOPROLOL TARTRATE 12.5 MG: 25 TABLET, FILM COATED ORAL at 20:34

## 2023-05-22 RX ADMIN — Medication 10 ML: at 20:36

## 2023-05-22 RX ADMIN — MEROPENEM 1 G: 1 INJECTION, POWDER, FOR SOLUTION INTRAVENOUS at 05:13

## 2023-05-22 RX ADMIN — ZINC OXIDE 1 APPLICATION: 200 OINTMENT TOPICAL at 20:37

## 2023-05-22 RX ADMIN — ZINC OXIDE 1 APPLICATION: 200 OINTMENT TOPICAL at 08:24

## 2023-05-22 RX ADMIN — ASPIRIN 81 MG: 81 TABLET, CHEWABLE ORAL at 08:24

## 2023-05-22 RX ADMIN — ENOXAPARIN SODIUM 40 MG: 100 INJECTION SUBCUTANEOUS at 20:34

## 2023-05-22 RX ADMIN — Medication 1 APPLICATION: at 08:24

## 2023-05-22 RX ADMIN — Medication 1 APPLICATION: at 16:31

## 2023-05-22 NOTE — THERAPY TREATMENT NOTE
Inpatient Rehabilitation - Speech Language Pathology Treatment Note    [facility]     Patient Name: Katherine Williamson  : 1950  MRN: [id number]    Today's Date: 2023                   Admit Date: 2023       Visit Dx:      ICD-10-CM ICD-9-CM   1. Decreased functional mobility and endurance  Z74.09 780.99       Patient Active Problem List   Diagnosis   • Benign essential HTN   • Tobacco abuse   • Dislocation of hip joint prosthesis   • Fracture of proximal humerus   • Mechanical complication of internal orthopedic device   • Wear of articular bearing surface of internal prosthetic joint   • History of repair of hip joint   • History of operative procedure on hip   • Hyperglycemia   • Hepatic steatosis   • Diverticulosis   • Chest pain, atypical   • History of colon polyps   • Family history of colon cancer in mother   • Allergic rhinitis   • Lung nodule seen on imaging study   • Acute respiratory failure with hypoxia   • Abscess of right lung with pneumonia   • Empyema   • Sepsis   • Pleural effusion, right   • Other emphysema   • Pulmonary nodule (RLL)   • Diastolic dysfunction, grade 1   • Physical debility   • Cryptogenic stroke   • Acute right MCA stroke       Past Medical History:   Diagnosis Date   • Arthritis    • Cataract    • Colon polyps     FOLLOWED BY DR. JOSEPH LAU   • Hypertension        Past Surgical History:   Procedure Laterality Date   • COLONOSCOPY  10/2015    Rwchr-qkkmchcmsptf-Xt. Kaplan.  Follow-up in 5 years.   • COLONOSCOPY N/A 2022    2 BENIGN POLYPS IN DESCENDING, 5 MM BENIGN POLYP IN SIGMOID, MULTIPLE SMALL AND LARGE DIVERTICULA IN SIGMOID, RESCOPE IN 3 YRS, DR. JOSEPH LAU AT [facility]   • EYE SURGERY     • JOINT REPLACEMENT  ?    Left total hip replacement in .  In 2015 patient had left hip revision   • TONSILLECTOMY         SLP Recommendation and Plan                   Anticipated Discharge Disposition (SLP): home with OP services (23 1100)      Therapy Frequency ([place]): evaluation only (05/22/23 1100)                 Plan of Care Reviewed With: patient (05/22/23 0857)                SLP EVALUATION (last 72 hours)     SLP SLC Evaluation     Row Name 05/22/23 1100 05/22/23 0830 05/20/23 1000             Communication Assessment/Intervention    Document Type therapy note (daily note)  -AL evaluation  -AL --      Patient Observations -- -- alert;cooperative;agree to therapy  -LS      Patient/Family/Caregiver Comments/Observations Pt is pleasant and cooperative.  -AL Pt participated well. She reported she feels ready to upgrade to regular.  -AL --      Patient Effort -- -- excellent  -LS         General Information    Patient Profile Reviewed -- -- yes  -LS      Pertinent History Of Current Problem -- -- Ms. Williamson is a 72 y.o. female who presented to [facility] initially complaining of weakness, shortness of breath and cough. She was found to have respiratory failure, pneumonia and lung abscess and was admitted to the hospital for further evaluation and treatment. Unfortunately her hospital stay was complicated by development of an acute stroke that required tPA and transferred to the intensive care unit.  This has resulted in some left hemiparesis and mild speech disturbance but the symptoms have improved after administering TNK on 5/14.  CTA showed right MCA perfusion deficit but no proximal occlusion appeared to have a distal M3 occlusion report and perfusion deficit of the right posterior parietal lobe.  -LS      Precautions/Limitations, Vision -- -- WFL  -LS      Precautions/Limitations, Hearing -- -- WFL  -LS      Patient Level of Education -- -- some college  -      Prior Level of Function-Communication -- -- [facility]  -LS      Barriers to Rehab -- -- none identified  -         Pain Scale: Numbers Pre/Post-Treatment    Pretreatment Pain Rating 0/10 - no pain  -AL -- --         Cognitive Assessment Intervention- SLP    Cognitive Function  (Cognition) -- -- WNL  -LS      Orientation Status (Cognition) -- -- WNL  -LS         Standardized Tests    Cognitive/Memory Tests -- -- CLQT: Cognitive Linguistic Quick Test  -         CLQT (The Cognitive Linguistic Quick Test)    Attention Domain Score -- -- 108  -LS      Attention Severity Rating -- -- 4: WNL  -LS      Memory Severity Rating -- -- 4: WNL  -LS      Executive Function Severity Rating -- -- 4: WNL  -LS      Language Severity Rating -- -- 4: WNL  -LS      Visuospatial Severity Rating -- -- 3: Mild  -LS      Clock Drawing Severity Rating -- -- WNL  -LS      Composite Severity Rating Range -- -- 4.0 - 3.5: WNL  Pt scored WFL's on the functional quick test.  Very mild executive function deficits present.  -         Recommendations    Therapy Frequency (SLP SLC) -- -- 5 days per week  -      Predicted Duration Therapy Intervention (Days) -- -- until discharge  -      Anticipated Discharge Disposition (SLP) -- -- unknown  -         Communication Treatment Objective and Progress Goals (SLP)    [facility] LTGs -- -- Patient will demonstrate functional cognitive-linguistic skills for return to discharge environment  -[facility] STGs -- -- Additional Goals (Group)  -      Diagnostic Treatment Objective -- -- Diagnostic Treatment Objectives (Group)  -         Patient will demonstrate functional cognitive-linguistic skills for return to discharge environment    [place] -- -- Independently  -      Time frame -- -- by discharge  -         SLP Diagnostic Treatment     Patient will participate in further assessment in the following areas -- -- higher-level cognitive-linguistic  -      Time Frame (Diagnostic) -- -- 3 days  -         Cognitive Linguistic Treatment Objectives    Executive Function Skills Selection -- -- executive function skills, SLP goal 1  -      Pragmatic Skills Selection -- -- pragmatic skills, SLP goal 2  -         Memory Skills Goal 1 (SLP)    Improve Memory Skills  "Through Goal 1 (SLP) -- -- recalling unrelated word lists with an imposed delay  -LS      Time Frame (Memory Skills Goal 1, SLP) -- -- by discharge  -         Functional Math Skills Goal 1 (SLP)    Improve Functional Math Skills Through Goal 1 (SLP) -- -- complete moderately complex math problems;50%  -LS      Time Frame (Functional Math Skills Goal 1, SLP) -- -- by discharge  -         Executive Functional Skills Goal 1 (SLP)    Improve Executive Function Skills Goal 1 (SLP) -- -- organization/planning activity  -LS      Time Frame (Executive Function Skills Goal 1, SLP) -- -- by discharge  -            User Key  (r) = Recorded By, (t) = Taken By, (c) = Cosigned By    Initials Name Effective Dates    Daryn Arvizu, MS CCC-[facility] 06/16/21 -     Sarita Landry, MS CCC-SLP 06/16/21 -                    EDUCATION    The patient has been educated in the following areas:       Cognitive Impairment.             SLP GOALS     Row Name 05/22/23 1100 05/20/23 1000          Patient will demonstrate functional cognitive-linguistic skills for return to discharge environment    [place] -- Independently  -LS     Time frame -- by discharge  -        Attention Goal 1 (SLP)    Comment (Attention Goal 1, SLP) Calendar task: 100% with NO cues.  -AL --        Memory Skills Goal 1 (SLP)    Improve Memory Skills Through Goal 1 (SLP) -- recalling unrelated word lists with an imposed delay  -LS     Time Frame (Memory Skills Goal 1, SLP) -- by discharge  -        Reasoning Goal 1 (SLP)    Comment (Reasoning Goal 1, SLP) Diagnostic treatment: Pt completed moderately complex logic puzzle with 80% accuracy with NO cues, 100% with MIN cues. Pt stated it was \"challenging.\"  -AL --        Functional Math Skills Goal 1 (SLP)    Improve Functional Math Skills Through Goal 1 (SLP) -- complete moderately complex math problems;50%  -LS     Time Frame (Functional Math Skills Goal 1, SLP) -- by discharge  -     Comment " (Functional Math Skills Goal 1, SLP) Diagnostic treatment: Pt completed grocery prices task with 100% accuracy with NO cues.  -AL --        Executive Functional Skills Goal 1 (SLP)    Improve Executive Function Skills Goal 1 (SLP) -- organization/planning activity  -LS     Time Frame (Executive Function Skills Goal 1, SLP) -- by discharge  -LS           User Key  (r) = Recorded By, (t) = Taken By, (c) = Cosigned By    Initials Name Provider Type    Daryn Arvizu MS CCC-SLP Speech and Language Pathologist    Sarita Landry MS CCC-SLP Speech and Language Pathologist                            Time Calculation:        Time Calculation- SLP     Row Name 05/22/23 1216 05/22/23 1215          Time Calculation- SLP    SLP Start Time 1100  -AL 0830  -AL     SLP Stop Time 1130  -AL 0900  -AL     SLP Time Calculation (min) 30 min  -AL 30 min  -AL           User Key  (r) = Recorded By, (t) = Taken By, (c) = Cosigned By    Initials Name Provider Type    Sarita Landry MS CCC-SLP Speech and Language Pathologist                  Therapy Charges for Today     Code Description Service Date Service Provider Modifiers Qty    15769131312 HC ST EVAL ORAL PHARYNG SWALLOW 1 5/22/2023 Sarita Comer MS CCC-SLP GN 1    91172803100 HC ST EVAL ORAL PHARYNG SWALLOW 2 5/22/2023 Sarita Comer MS CCC-SLP GN 1    18665185235 HC ST DEV OF COGN SKILLS INITIAL 15 MIN 5/22/2023 Sarita Comer MS CCC-SLP  1    13202713174 HC ST DEV OF COGN SKILLS EACH ADDT'L 15 MIN 5/22/2023 Sarita Comer MS CCC-SLP  1                           Sarita Comer MS CCC-SLP  5/22/2023

## 2023-05-22 NOTE — THERAPY TREATMENT NOTE
Inpatient Rehabilitation - Physical Therapy Treatment Note       [facility]     Patient Name: Katherine Williamson  : 1950  MRN: [id number]    Today's Date: 2023                    Admit Date: 2023      Visit Dx:     ICD-10-CM ICD-9-CM   1. Decreased functional mobility and endurance  Z74.09 780.99       Patient Active Problem List   Diagnosis   • Benign essential HTN   • Tobacco abuse   • Dislocation of hip joint prosthesis   • Fracture of proximal humerus   • Mechanical complication of internal orthopedic device   • Wear of articular bearing surface of internal prosthetic joint   • History of repair of hip joint   • History of operative procedure on hip   • Hyperglycemia   • Hepatic steatosis   • Diverticulosis   • Chest pain, atypical   • History of colon polyps   • Family history of colon cancer in mother   • Allergic rhinitis   • Lung nodule seen on imaging study   • Acute respiratory failure with hypoxia   • Abscess of right lung with pneumonia   • Empyema   • Sepsis   • Pleural effusion, right   • Other emphysema   • Pulmonary nodule (RLL)   • Diastolic dysfunction, grade 1   • Physical debility   • Cryptogenic stroke   • Acute right MCA stroke       Past Medical History:   Diagnosis Date   • Arthritis    • Cataract    • Colon polyps     FOLLOWED BY DR. JOSEPH LAU   • Hypertension        Past Surgical History:   Procedure Laterality Date   • COLONOSCOPY  10/2015    Pewqv-foaracsrcxtn-Bv. [facility].  Follow-up in 5 years.   • COLONOSCOPY N/A 2022    2 BENIGN POLYPS IN DESCENDING, 5 MM BENIGN POLYP IN SIGMOID, MULTIPLE SMALL AND LARGE DIVERTICULA IN SIGMOID, RESCOPE IN 3 YRS, DR. JOSEPH LAU AT [facility]   • EYE SURGERY     • JOINT REPLACEMENT  ?    Left total hip replacement in .  In 2015 patient had left hip revision   • TONSILLECTOMY         PT ASSESSMENT (last 12 hours)     IRF PT Evaluation and Treatment     Row Name 23 1015          PT Time and Intention    Document  Type daily treatment  -EE     Mode of Treatment physical therapy  -EE     Patient/Family/Caregiver Comments/Observations Pt sitting up in WC, agreeable to PT.  -EE     Row Name 05/22/23 1015          General Information    Existing Precautions/Restrictions fall;oxygen therapy device and L/min  -EE     Row Name 05/22/23 1015          Pain Assessment    Pretreatment Pain Rating 0/10 - no pain  -EE     Posttreatment Pain Rating 0/10 - no pain  -EE     Row Name 05/22/23 1015          Cognition/Psychosocial    Orientation Status (Cognition) oriented x 4  -EE     Follows Commands (Cognition) follows one-step commands;verbal cues/prompting required  -EE     Personal Safety Interventions fall prevention program maintained;gait belt;muscle strengthening facilitated;nonskid shoes/slippers when out of bed;supervised activity  -EE     Row Name 05/22/23 1015          Bed Mobility    Supine-Sit [place] (Bed Mobility) minimum assist (75% patient effort);verbal cues  assist with L LE  -EE     Row Name 05/22/23 1015          Transfer Assessment/Treatment    Comment, (Transfers) 5x STS from EOM in PM with HHA and min A, cues for setup and weight shifting.  -EE     Row Name 05/22/23 1015          Bed-Chair Transfer    Bed-Chair [place] (Transfers) minimum assist (75% patient effort);moderate assist (50% patient effort);verbal cues  -EE     Assistive Device (Bed-Chair Transfers) walker, front-wheeled  -EE     Row Name 05/22/23 1015          Chair-Bed Transfer    Chair-Bed [place] (Transfers) minimum assist (75% patient effort);moderate assist (50% patient effort);verbal cues  -EE     Assistive Device (Chair-Bed Transfers) walker, front-wheeled  -EE     Row Name 05/22/23 1015          Sit-Stand Transfer    Sit-Stand [place] (Transfers) minimum assist (75% patient effort);moderate assist (50% patient effort);verbal cues  -EE     Assistive Device (Sit-Stand Transfers) walker, front-wheeled;wheelchair  -EE     Comment,  (Sit-Stand Transfer) cues for hand placement  -EE     Row Name 05/22/23 1015          Stand-Sit Transfer    Stand-Sit [place] (Transfers) minimum assist (75% patient effort);moderate assist (50% patient effort);verbal cues  -EE     Assistive Device (Stand-Sit Transfers) walker, front-wheeled;wheelchair  -EE     Row Name 05/22/23 1015          Gait/Stairs (Locomotion)    [place] Level (Gait) minimum assist (75% patient effort);contact guard;verbal cues;1 person to manage equipment  WC follow for safety  -EE     Assistive Device (Gait) walker, front-wheeled  -EE     Distance in Feet (Gait) 80' x 1, 60' x 1  -EE     Pattern (Gait) step-through  -EE     Deviations/Abnormal Patterns (Gait) raina decreased;stride length decreased  -EE     Bilateral Gait Deviations forward flexed posture;heel strike decreased;weight shift ability decreased  -EE     Left Sided Gait Deviations heel strike decreased  -EE     Gait Assessment/Intervention Cues for upright posture and pursed lip breathing  -EE     Row Name 05/22/23 1015          Safety Issues, Functional Mobility    Impairments Affecting Function (Mobility) balance;endurance/activity tolerance;range of motion (ROM)  -EE     Row Name 05/22/23 1015          Motor Skills    Therapeutic Exercise hip;knee  -EE     Row Name 05/22/23 1015          Hip (Therapeutic Exercise)    Hip (Therapeutic Exercise) strengthening exercise  -EE     Hip Strengthening (Therapeutic Exercise) bilateral;marching while seated;10 repetitions  -EE     Row Name 05/22/23 1015          Knee (Therapeutic Exercise)    Knee (Therapeutic Exercise) strengthening exercise  -EE     Knee Strengthening (Therapeutic Exercise) bilateral;LAQ (long arc quad);10 repetitions  seated B closed chain knee ext x10 reps with foot positioned on swiss ball  -EE     Row Name 05/22/23 1015          Positioning and Restraints    Pre-Treatment Position sitting in chair/recliner  -EE     Post Treatment Position bed  -EE      In Bed fowlers;call light within reach;encouraged to call for assist;exit alarm on;notified nsg  -EE     Row Name 05/22/23 1015          Vital Signs    Pre SpO2 (%) 92  -EE     O2 Delivery Pre Treatment supplemental O2  1 L  -EE     Intra SpO2 (%) 90  -EE     O2 Delivery Intra Treatment supplemental O2  1 L  -EE     Post SpO2 (%) 95  -EE     O2 Delivery Post Treatment supplemental O2  1 L  -EE     Pre Patient Position Sitting  -EE     Intra Patient Position Standing  -EE     Post Patient Position Supine  -EE           User Key  (r) = Recorded By, (t) = Taken By, (c) = Cosigned By    Initials Name Provider Type     Tena Garay, PT Physical Therapist              Wound 05/14/23 1556 Right gluteal Pressure Injury (Active)   Pressure Injury Stage 2 05/22/23 0827       Wound 05/19/23 2055 Bilateral gluteal (Active)   Closure None 05/21/23 2134   Base pink;moist 05/21/23 2134   Care, Wound other (see comments) 05/21/23 2134   Dressing Care foam 05/21/23 2134     Physical Therapy Education     Title: PT OT SLP Therapies (In Progress)     Topic: Physical Therapy (Done)     Point: Mobility training (Done)     Learning Progress Summary           Patient Acceptance, E,TB, NR,VU by  at 5/22/2023 1221    Acceptance, E, NR by  at 5/20/2023 1216                   Point: Home exercise program (Done)     Learning Progress Summary           Patient Acceptance, E,TB, NR,VU by  at 5/22/2023 1221    Acceptance, E, NR by  at 5/20/2023 1216                   Point: Body mechanics (Done)     Learning Progress Summary           Patient Acceptance, E,TB, NR,VU by  at 5/22/2023 1221    Acceptance, E, NR by  at 5/20/2023 1216                   Point: Precautions (Done)     Learning Progress Summary           Patient Acceptance, E,TB, NR,VU by  at 5/22/2023 1221    Acceptance, E, NR by  at 5/20/2023 1216                               User Key     Initials Effective Dates Name Provider Type [facility] 06/16/21 -  Felipe  Jess LEE PT Physical Therapist PT    EE 06/16/21 -  Tena Garay PT Physical Therapist PT                PT Recommendation and Plan                          Time Calculation:      PT Charges     Row Name 05/22/23 1422 05/22/23 1221          Time Calculation    Start Time 1300  -EE 1000  -EE     Stop Time 1330  -EE 1030  -EE     Time Calculation (min) 30 min  -EE 30 min  -EE     PT Received On 05/22/23  -EE 05/22/23  -EE     PT - Next Appointment 05/23/23  -EE 05/22/23  -EE        Time Calculation- PT    Total Timed Code Minutes- PT 30 minute(s)  -EE 30 minute(s)  -EE           User Key  (r) = Recorded By, (t) = Taken By, (c) = Cosigned By    Initials Name Provider Type    EE Tena Garay, PT Physical Therapist                Therapy Charges for Today     Code Description Service Date Service Provider Modifiers Qty    93789971940 HC PT THER PROC EA 15 MIN 5/22/2023 Tena Garay, PT GP 2    23447697085 HC PT THERAPEUTIC ACT EA 15 MIN 5/22/2023 Tena Garay, PT GP 2    99491176403 HC PT THER SUPP EA 15 MIN 5/22/2023 Tena Garay, PT GP 1                   Tena Garay PT  5/22/2023

## 2023-05-22 NOTE — PAYOR COMM NOTE
"Clay Katherine CAST (72 y.o. Female)     PLEASE SEE ATTACHED FOR DC NOTICE    REF #   [id number]    THANK YOU  ASHANTI KNIGHT RN/ DEPT  [facility]   [phone]  [phone]     Date of Birth   1950    Social Security Number       Address   [address] [address]    Home Phone   [phone]    MRN   [id number]       Mandaen   Jew    Marital Status                               Admission Date   5/2/23    Admission Type   Emergency    Admitting Provider   Olga Palm MD    Attending Provider       Department, Room/Bed   [facility] [address], P599/1       Discharge Date   5/19/2023    Discharge Disposition   Rehab Facility or Unit ([facility])    Discharge Destination                               Attending Provider: (none)   Allergies: Hydrocodone-acetaminophen    Isolation: None   Infection: None   Code Status: CPR    Ht: 157.5 cm (62\")   Wt: 67.4 kg (148 lb 9.4 oz)    Admission Cmt: None   Principal Problem: Abscess of right lung with pneumonia [J85.1]                 Active Insurance as of 5/2/2023     Primary Coverage     Payor Plan Insurance Group Employer/Plan Group    ANTHEM BLUE CROSS ANTHEM BLUE CROSS BLUE SHIELD PPO [id number]     Payor Plan Address Payor Plan Phone Number Payor Plan Fax Number Effective Dates    [address] [phone]  3/24/2013 - None Entered    [facility] [id number]       Subscriber Name Subscriber Birth Date Member ID       SAMANTHA CERDA III 3/3/1958 [id number]           Secondary Coverage     Payor Plan Insurance Group Employer/Plan Group    MEDICARE MEDICARE A ONLY      Payor Plan Address Payor Plan Phone Number Payor Plan Fax Number Effective Dates    [address] [phone]  12/1/2017 - None Entered    [facility] [id number]       Subscriber Name Subscriber Birth Date Member ID       KATEHRINE CERDA 1950 [id number]                 Emergency Contacts      (Rel.) Home Phone Work Phone " Mobile Phone    Ronnie Williamson III (POA) (Spouse) [phone] -- --    KERRI lauren (Daughter) [phone] -- --            [place]: [facility] [id number]  Tax ID [id number]     Discharge Summary      Bharat Calabrese MD at 23 1513              Patient Name: Katherine Williamson  : 1950  MRN: [id number]    Date of Admission: 2023  Date of Discharge:  2023  Primary Care Physician: Zelalem Flor APRN      Hospital Course     Chief Complaint:   Shortness of Breath      Active Hospital Problems    Diagnosis  POA   • **Abscess of right lung with pneumonia [J85.1]  Yes   • Cryptogenic stroke [I63.9]  No   • Diastolic dysfunction, grade 1 [I51.89]  Yes   • Physical debility [R53.81]  Clinically Undetermined   • Sepsis [A41.9]  Yes   • Pleural effusion, right [J90]  Yes   • Other emphysema [J43.8]  Yes   • Pulmonary nodule (RLL) [R91.1]  Yes   • Acute respiratory failure with hypoxia [J96.01]  Yes   • Hepatic steatosis [K76.0]  Yes   • Tobacco abuse [Z72.0]  Yes   • Benign essential HTN [I10]  Yes      Resolved Hospital Problems   No resolved problems to display.        Hospital Course:  Ms. Williamson is a 72 y.o. female who presented to [facility] initially complaining of weakness, shortness of breath and cough. Please see the admitting history and physical for further details. She was found to have respiratory failure, pneumonia and lung abscess and was admitted to the hospital for further evaluation and treatment.  She was seen by pulmonology as well as infectious disease and placed on broad-spectrum antibiotics.  From a respiratory standpoint she did show signs of improvement and did not require mechanical ventilation or surgical intervention.  She is now breathing comfortably on 2 L/min and hopefully this can be further tapered.  ELIGIO was negative for vegetations.  Infectious disease has recommended 4-week course of antibiotics as outlined below with stop date of 2023.  PICC line has been placed.   She will need repeat CT scan chest prior to stopping antibiotics.    Unfortunately her hospital stay was complicated by development of an acute stroke that required tPA and transferred to the intensive care unit.  This has resulted in some left hemiparesis and mild speech disturbance but the symptoms have improved after administering TNK on 5/14.  CTA showed right MCA perfusion deficit but no proximal occlusion appeared to have a distal M3 occlusion report and perfusion deficit of the right posterior parietal lobe.  She has had no events on telemetry and TTE and ELIGIO were unrevealing.    Neurology has recommended DAPT with baby aspirin and Plavix.  She will continue on high intensity statin therapy with atorvastatin 80 mg.  She is to wear a long-term cardiac monitor at discharge.  Plan is for her to go to [facility] acute rehab for further aggressive rehabilitative care.  She will follow-up with neurology as outpatient.      Day of Discharge     HPI:   See today's progress note    Physical Exam:  Temp:  [97.5 °F (36.4 °C)-98.1 °F (36.7 °C)] 97.8 °F (36.6 °C)  Heart Rate:  [73-98] 84  Resp:  [16-20] 20  BP: (110-136)/(59-72) 110/59  Physical Exam      Consultants     Pulmonology  Cardiothoracic surgery  Psychiatry  Infectious disease  Neurology  Cardiology      Discharge Details     Inter-facility transfer medications (From admission, onward)             ipratropium-albuterol (DUO-NEB) nebulizer solution 3 mL  Every 4 Hours PRN             vitamin B-12 (CYANOCOBALAMIN) tablet 1,000 mcg  Daily             fluticasone (FLONASE) 50 MCG/ACT nasal spray 1 spray  Daily             guaiFENesin (MUCINEX) 12 hr tablet 600 mg  2 Times Daily             citalopram (CeleXA) tablet 20 mg  Daily             meropenem (MERREM) 1 g in sodium chloride 0.9 % 100 mL IVPB-VTB  (Meropenem IV Extended Infusion)  Every 8 Hours             metoprolol tartrate (LOPRESSOR) tablet 12.5 mg  Every 12 Hours Scheduled             atorvastatin  (LIPITOR) tablet 80 mg  Nightly             hydrocortisone-bacitracin-zinc oxide-nystatin (MAGIC BARRIER) ointment 1 application  2 Times Daily             Menthol-Zinc Oxide 1 application  4 Times Daily             clopidogrel (PLAVIX) tablet 75 mg  Daily             acetaminophen (TYLENOL) tablet 650 mg  Every 6 Hours PRN             doxycycline (MONODOX) capsule 100 mg  Every 12 Hours Scheduled                        Allergies   Allergen Reactions   • Hydrocodone-Acetaminophen Itching       [facility]charge Dis[facility]:  [facility]      CODE STATUS  Full code      Time Spent on Discharge:  Greater than 30 minutes      Electronically signed by Bharat Calabrese MD, 5/19/2023, 15:13 EDT      Electronically signed by Bharat Calabrese MD at 05/19/23 6001

## 2023-05-22 NOTE — PROGRESS NOTES
Inpatient Rehabilitation Admission  Section A. Transportation  Issues Due to Lack of Transportation:   No    Signed by: GEORGES Franks

## 2023-05-22 NOTE — THERAPY TREATMENT NOTE
Inpatient Rehabilitation - Occupational Therapy Treatment Note    [facility]     Patient Name: Katherine Williamson  : 1950  MRN: [id number]    Today's Date: 2023                 Admit Date: 2023         ICD-10-CM ICD-9-CM   1. Decreased functional mobility and endurance  Z74.09 780.99       Patient Active Problem List   Diagnosis   • Benign essential HTN   • Tobacco abuse   • Dislocation of hip joint prosthesis   • Fracture of proximal humerus   • Mechanical complication of internal orthopedic device   • Wear of articular bearing surface of internal prosthetic joint   • History of repair of hip joint   • History of operative procedure on hip   • Hyperglycemia   • Hepatic steatosis   • Diverticulosis   • Chest pain, atypical   • History of colon polyps   • Family history of colon cancer in mother   • Allergic rhinitis   • Lung nodule seen on imaging study   • Acute respiratory failure with hypoxia   • Abscess of right lung with pneumonia   • Empyema   • Sepsis   • Pleural effusion, right   • Other emphysema   • Pulmonary nodule (RLL)   • Diastolic dysfunction, grade 1   • Physical debility   • Cryptogenic stroke   • Acute right MCA stroke       Past Medical History:   Diagnosis Date   • Arthritis    • Cataract    • Colon polyps     FOLLOWED BY DR. JOSEPH LAU   • Hypertension        Past Surgical History:   Procedure Laterality Date   • COLONOSCOPY  10/2015    [facility].  Follow-up in 5 years.   • COLONOSCOPY N/A 2022    2 BENIGN POLYPS IN DESCENDING, 5 MM BENIGN POLYP IN SIGMOID, MULTIPLE SMALL AND LARGE DIVERTICULA IN SIGMOID, RESCOPE IN 3 YRS, DR. JOSEPH LAU AT [facility]   • EYE SURGERY     • JOINT REPLACEMENT  ?    Left total hip replacement in .  In 2015 patient had left hip revision   • TONSILLECTOMY               IRF OT ASSESSMENT FLOWSHEET (last 12 hours)     IRF OT Evaluation and Treatment     Row Name 23 1505          OT Time and Intention     Document Type daily treatment  -AF     Mode of Treatment occupational therapy  -AF     Patient Effort good  -AF     Symptoms Noted During/After Treatment fatigue  -AF     Row Name 05/22/23 1507          General Information    Patient/Family/Caregiver Comments/Observations pt is supine in bed in AM and PM sessions  -AF     General Observations of Patient pt had purewic in in AM session at 0930 states that she wears it at night time because it takes them too long to get to her. discussed not using it  -AF     Existing Precautions/Restrictions fall;oxygen therapy device and L/min  -AF     Limitations/Impairments safety/cognitive  -AF     Row Name 05/22/23 1505          Pain Assessment    Pretreatment Pain Rating 0/10 - no pain  -AF     Posttreatment Pain Rating 0/10 - no pain  -AF     Row Name 05/22/23 1505          Cognition/Psychosocial    Affect/Mental Status (Cognition) flat/blunted affect  -AF     Orientation Status (Cognition) oriented x 3  -AF     Follows Commands (Cognition) follows one-step commands;verbal cues/prompting required  -AF     Personal Safety Interventions fall prevention program maintained;gait belt;nonskid shoes/slippers when out of bed  -AF     Row Name 05/22/23 1503          Bathing    [place] Level (Bathing) bathing skills;upper body;set up;lower body;moderate assist (50% patient effort)  -AF     Position (Bathing) sink side;supported sitting;supported standing  -AF     Row Name 05/22/23 1505          Upper Body Dressing    [place] Level (Upper Body Dressing) upper body dressing skills;minimum assist (75% or more patient effort);verbal cues  -AF     Position (Upper Body Dressing) supported sitting  -AF     Set-up Assistance (Upper Body Dressing) obtain clothing  -AF     Row Name 05/22/23 1500          Lower Body Dressing    [place] Level (Lower Body Dressing) doff;don;pants/bottoms;shoes/slippers;socks;moderate assist (50% patient effort);maximum assist (25% patient  effort);verbal cues  -AF     Position (Lower Body Dressing) supported sitting;supported standing  -AF     Set-up Assistance (Lower Body Dressing) obtain clothing  -AF     Row Name 05/22/23 1505          Grooming    [place] Level (Grooming) oral care regimen;hair care, combing/brushing;set up;verbal cues  -AF     Position (Grooming) sink side;supported sitting  -AF     Row Name 05/22/23 1505          Toileting    Comment (Toileting) deferred  -AF     Row Name 05/22/23 1505          Bed Mobility    Supine-Sit [place] (Bed Mobility) contact guard;verbal cues  -AF     Assistive Device (Bed Mobility) bed rails  encouragement  -AF     Row Name 05/22/23 1505          Transfer Assessment/Treatment    Comment, (Transfers) w/c to EOM stand pivot MOD/MIN  -AF     Row Name 05/22/23 1505          Bed-Chair Transfer    Bed-Chair [place] (Transfers) moderate assist (50% patient effort);minimum assist (75% patient effort);verbal cues  -AF     Assistive Device (Bed-Chair Transfers) wheelchair;walker, front-wheeled  -     Row Name 05/22/23 1505          Sit-Stand Transfer    Sit-Stand [place]oving (Transfers) moderate assist (50% patient effort);minimum assist (75% patient effort);verbal cues  -AF     Assistive Device (Sit-Stand Transfers) wheelchair  at sink during ADL tasks  -     Row Name 05/22/23 1505          Stand-Sit Transfer    Stand-Sit Loving (Transfers) minimum assist (75% patient effort)  -AF     Assistive Device (Stand-Sit Transfers) wheelchair;walker, front-wheeled  -     Row Name 05/22/23 1505          Motor Skills    Therapeutic Exercise shoulder;elbow/forearm;wrist;hand  -     Row Name 05/22/23 1505          Shoulder (Therapeutic Exercise)    Shoulder (Therapeutic Exercise) strengthening exercise  -AF     Shoulder Strengthening (Therapeutic Exercise) bilateral;external rotation;internal rotation;sitting;2 lb free weight;10 repetitions;3 sets  -     Row Name 05/22/23 1506           Elbow/Forearm (Therapeutic Exercise)    Elbow/Forearm (Therapeutic Exercise) strengthening exercise  -AF     Elbow/Forearm Strengthening (Therapeutic Exercise) bilateral;flexion;extension;supination;pronation;sitting;2 lb free weight;10 repetitions;3 sets  -AF     Row Name 05/22/23 1505          Wrist (Therapeutic Exercise)    Wrist (Therapeutic Exercise) strengthening exercise  -AF     Wrist Strengthening (Therapeutic Exercise) bilateral;flexion;extension;2 lb free weight;10 repetitions;3 sets  -AF     Row Name 05/22/23 1505          Balance    Static Sitting Balance standby assist  -AF     Static Standing Balance moderate assist;verbal cues  -AF     Row Name 05/22/23 1505          Positioning and Restraints    Pre-Treatment Position in bed  -AF     Post Treatment Position wheelchair  -AF     In Wheelchair sitting;exit alarm on;with PT  with PT in AM and PM sessions  -AF           User Key  (r) = Recorded By, (t) = Taken By, (c) = Cosigned By    Initials Name Effective Dates    AF Silke Moreno, OTR 06/16/21 -                  Occupational Therapy Education     Title: PT OT SLP Therapies (In Progress)     Topic: Occupational Therapy (In Progress)     Point: ADL training (Done)     Description:   Instruct learner(s) on proper safety adaptation and remediation techniques during self care or transfers.   Instruct in proper use of assistive devices.              Learning Progress Summary           Patient Acceptance, E,TB, VU by  at 5/20/2023 1516                   Point: Home exercise program (Not Started)     Description:   Instruct learner(s) on appropriate technique for monitoring, assisting and/or progressing therapeutic exercises/activities.              Learner Progress:  Not documented in this visit.          Point: Precautions (Not Started)     Description:   Instruct learner(s) on prescribed precautions during self-care and functional transfers.              Learner Progress:  Not documented in this visit.           Point: Body mechanics (Not Started)     Description:   Instruct learner(s) on proper positioning and spine alignment during self-care, functional mobility activities and/or exercises.              Learner Progress:  Not documented in this visit.                      User Key     Initials Effective Dates Name Provider Type Discipline     06/16/21 -  Verena Hewitt OTR Occupational Therapist OT                    OT Recommendation and Plan                         Time Calculation:      Time Calculation- OT     Row Name 05/22/23 1512 05/22/23 1510          Time Calculation- OT    OT Start Time 1230  -AF 0930  -AF     OT Stop Time 1300  -AF 1000  -AF     OT Time Calculation (min) 30 min  -AF 30 min  -AF           User Key  (r) = Recorded By, (t) = Taken By, (c) = Cosigned By    Initials Name Provider Type    AF Silke Moreno OTR Occupational Therapist              Therapy Charges for Today     Code Description Service Date Service Provider Modifiers Qty    34512712882 HC OT SELF CARE/MGMT/TRAIN EA 15 MIN 5/22/2023 Silke Moreno OTR GO 3    04082392745 HC OT THER PROC EA 15 MIN 5/22/2023 Silke Moreno OTR GO 1                   SONG Kaiser  5/22/2023

## 2023-05-22 NOTE — PROGRESS NOTES
"  PROGRESS NOTE  Patient Name: Katherine Williamson  Age/Sex: 72 y.o. female  : 1950  MRN: [id number]    Date of Admission: 2023  Date of Encounter Visit: 23   LOS: 3 days   Patient Care Team:  Zelalem Flor APRN as PCP - General (Internal Medicine)  Stephen, Brandon Meyer MD as Consulting Physician (Orthopedic Surgery)    Chief Complaint: Acute progressive respiratory failure    Hospital course: Patient continues to improve  Currently on 1 L nasal cannula oxygen    REVIEW OF SYSTEMS:   CONSTITUTIONAL: Low-grade fever       Ventilator/Non-Invasive Ventilation Settings (From admission, onward)    1 L nasal cannula-            Vital Signs  Temp:  [97.8 °F (36.6 °C)-98.1 °F (36.7 °C)] 98 °F (36.7 °C)  Heart Rate:  [70-88] 74  Resp:  [18] 18  BP: (105-144)/(52-76) 119/74  SpO2:  [93 %-95 %] 94 %  on  Flow (L/min):  [1-2] 2 Device (Oxygen Therapy): nasal cannula with ETCO2    Intake/Output Summary (Last 24 hours) at 2023 1337  Last data filed at 2023 1300  Gross per 24 hour   Intake 240 ml   Output 400 ml   Net -160 ml     Flowsheet Rows    Flowsheet Row First Filed Value   Admission Height 160 cm (63\") Documented at 2023 1900   Admission Weight 61.2 kg (135 lb) Documented at 2023 1900        Body mass index is 23.39 kg/m².      23  1700 23  0554 23  1800   Weight: 59.7 kg (131 lb 9.8 oz) 60 kg (132 lb 4.4 oz) 59.9 kg (132 lb 0.9 oz)       Physical Exam:  GEN: Looking better, in no distress, awake and responsive, on low-flow nasal cannula oxygen      LUNGS: Normal chest on inspection, diminished breath sounds bilaterally, she does have faint crackles posteriorly, breathing is nonlabored       Results Review:    Results From Last 14 Days   Lab Units 05/15/23  0309 23  0528   CRP mg/dL  --  3.52*   TRIGLYCERIDES mg/dL 68  --      Results from last 7 days   Lab Units 23  0740 23  0425 23  0628 23  0340   SODIUM mmol/L 137 139 139 135* "   POTASSIUM mmol/L 4.0 4.1 3.8 3.9   CHLORIDE mmol/L 105 104 104 101   CO2 mmol/L 28.7 30.5* 31.0* 31.0*   BUN mg/dL 14 12 19 13   CREATININE mg/dL 0.20* 0.23* 0.19* 0.22*   CALCIUM mg/dL 8.6 8.5* 8.4* 8.3*   ANION GAP mmol/L 3.3* 4.5* 4.0* 3.0*   ALBUMIN g/dL 1.7* 1.9* 2.1* 2.1*                 Results from last 7 days   Lab Units 05/20/23  0740 05/19/23  0425 05/18/23  0628 05/16/23  0340   WBC 10*3/mm3 8.34 9.51 16.03* 11.81*   HEMOGLOBIN g/dL 10.3* 10.0* 10.0* 10.0*   HEMATOCRIT % 30.3* 29.5* 29.9* 29.0*   PLATELETS 10*3/mm3 366 351 387 365   MCV fL 88.3 87.8 89.5 89.2   NEUTROPHIL % % 66.1  --   --   --    LYMPHOCYTE % % 26.7  --   --   --    MONOCYTES % % 5.3  --   --   --    EOSINOPHIL % % 1.1  --   --   --    BASOPHIL % % 0.4  --   --   --    IMM GRAN % % 0.4  --   --   --       Latest Reference Range & Units Most Recent   RNP Antibodies 0.0 - 0.9 AI <0.2  5/9/23 14:00   Vann Antibodies 0.0 - 0.9 AI <0.2  5/9/23 14:00   Sjogren's Anti-SS-A 0.0 - 0.9 AI <0.2  5/9/23 14:00   Sjogren's Anti-SS-B 0.0 - 0.9 AI <0.2  5/9/23 14:00   DUTCH-1 IgG 0.0 - 0.9 AI <0.2  5/9/23 14:00      Latest Reference Range & Units Most Recent   Anti-Centromere B Antibodies 0.0 - 0.9 AI <0.2  5/9/23 14:00   Antichromatin Antibodies 0.0 - 0.9 AI <0.2  5/9/23 14:00   Anti-DNA (DS) Ab Qn 0 - 9 IU/mL <1  5/9/23 14:00   C-ANCA Neg:<1:20 titer <1:20  5/9/23 14:00   DUTCH-1 IgG 0.0 - 0.9 AI <0.2  5/9/23 14:00   P-ANCA Neg:<1:20 titer <1:20  5/9/23 14:00   Atypical pANCA Neg:<1:20 titer <1:20  5/9/23 14:00      Latest Reference Range & Units Most Recent   Antiscleroderma-70 Antibodies 0.0 - 0.9 AI <0.2  5/9/23 14:00           Latest Reference Range & Units Most Recent   pH, Fluid  8.03  5/10/23 11:10   Glucose, Fluid mg/dL 142  5/10/23 11:10   Protein, Total, Fluid g/dL 1.4  5/10/23 11:10      Latest Reference Range & Units Most Recent   ANTI-MPO ANTIBODIES 0.0 - 0.9 units <0.2  5/9/23 14:00   ANTI-PR3 ANTIBODIES 0.0 - 0.9 units <0.2  5/9/23 14:00                Invalid input(s): LDLCALC          No results found for: POCGLU                            Imaging:   Imaging Results (All)     Procedure Component Value Units Date/Time              I reviewed the patient's new clinical results.  I personally viewed and interpreted the patient's imaging results: Ongoing improvement in the right lower lobe infiltrate and right-sided effusion        Medication Review:   aspirin, 81 mg, Oral, Q24H  atorvastatin, 80 mg, Oral, Nightly  citalopram, 20 mg, Oral, Daily  clopidogrel, 75 mg, Oral, Daily  doxycycline, 100 mg, Oral, Q12H  enoxaparin, 40 mg, Subcutaneous, Q24H  fluticasone, 1 spray, Each Nare, Daily  guaiFENesin, 600 mg, Oral, BID  hydrocortisone-bacitracin-zinc oxide-nystatin, 1 application, Topical, BID  Menthol-Zinc Oxide, 1 application, Topical, 4x Daily  meropenem, 1 g, Intravenous, Q8H  metoprolol tartrate, 12.5 mg, Oral, Q12H  sodium chloride, 10 mL, Intravenous, Q12H  sodium chloride, 10 mL, Intravenous, Q12H  cyanocobalamin, 1,000 mcg, Oral, Daily             ASSESSMENT:   1. Right lower lobe lung abscess/pneumonia, on meropenem, planned for 4 weeks with stop date 6/8/2023  2. Sepsis  3. Right loculated pleural effusion, s/p thoracentesis  4. Acute hypoxic respiratory failure, worse  5. Sepsis, resolved  6. Emphysema, upper lobe predominant, COPD but no exacerbation  7. Peripheral pleural-based nodule, 2.4 cm on CT chest 4/3/23, could have been related to early pneumonia.  Underlying malignancy cannot be excluded  8. Tobacco abuse  9. Essential hypertension  10. Acute right MCA stroke status post TNK  11. Bilateral stroke and MRI looking for possible embolic so far no evidence on echo  12. Diarrhea    PLAN:  Continues to improve  The chest x-ray is reassuring  Expect her to be able to come off the oxygen in the next few days  She will need to have a follow-up chest imaging 6 weeks down the road  Finish the antibiotic course per ID, last day  6/8/2023  Continue with the as needed antidiarrhea medication  We will reassess again on Friday and see as needed in between but please call with any question or concern    Disposition: Per primary team    Jennifer Hood MD  05/22/23  13:37 EDT          Dictated utilizing Dragon dictation

## 2023-05-22 NOTE — PLAN OF CARE
Goal Outcome Evaluation:         Pt is A/Ox4, not OOB overnight. Meds taken whole w thin liquids. Purewick in place overnight for urine collection. No c/o pain. O2 at 2L via nasal cannula worn throughout night. Low air loss mattress in place. Pt is able to turn self in bed.

## 2023-05-22 NOTE — PROGRESS NOTES
Discharge Planning Assessment  [facility]     Patient Name: Katherine Williamson  MRN: [id number]  Today's Date: 5/22/2023    Admit Date: 5/19/2023    Plan: Patient will return home with . He plans to take FMLA to be home with patient. Will arrange follow up therapy as needed.   Discharge Needs Assessment    No documentation.                Discharge Plan     Row Name 05/22/23 1043       Plan    Plan Patient will return home with . He plans to take FMLA to be home with patient. Will arrange follow up therapy as needed.    Patient/Family in Agreement with Plan yes    Plan Comments Completed assessment with patient and , by phone. Patient lives with  in one story walk out ranch home. 2 step entry. Patient retired from [employer] but stated she started working 2 mornings a week at different [employer] in February. She hopes to be able to return to work part time. Patient was active and independent at home. She stated she normally smokes a pack of cigarettes daily but with recent health issues was trying to quit.  also a heavy smoker per patient. Patient stated she has been cranky due to her health situation as she was not expecting this. She is used to an active lifestyle and was exercising 3 days a week. D/C plan is home with  who is planning to take FMLA. Patient stated she has good friend who is retired so feels she has good support. Discussed SW role, team and family conference. Will assist with plans.              Continued Care and Services - Admitted Since 5/19/2023    Coordination has not been started for this encounter.     Selected Continued Care - Prior Encounters Includes continued care and service providers with selected services from prior encounters from 2/18/2023 to 5/22/2023    Discharged on 5/19/2023 Admission date: 5/2/2023 - Discharge disposition: Rehab Facility or Unit ([facility])    Destination     Service Provider Selected Services Address Phone Fax  "Patient Preferred    [facility] ACUTE REHAB PROGRAM Inpatient Rehabilitation [address] [facility] [id number] [phone] -- --       Internal Comment last updated by Kt Ontiveros RN 5/12/2023 1944    Order placed                      Home Medical Care     Service Provider Selected Services Address Phone Fax Patient Preferred     [facility] [address] [address] [phone] --                       Demographic Summary    No documentation.                Functional Status     Row Name 05/22/23 1026       Functional Status    Usual Activity Tolerance excellent    Current Activity Tolerance fair    Functional Status Comments Patient usually very active and was working part time. Indpendent with mobility and ADL's.       Functional Status, IADL    Medications independent    Meal Preparation independent    Housekeeping independent    Laundry independent    Shopping independent    IADL Comments Patient was driving and independent with household tasks       Mental Status    General Appearance WDL WDL       Mental Status Summary    Recent Changes in Mental Status/Cognitive Functioning mood    Mental Status Comments Patient voiced she has been \"cranky\" during her hospitalization as she is used to being active and independent       Employment/    Employment Status employed part-time;retired    Shift Worked first shift    Current or Previous Occupation service industry    Employment/ Comments Patient retired from [employer] 6 years ago but recently went back to work there 2 mornings a week.               Psychosocial     Row Name 05/22/23 1030       Values/Beliefs    Spiritual, Cultural Beliefs, Episcopalian Practices, Values that Affect Care no    Values/Beliefs Comment Baptism       Behavior WDL    Behavior WDL interactions    Interactions cooperative;eye contact appropriate;appropriate to situation       " Emotion Mood WDL    Emotion/Mood/Affect WDL emotion mood    Emotion/Mood appropriate to situation       Speech WDL    Speech WDL WDL       Perceptual State WDL    Perceptual State WDL WDL       Thought Process WDL    Thought Process WDL WDL       Intellectual Performance WDL    Intellectual Performance WDL WDL       Coping/Stress    Major Change/Loss/Stressor medical condition/diagnosis;loss of independence    Patient Personal Strengths assertive;expressive of emotions;expressive of needs;strong support system;resilient;self-reliant;positive attitude;motivated;successful coping history    Sources of Support adult child(ketty);spouse;friend(s)    Techniques to [place] with Loss/Stress/Change diversional activities    Reaction to Health Status anger;uncertainty;motivated;hopeful    Understanding of Condition and Treatment needs time to process;adequate understanding of medical condition;adequate understanding of treatment    Coping/Stress Comments Patient voices normal feelings of frustration/anger about what has happened to her. Was not expecting these health issues. She feels she is coping okay.       Developmental Stage (Eriksson's)    Developmental Stage Stage 8 (67 years-death/Late Adulthood) Integrity vs. Despair               Abuse/Neglect    No documentation.                Legal     Row Name 05/22/23 1032       Financial/Legal    Source of Income social security;salary/wages;pension/jail    Who Manages Finances if Patient Unable                Substance Abuse     Row Name 05/22/23 1033       Substance Use    Substance Use Status current tobacco use;current alcohol use    Environment Typically Uses Tobacco private residence    Readiness to Change Tobacco Use action    Substance Use Comment Patient stated she was trying to quit smoking before hospitalization due to recent pulmonary issues. Had been smoking 1ppd. Patient stated she drank 4-6 beers daily.       Family Member Substance Use (#4)    Family  History of Substance Use spouse    Reported Type of Substances tobacco    Substance Use Comments Patient stated  is heavy smoker and does not want to quit               Patient Forms    No documentation.                   Sanaz Pham MSW

## 2023-05-22 NOTE — PLAN OF CARE
Goal Outcome Evaluation:  Plan of Care Reviewed With: patient      Clinical Swallow Evaluation completed. No overt s/s of aspiration or penetration observed with thins by cup/straw, puree, mixed and regular. No difficulty with mastication observed. Recommend upgrade diet to regular with thins. Meds with water or applesauce, as tolerated. Sit upright for meals and 30 minutes after meals.

## 2023-05-22 NOTE — PROGRESS NOTES
Recreational Therapy Note    Patient Name: Katherine Williamson   MRN: [id number]    Therapeutic Recreation Eval and Treat (last 12 hours)     Therapeutic Recreation Eval & Treat     Row Name 05/22/23 1152       Lifestyle/Recreational History/Interest    Current Living Situation with family  -    Transportation Situation car, drives self  -SS    Row Name 05/22/23 1152       Recreational History and Interests    How Important is Recreation to You? high importance  -SS    Satisfied With Leisure Lifestyle? yes  -SS    Participate Regularly in Leisure Activity? yes  -SS    Are You a Social Person? yes  -SS    Current Hobbies/Interests exercise/fitness;family functions;games;outdoor activities;sports/team sports  -    Row Name 05/22/23 1152       Coping/Wellbeing    Current State of Wellbeing calm  -    Factors Impacting Current State of Wellbeing no significant issues  -    Row Name 05/22/23 1152       Therapeutic Recreation Participation    Orientation to Therapeutic Recreation patient  -SS    Row Name 05/22/23 1152       Therapeutic Recreation Assessment/Plan    Recreation Therapy Goals/Objectives strength and endurance;functional leisure skills  -    Recreation Plan structured leisure participation  -          User Key  (r) = Recorded By, (t) = Taken By, (c) = Cosigned By    Initials Name Provider Type    SS Sanaz Radford, CTRS Recreational Therapist                  CHUNG Hollins  5/22/2023

## 2023-05-22 NOTE — PROGRESS NOTES
Case Management  Inpatient Rehabilitation Plan of Care and Discharge Plan Note    Rehabilitation Diagnosis:  Acute R MCA stroke and bilateral infarcts  Date of Onset:  05/14/2023    Medical Summary:  Patient is a 72-year-old female with past medical history  remarkable for arthritis, hypertension was in her usual state of her health  until 30 March i.e. Friday afternoon when she developed sudden onset of right  mid back/chest discomfort that gets worse with coughing and taking deep breath.  ?Her symptoms started after she helped her friend cleaning her home. ?Besides  this discomfort that she had she was able to carry out routines of her  activities including yard work and pulling weeds over the weekend since the  onset of pain. ?Her symptoms continued to get worse to the point that she went  to see her primary care provider and was evaluated with plans to do CT scan of  the chest PE protocol as her D-dimer came back positive. ?CT scan of chest PE  protocol on 4/3/2023 revealed severe emphysematous changes in the lung without  any consolidation pleural effusion or pneumothorax she was noted to have  triangular peripheral right lower lobe 2.4 x 2.1 cm nodule with no pleural  effusion or pneumothorax. ?Differential diagnosis for this nodule based on shape  and location included: Pneumonia, infarct or neoplasm. ?Follow-up examination in  3 months was recommended. ?Patient was started on prednisone Dosepak on 4/3/2023  and follow-up call back/communication from the patient on 4/4/2023 suggested  improvement in her discomfort. ?After she completed her Dosepak her symptoms  recurred so she came back to the emergency room at our facility on 4/10/2023.  ?Review of system documented at that time shows she denies fever or chill or  decrease in appetite. ?A work-up revealed white blood cell count of 13,000 and  fairly unremarkable CMP. ?An assessment of pleurisy/thoracic radiculopathy  versus zoster versus pulmonary embolism  versus intercostal strain was raised.  ?She was given IV Toradol with improvement and was subsequently discharged on  5-day course of oral Toradol along with omeprazole for GI prophylaxis with  instructions to follow-up with PCP and if she continues to get better may  consider alternative such as meloxicam. ?Patient had a follow-up visit with her  primary care provider for back pain dyspnea on exertion on 4/24/2023 with  persistent discomfort in her back along with shortness of breath and swelling of  bilateral lower extremity left greater than right. ?She he was also noted to  have lost 4 pounds since beginning of the April. ?It is documented that she  continues to smoke. ?Arrangements were made for echocardiogram and pulmonary  function test and consideration for thoracic x-ray and MRI if her back pain does  not improve with conservative management. ?Review system performed that day  shows fatigue weight change shortness of breath but no fever or chills. ?Patient  is also scheduled to have CT scan of the chest and pulmonary function test. ?It  appears that patient CT scan of the chest performed-imaging on 4/26/2023.  ?Patient called and communicated with the care provider on 5/1/2023 but with a  result of her CT chest that was not uploaded in her [place]. ?It was at that  point recommended the patient and the need to come in and see her provider in  the office to see the extent of her discomfort and declining function and  associated shortness of breath or if she is not well may have to go to the  emergency room. ?Earlier today [facility] imaging called about the result of the  CT scan of the chest performed on 4/26/2023 and findings were concerning for  thick-walled pleural effusion with gas in it concerning for empyema as well as  worsening pneumonia in the right lower lobe with previously noted lung nodule of  3 mm increased to 7 mm for which PET scan was recommended. ?Patient also had in  the meantime got  herself a pulse ox meter and reported that her oxygen levels  are less than 90 and she was recommended to go to the ER immediately. ?In the  emergency room she was noted to be hypoxic with worsening lung infiltrate and  parameters positive for sepsis. ?Pulmonary service was consulted patient had  blood cultures drawn and was started on Zithromax and ceftria  Past Medical History: Past Medical History:  DiagnosisDate  ?Arthritis?  ?Cataract?  ?Colon polyps?  ?FOLLOWED BY DR. JOSEPH LAU  ?Hypertension?    ?  Past Surgical History:  Surgical History?Expand by Default  ????  Past Surgical History:  ProcedureLateralityDate  ?COLONOSCOPY?10/2015  ?Polyp-hyperplastic?Dr. Bermudez. ?Follow-up in 5 years.  ?COLONOSCOPYN/A1/12/2022  ?2 BENIGN POLYPS IN DESCENDING, 5 MM BENIGN POLYP IN SIGMOID, MULTIPLE SMALL  AND LARGE DIVERTICULA IN SIGMOID, RESCOPE IN 3 YRS, DR. JOSEPH LAU AT [facility]  ?EYE SURGERY??  ?JOINT REPLACEMENT?2005?  ?Left total hip replacement in 2005. ?In December 2015 patient had left hip  revision  ?TONSILLECTOMY??    ?  ?  ?    Plan of Care  Updated Problems/Interventions  Field    Expected Intensity:  Average of 3 hours of therapy 5 days/week.  Interdisciplinary Team:  Interdisciplinary Team: Medical Supervision and 24 Hour Rehabilitation Nursing.,  Physical Therapy:, Occupational Therapy:, Speech and Language Therapy:, Social  Work, Therapeutic Recreation., Psychology., Registered Dietitian.  Physical Therapy Intensity/Duration: 1 hour / day, 5 days / week, for  approximately 2 weeks.  Occupational Therapy Intensity/Duration: 1 hour / day, 5 days / week, for  approximately 2 weeks.  Speech Language Pathology  Intensity/Duration: 1 hour / day, 5 days / week, for  approximately 2 weeks.  Estimated Length of Stay/Anticipated Discharge Date: 2 weeks  Anticipated Discharge Destination:  Anticipated discharge destination from inpatient rehabilitation is community  discharge with assistance. Home with intermittent  assistance of spouse.  While  spouse working, patient has friends and additional family that can check on her  throughout the day.  Spouse can addtioinally apply for FMLA if needed.      Based on the patient's medical and functional status, their prognosis and  expected level of functional improvement is:  Goals are to achieve a level of  SBA / CGA with ADL's and mobility.  Rehab prognosis fair.  Medical prognosis  fair.    Signed by: Monse Ruvalcaba RN

## 2023-05-22 NOTE — PROGRESS NOTES
Inpatient Rehabilitation Plan of Care Note    Plan of Care  Updated Problems/Interventions  Mobility    [PT] Stairs(Active)  Current Status(05/22/2023): TBD  Weekly Goal(05/30/2023): PT only  Discharge Goal: 4 CGA    [PT] Walk(Active)  Current Status(05/22/2023): 60' Min RWX  Weekly Goal(05/30/2023): PT only  Discharge Goal: 100' CGA/SBA RWX    [PT] Bed/Chair/Wheelchair(Active)  Current Status(05/22/2023): Mod/Min RWX  Weekly Goal(05/30/2023): Min/CGA RWX  Discharge Goal: CGA/SBA RWX    [PT] Bed Mobility(Active)  Current Status(05/22/2023): Min  Weekly Goal(05/30/2023): CGA  Discharge Goal: SBA    Signed by: ANDRÉS CooneyT

## 2023-05-22 NOTE — NURSING NOTE
CWON Note: pt seen for evaluation of skin issues POA to rehab unit. Pt is alert and able to turn and reposition with minimal assistance, incontinent of bowel and bladder at times.  She is on a low air loss mattress for adequate pressure redistribution.     Original gluteal DTI has evolved into a healing stage 2 with pink partial thickness skin loss, 1.4x 0.8cms. Robin buttocks noted to have pink partial thickness skin loss secondary to friction and MASD from IAD.  An Optifoam was in place, but staying too moist. Continue alternating magic barrier cream with Calmoseptine ointment with an ABD pad covering the wound and change 4x day and prn for soiling. I have discussed with RN, Darling. Pt will need to continue frequent off-loading, continue use of a pressure redistribution W/C cushion, and dressing changes 4x day and prn for soiling. Orders updated in Epic. Will continue to follow weekly.

## 2023-05-22 NOTE — PROGRESS NOTES
Case Management  Inpatient Rehabilitation Plan of Care and Discharge Plan Note    Rehabilitation Diagnosis:  Branch  Date of Onset:  Branch    Medical Summary:  Branch  Past Medical History: Branch    Plan of Care  Updated Problems/Interventions  Field    Expected Intensity:  Branch  Interdisciplinary Team:  Joe  Estimated Length of Stay/Anticipated Discharge Date: Branch  Anticipated Discharge Destination:  Anticipated discharge destination from inpatient rehabilitation is community  discharge with assistance. Patient lives with  in one story walk out  ranch style home with two step entry.  D/C plan is home with  who plans to take FMLA. Friend can also assist  intermittently.      Based on the patient's medical and functional status, their prognosis and  expected level of functional improvement is:  Joe    Signed by: GEORGES Franks

## 2023-05-22 NOTE — THERAPY TREATMENT NOTE
Inpatient Rehabilitation - Physical Therapy Treatment Note       [facility]     Patient Name: Katherine Williamson  : 1950  MRN: [id number]    Today's Date: 2023                    Admit Date: 2023      Visit Dx:     ICD-10-CM ICD-9-CM   1. Decreased functional mobility and endurance  Z74.09 780.99       Patient Active Problem List   Diagnosis   • Benign essential HTN   • Tobacco abuse   • Dislocation of hip joint prosthesis   • Fracture of proximal humerus   • Mechanical complication of internal orthopedic device   • Wear of articular bearing surface of internal prosthetic joint   • History of repair of hip joint   • History of operative procedure on hip   • Hyperglycemia   • Hepatic steatosis   • Diverticulosis   • Chest pain, atypical   • History of colon polyps   • Family history of colon cancer in mother   • Allergic rhinitis   • Lung nodule seen on imaging study   • Acute respiratory failure with hypoxia   • Abscess of right lung with pneumonia   • Empyema   • Sepsis   • Pleural effusion, right   • Other emphysema   • Pulmonary nodule (RLL)   • Diastolic dysfunction, grade 1   • Physical debility   • Cryptogenic stroke   • Acute right MCA stroke       Past Medical History:   Diagnosis Date   • Arthritis    • Cataract    • Colon polyps     FOLLOWED BY DR. JOSEPH LAU   • Hypertension        Past Surgical History:   Procedure Laterality Date   • COLONOSCOPY  10/2015    Yaxle-puyduutvvflu-Yr. Kaplan.  Follow-up in 5 years.   • COLONOSCOPY N/A 2022    2 BENIGN POLYPS IN DESCENDING, 5 MM BENIGN POLYP IN SIGMOID, MULTIPLE SMALL AND LARGE DIVERTICULA IN SIGMOID, RESCOPE IN 3 YRS, DR. JOSEPH LAU AT [facility]   • EYE SURGERY     • JOINT REPLACEMENT  ?    Left total hip replacement in .  In 2015 patient had left hip revision   • TONSILLECTOMY         PT ASSESSMENT (last 12 hours)     IRF PT Evaluation and Treatment     Row Name 23 1015          PT Time and Intention    Document  Type daily treatment  -EE     Mode of Treatment physical therapy  -EE     Patient/Family/Caregiver Comments/Observations Pt sitting up in WC, agreeable to PT.  -EE     Row Name 05/22/23 1015          General Information    Existing Precautions/Restrictions fall;oxygen therapy device and L/min  -EE     Row Name 05/22/23 1015          Pain Assessment    Pretreatment Pain Rating 0/10 - no pain  -EE     Posttreatment Pain Rating 0/10 - no pain  -EE     Row Name 05/22/23 1015          Cognition/Psychosocial    Orientation Status (Cognition) oriented x 4  -EE     Follows Commands (Cognition) follows one-step commands;verbal cues/prompting required  -EE     Personal Safety Interventions fall prevention program maintained;gait belt;muscle strengthening facilitated;nonskid shoes/slippers when out of bed;supervised activity  -EE     Row Name 05/22/23 1015          Bed Mobility    Supine-Sit [facility] (Bed Mobility) minimum assist (75% patient effort);verbal cues  assist with L LE  -EE     Row Name 05/22/23 1015          Transfer Assessment/Treatment    Comment, (Transfers) 5x STS from EOM in PM with HHA and min A, cues for setup and weight shifting.  -EE     Row Name 05/22/23 1015          Bed-Chair Transfer    Bed-Chair [facility] (Transfers) minimum assist (75% patient effort);moderate assist (50% patient effort);verbal cues  -EE     Assistive Device (Bed-Chair Transfers) walker, front-wheeled  -EE     Row Name 05/22/23 1015          Chair-Bed Transfer    Chair-Bed [facility] (Transfers) minimum assist (75% patient effort);moderate assist (50% patient effort);verbal cues  -EE     Assistive Device (Chair-Bed Transfers) walker, front-wheeled  -EE     Row Name 05/22/23 1015          Sit-Stand Transfer    Sit-Stand [facility] (Transfers) minimum assist (75% patient effort);moderate assist (50% patient effort);verbal cues  -EE     Assistive Device (Sit-Stand Transfers) walker, front-wheeled;wheelchair  -EE     Comment,  (Sit-Stand Transfer) cues for hand placement  -EE     Row Name 05/22/23 1015          Stand-Sit Transfer    Stand-Sit [place] (Transfers) minimum assist (75% patient effort);moderate assist (50% patient effort);verbal cues  -EE     Assistive Device (Stand-Sit Transfers) walker, front-wheeled;wheelchair  -EE     Row Name 05/22/23 1015          Gait/Stairs (Locomotion)    [place] Level (Gait) minimum assist (75% patient effort);contact guard;verbal cues;1 person to manage equipment  WC follow for safety  -EE     Assistive Device (Gait) walker, front-wheeled  -EE     Distance in Feet (Gait) 80' x 1, 60' x 1  -EE     Pattern (Gait) step-through  -EE     Deviations/Abnormal Patterns (Gait) raina decreased;stride length decreased  -EE     Bilateral Gait Deviations forward flexed posture;heel strike decreased;weight shift ability decreased  -EE     Left Sided Gait Deviations heel strike decreased  -EE     Gait Assessment/Intervention Cues for upright posture and pursed lip breathing  -EE     Row Name 05/22/23 1015          Safety Issues, Functional Mobility    Impairments Affecting Function (Mobility) balance;endurance/activity tolerance;range of motion (ROM)  -EE     Row Name 05/22/23 1015          Motor Skills    Therapeutic Exercise hip;knee  -EE     Row Name 05/22/23 1015          Hip (Therapeutic Exercise)    Hip (Therapeutic Exercise) strengthening exercise  -EE     Hip Strengthening (Therapeutic Exercise) bilateral;marching while seated;10 repetitions  -EE     Row Name 05/22/23 1015          Knee (Therapeutic Exercise)    Knee (Therapeutic Exercise) strengthening exercise  -EE     Knee Strengthening (Therapeutic Exercise) bilateral;LAQ (long arc quad);10 repetitions  seated B closed chain knee ext x10 reps with foot positioned on swiss ball  -EE     Row Name 05/22/23 1015          Positioning and Restraints    Pre-Treatment Position sitting in chair/recliner  -EE     Post Treatment Position bed  -EE      In Bed fowlers;call light within reach;encouraged to call for assist;exit alarm on;notified nsg  -EE     Row Name 05/22/23 1015          Vital Signs    Pre SpO2 (%) 92  -EE     O2 Delivery Pre Treatment supplemental O2  1 L  -EE     Intra SpO2 (%) 90  -EE     O2 Delivery Intra Treatment supplemental O2  1 L  -EE     Post SpO2 (%) 95  -EE     O2 Delivery Post Treatment supplemental O2  1 L  -EE     Pre Patient Position Sitting  -EE     Intra Patient Position Standing  -EE     Post Patient Position Supine  -EE           User Key  (r) = Recorded By, (t) = Taken By, (c) = Cosigned By    Initials Name Provider Type     Tena Garay, PT Physical Therapist              Wound 05/14/23 1556 Right gluteal Pressure Injury (Active)   Pressure Injury Stage 2 05/22/23 0827       Wound 05/19/23 2055 Bilateral gluteal (Active)   Closure None 05/21/23 2134   Base pink;moist 05/21/23 2134   Care, Wound other (see comments) 05/21/23 2134   Dressing Care foam 05/21/23 2134     Physical Therapy Education     Title: PT OT SLP Therapies (In Progress)     Topic: Physical Therapy (Done)     Point: Mobility training (Done)     Learning Progress Summary           Patient Acceptance, E,TB, NR,VU by  at 5/22/2023 1221    Acceptance, E, NR by  at 5/20/2023 1216                   Point: Home exercise program (Done)     Learning Progress Summary           Patient Acceptance, E,TB, NR,VU by  at 5/22/2023 1221    Acceptance, E, NR by  at 5/20/2023 1216                   Point: Body mechanics (Done)     Learning Progress Summary           Patient Acceptance, E,TB, NR,VU by  at 5/22/2023 1221    Acceptance, E, NR by  at 5/20/2023 1216                   Point: Precautions (Done)     Learning Progress Summary           Patient Acceptance, E,TB, NR,VU by  at 5/22/2023 1221    Acceptance, E, NR by  at 5/20/2023 1216                               User Key     Initials Effective Dates Name Provider Type [facility] 06/16/21 -  Felipe  Jess LEE PT Physical Therapist PT    EE 06/16/21 -  Tena Garay PT Physical Therapist PT                PT Recommendation and Plan                          Time Calculation:      PT Charges     Row Name 05/22/23 1422 05/22/23 1221          Time Calculation    Start Time 1300  -EE 1000  -EE     Stop Time 1330  -EE 1030  -EE     Time Calculation (min) 30 min  -EE 30 min  -EE     PT Received On 05/22/23  -EE 05/22/23  -EE     PT - Next Appointment 05/23/23  -EE 05/22/23  -EE        Time Calculation- PT    Total Timed Code Minutes- PT 30 minute(s)  -EE 30 minute(s)  -EE           User Key  (r) = Recorded By, (t) = Taken By, (c) = Cosigned By    Initials Name Provider Type    EE Tena Garay, PT Physical Therapist                Therapy Charges for Today     Code Description Service Date Service Provider Modifiers Qty    61230596930 HC PT THER PROC EA 15 MIN 5/22/2023 Tena Garay, PT GP 2    78390357331 HC PT THERAPEUTIC ACT EA 15 MIN 5/22/2023 Tena Garay, PT GP 2    05603200799 HC PT THER SUPP EA 15 MIN 5/22/2023 Tena Garay, PT GP 1                   Tena Garay PT  5/22/2023

## 2023-05-22 NOTE — PROGRESS NOTES
SECTION GG    Mobility Performance:     Roll Left and Right: Fowler does less than half the effort. Fowler lifts, holds  or supports trunk or limbs but provides less than half the effort.   Sit to Lying: Fowler does less than half the effort. Fowler lifts, holds or  supports trunk or limbs but provides less than half the effort.   Lying to Sitting on Side of Bed: Fowler does less than half the effort. Fowler  lifts, holds or supports trunk or limbs but provides less than half the effort.   Sit to Stand: Fowler does more than half the effort. Fowler lifts or holds  trunk or limbs and provides more than half the effort.   Chair/Bed to Chair Transfer: Fowler does more than half the effort. Fowler  lifts or holds trunk or limbs and provides more than half the effort.   Car Transfer: Not attempted due to medical or safety concerns.   Walk 10 Feet:   Fowler does less than half the effort. Fowler lifts, holds or  supports trunk or limbs but provides less than half the effort.  Walk 50 Feet with 2 Turns:   Not attempted due to medical or safety concerns.  Walk 150 Feet:   Not attempted due to medical or safety concerns.  Walking 10 Feet on Uneven Surfaces:   Not attempted due to medical or safety  concerns.  1 Step Over Curb or Up/Down Stair:   Not attempted due to medical or safety  concerns.  Picking up an Object:   Not attempted due to medical or safety concerns.  Uses Wheelchair/Scooter: No    Mobility Discharge Goals:   Sit to Stand: Fowler provides verbal cues or touching/steadying assistance as  patient completes activity.    Section J. Health Conditions (Pain):  Pain Interference with Therapy Activities:   Occasionally  Pain Interference with Day-to-Day Activities:   Occasionally    Signed by: Tena Garay DPT

## 2023-05-22 NOTE — THERAPY EVALUATION
Inpatient Rehabilitation - Speech Language Pathology   Swallow Initial Evaluation [facility]     Patient Name: Katherine Williamson  : 1950  MRN: [id number]  Today's Date: 2023               Admit Date: 2023    Visit Dx:     ICD-10-CM ICD-9-CM   1. Decreased functional mobility and endurance  Z74.09 780.99     Patient Active Problem List   Diagnosis   • Benign essential HTN   • Tobacco abuse   • Dislocation of hip joint prosthesis   • Fracture of proximal humerus   • Mechanical complication of internal orthopedic device   • Wear of articular bearing surface of internal prosthetic joint   • History of repair of hip joint   • History of operative procedure on hip   • Hyperglycemia   • Hepatic steatosis   • Diverticulosis   • Chest pain, atypical   • History of colon polyps   • Family history of colon cancer in mother   • Allergic rhinitis   • Lung nodule seen on imaging study   • Acute respiratory failure with hypoxia   • Abscess of right lung with pneumonia   • Empyema   • Sepsis   • Pleural effusion, right   • Other emphysema   • Pulmonary nodule (RLL)   • Diastolic dysfunction, grade 1   • Physical debility   • Cryptogenic stroke   • Acute right MCA stroke     Past Medical History:   Diagnosis Date   • Arthritis    • Cataract    • Colon polyps     FOLLOWED BY DR. JOSEPH LAU   • Hypertension      Past Surgical History:   Procedure Laterality Date   • COLONOSCOPY  10/2015    Hzjni-uiwbazzpjhow-Ep. Kaplan.  Follow-up in 5 years.   • COLONOSCOPY N/A 2022    2 BENIGN POLYPS IN DESCENDING, 5 MM BENIGN POLYP IN SIGMOID, MULTIPLE SMALL AND LARGE DIVERTICULA IN SIGMOID, RESCOPE IN 3 YRS, DR. JOSEPH LAU AT [facility]   • EYE SURGERY     • JOINT REPLACEMENT  ?    Left total hip replacement in .  In 2015 patient had left hip revision   • TONSILLECTOMY         SLP Recommendation and Plan     SLP Diet Recommendation: regular textures, thin liquids (23 1100)  Recommended Precautions and  Strategies: upright posture during/after eating, small bites of food and sips of liquid, reflux precautions, chin tuck (05/22/23 1100)  SLP Rec. for Method of Medication Administration: meds whole, with thin liquids, with puree, as tolerated (05/22/23 1100)     Monitor for Signs of Aspiration: yes, notify SLP if any concerns (05/22/23 1100)  Recommended Diagnostics:  (Cognitive treatment only. Will monitor tolerance of regular diet.) (05/22/23 1100)     Anticipated Discharge Disposition (SLP): home with OP services (05/22/23 1100)     Therapy Frequency (Swallow): evaluation only (05/22/23 1100)                                           Plan of Care Reviewed With: patient      SWALLOW EVALUATION (last 72 hours)     SLP Adult Swallow Evaluation     Row Name 05/22/23 1100 05/22/23 0830                Rehab Evaluation    Subjective Information -- no complaints  -AL       Patient Observations -- alert;cooperative  -AL       Patient Effort -- excellent  -AL          General Information    Patient Profile Reviewed -- yes  -AL       Pertinent History Of Current Problem -- Ms. Williamson is a 72 y.o. female admitted to [facility] rehab under diagnosis R MCA infarct, necrotizing pneumonia, pleural effusion and sepsis with onset date 5/2/23.  Most recent VFSS was 5/16/23 with the following results:  Pt demonstrated absent epiglottic deflection initially with penetration of thin and nectar and severe vallecular residue.  Improved delfection and vallecular clearance with use of chin tuck.  Penetration eliminated with chin tuck.  Later trials w/o chin tuck only mild vallecular residue and no penetration.  Premature spillage of soft solids and mixed consistencies. Recommended soft (ground meats) with thins; use chin tuck.  -AL       Current Method of Nutrition -- soft to chew textures;ground;thin liquids  -AL       Precautions/Limitations, Vision -- WFL  -AL       Precautions/Limitations, Hearing -- WFL  -AL       Prior Level of  Function-Communication -- WFL  -AL       Prior Level of Function-Swallowing -- no diet consistency restrictions  -AL       Plans/Goals Discussed with -- patient;agreed upon  -AL       Patient's Goals for Discharge -- return to regular diet  -AL          Pain Scale: Numbers Pre/Post-Treatment    Pretreatment Pain Rating -- 0/10 - no pain  -AL          Oral Motor Structure and Function    Dentition Assessment -- natural, present and adequate  -AL       Secretion Management -- WNL/WFL  -AL          Oral Musculature and Cranial Nerve Assessment    Oral Motor General Assessment -- WFL  -AL          General Eating/Swallowing Observations    Respiratory Support Currently in Use -- nasal cannula  -AL       O2 Liters -- 1L  -AL       Eating/Swallowing Skills -- self-fed  -AL       Positioning During Eating -- upright in bed  -AL       Utensils Used -- spoon;cup;straw  -AL       Consistencies Trialed -- regular textures;mixed consistency;pureed;thin liquids  -AL          Clinical Swallow Eval    Clinical Swallow Evaluation Summary Clinical Swallow Evaluation completed. No overt s/s of aspiration or penetration observed with thins by cup/straw, puree, mixed and regular. No difficulty with mastication observed. Swallow initiation appeared timely. Pt used chin tuck with liquids independently. She reported she feels food/drink goes down better when using the chin tuck with less residue. Recommend upgrade diet to regular with thins. Use chin tuck with liquids and solids per pt preference. Meds with water or applesauce, as tolerated. Sit upright for meals and 30 minutes after meals.  -AL --          Recommendations    Therapy Frequency (Swallow) evaluation only  -AL --       SLP Diet Recommendation regular textures;thin liquids  -AL --       Recommended Diagnostics --  Cognitive treatment only. Will monitor tolerance of regular diet.  -AL --       Recommended Precautions and Strategies upright posture during/after eating;small  "bites of food and sips of liquid;reflux precautions;chin tuck  -AL --       Oral Care Recommendations Oral Care BID/PRN  -AL --       SLP Rec. for Method of Medication Administration meds whole;with thin liquids;with puree;as tolerated  -AL --       Monitor for Signs of Aspiration yes;notify SLP if any concerns  -AL --       Anticipated Discharge Disposition (SLP) home with OP services  -AL --             User Key  (r) = Recorded By, (t) = Taken By, (c) = Cosigned By    Initials Name Effective Dates    Sarita Landry, MS CCC-SLP 06/16/21 -                 EDUCATION  The patient has been educated in the following areas:   Dysphagia (Swallowing Impairment).        SLP GOALS     Row Name 05/22/23 1100 05/20/23 1000          Patient will demonstrate functional cognitive-linguistic skills for return to discharge environment    Yell -- Independently  -LS     Time frame -- by discharge  -LS        Attention Goal 1 (SLP)    Comment (Attention Goal 1, SLP) Calendar task: 100% with NO cues.  -AL --        Memory Skills Goal 1 (SLP)    Improve Memory Skills Through Goal 1 (SLP) -- recalling unrelated word lists with an imposed delay  -LS     Time Frame (Memory Skills Goal 1, SLP) -- by discharge  -LS        Reasoning Goal 1 (SLP)    Comment (Reasoning Goal 1, SLP) Diagnostic treatment: Pt completed moderately complex logic puzzle with 80% accuracy with NO cues, 100% with MIN cues. Pt stated it was \"challenging.\"  -AL --        Functional Math Skills Goal 1 (SLP)    Improve Functional Math Skills Through Goal 1 (SLP) -- complete moderately complex math problems;50%  -LS     Time Frame (Functional Math Skills Goal 1, SLP) -- by discharge  -LS     Comment (Functional Math Skills Goal 1, SLP) Diagnostic treatment: Pt completed grocery prices task with 100% accuracy with NO cues.  -AL --        Executive Functional Skills Goal 1 (SLP)    Improve Executive Function Skills Goal 1 (SLP) -- organization/planning activity  " -     Time Frame (Executive Function Skills Goal 1, SLP) -- by discharge  -           User Key  (r) = Recorded By, (t) = Taken By, (c) = Cosigned By    Initials Name Provider Type    Daryn Arvizu MS CCC-SLP Speech and Language Pathologist    Sarita Landry MS CCC-SLP Speech and Language Pathologist                   Time Calculation:    Time Calculation- SLP     Row Name 05/22/23 1215             Time Calculation- SLP    SLP Start Time 0830  -AL      SLP Stop Time 0900  -AL      SLP Time Calculation (min) 30 min  -AL            User Key  (r) = Recorded By, (t) = Taken By, (c) = Cosigned By    Initials Name Provider Type    Sarita Landry MS CCC-SLP Speech and Language Pathologist                Therapy Charges for Today     Code Description Service Date Service Provider Modifiers Qty    01447621419  ST EVAL ORAL PHARYNG SWALLOW 1 5/22/2023 Sarita Comer MS CCC-SLP GN 1    [id number]  ST EVAL ORAL PHARYNG SWALLOW 2 5/22/2023 Sarita Comer MS CCC-SLP GN 1               Sarita Comer MS CCC-JALEEL  5/22/2023

## 2023-05-22 NOTE — PLAN OF CARE
Goal Outcome Evaluation:  Plan of Care Reviewed With: patient           Outcome Evaluation: Patient alert and oriented x4, assist x1, and medication with water. She has been continent/incontinent of b/b-Imodium PRN ordered, but not used today. PICC line to RUE, flushes good and good blood return- antibiotics hung today. Zio in place, VS stable, and tolerated all therapies. Patient diet upgraded to regular, hopefully intake will increase. Low air loss mattress, refused waffle boots and no other issues at this time.

## 2023-05-22 NOTE — PROGRESS NOTES
Inpatient Rehabilitation Functional Measures Assessment and Plan of Care    Plan of Care  Updated Problems/Interventions  Cognition    [ST] Executive Functions(Active)  Current Status(05/22/2023): Mild high-level executive function deficit.  Weekly Goal(05/30/2023): Will complete home management tasks independently.  Discharge Goal: Adequate executive function skills for home environment.    Signed by: Sarita Comer, SLP

## 2023-05-22 NOTE — PROGRESS NOTES
Physical Medicine and Rehabilitation  Inpatient Rehabilitation Interdisciplinary Plan of Care    Demographics            Age: 72Y            Gender: Female    Admission Date: 5/19/2023 6:49:00 PM  Rehabilitation Diagnosis:  Acute R MCA stroke and bilateral infarcts  Acute right MCA stroke    1. Acute R MCA stroke and bilateral infarcts  Zio patch placed on May 19 for 2 weeks  - 5/20 - Admit for inpt rehab w/ PT, OT, SLP, psychology and rehab medical and  nursing care. Continue secondary stroke prophylaxis    2. Pneumonia and right lung abscess and Pleural effusions  - 5/20 - Continue IV antibx. ID following    - 5/21 - Continues to improve   The chest x-ray is reassuring   Expect her to be able to come off the oxygen in the next few days   She will need to have a follow-up chest imaging 6 weeks down the road   Finish the antibiotic course per ID, last day 6/8/2023  -5/22-on 1 L oxygen with activities and maintaining sats    3. Acute hypoxic respiratory failure    4. Emphysema d/t Tobacco use    5. Hypertension-continue antihypertensive regimen and avoid hypotensive  episodes.    6. DVT prophylaxis -  - 5/20 - Start Lovenox SQ daily    7.  Dysphagia  -5/22-diet advanced to regular solid thin liquids    Plan of Care  Anticipated Discharge Date/Estimated Length of Stay: 2 weeks  Anticipated Discharge Destination: Community discharge with assistance  Discharge Plan : Patient lives with  in one story walk out ranch style  home with two step entry.  D/C plan is home with  who plans to take FMLA. Friend can also assist  intermittently.  Medical Necessity Expected Level Rationale: Goals are to achieve a level of SBA  / CGA with ADL's and mobility.  Rehab prognosis fair.  Medical prognosis fair.  Intensity and Duration: an average of 3 hours/5 days per week  Medical Supervision and 24 Hour Rehab Nursing: x  Physical Therapy: x  PT Intensity/Duration: 1 hour / day, 5 days / week, for approximately 2  weeks.  Occupational Therapy: x  OT Intensity/Duration: 1 hour / day, 5 days / week, for approximately 2 weeks.  Speech and Language Therapy: x  SLP Intensity/Duration: 1 hour / day, 5 days / week, for approximately 2 weeks.  Social Work: x  Therapeutic Recreation: x  Psychology: x  Registered Dietician: x  Updated (if changes indicated)  No changes to plan.    Based on the patient's medical and functional status, their prognosis and  expected level of functional improvement is: Goals are to achieve a level of SBA  / CGA with ADL's and mobility.  Rehab prognosis fair.  Medical prognosis fair.    Interdisciplinary Problem/Goals/Status  Safety    [RN] Potential for Injury(Active)  Current Status(05/22/2023): Patient at risk for injury related to mobility  issues.  Weekly Goal(05/27/2023): Patient will use call light appropriately and have no  injury while on Rehab.  Discharge Goal: Patient will have no injury while on Rehab.        Psychosocial    [RN] Coping/Adjustment(Active)  Current Status(05/22/2023): Patient at risk for ineffective coping, but has a  supportive .  Weekly Goal(05/27/2023): Patient will verbalize needs and concerns related to  current situation.  Discharge Goal: Patient will have healthy coping skills at time of discharge.        Sphincter Control    [RN] Bladder Management(Active)  Current Status(05/22/2023): Patient continent 75% of the time.  Weekly Goal(05/27/2023): Patient will be continent 100%  Discharge Goal: Patient will be continent 100% of the time.    [RN] Bowel Management(Active)  Current Status(05/22/2023): Patient incontinent 100% of the time.  Weekly Goal(05/27/2023): Patient will be continent 50% of the time.  Discharge Goal: Patient will be continent 100% of the time.        Body Systems    [RN] Integumentary(Active)  Current Status(05/22/2023): Patient has stage 2 pressure injury to cocyx  Weekly Goal(05/27/2023): Patients skin will be healing with no more  deteriotion.    Discharge Goal: Patients skin will be healed with no further injury.        Self Care    [OT] Bathing(Active)  Current Status(05/22/2023): MOD  Weekly Goal(05/30/2023): MIN  Discharge Goal: CGA    [OT] Dressing (Lower)(Active)  Current Status(05/22/2023): MOD  Weekly Goal(05/30/2023): MIN  Discharge Goal: CGA    [OT] Dressing (Upper)(Active)  Current Status(05/22/2023): MIN  Weekly Goal(05/30/2023): set up  Discharge Goal: set up    [OT] Grooming(Active)  Current Status(05/22/2023): set up  Weekly Goal(05/30/2023): Supervision  Discharge Goal: Supervision    [OT] Toileting(Active)  Current Status(05/22/2023): MAX  Weekly Goal(05/30/2023): MOD  Discharge Goal: CGA        Mobility    [OT] Toilet Transfers(Active)  Current Status(05/22/2023): MOD A RWX  Weekly Goal(05/30/2023): MIN  Discharge Goal: CGA/SBA    [OT] Tub/Shower Transfers(Active)  Current Status(05/22/2023): TBA  Weekly Goal(05/30/2023): MOD  Discharge Goal: CGA/SBA      Comments:    Signed by: Sebastian Collins MD

## 2023-05-22 NOTE — PROGRESS NOTES
LOS: 3 days   Patient Care Team:  Zelalem Flor APRN as PCP - General (Internal Medicine)  Brandon Mitchell MD as Consulting Physician (Orthopedic Surgery)      ANNIE CERDA  1950    Diagnoses    1. DECREASED FUNCTIONAL MOBILITY AND ENDURANCE       ADMITTING DIAGNOSIS:  CVA      Subjective   Patient reports her strength is overall unchanged.  Tolerating activities.   Patient is not like the dysphagia diet.  Has had decreased intake.  Reviewed with speech therapy and diet adjusted today to regular solid thin liquid.      Objective     Vitals:    05/22/23 0500   BP: 144/76   Pulse: 70   Resp: 18   Temp: 98.1 °F (36.7 °C)   SpO2: 94%       PHYSICAL EXAM:   MENTAL STATUS -  AWAKE / ALERT  HEENT- NCAT, PUPILS EQUALLY ROUND, SCLERAE ANICTERIC, CONJUNCTIVAE PINK, OP MOIST, NO JVD, EARS UNREMARKABLE EXTERNALLY  LUNGS -decreased air movement bilaterally   O2 1 L nasal cannula  Chest-Zio patch  HEART- RRR, NO RUB, MURMUR, OR GALLOP  ABD - NORMOACTIVE BOWEL SOUNDS, SOFT, NT. NO HEPATOSPLENOMEGALY APPRECIATED  EXT - NO EDEMA OR CYANOSIS  NEURO -oriented x4  MOTOR EXAM -mild left facial weakness  Mild left-sided weakness        MEDICATIONS  Scheduled Meds:aspirin, 81 mg, Oral, Q24H  atorvastatin, 80 mg, Oral, Nightly  citalopram, 20 mg, Oral, Daily  clopidogrel, 75 mg, Oral, Daily  doxycycline, 100 mg, Oral, Q12H  enoxaparin, 40 mg, Subcutaneous, Q24H  fluticasone, 1 spray, Each Nare, Daily  guaiFENesin, 600 mg, Oral, BID  hydrocortisone-bacitracin-zinc oxide-nystatin, 1 application, Topical, BID  Menthol-Zinc Oxide, 1 application, Topical, 4x Daily  meropenem, 1 g, Intravenous, Q8H  metoprolol tartrate, 12.5 mg, Oral, Q12H  sodium chloride, 10 mL, Intravenous, Q12H  sodium chloride, 10 mL, Intravenous, Q12H  cyanocobalamin, 1,000 mcg, Oral, Daily      Continuous Infusions:   PRN Meds:.•  acetaminophen  •  ipratropium-albuterol  •  loperamide  •  sodium chloride  •  sodium chloride  •  sodium chloride  •  sodium  "chloride  •  sodium chloride      RESULTS  No results found for: POCGLU  Results from last 7 days   Lab Units 05/20/23  0740 05/19/23  0425 05/18/23  0628   WBC 10*3/mm3 8.34 9.51 16.03*   HEMOGLOBIN g/dL 10.3* 10.0* 10.0*   HEMATOCRIT % 30.3* 29.5* 29.9*   PLATELETS 10*3/mm3 366 351 387     Results from last 7 days   Lab Units 05/20/23  0740 05/19/23  0425 05/18/23  0628   SODIUM mmol/L 137 139 139   POTASSIUM mmol/L 4.0 4.1 3.8   CHLORIDE mmol/L 105 104 104   CO2 mmol/L 28.7 30.5* 31.0*   BUN mg/dL 14 12 19   CREATININE mg/dL 0.20* 0.23* 0.19*   CALCIUM mg/dL 8.6 8.5* 8.4*   GLUCOSE mg/dL 89 85 100*       New Radiology:  ELIGIO negative for vegetations     Prior radiology:  MRI brain: \"Bilateral multifocal areas of infarction.  No evidence of hemorrhagic transformation. \"    ASSESSMENT and PLAN    Acute right MCA stroke    1. Acute R MCA stroke and bilateral infarcts  Zio patch placed on May 19 for 2 weeks  - 5/20 - Admit for inpt rehab w/ PT, OT, SLP, psychology and rehab medical and nursing care. Continue secondary stroke prophylaxis     2. Pneumonia and right lung abscess and Pleural effusions  - 5/20 - Continue IV antibx. ID following    - 5/21 - Continues to improve   The chest x-ray is reassuring   Expect her to be able to come off the oxygen in the next few days   She will need to have a follow-up chest imaging 6 weeks down the road   Finish the antibiotic course per ID, last day 6/8/2023   -5/22-on 1 L oxygen with activities and maintaining sats     3. Acute hypoxic respiratory failure     4. Emphysema d/t Tobacco use     5. Hypertension-continue antihypertensive regimen and avoid hypotensive episodes.     6. DVT prophylaxis -   - 5/20 - Start Lovenox SQ daily    7.  Dysphagia  -5/22-diet advanced to regular solid thin liquids    Now admit for comprehensive acute inpatient rehabilitation .  This would be an interdisciplinary program with physical therapy 1 hour,  occupational therapy 1 hour, and speech " therapy 1 hour, 5 days a week.  Rehabilitation nursing for carryover, monitoring of neurologic and pulmonary   status, bowel and bladder, and skin  Ongoing physician follow-up.  Weekly team conferences.  Goals are to achieve a level of supervision with  mobility and self-care and improved endurance.   Rehabilitation prognosis fair.  Medical prognosis fair.  Estimated length of stay is approximately 2 weeks, but is only an estimation.    .     The patient's functional status and clinical status is unchanged from preadmission assessment and the patient continues appropriate for acute inpatient rehabilitation.  Goal is for home with outpatient   therapies.  Barrier to discharge: Impaired mobility self-care endurance- work on vision, strength, gross and fine motor control, balance, progressive ambulation, ADLs to overcome.           Sebastian Collins MD      During rounds, used appropriate personal protective equipment including mask and gloves.  Additional gown if indicated.  Mask used was standard procedure mask. Appropriate PPE was worn during the entire visit.  Hand hygiene was completed before and after.

## 2023-05-22 NOTE — CONSULTS
Nutrition Services    Patient Name:  Katherine Williamson  YOB: 1950  MRN: [id number]  Admit Date:  5/19/2023     Assessment Date:  05/22/23    CLINICAL NUTRITION ASSESSMENT    Comments: Nutrition consult received for new rehab admission. Pt with acute R MCA stroke with L hemiparesis. R gluteal PI noted on chart. Hx of diverticulosis, tobacco abuse. Current diet: advanced this AM to regular/thins from previously soft-to-chew, ground meat. Admitted on pureed diet.   Visited pt in room who was very happy to be advanced to regular diet. PO intakes were poor on modified diets (~50% avg). Pt reports -140#, with perceived wt loss since admit. Upon wt hx review, wt fluctuations since admitted; wt loss of 13# (8.9%) x 1 yr. Pt does not drink Boost Breeze supplements out of fear for intolerance, as Boost + caused diarrhea when she was admitted to acute wing. Pt states her current appetite is about the same as PTA (ate 2-3 meals/d). Advised pt of snacks available on unit and reviewed menu selections.     Recommendations:  1. D/c Boost Breeze.   2. Continue current liberalized diet for optimal PO intakes.     RD will continue to follow-up, per protocol.         Reason for Assessment Acute Rehab Admission     Admitting Diagnosis Acute R MCA stroke     Medical/Surgical History   Past Medical History:   Diagnosis Date   • Arthritis    • Cataract    • Colon polyps     FOLLOWED BY DR. JOSEPH LAU   • Hypertension        Past Surgical History:   Procedure Laterality Date   • COLONOSCOPY  10/2015    [facility].  Follow-up in 5 years.   • COLONOSCOPY N/A 1/12/2022    2 BENIGN POLYPS IN DESCENDING, 5 MM BENIGN POLYP IN SIGMOID, MULTIPLE SMALL AND LARGE DIVERTICULA IN SIGMOID, RESCOPE IN 3 YRS, DR. JOSEPH LAU AT [facility]   • EYE SURGERY     • JOINT REPLACEMENT  2005?    Left total hip replacement in 2005.  In December 2015 patient had left hip revision   • TONSILLECTOMY          Encounter Information      "   Nutrition History:     Food Preferences:    Supplements:    Factors Affecting Intake: diarrhea, fatigue     Current Nutrition Orders & Evaluation of Intake       Oral Nutrition     Food Allergies NKFA   Current PO Diet Diet: Regular/House Diet; Texture: Regular Texture (IDDSI 7); Fluid Consistency: Thin (IDDSI 0)   Supplement Boost Breeze q daily   PO Evaluation     % PO Intake 50% avg; diet advanced today with 100% PO at B    # of Days Evaluated 3   --  Anthropometrics        Current Height  Current Weight  BMI kg/m2 Height: 160 cm (63\")  Weight: 59.9 kg (132 lb 0.9 oz) (05/21/23 1800)  Body mass index is 23.39 kg/m².   Adj BMI (if applicable)    BMI Category Normal/Healthy (18.4 - 24.9)       Ideal Body Weight (IBW) 115#   Adj IBW (if applicable)        Usual Body Weight (UBW) 135-140#   Weight Change/Trend Loss 13# (8.9%) x 1 yr       Weight History Wt Readings from Last 15 Encounters:   05/21/23 1800 59.9 kg (132 lb 0.9 oz)   05/20/23 0554 60 kg (132 lb 4.4 oz)   05/19/23 1700 59.7 kg (131 lb 9.8 oz)   05/19/23 0548 67.4 kg (148 lb 9.4 oz)   05/18/23 0553 65.4 kg (144 lb 2.9 oz)   05/17/23 1128 63 kg (139 lb)   05/16/23 0600 63.1 kg (139 lb 1.8 oz)   05/15/23 1301 66 kg (145 lb 8.1 oz)   05/11/23 0600 65.8 kg (145 lb 1 oz)   05/10/23 0414 65.8 kg (145 lb 1 oz)   05/09/23 0527 65.8 kg (145 lb 1 oz)   05/08/23 0500 66.9 kg (147 lb 7.8 oz)   05/07/23 0300 64 kg (141 lb)   05/06/23 0300 62 kg (136 lb 11 oz)   05/05/23 0300 65.8 kg (145 lb)   05/04/23 0500 66.2 kg (145 lb 15.1 oz)   05/03/23 0917 63.5 kg (139 lb 15.9 oz)   05/02/23 1900 61.2 kg (135 lb)   04/24/23 1356 61.7 kg (136 lb)   04/10/23 0657 61.7 kg (136 lb)   04/03/23 1134 63.5 kg (140 lb)   01/24/23 0957 67.3 kg (148 lb 6.4 oz)   01/09/23 0854 67 kg (147 lb 12.8 oz)   10/11/22 0928 68.5 kg (151 lb)   10/05/22 0920 67.6 kg (149 lb)   08/30/22 1324 67.6 kg (149 lb)   06/06/22 1343 67.1 kg (148 lb)   01/11/22 1743 67.1 kg (148 lb)   10/07/21 0923 67.4 kg " (148 lb 9.6 oz)   09/03/20 0941 67.1 kg (148 lb)   05/13/20 1049 67 kg (147 lb 11.3 oz)        Estimated/Assessed Needs       Energy Requirements    Weight for Calculation 59.9 kg   Method for Estimation  25 kcal/kg, 30 kcal/kg   EST Needs (kcal/day) 0527-3103 kcal       Protein Requirements    Weight for Calculation 59.9 kg   EST Protein Needs (g/kg) 1.0 - 1.2 gm/kg   EST Daily Needs (g/day) 60-72 g       Fluid Requirements     Method for Estimation 1 mL/kcal    Estimated Needs (mL/day) 3118-9506 mL     Physical Findings          Physical Appearance alert, oriented   Oral/Mouth Cavity WNL   Edema  1+ (trace), 2+ (mild)   Gastrointestinal last bowel movement:5/20   Skin  non-healing wound(s), pressure injury; gluteal wound   Tubes/Drains/Lines central line/PICC   NFPE Not applicable at this time     Tests/Procedures/Labs        Tests/Procedures No new tests/procedures       Pertinent Labs     Results from last 7 days   Lab Units 05/20/23  0740 05/19/23  0425 05/18/23  0628   SODIUM mmol/L 137 139 139   POTASSIUM mmol/L 4.0 4.1 3.8   CHLORIDE mmol/L 105 104 104   CO2 mmol/L 28.7 30.5* 31.0*   BUN mg/dL 14 12 19   CREATININE mg/dL 0.20* 0.23* 0.19*   CALCIUM mg/dL 8.6 8.5* 8.4*   GLUCOSE mg/dL 89 85 100*     Results from last 7 days   Lab Units 05/20/23  0740   PHOSPHORUS mg/dL 2.6   HEMOGLOBIN g/dL 10.3*   HEMATOCRIT % 30.3*   WBC 10*3/mm3 8.34   ALBUMIN g/dL 1.7*     Results from last 7 days   Lab Units 05/20/23  0740 05/19/23  0425 05/18/23  0628 05/16/23  0340   PLATELETS 10*3/mm3 366 351 387 365     COVID19   Date Value Ref Range Status   05/02/2023 Not Detected Not Detected - Ref. Range Final     Lab Results   Component Value Date    HGBA1C 6.20 (H) 05/15/2023        Medications           Scheduled Medications aspirin, 81 mg, Oral, Q24H  atorvastatin, 80 mg, Oral, Nightly  citalopram, 20 mg, Oral, Daily  clopidogrel, 75 mg, Oral, Daily  doxycycline, 100 mg, Oral, Q12H  enoxaparin, 40 mg, Subcutaneous,  Q24H  fluticasone, 1 spray, Each Nare, Daily  guaiFENesin, 600 mg, Oral, BID  hydrocortisone-bacitracin-zinc oxide-nystatin, 1 application, Topical, BID  Menthol-Zinc Oxide, 1 application, Topical, 4x Daily  meropenem, 1 g, Intravenous, Q8H  metoprolol tartrate, 12.5 mg, Oral, Q12H  sodium chloride, 10 mL, Intravenous, Q12H  sodium chloride, 10 mL, Intravenous, Q12H  cyanocobalamin, 1,000 mcg, Oral, Daily       Infusions     PRN Medications •  acetaminophen  •  ipratropium-albuterol  •  loperamide  •  sodium chloride  •  sodium chloride  •  sodium chloride  •  sodium chloride  •  sodium chloride        Nutrition Diagnosis        Nutrition Dx Problem  Problem: Predicted Suboptimal Intake and Increased Nutrient Needs  Etiology: Medical Diagnosis Acute R MCA stroke; Gluteal PI  Signs/Symptoms: Report of Minimal PO Intake, Protein and Report/Observation    Comment: Poor PO r/t diet modification (pureed at first admission; transitioned to mechanical ground; now advanced to regular). Increased protein needs for wound healing (Pressure injury - gluteal)     NUTRITION INTERVENTION / PLAN OF CARE  Goals        Intervention Goal(s) Maintain nutrition status, Reduce/improve symptoms, Meet estimated needs, Increase intake, Continue positive trend, Maintain weight and No significant weight loss     Nutrition Intervention        RD Action Advise available snack, Encourage intake, Follow Tx Progress and Care plan reviewed     Prescription        Diet Prescription    Supplement Prescription    Snack Prescription    EN Prescription    New Prescription Ordered? Continue same per protocol     Monitor/Evaluation        Monitor Per protocol   Discharge Needs Pending clinical course   Education Will instruct as appropriate       RD to follow up per protocol.    Electronically signed by:  Pina Chun RD  05/22/23 15:50 EDT

## 2023-05-23 PROCEDURE — 97129 THER IVNTJ 1ST 15 MIN: CPT

## 2023-05-23 PROCEDURE — 25010000002 MEROPENEM PER 100 MG: Performed by: HOSPITALIST

## 2023-05-23 PROCEDURE — 97110 THERAPEUTIC EXERCISES: CPT

## 2023-05-23 PROCEDURE — 97535 SELF CARE MNGMENT TRAINING: CPT

## 2023-05-23 PROCEDURE — 25010000002 ENOXAPARIN PER 10 MG: Performed by: PHYSICAL MEDICINE & REHABILITATION

## 2023-05-23 PROCEDURE — 97130 THER IVNTJ EA ADDL 15 MIN: CPT

## 2023-05-23 PROCEDURE — 97530 THERAPEUTIC ACTIVITIES: CPT

## 2023-05-23 RX ORDER — OMEPRAZOLE 40 MG/1
40 CAPSULE, DELAYED RELEASE ORAL DAILY
COMMUNITY
End: 2023-06-09 | Stop reason: HOSPADM

## 2023-05-23 RX ADMIN — ASPIRIN 81 MG: 81 TABLET, CHEWABLE ORAL at 08:21

## 2023-05-23 RX ADMIN — GUAIFENESIN 600 MG: 600 TABLET, EXTENDED RELEASE ORAL at 21:00

## 2023-05-23 RX ADMIN — ENOXAPARIN SODIUM 40 MG: 100 INJECTION SUBCUTANEOUS at 20:59

## 2023-05-23 RX ADMIN — DOXYCYCLINE 100 MG: 100 CAPSULE ORAL at 20:59

## 2023-05-23 RX ADMIN — CITALOPRAM 20 MG: 20 TABLET, FILM COATED ORAL at 08:21

## 2023-05-23 RX ADMIN — GUAIFENESIN 600 MG: 600 TABLET, EXTENDED RELEASE ORAL at 08:21

## 2023-05-23 RX ADMIN — Medication 1 APPLICATION: at 12:28

## 2023-05-23 RX ADMIN — CLOPIDOGREL BISULFATE 75 MG: 75 TABLET, FILM COATED ORAL at 08:21

## 2023-05-23 RX ADMIN — Medication 1000 MCG: at 08:21

## 2023-05-23 RX ADMIN — METOPROLOL TARTRATE 12.5 MG: 25 TABLET, FILM COATED ORAL at 08:21

## 2023-05-23 RX ADMIN — ATORVASTATIN CALCIUM 80 MG: 80 TABLET, FILM COATED ORAL at 20:59

## 2023-05-23 RX ADMIN — Medication 1 APPLICATION: at 17:29

## 2023-05-23 RX ADMIN — MEROPENEM 1 G: 1 INJECTION, POWDER, FOR SOLUTION INTRAVENOUS at 04:09

## 2023-05-23 RX ADMIN — METOPROLOL TARTRATE 12.5 MG: 25 TABLET, FILM COATED ORAL at 20:59

## 2023-05-23 RX ADMIN — MEROPENEM 1 G: 1 INJECTION, POWDER, FOR SOLUTION INTRAVENOUS at 21:00

## 2023-05-23 RX ADMIN — Medication 1 APPLICATION: at 08:22

## 2023-05-23 RX ADMIN — Medication 10 ML: at 21:01

## 2023-05-23 RX ADMIN — ZINC OXIDE 1 APPLICATION: 200 OINTMENT TOPICAL at 21:00

## 2023-05-23 RX ADMIN — MEROPENEM 1 G: 1 INJECTION, POWDER, FOR SOLUTION INTRAVENOUS at 12:26

## 2023-05-23 RX ADMIN — Medication 1 APPLICATION: at 20:59

## 2023-05-23 RX ADMIN — DOXYCYCLINE 100 MG: 100 CAPSULE ORAL at 08:21

## 2023-05-23 RX ADMIN — ZINC OXIDE 1 APPLICATION: 200 OINTMENT TOPICAL at 08:23

## 2023-05-23 NOTE — THERAPY TREATMENT NOTE
Inpatient Rehabilitation - Speech Language Pathology Treatment Note    [facility]     Patient Name: Katherine Williamson  : 1950  MRN: [id number]    Today's Date: 2023                   Admit Date: 2023       Visit Dx:      ICD-10-CM ICD-9-CM   1. Decreased functional mobility and endurance  Z74.09 780.99       Patient Active Problem List   Diagnosis   • Benign essential HTN   • Tobacco abuse   • Dislocation of hip joint prosthesis   • Fracture of proximal humerus   • Mechanical complication of internal orthopedic device   • Wear of articular bearing surface of internal prosthetic joint   • History of repair of hip joint   • History of operative procedure on hip   • Hyperglycemia   • Hepatic steatosis   • Diverticulosis   • Chest pain, atypical   • History of colon polyps   • Family history of colon cancer in mother   • Allergic rhinitis   • Lung nodule seen on imaging study   • Acute respiratory failure with hypoxia   • Abscess of right lung with pneumonia   • Empyema   • Sepsis   • Pleural effusion, right   • Other emphysema   • Pulmonary nodule (RLL)   • Diastolic dysfunction, grade 1   • Physical debility   • Cryptogenic stroke   • Acute right MCA stroke       Past Medical History:   Diagnosis Date   • Arthritis    • Cataract    • Colon polyps     FOLLOWED BY DR. JOSEPH LAU   • Hypertension        Past Surgical History:   Procedure Laterality Date   • COLONOSCOPY  10/2015    [facility]qql[facility]. [facility].  Follow-up in 5 years.   • COLONOSCOPY N/A 2022    2 BENIGN POLYPS IN DESCENDING, 5 MM BENIGN POLYP IN SIGMOID, MULTIPLE SMALL AND LARGE DIVERTICULA IN SIGMOID, RESCOPE IN 3 YRS, DR. JOSEPH LAU AT [facility]   • EYE SURGERY     • JOINT REPLACEMENT  ?    Left total hip replacement in .  In 2015 patient had left hip revision   • TONSILLECTOMY         SLP Recommendation and Plan                                                            SLP EVALUATION (last 72 hours)     SLP SLC  "Evaluation     Row Name 05/23/23 1100 05/23/23 0830 05/22/23 1100 05/22/23 0830          Communication Assessment/Intervention    Document Type therapy note (daily note)  -AL therapy note (daily note)  -AL therapy note (daily note)  -AL evaluation  -AL     Patient/Family/Caregiver Comments/Observations Pt participated well.  -AL Pt is pleasant and cooperative.  -AL Pt is pleasant and cooperative.  -AL Pt participated well. She reported she feels ready to upgrade to regular.  -AL        Pain Scale: Numbers Pre/Post-Treatment    Pretreatment Pain Rating 0/10 - no pain  -AL 0/10 - no pain  -AL 0/10 - no pain  -AL --           User Key  (r) = Recorded By, (t) = Taken By, (c) = Cosigned By    Initials Name Effective Dates    Sarita Landry, MS CCC-SLP 06/16/21 -                    EDUCATION    The patient has been educated in the following areas:       Cognitive Impairment.             SLP GOALS     Row Name 05/23/23 1100 05/23/23 0830 05/22/23 1100       Attention Goal 1 (SLP)    Improve Attention by Goal 1 (SLP) -- complete selective attention task;complete divided attention task;90%;independently (over 90% accuracy)  -AL --    Progress/Outcomes (Attention Goal 1, SLP) good progress toward goal  -AL good progress toward goal  -AL --    Comment (Attention Goal 1, SLP) Introduced \"Blink\" card sort task. Pt required MIN cue x1 when sorting entire deck (color, shape, number).  -AL Written \"if/then\" directions: 7/8 with NO cues, 8/8 with MIN cue for attention to detail.  -AL Calendar task: 100% with NO cues.  -AL       Memory Skills Goal 1 (SLP)    Progress/Outcomes (Memory Skills Goal 1, SLP) -- good progress toward goal  -AL --    Comment (Memory Skills Goal 1, SLP) -- Recalled 3/3 errands after 15 minutes with NO cues.  -AL --       Reasoning Goal 1 (SLP)    Improve Reasoning Through Goal 1 (SLP) -- complete high level reasoning task;90%;independently (over 90% accuracy)  -AL --    Progress/Outcomes (Reasoning Goal " "1, SLP) -- good progress toward goal  -AL --    Comment (Reasoning Goal 1, SLP) -- Moderately complex logic puzzle: 65% with NO cues, 100% with MIN-MOD cues.  -AL Diagnostic treatment: Pt completed moderately complex logic puzzle with 80% accuracy with NO cues, 100% with MIN cues. Pt stated it was \"challenging.\"  -AL       Functional Math Skills Goal 1 (SLP)    Improve Functional Math Skills Through Goal 1 (SLP) complete functional math task;100%;independently (over 90% accuracy)  -AL -- --    Time Frame (Functional Math Skills Goal 1, SLP) 1 week  -AL -- --    Progress/Outcomes (Functional Math Skills Goal 1, SLP) good progress toward goal  -AL -- --    Comment (Functional Math Skills Goal 1, SLP) Checkbook balancing task: 5/8 with NO cues, 8/8 with MIN cues to correct math errors. Pt organized information with NO cues.  -AL -- Diagnostic treatment: Pt completed grocery prices task with 100% accuracy with NO cues.  -AL          User Key  (r) = Recorded By, (t) = Taken By, (c) = Cosigned By    Initials Name Provider Type    Sarita Landry MS CCC-SLP Speech and Language Pathologist                            Time Calculation:        Time Calculation- SLP     Row Name 05/23/23 1146 05/23/23 0857          Time Calculation- SLP    SLP Start Time 1100  -AL 0830  -AL     SLP Stop Time 1130  -AL 0900  -AL     SLP Time Calculation (min) 30 min  -AL 30 min  -AL           User Key  (r) = Recorded By, (t) = Taken By, (c) = Cosigned By    Initials Name Provider Type    Sarita Landry MS CCC-SLP Speech and Language Pathologist                  Therapy Charges for Today     Code Description Service Date Service Provider Modifiers Qty    98582154989 HC ST EVAL ORAL PHARYNG SWALLOW 1 5/22/2023 Sarita Comer MS CCC-SLP GN 1    55842140023 HC ST EVAL ORAL PHARYNG SWALLOW 2 5/22/2023 Sarita Comer MS CCC-SLP GN 1    18411694125 HC ST DEV OF COGN SKILLS INITIAL 15 MIN 5/22/2023 Sarita Comer MS CCC-SLP  1 "    [id number]  ST DEV OF COGN SKILLS EACH ADDT'L 15 MIN 5/22/2023 Sarita Comer, MS CCC-SLP  1    85887702228 HC ST DEV OF COGN SKILLS INITIAL 15 MIN 5/23/2023 Sarita Comer, MS CCC-SLP  1    [id number]  ST DEV OF COGN SKILLS EACH ADDT'L 15 MIN 5/23/2023 Sarita Comer, MS CCC-SLP  3                           Sarita Comer MS CCC-SLP  5/23/2023

## 2023-05-23 NOTE — PROGRESS NOTES
Reviewed team conference report regarding progress, weekly and d/c goals, and ELOS with patient and . They are agreeable to plan. Scheduled family conference for next Thursday, 6/1 at 3:00 p.m. Will assist with plans.

## 2023-05-23 NOTE — NURSING NOTE
Iv team called to evaluate swelling in rt extremity with picc in LEWIS.. Assessed pt's rt extremity Pt does have some mild swelling in RFA. No redness, cool to touch, no c/o and pain or discomfort reported. No swelling noted in upper arm by picc line. Compared to LFA - pt also has mild swelling in this forearm. Recommend elevating extremities to avoid dependent swelling. I cannot rule out a blood clot without a doppler, however unlikely with present symptoms would continue to monitor to ensure swelling doesn't increase with redness , get warm to touch and/or an increase of discomfort or pain noted in her lt extremity at which time a doppler would be recommended.

## 2023-05-23 NOTE — PLAN OF CARE
Goal Outcome Evaluation:  Plan of Care Reviewed With: patient             Problem: Rehabilitation (IRF) Plan of Care  Goal: Plan of Care Review  5/23/2023 0248 by Shirley Cordero RN  Outcome: Ongoing, Progressing  Flowsheets (Taken 5/23/2023 0213)  Outcome Evaluation: Pt is alert and oriented x4. Flat affect. can be forgetful at times. Meds whole PO with thins. Generalized weakness to BLE. Wears 2L humidified nc. Purewick in place at HS. Stage II noted to buttocks; cream applied as ordered. Refused waffle boots. Independent with bed mobility. Continues on IV abx and PO abx r/t PNA. Denies pain. Resting well with eyes closed. Call light within reach.

## 2023-05-23 NOTE — PROGRESS NOTES
Case Management  Inpatient Rehabilitation Team Conference    Conference Date/Time: 5/23/2023 8:00:43 AM    Team Conference Attendees:  Tabby Almazan, Pharmacist  Sanaz Pham, LUTHERW  Tena Garay, PT  Silke Moreno, OT  Sarita Comer, SLP  Sanaz Radford, CTRS  Monse Ruvalcaba, RN  Mayra Shi, RN   Pina Chun, Dietician    Demographics            Age: 72Y            Gender: Female    Admission Date: 5/19/2023 6:49:00 PM  Rehabilitation Diagnosis:  Acute R MCA stroke and bilateral infarcts  Past Medical History: Past Medical History:  DiagnosisDate  ?Arthritis?  ?Cataract?  ?Colon polyps?  ?FOLLOWED BY DR. JOSEPH LAU  ?Hypertension?    ?  Past Surgical History:  Surgical History?Expand by Default  ????  Past Surgical History:  ProcedureLateralityDate  ?COLONOSCOPY?10/2015  ?Polyp-hyperplastic?Dr. Bermudez. ?Follow-up in 5 years.  ?COLONOSCOPYN/A1/12/2022  ?2 BENIGN POLYPS IN DESCENDING, 5 MM BENIGN POLYP IN SIGMOID, MULTIPLE SMALL  AND LARGE DIVERTICULA IN SIGMOID, RESCOPE IN 3 YRS, DR. JOSEPH LAU AT [facility]  ?EYE SURGERY??  ?JOINT REPLACEMENT?2005?  ?Left total hip replacement in 2005. ?In December 2015 patient had left hip  revision  ?TONSILLECTOMY??    ?  ?  ?      Plan of Care  Anticipated Discharge Date/Estimated Length of Stay: 2 weeks  Anticipated Discharge Destination: Community discharge with assistance  Discharge Plan : Patient lives with  in one story walk out ranch style  home with two step entry.  D/C plan is home with  who plans to take FMLA. Friend can also assist  intermittently.  Medical Necessity Expected Level Rationale: Goals are to achieve a level of SBA  / CGA with ADL's and mobility.  Rehab prognosis fair.  Medical prognosis fair.  Intensity and Duration: an average of 3 hours/5 days per week  Medical Supervision and 24 Hour Rehab Nursing: x  Physical Therapy: x  PT Intensity/Duration: 1 hour / day, 5 days / week, for approximately 2  weeks.  Occupational Therapy: x  OT Intensity/Duration: 1 hour / day, 5 days / week, for approximately 2 weeks.  Speech and Language Therapy: x  SLP Intensity/Duration: 1 hour / day, 5 days / week, for approximately 2 weeks.  Social Work: x  Therapeutic Recreation: x  Psychology: x  Registered Dietician: x  Updated (if changes indicated)    Anticipated Discharge Date/Estimated Length of Stay:   ELOS: 2 weeks    Based on the patient's medical and functional status, their prognosis and  expected level of functional improvement is: Goals are to achieve a level of SBA  / CGA with ADL's and mobility.  Rehab prognosis fair.  Medical prognosis fair.      Interdisciplinary Problem/Goals/Status    All Rehab Problems:  Safety    [RN] Potential for Injury(Active)  Current Status(05/23/2023): Patient at risk for injury related to mobility  issues.  Weekly Goal(05/27/2023): Patient will use call light appropriately and have no  injury while on Rehab.  Discharge Goal: Patient will have no injury while on Rehab.        Psychosocial    [RN] Coping/Adjustment(Active)  Current Status(05/23/2023): Patient at risk for ineffective coping, but has a  supportive .  Weekly Goal(05/27/2023): Patient will verbalize needs and concerns related to  current situation.  Discharge Goal: Patient will have healthy coping skills at time of discharge.        Sphincter Control    [RN] Bladder Management(Active)  Current Status(05/23/2023): Patient continent 75% of the time.  Weekly Goal(05/27/2023): Patient will be continent 100%  Discharge Goal: Patient will be continent 100% of the time.    [RN] Bowel Management(Active)  Current Status(05/23/2023): Patient incontinent 100% of the time.  Weekly Goal(05/27/2023): Patient will be continent 50% of the time.  Discharge Goal: Patient will be continent 100% of the time.        Body Systems    [RN] Integumentary(Active)  Current Status(05/23/2023): Patient has stage 2 pressure injury to cocyx  Weekly  Goal(05/27/2023): Patients skin will be healing with no more deteriotion.    Discharge Goal: Patients skin will be healed with no further injury.        Self Care    [OT] Bathing(Active)  Current Status(05/22/2023): MOD  Weekly Goal(05/30/2023): MIN  Discharge Goal: CGA    [OT] Dressing (Lower)(Active)  Current Status(05/22/2023): MOD  Weekly Goal(05/30/2023): MIN  Discharge Goal: CGA    [OT] Dressing (Upper)(Active)  Current Status(05/22/2023): MIN  Weekly Goal(05/30/2023): set up  Discharge Goal: set up    [OT] Grooming(Active)  Current Status(05/22/2023): set up  Weekly Goal(05/30/2023): Supervision  Discharge Goal: Supervision    [OT] Toileting(Active)  Current Status(05/22/2023): MAX  Weekly Goal(05/30/2023): MOD  Discharge Goal: CGA        Mobility    [OT] Toilet Transfers(Active)  Current Status(05/22/2023): MOD A RWX  Weekly Goal(05/30/2023): MIN  Discharge Goal: CGA/SBA    [OT] Tub/Shower Transfers(Active)  Current Status(05/22/2023): TBA  Weekly Goal(05/30/2023): MOD  Discharge Goal: CGA/SBA    [PT] Stairs(Active)  Current Status(05/22/2023): TBD  Weekly Goal(05/30/2023): PT only  Discharge Goal: 4 CGA    [PT] Walk(Active)  Current Status(05/22/2023): 60' Min RWX  Weekly Goal(05/30/2023): PT only  Discharge Goal: 100' CGA/SBA RWX    [PT] Bed/Chair/Wheelchair(Active)  Current Status(05/22/2023): Mod/Min RWX  Weekly Goal(05/30/2023): Min/CGA RWX  Discharge Goal: CGA/SBA RWX    [PT] Bed Mobility(Active)  Current Status(05/22/2023): Min  Weekly Goal(05/30/2023): CGA  Discharge Goal: SBA        Cognition    [ST] Executive Functions(Active)  Current Status(05/22/2023): Mild high-level executive function deficit.  Weekly Goal(05/30/2023): Will complete home management tasks independently.  Discharge Goal: Adequate executive function skills for home environment.        Comments: 5/23 Min A bed mob, Mod / Min rwx for transfers, amb 60' Min A rwx.  Mod A rwx for toilet transfer.  Max A toileting.  Flat affect.  Mod  A bathing  and LBD.  Mild high level executive function deficit.  Upgraded to regular w/  thins.  Cont bladder, incontinent of bowel.  Weaning oxygen.    Signed by: Monse Ruvalcaba RN    Physician CoSigned By: Sebastian Collins 05/23/2023 08:52:15

## 2023-05-23 NOTE — PLAN OF CARE
Goal Outcome Evaluation:  Plan of Care Reviewed With: patient        Progress: improving  Outcome Evaluation: AAOx4. Cooperative with all care. Transfers assist of 1. No safety concerns noted today. Incontinent of bladder at times; purewick applied after therapies completed to aid in wound healing. PI to coccyx; woundcare as ordered. VS stable. No new skin concerns. Edema to BUE's; increase in right arm today. IV RN assessed PICC line today; believes edema is not related to line. No pain, redness or other signs of possible DVT noted. Will continue to monitor.

## 2023-05-23 NOTE — THERAPY TREATMENT NOTE
Inpatient Rehabilitation - Occupational Therapy Treatment Note    [facility]     Patient Name: Katherine Williamson  : 1950  MRN: [id number]    Today's Date: 2023                 Admit Date: 2023         ICD-10-CM ICD-9-CM   1. Decreased functional mobility and endurance  Z74.09 780.99       Patient Active Problem List   Diagnosis   • Benign essential HTN   • Tobacco abuse   • Dislocation of hip joint prosthesis   • Fracture of proximal humerus   • Mechanical complication of internal orthopedic device   • Wear of articular bearing surface of internal prosthetic joint   • History of repair of hip joint   • History of operative procedure on hip   • Hyperglycemia   • Hepatic steatosis   • Diverticulosis   • Chest pain, atypical   • History of colon polyps   • Family history of colon cancer in mother   • Allergic rhinitis   • Lung nodule seen on imaging study   • Acute respiratory failure with hypoxia   • Abscess of right lung with pneumonia   • Empyema   • Sepsis   • Pleural effusion, right   • Other emphysema   • Pulmonary nodule (RLL)   • Diastolic dysfunction, grade 1   • Physical debility   • Cryptogenic stroke   • Acute right MCA stroke       Past Medical History:   Diagnosis Date   • Arthritis    • Cataract    • Colon polyps     FOLLOWED BY DR. JOSEPH LAU   • Hypertension        Past Surgical History:   Procedure Laterality Date   • COLONOSCOPY  10/2015    Lktqb-[id number]-Mj. Kaplan.  Follow-up in 5 years.   • COLONOSCOPY N/A 2022    2 BENIGN POLYPS IN DESCENDING, 5 MM BENIGN POLYP IN SIGMOID, MULTIPLE SMALL AND LARGE DIVERTICULA IN SIGMOID, RESCOPE IN 3 YRS, DR. JOSEPH LAU AT [facility]   • EYE SURGERY     • JOINT REPLACEMENT  ?    Left total hip replacement in .  In 2015 patient had left hip revision   • TONSILLECTOMY               IRF OT ASSESSMENT FLOWSHEET (last 12 hours)     IRF OT Evaluation and Treatment     Row Name 23 1442          OT Time and Intention     Document Type daily treatment  -AF     Mode of Treatment occupational therapy  -AF     Patient Effort good  -AF     Symptoms Noted During/After Treatment fatigue  -AF     Row Name 05/23/23 1442          General Information    Patient/Family/Caregiver Comments/Observations pt siting up in w/c in AM and up in bed in PM  -AF     Existing Precautions/Restrictions fall;oxygen therapy device and L/min  -AF     Limitations/Impairments safety/cognitive  -AF     Row Name 05/23/23 1442          Pain Assessment    Pretreatment Pain Rating 0/10 - no pain  -AF     Posttreatment Pain Rating 0/10 - no pain  -AF     Row Name 05/23/23 1442          Cognition/Psychosocial    Affect/Mental Status (Cognition) flat/blunted affect  -AF     Orientation Status (Cognition) oriented x 3  -AF     Follows Commands (Cognition) follows one-step commands;verbal cues/prompting required  -AF     Personal Safety Interventions fall prevention program maintained;gait belt;nonskid shoes/slippers when out of bed  -AF     Row Name 05/23/23 1442          Bathing    Comment (Bathing) deferred  -AF     Row Name 05/23/23 1442          Upper Body Dressing    Comment (Upper Body Dressing) deferred  -AF     Row Name 05/23/23 1442          Grooming    [place] Level (Grooming) grooming skills;set up  -AF     Position (Grooming) sink side;supported sitting  -AF     Comment (Grooming) w/c level  -AF     Row Name 05/23/23 1442          Toileting    [place] Level (Toileting) toileting skills;moderate assist (50% patient effort);minimum assist (75% patient effort);verbal cues  -AF     Assistive Device Use (Toileting) grab bar/safety frame;raised toilet seat  -AF     Position (Toileting) supported standing;supported sitting  -AF     Row Name 05/23/23 1442          Bed Mobility    Supine-Sit [place] (Bed Mobility) contact guard  -AF     Sit-Supine [place] (Bed Mobility) contact guard;minimum assist (75% patient effort)  -AF     Assistive Device (Bed  Mobility) bed rails  -AF     Row Name 05/23/23 1442          Transfer Assessment/Treatment    Transfers toilet transfer  -AF     Comment, (Transfers) sit to stand at counter top with out RWX MOD A  -AF     Row Name 05/23/23 1442          Bed-Chair Transfer    Bed-Chair [place] (Transfers) minimum assist (75% patient effort);verbal cues  -AF     Assistive Device (Bed-Chair Transfers) wheelchair;walker, front-wheeled  -AF     Row Name 05/23/23 1442          Chair-Bed Transfer    Chair-Bed [place] (Transfers) minimum assist (75% patient effort);verbal cues  -AF     Assistive Device (Chair-Bed Transfers) wheelchair;walker, front-wheeled  -AF     Row Name 05/23/23 1442          Toilet Transfer    Type (Toilet Transfer) stand pivot/stand step  -AF     [place] Level (Toilet Transfer) moderate assist (50% patient effort);minimum assist (75% patient effort);verbal cues  -AF     Assistive Device (Toilet Transfer) commode;grab bars/safety frame;walker, front-wheeled  -AF     Row Name 05/23/23 1442          Motor Skills    Results, 9 Hole Peg Test of Fine Motor Coordination in standing FMC tasks with reaching/weight shifting side to side with MIN difficulty  -AF     Row Name 05/23/23 1442          Shoulder (Therapeutic Exercise)    Shoulder Strengthening (Therapeutic Exercise) bilateral;flexion;extension;scapular stabilization;sitting;2 lb free weight;10 repetitions;3 sets  -AF     Row Name 05/23/23 1442          Elbow/Forearm (Therapeutic Exercise)    Elbow/Forearm Strengthening (Therapeutic Exercise) bilateral;flexion;extension;supination;pronation;sitting;2 lb free weight;10 repetitions;3 sets  -AF     Row Name 05/23/23 1442          Wrist (Therapeutic Exercise)    Wrist Strengthening (Therapeutic Exercise) bilateral;flexion;extension;2 lb free weight;10 repetitions;3 sets  -AF     Row Name 05/23/23 1442          Hand (Therapeutic Exercise)    Hand (Therapeutic Exercise) strengthening exercise  -AF     Hand  Strengthening (Therapeutic Exercise) bilateral; strengthening;hand gripper;10 repetitions;3 sets  -AF     Row Name 05/23/23 1442          Balance    Static Standing Balance minimal assist;contact guard  with toileting tasks and with weight shifting at counter top with [facility] [place]  -AF     Comment, Balance pt able to stand and particiapted in table top acitivty for 5 mins prior to seated rest break on .5 L O2 stayed at 89 then up to 94  -AF     Row Name 05/23/23 1442          Positioning and Restraints    Pre-Treatment Position in bed  -AF     Post Treatment Position bed  -AF     In Bed fowlers;call light within reach;encouraged to call for assist;exit alarm on  in PM  -AF     In Wheelchair sitting;exit alarm on;with PT  in AM  -AF     Row Name 05/23/23 1442          Vital Signs    Pre SpO2 (%) 94  -AF     O2 Delivery Pre Treatment supplemental O2  .5 LIter  -AF     Intra SpO2 (%) 89  -AF     O2 Delivery Intra Treatment supplemental O2  -AF     Post SpO2 (%) 90  -AF     O2 Delivery Post Treatment supplemental O2  -AF     Pre Patient Position Sitting  -AF     Intra Patient Position Standing  -AF     Post Patient Position Sitting  -AF           User Key  (r) = Recorded By, (t) = Taken By, (c) = Cosigned By    Initials Name Effective Dates    AF Silke Moreno, OTR 06/16/21 -                  Occupational Therapy Education     Title: PT OT SLP Therapies (In Progress)     Topic: Occupational Therapy (In Progress)     Point: ADL training (Done)     Description:   Instruct learner(s) on proper safety adaptation and remediation techniques during self care or transfers.   Instruct in proper use of assistive devices.              Learning Progress Summary           Patient Acceptance, E,TB, VU by  at 5/20/2023 1516                   Point: Home exercise program (Not Started)     Description:   Instruct learner(s) on appropriate technique for monitoring, assisting and/or progressing therapeutic exercises/activities.               Learner Progress:  Not documented in this visit.          Point: Precautions (Not Started)     Description:   Instruct learner(s) on prescribed precautions during self-care and functional transfers.              Learner Progress:  Not documented in this visit.          Point: Body mechanics (Done)     Description:   Instruct learner(s) on proper positioning and spine alignment during self-care, functional mobility activities and/or exercises.              Learning Progress Summary           Patient Acceptance, E, VU,DU,NR by AF at 5/23/2023 1448    Comment: safety and technique with transfers                               User Key     Initials Effective Dates Name Provider Type Discipline    SG 06/16/21 -  Verena Hewitt OTENID Occupational Therapist OT    AF 06/16/21 -  Silke Moreno OTENID Occupational Therapist OT                    OT Recommendation and Plan                         Time Calculation:      Time Calculation- OT     Row Name 05/23/23 1449 05/23/23 1448          Time Calculation- OT    OT Start Time 1230  -AF 0930  -AF     OT Stop Time 1300  -AF 1000  -AF     OT Time Calculation (min) 30 min  -AF 30 min  -AF           User Key  (r) = Recorded By, (t) = Taken By, (c) = Cosigned By    Initials Name Provider Type    AF Silke Moreno, OTR Occupational Therapist              Therapy Charges for Today     Code Description Service Date Service Provider Modifiers Qty    07718967713 HC OT SELF CARE/MGMT/TRAIN EA 15 MIN 5/22/2023 Silke Moreno, OTR GO 3    03851497782 HC OT THER PROC EA 15 MIN 5/22/2023 Silke Moreno, OTR GO 1    91741050845 HC OT SELF CARE/MGMT/TRAIN EA 15 MIN 5/23/2023 Silke Moreno, OTR GO 2    19894142095 HC OT THER PROC EA 15 MIN 5/23/2023 Silke Moreno, OTR GO 1    06253567766 HC OT THERAPEUTIC ACT EA 15 MIN 5/23/2023 Silke Moreno, OTR GO 1                   SONG Kaiser  5/23/2023

## 2023-05-23 NOTE — PROGRESS NOTES
LOS: 4 days   Patient Care Team:  Zelalem Flor APRN as PCP - General (Internal Medicine)  Brandon Mitchell MD as Consulting Physician (Orthopedic Surgery)      ANNIE CERDA  1950    Diagnoses    1. DECREASED FUNCTIONAL MOBILITY AND ENDURANCE       ADMITTING DIAGNOSIS:  CVA      Subjective   Denies new symptoms of weakness, dizziness, or blurry vision. States that she was able to tolerate walking with 1L NC. No increasing SOB or cough. Tolerating regular diet well.      Objective     Vitals:    05/23/23 1200   BP: 128/71   Pulse: 72   Resp: 16   Temp: 98.1 °F (36.7 °C)   SpO2: 92%       PHYSICAL EXAM:   MENTAL STATUS -  AWAKE / ALERT  HEENT- NCAT, PUPILS EQUALLY ROUND, SCLERAE ANICTERIC, CONJUNCTIVAE PINK, OP MOIST, NO JVD, EARS UNREMARKABLE EXTERNALLY  LUNGS -decreased air movement bilaterally   O2 1 L nasal cannula  Chest-Zio patch  HEART- RRR, NO RUB, MURMUR, OR GALLOP  ABD - NORMOACTIVE BOWEL SOUNDS, SOFT, NT. NO HEPATOSPLENOMEGALY APPRECIATED  EXT - NO EDEMA OR CYANOSIS  NEURO -oriented x4  MOTOR EXAM -mild left facial weakness  Mild left-sided weakness        MEDICATIONS  Scheduled Meds:aspirin, 81 mg, Oral, Q24H  atorvastatin, 80 mg, Oral, Nightly  citalopram, 20 mg, Oral, Daily  clopidogrel, 75 mg, Oral, Daily  doxycycline, 100 mg, Oral, Q12H  enoxaparin, 40 mg, Subcutaneous, Q24H  fluticasone, 1 spray, Each Nare, Daily  guaiFENesin, 600 mg, Oral, BID  hydrocortisone-bacitracin-zinc oxide-nystatin, 1 application, Topical, BID  Menthol-Zinc Oxide, 1 application, Topical, 4x Daily  meropenem, 1 g, Intravenous, Q8H  metoprolol tartrate, 12.5 mg, Oral, Q12H  sodium chloride, 10 mL, Intravenous, Q12H  sodium chloride, 10 mL, Intravenous, Q12H  cyanocobalamin, 1,000 mcg, Oral, Daily      Continuous Infusions:   PRN Meds:.•  acetaminophen  •  ipratropium-albuterol  •  loperamide  •  sodium chloride  •  sodium chloride  •  sodium chloride  •  sodium chloride  •  sodium chloride      RESULTS  No results  "found for: POCGLU  Results from last 7 days   Lab Units 05/20/23  0740 05/19/23  0425 05/18/23  0628   WBC 10*3/mm3 8.34 9.51 16.03*   HEMOGLOBIN g/dL 10.3* 10.0* 10.0*   HEMATOCRIT % 30.3* 29.5* 29.9*   PLATELETS 10*3/mm3 366 351 387     Results from last 7 days   Lab Units 05/20/23  0740 05/19/23  0425 05/18/23  0628   SODIUM mmol/L 137 139 139   POTASSIUM mmol/L 4.0 4.1 3.8   CHLORIDE mmol/L 105 104 104   CO2 mmol/L 28.7 30.5* 31.0*   BUN mg/dL 14 12 19   CREATININE mg/dL 0.20* 0.23* 0.19*   CALCIUM mg/dL 8.6 8.5* 8.4*   GLUCOSE mg/dL 89 85 100*       New Radiology:  ELIGIO negative for vegetations     Prior radiology:  MRI brain: \"Bilateral multifocal areas of infarction.  No evidence of hemorrhagic transformation. \"    ASSESSMENT and PLAN    Acute right MCA stroke    1. Acute R MCA stroke and bilateral infarcts  Zio patch placed on May 19 for 2 weeks  - 5/20 - Admit for inpt rehab w/ PT, OT, SLP, psychology and rehab medical and nursing care. Continue secondary stroke prophylaxis     2. Pneumonia and right lung abscess and Pleural effusions  - 5/20 - Continue IV antibx. ID following    - 5/21 - Continues to improve   The chest x-ray is reassuring   Expect her to be able to come off the oxygen in the next few days   She will need to have a follow-up chest imaging 6 weeks down the road   Finish the antibiotic course per ID, last day 6/8/2023   -5/22-on 1 L oxygen with activities and maintaining sats  -5/23- Per Pulmonology, continue Merrem. Continue to wean off supplemental oxygen     3. Acute hypoxic respiratory failure     4. Emphysema d/t Tobacco use     5. Hypertension-continue antihypertensive regimen and avoid hypotensive episodes.     6. DVT prophylaxis -   - 5/20 - Start Lovenox SQ daily    7.  Dysphagia  -5/22-diet advanced to regular solid thin liquids    TEAM CONF - MAY 23 - BED MIN. TRANSFERS MIN MOD ASSIST. GAIT 60 FEET MIN RW. TOILET TRANSFERS MOD ASSIST RW. SATS 1 L WITH ACTIVITIES WITH SATS >=90%. " BATH MOD. LBD MOD.UBD MIN. GROOMING SET UP. TOILETING MAX. MILD EXECUTIVE FUNCTION DEFICITS. BNE PENDING. DIET ADVANCED TO REGULAR CONSISTENCY, GOOD INTAKE. PULM EXPECTS WILL BE OFF OXYGEN IN NEXT FEW DAYS. REPEAT CHEST IMAGING IN 6 WEEKS.  ELOS - 2 WEEKS.     Now admit for comprehensive acute inpatient rehabilitation .  This would be an interdisciplinary program with physical therapy 1 hour,  occupational therapy 1 hour, and speech therapy 1 hour, 5 days a week.  Rehabilitation nursing for carryover, monitoring of neurologic and pulmonary   status, bowel and bladder, and skin  Ongoing physician follow-up.  Weekly team conferences.  Goals are to achieve a level of supervision with  mobility and self-care and improved endurance.   Rehabilitation prognosis fair.  Medical prognosis fair.  Estimated length of stay is approximately 2 weeks, but is only an estimation.    .     The patient's functional status and clinical status is unchanged from preadmission assessment and the patient continues appropriate for acute inpatient rehabilitation.  Goal is for home with outpatient   therapies.  Barrier to discharge: Impaired mobility self-care endurance- work on vision, strength, gross and fine motor control, balance, progressive ambulation, ADLs to overcome.           Karthikeyan Pan, DO      During rounds, used appropriate personal protective equipment including mask and gloves.  Additional gown if indicated.  Mask used was standard procedure mask. Appropriate PPE was worn during the entire visit.  Hand hygiene was completed before and after.

## 2023-05-23 NOTE — PROGRESS NOTES
Inpatient Rehabilitation Plan of Care Note    Plan of Care  Care Plan Reviewed - No updates at this time.    Safety    [RN] Potential for Injury(Active)  Current Status(05/23/2023): Patient at risk for injury related to mobility  issues.  Weekly Goal(05/27/2023): Patient will use call light appropriately and have no  injury while on Rehab.  Discharge Goal: Patient will have no injury while on Rehab.    Performed Intervention(s)  Items within reach, environmental set-up for reduce risk of fall  Bed alarms and chair alarms and safety rounds hourly      Psychosocial    [RN] Coping/Adjustment(Active)  Current Status(05/23/2023): Patient at risk for ineffective coping, but has a  supportive .  Weekly Goal(05/27/2023): Patient will verbalize needs and concerns related to  current situation.  Discharge Goal: Patient will have healthy coping skills at time of discharge.    Performed Intervention(s)  Medication as ordered and PRN  Verbalize needs and concerns  Therapeutic environmental set up      Sphincter Control    [RN] Bladder Management(Active)  Current Status(05/23/2023): Patient continent 75% of the time.  Weekly Goal(05/27/2023): Patient will be continent 100%  Discharge Goal: Patient will be continent 100% of the time.    [RN] Bowel Management(Active)  Current Status(05/23/2023): Patient incontinent 100% of the time.  Weekly Goal(05/27/2023): Patient will be continent 50% of the time.  Discharge Goal: Patient will be continent 100% of the time.    Performed Intervention(s)  Incontinent care as needed and ointment applied as ordered.  Take to bathroom in a timely manner  Proper diet and adequate fluid intake.      Body Systems    [RN] Integumentary(Active)  Current Status(05/23/2023): Patient has stage 2 pressure injury to cocyx  Weekly Goal(05/27/2023): Patients skin will be healing with no more deteriotion.    Discharge Goal: Patients skin will be healed with no further injury.    Performed  Intervention(s)  Medication, turn every 2 hours and prper nutrition.    Signed by: Shirley Cordero RN

## 2023-05-23 NOTE — THERAPY TREATMENT NOTE
Inpatient Rehabilitation - Physical Therapy Treatment Note       [facility]     Patient Name: Katherine Williamson  : 1950  MRN: [id number]    Today's Date: 2023                    Admit Date: 2023      Visit Dx:     ICD-10-CM ICD-9-CM   1. Decreased functional mobility and endurance  Z74.09 780.99       Patient Active Problem List   Diagnosis   • Benign essential HTN   • Tobacco abuse   • Dislocation of hip joint prosthesis   • Fracture of proximal humerus   • Mechanical complication of internal orthopedic device   • Wear of articular bearing surface of internal prosthetic joint   • History of repair of hip joint   • History of operative procedure on hip   • Hyperglycemia   • Hepatic steatosis   • Diverticulosis   • Chest pain, atypical   • History of colon polyps   • Family history of colon cancer in mother   • Allergic rhinitis   • Lung nodule seen on imaging study   • Acute respiratory failure with hypoxia   • Abscess of right lung with pneumonia   • Empyema   • Sepsis   • Pleural effusion, right   • Other emphysema   • Pulmonary nodule (RLL)   • Diastolic dysfunction, grade 1   • Physical debility   • Cryptogenic stroke   • Acute right MCA stroke       Past Medical History:   Diagnosis Date   • Arthritis    • Cataract    • Colon polyps     FOLLOWED BY DR. JOSEPH LAU   • Hypertension        Past Surgical History:   Procedure Laterality Date   • COLONOSCOPY  10/2015    Thduy-zvwscsyfivoq-Xa. Kaplan.  Follow-up in 5 years.   • COLONOSCOPY N/A 2022    2 BENIGN POLYPS IN DESCENDING, 5 MM BENIGN POLYP IN SIGMOID, MULTIPLE SMALL AND LARGE DIVERTICULA IN SIGMOID, RESCOPE IN 3 YRS, DR. JOSEPH LAU AT [facility]   • EYE SURGERY     • JOINT REPLACEMENT  ?    Left total hip replacement in .  In 2015 patient had left hip revision   • TONSILLECTOMY         PT ASSESSMENT (last 12 hours)     IRF PT Evaluation and Treatment     Row Name 23 1023          PT Time and Intention    Document  Type daily treatment  -EE     Mode of Treatment physical therapy;individual therapy  -EE     Patient/Family/Caregiver Comments/Observations Pt sitting up in WC in AM, supine in bed in PM, agreeable to PT.  -EE     Row Name 05/23/23 1023          General Information    Existing Precautions/Restrictions fall;oxygen therapy device and L/min  -EE     Limitations/Impairments safety/cognitive  -EE     Row Name 05/23/23 1023          Pain Assessment    Pretreatment Pain Rating 0/10 - no pain  -EE     Posttreatment Pain Rating 0/10 - no pain  -EE     Row Name 05/23/23 1023          Cognition/Psychosocial    Affect/Mental Status (Cognition) flat/blunted affect  -EE     Orientation Status (Cognition) oriented x 3  -EE     Follows Commands (Cognition) follows one-step commands;verbal cues/prompting required  -EE     Personal Safety Interventions fall prevention program maintained;gait belt;muscle strengthening facilitated;nonskid shoes/slippers when out of bed  -EE     Row Name 05/23/23 1023          Bed Mobility    Supine-Sit [place] (Bed Mobility) contact guard;verbal cues  -EE     Sit-Supine [place] (Bed Mobility) contact guard;verbal cues  -EE     Assistive Device (Bed Mobility) bed rails;head of bed elevated  -EE     Row Name 05/23/23 1023          Bed-Chair Transfer    Bed-Chair [place] (Transfers) minimum assist (75% patient effort);verbal cues  -EE     Assistive Device (Bed-Chair Transfers) walker, front-wheeled  -EE     Row Name 05/23/23 1023          Chair-Bed Transfer    Chair-Bed [place] (Transfers) minimum assist (75% patient effort);verbal cues  -EE     Assistive Device (Chair-Bed Transfers) walker, front-wheeled  -EE     Row Name 05/23/23 1023          Sit-Stand Transfer    Sit-Stand [place] (Transfers) minimum assist (75% patient effort);moderate assist (50% patient effort);verbal cues  -EE     Assistive Device (Sit-Stand Transfers) walker, front-wheeled;wheelchair  -EE     Comment,  (Sit-Stand Transfer) cues for hand placement  -EE     Row Name 05/23/23 1023          Stand-Sit Transfer    Stand-Sit [place] (Transfers) minimum assist (75% patient effort);verbal cues  -EE     Assistive Device (Stand-Sit Transfers) walker, front-wheeled;wheelchair  -EE     Comment, (Stand-Sit Transfer) cues for hand placement  -EE     Row Name 05/23/23 1023          Gait/Stairs (Locomotion)    [place] Level (Gait) contact guard;minimum assist (75% patient effort);verbal cues  -EE     Assistive Device (Gait) walker, front-wheeled  -EE     Distance in Feet (Gait) 80' x 2  -EE     Pattern (Gait) step-through  -EE     Deviations/Abnormal Patterns (Gait) raina decreased;stride length decreased  -EE     Bilateral Gait Deviations forward flexed posture;heel strike decreased;weight shift ability decreased  -EE     Left Sided Gait Deviations heel strike decreased;knee hyperextension;foot drop/toe drag  L ankle inversion noted during stance, decreased L toe clearance  -EE     Row Name 05/23/23 1023          Safety Issues, Functional Mobility    Impairments Affecting Function (Mobility) balance;endurance/activity tolerance;range of motion (ROM)  -EE     Row Name 05/23/23 1023          Hip (Therapeutic Exercise)    Hip Strengthening (Therapeutic Exercise) bilateral;marching while seated;aBduction;aDduction;10 repetitions  -EE     Row Name 05/23/23 1023          Knee (Therapeutic Exercise)    Knee Strengthening (Therapeutic Exercise) sitting;bilateral;LAQ (long arc quad);5 second hold;10 repetitions  -EE     Row Name 05/23/23 1023          Positioning and Restraints    Pre-Treatment Position sitting in chair/recliner  -EE     Post Treatment Position bed  -EE     In Bed fowlers;call light within reach;encouraged to call for assist;exit alarm on  -EE     Row Name 05/23/23 1023          Vital Signs    Pre SpO2 (%) 93  -EE     O2 Delivery Pre Treatment supplemental O2  0.5 L  -EE     Intra SpO2 (%) 92  -EE     O2  Delivery Intra Treatment supplemental O2  0.5 L  -EE     Post SpO2 (%) 93  -EE     O2 Delivery Post Treatment supplemental O2  0.5 L  -EE           User Key  (r) = Recorded By, (t) = Taken By, (c) = Cosigned By    Initials Name Provider Type    Tena Paula, PT Physical Therapist              Wound 05/19/23 2055 Bilateral gluteal (Active)   Pressure Injury Stage 2 05/22/23 2035   Dressing Appearance intact 05/22/23 2035   Closure Open to air 05/22/23 2035   Base clean;moist;pink;red 05/22/23 2035   Periwound Temperature warm 05/22/23 2035   Periwound Skin Turgor soft 05/22/23 2035   Edges irregular 05/22/23 2035   Care, Wound cleansed with;soap and water;barrier applied 05/22/23 2035   Dressing Care dressing applied;gauze, dry 05/22/23 2035   Periwound Care dry periwound area maintained 05/22/23 2035     Physical Therapy Education     Title: PT OT SLP Therapies (In Progress)     Topic: Physical Therapy (Done)     Point: Mobility training (Done)     Learning Progress Summary           Patient Acceptance, E,TB, VU,NR by  at 5/23/2023 1139    Acceptance, E,TB, NR,VU by  at 5/22/2023 1221    Acceptance, E, NR by  at 5/20/2023 1216                   Point: Home exercise program (Done)     Learning Progress Summary           Patient Acceptance, E,TB, VU,NR by EE at 5/23/2023 1139    Acceptance, E,TB, NR,VU by  at 5/22/2023 1221    Acceptance, E, NR by  at 5/20/2023 1216                   Point: Body mechanics (Done)     Learning Progress Summary           Patient Acceptance, E,TB, VU,NR by EE at 5/23/2023 1139    Acceptance, E,TB, NR,VU by  at 5/22/2023 1221    Acceptance, E, NR by  at 5/20/2023 1216                   Point: Precautions (Done)     Learning Progress Summary           Patient Acceptance, E,TB, VU,NR by EE at 5/23/2023 1139    Acceptance, E,TB, NR,VU by  at 5/22/2023 1221    Acceptance, E, NR by  at 5/20/2023 1216                               User Key     Initials Effective Dates Name  Provider Type Discipline     06/16/21 -  Jess Wang, PT Physical Therapist PT    EE 06/16/21 -  Tena Garay PT Physical Therapist PT                PT Recommendation and Plan                          Time Calculation:      PT Charges     Row Name 05/23/23 1500 05/23/23 1139          Time Calculation    Start Time 1400  -EE 1000  -EE     Stop Time 1430  -EE 1030  -EE     Time Calculation (min) 30 min  -EE 30 min  -EE     PT Received On 05/23/23  -EE 05/23/23  -EE     PT - Next Appointment 05/24/23  -EE 05/23/23  -EE        Time Calculation- PT    Total Timed Code Minutes- PT 30 minute(s)  -EE 30 minute(s)  -EE           User Key  (r) = Recorded By, (t) = Taken By, (c) = Cosigned By    Initials Name Provider Type    EE Tena Garay, PT Physical Therapist                Therapy Charges for Today     Code Description Service Date Service Provider Modifiers Qty    98570953064 HC PT THER PROC EA 15 MIN 5/22/2023 Tena Garay, PT GP 2    47655944846 HC PT THERAPEUTIC ACT EA 15 MIN 5/22/2023 Tena Garay, PT GP 2    07674141992 HC PT THER SUPP EA 15 MIN 5/22/2023 Tena Garay, PT GP 1    84634442146 HC PT THER PROC EA 15 MIN 5/23/2023 Tena Garay, PT GP 2    26694314574 HC PT THERAPEUTIC ACT EA 15 MIN 5/23/2023 Tena Garay, PT GP 2                   Tena Garay PT  5/23/2023

## 2023-05-23 NOTE — NURSING NOTE
IV abx started at 0409 and noticeable hardened lump and swelling at RFA. Pt denies pain to area. Infusion held at this time. IV nurse called to verify ok to use or need to move line. Has 60.6 cc left of abx. Dayshift nurse aware.

## 2023-05-23 NOTE — PROGRESS NOTES
TEAM CONF - MAY 23 - BED MIN. TRANSFERS MIN MOD ASSIST. GAIT 60 FEET MIN RW. TOILET TRANSFERS MOD ASSIST RW. SATS 1 L WITH ACTIVITIES WITH SATS >=90%. BATH MOD. LBD MOD.UBD MIN. GROOMING SET UP. TOILETING MAX. MILD EXECUTIVE FUNCTION DEFICITS. BNE PENDING. DIET ADVANCED TO REGULAR CONSISTENCY, GOOD INTAKE. PULM EXPECTS WILL BE OFF OXYGEN IN NEXT FEW DAYS. REPEAT CHEST IMAGING IN 6 WEEKS.  ELOS - 2 WEEKS.

## 2023-05-24 LAB
FUNGUS WND CULT: NORMAL
GLUCOSE BLDC GLUCOMTR-MCNC: 118 MG/DL (ref 70–130)
MYCOBACTERIUM SPEC CULT: NORMAL
MYCOBACTERIUM SPEC CULT: NORMAL
NIGHT BLUE STAIN TISS: NORMAL
NIGHT BLUE STAIN TISS: NORMAL

## 2023-05-24 PROCEDURE — 97130 THER IVNTJ EA ADDL 15 MIN: CPT

## 2023-05-24 PROCEDURE — 97110 THERAPEUTIC EXERCISES: CPT

## 2023-05-24 PROCEDURE — 97129 THER IVNTJ 1ST 15 MIN: CPT

## 2023-05-24 PROCEDURE — 97535 SELF CARE MNGMENT TRAINING: CPT

## 2023-05-24 PROCEDURE — 25010000002 MEROPENEM PER 100 MG: Performed by: HOSPITALIST

## 2023-05-24 PROCEDURE — 25010000002 ENOXAPARIN PER 10 MG: Performed by: PHYSICAL MEDICINE & REHABILITATION

## 2023-05-24 PROCEDURE — 82948 REAGENT STRIP/BLOOD GLUCOSE: CPT

## 2023-05-24 PROCEDURE — 97530 THERAPEUTIC ACTIVITIES: CPT

## 2023-05-24 RX ORDER — AMOXICILLIN 250 MG
1 CAPSULE ORAL 2 TIMES DAILY
Status: DISCONTINUED | OUTPATIENT
Start: 2023-05-24 | End: 2023-05-25

## 2023-05-24 RX ADMIN — DOXYCYCLINE 100 MG: 100 CAPSULE ORAL at 09:23

## 2023-05-24 RX ADMIN — Medication 1 APPLICATION: at 09:23

## 2023-05-24 RX ADMIN — ATORVASTATIN CALCIUM 80 MG: 80 TABLET, FILM COATED ORAL at 20:28

## 2023-05-24 RX ADMIN — ZINC OXIDE 1 APPLICATION: 200 OINTMENT TOPICAL at 20:28

## 2023-05-24 RX ADMIN — GUAIFENESIN 600 MG: 600 TABLET, EXTENDED RELEASE ORAL at 20:28

## 2023-05-24 RX ADMIN — Medication 1000 MCG: at 09:23

## 2023-05-24 RX ADMIN — ACETAMINOPHEN 650 MG: 325 TABLET, FILM COATED ORAL at 12:31

## 2023-05-24 RX ADMIN — DOCUSATE SODIUM 50MG AND SENNOSIDES 8.6MG 1 TABLET: 8.6; 5 TABLET, FILM COATED ORAL at 20:27

## 2023-05-24 RX ADMIN — METOPROLOL TARTRATE 12.5 MG: 25 TABLET, FILM COATED ORAL at 20:28

## 2023-05-24 RX ADMIN — MEROPENEM 1 G: 1 INJECTION, POWDER, FOR SOLUTION INTRAVENOUS at 20:28

## 2023-05-24 RX ADMIN — CITALOPRAM 20 MG: 20 TABLET, FILM COATED ORAL at 09:23

## 2023-05-24 RX ADMIN — ZINC OXIDE 1 APPLICATION: 200 OINTMENT TOPICAL at 09:23

## 2023-05-24 RX ADMIN — Medication 1 APPLICATION: at 20:28

## 2023-05-24 RX ADMIN — Medication 10 ML: at 20:29

## 2023-05-24 RX ADMIN — MEROPENEM 1 G: 1 INJECTION, POWDER, FOR SOLUTION INTRAVENOUS at 05:31

## 2023-05-24 RX ADMIN — GUAIFENESIN 600 MG: 600 TABLET, EXTENDED RELEASE ORAL at 09:23

## 2023-05-24 RX ADMIN — METOPROLOL TARTRATE 12.5 MG: 25 TABLET, FILM COATED ORAL at 09:22

## 2023-05-24 RX ADMIN — Medication 1 APPLICATION: at 12:56

## 2023-05-24 RX ADMIN — MEROPENEM 1 G: 1 INJECTION, POWDER, FOR SOLUTION INTRAVENOUS at 12:37

## 2023-05-24 RX ADMIN — DOXYCYCLINE 100 MG: 100 CAPSULE ORAL at 20:28

## 2023-05-24 RX ADMIN — ASPIRIN 81 MG: 81 TABLET, CHEWABLE ORAL at 09:23

## 2023-05-24 RX ADMIN — ENOXAPARIN SODIUM 40 MG: 100 INJECTION SUBCUTANEOUS at 20:28

## 2023-05-24 RX ADMIN — Medication 1 APPLICATION: at 18:25

## 2023-05-24 RX ADMIN — DOCUSATE SODIUM 50MG AND SENNOSIDES 8.6MG 1 TABLET: 8.6; 5 TABLET, FILM COATED ORAL at 09:28

## 2023-05-24 RX ADMIN — CLOPIDOGREL BISULFATE 75 MG: 75 TABLET, FILM COATED ORAL at 09:23

## 2023-05-24 NOTE — PLAN OF CARE
Goal Outcome Evaluation:  Plan of Care Reviewed With: patient           Outcome Evaluation: Patient alert and oriented x4, assist x1, and medication with water. She has been continent/incontinent of b/b-Imodium PRN- not used today. Senna and Milk of Mag ordered tody- Senna taken, but refused Milk of Mag-BM todayPICC line to RUE, flushes good and good blood return. Zio in place, VS stable, and tolerated all therapies. Purewick tonight, but may evaluate tomorrow with therapy, per Dr Collins.

## 2023-05-24 NOTE — PROGRESS NOTES
Inpatient Rehabilitation Plan of Care Note    Plan of Care  Care Plan Reviewed - Updates as Follows    Safety    [RN] Potential for Injury(Active)  Current Status(05/24/2023): Patient at risk for injury related to mobility  issues.  Weekly Goal(05/27/2023): Patient will use call light appropriately and have no  injury while on Rehab.  Discharge Goal: Patient will have no injury while on Rehab.    Performed Intervention(s)  Items within reach, environmental set-up for reduce risk of fall  Bed alarms and chair alarms and safety rounds hourly      Psychosocial    [RN] Coping/Adjustment(Active)  Current Status(05/24/2023): Patient at risk for ineffective coping, but has a  supportive .  Weekly Goal(05/27/2023): Patient will verbalize needs and concerns related to  current situation.  Discharge Goal: Patient will have healthy coping skills at time of discharge.    Performed Intervention(s)  Medication as ordered and PRN  Verbalize needs and concerns  Therapeutic environmental set up      Sphincter Control    [RN] Bladder Management(Active)  Current Status(05/24/2023): Patient continent 75% of the time.  Weekly Goal(05/27/2023): Patient will be continent 100%  Discharge Goal: Patient will be continent 100% of the time.    [RN] Bowel Management(Active)  Current Status(05/24/2023): Patient incontinent 100% of the time.  Weekly Goal(05/27/2023): Patient will be continent 50% of the time.  Discharge Goal: Patient will be continent 100% of the time.    Performed Intervention(s)  Incontinent care as needed and ointment applied as ordered.  Take to bathroom in a timely manner  Proper diet and adequate fluid intake.      Body Systems    [RN] Integumentary(Active)  Current Status(05/24/2023): Patient has stage 2 pressure injury to cocyx  Weekly Goal(05/27/2023): Patients skin will be healing with no more deteriotion.    Discharge Goal: Patients skin will be healed with no further injury.    Performed  Intervention(s)  Medication, turn every 2 hours and prper nutrition.    Signed by: Bia Muhammad, RN

## 2023-05-24 NOTE — PROGRESS NOTES
LOS: 5 days   Patient Care Team:  Zelalem Flor APRN as PCP - General (Internal Medicine)  Brandon Mitchell MD as Consulting Physician (Orthopedic Surgery)      ANNIE CERDA  1950    Diagnoses    1. DECREASED FUNCTIONAL MOBILITY AND ENDURANCE       ADMITTING DIAGNOSIS:  CVA      Subjective     Patient complains of constipation.  No abdominal pain.    She is down to 0.5 L of nasal cannula oxygen.  Tolerating activities.  Reports comfortable with her breathing.        Objective     Vitals:    05/24/23 0921   BP: 123/62   Pulse: 91   Resp:    Temp:    SpO2: 91%       PHYSICAL EXAM:   MENTAL STATUS -  AWAKE / ALERT  HEENT- NCAT, PUPILS EQUALLY ROUND, SCLERAE ANICTERIC, CONJUNCTIVAE PINK, OP MOIST, NO JVD, EARS UNREMARKABLE EXTERNALLY  LUNGS -decreased air movement bilaterally   O2 0.5 L nasal cannula  Chest-Zio patch  HEART- RRR, NO RUB, MURMUR, OR GALLOP  ABD - NORMOACTIVE BOWEL SOUNDS, SOFT, NT.    EXT - NO EDEMA OR CYANOSIS  NEURO -oriented x4  MOTOR EXAM -mild left facial weakness  Mild left-sided weakness        MEDICATIONS  Scheduled Meds:aspirin, 81 mg, Oral, Q24H  atorvastatin, 80 mg, Oral, Nightly  citalopram, 20 mg, Oral, Daily  clopidogrel, 75 mg, Oral, Daily  doxycycline, 100 mg, Oral, Q12H  enoxaparin, 40 mg, Subcutaneous, Q24H  guaiFENesin, 600 mg, Oral, BID  hydrocortisone-bacitracin-zinc oxide-nystatin, 1 application, Topical, BID  magnesium hydroxide, 10 mL, Oral, Once  Menthol-Zinc Oxide, 1 application, Topical, 4x Daily  meropenem, 1 g, Intravenous, Q8H  metoprolol tartrate, 12.5 mg, Oral, Q12H  senna-docusate sodium, 1 tablet, Oral, BID  sodium chloride, 10 mL, Intravenous, Q12H  sodium chloride, 10 mL, Intravenous, Q12H  cyanocobalamin, 1,000 mcg, Oral, Daily      Continuous Infusions:   PRN Meds:.•  acetaminophen  •  ipratropium-albuterol  •  loperamide  •  sodium chloride  •  sodium chloride  •  sodium chloride  •  sodium chloride  •  sodium chloride      RESULTS  No results found  "for: POCGLU  Results from last 7 days   Lab Units 05/20/23  0740 05/19/23  0425 05/18/23  0628   WBC 10*3/mm3 8.34 9.51 16.03*   HEMOGLOBIN g/dL 10.3* 10.0* 10.0*   HEMATOCRIT % 30.3* 29.5* 29.9*   PLATELETS 10*3/mm3 366 351 387     Results from last 7 days   Lab Units 05/20/23  0740 05/19/23  0425 05/18/23  0628   SODIUM mmol/L 137 139 139   POTASSIUM mmol/L 4.0 4.1 3.8   CHLORIDE mmol/L 105 104 104   CO2 mmol/L 28.7 30.5* 31.0*   BUN mg/dL 14 12 19   CREATININE mg/dL 0.20* 0.23* 0.19*   CALCIUM mg/dL 8.6 8.5* 8.4*   GLUCOSE mg/dL 89 85 100*       New Radiology:  ELIGIO negative for vegetations     Prior radiology:  MRI brain: \"Bilateral multifocal areas of infarction.  No evidence of hemorrhagic transformation. \"    ASSESSMENT and PLAN    Acute right MCA stroke    1. Acute R MCA stroke and bilateral infarcts  Stroke prophylaxis-aspirin/Plavix/atorvastatin  Zio patch placed on May 19 for 2 weeks  - 5/20 - Admit for inpt rehab w/ PT, OT, SLP, psychology and rehab medical and nursing care. Continue secondary stroke prophylaxis     2. Pneumonia and right lung abscess and Pleural effusions  - 5/20 - Continue IV antibx. ID following    - 5/21 - Continues to improve   The chest x-ray is reassuring   Expect her to be able to come off the oxygen in the next few days   She will need to have a follow-up chest imaging 6 weeks down the road   Finish the antibiotic course per ID, last day 6/8/2023   -5/22-on 1 L oxygen with activities and maintaining sats  -5/23- Per Pulmonology, continue Merrem. Continue to wean off supplemental oxygen  -5/24-oxygen weaned to 0.5 L nasal cannula    ID-meropenem and doxycycline-last dose June 8, 2023  Repeat CT chest June 6, 2023     3. Acute hypoxic respiratory failure     4. Emphysema d/t Tobacco use     5. Hypertension-continue antihypertensive regimen and avoid hypotensive episodes.     6. DVT prophylaxis -   - 5/20 - Start Lovenox SQ daily    7.  Dysphagia  -5/22-diet advanced to regular " solid thin liquids    8.  GI-constipation-May 24-add Senokot-Colace.  Milk of magnesia x1.  Patient maintaining hydration    TEAM CONF - MAY 23 - BED MIN. TRANSFERS MIN MOD ASSIST. GAIT 60 FEET MIN RW. TOILET TRANSFERS MOD ASSIST RW. SATS 1 L WITH ACTIVITIES WITH SATS >=90%. BATH MOD. LBD MOD.UBD MIN. GROOMING SET UP. TOILETING MAX. MILD EXECUTIVE FUNCTION DEFICITS. BNE PENDING. DIET ADVANCED TO REGULAR CONSISTENCY, GOOD INTAKE. PULM EXPECTS WILL BE OFF OXYGEN IN NEXT FEW DAYS. REPEAT CHEST IMAGING IN 6 WEEKS.  ELOS - 2 WEEKS.     Now admit for comprehensive acute inpatient rehabilitation .  This would be an interdisciplinary program with physical therapy 1 hour,  occupational therapy 1 hour, and speech therapy 1 hour, 5 days a week.  Rehabilitation nursing for carryover, monitoring of neurologic and pulmonary   status, bowel and bladder, and skin  Ongoing physician follow-up.  Weekly team conferences.  Goals are to achieve a level of supervision with  mobility and self-care and improved endurance.   Rehabilitation prognosis fair.  Medical prognosis fair.  Estimated length of stay is approximately 2 weeks, but is only an estimation.    .     The patient's functional status and clinical status is unchanged from preadmission assessment and the patient continues appropriate for acute inpatient rehabilitation.  Goal is for home with outpatient   therapies.  Barrier to discharge: Impaired mobility self-care endurance- work on vision, strength, gross and fine motor control, balance, progressive ambulation, ADLs to overcome.           Sebastian Collins MD      During rounds, used appropriate personal protective equipment including mask and gloves.  Additional gown if indicated.  Mask used was standard procedure mask. Appropriate PPE was worn during the entire visit.  Hand hygiene was completed before and after.

## 2023-05-24 NOTE — PROGRESS NOTES
Inpatient Rehabilitation Plan of Care Note    Plan of Care  Care Plan Reviewed - No updates at this time.    Safety    Performed Intervention(s)  Items within reach, environmental set-up for reduce risk of fall  Bed alarms and chair alarms and safety rounds hourly      Psychosocial    Performed Intervention(s)  Medication as ordered and PRN  Verbalize needs and concerns  Therapeutic environmental set up      Sphincter Control    Performed Intervention(s)  Incontinent care as needed and ointment applied as ordered.  Take to bathroom in a timely manner  Proper diet and adequate fluid intake.      Body Systems    Performed Intervention(s)  Medication, turn every 2 hours and prper nutrition.    Signed by: Bryon Porter RN

## 2023-05-24 NOTE — PLAN OF CARE
Problem: Rehabilitation (IRF) Plan of Care  Goal: Plan of Care Review  Outcome: Ongoing, Progressing  Flowsheets (Taken 5/24/2023 0332)  Progress: improving  Plan of Care Reviewed With: patient  Outcome Evaluation: Pt is A&Ox4, pleasant and cooperative, meds whole w/ water, up w/ asst x1, purewick at night due to incontinence and wound to coccyx, barrier cream applied per order, left brief open to air, picc line in RUE, pt on IV abx, mild swelling in B arms and BLE, encourage pt to turn on side, pt resting well, will continue to monitor.   Goal Outcome Evaluation:  Plan of Care Reviewed With: patient        Progress: improving  Outcome Evaluation: Pt is A&Ox4, pleasant and cooperative, meds whole w/ water, up w/ asst x1, purewick at night due to incontinence and wound to coccyx, barrier cream applied per order, left brief open to air, picc line in RUE, pt on IV abx, mild swelling in B arms and BLE, encourage pt to turn on side, pt resting well, will continue to monitor.

## 2023-05-24 NOTE — THERAPY TREATMENT NOTE
Inpatient Rehabilitation - Physical Therapy Treatment Note       [facility]     Patient Name: Katherine Williamson  : 1950  MRN: [id number]    Today's Date: 2023                    Admit Date: 2023      Visit Dx:     ICD-10-CM ICD-9-CM   1. Decreased functional mobility and endurance  Z74.09 780.99       Patient Active Problem List   Diagnosis   • Benign essential HTN   • Tobacco abuse   • Dislocation of hip joint prosthesis   • Fracture of proximal humerus   • Mechanical complication of internal orthopedic device   • Wear of articular bearing surface of internal prosthetic joint   • History of repair of hip joint   • History of operative procedure on hip   • Hyperglycemia   • Hepatic steatosis   • Diverticulosis   • Chest pain, atypical   • History of colon polyps   • Family history of colon cancer in mother   • Allergic rhinitis   • Lung nodule seen on imaging study   • Acute respiratory failure with hypoxia   • Abscess of right lung with pneumonia   • Empyema   • Sepsis   • Pleural effusion, right   • Other emphysema   • Pulmonary nodule (RLL)   • Diastolic dysfunction, grade 1   • Physical debility   • Cryptogenic stroke   • Acute right MCA stroke       Past Medical History:   Diagnosis Date   • Arthritis    • Cataract    • Colon polyps     FOLLOWED BY DR. JOSEPH LAU   • Hypertension        Past Surgical History:   Procedure Laterality Date   • COLONOSCOPY  10/2015    Zyrye-hqttrdphxovk-Es. Kaplan.  Follow-up in 5 years.   • COLONOSCOPY N/A 2022    2 BENIGN POLYPS IN DESCENDING, 5 MM BENIGN POLYP IN SIGMOID, MULTIPLE SMALL AND LARGE DIVERTICULA IN SIGMOID, RESCOPE IN 3 YRS, DR. JOSEPH LAU AT [facility]   • EYE SURGERY     • JOINT REPLACEMENT  ?    Left total hip replacement in .  In 2015 patient had left hip revision   • TONSILLECTOMY         PT ASSESSMENT (last 12 hours)     IRF PT Evaluation and Treatment     Row Name 23 1015          PT Time and Intention    Document  Type daily treatment  -EE     Mode of Treatment physical therapy;individual therapy  -EE     Patient/Family/Caregiver Comments/Observations Pt sitting up in WC, agreeable to PT.  -EE     Row Name 05/24/23 1015          General Information    Existing Precautions/Restrictions fall  -EE     Limitations/Impairments safety/cognitive  -EE     Row Name 05/24/23 1015          Pain Assessment    Pretreatment Pain Rating 0/10 - no pain  -EE     Posttreatment Pain Rating 0/10 - no pain  -EE     Row Name 05/24/23 1015          Cognition/Psychosocial    Affect/Mental Status (Cognition) flat/blunted affect  -EE     Orientation Status (Cognition) oriented x 3  -EE     Follows Commands (Cognition) follows one-step commands;verbal cues/prompting required  -EE     Personal Safety Interventions fall prevention program maintained;gait belt;muscle strengthening facilitated;nonskid shoes/slippers when out of bed;supervised activity  -EE     Row Name 05/24/23 1015          Bed Mobility    Bed Mobility scooting/bridging  -EE     Scooting/Bridging [place] (Bed Mobility) minimum assist (75% patient effort);verbal cues  -EE     Sit-Supine [place] (Bed Mobility) contact guard;minimum assist (75% patient effort);verbal cues  -EE     Row Name 05/24/23 1015          Transfer Assessment/Treatment    Comment, (Transfers) 5x STS for functional strengthening with cues for hand placement and setup  -EE     Row Name 05/24/23 1015          Chair-Bed Transfer    Chair-Bed [place] (Transfers) minimum assist (75% patient effort);verbal cues  -EE     Assistive Device (Chair-Bed Transfers) walker, front-wheeled;wheelchair  -EE     Row Name 05/24/23 1015          Sit-Stand Transfer    Sit-Stand [place] (Transfers) minimum assist (75% patient effort);verbal cues  -EE     Assistive Device (Sit-Stand Transfers) walker, front-wheeled  -EE     Comment, (Sit-Stand Transfer) cues for hand placement, sequencing  -EE     Row Name 05/24/23 1015           Stand-Sit Transfer    Stand-Sit [place] (Transfers) contact guard;minimum assist (75% patient effort);verbal cues  -EE     Assistive Device (Stand-Sit Transfers) walker, front-wheeled  -EE     Comment, (Stand-Sit Transfer) cues for hand placement  -EE     Row Name 05/24/23 1015          Gait/Stairs (Locomotion)    [place] Level (Gait) contact guard;minimum assist (75% patient effort);verbal cues  -EE     Assistive Device (Gait) walker, front-wheeled  -EE     Distance in Feet (Gait) 80' x 3  -EE     Pattern (Gait) step-through  -EE     Deviations/Abnormal Patterns (Gait) raina decreased;stride length decreased  -EE     Bilateral Gait Deviations forward flexed posture;heel strike decreased;weight shift ability decreased  -EE     Left Sided Gait Deviations heel strike decreased;knee hyperextension;decreased knee extension  decreased foot clearance, more noteable with fatigue  -EE     Gait Assessment/Intervention Cues for upright posture  -EE     Row Name 05/24/23 1015          Safety Issues, Functional Mobility    Impairments Affecting Function (Mobility) balance;endurance/activity tolerance;range of motion (ROM)  -EE     Row Name 05/24/23 1015          Hip (Therapeutic Exercise)    Hip Strengthening (Therapeutic Exercise) bilateral;marching while seated;aBduction;red;resistance band;10 repetitions  -EE     Row Name 05/24/23 1015          Knee (Therapeutic Exercise)    Knee Strengthening (Therapeutic Exercise) bilateral;LAQ (long arc quad);hamstring curls;red;resistance band;10 repetitions  -EE     Row Name 05/24/23 1015          Positioning and Restraints    Pre-Treatment Position sitting in chair/recliner  -EE     Post Treatment Position bed  -EE     In Bed fowlers;call light within reach;encouraged to call for assist;exit alarm on  waffle cushion  -EE     Row Name 05/24/23 1015          Vital Signs    Pre SpO2 (%) 93  -EE     O2 Delivery Pre Treatment room air  -EE     Intra SpO2 (%) 90  -EE     O2  Delivery Intra Treatment room air  -EE     Post SpO2 (%) 95  -EE     O2 Delivery Post Treatment room air  -EE     Pre Patient Position Sitting  -EE     Intra Patient Position Standing  -EE     Post Patient Position Sitting  -EE           User Key  (r) = Recorded By, (t) = Taken By, (c) = Cosigned By    Initials Name Provider Type    Tena Paula, PT Physical Therapist              Wound 05/14/23 1556 Right gluteal Pressure Injury (Active)   Care, Wound cleansed with;soap and water 05/24/23 0922       Wound 05/19/23 2055 Bilateral gluteal (Active)   Closure Open to air 05/23/23 2050   Base moist;red;pink 05/23/23 2050   Care, Wound cleansed with 05/24/23 0922   Periwound Care topical treatment applied 05/23/23 2050     Physical Therapy Education     Title: PT OT SLP Therapies (In Progress)     Topic: Physical Therapy (Done)     Point: Mobility training (Done)     Learning Progress Summary           Patient Acceptance, E,D, VU,NR by EE at 5/24/2023 1136    Acceptance, E,TB, VU,NR by EE at 5/23/2023 1139    Acceptance, E,TB, NR,VU by EE at 5/22/2023 1221    Acceptance, E, NR by  at 5/20/2023 1216                   Point: Home exercise program (Done)     Learning Progress Summary           Patient Acceptance, E,D, VU,NR by EE at 5/24/2023 1136    Acceptance, E,TB, VU,NR by EE at 5/23/2023 1139    Acceptance, E,TB, NR,VU by EE at 5/22/2023 1221    Acceptance, E, NR by  at 5/20/2023 1216                   Point: Body mechanics (Done)     Learning Progress Summary           Patient Acceptance, E,D, VU,NR by EE at 5/24/2023 1136    Acceptance, E,TB, VU,NR by EE at 5/23/2023 1139    Acceptance, E,TB, NR,VU by EE at 5/22/2023 1221    Acceptance, E, NR by  at 5/20/2023 1216                   Point: Precautions (Done)     Learning Progress Summary           Patient Acceptance, E,D, VU,NR by EE at 5/24/2023 1136    Acceptance, E,TB, VU,NR by EE at 5/23/2023 1139    Acceptance, E,TB, NR,VU by EE at 5/22/2023 1221     Acceptance, E, NR by  at 5/20/2023 1216                               User Key     Initials Effective Dates Name Provider Type Discipline     06/16/21 -  Jess Wang, PT Physical Therapist PT    EE 06/16/21 -  Tena Garay PT Physical Therapist PT                PT Recommendation and Plan                          Time Calculation:      PT Charges     Row Name 05/24/23 1338 05/24/23 1136          Time Calculation    Start Time 1300  -EE 1000  -EE     Stop Time 1330  -EE 1030  -EE     Time Calculation (min) 30 min  -EE 30 min  -EE     PT Received On 05/24/23  -EE 05/24/23  -EE     PT - Next Appointment 05/25/23  -EE 05/24/23  -EE        Time Calculation- PT    Total Timed Code Minutes- PT 30 minute(s)  -EE 30 minute(s)  -EE           User Key  (r) = Recorded By, (t) = Taken By, (c) = Cosigned By    Initials Name Provider Type    EE Tena Garay, PT Physical Therapist                Therapy Charges for Today     Code Description Service Date Service Provider Modifiers Qty    44691166810 HC PT THER PROC EA 15 MIN 5/23/2023 Tena Garay, PT GP 2    46837620954 HC PT THERAPEUTIC ACT EA 15 MIN 5/23/2023 Tena Garay, PT GP 2    27714390514 HC PT THER PROC EA 15 MIN 5/24/2023 Tena Garay, PT GP 1    23821064053 HC PT THERAPEUTIC ACT EA 15 MIN 5/24/2023 Tena Garay, PT GP 3                   Tena Garay, PT  5/24/2023

## 2023-05-24 NOTE — PROGRESS NOTES
Recreational Therapy Note    Patient Name: Katherine Williamson   MRN: [id number]    Therapeutic Recreation Eval and Treat (last 12 hours)     Therapeutic Recreation Eval & Treat     Row Name 05/24/23 1200       Therapeutic Recreation Participation    Recreation Therapy Participation games  -SS    Games other (see comments)  Yaaracelizee  -    Objectives of Recreation Participation increase;sense of autonomy by choosing level of participation;motivation and activity level through successful participation  -SS    Comment, Recreation Participation WFL to  dice with Lhand and for scorekeeping task  -SS    Recreation Therapy Summary of Participation active participation  -SS          User Key  (r) = Recorded By, (t) = Taken By, (c) = Cosigned By    Initials Name Provider Type    SS Sanaz Radford, CTRS Recreational Therapist                  CHUNG Hollins  5/24/2023

## 2023-05-24 NOTE — THERAPY TREATMENT NOTE
Inpatient Rehabilitation - Speech Language Pathology Treatment Note    [facility]     Patient Name: Katherine Williamson  : 1950  MRN: [id number]    Today's Date: 2023                   Admit Date: 2023       Visit Dx:      ICD-10-CM ICD-9-CM   1. Decreased functional mobility and endurance  Z74.09 780.99       Patient Active Problem List   Diagnosis   • Benign essential HTN   • Tobacco abuse   • Dislocation of hip joint prosthesis   • Fracture of proximal humerus   • Mechanical complication of internal orthopedic device   • Wear of articular bearing surface of internal prosthetic joint   • History of repair of hip joint   • History of operative procedure on hip   • Hyperglycemia   • Hepatic steatosis   • Diverticulosis   • Chest pain, atypical   • History of colon polyps   • Family history of colon cancer in mother   • Allergic rhinitis   • Lung nodule seen on imaging study   • Acute respiratory failure with hypoxia   • Abscess of right lung with pneumonia   • Empyema   • Sepsis   • Pleural effusion, right   • Other emphysema   • Pulmonary nodule (RLL)   • Diastolic dysfunction, grade 1   • Physical debility   • Cryptogenic stroke   • Acute right MCA stroke       Past Medical History:   Diagnosis Date   • Arthritis    • Cataract    • Colon polyps     FOLLOWED BY DR. JOSEPH LAU   • Hypertension        Past Surgical History:   Procedure Laterality Date   • COLONOSCOPY  10/2015    Tklbp-gillihtjytzw-Qb. Kaplan.  Follow-up in 5 years.   • COLONOSCOPY N/A 2022    2 BENIGN POLYPS IN DESCENDING, 5 MM BENIGN POLYP IN SIGMOID, MULTIPLE SMALL AND LARGE DIVERTICULA IN SIGMOID, RESCOPE IN 3 YRS, DR. JOSEPH LAU AT [facility]   • EYE SURGERY     • JOINT REPLACEMENT  ?    Left total hip replacement in .  In 2015 patient had left hip revision   • TONSILLECTOMY         SLP Recommendation and Plan                                                            SLP EVALUATION (last 72 hours)     SLP SLC  "Evaluation     Row Name 05/24/23 1100 05/24/23 0830 05/23/23 1100 05/23/23 0830 05/22/23 1100       Communication Assessment/Intervention    Document Type therapy note (daily note)  -AL therapy note (daily note)  -AL therapy note (daily note)  -AL therapy note (daily note)  -AL therapy note (daily note)  -AL    Patient/Family/Caregiver Comments/Observations Pt is pleasant and cooperative.  -AL Pt participated well. Seen bedside.  -AL Pt participated well.  -AL Pt is pleasant and cooperative.  -AL Pt is pleasant and cooperative.  -AL       Pain Scale: Numbers Pre/Post-Treatment    Pretreatment Pain Rating 0/10 - no pain  -AL 0/10 - no pain  -AL 0/10 - no pain  -AL 0/10 - no pain  -AL 0/10 - no pain  -AL    Row Name 05/22/23 0830                   Communication Assessment/Intervention    Document Type evaluation  -AL        Patient/Family/Caregiver Comments/Observations Pt participated well. She reported she feels ready to upgrade to regular.  -AL              User Key  (r) = Recorded By, (t) = Taken By, (c) = Cosigned By    Initials Name Effective Dates    Sarita Landry, MS CCC-SLP 06/16/21 -                    EDUCATION    The patient has been educated in the following areas:       Cognitive Impairment.             SLP GOALS     Row Name 05/24/23 1100 05/24/23 0830 05/23/23 1100       Attention Goal 1 (SLP)    Progress/Outcomes (Attention Goal 1, SLP) -- good progress toward goal  -AL good progress toward goal  -AL    Comment (Attention Goal 1, SLP) -- Attention to detail for written \"if/then\" directions: 100% with NO cues. Alternating attention for \"Blink\" card sort: required MIN cue x1 when sorting entire deck.  -AL Introduced \"Blink\" card sort task. Pt required MIN cue x1 when sorting entire deck (color, shape, number).  -AL       Memory Skills Goal 1 (SLP)    Improve Memory Skills Through Goal 1 (SLP) -- recalling unrelated word lists with an imposed delay  -AL --    Progress/Outcomes (Memory Skills Goal " "1, SLP) -- good progress toward goal  -AL --    Comment (Memory Skills Goal 1, SLP) -- Recalled 2/3 errands after 15 minutes with NO cues, 3/3 with MIN cues  -AL --       Reasoning Goal 1 (SLP)    Improve Reasoning Through Goal 1 (SLP) complete high level reasoning task;90%;independently (over 90% accuracy)  -AL complete high level reasoning task;90%;independently (over 90% accuracy)  -AL --    Time Frame (Reasoning Goal 1, SLP) -- short term goal (STG);1 week  -AL --    Progress/Outcomes (Reasoning Goal 1, SLP) good progress toward goal  -AL good progress toward goal  -AL --    Comment (Reasoning Goal 1, SLP) Complex visuospatial reasoning task: 50% with NO Cues, 100% with MOD cues. Pt reported fatigue impacted her ability to think. She became frustrated midway through task and stated \"I can't finish it.\"  -AL [place] reasoning task: 7/12 with NO cues, 12/12 with MIN cues.  -AL --       Functional Math Skills Goal 1 (SLP)    Improve Functional Math Skills Through Goal 1 (SLP) -- -- complete functional math task;100%;independently (over 90% accuracy)  -AL    Time Frame (Functional Math Skills Goal 1, SLP) -- -- 1 week  -AL    Progress/Outcomes (Functional Math Skills Goal 1, SLP) -- -- good progress toward goal  -AL    Comment (Functional Math Skills Goal 1, SLP) -- -- Checkbook balancing task: 5/8 with NO cues, 8/8 with MIN cues to correct math errors. Pt organized information with NO cues.  -AL       Executive Functional Skills Goal 1 (SLP)    Progress/Outcomes (Executive Function Skills Goal 1, SLP) goal ongoing  -AL -- --    Comment (Executive Function Skills Goal 1, SLP) Discussed recommendation for use of pill box at home to organize meds. Pt reported she does not wish to practice organizing meds at this time, but plans to do so before discharge.  -AL -- --    Row Name 05/23/23 0830 05/22/23 1100          Attention Goal 1 (SLP)    Improve Attention by Goal 1 (SLP) complete selective attention task;complete " "divided attention task;90%;independently (over 90% accuracy)  -AL --     Progress/Outcomes (Attention Goal 1, SLP) good progress toward goal  -AL --     Comment (Attention Goal 1, SLP) Written \"if/then\" directions: 7/8 with NO cues, 8/8 with MIN cue for attention to detail.  -AL Calendar task: 100% with NO cues.  -AL        Memory Skills Goal 1 (SLP)    Progress/Outcomes (Memory Skills Goal 1, SLP) good progress toward goal  -AL --     Comment (Memory Skills Goal 1, SLP) Recalled 3/3 errands after 15 minutes with NO cues.  -AL --        Reasoning Goal 1 (SLP)    Improve Reasoning Through Goal 1 (SLP) complete high level reasoning task;90%;independently (over 90% accuracy)  -AL --     Progress/Outcomes (Reasoning Goal 1, SLP) good progress toward goal  -AL --     Comment (Reasoning Goal 1, SLP) Moderately complex logic puzzle: 65% with NO cues, 100% with MIN-MOD cues.  -AL Diagnostic treatment: Pt completed moderately complex logic puzzle with 80% accuracy with NO cues, 100% with MIN cues. Pt stated it was \"challenging.\"  -AL        Functional Math Skills Goal 1 (SLP)    Comment (Functional Math Skills Goal 1, SLP) -- Diagnostic treatment: Pt completed grocery prices task with 100% accuracy with NO cues.  -AL           User Key  (r) = Recorded By, (t) = Taken By, (c) = Cosigned By    Initials Name Provider Type    Sarita Landry MS CCC-SLP Speech and Language Pathologist                            Time Calculation:        Time Calculation- SLP     Row Name 05/24/23 1140 05/24/23 0900          Time Calculation- SLP    SLP Start Time 1100  -AL 0830  -AL     SLP Stop Time 1130  -AL 0900  -AL     SLP Time Calculation (min) 30 min  -AL 30 min  -AL           User Key  (r) = Recorded By, (t) = Taken By, (c) = Cosigned By    Initials Name Provider Type    Sarita Landry MS CCC-SLP Speech and Language Pathologist                  Therapy Charges for Today     Code Description Service Date Service Provider " Modifiers Qty    68628442356 HC ST DEV OF COGN SKILLS INITIAL 15 MIN 5/23/2023 Sarita Comer, MS CCC-SLP  1    44372834092 HC ST DEV OF COGN SKILLS EACH ADDT'L 15 MIN 5/23/2023 Sarita Comer, MS CCC-SLP  3    26603079002 HC ST DEV OF COGN SKILLS INITIAL 15 MIN 5/24/2023 Sarita Comer, MS CCC-SLP  1    79183320483 HC ST DEV OF COGN SKILLS EACH ADDT'L 15 MIN 5/24/2023 Sarita Comer, MS CCC-SLP  3                           Sarita Comer MS CCC-SLP  5/24/2023

## 2023-05-25 ENCOUNTER — APPOINTMENT (OUTPATIENT)
Dept: GENERAL RADIOLOGY | Facility: HOSPITAL | Age: 73
DRG: 056 | End: 2023-05-25
Payer: COMMERCIAL

## 2023-05-25 LAB
ANION GAP SERPL CALCULATED.3IONS-SCNC: 5 MMOL/L (ref 5–15)
BASOPHILS # BLD AUTO: 0.04 10*3/MM3 (ref 0–0.2)
BASOPHILS NFR BLD AUTO: 0.2 % (ref 0–1.5)
BUN SERPL-MCNC: 13 MG/DL (ref 8–23)
BUN/CREAT SERPL: 54.2 (ref 7–25)
CALCIUM SPEC-SCNC: 8.1 MG/DL (ref 8.6–10.5)
CHLORIDE SERPL-SCNC: 104 MMOL/L (ref 98–107)
CO2 SERPL-SCNC: 29 MMOL/L (ref 22–29)
CREAT SERPL-MCNC: 0.24 MG/DL (ref 0.57–1)
DEPRECATED RDW RBC AUTO: 44.3 FL (ref 37–54)
EGFRCR SERPLBLD CKD-EPI 2021: 120.6 ML/MIN/1.73
EOSINOPHIL # BLD AUTO: 0.05 10*3/MM3 (ref 0–0.4)
EOSINOPHIL NFR BLD AUTO: 0.3 % (ref 0.3–6.2)
ERYTHROCYTE [DISTWIDTH] IN BLOOD BY AUTOMATED COUNT: 13.9 % (ref 12.3–15.4)
GLUCOSE SERPL-MCNC: 90 MG/DL (ref 65–99)
HCT VFR BLD AUTO: 32.3 % (ref 34–46.6)
HGB BLD-MCNC: 11 G/DL (ref 12–15.9)
IMM GRANULOCYTES # BLD AUTO: 0.13 10*3/MM3 (ref 0–0.05)
IMM GRANULOCYTES NFR BLD AUTO: 0.7 % (ref 0–0.5)
LYMPHOCYTES # BLD AUTO: 1.93 10*3/MM3 (ref 0.7–3.1)
LYMPHOCYTES NFR BLD AUTO: 10.7 % (ref 19.6–45.3)
MCH RBC QN AUTO: 29.9 PG (ref 26.6–33)
MCHC RBC AUTO-ENTMCNC: 34.1 G/DL (ref 31.5–35.7)
MCV RBC AUTO: 87.8 FL (ref 79–97)
MONOCYTES # BLD AUTO: 0.93 10*3/MM3 (ref 0.1–0.9)
MONOCYTES NFR BLD AUTO: 5.1 % (ref 5–12)
NEUTROPHILS NFR BLD AUTO: 15 10*3/MM3 (ref 1.7–7)
NEUTROPHILS NFR BLD AUTO: 83 % (ref 42.7–76)
NRBC BLD AUTO-RTO: 0 /100 WBC (ref 0–0.2)
PLATELET # BLD AUTO: 412 10*3/MM3 (ref 140–450)
PMV BLD AUTO: 10.7 FL (ref 6–12)
POTASSIUM SERPL-SCNC: 3.5 MMOL/L (ref 3.5–5.2)
RBC # BLD AUTO: 3.68 10*6/MM3 (ref 3.77–5.28)
SODIUM SERPL-SCNC: 138 MMOL/L (ref 136–145)
WBC NRBC COR # BLD: 18.08 10*3/MM3 (ref 3.4–10.8)

## 2023-05-25 PROCEDURE — 97130 THER IVNTJ EA ADDL 15 MIN: CPT

## 2023-05-25 PROCEDURE — 85025 COMPLETE CBC W/AUTO DIFF WBC: CPT | Performed by: PHYSICAL MEDICINE & REHABILITATION

## 2023-05-25 PROCEDURE — 97129 THER IVNTJ 1ST 15 MIN: CPT

## 2023-05-25 PROCEDURE — 25010000002 MEROPENEM PER 100 MG: Performed by: HOSPITALIST

## 2023-05-25 PROCEDURE — 97535 SELF CARE MNGMENT TRAINING: CPT

## 2023-05-25 PROCEDURE — 80048 BASIC METABOLIC PNL TOTAL CA: CPT | Performed by: PHYSICAL MEDICINE & REHABILITATION

## 2023-05-25 PROCEDURE — 97530 THERAPEUTIC ACTIVITIES: CPT

## 2023-05-25 PROCEDURE — 71046 X-RAY EXAM CHEST 2 VIEWS: CPT

## 2023-05-25 PROCEDURE — 97110 THERAPEUTIC EXERCISES: CPT

## 2023-05-25 PROCEDURE — 25010000002 ENOXAPARIN PER 10 MG: Performed by: PHYSICAL MEDICINE & REHABILITATION

## 2023-05-25 RX ORDER — AMOXICILLIN 250 MG
1 CAPSULE ORAL 2 TIMES DAILY PRN
Status: DISCONTINUED | OUTPATIENT
Start: 2023-05-25 | End: 2023-06-09

## 2023-05-25 RX ADMIN — Medication 10 ML: at 08:08

## 2023-05-25 RX ADMIN — ASPIRIN 81 MG: 81 TABLET, CHEWABLE ORAL at 08:07

## 2023-05-25 RX ADMIN — Medication 1000 MCG: at 08:07

## 2023-05-25 RX ADMIN — GUAIFENESIN 600 MG: 600 TABLET, EXTENDED RELEASE ORAL at 08:07

## 2023-05-25 RX ADMIN — DOXYCYCLINE 100 MG: 100 CAPSULE ORAL at 20:34

## 2023-05-25 RX ADMIN — Medication 1 APPLICATION: at 08:08

## 2023-05-25 RX ADMIN — ENOXAPARIN SODIUM 40 MG: 100 INJECTION SUBCUTANEOUS at 20:34

## 2023-05-25 RX ADMIN — METOPROLOL TARTRATE 12.5 MG: 25 TABLET, FILM COATED ORAL at 20:34

## 2023-05-25 RX ADMIN — CLOPIDOGREL BISULFATE 75 MG: 75 TABLET, FILM COATED ORAL at 08:07

## 2023-05-25 RX ADMIN — MEROPENEM 1 G: 1 INJECTION, POWDER, FOR SOLUTION INTRAVENOUS at 12:22

## 2023-05-25 RX ADMIN — MEROPENEM 1 G: 1 INJECTION, POWDER, FOR SOLUTION INTRAVENOUS at 20:34

## 2023-05-25 RX ADMIN — Medication 10 ML: at 20:37

## 2023-05-25 RX ADMIN — Medication 1 APPLICATION: at 12:22

## 2023-05-25 RX ADMIN — METOPROLOL TARTRATE 12.5 MG: 25 TABLET, FILM COATED ORAL at 08:08

## 2023-05-25 RX ADMIN — CITALOPRAM 20 MG: 20 TABLET, FILM COATED ORAL at 08:07

## 2023-05-25 RX ADMIN — Medication 1 APPLICATION: at 20:37

## 2023-05-25 RX ADMIN — DOXYCYCLINE 100 MG: 100 CAPSULE ORAL at 08:07

## 2023-05-25 RX ADMIN — MEROPENEM 1 G: 1 INJECTION, POWDER, FOR SOLUTION INTRAVENOUS at 05:12

## 2023-05-25 RX ADMIN — ATORVASTATIN CALCIUM 80 MG: 80 TABLET, FILM COATED ORAL at 20:34

## 2023-05-25 RX ADMIN — ZINC OXIDE 1 APPLICATION: 200 OINTMENT TOPICAL at 08:08

## 2023-05-25 RX ADMIN — GUAIFENESIN 600 MG: 600 TABLET, EXTENDED RELEASE ORAL at 20:34

## 2023-05-25 RX ADMIN — ZINC OXIDE 1 APPLICATION: 200 OINTMENT TOPICAL at 20:37

## 2023-05-25 NOTE — THERAPY TREATMENT NOTE
Inpatient Rehabilitation - Physical Therapy Treatment Note       [facility]     Patient Name: Katherine Williamson  : 1950  MRN: [id number]    Today's Date: 2023                    Admit Date: 2023      Visit Dx:     ICD-10-CM ICD-9-CM   1. Decreased functional mobility and endurance  Z74.09 780.99       Patient Active Problem List   Diagnosis   • Benign essential HTN   • Tobacco abuse   • Dislocation of hip joint prosthesis   • Fracture of proximal humerus   • Mechanical complication of internal orthopedic device   • Wear of articular bearing surface of internal prosthetic joint   • History of repair of hip joint   • History of operative procedure on hip   • Hyperglycemia   • Hepatic steatosis   • Diverticulosis   • Chest pain, atypical   • History of colon polyps   • Family history of colon cancer in mother   • Allergic rhinitis   • Lung nodule seen on imaging study   • Acute respiratory failure with hypoxia   • Abscess of right lung with pneumonia   • Empyema   • Sepsis   • Pleural effusion, right   • Other emphysema   • Pulmonary nodule (RLL)   • Diastolic dysfunction, grade 1   • Physical debility   • Cryptogenic stroke   • Acute right MCA stroke       Past Medical History:   Diagnosis Date   • Arthritis    • Cataract    • Colon polyps     FOLLOWED BY DR. JOSEPH LAU   • Hypertension        Past Surgical History:   Procedure Laterality Date   • COLONOSCOPY  10/2015    Wcxnf-fxinawfkrkoy-Lu. Kaplan.  Follow-up in 5 years.   • COLONOSCOPY N/A 2022    2 BENIGN POLYPS IN DESCENDING, 5 MM BENIGN POLYP IN SIGMOID, MULTIPLE SMALL AND LARGE DIVERTICULA IN SIGMOID, RESCOPE IN 3 YRS, DR. JOSEPH LAU AT [facility]   • EYE SURGERY     • JOINT REPLACEMENT  ?    Left total hip replacement in .  In 2015 patient had left hip revision   • TONSILLECTOMY         PT ASSESSMENT (last 12 hours)     IRF PT Evaluation and Treatment     Row Name 23 1019          PT Time and Intention    Document  Type daily treatment  -     Mode of Treatment physical therapy  -     Patient/Family/Caregiver Comments/Observations Pt just finished first shower since hospitalization this am - natable fatigued,  -     Row Name 05/25/23 1019          General Information    Patient Profile Reviewed yes  -     General Observations of Patient In pm, pt noted sig fatigue due to the fact she hadnt rested all day.  -     Existing Precautions/Restrictions fall  -     Limitations/Impairments safety/cognitive  -     Row Name 05/25/23 1019          Pain Assessment    Pretreatment Pain Rating 0/10 - no pain  -     Posttreatment Pain Rating 0/10 - no pain  -     Row Name 05/25/23 1019          Cognition/Psychosocial    Affect/Mental Status (Cognition) flat/blunted affect  -     Orientation Status (Cognition) oriented x 3  -     Follows Commands (Cognition) follows one-step commands;verbal cues/prompting required  -     Personal Safety Interventions fall prevention program maintained;nonskid shoes/slippers when out of bed;supervised activity  -     Cognitive Function WFL  -     Row Name 05/25/23 1019          Bed Mobility    Sit-Supine [place] (Bed Mobility) standby assist;verbal cues  -     Assistive Device (Bed Mobility) bed rails  -     Comment, (Bed Mobility) bed flat  -     Row Name 05/25/23 1019          Chair-Bed Transfer    Chair-Bed [place] (Transfers) minimum assist (75% patient effort);verbal cues;set up  -     Assistive Device (Chair-Bed Transfers) walker, front-wheeled;wheelchair  -     Row Name 05/25/23 1019          Sit-Stand Transfer    Sit-Stand [place] (Transfers) moderate assist (50% patient effort);minimum assist (75% patient effort);verbal cues;set up  -     Assistive Device (Sit-Stand Transfers) walker, front-wheeled;wheelchair  -     Comment, (Sit-Stand Transfer) VC for hand placement and foot placement as wellas fwd WS  -     Row Name 05/25/23 1019           Stand-Sit Transfer    Stand-Sit [place] (Transfers) minimum assist (75% patient effort);verbal cues;set up  -     Assistive Device (Stand-Sit Transfers) wheelchair;walker, front-wheeled  -     Comment, (Stand-Sit Transfer) Vc for hand placement.  -     Row Name 05/25/23 1019          Gait/Stairs (Locomotion)    [place] Level (Gait) minimum assist (75% patient effort);contact guard;verbal cues  -     Assistive Device (Gait) walker, front-wheeled  -     Distance in Feet (Gait) 80 x 2, 15  -     Pattern (Gait) step-through  -     Deviations/Abnormal Patterns (Gait) raina decreased;stride length decreased  -     Bilateral Gait Deviations forward flexed posture;heel strike decreased;weight shift ability decreased  -     Left Sided Gait Deviations heel strike decreased;knee hyperextension;decreased knee extension  dec foot clearance - riki noted with fatigue.  -     Gait Assessment/Intervention Vc for posture, step length.  PT noted that she feels she could not inc distance.  -     Row Name 05/25/23 1019          Safety Issues, Functional Mobility    Safety Issues Affecting Function (Mobility) ability to follow commands;problem-solving  -     Impairments Affecting Function (Mobility) balance;endurance/activity tolerance;range of motion (ROM)  -     Row Name 05/25/23 1019          Hip (Therapeutic Exercise)    Hip Strengthening (Therapeutic Exercise) bilateral;aBduction;sitting;10 repetitions  -[facility] Name 05/25/23 1019          Knee (Therapeutic Exercise)    Knee Strengthening (Therapeutic Exercise) sitting;bilateral;LAQ (long arc quad);extension;red;10 repetitions  -[facility] Name 05/25/23 1019          Positioning and Restraints    Pre-Treatment Position sitting in chair/recliner  -     Post Treatment Position bed  -     In Bed notified nsg;call light within reach;encouraged to call for assist;exit alarm on;supine  -     Row Name 05/25/23 1019          Vital Signs    Pre  SpO2 (%) 96  -KP     O2 Delivery Pre Treatment room air  -KP     Intra SpO2 (%) 93  -KP     O2 Delivery Intra Treatment room air  -KP     Post SpO2 (%) 95  -KP     O2 Delivery Post Treatment room air  -KP     Pre Patient Position Sitting  -KP     Intra Patient Position Standing  -KP     Post Patient Position Sitting  -KP           User Key  (r) = Recorded By, (t) = Taken By, (c) = Cosigned By    Initials Name Provider Type     Jesusita Mendoza, PT Physical Therapist              Wound 05/19/23 2055 Bilateral gluteal (Active)   Pressure Injury Stage 2 05/25/23 0807   Dressing Appearance dry;intact 05/24/23 2030   Closure None 05/24/23 2030   Base clean;pink;moist 05/25/23 0807   Periwound Temperature warm 05/24/23 2030   [place] Skin Turgor soft 05/24/23 2030   Edges irregular 05/25/23 0807   Care, Wound cleansed with;soap and water 05/25/23 0807   Dressing Care dressing changed;abdominal pad 05/24/23 2030   Periwound Care barrier ointment applied 05/25/23 0807     Physical Therapy Education     Title: PT OT SLP Therapies (In Progress)     Topic: Physical Therapy (In Progress)     Point: Mobility training (In Progress)     Learning Progress Summary           Patient Eager, E,TB,D, NR by  at 5/25/2023 1019    Acceptance, E,D, VU,NR by EE at 5/24/2023 1136    Acceptance, E,TB, VU,NR by EE at 5/23/2023 1139    Acceptance, E,TB, NR,VU by EE at 5/22/2023 1221    Acceptance, E, NR by  at 5/20/2023 1216                   Point: Home exercise program (In Progress)     Learning Progress Summary           Patient Eager, E,TB,D, NR by  at 5/25/2023 1019    Acceptance, E,D, VU,NR by EE at 5/24/2023 1136    Acceptance, E,TB, VU,NR by EE at 5/23/2023 1139    Acceptance, E,TB, NR,VU by EE at 5/22/2023 1221    Acceptance, E, NR by  at 5/20/2023 1216                   Point: Body mechanics (Done)     Learning Progress Summary           Patient Acceptance, E,D, VU,NR by EE at 5/24/2023 1136    Acceptance, E,TB, VU,NR by EE  at 5/23/2023 1139    Acceptance, E,TB, NR,VU by  at 5/22/2023 1221    Acceptance, E, NR by  at 5/20/2023 1216                   Point: Precautions (Done)     Learning Progress Summary           Patient Acceptance, E,D, VU,NR by  at 5/24/2023 1136    Acceptance, E,TB, VU,NR by  at 5/23/2023 1139    Acceptance, E,TB, NR,VU by  at 5/22/2023 1221    Acceptance, E, NR by  at 5/20/2023 1216                               User Key     Initials Effective Dates Name Provider Type Discipline     06/16/21 -  Jess Wang, PT Physical Therapist PT     06/16/21 -  Tena Garay, PT Physical Therapist PT     06/16/21 -  Jesusita Mendoza, PT Physical Therapist PT                PT Recommendation and Plan                          Time Calculation:      PT Charges     Row Name 05/25/23 1453 05/25/23 1018          Time Calculation    Start Time 1300  - 1000  -     Stop Time 1330  - 1030  -KP     Time Calculation (min) 30 min  -KP 30 min  -     PT Received On 05/25/23  - 05/25/23  -     PT - Next Appointment 05/26/23  - 05/25/23  -           User Key  (r) = Recorded By, (t) = Taken By, (c) = Cosigned By    Initials Name Provider Type     Jesusita Mendoza, PT Physical Therapist                Therapy Charges for Today     Code Description Service Date Service Provider Modifiers Qty    23423307716 HC PT THERAPEUTIC ACT EA 15 MIN 5/25/2023 Jesusita Mendoza, PT GP 2    05540520467 HC PT THER PROC EA 15 MIN 5/25/2023 Jesusita Mendoza, PT GP 2                   Jesusita Mendoza, PT  5/25/2023

## 2023-05-25 NOTE — PROGRESS NOTES
Recreational Therapy Note    Patient Name: Katherine Williamson   MRN: [id number]    Therapeutic Recreation Eval and Treat (last 12 hours)     Therapeutic Recreation Eval & Treat     Row Name 05/25/23 1100       Therapeutic Recreation Participation    Recreation Therapy Participation games  -    Games card games  5 Crowns  -    Objectives of Recreation Participation increase;sense of autonomy by choosing level of participation;motivation and activity level through successful participation  -    Comment, Recreation Participation occ cues to recall directions for unfamiliar activity  -    Recreation Therapy Summary of Participation active participation  -          User Key  (r) = Recorded By, (t) = Taken By, (c) = Cosigned By    Initials Name Provider Type    SS Sanaz Radford, CTRS Recreational Therapist                  CHUNG Hollins  5/25/2023

## 2023-05-25 NOTE — PROGRESS NOTES
"  PROGRESS NOTE  Patient Name: Katherine Williamson  Age/Sex: 72 y.o. female  : 1950  MRN: [id number]    Date of Admission: 2023  Date of Encounter Visit: 23   LOS: 6 days   Patient Care Team:  Zelalem Flor APRN as PCP - General (Internal Medicine)  Stephen, Brandon Meyer MD as Consulting Physician (Orthopedic Surgery)    Chief Complaint: Pneumonia with empyema on antibiotic, hypoxemia that resolved, new onset diarrhea    Hospital course: Patient continues to improve  She is currently on room air and has no respiratory complaints  She is on the meropenem and she is supposed to finish a total of 4 weeks for her empyema last dose scheduled for 2023  She has been doing fine however she had some constipation she was on laxative and she did develop some diarrhea.  The diarrhea was always a concern given the risk for C. difficile colitis and it was not green and foul smell and the patient was taken off her laxative and the bowel movements are becoming less frequent and slightly more formed this afternoon.  Patient did have worsening leukocytosis however    REVIEW OF SYSTEMS:   CONSTITUTIONAL: Low-grade fever       Ventilator/Non-Invasive Ventilation Settings (From admission, onward)    Room air            Vital Signs  Temp:  [97.1 °F (36.2 °C)-98.2 °F (36.8 °C)] 98.2 °F (36.8 °C)  Heart Rate:  [81-87] 85  Resp:  [18] 18  BP: (122-129)/(63-67) 129/63  SpO2:  [87 %-95 %] 91 %  on  Flow (L/min):  [2] 2 Device (Oxygen Therapy): room air    Intake/Output Summary (Last 24 hours) at 2023 1324  Last data filed at 2023 0432  Gross per 24 hour   Intake 240 ml   Output 200 ml   Net 40 ml     Flowsheet Rows    Flowsheet Row First Filed Value   Admission Height 160 cm (63\") Documented at 2023   Admission Weight 61.2 kg (135 lb) Documented at 2023 190        Body mass index is 24.33 kg/m².      23  0548 23  0626 23  0434   Weight: 57.6 kg (126 lb 15.8 oz) 70.1 kg (154 lb " AM assessment completed. Patient resting in bed at this time. Denies any chest pain at this time. Remains NSR/ST on the monitor. Trace non-pitting edema noted to right lower extremity. Pedal pulses palpable. Denies any shortness of breath at this time. Lungs are diminished bilaterally. SATS 92% on RA. He is A/Ox3 and can be up with a 1 person assist in the room. Denies any pain with elimination. Skin remains warm, dry and intact. #20g SL to right AC with heparin gtt infusing at 22units=17.1ml/hr. Vital signs stable. Call light remains in reach.   Electronically signed by Mami Currie RN on 12/11/2021 at 1:59 PM 8.7 oz) 62.3 kg (137 lb 5.6 oz)       Physical Exam:  GEN: Looking better, in no distress, awake and responsive, on  room air  EYES:   Sclerae clear. No icterus. PERRL. Normal EOM  ENT:   External ears/nose normal, no oral lesions, no thrush, mucous membranes moist  NECK:  Supple, midline trachea, no JVD  LUNGS: Normal chest on inspection, diminished breath sounds bilaterally, she does have some friction rub on the base posteriorly on the right with some minimal crackles no wheezes, breathing is nonlabored  CV:  Regular rhythm and rate. Normal S1/S2. No murmurs, gallops, or rubs noted.  ABD:  Soft, nontender and nondistended. Normal bowel sounds. No guarding  EXT:  Moves all extremities well. No cyanosis. No redness.    No edema.   Skin:  abnormally dry over the lower extremities with some scattered petechial type hemorrhage that is only noted on the lower extremity and not sure if this is a coagulopathic kind of bleed or just because of the dryness and the skin scratching.       Results Review:    Results From Last 14 Days   Lab Units 05/15/23  0309 05/14/23  0528   CRP mg/dL  --  3.52*   TRIGLYCERIDES mg/dL 68  --      Results from last 7 days   Lab Units 05/25/23  0501 05/20/23  0740 05/19/23  0425   SODIUM mmol/L 138 137 139   POTASSIUM mmol/L 3.5 4.0 4.1   CHLORIDE mmol/L 104 105 104   CO2 mmol/L 29.0 28.7 30.5*   BUN mg/dL 13 14 12   CREATININE mg/dL 0.24* 0.20* 0.23*   CALCIUM mg/dL 8.1* 8.6 8.5*   ANION GAP mmol/L 5.0 3.3* 4.5*   ALBUMIN g/dL  --  1.7* 1.9*                 Results from last 7 days   Lab Units 05/25/23  0501 05/20/23  0740 05/19/23  0425   WBC 10*3/mm3 18.08* 8.34 9.51   HEMOGLOBIN g/dL 11.0* 10.3* 10.0*   HEMATOCRIT % 32.3* 30.3* 29.5*   PLATELETS 10*3/mm3 412 366 351   MCV fL 87.8 88.3 87.8   NEUTROPHIL % % 83.0* 66.1  --    LYMPHOCYTE % % 10.7* 26.7  --    MONOCYTES % % 5.1 5.3  --    EOSINOPHIL % % 0.3 1.1  --    BASOPHIL % % 0.2 0.4  --    IMM GRAN % % 0.7* 0.4  --       Latest Reference  Range & Units Most Recent   RNP Antibodies 0.0 - 0.9 AI <0.2  5/9/23 14:00   Vann Antibodies 0.0 - 0.9 AI <0.2  5/9/23 14:00   Sjogren's Anti-SS-A 0.0 - 0.9 AI <0.2  5/9/23 14:00   Sjogren's Anti-SS-B 0.0 - 0.9 AI <0.2  5/9/23 14:00   DUTCH-1 IgG 0.0 - 0.9 AI <0.2  5/9/23 14:00      Latest Reference Range & Units Most Recent   Anti-Centromere B Antibodies 0.0 - 0.9 AI <0.2  5/9/23 14:00   Antichromatin Antibodies 0.0 - 0.9 AI <0.2  5/9/23 14:00   Anti-DNA (DS) Ab Qn 0 - 9 IU/mL <1  5/9/23 14:00   C-ANCA Neg:<1:20 titer <1:20  5/9/23 14:00   DUTCH-1 IgG 0.0 - 0.9 AI <0.2  5/9/23 14:00   P-ANCA Neg:<1:20 titer <1:20  5/9/23 14:00   Atypical pANCA Neg:<1:20 titer <1:20  5/9/23 14:00      Latest Reference Range & Units Most Recent   Antiscleroderma-70 Antibodies 0.0 - 0.9 AI <0.2  5/9/23 14:00           Latest Reference Range & Units Most Recent   pH, Fluid  8.03  5/10/23 11:10   Glucose, Fluid mg/dL 142  5/10/23 11:10   Protein, Total, Fluid g/dL 1.4  5/10/23 11:10      Latest Reference Range & Units Most Recent   ANTI-MPO ANTIBODIES 0.0 - 0.9 units <0.2  5/9/23 14:00   ANTI-PR3 ANTIBODIES 0.0 - 0.9 units <0.2  5/9/23 14:00               Invalid input(s): LDLCALC          Glucose   Date/Time Value Ref Range Status   05/24/2023 2027 118 70 - 130 mg/dL Final     Comment:     Meter: [id number] : [id number] Pedro HORN                               Imaging:   Imaging Results (All)     Procedure Component Value Units Date/Time              I reviewed the patient's new clinical results.  I personally viewed and interpreted the patient's imaging results: Ongoing improvement in the right lower lobe infiltrate and right-sided effusion        Medication Review:   aspirin, 81 mg, Oral, Q24H  atorvastatin, 80 mg, Oral, Nightly  citalopram, 20 mg, Oral, Daily  clopidogrel, 75 mg, Oral, Daily  doxycycline, 100 mg, Oral, Q12H  enoxaparin, 40 mg, Subcutaneous, Q24H  guaiFENesin, 600 mg, Oral, BID  hydrocortisone-bacitracin-zinc  oxide-nystatin, 1 application, Topical, BID  magnesium hydroxide, 10 mL, Oral, Once  Menthol-Zinc Oxide, 1 application, Topical, 4x Daily  meropenem, 1 g, Intravenous, Q8H  metoprolol tartrate, 12.5 mg, Oral, Q12H  sodium chloride, 10 mL, Intravenous, Q12H  sodium chloride, 10 mL, Intravenous, Q12H  cyanocobalamin, 1,000 mcg, Oral, Daily             ASSESSMENT:   1. Right lower lobe lung abscess/pneumonia, on meropenem, planned for 4 weeks with stop date 6/8/2023  2. Sepsis  3. Right loculated pleural effusion, s/p thoracentesis  4. Acute hypoxic respiratory failure, worse  5. Sepsis, resolved  6. Emphysema, upper lobe predominant, COPD but no exacerbation  7. Peripheral pleural-based nodule, 2.4 cm on CT chest 4/3/23, could have been related to early pneumonia.  Underlying malignancy cannot be excluded  8. Tobacco abuse  9. Essential hypertension  10. Acute right MCA stroke status post TNK  11. Bilateral stroke and MRI looking for possible embolic so far no evidence on echo  12. Labile GI symptoms was constipation and diarrhea  13. New onset leukocytosis as of 5/25/2023    PLAN:  Patient was doing better clinically however she did have some constipation yesterday had some laxative and developed diarrhea today, that would not be an issue if not for the simultaneous decrease in her white blood cell count.  That is a concern because patient is on strong antibiotic regimen with a prolonged course and high risk for  C. difficile colitis.  Her last chest x-ray showed improvement in the pulmonary infiltrate, she is due for another x-ray and follow-up on that tomorrow  The plan was to continue with imipenem for 4 weeks with the last day scheduled for 6/8/2023 per infectious disease recommendations  Not sure at this point if the diarrhea is caused by the additional laxatives given for her recent constipation or C. difficile related and C. difficile screen will be sent.  Patient is on aspirin and Plavix which can increase  risk for petechial hemorrhage specially from scratching dry skin area, the lack of involvement of other body parts is reassuring but it would be monitored, preferred to keep the aspirin and the Plavix for now given the recent stroke    Disposition: Per primary team    Jennifer Hood MD  05/25/23  13:24 EDT          Dictated utilizing Dragon dictation

## 2023-05-25 NOTE — PROGRESS NOTES
Inpatient Rehabilitation Plan of Care Note    Plan of Care  Care Plan Reviewed - No updates at this time.    Safety    [RN] Potential for Injury(Active)  Current Status(05/25/2023): Patient at risk for injury related to mobility  issues.  Weekly Goal(05/30/2023): Patient will use call light appropriately and have no  injury while on Rehab.  Discharge Goal: Patient will have no injury while on Rehab.    Performed Intervention(s)  Items within reach, environmental set-up for reduce risk of fall  Bed alarms and chair alarms and safety rounds hourly      Psychosocial    [RN] Coping/Adjustment(Active)  Current Status(05/25/2023): Patient at risk for ineffective coping, but has a  supportive .  Weekly Goal(05/31/2023): Patient will verbalize needs and concerns related to  current situation.  Discharge Goal: Patient will have healthy coping skills at time of discharge.    Performed Intervention(s)  Medication as ordered and PRN  Verbalize needs and concerns  Therapeutic environmental set up      Sphincter Control    [RN] Bladder Management(Active)  Current Status(05/25/2023): Patient continent 75% of the time.  Weekly Goal(05/31/2023): Patient will be continent 100%  Discharge Goal: Patient will be continent 100% of the time.    [RN] Bowel Management(Active)  Current Status(05/25/2023): Patient incontinent 100% of the time.  Weekly Goal(05/30/2023): Patient will be continent 50% of the time.  Discharge Goal: Patient will be continent 100% of the time.    Performed Intervention(s)  Incontinent care as needed and ointment applied as ordered.  Take to bathroom in a timely manner  Proper diet and adequate fluid intake.      Body Systems    [RN] Integumentary(Active)  Current Status(05/25/2023): Patient has stage 2 pressure injury to cocyx  Weekly Goal(05/30/2023): Patients skin will be healing with no more deteriotion.    Discharge Goal: Patients skin will be healed with no further injury.    Performed  Intervention(s)  Medication, turn every 2 hours and prper nutrition.    Signed by: Shirley Cordero RN

## 2023-05-25 NOTE — PLAN OF CARE
Goal Outcome Evaluation:  Plan of Care Reviewed With: patient             Problem: Rehabilitation (IRF) Plan of Care  Goal: Plan of Care Review  Outcome: Ongoing, Progressing  Flowsheets (Taken 5/25/2023 9423)  Plan of Care Reviewed With: patient  Outcome Evaluation: Pt is alert and oriented x 4. Flat affect. Meds whole PO with thins. Continent/incontinent of bowel and bladder. Purewick in place this evening. Large continent bowel movement this am. PICC to rught arm patent with good blood return. Continues on IV abx r/t PNA. Zio patch in place. Independent with bed mobility. No pain or discomfort. No unsafe behaviors. On RA at HS, checked during round and read 87% on RA, 2L applied. WNL. Call light within reach.

## 2023-05-25 NOTE — PROGRESS NOTES
Inpatient Rehabilitation Plan of Care Note    Plan of Care  Care Plan Reviewed - No updates at this time.    Safety    Performed Intervention(s)  Items within reach, environmental set-up for reduce risk of fall  Bed alarms and chair alarms and safety rounds hourly      Psychosocial    Performed Intervention(s)  Medication as ordered and PRN  Verbalize needs and concerns  Therapeutic environmental set up      Sphincter Control    Performed Intervention(s)  Incontinent care as needed and ointment applied as ordered.  Take to bathroom in a timely manner  Proper diet and adequate fluid intake.      Body Systems    Performed Intervention(s)  Medication, turn every 2 hours and prper nutrition.    Signed by: Susana Ugalde RN

## 2023-05-25 NOTE — PROGRESS NOTES
LOS: 6 days   Patient Care Team:  Zelalem Flor APRN as PCP - General (Internal Medicine)  Brandon Mitchell MD as Consulting Physician (Orthopedic Surgery)      ANNIE CERDA  1950    Diagnoses    1. DECREASED FUNCTIONAL MOBILITY AND ENDURANCE       ADMITTING DIAGNOSIS:  CVA      Subjective       Patient working with speech therapy on cognitive task  She complains of some shortness of air but no acute change.  On room air in the speech therapy room.  Was wearing 2 L nasal cannula last night.  Has had 3 loose stools semiformed today.  Received bowel program yesterday for constipation with no recent bowel movement prior.  Per staff did not seem like C. Difficile.  Denies any dysuria.  Participating in therapies..        Objective     Vitals:    05/25/23 0515   BP:    Pulse:    Resp:    Temp:    SpO2: 92%       PHYSICAL EXAM:   MENTAL STATUS -  AWAKE / ALERT  HEENT-   SCLERAE ANICTERIC, CONJUNCTIVAE PINK, OP MOIST, NO JVD,  LUNGS -decreased air movement bilaterally   On room air  Chest-Zio patch  HEART- RRR, NO RUB, MURMUR, OR GALLOP  ABD - NORMOACTIVE BOWEL SOUNDS, SOFT, NT.    EXT - NO EDEMA OR CYANOSIS  Increased diffuse purpleish petechiae on the anterior lower legs with some areas of ecchymosis as well.  Some old ecchymosis on the forearms.  Has nail polish on the fingernails and toenails both the hands and feet appear unremarkable  Right upper extremity PICC line site unremarkable without any swelling about the area.  NEURO -oriented x4  MOTOR EXAM -takes resistance bilaterally        MEDICATIONS  Scheduled Meds:aspirin, 81 mg, Oral, Q24H  atorvastatin, 80 mg, Oral, Nightly  citalopram, 20 mg, Oral, Daily  clopidogrel, 75 mg, Oral, Daily  doxycycline, 100 mg, Oral, Q12H  enoxaparin, 40 mg, Subcutaneous, Q24H  guaiFENesin, 600 mg, Oral, BID  hydrocortisone-bacitracin-zinc oxide-nystatin, 1 application, Topical, BID  magnesium hydroxide, 10 mL, Oral, Once  Menthol-Zinc Oxide, 1 application, Topical, 4x  "Daily  meropenem, 1 g, Intravenous, Q8H  metoprolol tartrate, 12.5 mg, Oral, Q12H  sodium chloride, 10 mL, Intravenous, Q12H  sodium chloride, 10 mL, Intravenous, Q12H  cyanocobalamin, 1,000 mcg, Oral, Daily      Continuous Infusions:   PRN Meds:.•  acetaminophen  •  ipratropium-albuterol  •  loperamide  •  senna-docusate sodium  •  sodium chloride  •  sodium chloride  •  sodium chloride  •  sodium chloride  •  sodium chloride      RESULTS  Glucose   Date/Time Value Ref Range Status   05/24/2023 2027 118 70 - 130 mg/dL Final     Comment:     Meter: [id number] : [id number] Pedro HORN     Results from last 7 days   Lab Units 05/25/23  0501 05/20/23  0740 05/19/23  0425   WBC 10*3/mm3 18.08* 8.34 9.51   HEMOGLOBIN g/dL 11.0* 10.3* 10.0*   HEMATOCRIT % 32.3* 30.3* 29.5*   PLATELETS 10*3/mm3 412 366 351     Results from last 7 days   Lab Units 05/25/23  0501 05/20/23  0740 05/19/23  0425   SODIUM mmol/L 138 137 139   POTASSIUM mmol/L 3.5 4.0 4.1   CHLORIDE mmol/L 104 105 104   CO2 mmol/L 29.0 28.7 30.5*   BUN mg/dL 13 14 12   CREATININE mg/dL 0.24* 0.20* 0.23*   CALCIUM mg/dL 8.1* 8.6 8.5*   GLUCOSE mg/dL 90 89 85       New Radiology:  ELIGIO negative for vegetations     Prior radiology:  MRI brain: \"Bilateral multifocal areas of infarction.  No evidence of hemorrhagic transformation. \"    ASSESSMENT and PLAN    Acute right MCA stroke    1. Acute R MCA stroke and bilateral infarcts  Stroke prophylaxis-aspirin/Plavix/atorvastatin  Zio patch placed on May 19 for 2 weeks  - 5/20 - Admit for inpt rehab w/ PT, OT, SLP, psychology and rehab medical and nursing care. Continue secondary stroke prophylaxis     2. Pneumonia and right lung abscess and Pleural effusions  - 5/20 - Continue IV antibx. ID following    - 5/21 - Continues to improve   The chest x-ray is reassuring   Expect her to be able to come off the oxygen in the next few days   She will need to have a follow-up chest imaging 6 weeks down the road   Finish the " antibiotic course per ID, last day 6/8/2023   -5/22-on 1 L oxygen with activities and maintaining sats  -5/23- Per Pulmonology, continue Merrem. Continue to wean off supplemental oxygen  -5/24-oxygen weaned to 0.5 L nasal cannula  -5/25 -on room air and therapy, more 2 L last night    ID-meropenem and doxycycline-last dose June 8, 2023  Repeat CT chest June 6, 2023  May 25-WBC increased to 18 K from 8K 5 days ago.  No fever.  Vital signs stable.  On room air during speech therapy session.  Not acutely ill-appearing.  Had 3 loose semiformed stool today but received bowel program yesterday for recent constipation.   PICC line site unremarkable.   Increased diffuse purpleish petechiae on the anterior lower legs.  She had a few earlier in the week but there more prominent today  Calves are soft without any significant edema.  Continues on meropenem and doxycycline.   Patient reviewed with pulmonology service who will assess.  Although with the recent bowel program, given the white blood cell count increased will check C. difficile with the loose stool today  Will review with infectious disease service    3. Acute hypoxic respiratory failure     4. Emphysema d/t Tobacco use     5. Hypertension-continue antihypertensive regimen and avoid hypotensive episodes.     6. DVT prophylaxis -   - 5/20 - Start Lovenox SQ daily    7.  Dysphagia  -5/22-diet advanced to regular solid thin liquids    8.  GI-constipation-May 24-add Senokot-Colace.  Milk of magnesia x1.  Patient maintaining hydration  May 25-loose stool semiformed x3 today.  Changed Senokot Colace to as needed    TEAM CONF - MAY 23 - BED MIN. TRANSFERS MIN MOD ASSIST. GAIT 60 FEET MIN RW. TOILET TRANSFERS MOD ASSIST RW. SATS 1 L WITH ACTIVITIES WITH SATS >=90%. BATH MOD. LBD MOD.UBD MIN. GROOMING SET UP. TOILETING MAX. MILD EXECUTIVE FUNCTION DEFICITS. BNE PENDING. DIET ADVANCED TO REGULAR CONSISTENCY, GOOD INTAKE. PULM EXPECTS WILL BE OFF OXYGEN IN NEXT FEW DAYS. REPEAT  CHEST IMAGING IN 6 WEEKS.  ELOS - 2 WEEKS.     Now admit for comprehensive acute inpatient rehabilitation .  This would be an interdisciplinary program with physical therapy 1 hour,  occupational therapy 1 hour, and speech therapy 1 hour, 5 days a week.  Rehabilitation nursing for carryover, monitoring of neurologic and pulmonary   status, bowel and bladder, and skin  Ongoing physician follow-up.  Weekly team conferences.  Goals are to achieve a level of supervision with  mobility and self-care and improved endurance.   Rehabilitation prognosis fair.  Medical prognosis fair.  Estimated length of stay is approximately 2 weeks, but is only an estimation.    .     The patient's functional status and clinical status is unchanged from preadmission assessment and the patient continues appropriate for acute inpatient rehabilitation.  Goal is for home with outpatient   therapies.  Barrier to discharge: Impaired mobility self-care endurance- work on vision, strength, gross and fine motor control, balance, progressive ambulation, ADLs to overcome.           Sebastian Collins MD      During rounds, used appropriate personal protective equipment including mask and gloves.  Additional gown if indicated.  Mask used was standard procedure mask. Appropriate PPE was worn during the entire visit.  Hand hygiene was completed before and after.

## 2023-05-25 NOTE — THERAPY TREATMENT NOTE
Inpatient Rehabilitation - Occupational Therapy Treatment Note    [facility]     Patient Name: Katherine Williamson  : 1950  MRN: [id number]    Today's Date: 2023                 Admit Date: 2023         ICD-10-CM ICD-9-CM   1. Decreased functional mobility and endurance  Z74.09 780.99       Patient Active Problem List   Diagnosis   • Benign essential HTN   • Tobacco abuse   • Dislocation of hip joint prosthesis   • Fracture of proximal humerus   • Mechanical complication of internal orthopedic device   • Wear of articular bearing surface of internal prosthetic joint   • History of repair of hip joint   • History of operative procedure on hip   • Hyperglycemia   • Hepatic steatosis   • Diverticulosis   • Chest pain, atypical   • History of colon polyps   • Family history of colon cancer in mother   • Allergic rhinitis   • Lung nodule seen on imaging study   • Acute respiratory failure with hypoxia   • Abscess of right lung with pneumonia   • Empyema   • Sepsis   • Pleural effusion, right   • Other emphysema   • Pulmonary nodule (RLL)   • Diastolic dysfunction, grade 1   • Physical debility   • Cryptogenic stroke   • Acute right MCA stroke       Past Medical History:   Diagnosis Date   • Arthritis    • Cataract    • Colon polyps     FOLLOWED BY DR. JOSEPH LAU   • Hypertension        Past Surgical History:   Procedure Laterality Date   • COLONOSCOPY  10/2015    [id number]-Jx. Kaplan.  Follow-up in 5 years.   • COLONOSCOPY N/A 2022    2 BENIGN POLYPS IN DESCENDING, 5 MM BENIGN POLYP IN SIGMOID, MULTIPLE SMALL AND LARGE DIVERTICULA IN SIGMOID, RESCOPE IN 3 YRS, DR. JOSEPH LAU AT [facility]   • EYE SURGERY     • JOINT REPLACEMENT  ?    Left total hip replacement in .  In 2015 patient had left hip revision   • TONSILLECTOMY               IRF OT ASSESSMENT FLOWSHEET (last 12 hours)     IRF OT Evaluation and Treatment     Row Name 23 1444          OT Time and Intention     Document Type daily treatment  -AF     Mode of Treatment occupational therapy  -AF     Patient Effort adequate  -AF     Symptoms Noted During/After Treatment fatigue  -AF     Row Name 05/25/23 1444          General Information    Patient/Family/Caregiver Comments/Observations pt had mulitple loose bowel movements during both sessions of OT, AVILA and MD aware, agreeable a shower this AM  -AF     Existing Precautions/Restrictions fall;oxygen therapy device and L/min  -AF     Row Name 05/25/23 1444          Pain Assessment    Pretreatment Pain Rating 0/10 - no pain  -AF     Posttreatment Pain Rating 0/10 - no pain  -AF     Pre/Posttreatment Pain Comment pt has soreness/pain on her bottom secondary to wound and multiple bowel movements NSG aware  -AF     Row Name 05/25/23 1444          Cognition/Psychosocial    Affect/Mental Status (Cognition) flat/blunted affect  -AF     Orientation Status (Cognition) oriented x 3  -AF     Follows Commands (Cognition) follows one-step commands;verbal cues/prompting required  -AF     Personal Safety Interventions fall prevention program maintained;gait belt;nonskid shoes/slippers when out of bed  -AF     Cognitive Function WFL  -AF     Row Name 05/25/23 1444          Bathing    [place] Level (Bathing) bathing skills;lower body;upper body;minimum assist (75% patient effort)  -AF     Assistive Device (Bathing) grab bar/tub rail;hand held shower spray hose;tub bench  -AF     Position (Bathing) supported sitting;supported standing  -AF     Row Name 05/25/23 1444          Upper Body Dressing    [place] Level (Upper Body Dressing) upper body dressing skills;set up assistance  -AF     Position (Upper Body Dressing) supported sitting  -AF     Comment (Upper Body Dressing) seated on TTB  -AF     Row Name 05/25/23 1444          Lower Body Dressing    [place] Level (Lower Body Dressing) don;pants/bottoms;shoes/slippers;socks;doff;moderate assist (50% patient effort);verbal cues  -AF      Position (Lower Body Dressing) supported sitting;supported standing  -AF     Set-up Assistance (Lower Body Dressing) obtain clothing  -AF     Row Name 05/25/23 1444          Grooming    [place] Level (Grooming) grooming skills;set up  -AF     Position (Grooming) sink side;supported sitting  -AF     Comment (Grooming) w/c level  -AF     Row Name 05/25/23 1444          Toileting    [place] Level (Toileting) toileting skills;moderate assist (50% patient effort);minimum assist (75% patient effort);verbal cues  -AF     Assistive Device Use (Toileting) grab bar/safety frame;raised toilet seat  -AF     Position (Toileting) supported sitting;supported standing  -AF     Comment (Toileting) RWX level, completed in AM and PM sessions  -AF     Row Name 05/25/23 1444          Bed Mobility    Supine-Sit [place] (Bed Mobility) contact guard  in AM and PM  -     Row Name 05/25/23 1444          Functional Mobility    Functional Mobility- Comment walked to and from EOB to commode with RWX in AM and PM session with CGA  -     Row Name 05/25/23 1444          Transfer Assessment/Treatment    Transfers shower transfer  -     Row Name 05/25/23 1444          Bed-Chair Transfer    Bed-Chair [place] (Transfers) minimum assist (75% patient effort);verbal cues  -AF     Assistive Device (Bed-Chair Transfers) walker, front-wheeled;wheelchair  -     Row Name 05/25/23 1444          Sit-Stand Transfer    Sit-Stand [place] (Transfers) minimum assist (75% patient effort);verbal cues;moderate assist (50% patient effort)  -AF     Assistive Device (Sit-Stand Transfers) walker, front-wheeled  -AF     Row Name 05/25/23 1444          Stand-Sit Transfer    Stand-Sit [place] (Transfers) minimum assist (75% patient effort);contact guard;verbal cues  -AF     Assistive Device (Stand-Sit Transfers) walker, front-wheeled;wheelchair  -AF     Row Name 05/25/23 1444          Toilet Transfer    Type (Toilet Transfer)  sit-stand;stand-sit  -AF     [place] Level (Toilet Transfer) minimum assist (75% patient effort);verbal cues  -AF     Assistive Device (Toilet Transfer) commode;grab bars/safety frame;walker, front-wheeled  -AF     Comment, (Toilet Transfer) in AM and PM sessions  -AF     Row Name 05/25/23 1444          Shower Transfer    Type (Shower Transfer) stand pivot/stand step  -AF     [place] Level (Shower Transfer) minimum assist (75% patient effort);verbal cues  -AF     Assistive Device (Shower Transfer) grab bar, tub/shower;tub bench;wheelchair  -AF     Row Name 05/25/23 1444          Shoulder (Therapeutic Exercise)    Shoulder Strengthening (Therapeutic Exercise) bilateral;scapular stabilization;sitting;2 lb free weight;10 repetitions;2 sets  -AF     Row Name 05/25/23 1444          Elbow/Forearm (Therapeutic Exercise)    Elbow/Forearm Strengthening (Therapeutic Exercise) flexion;bilateral;extension;supination;pronation;sitting;2 lb free weight;10 repetitions;2 sets  -AF     Row Name 05/25/23 1444          Balance    Static Sitting Balance standby assist  -AF     Dynamic Sitting Balance contact guard  with bathing seated on TTB  -AF     Static Standing Balance minimal assist;contact guard  -AF     Dynamic Standing Balance minimal assist  for short stand during ADL tasks  -AF     Row Name 05/25/23 1444          Positioning and Restraints    Pre-Treatment Position in bed  -AF     Post Treatment Position wheelchair  -AF     In Wheelchair sitting;exit alarm on;with PT  in AM and PM sessions  -AF           User Key  (r) = Recorded By, (t) = Taken By, (c) = Cosigned By    Initials Name Effective Dates    AF Silke Moreno, OTR 06/16/21 -                  Occupational Therapy Education     Title: PT OT SLP Therapies (In Progress)     Topic: Occupational Therapy (In Progress)     Point: ADL training (Done)     Description:   Instruct learner(s) on proper safety adaptation and remediation techniques during self care or  transfers.   Instruct in proper use of assistive devices.              Learning Progress Summary           Patient Acceptance, E,TB, VU by  at 5/20/2023 1516                   Point: Home exercise program (Not Started)     Description:   Instruct learner(s) on appropriate technique for monitoring, assisting and/or progressing therapeutic exercises/activities.              Learner Progress:  Not documented in this visit.          Point: Precautions (Not Started)     Description:   Instruct learner(s) on prescribed precautions during self-care and functional transfers.              Learner Progress:  Not documented in this visit.          Point: Body mechanics (Done)     Description:   Instruct learner(s) on proper positioning and spine alignment during self-care, functional mobility activities and/or exercises.              Learning Progress Summary           Patient Acceptance, E, VU,DU,NR by AF at 5/23/2023 1448    Comment: safety and technique with transfers                               User Key     Initials Effective Dates Name Provider Type Discipline     06/16/21 -  Verena Hewitt OTENID Occupational Therapist OT     06/16/21 -  Silke Moreno OTENID Occupational Therapist OT                    OT Recommendation and Plan                         Time Calculation:      Time Calculation- OT     Row Name 05/25/23 1450 05/25/23 1445          Time Calculation- OT    OT Start Time 1230  -AF 0930  -AF     OT Stop Time 1300  -AF 1000  -AF     OT Time Calculation (min) 30 min  -AF 30 min  -AF           User Key  (r) = Recorded By, (t) = Taken By, (c) = Cosigned By    Initials Name Provider Type    AF Silke Moreno, OTR Occupational Therapist              Therapy Charges for Today     Code Description Service Date Service Provider Modifiers Qty    81287884869 HC OT SELF CARE/MGMT/TRAIN EA 15 MIN 5/24/2023 Silke Moreno, OTR GO 2    [id number]  OT THER PROC EA 15 MIN 5/24/2023 Silke Moreno, OTR GO 2     53430044104 HC OT SELF CARE/MGMT/TRAIN EA 15 MIN 5/25/2023 Silke Moreno, OTR GO 3    95684137203 HC OT THER PROC EA 15 MIN 5/25/2023 Silke Moreno, OTENID GO 1                   SONG Kaiser  5/25/2023

## 2023-05-25 NOTE — THERAPY TREATMENT NOTE
Inpatient Rehabilitation - Speech Language Pathology Treatment Note    [facility]     Patient Name: Katherine Williamson  : 1950  MRN: [id number]    Today's Date: 2023                   Admit Date: 2023       Visit Dx:      ICD-10-CM ICD-9-CM   1. Decreased functional mobility and endurance  Z74.09 780.99       Patient Active Problem List   Diagnosis   • Benign essential HTN   • Tobacco abuse   • Dislocation of hip joint prosthesis   • Fracture of proximal humerus   • Mechanical complication of internal orthopedic device   • Wear of articular bearing surface of internal prosthetic joint   • History of repair of hip joint   • History of operative procedure on hip   • Hyperglycemia   • Hepatic steatosis   • Diverticulosis   • Chest pain, atypical   • History of colon polyps   • Family history of colon cancer in mother   • Allergic rhinitis   • Lung nodule seen on imaging study   • Acute respiratory failure with hypoxia   • Abscess of right lung with pneumonia   • Empyema   • Sepsis   • Pleural effusion, right   • Other emphysema   • Pulmonary nodule (RLL)   • Diastolic dysfunction, grade 1   • Physical debility   • Cryptogenic stroke   • Acute right MCA stroke       Past Medical History:   Diagnosis Date   • Arthritis    • Cataract    • Colon polyps     FOLLOWED BY DR. JOSEPH LAU   • Hypertension        Past Surgical History:   Procedure Laterality Date   • COLONOSCOPY  10/2015    [id number].  Follow-up in 5 years.   • COLONOSCOPY N/A 2022    2 BENIGN POLYPS IN DESCENDING, 5 MM BENIGN POLYP IN SIGMOID, MULTIPLE SMALL AND LARGE DIVERTICULA IN SIGMOID, RESCOPE IN 3 YRS, DR. JOSEPH LAU AT [facility]   • EYE SURGERY     • JOINT REPLACEMENT  ?    Left total hip replacement in .  In 2015 patient had left hip revision   • TONSILLECTOMY         SLP Recommendation and Plan                                                            SLP EVALUATION (last 72 hours)     SLP SLC  Evaluation     Row Name 05/25/23 1100 05/25/23 0830 05/24/23 1100 05/24/23 0830 05/23/23 1100       Communication Assessment/Intervention    Document Type therapy note (daily note)  -AL therapy note (daily note)  -AL therapy note (daily note)  -AL therapy note (daily note)  -AL therapy note (daily note)  -AL    Patient/Family/Caregiver Comments/Observations Pt participated well. She reported continued fatigue impacting her ability to think.  -AL Pt reporting fatigue. Seen bedside.  -AL Pt is pleasant and cooperative.  -AL Pt participated well. Seen bedside.  -AL Pt participated well.  -AL       Pain Scale: Numbers Pre/Post-Treatment    Pretreatment Pain Rating 0/10 - no pain  -AL 0/10 - no pain  -AL 0/10 - no pain  -AL 0/10 - no pain  -AL 0/10 - no pain  -AL    Row Name 05/23/23 0830                   Communication Assessment/Intervention    Document Type therapy note (daily note)  -AL        Patient/Family/Caregiver Comments/Observations Pt is pleasant and cooperative.  -AL           Pain Scale: Numbers Pre/Post-Treatment    Pretreatment Pain Rating 0/10 - no pain  -AL              User Key  (r) = Recorded By, (t) = Taken By, (c) = Cosigned By    Initials Name Effective Dates    Sarita Landry, MS CCC-SLP 06/16/21 -                    EDUCATION    The patient has been educated in the following areas:       Cognitive Impairment.             SLP GOALS     Row Name 05/25/23 1100 05/25/23 0830 05/24/23 1100       Memory Skills Goal 1 (SLP)    Progress/Outcomes (Memory Skills Goal 1, SLP) -- good progress toward goal  -AL --    Comment (Memory Skills Goal 1, SLP) -- Diagnostic treatment: Working memory for 4 words reversed: 100% with NO cues.  -AL --       Reasoning Goal 1 (SLP)    Improve Reasoning Through Goal 1 (SLP) -- -- complete high level reasoning task;90%;independently (over 90% accuracy)  -AL    Progress/Outcomes (Reasoning Goal 1, SLP) -- good progress toward goal  -AL good progress toward goal  -AL     "Comment (Reasoning Goal 1, SLP) -- Moderate level logic puzzle: 80% with NO cues, 100% with MIN cues.  -AL Complex visuospatial reasoning task: 50% with NO Cues, 100% with MOD cues. Pt reported fatigue impacted her ability to think. She became frustrated midway through task and stated \"I can't finish it.\"  -AL       Functional Math Skills Goal 1 (SLP)    Improve Functional Math Skills Through Goal 1 (SLP) complete functional math task;100%;independently (over 90% accuracy)  -AL -- --    Time Frame (Functional Math Skills Goal 1, SLP) 1 week  -AL -- --    Progress/Outcomes (Functional Math Skills Goal 1, SLP) good progress toward goal  -AL -- --    Comment (Functional Math Skills Goal 1, SLP) Checkbook balancing task: 40% with NO cues, 100% wtih MIN cues. Pt with increased difficulty using the calculatory today.  -AL -- --       Executive Functional Skills Goal 1 (SLP)    Progress/Outcomes (Executive Function Skills Goal 1, SLP) -- -- goal ongoing  -AL    Comment (Executive Function Skills Goal 1, SLP) -- -- Discussed recommendation for use of pill box at home to organize meds. Pt reported she does not wish to practice organizing meds at this time, but plans to do so before discharge.  -AL    Row Name 05/24/23 0830 05/23/23 1100 05/23/23 0830       Attention Goal 1 (SLP)    Improve Attention by Goal 1 (SLP) -- -- complete selective attention task;complete divided attention task;90%;independently (over 90% accuracy)  -AL    Progress/Outcomes (Attention Goal 1, SLP) good progress toward goal  -AL good progress toward goal  -AL good progress toward goal  -AL    Comment (Attention Goal 1, SLP) Attention to detail for written \"if/then\" directions: 100% with NO cues. Alternating attention for \"Blink\" card sort: required MIN cue x1 when sorting entire deck.  -AL Introduced \"Blink\" card sort task. Pt required MIN cue x1 when sorting entire deck (color, shape, number).  -AL Written \"if/then\" directions: 7/8 with NO cues, 8/8 " with MIN cue for attention to detail.  -AL       Memory Skills Goal 1 (SLP)    Improve Memory Skills Through Goal 1 (SLP) recalling unrelated word lists with an imposed delay  -AL -- --    Progress/Outcomes (Memory Skills Goal 1, SLP) good progress toward goal  -AL -- good progress toward goal  -AL    Comment (Memory Skills Goal 1, SLP) Recalled 2/3 errands after 15 minutes with NO cues, 3/3 with MIN cues  -AL -- Recalled 3/3 errands after 15 minutes with NO cues.  -AL       Reasoning Goal 1 (SLP)    Improve Reasoning Through Goal 1 (SLP) complete high level reasoning task;90%;independently (over 90% accuracy)  -AL -- complete high level reasoning task;90%;independently (over 90% accuracy)  -AL    Time Frame (Reasoning Goal 1, SLP) short term goal (STG);1 week  -AL -- --    Progress/Outcomes (Reasoning Goal 1, SLP) good progress toward goal  -AL -- good progress toward goal  -AL    Comment (Reasoning Goal 1, [facility]) [employer] reasoning task: 7/12 with NO cues, 12/12 with MIN cues.  -AL -- Moderately complex logic puzzle: 65% with NO cues, 100% with MIN-MOD cues.  -AL       Functional Math Skills Goal 1 (SLP)    Improve Functional Math Skills Through Goal 1 (SLP) -- complete functional math task;100%;independently (over 90% accuracy)  -AL --    Time Frame (Functional Math Skills Goal 1, SLP) -- 1 week  -AL --    Progress/Outcomes (Functional Math Skills Goal 1, SLP) -- good progress toward goal  -AL --    Comment (Functional Math Skills Goal 1, SLP) -- Checkbook balancing task: 5/8 with NO cues, 8/8 with MIN cues to correct math errors. Pt organized information with NO cues.  -AL --          User Key  (r) = Recorded By, (t) = Taken By, (c) = Cosigned By    Initials Name Provider Type    Sarita Landry, MS CCC-SLP Speech and Language Pathologist                            Time Calculation:        Time Calculation- SLP     Row Name 05/25/23 1210 05/25/23 0900          Time Calculation- SLP    SLP Start Time 1100   -AL 0830  -AL     SLP Stop Time 1130  -AL 0900  -AL     SLP Time Calculation (min) 30 min  -AL 30 min  -AL           User Key  (r) = Recorded By, (t) = Taken By, (c) = Cosigned By    Initials Name Provider Type    Sarita Landry, MS CCC-SLP Speech and Language Pathologist                  Therapy Charges for Today     Code Description Service Date Service Provider Modifiers Qty    29937541486 HC ST DEV OF COGN SKILLS INITIAL 15 MIN 5/24/2023 Sarita Comer, MS CCC-SLP  1    66532974352 HC ST DEV OF COGN SKILLS EACH ADDT'L 15 MIN 5/24/2023 Sarita Comer, MS CCC-SLP  3    37042094750 HC ST DEV OF COGN SKILLS INITIAL 15 MIN 5/25/2023 Sarita Comer, MS CCC-SLP  1    09228000416 HC ST DEV OF COGN SKILLS EACH ADDT'L 15 MIN 5/25/2023 Sarita Comer, MS CCC-SLP  3                           Sarita Comer MS CCC-SLP  5/25/2023

## 2023-05-25 NOTE — PLAN OF CARE
Goal Outcome Evaluation:           Progress: improving  Outcome Evaluation: Katherine is alert and oriented, she explained that she feels like her right side is slightly weaker than left after the stroke, has been continent and incontiennt of bowel and bladder, had 3 BM this shift, WBC elevated today C-Diff specimen ordered although not obtained yet due to not having another BM yet, going down for Chest xray now, labs ordered for AM, PICC intact to right upper extremitiy, good blood return IV antibiotics continued, no unsafe behaivors vistors this afternoon, no complaints of pain or distress vital signs stable.

## 2023-05-26 ENCOUNTER — APPOINTMENT (OUTPATIENT)
Dept: CT IMAGING | Facility: HOSPITAL | Age: 73
DRG: 056 | End: 2023-05-26
Payer: COMMERCIAL

## 2023-05-26 LAB
C DIFF TOX GENS STL QL NAA+PROBE: NEGATIVE
CRP SERPL-MCNC: 13.75 MG/DL (ref 0–0.5)
DEPRECATED RDW RBC AUTO: 44.4 FL (ref 37–54)
ERYTHROCYTE [DISTWIDTH] IN BLOOD BY AUTOMATED COUNT: 14 % (ref 12.3–15.4)
HCT VFR BLD AUTO: 27.8 % (ref 34–46.6)
HGB BLD-MCNC: 9.7 G/DL (ref 12–15.9)
MCH RBC QN AUTO: 30.3 PG (ref 26.6–33)
MCHC RBC AUTO-ENTMCNC: 34.9 G/DL (ref 31.5–35.7)
MCV RBC AUTO: 86.9 FL (ref 79–97)
PLATELET # BLD AUTO: 330 10*3/MM3 (ref 140–450)
PMV BLD AUTO: 10.5 FL (ref 6–12)
PROCALCITONIN SERPL-MCNC: 0.22 NG/ML (ref 0–0.25)
RBC # BLD AUTO: 3.2 10*6/MM3 (ref 3.77–5.28)
WBC NRBC COR # BLD: 15.03 10*3/MM3 (ref 3.4–10.8)

## 2023-05-26 PROCEDURE — 84145 PROCALCITONIN (PCT): CPT | Performed by: INTERNAL MEDICINE

## 2023-05-26 PROCEDURE — 97129 THER IVNTJ 1ST 15 MIN: CPT

## 2023-05-26 PROCEDURE — 85027 COMPLETE CBC AUTOMATED: CPT | Performed by: INTERNAL MEDICINE

## 2023-05-26 PROCEDURE — 87493 C DIFF AMPLIFIED PROBE: CPT | Performed by: PHYSICAL MEDICINE & REHABILITATION

## 2023-05-26 PROCEDURE — 97110 THERAPEUTIC EXERCISES: CPT

## 2023-05-26 PROCEDURE — 97535 SELF CARE MNGMENT TRAINING: CPT

## 2023-05-26 PROCEDURE — 25010000002 ENOXAPARIN PER 10 MG: Performed by: PHYSICAL MEDICINE & REHABILITATION

## 2023-05-26 PROCEDURE — 25010000002 MEROPENEM PER 100 MG: Performed by: HOSPITALIST

## 2023-05-26 PROCEDURE — 97116 GAIT TRAINING THERAPY: CPT

## 2023-05-26 PROCEDURE — 97530 THERAPEUTIC ACTIVITIES: CPT

## 2023-05-26 PROCEDURE — 97130 THER IVNTJ EA ADDL 15 MIN: CPT

## 2023-05-26 PROCEDURE — 71250 CT THORAX DX C-: CPT

## 2023-05-26 PROCEDURE — 86140 C-REACTIVE PROTEIN: CPT | Performed by: INTERNAL MEDICINE

## 2023-05-26 RX ADMIN — ASPIRIN 81 MG: 81 TABLET, CHEWABLE ORAL at 07:27

## 2023-05-26 RX ADMIN — GUAIFENESIN 600 MG: 600 TABLET, EXTENDED RELEASE ORAL at 07:28

## 2023-05-26 RX ADMIN — Medication 1 APPLICATION: at 21:03

## 2023-05-26 RX ADMIN — ZINC OXIDE 1 APPLICATION: 200 OINTMENT TOPICAL at 07:28

## 2023-05-26 RX ADMIN — CITALOPRAM 20 MG: 20 TABLET, FILM COATED ORAL at 07:28

## 2023-05-26 RX ADMIN — ATORVASTATIN CALCIUM 80 MG: 80 TABLET, FILM COATED ORAL at 21:02

## 2023-05-26 RX ADMIN — Medication 10 ML: at 07:30

## 2023-05-26 RX ADMIN — METOPROLOL TARTRATE 12.5 MG: 25 TABLET, FILM COATED ORAL at 07:26

## 2023-05-26 RX ADMIN — Medication 10 ML: at 21:04

## 2023-05-26 RX ADMIN — GUAIFENESIN 600 MG: 600 TABLET, EXTENDED RELEASE ORAL at 21:02

## 2023-05-26 RX ADMIN — DOXYCYCLINE 100 MG: 100 CAPSULE ORAL at 07:28

## 2023-05-26 RX ADMIN — ENOXAPARIN SODIUM 40 MG: 100 INJECTION SUBCUTANEOUS at 21:02

## 2023-05-26 RX ADMIN — DOXYCYCLINE 100 MG: 100 CAPSULE ORAL at 21:02

## 2023-05-26 RX ADMIN — METOPROLOL TARTRATE 12.5 MG: 25 TABLET, FILM COATED ORAL at 21:02

## 2023-05-26 RX ADMIN — CLOPIDOGREL BISULFATE 75 MG: 75 TABLET, FILM COATED ORAL at 07:27

## 2023-05-26 RX ADMIN — Medication 1000 MCG: at 07:27

## 2023-05-26 RX ADMIN — MEROPENEM 1 G: 1 INJECTION, POWDER, FOR SOLUTION INTRAVENOUS at 04:33

## 2023-05-26 RX ADMIN — Medication 1 APPLICATION: at 17:42

## 2023-05-26 RX ADMIN — Medication 1 APPLICATION: at 07:30

## 2023-05-26 RX ADMIN — Medication 10 ML: at 07:31

## 2023-05-26 RX ADMIN — MEROPENEM 1 G: 1 INJECTION, POWDER, FOR SOLUTION INTRAVENOUS at 13:04

## 2023-05-26 RX ADMIN — ZINC OXIDE 1 APPLICATION: 200 OINTMENT TOPICAL at 21:03

## 2023-05-26 RX ADMIN — MEROPENEM 1 G: 1 INJECTION, POWDER, FOR SOLUTION INTRAVENOUS at 21:01

## 2023-05-26 NOTE — PLAN OF CARE
Problem: Rehabilitation (IRF) Plan of Care  Goal: Plan of Care Review  Outcome: Ongoing, Progressing  Flowsheets (Taken 5/26/2023 0038)  Plan of Care Reviewed With: patient  Goal: Patient-Specific Goal (Individualized)  Outcome: Ongoing, Progressing  Goal: Absence of New-Onset Illness or Injury  Outcome: Ongoing, Progressing  Intervention: Prevent Fall and Fall Injury  Recent Flowsheet Documentation  Taken 5/25/2023 2040 by Reno Lazo RN  Safety Promotion/Fall Prevention:   safety round/check completed   room organization consistent   nonskid shoes/slippers when out of bed   assistive device/personal items within reach   clutter free environment maintained   fall prevention program maintained  Intervention: Prevent Infection  Recent Flowsheet Documentation  Taken 5/25/2023 2040 by Reno Lazo RN  Infection Prevention:   environmental surveillance performed   hand hygiene promoted   single patient room provided  Intervention: Prevent VTE (Venous Thromboembolism)  Recent Flowsheet Documentation  Taken 5/25/2023 2040 by Reno Lazo RN  VTE Prevention/Management:   bilateral   sequential compression devices off   patient refused intervention  Goal: Optimal Comfort and Wellbeing  Outcome: Ongoing, Progressing  Goal: Home and Community Transition Plan Established  Outcome: Ongoing, Progressing     Problem: Fall Injury Risk  Goal: Absence of Fall and Fall-Related Injury  Outcome: Ongoing, Progressing  Intervention: Identify and Manage Contributors  Recent Flowsheet Documentation  Taken 5/25/2023 2040 by Reno Lazo RN  Medication Review/Management: medications reviewed  Self-Care Promotion: independence encouraged  Intervention: Promote Injury-Free Environment  Recent Flowsheet Documentation  Taken 5/25/2023 2040 by Reno Lazo RN  Safety Promotion/Fall Prevention:   safety round/check completed   room organization consistent   nonskid shoes/slippers when out of bed   assistive  device/personal items within reach   clutter free environment maintained   fall prevention program maintained  Goal: Absence of Fall and Fall-Related Injury  Outcome: Ongoing, Progressing  Intervention: Identify and Manage Contributors  Recent Flowsheet Documentation  Taken 5/25/2023 2040 by Reno Lazo RN  Medication Review/Management: medications reviewed  Self-Care Promotion: independence encouraged  Intervention: Promote Injury-Free Environment  Recent Flowsheet Documentation  Taken 5/25/2023 2040 by Rneo Lazo RN  Safety Promotion/Fall Prevention:   safety round/check completed   room organization consistent   nonskid shoes/slippers when out of bed   assistive device/personal items within reach   clutter free environment maintained   fall prevention program maintained     Problem: Skin Injury Risk Increased  Goal: Skin Health and Integrity  Outcome: Ongoing, Progressing  Intervention: Promote and Optimize Oral Intake  Recent Flowsheet Documentation  Taken 5/25/2023 2040 by Reno Lazo RN  Oral Nutrition Promotion:   physical activity promoted   social interaction promoted  Intervention: Optimize Skin Protection  Recent Flowsheet Documentation  Taken 5/25/2023 2040 by Reno Lazo RN  Pressure Reduction Techniques:   frequent weight shift encouraged   weight shift assistance provided   positioned off wounds  Head of Bed (HOB) Positioning: HOB elevated  Pressure Reduction Devices: positioning supports utilized  Skin Protection: adhesive use limited  Goal: Skin Health and Integrity  Outcome: Ongoing, Progressing  Intervention: Promote and Optimize Oral Intake  Recent Flowsheet Documentation  Taken 5/25/2023 2040 by Reno Lazo RN  Oral Nutrition Promotion:   physical activity promoted   social interaction promoted  Intervention: Optimize Skin Protection  Recent Flowsheet Documentation  Taken 5/25/2023 2040 by Reno Lazo RN  Pressure Reduction Techniques:   frequent weight shift  encouraged   weight shift assistance provided   positioned off wounds  Head of Bed (HOB) Positioning: HOB elevated  Pressure Reduction Devices: positioning supports utilized  Skin Protection: adhesive use limited     Problem: Hypertension Comorbidity  Goal: Blood Pressure in Desired Range  Outcome: Ongoing, Progressing  Intervention: Maintain Blood Pressure Management  Recent Flowsheet Documentation  Taken 5/25/2023 2040 by Reno Lazo RN  Syncope Management: position changed slowly  Medication Review/Management: medications reviewed     Problem: Pain Chronic (Persistent) (Comorbidity Management)  Goal: Acceptable Pain Control and Functional Ability  Outcome: Ongoing, Progressing  Intervention: Manage Persistent Pain  Recent Flowsheet Documentation  Taken 5/25/2023 2040 by Reno Lazo RN  Sleep/Rest Enhancement:   awakenings minimized   room darkened  Medication Review/Management: medications reviewed  Intervention: Optimize Psychosocial Wellbeing  Recent Flowsheet Documentation  Taken 5/25/2023 2040 by Reno Lazo RN  Supportive Measures:   active listening utilized   verbalization of feelings encouraged   self-responsibility promoted   self-reflection promoted   self-care encouraged  Family/Support System Care:   support provided   self-care encouraged   presence promoted   involvement promoted     Problem: Coping Ineffective  Goal: Effective Coping  Outcome: Ongoing, Progressing  Intervention: Support and Enhance Coping Strategies  Recent Flowsheet Documentation  Taken 5/25/2023 2040 by Reno Lazo RN  Supportive Measures:   active listening utilized   verbalization of feelings encouraged   self-responsibility promoted   self-reflection promoted   self-care encouraged  Environmental Support:   calm environment promoted   environmental consistency promoted  Family/Support System Care:   support provided   self-care encouraged   presence promoted   involvement promoted   Goal Outcome  Evaluation:  Plan of Care Reviewed With: patient

## 2023-05-26 NOTE — PROGRESS NOTES
LOS: 7 days   Patient Care Team:  Zelalem Flor APRN as PCP - General (Internal Medicine)  Brandon Mitchell MD as Consulting Physician (Orthopedic Surgery)      ANNIE CERDA  1950    Diagnoses    1. DECREASED FUNCTIONAL MOBILITY AND ENDURANCE       ADMITTING DIAGNOSIS:  CVA      Subjective   Denies SOB and cough this morning. Had just one bowel movement this morning that was not watery. Per nursing, patient was satting in the high 80s overnight and this morning. Was placed on 2L NC this morning.    Objective     Vitals:    05/26/23 0710   BP:    Pulse:    Resp:    Temp:    SpO2: 92%       PHYSICAL EXAM:   MENTAL STATUS -  AWAKE / ALERT  HEENT-   SCLERAE ANICTERIC, CONJUNCTIVAE PINK, OP MOIST, NO JVD,  LUNGS -decreased air movement bilaterally   On 2L in the room  Chest-Zio patch  HEART- RRR, NO RUB, MURMUR, OR GALLOP  ABD - NORMOACTIVE BOWEL SOUNDS, SOFT, NT.    EXT - NO EDEMA OR CYANOSIS  Increased diffuse purpleish petechiae on the anterior lower legs with some areas of ecchymosis as well.  Some old ecchymosis on the forearms.  Has nail polish on the fingernails and toenails both the hands and feet appear unremarkable  Right upper extremity PICC line site unremarkable without any swelling about the area.  NEURO -oriented x4  MOTOR EXAM -takes resistance bilaterally        MEDICATIONS  Scheduled Meds:aspirin, 81 mg, Oral, Q24H  atorvastatin, 80 mg, Oral, Nightly  citalopram, 20 mg, Oral, Daily  clopidogrel, 75 mg, Oral, Daily  doxycycline, 100 mg, Oral, Q12H  enoxaparin, 40 mg, Subcutaneous, Q24H  guaiFENesin, 600 mg, Oral, BID  hydrocortisone-bacitracin-zinc oxide-nystatin, 1 application, Topical, BID  magnesium hydroxide, 10 mL, Oral, Once  Menthol-Zinc Oxide, 1 application, Topical, 4x Daily  meropenem, 1 g, Intravenous, Q8H  metoprolol tartrate, 12.5 mg, Oral, Q12H  sodium chloride, 10 mL, Intravenous, Q12H  sodium chloride, 10 mL, Intravenous, Q12H  cyanocobalamin, 1,000 mcg, Oral,  "Daily      Continuous Infusions:   PRN Meds:.•  acetaminophen  •  ipratropium-albuterol  •  loperamide  •  senna-docusate sodium  •  sodium chloride  •  sodium chloride  •  sodium chloride  •  sodium chloride  •  sodium chloride      RESULTS  Glucose   Date/Time Value Ref Range Status   05/24/2023 2027 118 70 - 130 mg/dL Final     Comment:     Meter: [id number] : [id number] Pedro HORN     Results from last 7 days   Lab Units 05/26/23  0432 05/25/23  0501 05/20/23  0740   WBC 10*3/mm3 15.03* 18.08* 8.34   HEMOGLOBIN g/dL 9.7* 11.0* 10.3*   HEMATOCRIT % 27.8* 32.3* 30.3*   PLATELETS 10*3/mm3 330 412 366     Results from last 7 days   Lab Units 05/25/23  0501 05/20/23  0740   SODIUM mmol/L 138 137   POTASSIUM mmol/L 3.5 4.0   CHLORIDE mmol/L 104 105   CO2 mmol/L 29.0 28.7   BUN mg/dL 13 14   CREATININE mg/dL 0.24* 0.20*   CALCIUM mg/dL 8.1* 8.6   GLUCOSE mg/dL 90 89       New Radiology:  ELIGIO negative for vegetations  CXR 5/25 revealed increased opacification of the RLL     Prior radiology:  MRI brain: \"Bilateral multifocal areas of infarction.  No evidence of hemorrhagic transformation. \"    ASSESSMENT and PLAN    Acute right MCA stroke    1. Acute R MCA stroke and bilateral infarcts  Stroke prophylaxis-aspirin/Plavix/atorvastatin  Zio patch placed on May 19 for 2 weeks  - 5/20 - Admit for inpt rehab w/ PT, OT, SLP, psychology and rehab medical and nursing care. Continue secondary stroke prophylaxis     2. Pneumonia and right lung abscess and Pleural effusions  - 5/20 - Continue IV antibx. ID following    - 5/21 - Continues to improve   The chest x-ray is reassuring   Expect her to be able to come off the oxygen in the next few days   She will need to have a follow-up chest imaging 6 weeks down the road   Finish the antibiotic course per ID, last day 6/8/2023   -5/22-on 1 L oxygen with activities and maintaining sats  -5/23- Per Pulmonology, continue Merrem. Continue to wean off supplemental " oxygen  -5/24-oxygen weaned to 0.5 L nasal cannula  -5/25 -on room air and therapy, more 2 L last night  -5/26 Placed on 2L NC last night, WBC decreased to 15. CXR revealed increased opacification in RLL. Will continue Merrem and appreciate recommendations from Pulmonolgy. CDiff negative and bowel movements decreased this morning.    ID-meropenem and doxycycline-last dose June 8, 2023  Repeat CT chest June 6, 2023  May 25-WBC increased to 18 K from 8K 5 days ago.  No fever.  Vital signs stable.  On room air during speech therapy session.  Not acutely ill-appearing.  Had 3 loose semiformed stool today but received bowel program yesterday for recent constipation.   PICC line site unremarkable.   Increased diffuse purpleish petechiae on the anterior lower legs.  She had a few earlier in the week but there more prominent today  Calves are soft without any significant edema.  Continues on meropenem and doxycycline.   Patient reviewed with pulmonology service who will assess.  Although with the recent bowel program, given the white blood cell count increased will check C. difficile with the loose stool today  Will review with infectious disease service    3. Acute hypoxic respiratory failure     4. Emphysema d/t Tobacco use     5. Hypertension-continue antihypertensive regimen and avoid hypotensive episodes.     6. DVT prophylaxis -   - 5/20 - Start Lovenox SQ daily    7.  Dysphagia  -5/22-diet advanced to regular solid thin liquids    8.  GI-constipation-May 24-add Senokot-Colace.  Milk of magnesia x1.  Patient maintaining hydration  May 25-loose stool semiformed x3 today.  Changed Senokot Colace to as needed    TEAM CONF - MAY 23 - BED MIN. TRANSFERS MIN MOD ASSIST. GAIT 60 FEET MIN RW. TOILET TRANSFERS MOD ASSIST RW. SATS 1 L WITH ACTIVITIES WITH SATS >=90%. BATH MOD. LBD MOD.UBD MIN. GROOMING SET UP. TOILETING MAX. MILD EXECUTIVE FUNCTION DEFICITS. BNE PENDING. DIET ADVANCED TO REGULAR CONSISTENCY, GOOD INTAKE. PULM  EXPECTS WILL BE OFF OXYGEN IN NEXT FEW DAYS. REPEAT CHEST IMAGING IN 6 WEEKS.  ELOS - 2 WEEKS.     Now admit for comprehensive acute inpatient rehabilitation .  This would be an interdisciplinary program with physical therapy 1 hour,  occupational therapy 1 hour, and speech therapy 1 hour, 5 days a week.  Rehabilitation nursing for carryover, monitoring of neurologic and pulmonary   status, bowel and bladder, and skin  Ongoing physician follow-up.  Weekly team conferences.  Goals are to achieve a level of supervision with  mobility and self-care and improved endurance.   Rehabilitation prognosis fair.  Medical prognosis fair.  Estimated length of stay is approximately 2 weeks, but is only an estimation.    .     The patient's functional status and clinical status is unchanged from preadmission assessment and the patient continues appropriate for acute inpatient rehabilitation.  Goal is for home with outpatient   therapies.  Barrier to discharge: Impaired mobility self-care endurance- work on vision, strength, gross and fine motor control, balance, progressive ambulation, ADLs to overcome.           Karthikeyan Pan, DO      During rounds, used appropriate personal protective equipment including mask and gloves.  Additional gown if indicated.  Mask used was standard procedure mask. Appropriate PPE was worn during the entire visit.  Hand hygiene was completed before and after.

## 2023-05-26 NOTE — THERAPY TREATMENT NOTE
Inpatient Rehabilitation - Speech Language Pathology Treatment Note    [facility]     Patient Name: Katherine Williamson  : 1950  MRN: [id number]    Today's Date: 2023                   Admit Date: 2023       Visit Dx:      ICD-10-CM ICD-9-CM   1. Decreased functional mobility and endurance  Z74.09 780.99       Patient Active Problem List   Diagnosis   • Benign essential HTN   • Tobacco abuse   • Dislocation of hip joint prosthesis   • Fracture of proximal humerus   • Mechanical complication of internal orthopedic device   • Wear of articular bearing surface of internal prosthetic joint   • History of repair of hip joint   • History of operative procedure on hip   • Hyperglycemia   • Hepatic steatosis   • Diverticulosis   • Chest pain, atypical   • History of colon polyps   • Family history of colon cancer in mother   • Allergic rhinitis   • Lung nodule seen on imaging study   • Acute respiratory failure with hypoxia   • Abscess of right lung with pneumonia   • Empyema   • Sepsis   • Pleural effusion, right   • Other emphysema   • Pulmonary nodule (RLL)   • Diastolic dysfunction, grade 1   • Physical debility   • Cryptogenic stroke   • Acute right MCA stroke       Past Medical History:   Diagnosis Date   • Arthritis    • Cataract    • Colon polyps     FOLLOWED BY DR. JOSEPH LAU   • Hypertension        Past Surgical History:   Procedure Laterality Date   • COLONOSCOPY  10/2015    Pivsf-socsrxrgtcku-Cx. Kaplan.  Follow-up in 5 years.   • COLONOSCOPY N/A 2022    2 BENIGN POLYPS IN DESCENDING, 5 MM BENIGN POLYP IN SIGMOID, MULTIPLE SMALL AND LARGE DIVERTICULA IN SIGMOID, RESCOPE IN 3 YRS, DR. JOSEPH LAU AT [facility]   • EYE SURGERY     • JOINT REPLACEMENT  ?    Left total hip replacement in .  In 2015 patient had left hip revision   • TONSILLECTOMY         SLP Recommendation and Plan                                                            SLP EVALUATION (last 72 hours)     SLP SLC  "Evaluation     Row Name 05/26/23 1320 05/26/23 1100 05/25/23 1100 05/25/23 0830 05/24/23 1100       Communication Assessment/Intervention    Document Type therapy note (daily note)  -AL therapy note (daily note)  -AL therapy note (daily note)  -AL therapy note (daily note)  -AL therapy note (daily note)  -AL    Patient/Family/Caregiver Comments/Observations Pt participated well.  -AL Pt seen bedside.  -AL Pt participated well. She reported continued fatigue impacting her ability to think.  -AL Pt reporting fatigue. Seen bedside.  -AL Pt is pleasant and cooperative.  -AL       Pain Scale: Numbers Pre/Post-Treatment    Pretreatment Pain Rating 0/10 - no pain  -AL 0/10 - no pain  -AL 0/10 - no pain  -AL 0/10 - no pain  -AL 0/10 - no pain  -AL    Row Name 05/24/23 0830                   Communication Assessment/Intervention    Document Type therapy note (daily note)  -AL        Patient/Family/Caregiver Comments/Observations Pt participated well. Seen bedside.  -AL           Pain Scale: Numbers Pre/Post-Treatment    Pretreatment Pain Rating 0/10 - no pain  -AL              User Key  (r) = Recorded By, (t) = Taken By, (c) = Cosigned By    Initials Name Effective Dates    Sarita Landry, MS CCC-SLP 06/16/21 -                    EDUCATION    The patient has been educated in the following areas:       Cognitive Impairment.             SLP GOALS     Row Name 05/26/23 1320 05/26/23 1100 05/25/23 1100       Attention Goal 1 (SLP)    Progress/Outcomes (Attention Goal 1, SLP) good progress toward goal  -AL good progress toward goal  -AL --    Comment (Attention Goal 1, SLP) Alternating attention for \"Blink\" card sort task: Pt sorted 25 cards with NO cues. She exhibited mildly reduced speed in game play against clinician.  -AL Opposites word search task: 100% with NO cues.  -AL --       Reasoning Goal 1 (SLP)    Progress/Outcomes (Reasoning Goal 1, SLP) -- goal partially met  -AL --    Comment (Reasoning Goal 1, SLP) -- " "Moderate level logic puzzle: 95% with NO cues, 100% with MIN cues.  -AL --       Functional Math Skills Goal 1 (SLP)    Improve Functional Math Skills Through Goal 1 (SLP) complete functional math task;100%;independently (over 90% accuracy)  -AL -- complete functional math task;100%;independently (over 90% accuracy)  -AL    Time Frame (Functional Math Skills Goal 1, SLP) 1 week  -AL -- 1 week  -AL    Progress/Outcomes (Functional Math Skills Goal 1, SLP) good progress toward goal  -AL -- good progress toward goal  -AL    Comment (Functional Math Skills Goal 1, SLP) Reviewed and corrected checkbook balancing task from previous day. Pt required overall MIN-MOD cues to locate and correct her errors.  -AL -- Checkbook balancing task: 40% with NO cues, 100% wtih MIN cues. Pt with increased difficulty using the calculatory today.  -AL    Row Name 05/25/23 0830 05/24/23 1100 05/24/23 0830       Attention Goal 1 (SLP)    Progress/Outcomes (Attention Goal 1, SLP) -- -- good progress toward goal  -AL    Comment (Attention Goal 1, SLP) -- -- Attention to detail for written \"if/then\" directions: 100% with NO cues. Alternating attention for \"Blink\" card sort: required MIN cue x1 when sorting entire deck.  -AL       Memory Skills Goal 1 (SLP)    Improve Memory Skills Through Goal 1 (SLP) -- -- recalling unrelated word lists with an imposed delay  -AL    Progress/Outcomes (Memory Skills Goal 1, SLP) good progress toward goal  -AL -- good progress toward goal  -AL    Comment (Memory Skills Goal 1, SLP) Diagnostic treatment: Working memory for 4 words reversed: 100% with NO cues.  -AL -- Recalled 2/3 errands after 15 minutes with NO cues, 3/3 with MIN cues  -AL       Reasoning Goal 1 (SLP)    Improve Reasoning Through Goal 1 (SLP) -- complete high level reasoning task;90%;independently (over 90% accuracy)  -AL complete high level reasoning task;90%;independently (over 90% accuracy)  -AL    Time Frame (Reasoning Goal 1, SLP) -- -- " "short term goal (STG);1 week  -AL    Progress/Outcomes (Reasoning Goal 1, SLP) good progress toward goal  -AL good progress toward goal  -AL good progress toward goal  -AL    Comment (Reasoning Goal 1, SLP) Moderate level logic puzzle: 80% with NO cues, 100% with MIN cues.  -AL Complex visuospatial reasoning task: 50% with NO Cues, 100% with MOD cues. Pt reported fatigue impacted her ability to think. She became frustrated midway through task and stated \"I can't finish it.\"  -AL [employer] reasoning task: 7/12 with NO cues, 12/12 with MIN cues.  -AL       Executive Functional Skills Goal 1 (SLP)    Progress/Outcomes (Executive Function Skills Goal 1, SLP) -- goal ongoing  -AL --    Comment (Executive Function Skills Goal 1, SLP) -- Discussed recommendation for use of pill box at home to organize meds. Pt reported she does not wish to practice organizing meds at this time, but plans to do so before discharge.  -AL --          User Key  (r) = Recorded By, (t) = Taken By, (c) = Cosigned By    Initials Name Provider Type    Sarita Landry MS CCC-SLP Speech and Language Pathologist                            Time Calculation:        Time Calculation- SLP     Row Name 05/26/23 1358 05/26/23 1222          Time Calculation- SLP    SLP Start Time 1320  -AL 1100  -AL     SLP Stop Time 1350  -AL 1130  -AL     SLP Time Calculation (min) 30 min  -AL 30 min  -AL           User Key  (r) = Recorded By, (t) = Taken By, (c) = Cosigned By    Initials Name Provider Type    Sarita Landry MS CCC-SLP Speech and Language Pathologist                  Therapy Charges for Today     Code Description Service Date Service Provider Modifiers Qty    14049008801 HC ST DEV OF COGN SKILLS INITIAL 15 MIN 5/25/2023 Sarita Comer MS CCC-SLP  1    94576491985 HC ST DEV OF COGN SKILLS EACH ADDT'L 15 MIN 5/25/2023 Sarita Comer MS CCC-SLP  3    14008301216 HC ST DEV OF COGN SKILLS INITIAL 15 MIN 5/26/2023 Sarita Comer MS " CCC-SLP  1    [id number] [facility] DEV OF COGN SKILLS EACH ADDT'L 15 MIN 5/26/2023 Sarita Comer, MS CCC-SLP  3                           Sarita Comer, MS CCC-SLP  5/26/2023

## 2023-05-26 NOTE — PROGRESS NOTES
"  PROGRESS NOTE  Patient Name: Katherine Williamson  Age/Sex: 72 y.o. female  : 1950  MRN: [id number]    Date of Admission: 2023  Date of Encounter Visit: 23   LOS: 7 days   Patient Care Team:  Zelalem Flor APRN as PCP - General (Internal Medicine)  Stephen, Brandon Meyer MD as Consulting Physician (Orthopedic Surgery)    Chief Complaint: Pneumonia with empyema on antibiotic, hypoxemia that resolved, new onset diarrhea    Hospital course: Patient continues to improve  She is currently on room air and has no respiratory complaints  She is on the meropenem and she is supposed to finish a total of 4 weeks for her empyema last dose scheduled for 2023  She has been doing fine however she had some constipation she was on laxative and she did develop some diarrhea.  The diarrhea was always a concern given the risk for C. difficile colitis and it was not green and foul smell and the patient was taken off her laxative and the bowel movements are becoming less frequent and slightly more formed this afternoon.  Patient did have worsening leukocytosis however    REVIEW OF SYSTEMS:   CONSTITUTIONAL: Low-grade fever       Ventilator/Non-Invasive Ventilation Settings (From admission, onward)    Room air            Vital Signs  Temp:  [98.1 °F (36.7 °C)-98.2 °F (36.8 °C)] 98.1 °F (36.7 °C)  Heart Rate:  [78-85] 80  Resp:  [16-20] 20  BP: (110-125)/(57-71) 110/57  SpO2:  [85 %-92 %] 92 %  on  Flow (L/min):  [2] 2 Device (Oxygen Therapy): nasal cannula    Intake/Output Summary (Last 24 hours) at 2023 0926  Last data filed at 2023 1730  Gross per 24 hour   Intake 20 ml   Output --   Net 20 ml     Flowsheet Rows    Flowsheet Row First Filed Value   Admission Height 160 cm (63\") Documented at 2023 190   Admission Weight 61.2 kg (135 lb) Documented at 2023 190        Body mass index is 28.16 kg/m².      23  0626 23  0434 23  0425   Weight: 70.1 kg (154 lb 8.7 oz) 62.3 kg (137 lb " 5.6 oz) 72.1 kg (158 lb 15.2 oz)       Physical Exam:  GEN: Looking better, in no distress, awake and responsive, on  room air  EYES:   Sclerae clear. No icterus. PERRL. Normal EOM  ENT:   External ears/nose normal, no oral lesions, no thrush, mucous membranes moist  NECK:  Supple, midline trachea, no JVD  LUNGS: Normal chest on inspection, diminished breath sounds bilaterally, she does have some friction rub on the base posteriorly on the right with some minimal crackles no wheezes, breathing is nonlabored  CV:  Regular rhythm and rate. Normal S1/S2. No murmurs, gallops, or rubs noted.  ABD:  Soft, nontender and nondistended. Normal bowel sounds. No guarding  EXT:  Moves all extremities well. No cyanosis. No redness.    No edema.   Skin:  abnormally dry over the lower extremities with some scattered petechial type hemorrhage that is only noted on the lower extremity and not sure if this is a coagulopathic kind of bleed or just because of the dryness and the skin scratching.       Results Review:    Results From Last 14 Days   Lab Units 05/26/23  0432 05/15/23  0309 05/14/23  0528   CRP mg/dL 13.75*  --  3.52*   TRIGLYCERIDES mg/dL  --  68  --      Results from last 7 days   Lab Units 05/25/23  0501 05/20/23  0740   SODIUM mmol/L 138 137   POTASSIUM mmol/L 3.5 4.0   CHLORIDE mmol/L 104 105   CO2 mmol/L 29.0 28.7   BUN mg/dL 13 14   CREATININE mg/dL 0.24* 0.20*   CALCIUM mg/dL 8.1* 8.6   ANION GAP mmol/L 5.0 3.3*   ALBUMIN g/dL  --  1.7*                 Results from last 7 days   Lab Units 05/26/23  0432 05/25/23  0501 05/20/23  0740   WBC 10*3/mm3 15.03* 18.08* 8.34   HEMOGLOBIN g/dL 9.7* 11.0* 10.3*   HEMATOCRIT % 27.8* 32.3* 30.3*   PLATELETS 10*3/mm3 330 412 366   MCV fL 86.9 87.8 88.3   NEUTROPHIL % %  --  83.0* 66.1   LYMPHOCYTE % %  --  10.7* 26.7   MONOCYTES % %  --  5.1 5.3   EOSINOPHIL % %  --  0.3 1.1   BASOPHIL % %  --  0.2 0.4   IMM GRAN % %  --  0.7* 0.4      Latest Reference Range & Units Most Recent    RNP Antibodies 0.0 - 0.9 AI <0.2  5/9/23 14:00   Vann Antibodies 0.0 - 0.9 AI <0.2  5/9/23 14:00   Sjogren's Anti-SS-A 0.0 - 0.9 AI <0.2  5/9/23 14:00   Sjogren's Anti-SS-B 0.0 - 0.9 AI <0.2  5/9/23 14:00   DUTCH-1 IgG 0.0 - 0.9 AI <0.2  5/9/23 14:00      Latest Reference Range & Units Most Recent   Anti-Centromere B Antibodies 0.0 - 0.9 AI <0.2  5/9/23 14:00   Antichromatin Antibodies 0.0 - 0.9 AI <0.2  5/9/23 14:00   Anti-DNA (DS) Ab Qn 0 - 9 IU/mL <1  5/9/23 14:00   C-ANCA Neg:<1:20 titer <1:20  5/9/23 14:00   DUTCH-1 IgG 0.0 - 0.9 AI <0.2  5/9/23 14:00   P-ANCA Neg:<1:20 titer <1:20  5/9/23 14:00   Atypical pANCA Neg:<1:20 titer <1:20  5/9/23 14:00      Latest Reference Range & Units Most Recent   Antiscleroderma-70 Antibodies 0.0 - 0.9 AI <0.2  5/9/23 14:00           Latest Reference Range & Units Most Recent   pH, Fluid  8.03  5/10/23 11:10   Glucose, Fluid mg/dL 142  5/10/23 11:10   Protein, Total, Fluid g/dL 1.4  5/10/23 11:10      Latest Reference Range & Units Most Recent   ANTI-MPO ANTIBODIES 0.0 - 0.9 units <0.2  5/9/23 14:00   ANTI-PR3 ANTIBODIES 0.0 - 0.9 units <0.2  5/9/23 14:00               Invalid input(s): LDLCALC          Glucose   Date/Time Value Ref Range Status   05/24/2023 2027 118 70 - 130 mg/dL Final     Comment:     Meter: [id number] : [id number] Pedro HORN     Results from last 7 days   Lab Units 05/26/23  0432   PROCALCITONIN ng/mL 0.22                           Imaging:   Imaging Results (All)     Procedure Component Value Units Date/Time                I reviewed the patient's new clinical results.  I personally viewed and interpreted the patient's imaging results: The chest CT is actually looking better, the area of dense consolidation is starting to cavitate with much less inflammation around it, the left lung has cleared up.  There is a loculated effusion still.  There are some other scattered minor increase in the density of the consolidation and the overall picture is that  of a less diffuse inflammation in the lung        Medication Review:   aspirin, 81 mg, Oral, Q24H  atorvastatin, 80 mg, Oral, Nightly  citalopram, 20 mg, Oral, Daily  clopidogrel, 75 mg, Oral, Daily  doxycycline, 100 mg, Oral, Q12H  enoxaparin, 40 mg, Subcutaneous, Q24H  guaiFENesin, 600 mg, Oral, BID  hydrocortisone-bacitracin-zinc oxide-nystatin, 1 application, Topical, BID  magnesium hydroxide, 10 mL, Oral, Once  Menthol-Zinc Oxide, 1 application, Topical, 4x Daily  meropenem, 1 g, Intravenous, Q8H  metoprolol tartrate, 12.5 mg, Oral, Q12H  sodium chloride, 10 mL, Intravenous, Q12H  sodium chloride, 10 mL, Intravenous, Q12H  cyanocobalamin, 1,000 mcg, Oral, Daily             ASSESSMENT:   1. Right lower lobe lung abscess/pneumonia, on meropenem, planned for 4 weeks with stop date 6/8/2023  2. Sepsis, improved  3. Right loculated pleural effusion, s/p thoracentesis  4. Acute hypoxic respiratory failure, improved  5. Emphysema, upper lobe predominant, COPD but no exacerbation  6. Peripheral pleural-based nodule, 2.4 cm on CT chest 4/3/23, could have been related to early pneumonia.  Underlying malignancy cannot be excluded  7. Tobacco abuse  8. Essential hypertension  9. Acute right MCA stroke status post TNK  10. Bilateral stroke and MRI looking for possible embolic so far no evidence on echo  11. Labile GI symptoms was constipation and diarrhea  12. New onset leukocytosis as of 5/25/2023    PLAN:  Patient is not having any fever, her white count did increase yesterday but is trending down with no other intervention, the CRP on the other hand did not significantly increase in size.  C. difficile screen is pending and the procalcitonin is negative  Given the above, I would finish the course of imipenem as ordered while ruling out C. difficile colitis  Continue to follow-up on the clinical response and continue with the daily CBC, may need to reconsider changing the antibiotic in case the patient has any  further  clinical signs that further suggest  worsening sepsis  Further recommendations to follow  Patient had a right-sided pleural mass more than a month ago that was concerning and patient was not stable enough for a biopsy, malignancy was not considered since patient was having clinical picture of sepsis on initial presentation      Disposition: Per primary team    Jennifer Hood MD  05/26/23  09:26 EDT          Dictated utilizing Dragon dictation

## 2023-05-26 NOTE — PLAN OF CARE
Goal Outcome Evaluation:  Plan of Care Reviewed With: patient        Progress: improving  Outcome Evaluation: Pt is AOX4, calm and cooperative. She has generalized weakness. Multiple stools, stool sample sent to lab, negative for c-diff. WBC is slightly lower today. Cont/Incont of B/BL at times. Went for a stat CT today. Pleural effusion. PICC intact to RUE, good blood return, IV antibiotics. No unsafe behaviors. No c/o pain.

## 2023-05-26 NOTE — PROGRESS NOTES
Case Management  Inpatient Rehabilitation Plan of Care and Discharge Plan Note    Rehabilitation Diagnosis:  Branch  Date of Onset:  Joe    Medical Summary:  Branch  Past Medical History: Branch    Plan of Care  Updated Problems/Interventions  Field    Expected Intensity:  Branch  Interdisciplinary Team:  Joe  Estimated Length of Stay/Anticipated Discharge Date: Branch  Anticipated Discharge Destination:  Anticipated discharge destination from inpatient rehabilitation is community  discharge with assistance. Patient lives with  in one story walk out  ranch style home with two step entry.  Family conference scheduled for 6/1 at 3:00 p.m.  D/C plan is home with  who plans to take FMLA. Friend can also assist  intermittently.      Based on the patient's medical and functional status, their prognosis and  expected level of functional improvement is:  Joe    Signed by: GEORGES Franks

## 2023-05-26 NOTE — PROGRESS NOTES
Inpatient Rehabilitation Functional Measures Assessment and Plan of Care    Plan of Care  Updated Problems/Interventions  Cognition    [ST] Executive Functions(Active)  Current Status(05/26/2023): Mild high-level executive function deficit.  Completes checkbook balancing with MIN cues for organization and attention to  detail.  Weekly Goal(05/30/2023): Will complete home management tasks independently.  Discharge Goal: Adequate executive function skills for home environment.    Signed by: Sarita Comer, SLP

## 2023-05-26 NOTE — THERAPY TREATMENT NOTE
Inpatient Rehabilitation - Occupational Therapy Treatment Note    [facility]     Patient Name: Katherine Williamson  : 1950  MRN: [id number]    Today's Date: 2023                 Admit Date: 2023         ICD-10-CM ICD-9-CM   1. Decreased functional mobility and endurance  Z74.09 780.99       Patient Active Problem List   Diagnosis   • Benign essential HTN   • Tobacco abuse   • Dislocation of hip joint prosthesis   • Fracture of proximal humerus   • Mechanical complication of internal orthopedic device   • Wear of articular bearing surface of internal prosthetic joint   • History of repair of hip joint   • History of operative procedure on hip   • Hyperglycemia   • Hepatic steatosis   • Diverticulosis   • Chest pain, atypical   • History of colon polyps   • Family history of colon cancer in mother   • Allergic rhinitis   • Lung nodule seen on imaging study   • Acute respiratory failure with hypoxia   • Abscess of right lung with pneumonia   • Empyema   • Sepsis   • Pleural effusion, right   • Other emphysema   • Pulmonary nodule (RLL)   • Diastolic dysfunction, grade 1   • Physical debility   • Cryptogenic stroke   • Acute right MCA stroke       Past Medical History:   Diagnosis Date   • Arthritis    • Cataract    • Colon polyps     FOLLOWED BY DR. JOSEPH LAU   • Hypertension        Past Surgical History:   Procedure Laterality Date   • COLONOSCOPY  10/2015    Cmdzn-cpsysfchegtl-Ie. Kaplan.  Follow-up in 5 years.   • COLONOSCOPY N/A 2022    2 BENIGN POLYPS IN DESCENDING, 5 MM BENIGN POLYP IN SIGMOID, MULTIPLE SMALL AND LARGE DIVERTICULA IN SIGMOID, RESCOPE IN 3 YRS, DR. JOSEPH LAU AT [facility]   • EYE SURGERY     • JOINT REPLACEMENT  ?    Left total hip replacement in .  In 2015 patient had left hip revision   • TONSILLECTOMY               IRF OT ASSESSMENT FLOWSHEET (last 12 hours)     IRF OT Evaluation and Treatment     Row Name 23 1445          OT Time and Intention     Document Type daily treatment  -AF     Mode of Treatment occupational therapy  -AF     Patient Effort adequate  -AF     Symptoms Noted During/After Treatment fatigue  -AF     Row Name 05/26/23 1445          General Information    Patient/Family/Caregiver Comments/Observations pt sitting up in w/c in AM and supine in bed in PM  -AF     Existing Precautions/Restrictions fall;oxygen therapy device and L/min  -AF     Limitations/Impairments safety/cognitive  -AF     Row Name 05/26/23 1445          Pain Assessment    Pretreatment Pain Rating 0/10 - no pain  -AF     Posttreatment Pain Rating 0/10 - no pain  -AF     Row Name 05/26/23 1445          Cognition/Psychosocial    Affect/Mental Status (Cognition) flat/blunted affect  -AF     Orientation Status (Cognition) oriented x 3  -AF     Follows Commands (Cognition) follows one-step commands;verbal cues/prompting required  -AF     Personal Safety Interventions fall prevention program maintained;gait belt;nonskid shoes/slippers when out of bed  -AF     Row Name 05/26/23 1445          Bathing    Comment (Bathing) deferred  -AF     Row Name 05/26/23 1445          Upper Body Dressing    Comment (Upper Body Dressing) deferred  -AF     Row Name 05/26/23 1445          Lower Body Dressing    [place] Level (Lower Body Dressing) don;shoes/slippers;socks;moderate assist (50% patient effort)  -AF     Position (Lower Body Dressing) supported sitting  -AF     Comment (Lower Body Dressing) w/c level  -AF     Row Name 05/26/23 1445          Grooming    [place] Level (Grooming) grooming skills;set up  -AF     Position (Grooming) sink side;supported sitting  -AF     Comment (Grooming) w/c level  -AF     Row Name 05/26/23 1445          Toileting    [place] Level (Toileting) toileting skills;moderate assist (50% patient effort);verbal cues  -AF     Assistive Device Use (Toileting) grab bar/safety frame;raised toilet seat  -AF     Position (Toileting) supported sitting;supported  standing  -AF     Row Name 05/26/23 1445          Bed Mobility    Supine-Sit [place] (Bed Mobility) contact guard;standby assist  -AF     Assistive Device (Bed Mobility) bed rails  -AF     Row Name 05/26/23 1445          Bed-Chair Transfer    Bed-Chair [place] (Transfers) minimum assist (75% patient effort);verbal cues  -AF     Assistive Device (Bed-Chair Transfers) wheelchair;walker, front-wheeled  -AF     Row Name 05/26/23 1445          Sit-Stand Transfer    Sit-Stand [place] (Transfers) minimum assist (75% patient effort)  -AF     Assistive Device (Sit-Stand Transfers) wheelchair  -AF     Row Name 05/26/23 1445          Stand-Sit Transfer    Stand-Sit [place] (Transfers) minimum assist (75% patient effort)  -AF     Assistive Device (Stand-Sit Transfers) wheelchair  -AF     Row Name 05/26/23 1445          Toilet Transfer    Type (Toilet Transfer) stand pivot/stand step  -AF     [place] Level (Toilet Transfer) moderate assist (50% patient effort);minimum assist (75% patient effort);verbal cues  -AF     Assistive Device (Toilet Transfer) commode;grab bars/safety frame;walker, front-wheeled;wheelchair  -AF     Row Name 05/26/23 1445          Shoulder (Therapeutic Exercise)    Shoulder Strengthening (Therapeutic Exercise) bilateral;flexion;extension;scapular stabilization;external rotation;internal rotation;sitting;2 lb free weight;15 repititions  -AF     Row Name 05/26/23 1445          Elbow/Forearm (Therapeutic Exercise)    Elbow/Forearm Strengthening (Therapeutic Exercise) bilateral;flexion;extension;supination;pronation;sitting;2 lb free weight;15 repititions  -AF     Row Name 05/26/23 1445          Wrist (Therapeutic Exercise)    Wrist Strengthening (Therapeutic Exercise) bilateral;flexion;extension;2 lb free weight;15 repititions  -AF     Row Name 05/26/23 1445          Hand (Therapeutic Exercise)    Hand Strengthening (Therapeutic Exercise) bilateral; strengthening;hand gripper;15  repititions  -AF     Row Name 05/26/23 1445          Balance    Static Sitting Balance standby assist  -AF     Static Standing Balance contact guard;minimal assist  with ADL and toileting tasks  -AF     Row Name 05/26/23 1445          Positioning and Restraints    Pre-Treatment Position sitting in chair/recliner  -AF     Post Treatment Position wheelchair  -AF     In Wheelchair sitting;call light within reach;encouraged to call for assist;exit alarm on;with PT;with SLP  in AM and PM sessions  -AF           User Key  (r) = Recorded By, (t) = Taken By, (c) = Cosigned By    Initials Name Effective Dates    AF Silke Moreno, OTR 06/16/21 -                  Occupational Therapy Education     Title: PT OT SLP Therapies (Done)     Topic: Occupational Therapy (Done)     Point: ADL training (Done)     Description:   Instruct learner(s) on proper safety adaptation and remediation techniques during self care or transfers.   Instruct in proper use of assistive devices.              Learning Progress Summary           Patient Acceptance, E, VU by WN at 5/26/2023 0038    Acceptance, E,TB, VU by SG at 5/20/2023 1516                   Point: Home exercise program (Done)     Description:   Instruct learner(s) on appropriate technique for monitoring, assisting and/or progressing therapeutic exercises/activities.              Learning Progress Summary           Patient Acceptance, E, VU by WN at 5/26/2023 0038                   Point: Precautions (Done)     Description:   Instruct learner(s) on prescribed precautions during self-care and functional transfers.              Learning Progress Summary           Patient Acceptance, E, VU by WN at 5/26/2023 0038                   Point: Body mechanics (Done)     Description:   Instruct learner(s) on proper positioning and spine alignment during self-care, functional mobility activities and/or exercises.              Learning Progress Summary           Patient Acceptance, E, VU by WN at  5/26/2023 0038    Acceptance, E, TAMI,CHRIS,NR by AF at 5/23/2023 1448    Comment: safety and technique with transfers                               User Key     Initials Effective Dates Name Provider Type Discipline    SG 06/16/21 -  Verena Hewitt OTENID Occupational Therapist OT    AF 06/16/21 -  Silke Moreno OTENID Occupational Therapist OT    WN 08/23/22 -  Reno Lazo, RN Registered Nurse Nurse                    OT Recommendation and Plan                         Time Calculation:      Time Calculation- OT     Row Name 05/26/23 1449 05/26/23 1448          Time Calculation- OT    OT Start Time 1230  -AF 0930  -AF     OT Stop Time 1300  -AF 1000  -AF     OT Time Calculation (min) 30 min  -AF 30 min  -AF           User Key  (r) = Recorded By, (t) = Taken By, (c) = Cosigned By    Initials Name Provider Type    AF Silke Moreno, OTR Occupational Therapist              Therapy Charges for Today     Code Description Service Date Service Provider Modifiers Qty    39455220731 HC OT SELF CARE/MGMT/TRAIN EA 15 MIN 5/25/2023 Silke Moreno, OTR GO 3    17616348507 HC OT THER PROC EA 15 MIN 5/25/2023 Silke Moreno, OTR GO 1    06774564609 HC OT SELF CARE/MGMT/TRAIN EA 15 MIN 5/26/2023 Silke Moreno, OTR GO 2    60595411175 HC OT THER PROC EA 15 MIN 5/26/2023 Silke Moreno, OTR GO 2                   SONG Kaiser  5/26/2023

## 2023-05-26 NOTE — THERAPY PROGRESS REPORT/RE-CERT
Inpatient Rehabilitation - Physical Therapy Treatment Note       [facility]     Patient Name: Katherine Williamson  : 1950  MRN: [id number]    Today's Date: 2023                    Admit Date: 2023      Visit Dx:     ICD-10-CM ICD-9-CM   1. Decreased functional mobility and endurance  Z74.09 780.99       Patient Active Problem List   Diagnosis   • Benign essential HTN   • Tobacco abuse   • Dislocation of hip joint prosthesis   • Fracture of proximal humerus   • Mechanical complication of internal orthopedic device   • Wear of articular bearing surface of internal prosthetic joint   • History of repair of hip joint   • History of operative procedure on hip   • Hyperglycemia   • Hepatic steatosis   • Diverticulosis   • Chest pain, atypical   • History of colon polyps   • Family history of colon cancer in mother   • Allergic rhinitis   • Lung nodule seen on imaging study   • Acute respiratory failure with hypoxia   • Abscess of right lung with pneumonia   • Empyema   • Sepsis   • Pleural effusion, right   • Other emphysema   • Pulmonary nodule (RLL)   • Diastolic dysfunction, grade 1   • Physical debility   • Cryptogenic stroke   • Acute right MCA stroke       Past Medical History:   Diagnosis Date   • Arthritis    • Cataract    • Colon polyps     FOLLOWED BY DR. JOSEPH LAU   • Hypertension        Past Surgical History:   Procedure Laterality Date   • COLONOSCOPY  10/2015    Xptjq-tdhfklhgldux-Ih. [facility].  Follow-up in 5 years.   • COLONOSCOPY N/A 2022    2 BENIGN POLYPS IN DESCENDING, 5 MM BENIGN POLYP IN SIGMOID, MULTIPLE SMALL AND LARGE DIVERTICULA IN SIGMOID, RESCOPE IN 3 YRS, DR. JOSEPH LAU AT [facility]   • EYE SURGERY     • JOINT REPLACEMENT  ?    Left total hip replacement in .  In 2015 patient had left hip revision   • TONSILLECTOMY         PT ASSESSMENT (last 12 hours)     IRF PT Evaluation and Treatment     Row Name 23 1007          PT Time and Intention    Document  Type daily treatment  -MD     Mode of Treatment physical therapy  -MD     Patient/Family/Caregiver Comments/Observations Pt sitting in WC showing no signs of acute distress.  Pt w c/o fatigue this date.  -MD     Row Name 05/26/23 1007          Pain Assessment    Pretreatment Pain Rating 0/10 - no pain  -MD     Row Name 05/26/23 1007          Cognition/Psychosocial    Orientation Status (Cognition) oriented x 3  -MD     Follows Commands (Cognition) follows one-step commands;verbal cues/prompting required  -MD     Personal Safety Interventions fall prevention program maintained;gait belt  -MD     Row Name 05/26/23 1007          Bed Mobility    Sit-Supine [place] (Bed Mobility) verbal cues;minimum assist (75% patient effort)  -MD     Row Name 05/26/23 1007          Chair-Bed Transfer    Chair-Bed [place] (Transfers) verbal cues;minimum assist (75% patient effort);moderate assist (50% patient effort)  -MD     Assistive Device (Chair-Bed Transfers) walker, front-wheeled;wheelchair  -MD     Row Name 05/26/23 1007          Sit-Stand Transfer    Sit-Stand [place] (Transfers) verbal cues;minimum assist (75% patient effort);moderate assist (50% patient effort)  -MD     Assistive Device (Sit-Stand Transfers) walker, front-wheeled  -MD     Row Name 05/26/23 1007          Stand-Sit Transfer    Stand-Sit [place] (Transfers) verbal cues;minimum assist (75% patient effort);contact guard  -MD     Assistive Device (Stand-Sit Transfers) walker, front-wheeled  -MD     Row Name 05/26/23 1007          Gait/Stairs (Locomotion)    [place] Level (Gait) verbal cues;minimum assist (75% patient effort)  -MD     Assistive Device (Gait) walker, front-wheeled  -MD     Distance in Feet (Gait) 80'x2  -MD     Pattern (Gait) step-through  -MD     Deviations/Abnormal Patterns (Gait) raina decreased;stride length decreased  -MD     Bilateral Gait Deviations forward flexed posture;heel strike decreased;weight shift ability  decreased  -MD     Left Sided Gait Deviations heel strike decreased;knee hyperextension;decreased knee extension  -MD     Row Name 05/26/23 1007          Motor Skills    Therapeutic Exercise ankle  -MD     Row Name 05/26/23 1007          Hip (Therapeutic Exercise)    Hip (Therapeutic Exercise) isometric exercises  -MD     Hip Isometrics (Therapeutic Exercise) bilateral;gluteal sets;10 repetitions  -MD     Hip Strengthening (Therapeutic Exercise) bilateral;aBduction;aDduction;heel slides;10 repetitions  -MD     Row Name 05/26/23 1007          Knee (Therapeutic Exercise)    Knee (Therapeutic Exercise) isometric exercises  -MD     Knee Isometrics (Therapeutic Exercise) bilateral;quad sets;10 repetitions  -MD     Row Name 05/26/23 1007          Ankle (Therapeutic Exercise)    Ankle (Therapeutic Exercise) strengthening exercise  -MD     Ankle Strengthening (Therapeutic Exercise) bilateral;dorsiflexion;plantarflexion;10 repetitions  -MD     Row Name 05/26/23 1007          Positioning and Restraints    Pre-Treatment Position sitting in chair/recliner  -MD     Post Treatment Position bed  -MD     In Bed supine;call light within reach;exit alarm on  -MD     Row Name 05/26/23 1007          Weekly Progress Summary (PT)    Weekly Progress Summary (PT) Pt progressing w ambulation this week as demonstrated by increasing ambulation distance.  Pt continues to require min/mod assist w transfers as pt fatigue increases.  Pt also limited due to decreased activity tolerance.  Pt will continue to benifit from skilled PT to address mobility deficits and increase safety and independence w functional mobility.  -MD     Row Name 05/26/23 1007          Bed Mobility Goal 1 (PT-IRF)    Progress/Outcomes (Bed Mobility Goal 1, PT-IRF) goal ongoing  -MD     Row Name 05/26/23 1007          Transfer Goal 1 (PT-IRF)    Progress/Outcomes (Transfer Goal 1, PT-IRF) goal ongoing  -MD     Row Name 05/26/23 1007          Transfer Goal 2 (PT-IRF)     Progress/Outcomes (Transfer Goal 2, PT-IRF) goal meaghan  -MD     Row Name 05/26/23 1007          Gait/Walking Locomotion Goal 1 (PT-IRF)    Progress/Outcomes (Gait/Walking Locomotion Goal 1, PT-IRF) goal meaghan GOODE     Row Name 05/26/23 1007          Stairs Goal 1 (PT-IRF)    Progress/Outcomes (Stairs Goal 1, PT-IRF) goal meaghan GOODE           User Key  (r) = Recorded By, (t) = Taken By, (c) = Cosigned By    Initials Name Provider Type    Melissa Fischer, PT Physical Therapist              Wound 05/14/23 1556 Right gluteal Pressure Injury (Active)   Closure None 05/25/23 2040   Base purple;red 05/25/23 2040   Periwound redness 05/25/23 2040   Periwound Temperature warm 05/25/23 2040   Periwound Skin Turgor soft 05/25/23 2040   Edges irregular 05/25/23 2040       Wound 05/19/23 2055 Bilateral gluteal (Active)   Dressing Appearance dry;intact 05/25/23 2040   Base clean;pink;moist 05/26/23 0731   Edges irregular 05/26/23 0731   Care, Wound cleansed with;soap and water 05/25/23 2040   Dressing Care open to air 05/25/23 2040   Periwound Care barrier ointment applied 05/25/23 2040     Physical Therapy Education     Title: PT OT SLP Therapies (Done)     Topic: Physical Therapy (Done)     Point: Mobility training (Done)     Learning Progress Summary           Patient Acceptance, E, VU by TAYLOR at 5/26/2023 0038    Eager, E,TB,D, NR by SUMMER at 5/25/2023 1019    Acceptance, E,D, VU,NR by EE at 5/24/2023 1136    Acceptance, E,TB, VU,NR by EE at 5/23/2023 1139    Acceptance, E,TB, NR,VU by EE at 5/22/2023 1221    Acceptance, E, NR by  at 5/20/2023 1216                   Point: Home exercise program (Done)     Learning Progress Summary           Patient Acceptance, E, VU by TAYLOR at 5/26/2023 0038    Eager, E,TB,D, NR by KP at 5/25/2023 1019    Acceptance, E,D, VU,NR by EE at 5/24/2023 1136    Acceptance, E,TB, VU,NR by EE at 5/23/2023 1139    Acceptance, E,TB, NR,VU by EE at 5/22/2023 1221    Acceptance, E, NR by LH at 5/20/2023  1216                   Point: Body mechanics (Done)     Learning Progress Summary           Patient Acceptance, E, VU by WN at 5/26/2023 0038    Acceptance, E,D, VU,NR by EE at 5/24/2023 1136    Acceptance, E,TB, VU,NR by  at 5/23/2023 1139    Acceptance, E,TB, NR,VU by  at 5/22/2023 1221    Acceptance, E, NR by  at 5/20/2023 1216                   Point: Precautions (Done)     Learning Progress Summary           Patient Acceptance, E, VU by MD at 5/26/2023 1014    Acceptance, E, VU by WN at 5/26/2023 0038    Acceptance, E,D, VU,NR by EE at 5/24/2023 1136    Acceptance, E,TB, VU,NR by  at 5/23/2023 1139    Acceptance, E,TB, NR,VU by  at 5/22/2023 1221    Acceptance, E, NR by  at 5/20/2023 1216                               User Key     Initials Effective Dates Name Provider Type Discipline     06/16/21 -  Jess Wang, PT Physical Therapist PT    EE 06/16/21 -  Tena Garay, PT Physical Therapist PT    MD 06/16/21 -  Melissa Wise, PT Physical Therapist PT     06/16/21 -  Jesusita Mendoza, PT Physical Therapist PT     08/23/22 -  Reno aLzo RN Registered Nurse Nurse                PT Recommendation and Plan                          Time Calculation:      PT Charges     Row Name 05/26/23 1406 05/26/23 1006          Time Calculation    Start Time 1400  -MD 1000  -MD     Stop Time 1430  -MD 1030  -MD     Time Calculation (min) 30 min  -MD 30 min  -MD     PT Received On -- 05/26/23  -MD     PT - Next Appointment -- 05/27/23  -MD     PT Goal Re-Cert Due Date 06/02/23  -MD --           User Key  (r) = Recorded By, (t) = Taken By, (c) = Cosigned By    Initials Name Provider Type    Melissa Fischer, PT Physical Therapist                Therapy Charges for Today     Code Description Service Date Service Provider Modifiers Qty    27116961004 HC PT THER PROC EA 15 MIN 5/26/2023 Melissa Wise, PT GP 2    56013351683 HC GAIT TRAINING EA 15 MIN 5/26/2023 Frandy, Melissa, PT GP 1    [id number]  PT THERAPEUTIC ACT  EA 15 MIN 5/26/2023 Melissa Wise, PT GP 1    [id number]  PT THER SUPP EA 15 MIN 5/26/2023 Melissa Wise, PT GP 2                   Melissa Wise, PT  5/26/2023

## 2023-05-26 NOTE — PROGRESS NOTES
Inpatient Rehabilitation Plan of Care Note    Plan of Care  Updated Problems/Interventions  Mobility    [PT] Stairs(Active)  Current Status(05/26/2023): TBD  Weekly Goal(05/30/2023): PT only  Discharge Goal: 4 CGA    [PT] Walk(Active)  Current Status(05/26/2023): 80' Min RWX  Weekly Goal(05/30/2023): to and from BR, CGA, RWx  Discharge Goal: 100' CGA/SBA RWX    [PT] Bed/Chair/Wheelchair(Active)  Current Status(05/26/2023): Mod/Min RWX  Weekly Goal(05/30/2023): Min/CGA RWX  Discharge Goal: CGA/SBA RWX    [PT] Bed Mobility(Active)  Current Status(05/26/2023): CGA  Weekly Goal(05/30/2023): SBA  Discharge Goal: SBA    Signed by: Melissa Wise, PT

## 2023-05-27 PROCEDURE — 25010000002 MEROPENEM PER 100 MG: Performed by: HOSPITALIST

## 2023-05-27 PROCEDURE — 25010000002 ENOXAPARIN PER 10 MG: Performed by: PHYSICAL MEDICINE & REHABILITATION

## 2023-05-27 RX ADMIN — MEROPENEM 1 G: 1 INJECTION, POWDER, FOR SOLUTION INTRAVENOUS at 19:36

## 2023-05-27 RX ADMIN — DOXYCYCLINE 100 MG: 100 CAPSULE ORAL at 09:07

## 2023-05-27 RX ADMIN — METOPROLOL TARTRATE 12.5 MG: 25 TABLET, FILM COATED ORAL at 20:39

## 2023-05-27 RX ADMIN — Medication 1000 MCG: at 09:08

## 2023-05-27 RX ADMIN — Medication 10 ML: at 09:09

## 2023-05-27 RX ADMIN — ENOXAPARIN SODIUM 40 MG: 100 INJECTION SUBCUTANEOUS at 20:39

## 2023-05-27 RX ADMIN — CITALOPRAM 20 MG: 20 TABLET, FILM COATED ORAL at 09:08

## 2023-05-27 RX ADMIN — Medication 1 APPLICATION: at 12:45

## 2023-05-27 RX ADMIN — ATORVASTATIN CALCIUM 80 MG: 80 TABLET, FILM COATED ORAL at 20:39

## 2023-05-27 RX ADMIN — Medication 1 APPLICATION: at 20:39

## 2023-05-27 RX ADMIN — MEROPENEM 1 G: 1 INJECTION, POWDER, FOR SOLUTION INTRAVENOUS at 04:22

## 2023-05-27 RX ADMIN — Medication 1 APPLICATION: at 18:03

## 2023-05-27 RX ADMIN — GUAIFENESIN 600 MG: 600 TABLET, EXTENDED RELEASE ORAL at 20:39

## 2023-05-27 RX ADMIN — Medication 10 ML: at 20:58

## 2023-05-27 RX ADMIN — GUAIFENESIN 600 MG: 600 TABLET, EXTENDED RELEASE ORAL at 09:08

## 2023-05-27 RX ADMIN — DOXYCYCLINE 100 MG: 100 CAPSULE ORAL at 20:39

## 2023-05-27 RX ADMIN — METOPROLOL TARTRATE 12.5 MG: 25 TABLET, FILM COATED ORAL at 09:07

## 2023-05-27 RX ADMIN — Medication 10 ML: at 01:01

## 2023-05-27 RX ADMIN — ASPIRIN 81 MG: 81 TABLET, CHEWABLE ORAL at 09:07

## 2023-05-27 RX ADMIN — MEROPENEM 1 G: 1 INJECTION, POWDER, FOR SOLUTION INTRAVENOUS at 12:45

## 2023-05-27 RX ADMIN — CLOPIDOGREL BISULFATE 75 MG: 75 TABLET, FILM COATED ORAL at 09:07

## 2023-05-27 RX ADMIN — ZINC OXIDE 1 APPLICATION: 200 OINTMENT TOPICAL at 09:08

## 2023-05-27 NOTE — PROGRESS NOTES
[facility]     Progress Note    Patient Name: Katherine Williamson  : 1950  MRN: [id number]  Primary Care Physician:  Zelalem Flor APRN  Date of admission: 2023    Subjective   Subjective     Chief Complaint:   CVA    History of Present Illness  Patient Reports no change in strength, tolerating rehab sessions well.  Denies f/c/n/v/soa/cp, tolerated supplemental O2 overnight. Denies cough. Denies skin changes on anterior shins bilaterally.  Sleeping well, eating well.    Review of Systems    Objective   Objective     Vitals:   Temp:  [97.5 °F (36.4 °C)-97.7 °F (36.5 °C)] 97.5 °F (36.4 °C)  Heart Rate:  [74-78] 74  Resp:  [18] 18  BP: (129-146)/(61-73) 129/61  Flow (L/min):  [2] 2    Physical Exam   MENTAL STATUS -  AWAKE / ALERT  HEENT-   SCLERAE ANICTERIC, CONJUNCTIVAE PINK, OP MOIST, NO JVD,  LUNGS -ctab no w/r/c  On 2L in the room  Chest-Zio patch  HEART- RRR, NO RUB, MURMUR, OR GALLOP  ABD - NORMOACTIVE BOWEL SOUNDS, SOFT, NT.    EXT - NO EDEMA OR CYANOSIS  diffuse purpleish petechiae on the anterior lower legs with some areas of ecchymosis as well.  Some old ecchymosis on the forearms.  NEURO -oriented x4  MOTOR EXAM -takes resistance bilaterally     Result Review    Result Review:  I have personally reviewed the results from the time of this admission to 2023 15:18 EDT and agree with these findings:  []  Laboratory list / accordion  []  Microbiology  []  Radiology  []  EKG/Telemetry   []  Cardiology/Vascular   []  Pathology  []  Old records  []  Other:  Most notable findings include:   No new labs    CT CHEST WO CONTRAST DIAGNOSTIC-     INDICATIONS: Pleural effusion. Empyema and lung abscess.     TECHNIQUE: Radiation dose reduction techniques were utilized, including  automated exposure control and exposure modulation based on body size.  Unenhanced CHEST CT     COMPARISON: 2023      FINDINGS:           The heart size is normal without pericardial effusion. A 9 mm short axis  precarinal  lymph node appears stable. Assessment of vascular and  mediastinal structures is limited without intravenous contrast material.  Right PICC extends to the superior vena cava.     The airways appear clear.     Previous left pleural effusion appears mostly resolved. Persistent  loculated right pleural collection fluid gas, similar to prior exam     The lungs show increased consolidation in the superior segment right  lower lobe, but partially improved aeration in the lower aspect of the  right lower lobe. Increased cavitation is evident in the right lower  lobe. A nodular density in the right lower lobe measuring 7 mm on axial  image 52 may be infectious in etiology, possibility of neoplasm not  excluded, CT follow-up recommended. Several other nodular densities are  also seen in the right lower lung and left lower lobe, likewise  nonspecific. A focal consolidation in the left lower lobe as developed,  5 cm. Emphysematous changes of lungs are conspicuous.     Upper abdominal structures show no acute findings. Diffuse low-density  of the liver is noted, compatible steatosis..     Degenerative changes are seen in the spine. No acute fracture is  identified.     IMPRESSION:     Persistent right empyema, similar to prior exam. Increased consolidation  in the superior segment right lower lobe, left lower lobe. Multiple  small nodular densities in both lower lungs, indeterminate. Increased  cavitation in the right lower lobe. Decreased left pleural effusion.  Continued follow-up advised.    Scheduled Meds:aspirin, 81 mg, Oral, Q24H  atorvastatin, 80 mg, Oral, Nightly  citalopram, 20 mg, Oral, Daily  clopidogrel, 75 mg, Oral, Daily  doxycycline, 100 mg, Oral, Q12H  enoxaparin, 40 mg, Subcutaneous, Q24H  guaiFENesin, 600 mg, Oral, BID  hydrocortisone-bacitracin-zinc oxide-nystatin, 1 application, Topical, BID  magnesium hydroxide, 10 mL, Oral, Once  Menthol-Zinc Oxide, 1 application, Topical, 4x Daily  meropenem, 1 g,  "Intravenous, Q8H  metoprolol tartrate, 12.5 mg, Oral, Q12H  sodium chloride, 10 mL, Intravenous, Q12H  sodium chloride, 10 mL, Intravenous, Q12H  cyanocobalamin, 1,000 mcg, Oral, Daily      Continuous Infusions:   PRN Meds:.•  acetaminophen  •  ipratropium-albuterol  •  loperamide  •  senna-docusate sodium  •  sodium chloride  •  sodium chloride  •  sodium chloride  •  sodium chloride  •  sodium chloride       Assessment & Plan   Assessment / Plan     Brief Patient Summary:  Katherine Williamson is a 72 y.o. female     Active Hospital Problems:  Active Hospital Problems    Diagnosis    • **Acute right MCA stroke      Plan:   1. Acute R MCA stroke and bilateral infarcts  Stroke prophylaxis-aspirin/Plavix/atorvastatin  Zio patch placed on May 19 for 2 weeks  - 5/20 - Admit for inpt rehab w/ PT, OT, SLP, psychology and rehab medical and nursing care. Continue secondary stroke prophylaxis     2. Pneumonia and right lung abscess and Pleural effusions  - 5/20 - Continue IV antibx. ID following    - 5/21 - Continues to improve   The chest x-ray is reassuring   Expect her to be able to come off the oxygen in the next few days   She will need to have a follow-up chest imaging 6 weeks down the road   Finish the antibiotic course per ID, last day 6/8/2023   -5/22-on 1 L oxygen with activities and maintaining sats  -5/23- Per Pulmonology, continue Merrem. Continue to wean off supplemental oxygen  -5/24-oxygen weaned to 0.5 L nasal cannula  -5/25 -on room air and therapy, more 2 L last night  -5/26 Placed on 2L NC last night, WBC decreased to 15. CXR revealed increased opacification in RLL. Will continue Merrem and appreciate recommendations from Pulmonolgy. CDiff negative and bowel movements decreased this morning.  5/27 - clinically stable this morning. Reviewed Pulm note from 5/26 recommending daily CBC. Will order. CT chest report reviewed from 5/26 which stated \"Persistent right empyema, similar to prior exam. Increased " "consolidation  in the superior segment right lower lobe, left lower lobe. Multiple  small nodular densities in both lower lungs, indeterminate. Increased  cavitation in the right lower lobe. Decreased left pleural effusion.  Continued follow-up advised.\"     ID-meropenem and doxycycline-last dose June 8, 2023  Repeat CT chest June 6, 2023  May 25-WBC increased to 18 K from 8K 5 days ago.  No fever.  Vital signs stable.  On room air during speech therapy session.  Not acutely ill-appearing.  Had 3 loose semiformed stool today but received bowel program yesterday for recent constipation.   PICC line site unremarkable.   Increased diffuse purpleish petechiae on the anterior lower legs.  She had a few earlier in the week but there more prominent today  Calves are soft without any significant edema.  Continues on meropenem and doxycycline.   Patient reviewed with pulmonology service who will assess.  Although with the recent bowel program, given the white blood cell count increased will check C. difficile with the loose stool today  Will review with infectious disease service  May 27 - Pulm recommending finishing meropenem     3. Acute hypoxic respiratory failure     4. Emphysema d/t Tobacco use     5. Hypertension-continue antihypertensive regimen and avoid hypotensive episodes.     6. DVT prophylaxis -   - 5/20 - Start Lovenox SQ daily     7.  Dysphagia  -5/22-diet advanced to regular solid thin liquids     8.  GI-constipation-May 24-add Senokot-Colace.  Milk of magnesia x1.  Patient maintaining hydration  May 25-loose stool semiformed x3 today.  Changed Senokot Colace to as needed    DVT prophylaxis:  Medical DVT prophylaxis orders are present.    CODE STATUS:    Code Status (Patient has no pulse and is not breathing): CPR (Attempt to Resuscitate)  Medical Interventions (Patient has pulse or is breathing): Full Support      Mira Loomis MD           "

## 2023-05-27 NOTE — PLAN OF CARE
Goal Outcome Evaluation:  Plan of Care Reviewed With: patient        Progress: improving  Outcome Evaluation: IV Merrem per PICC line RUE. Meds with water. No c/o pain. Pericare done, barrier ointments applied to open, excoriated areas. Purewick at night. Pt did not want staff to turn her, states she turns herself in bed. O2 2L per humidified n/c. No c/o SOA. Appears to be sleeping well.

## 2023-05-27 NOTE — PROGRESS NOTES
Inpatient Rehabilitation Plan of Care Note    Plan of Care  Care Plan Reviewed - No updates at this time.    Safety    Performed Intervention(s)  Items within reach, environmental set-up for reduce risk of fall  Bed alarms and chair alarms and safety rounds hourly      Psychosocial    Performed Intervention(s)  Medication as ordered and PRN  Verbalize needs and concerns  Therapeutic environmental set up      Sphincter Control    Performed Intervention(s)  Incontinent care as needed and ointment applied as ordered.  Take to bathroom in a timely manner  Proper diet and adequate fluid intake.      Body Systems    Performed Intervention(s)  Medication, turn every 2 hours and proper nutrition.    Signed by: Susana Ugalde RN

## 2023-05-27 NOTE — PLAN OF CARE
Problem: Rehabilitation (IRF) Plan of Care  Goal: Plan of Care Review  Outcome: Ongoing, Progressing  Flowsheets (Taken 5/27/2023 1816)  Progress: improving  Plan of Care Reviewed With: patient  Outcome Evaluation: Katherine is alert and oriented, particpated in therapies today, has been continent of bowel and bladder this shift, excoriation to buttocks creams as ordered, 02 @ 2 L per n/c AATs, no complaints of pain or distress, PICC to RUE clean and intact good blood return noted and IV antibiotics continued as ordered,  and best friend visited today.   Goal Outcome Evaluation:           Progress: improving

## 2023-05-27 NOTE — THERAPY TREATMENT NOTE
Inpatient Rehabilitation - Physical Therapy Treatment Note       [facility]     Patient Name: Katherine Williamson  : 1950  MRN: [id number]    Today's Date: 2023                    Admit Date: 2023      Visit Dx:     ICD-10-CM ICD-9-CM   1. Decreased functional mobility and endurance  Z74.09 780.99       Patient Active Problem List   Diagnosis   • Benign essential HTN   • Tobacco abuse   • Dislocation of hip joint prosthesis   • Fracture of proximal humerus   • Mechanical complication of internal orthopedic device   • Wear of articular bearing surface of internal prosthetic joint   • History of repair of hip joint   • History of operative procedure on hip   • Hyperglycemia   • Hepatic steatosis   • Diverticulosis   • Chest pain, atypical   • History of colon polyps   • Family history of colon cancer in mother   • Allergic rhinitis   • Lung nodule seen on imaging study   • Acute respiratory failure with hypoxia   • Abscess of right lung with pneumonia   • Empyema   • Sepsis   • Pleural effusion, right   • Other emphysema   • Pulmonary nodule (RLL)   • Diastolic dysfunction, grade 1   • Physical debility   • Cryptogenic stroke   • Acute right MCA stroke       Past Medical History:   Diagnosis Date   • Arthritis    • Cataract    • Colon polyps     FOLLOWED BY DR. JOSEPH LAU   • Hypertension        Past Surgical History:   Procedure Laterality Date   • COLONOSCOPY  10/2015    Cdwlf-pcobnhmccdtt-Qs. [facility].  Follow-up in 5 years.   • COLONOSCOPY N/A 2022    2 BENIGN POLYPS IN DESCENDING, 5 MM BENIGN POLYP IN SIGMOID, MULTIPLE SMALL AND LARGE DIVERTICULA IN SIGMOID, RESCOPE IN 3 YRS, DR. JOSEPH LAU AT [facility]   • EYE SURGERY     • JOINT REPLACEMENT  ?    Left total hip replacement in .  In 2015 patient had left hip revision   • TONSILLECTOMY         PT ASSESSMENT (last 12 hours)     IRF PT Evaluation and Treatment     Row Name 23 0928          PT Time and Intention    Document  Type daily treatment  -     Mode of Treatment physical therapy  -     Patient/Family/Caregiver Comments/Observations Pt agreeable to PT.  Up in chair and agreebale to PT.  Pt noted that she was done with PT after 15 min - encouraged to participate for full session.  -     Row Name 05/27/23 0928          General Information    Patient Profile Reviewed yes  -     General Observations of Patient NUrsing rec bring O2 to PT.  -     Existing Precautions/Restrictions fall;oxygen therapy device and L/min  O2 avaiable  -     Limitations/Impairments safety/cognitive  -     Row Name 05/27/23 0928          Pain Assessment    Pretreatment Pain Rating 0/10 - no pain  -     Posttreatment Pain Rating 0/10 - no pain  -     Row Name 05/27/23 0928          Cognition/Psychosocial    Affect/Mental Status (Cognition) flat/blunted affect  -     Orientation Status (Cognition) oriented x 3  -     Follows Commands (Cognition) follows one-step commands;verbal cues/prompting required  -     Personal Safety Interventions fall prevention program maintained;gait belt;nonskid shoes/slippers when out of bed;supervised activity;other (see comments)  -     Cognitive Function WFL  -     Row Name 05/27/23 0928          Transfer Assessment/Treatment    Comment, (Transfers) VC for hand placement , difficulty intiating stand today.  -     Row Name 05/27/23 0928          Sit-Stand Transfer    Sit-Stand [place] (Transfers) moderate assist (50% patient effort);minimum assist (75% patient effort);verbal cues  -     Assistive Device (Sit-Stand Transfers) wheelchair;walker, front-wheeled  -     Row Name 05/27/23 0928          Stand-Sit Transfer    Stand-Sit [place] (Transfers) moderate assist (50% patient effort);minimum assist (75% patient effort);verbal cues;set up  -     Assistive Device (Stand-Sit Transfers) wheelchair;walker, front-wheeled  -     Row Name 05/27/23 0928          Gait/Stairs (Locomotion)     [place] Level (Gait) minimum assist (75% patient effort);verbal cues  -     Assistive Device (Gait) walker, front-wheeled  -     Distance in Feet (Gait) 120, 45  -KP     Pattern (Gait) step-through  -     Deviations/Abnormal Patterns (Gait) raina decreased;stride length decreased  -KP     Bilateral Gait Deviations forward flexed posture;heel strike decreased;weight shift ability decreased  -KP     Left Sided Gait Deviations heel strike decreased;knee hyperextension  ER, dc push off  -     Gait Assessment/Intervention VC for posture, L step to minimize ER, achieve HS.  -     Row Name 05/27/23 0928          Hip (Therapeutic Exercise)    Hip Strengthening (Therapeutic Exercise) sitting;bilateral;aBduction;aDduction;marching while seated;red;10 repetitions  -     Row Name 05/27/23 0928          Knee (Therapeutic Exercise)    Knee Strengthening (Therapeutic Exercise) sitting;bilateral;LAQ (long arc quad);red;10 repetitions;flexion  -     Row Name 05/27/23 0928          Ankle (Therapeutic Exercise)    Ankle Strengthening (Therapeutic Exercise) sitting;bilateral;dorsiflexion;plantarflexion;10 repetitions  -     Row Name 05/27/23 0928          Positioning and Restraints    Pre-Treatment Position sitting in chair/recliner  -KP     Post Treatment Position wheelchair  -KP     In Wheelchair sitting;call light within reach;encouraged to call for assist;exit alarm on  -     Row Name 05/27/23 0928          Vital Signs    Pre SpO2 (%) 96  -KP     O2 Delivery Pre Treatment room air  -KP     Intra SpO2 (%) 88  -KP     O2 Delivery Intra Treatment room air  -KP     Post SpO2 (%) 91  -KP     O2 Delivery Post Treatment room air  -KP     Pre Patient Position Sitting  -KP     Intra Patient Position Standing  -KP     Post Patient Position Sitting  -KP           User Key  (r) = Recorded By, (t) = Taken By, (c) = Cosigned By    Initials Name Provider Type    KP Jesusita Mendoza, PT Physical Therapist               Wound 05/14/23 1556 Right gluteal Pressure Injury (Active)   Dressing Appearance open to air 05/26/23 2102   Base red 05/26/23 2102   Periwound Care barrier ointment applied 05/26/23 2102       Wound 05/19/23 2055 Bilateral gluteal (Active)   Dressing Appearance open to air 05/26/23 2102   Base clean;pink;moist 05/27/23 0859   Edges irregular 05/27/23 0859   Care, Wound cleansed with;soap and water;barrier applied 05/26/23 2102   Periwound Care barrier ointment applied 05/26/23 2102     Physical Therapy Education     Title: PT OT SLP Therapies (Done)     Topic: Physical Therapy (Done)     Point: Mobility training (Done)     Learning Progress Summary           Patient Acceptance, E,TB, VU by  at 5/27/2023 0932    Acceptance, E, VU by WN at 5/26/2023 0038    Eager, E,TB,D, NR by  at 5/25/2023 1019    Acceptance, E,D, VU,NR by  at 5/24/2023 1136    Acceptance, E,TB, VU,NR by EE at 5/23/2023 1139    Acceptance, E,TB, NR,VU by EE at 5/22/2023 1221    Acceptance, E, NR by  at 5/20/2023 1216                   Point: Home exercise program (Done)     Learning Progress Summary           Patient Acceptance, E,TB, VU by KP at 5/27/2023 0932    Acceptance, E, VU by WN at 5/26/2023 0038    Eager, E,TB,D, NR by  at 5/25/2023 1019    Acceptance, E,D, VU,NR by EE at 5/24/2023 1136    Acceptance, E,TB, VU,NR by EE at 5/23/2023 1139    Acceptance, E,TB, NR,VU by  at 5/22/2023 1221    Acceptance, E, NR by  at 5/20/2023 1216                   Point: Body mechanics (Done)     Learning Progress Summary           Patient Acceptance, E, VU by WN at 5/26/2023 0038    Acceptance, E,D, VU,NR by EE at 5/24/2023 1136    Acceptance, E,TB, VU,NR by EE at 5/23/2023 1139    Acceptance, E,TB, NR,VU by EE at 5/22/2023 1221    Acceptance, E, NR by  at 5/20/2023 1216                   Point: Precautions (Done)     Learning Progress Summary           Patient Acceptance, E, VU by MD at 5/26/2023 1014    Acceptance, E, VU by TAYLOR at  5/26/2023 0038    Acceptance, E,D, VU,NR by  at 5/24/2023 1136    Acceptance, E,TB, VU,NR by  at 5/23/2023 1139    Acceptance, E,TB, NR,VU by  at 5/22/2023 1221    Acceptance, E, NR by  at 5/20/2023 1216                               User Key     Initials Effective Dates Name Provider Type Discipline     06/16/21 -  Jess Wang, PT Physical Therapist PT     06/16/21 -  Tena Garay, PT Physical Therapist PT    MD 06/16/21 -  Melissa Wise, PT Physical Therapist PT     06/16/21 -  Jesusita Mendoza, PT Physical Therapist PT     08/23/22 -  Reno Lazo, RN Registered Nurse Nurse                PT Recommendation and Plan                          Time Calculation:      PT Charges     Row Name 05/27/23 0932             Time Calculation    Start Time 0910  -      Stop Time 0940  -      Time Calculation (min) 30 min  -      PT Received On 05/27/23  -      PT - Next Appointment 05/29/23  -            User Key  (r) = Recorded By, (t) = Taken By, (c) = Cosigned By    Initials Name Provider Type    Jesusita So, PT Physical Therapist                             Jesusita Mendoza, PT  5/27/2023

## 2023-05-28 LAB
BASOPHILS # BLD AUTO: 0.03 10*3/MM3 (ref 0–0.2)
BASOPHILS NFR BLD AUTO: 0.3 % (ref 0–1.5)
DEPRECATED RDW RBC AUTO: 45.6 FL (ref 37–54)
EOSINOPHIL # BLD AUTO: 0.18 10*3/MM3 (ref 0–0.4)
EOSINOPHIL NFR BLD AUTO: 1.6 % (ref 0.3–6.2)
ERYTHROCYTE [DISTWIDTH] IN BLOOD BY AUTOMATED COUNT: 14 % (ref 12.3–15.4)
HCT VFR BLD AUTO: 30 % (ref 34–46.6)
HGB BLD-MCNC: 9.8 G/DL (ref 12–15.9)
IMM GRANULOCYTES # BLD AUTO: 0.05 10*3/MM3 (ref 0–0.05)
IMM GRANULOCYTES NFR BLD AUTO: 0.5 % (ref 0–0.5)
LYMPHOCYTES # BLD AUTO: 2.64 10*3/MM3 (ref 0.7–3.1)
LYMPHOCYTES NFR BLD AUTO: 24 % (ref 19.6–45.3)
MCH RBC QN AUTO: 29.3 PG (ref 26.6–33)
MCHC RBC AUTO-ENTMCNC: 32.7 G/DL (ref 31.5–35.7)
MCV RBC AUTO: 89.8 FL (ref 79–97)
MONOCYTES # BLD AUTO: 0.78 10*3/MM3 (ref 0.1–0.9)
MONOCYTES NFR BLD AUTO: 7.1 % (ref 5–12)
NEUTROPHILS NFR BLD AUTO: 66.5 % (ref 42.7–76)
NEUTROPHILS NFR BLD AUTO: 7.34 10*3/MM3 (ref 1.7–7)
NRBC BLD AUTO-RTO: 0 /100 WBC (ref 0–0.2)
PLATELET # BLD AUTO: 376 10*3/MM3 (ref 140–450)
PMV BLD AUTO: 10.2 FL (ref 6–12)
RBC # BLD AUTO: 3.34 10*6/MM3 (ref 3.77–5.28)
WBC NRBC COR # BLD: 11.02 10*3/MM3 (ref 3.4–10.8)

## 2023-05-28 PROCEDURE — 25010000002 MEROPENEM PER 100 MG: Performed by: HOSPITALIST

## 2023-05-28 PROCEDURE — 85025 COMPLETE CBC W/AUTO DIFF WBC: CPT | Performed by: PHYSICAL MEDICINE & REHABILITATION

## 2023-05-28 PROCEDURE — 25010000002 ENOXAPARIN PER 10 MG: Performed by: PHYSICAL MEDICINE & REHABILITATION

## 2023-05-28 RX ADMIN — ZINC OXIDE 1 APPLICATION: 200 OINTMENT TOPICAL at 21:14

## 2023-05-28 RX ADMIN — Medication 1 APPLICATION: at 12:18

## 2023-05-28 RX ADMIN — MEROPENEM 1 G: 1 INJECTION, POWDER, FOR SOLUTION INTRAVENOUS at 12:18

## 2023-05-28 RX ADMIN — ATORVASTATIN CALCIUM 80 MG: 80 TABLET, FILM COATED ORAL at 21:13

## 2023-05-28 RX ADMIN — Medication 10 ML: at 08:49

## 2023-05-28 RX ADMIN — Medication 1 APPLICATION: at 21:14

## 2023-05-28 RX ADMIN — Medication 10 ML: at 08:48

## 2023-05-28 RX ADMIN — Medication 1 APPLICATION: at 18:06

## 2023-05-28 RX ADMIN — DOXYCYCLINE 100 MG: 100 CAPSULE ORAL at 21:13

## 2023-05-28 RX ADMIN — Medication 1000 MCG: at 08:47

## 2023-05-28 RX ADMIN — ZINC OXIDE 1 APPLICATION: 200 OINTMENT TOPICAL at 08:51

## 2023-05-28 RX ADMIN — METOPROLOL TARTRATE 12.5 MG: 25 TABLET, FILM COATED ORAL at 21:13

## 2023-05-28 RX ADMIN — Medication 10 ML: at 21:15

## 2023-05-28 RX ADMIN — CITALOPRAM 20 MG: 20 TABLET, FILM COATED ORAL at 08:47

## 2023-05-28 RX ADMIN — GUAIFENESIN 600 MG: 600 TABLET, EXTENDED RELEASE ORAL at 08:47

## 2023-05-28 RX ADMIN — ASPIRIN 81 MG: 81 TABLET, CHEWABLE ORAL at 08:47

## 2023-05-28 RX ADMIN — MEROPENEM 1 G: 1 INJECTION, POWDER, FOR SOLUTION INTRAVENOUS at 21:13

## 2023-05-28 RX ADMIN — ENOXAPARIN SODIUM 40 MG: 100 INJECTION SUBCUTANEOUS at 21:14

## 2023-05-28 RX ADMIN — GUAIFENESIN 600 MG: 600 TABLET, EXTENDED RELEASE ORAL at 21:13

## 2023-05-28 RX ADMIN — DOXYCYCLINE 100 MG: 100 CAPSULE ORAL at 08:47

## 2023-05-28 RX ADMIN — METOPROLOL TARTRATE 12.5 MG: 25 TABLET, FILM COATED ORAL at 08:47

## 2023-05-28 RX ADMIN — Medication 1 APPLICATION: at 08:51

## 2023-05-28 RX ADMIN — MEROPENEM 1 G: 1 INJECTION, POWDER, FOR SOLUTION INTRAVENOUS at 04:21

## 2023-05-28 RX ADMIN — CLOPIDOGREL BISULFATE 75 MG: 75 TABLET, FILM COATED ORAL at 08:47

## 2023-05-28 NOTE — PROGRESS NOTES
Inpatient Rehabilitation Plan of Care Note    Plan of Care  Care Plan Reviewed - No updates at this time.    Safety    Performed Intervention(s)  Items within reach, environmental set-up for reduce risk of fall  Bed alarms and chair alarms and safety rounds hourly      Psychosocial    Performed Intervention(s)  Medication as ordered and PRN  Verbalize needs and concerns  Therapeutic environmental set up      Sphincter Control    Performed Intervention(s)  Incontinent care as needed and ointment applied as ordered.  Take to bathroom in a timely manner  Proper diet and adequate fluid intake.      Body Systems    Performed Intervention(s)  Medication, turn every 2 hours and proper nutrition.    Signed by: Makeda Dozier RN

## 2023-05-28 NOTE — PLAN OF CARE
Problem: Rehabilitation (IRF) Plan of Care  Goal: Plan of Care Review  Outcome: Ongoing, Progressing  Flowsheets (Taken 5/28/2023 0220)  Progress: improving  Plan of Care Reviewed With: patient  Outcome Evaluation: Patient A&Ox4 calm and cooperative with staff. Appears to be sleeping well tonight no distress noted. Wearing O2 at 2L via NC AAT. Stage II pressure injury to bilateral buttocks. Wound care done as ordered. Patient refused to be woken up to turn but patient actually turns herself throughout the night independently. Pt on JACLYN and waffle cushion to buttocks. PICC line to RUE dressing CDI and flushes well with great blood return. On daily CBC. Continues with IV antibiotic. Continent of bladder, uses purewick at HS. No unsafe behavior.

## 2023-05-28 NOTE — PLAN OF CARE
Goal Outcome Evaluation:  Plan of Care Reviewed With: patient        Progress: improving  Patient is calm and cooperative. Alert and oriented x 4. She is using the call light for assistance. 1-2 person assist. Frequent turning and repositioning offered. Encouraged to stay off buttock/ coccyx wound. Medicated ointments to area applied. PICC line flushed without difficulty. Intermittent IV antibiotic administered. Dressing is dry, clean, and intact. Will continue to monitor.

## 2023-05-28 NOTE — PROGRESS NOTES
[facility]     Progress Note    Patient Name: Katherine Williamson  : 1950  MRN: [id number]  Primary Care Physician:  Zelalem Flor APRN  Date of admission: 2023    Subjective   Subjective     Chief Complaint:   CVA    History of Present Illness  Patient Reports slept okay but not great last night. Denies f/c, breathing easily, denies soa/cp/cough.     Review of Systems    Objective   Objective     Vitals:   Temp:  [97.1 °F (36.2 °C)-98.1 °F (36.7 °C)] 97.8 °F (36.6 °C)  Heart Rate:  [75-78] 75  Resp:  [18-20] 18  BP: (136-151)/(65-81) 136/65  Flow (L/min):  [2] 2    Physical Exam   MENTAL STATUS -  AWAKE / ALERT  HEENT-   SCLERAE ANICTERIC, CONJUNCTIVAE PINK, OP MOIST, NO JVD,  LUNGS -ctab no w/r/c  On 2L in the room  Chest-Zio patch  HEART- RRR, NO RUB, MURMUR, OR GALLOP  ABD - NORMOACTIVE BOWEL SOUNDS, SOFT, NT.    EXT - NO EDEMA OR CYANOSIS  diffuse purpleish petechiae on the anterior lower legs with some areas of ecchymosis as well.  Some old ecchymosis on the forearms.  NEURO -oriented x4  MOTOR EXAM -takes resistance bilaterally      Result Review    Result Review:  I have personally reviewed the results from the time of this admission to 2023 14:34 EDT and agree with these findings:  []  Laboratory list / accordion  []  Microbiology  []  Radiology  []  EKG/Telemetry   []  Cardiology/Vascular   []  Pathology  []  Old records  []  Other:  Most notable findings include:   Results from last 7 days   Lab Units 23  0417 23  0432 23  0501   WBC 10*3/mm3 11.02* 15.03* 18.08*   HEMOGLOBIN g/dL 9.8* 9.7* 11.0*   HEMATOCRIT % 30.0* 27.8* 32.3*   PLATELETS 10*3/mm3 376 330 412     Scheduled Meds:aspirin, 81 mg, Oral, Q24H  atorvastatin, 80 mg, Oral, Nightly  citalopram, 20 mg, Oral, Daily  clopidogrel, 75 mg, Oral, Daily  doxycycline, 100 mg, Oral, Q12H  enoxaparin, 40 mg, Subcutaneous, Q24H  guaiFENesin, 600 mg, Oral, BID  hydrocortisone-bacitracin-zinc oxide-nystatin, 1 application,  Topical, BID  magnesium hydroxide, 10 mL, Oral, Once  Menthol-Zinc Oxide, 1 application, Topical, 4x Daily  meropenem, 1 g, Intravenous, Q8H  metoprolol tartrate, 12.5 mg, Oral, Q12H  sodium chloride, 10 mL, Intravenous, Q12H  sodium chloride, 10 mL, Intravenous, Q12H  cyanocobalamin, 1,000 mcg, Oral, Daily      Continuous Infusions:   PRN Meds:.•  acetaminophen  •  ipratropium-albuterol  •  loperamide  •  senna-docusate sodium  •  sodium chloride  •  sodium chloride  •  sodium chloride  •  sodium chloride  •  sodium chloride        Assessment & Plan   Assessment / Plan     Brief Patient Summary:  Katherine Williamson is a 72 y.o. female    Active Hospital Problems:  Active Hospital Problems    Diagnosis    • **Acute right MCA stroke      Plan:   1. Acute R MCA stroke and bilateral infarcts  Stroke prophylaxis-aspirin/Plavix/atorvastatin  Zio patch placed on May 19 for 2 weeks  - 5/20 - Admit for inpt rehab w/ PT, OT, SLP, psychology and rehab medical and nursing care. Continue secondary stroke prophylaxis     2. Pneumonia and right lung abscess and Pleural effusions  - 5/20 - Continue IV antibx. ID following    - 5/21 - Continues to improve   The chest x-ray is reassuring   Expect her to be able to come off the oxygen in the next few days   She will need to have a follow-up chest imaging 6 weeks down the road   Finish the antibiotic course per ID, last day 6/8/2023   -5/22-on 1 L oxygen with activities and maintaining sats  -5/23- Per Pulmonology, continue Merrem. Continue to wean off supplemental oxygen  -5/24-oxygen weaned to 0.5 L nasal cannula  -5/25 -on room air and therapy, more 2 L last night  -5/26 Placed on 2L NC last night, WBC decreased to 15. CXR revealed increased opacification in RLL. Will continue Merrem and appreciate recommendations from Pulmonolgy. CDiff negative and bowel movements decreased this morning.  5/27 - clinically stable this morning. Reviewed Pulm note from 5/26 recommending daily CBC. Will  "order. CT chest report reviewed from 5/26 which stated \"Persistent right empyema, similar to prior exam. Increased consolidation  in the superior segment right lower lobe, left lower lobe. Multiple  small nodular densities in both lower lungs, indeterminate. Increased  cavitation in the right lower lobe. Decreased left pleural effusion.  Continued follow-up advised.\"  5/28 - WBC improving, clinically stable     ID-meropenem and doxycycline-last dose June 8, 2023  Repeat CT chest June 6, 2023  May 25-WBC increased to 18 K from 8K 5 days ago.  No fever.  Vital signs stable.  On room air during speech therapy session.  Not acutely ill-appearing.  Had 3 loose semiformed stool today but received bowel program yesterday for recent constipation.   PICC line site unremarkable.   Increased diffuse purpleish petechiae on the anterior lower legs.  She had a few earlier in the week but there more prominent today  Calves are soft without any significant edema.  Continues on meropenem and doxycycline.   Patient reviewed with pulmonology service who will assess.  Although with the recent bowel program, given the white blood cell count increased will check C. difficile with the loose stool today  Will review with infectious disease service  May 27 - Pulm recommending finishing meropenem     3. Acute hypoxic respiratory failure     4. Emphysema d/t Tobacco use     5. Hypertension-continue antihypertensive regimen and avoid hypotensive episodes.     6. DVT prophylaxis -   - 5/20 - Start Lovenox SQ daily     7.  Dysphagia  -5/22-diet advanced to regular solid thin liquids     8.  GI-constipation-May 24-add Senokot-Colace.  Milk of magnesia x1.  Patient maintaining hydration  May 25-loose stool semiformed x3 today.  Changed Senokot Colace to as needed    DVT prophylaxis:  Medical DVT prophylaxis orders are present.    CODE STATUS:    Code Status (Patient has no pulse and is not breathing): CPR (Attempt to Resuscitate)  Medical " Interventions (Patient has pulse or is breathing): Full Support    Mira Loomis MD

## 2023-05-29 VITALS
SYSTOLIC BLOOD PRESSURE: 128 MMHG | TEMPERATURE: 98 F | HEIGHT: 63 IN | BODY MASS INDEX: 26.95 KG/M2 | DIASTOLIC BLOOD PRESSURE: 72 MMHG | HEART RATE: 67 BPM | WEIGHT: 152.12 LBS | RESPIRATION RATE: 18 BRPM | OXYGEN SATURATION: 98 %

## 2023-05-29 LAB
ANION GAP SERPL CALCULATED.3IONS-SCNC: 3.1 MMOL/L (ref 5–15)
BASOPHILS # BLD AUTO: 0.03 10*3/MM3 (ref 0–0.2)
BASOPHILS NFR BLD AUTO: 0.3 % (ref 0–1.5)
BUN SERPL-MCNC: 5 MG/DL (ref 8–23)
BUN/CREAT SERPL: 21.7 (ref 7–25)
CALCIUM SPEC-SCNC: 8.5 MG/DL (ref 8.6–10.5)
CHLORIDE SERPL-SCNC: 103 MMOL/L (ref 98–107)
CO2 SERPL-SCNC: 31.9 MMOL/L (ref 22–29)
CREAT SERPL-MCNC: 0.23 MG/DL (ref 0.57–1)
DEPRECATED RDW RBC AUTO: 44.1 FL (ref 37–54)
EGFRCR SERPLBLD CKD-EPI 2021: 121.8 ML/MIN/1.73
EOSINOPHIL # BLD AUTO: 0.19 10*3/MM3 (ref 0–0.4)
EOSINOPHIL NFR BLD AUTO: 1.6 % (ref 0.3–6.2)
ERYTHROCYTE [DISTWIDTH] IN BLOOD BY AUTOMATED COUNT: 13.9 % (ref 12.3–15.4)
GLUCOSE SERPL-MCNC: 82 MG/DL (ref 65–99)
HCT VFR BLD AUTO: 30.2 % (ref 34–46.6)
HGB BLD-MCNC: 10.3 G/DL (ref 12–15.9)
IMM GRANULOCYTES # BLD AUTO: 0.07 10*3/MM3 (ref 0–0.05)
IMM GRANULOCYTES NFR BLD AUTO: 0.6 % (ref 0–0.5)
LYMPHOCYTES # BLD AUTO: 2.6 10*3/MM3 (ref 0.7–3.1)
LYMPHOCYTES NFR BLD AUTO: 21.9 % (ref 19.6–45.3)
MCH RBC QN AUTO: 29.8 PG (ref 26.6–33)
MCHC RBC AUTO-ENTMCNC: 34.1 G/DL (ref 31.5–35.7)
MCV RBC AUTO: 87.3 FL (ref 79–97)
MONOCYTES # BLD AUTO: 0.85 10*3/MM3 (ref 0.1–0.9)
MONOCYTES NFR BLD AUTO: 7.1 % (ref 5–12)
NEUTROPHILS NFR BLD AUTO: 68.5 % (ref 42.7–76)
NEUTROPHILS NFR BLD AUTO: 8.15 10*3/MM3 (ref 1.7–7)
NRBC BLD AUTO-RTO: 0 /100 WBC (ref 0–0.2)
PLATELET # BLD AUTO: 381 10*3/MM3 (ref 140–450)
PMV BLD AUTO: 10.3 FL (ref 6–12)
POTASSIUM SERPL-SCNC: 3.3 MMOL/L (ref 3.5–5.2)
RBC # BLD AUTO: 3.46 10*6/MM3 (ref 3.77–5.28)
SODIUM SERPL-SCNC: 138 MMOL/L (ref 136–145)
WBC NRBC COR # BLD: 11.89 10*3/MM3 (ref 3.4–10.8)

## 2023-05-29 PROCEDURE — 97530 THERAPEUTIC ACTIVITIES: CPT

## 2023-05-29 PROCEDURE — 97129 THER IVNTJ 1ST 15 MIN: CPT

## 2023-05-29 PROCEDURE — 25010000002 MEROPENEM PER 100 MG: Performed by: HOSPITALIST

## 2023-05-29 PROCEDURE — 97110 THERAPEUTIC EXERCISES: CPT

## 2023-05-29 PROCEDURE — 97130 THER IVNTJ EA ADDL 15 MIN: CPT

## 2023-05-29 PROCEDURE — 97535 SELF CARE MNGMENT TRAINING: CPT

## 2023-05-29 PROCEDURE — 25010000002 ENOXAPARIN PER 10 MG: Performed by: PHYSICAL MEDICINE & REHABILITATION

## 2023-05-29 PROCEDURE — 85025 COMPLETE CBC W/AUTO DIFF WBC: CPT | Performed by: PHYSICAL MEDICINE & REHABILITATION

## 2023-05-29 PROCEDURE — 80048 BASIC METABOLIC PNL TOTAL CA: CPT | Performed by: PHYSICAL MEDICINE & REHABILITATION

## 2023-05-29 RX ORDER — POTASSIUM CHLORIDE 750 MG/1
40 TABLET, FILM COATED, EXTENDED RELEASE ORAL ONCE
Status: COMPLETED | OUTPATIENT
Start: 2023-05-29 | End: 2023-05-29

## 2023-05-29 RX ADMIN — MEROPENEM 1 G: 1 INJECTION, POWDER, FOR SOLUTION INTRAVENOUS at 12:25

## 2023-05-29 RX ADMIN — GUAIFENESIN 600 MG: 600 TABLET, EXTENDED RELEASE ORAL at 20:21

## 2023-05-29 RX ADMIN — ZINC OXIDE 1 APPLICATION: 200 OINTMENT TOPICAL at 09:28

## 2023-05-29 RX ADMIN — METOPROLOL TARTRATE 12.5 MG: 25 TABLET, FILM COATED ORAL at 09:28

## 2023-05-29 RX ADMIN — Medication 1 APPLICATION: at 17:16

## 2023-05-29 RX ADMIN — ATORVASTATIN CALCIUM 80 MG: 80 TABLET, FILM COATED ORAL at 20:21

## 2023-05-29 RX ADMIN — Medication 1 APPLICATION: at 09:28

## 2023-05-29 RX ADMIN — METOPROLOL TARTRATE 12.5 MG: 25 TABLET, FILM COATED ORAL at 20:21

## 2023-05-29 RX ADMIN — Medication 1 APPLICATION: at 12:26

## 2023-05-29 RX ADMIN — Medication 10 ML: at 19:30

## 2023-05-29 RX ADMIN — MEROPENEM 1 G: 1 INJECTION, POWDER, FOR SOLUTION INTRAVENOUS at 19:35

## 2023-05-29 RX ADMIN — MEROPENEM 1 G: 1 INJECTION, POWDER, FOR SOLUTION INTRAVENOUS at 04:25

## 2023-05-29 RX ADMIN — Medication 10 ML: at 09:29

## 2023-05-29 RX ADMIN — ASPIRIN 81 MG: 81 TABLET, CHEWABLE ORAL at 09:27

## 2023-05-29 RX ADMIN — DOXYCYCLINE 100 MG: 100 CAPSULE ORAL at 09:27

## 2023-05-29 RX ADMIN — Medication 1000 MCG: at 09:27

## 2023-05-29 RX ADMIN — DOXYCYCLINE 100 MG: 100 CAPSULE ORAL at 20:21

## 2023-05-29 RX ADMIN — POTASSIUM CHLORIDE 40 MEQ: 750 TABLET, EXTENDED RELEASE ORAL at 09:27

## 2023-05-29 RX ADMIN — Medication 10 ML: at 01:05

## 2023-05-29 RX ADMIN — ENOXAPARIN SODIUM 40 MG: 100 INJECTION SUBCUTANEOUS at 20:21

## 2023-05-29 RX ADMIN — CITALOPRAM 20 MG: 20 TABLET, FILM COATED ORAL at 09:27

## 2023-05-29 RX ADMIN — GUAIFENESIN 600 MG: 600 TABLET, EXTENDED RELEASE ORAL at 09:27

## 2023-05-29 RX ADMIN — CLOPIDOGREL BISULFATE 75 MG: 75 TABLET, FILM COATED ORAL at 09:27

## 2023-05-29 NOTE — PROGRESS NOTES
Inpatient Rehabilitation Plan of Care Note    Plan of Care  Care Plan Reviewed - No updates at this time.    Safety    Performed Intervention(s)  Items within reach, environmental set-up for reduce risk of fall  Bed alarms and chair alarms and safety rounds hourly      Psychosocial    Performed Intervention(s)  Medication as ordered and PRN  Verbalize needs and concerns  Therapeutic environmental set up      Sphincter Control    Performed Intervention(s)  Incontinent care as needed and ointment applied as ordered.  Take to bathroom in a timely manner  Proper diet and adequate fluid intake.      Body Systems    Performed Intervention(s)  Medication, turn every 2 hours and proper nutrition.    Signed by: Katherine Diaz RN

## 2023-05-29 NOTE — PROGRESS NOTES
Inpatient Rehabilitation Plan of Care Note    Plan of Care  Updated Problems/Interventions  Mobility    [PT] Stairs(Active)  Current Status(05/29/2023): TBD  Weekly Goal(06/06/2023): PT only  Discharge Goal: 4 CGA    [PT] Walk(Active)  Current Status(05/29/2023): 160' Min/CGA RWX  Weekly Goal(06/06/2023): to and from BR, CGA, RWx  Discharge Goal: 100' CGA/SBA RWX    [PT] Bed/Chair/Wheelchair(Active)  Current Status(05/29/2023): Min RWX  Weekly Goal(06/06/2023): CGA RWX  Discharge Goal: CGA/SBA RWX    [PT] Bed Mobility(Active)  Current Status(05/29/2023): CGA  Weekly Goal(06/06/2023): SBA  Discharge Goal: SBA    Signed by: Tena Garay DPT

## 2023-05-29 NOTE — PROGRESS NOTES
Inpatient Rehabilitation Plan of Care Note    Plan of Care  Updated Problems/Interventions  Medical Problem(s)    BNE (Active)  Att'n. - Min-Mild Imp.  Exec. Fx. - Min-Mild Imp.  Rsng/Jgmnt - WNL  Arith - WNL  Visuospatial Skills - WNL  Visual Mem. WNL  Verbal Mem. - WNL  Emot - Pt ackonwledged mild dep and anx related to dependence, but described  some degree of optimism.    Signed by: Krystian Whitt Psy.d

## 2023-05-29 NOTE — THERAPY TREATMENT NOTE
Inpatient Rehabilitation - Occupational Therapy Treatment Note    [facility]     Patient Name: Katherine Williamson  : 1950  MRN: [id number]    Today's Date: 2023                 Admit Date: 2023         ICD-10-CM ICD-9-CM   1. Decreased functional mobility and endurance  Z74.09 780.99       Patient Active Problem List   Diagnosis   • Benign essential HTN   • Tobacco abuse   • Dislocation of hip joint prosthesis   • Fracture of proximal humerus   • Mechanical complication of internal orthopedic device   • Wear of articular bearing surface of internal prosthetic joint   • History of repair of hip joint   • History of operative procedure on hip   • Hyperglycemia   • Hepatic steatosis   • Diverticulosis   • Chest pain, atypical   • History of colon polyps   • Family history of colon cancer in mother   • Allergic rhinitis   • Lung nodule seen on imaging study   • Acute respiratory failure with hypoxia   • Abscess of right lung with pneumonia   • Empyema   • Sepsis   • Pleural effusion, right   • Other emphysema   • Pulmonary nodule (RLL)   • Diastolic dysfunction, grade 1   • Physical debility   • Cryptogenic stroke   • Acute right MCA stroke       Past Medical History:   Diagnosis Date   • Arthritis    • Cataract    • Colon polyps     FOLLOWED BY DR. JOSEPH LAU   • Hypertension        Past Surgical History:   Procedure Laterality Date   • COLONOSCOPY  10/2015    [facility]ct[facility]-fgzvolxfvsxi-[facility]. [facility].  Follow-up in 5 years.   • COLONOSCOPY N/A 2022    2 BENIGN POLYPS IN DESCENDING, 5 MM BENIGN POLYP IN SIGMOID, MULTIPLE SMALL AND LARGE DIVERTICULA IN SIGMOID, RESCOPE IN 3 YRS, DR. JOSEPH LAU AT [facility]   • EYE SURGERY     • JOINT REPLACEMENT  ?    Left total hip replacement in .  In 2015 patient had left hip revision   • TONSILLECTOMY               IRF OT ASSESSMENT FLOWSHEET (last 12 hours)     IRF OT Evaluation and Treatment     Row Name 23 1046          OT Time and Intention     Document Type daily treatment  -KA     Mode of Treatment individual therapy;occupational therapy  -KA     Patient Effort good  -     Row Name 05/29/23 1046          General Information    Patient Profile Reviewed yes  -KA     Patient/Family/Caregiver Comments/Observations Pt sitting up in wc in room with nsg present  -KA     Existing Precautions/Restrictions fall;oxygen therapy device and L/min  Pt on 2L O2 today upon arrival  -     Row Name 05/29/23 1046          Pain Assessment    Pretreatment Pain Rating 0/10 - no pain  -KA     Posttreatment Pain Rating 0/10 - no pain  -     Row Name 05/29/23 1046          Cognition/Psychosocial    Affect/Mental Status (Cognition) flat/blunted affect  -     Orientation Status (Cognition) oriented x 3  -KA     Follows Commands (Cognition) follows one-step commands;verbal cues/prompting required  -KA     Personal Safety Interventions fall prevention program maintained;gait belt;nonskid shoes/slippers when out of bed  -KA     Cognitive Function WFL  -     Row Name 05/29/23 1046          Bathing    Comment (Bathing) Deferred- pt reported she already completed bathing at the sink with nsg this AM  -     Row Name 05/29/23 1046          Upper Body Dressing    [place] Level (Upper Body Dressing) upper body dressing skills;don;doff;set up assistance  -KA     Position (Upper Body Dressing) supported sitting  -KA     Set-up Assistance (Upper Body Dressing) obtain clothing  -     Row Name 05/29/23 1046          Lower Body Dressing    [place] Level (Lower Body Dressing) don;pants/bottoms;shoes/slippers;socks;minimum assist (75% patient effort);moderate assist (50% patient effort)  -KA     Position (Lower Body Dressing) supported sitting  -KA     Set-up Assistance (Lower Body Dressing) obtain clothing  -     Comment (Lower Body Dressing) Pt required assist with L sock and shoe  -     Row Name 05/29/23 1046          Grooming    Comment (Grooming) Pt reported she  already completed this AM with nsg  -     Row Name 05/29/23 1046          Sit-Stand Transfer    Sit-Stand [place] (Transfers) minimum assist (75% patient effort);verbal cues  -     Assistive Device (Sit-Stand Transfers) walker, front-wheeled;wheelchair  -LORENZO     Comment, (Sit-Stand Transfer) cues for hand placement  -     Row Name 05/29/23 1046          Stand-Sit Transfer    Stand-Sit [place] (Transfers) contact guard;verbal cues  -     Assistive Device (Stand-Sit Transfers) walker, front-wheeled;wheelchair  -LORENZO     Comment, (Stand-Sit Transfer) Cues for hand placement  -     Row Name 05/29/23 1046          Elbow/Forearm (Therapeutic Exercise)    Elbow/Forearm Strengthening (Therapeutic Exercise) bilateral;flexion;extension;supination;pronation;sitting;2 lb free weight;15 repititions;3 sets  -     Row Name 05/29/23 1046          Wrist (Therapeutic Exercise)    Wrist Strengthening (Therapeutic Exercise) bilateral;flexion;extension;2 lb free weight;15 repititions;3 sets  -[facility] Name 05/29/23 1046          Hand (Therapeutic Exercise)    Hand Strengthening (Therapeutic Exercise) bilateral;finger flexion;finger extension; strengthening;20 repititions;2 sets  -     Row Name 05/29/23 1046          Positioning and Restraints    Pre-Treatment Position sitting in chair/recliner  -     Post Treatment Position wheelchair  -KA     In Wheelchair sitting;with PT  in therapy gym  -           User Key  (r) = Recorded By, (t) = Taken By, (c) = Cosigned By    Initials Name Effective Dates    Michelle Kat OT 09/22/22 -                  Occupational Therapy Education     Title: PT OT SLP Therapies (Done)     Topic: Occupational Therapy (Done)     Point: ADL training (Done)     Description:   Instruct learner(s) on proper safety adaptation and remediation techniques during self care or transfers.   Instruct in proper use of assistive devices.              Learning Progress Summary            Patient Acceptance, E, VU by WN at 5/26/2023 0038    Acceptance, E,TB, VU by SG at 5/20/2023 1516                   Point: Home exercise program (Done)     Description:   Instruct learner(s) on appropriate technique for monitoring, assisting and/or progressing therapeutic exercises/activities.              Learning Progress Summary           Patient Acceptance, E, VU by WN at 5/26/2023 0038                   Point: Precautions (Done)     Description:   Instruct learner(s) on prescribed precautions during self-care and functional transfers.              Learning Progress Summary           Patient Acceptance, E, VU by WN at 5/26/2023 0038                   Point: Body mechanics (Done)     Description:   Instruct learner(s) on proper positioning and spine alignment during self-care, functional mobility activities and/or exercises.              Learning Progress Summary           Patient Acceptance, E, VU by WN at 5/26/2023 0038    Acceptance, E, VU,DU,NR by AF at 5/23/2023 1448    Comment: safety and technique with transfers                               User Key     Initials Effective Dates Name Provider Type Discipline     06/16/21 -  Verena Hewitt, OTR Occupational Therapist OT    AF 06/16/21 -  Silke Moreno OTR Occupational Therapist OT     08/23/22 -  Reno Lazo, RN Registered Nurse Nurse                    OT Recommendation and Plan                         Time Calculation:      Time Calculation- OT     Row Name 05/29/23 1051             Time Calculation- OT    OT Start Time 0930  -KA      OT Stop Time 1000  -KA      OT Time Calculation (min) 30 min  -KA            User Key  (r) = Recorded By, (t) = Taken By, (c) = Cosigned By    Initials Name Provider Type    Michelle Kat OT Occupational Therapist              Therapy Charges for Today     Code Description Service Date Service Provider Modifiers Qty    09684649557 HC OT SELF CARE/MGMT/TRAIN EA 15 MIN 5/29/2023 Michelle Cabello OT GO 1     [id number]  OT THER PROC EA 15 MIN 5/29/2023 Michelle Cabello OT GO 1                   Michelle Cabello OT  5/29/2023

## 2023-05-29 NOTE — THERAPY TREATMENT NOTE
Inpatient Rehabilitation - Speech Language Pathology Treatment Note  [facility]     Patient Name: Katherine Williamson  : 1950  MRN: [id number]  Today's Date: 2023               Admit Date: 2023     Visit Dx:    ICD-10-CM ICD-9-CM   1. Decreased functional mobility and endurance  Z74.09 780.99     Patient Active Problem List   Diagnosis   • Benign essential HTN   • Tobacco abuse   • Dislocation of hip joint prosthesis   • Fracture of proximal humerus   • Mechanical complication of internal orthopedic device   • Wear of articular bearing surface of internal prosthetic joint   • History of repair of hip joint   • History of operative procedure on hip   • Hyperglycemia   • Hepatic steatosis   • Diverticulosis   • Chest pain, atypical   • History of colon polyps   • Family history of colon cancer in mother   • Allergic rhinitis   • Lung nodule seen on imaging study   • Acute respiratory failure with hypoxia   • Abscess of right lung with pneumonia   • Empyema   • Sepsis   • Pleural effusion, right   • Other emphysema   • Pulmonary nodule (RLL)   • Diastolic dysfunction, grade 1   • Physical debility   • Cryptogenic stroke   • Acute right MCA stroke     Past Medical History:   Diagnosis Date   • Arthritis    • Cataract    • Colon polyps     FOLLOWED BY DR. JOSEPH LAU   • Hypertension      Past Surgical History:   Procedure Laterality Date   • COLONOSCOPY  10/2015    [facility].  Follow-up in 5 years.   • COLONOSCOPY N/A 2022    2 BENIGN POLYPS IN DESCENDING, 5 MM BENIGN POLYP IN SIGMOID, MULTIPLE SMALL AND LARGE DIVERTICULA IN SIGMOID, RESCOPE IN 3 YRS, DR. JOSEPH LAU AT [facility]   • EYE SURGERY     • JOINT REPLACEMENT  ?    Left total hip replacement in .  In 2015 patient had left hip revision   • TONSILLECTOMY         SLP Recommendation and Plan                                                            SLP EVALUATION (last 72 hours)     SLP SLC Evaluation     Row  "Name 05/29/23 1044 05/26/23 1320                Communication Assessment/Intervention    Document Type therapy note (daily note)  -JT therapy note (daily note)  -AL       Subjective Information no complaints  -JT --       Patient Observations alert;cooperative;agree to therapy  -JT --       Patient/Family/Caregiver Comments/Observations pt sitting up in wc, seen in room  -JT Pt participated well.  -AL       Patient Effort good  -JT --       Symptoms Noted During/After Treatment none  -JT --          Pain Scale: Numbers Pre/Post-Treatment    Pretreatment Pain Rating 0/10 - no pain  -JT 0/10 - no pain  -AL       Posttreatment Pain Rating 0/10 - no pain  -JT --             User Key  (r) = Recorded By, (t) = Taken By, (c) = Cosigned By    Initials Name Effective Dates    Danni Middleton 06/16/21 -     Sarita Landry, MS CCC-SLP 06/16/21 -                    EDUCATION  The patient has been educated in the following areas:     Cognitive Impairment.           SLP GOALS     Row Name 05/29/23 1000 05/26/23 1320          Attention Goal 1 (SLP)    Progress/Outcomes (Attention Goal 1, SLP) -- good progress toward goal  -AL     Comment (Attention Goal 1, SLP) -- Alternating attention for \"Blink\" card sort task: Pt sorted 25 cards with NO cues. She exhibited mildly reduced speed in game play against clinician.  -AL        Functional Math Skills Goal 1 (SLP)    Improve Functional Math Skills Through Goal 1 (SLP) -- complete functional math task;100%;independently (over 90% accuracy)  -AL     Time Frame (Functional Math Skills Goal 1, SLP) -- 1 week  -AL     Progress/Outcomes (Functional Math Skills Goal 1, SLP) goal ongoing  -JT good progress toward goal  -AL     Comment (Functional Math Skills Goal 1, SLP) checkbook balancing task 50% independently;  -JT Reviewed and corrected checkbook balancing task from previous day. Pt required overall MIN-MOD cues to locate and correct her errors.  -AL           User Key  (r) " = Recorded By, (t) = Taken By, (c) = Cosigned By    Initials Name Provider Type    Danni Middleton Speech and Language Pathologist    Sarita Landry, MS CCC-SLP Speech and Language Pathologist                        Time Calculation:      Time Calculation- SLP     Row Name 05/29/23 1058             Time Calculation- SLP    SLP Start Time 1030  -JT      SLP Stop Time 1100  -JT      SLP Time Calculation (min) 30 min  -JT            User Key  (r) = Recorded By, (t) = Taken By, (c) = Cosigned By    Initials Name Provider Type    Danni Middleton Speech and Language Pathologist                Therapy Charges for Today     Code Description Service Date Service Provider Modifiers Qty    28010499238 HC ST DEV OF COGN SKILLS EACH ADDT'L 15 MIN 5/29/2023 Danni Abad  1    [id number] HC ST DEV OF COGN SKILLS INITIAL 15 MIN 5/29/2023 Danni Abad  1                     Danni Abad  5/29/2023

## 2023-05-29 NOTE — PLAN OF CARE
Goal Outcome Evaluation:  Plan of Care Reviewed With: patient        Progress: improving  Outcome Evaluation: Aox4, calm and cooperative. Discomfort at open areas on bilateral buttocks, barrier ointments applied. Pt turns self in bed for pressure relief. Oxygen decreased while working with PT today, weaned down to 1L NC, will continue to monitor. IV Antibx, no pain at this time.          Pt waiting in room for spouse to get take home medications.

## 2023-05-29 NOTE — PROGRESS NOTES
"Nutrition Services    Patient Name:  Katherine Williamson  YOB: 1950  MRN: [id number]  Admit Date:  5/19/2023      Assessment Date:  05/29/23    FOLLOW UP NOTE - CLINICAL NUTRITION    Comments:   Visited pt in room during lunch who reported some intake issues associated with  errors, not always receiving what was ordered or meal selection undesirable. Pt is making it work with a la carte selections + nourishment room options. Pt denies N/V/D on this day. Wt up (+1L x 1 wk, per I/Os). Bilateral buttocks/coccyx S2 PI - pt was agreeable to trying Kwasi BID. Also discussed ways to increase protein, given  issues. Pt has previously denied Boost d/t diarrheal effects after consuming in the past.     Recommendations:   1. Adding Kwasi (fruit punch flavor) mixed in 10 oz of crystal light lemonade BID.   2. Continue to encourage good intakes, prioritizing protein + using nourishment room selections when needed.    RD will continue to follow-up and trend intakes, per protocol.      Encounter Information        Reason for Encounter Follow-up   Current Issues S/p R MCA stroke     Current Nutrition Orders & Evaluation of Intake       Oral Nutrition     Current PO Diet Diet: Regular/House Diet; Texture: Regular Texture (IDDSI 7); Fluid Consistency: Thin (IDDSI 0)   Supplement N/a   PO Evaluation    % PO Intake/# of days Mostly %, 50% D on 5/27 and 5/28.    Factors Affecting Intake  dislikes hospital food, limited food preferences     Anthropometrics         Height   Weight Height: 160 cm (63\")  Weight: 69 kg (152 lb 1.9 oz) (05/29/23 0531)   BMI kg/m2 Body mass index is 26.95 kg/m².  Overweight (25 - 29.9)   Weight trend Gain + 1 L x 1 wk, per I/Os.      Physical Findings          Physical Appearance alert, oriented   Oral/Mouth Cavity tooth or teeth missing   Edema  1+ (trace), 2+ (mild)   Gastrointestinal non-distended , normoactive, last bowel movement:5/29   Skin  pressure injury " bilateral gluteal PI (S2)   Tubes/Drains/Lines central line/PICC     Labs       Pertinent Labs Reviewed, listed below     Results from last 7 days   Lab Units 05/29/23  0423 05/25/23  0501   SODIUM mmol/L 138 138   POTASSIUM mmol/L 3.3* 3.5   CHLORIDE mmol/L 103 104   CO2 mmol/L 31.9* 29.0   BUN mg/dL 5* 13   CREATININE mg/dL 0.23* 0.24*   CALCIUM mg/dL 8.5* 8.1*   GLUCOSE mg/dL 82 90     Results from last 7 days   Lab Units 05/29/23  0423   HEMOGLOBIN g/dL 10.3*   HEMATOCRIT % 30.2*   WBC 10*3/mm3 11.89*     Results from last 7 days   Lab Units 05/29/23  0423 05/28/23  0417 05/26/23  0432 05/25/23  0501   PLATELETS 10*3/mm3 381 376 330 412     COVID19   Date Value Ref Range Status   05/02/2023 Not Detected Not Detected - Ref. Range Final     Lab Results   Component Value Date    HGBA1C 6.20 (H) 05/15/2023          Medications           Scheduled Medications aspirin, 81 mg, Oral, Q24H  atorvastatin, 80 mg, Oral, Nightly  citalopram, 20 mg, Oral, Daily  clopidogrel, 75 mg, Oral, Daily  doxycycline, 100 mg, Oral, Q12H  enoxaparin, 40 mg, Subcutaneous, Q24H  guaiFENesin, 600 mg, Oral, BID  hydrocortisone-bacitracin-zinc oxide-nystatin, 1 application, Topical, BID  magnesium hydroxide, 10 mL, Oral, Once  Menthol-Zinc Oxide, 1 application, Topical, 4x Daily  meropenem, 1 g, Intravenous, Q8H  metoprolol tartrate, 12.5 mg, Oral, Q12H  sodium chloride, 10 mL, Intravenous, Q12H  sodium chloride, 10 mL, Intravenous, Q12H  cyanocobalamin, 1,000 mcg, Oral, Daily       Infusions     PRN Medications •  acetaminophen  •  ipratropium-albuterol  •  loperamide  •  senna-docusate sodium  •  sodium chloride  •  sodium chloride  •  sodium chloride  •  sodium chloride  •  sodium chloride     PLAN OF CARE  Intervention Goal        Intervention Goal(s) Maintain nutrition status, Improved nutrition related labs, Tolerate PO , Increase intake, Continue positive trend, Maintain weight and No significant weight loss     Nutrition Intervention         RD Action Advise available snack, Adjusted supplement, Encourage intake, Follow Tx Progress and Care plan reviewed     Prescription        Diet Prescription    Supplement Prescription Kwasi (fruit punch flavor) mixed in crystal light lemonade BID   EN/PN Prescription    Prescription Ordered Yes   --  Monitor/Evaluation        Monitor Per protocol, PO intake, Supplement intake, Pertinent labs, Weight, Skin status, GI status, Symptoms   Discharge Plan Pending clinical course   Education Will educate as needed       Electronically signed by:  Pina Chun RD  05/29/23 14:45 EDT

## 2023-05-29 NOTE — PLAN OF CARE
Goal Outcome Evaluation:  Plan of Care Reviewed With: patient        Progress: improving  Outcome Evaluation: Discomfort at open areas on bilat. buttocks. Using barrier ointments. Pt turns self in bed for pressure relief. O2 2L per humidified n/c. IV AB and labs drawn per PICC. Purewick at night.

## 2023-05-29 NOTE — THERAPY TREATMENT NOTE
Inpatient Rehabilitation - Physical Therapy Treatment Note       [facility]     Patient Name: Katherine Williamson  : 1950  MRN: [id number]    Today's Date: 2023                    Admit Date: 2023      Visit Dx:     ICD-10-CM ICD-9-CM   1. Decreased functional mobility and endurance  Z74.09 780.99       Patient Active Problem List   Diagnosis   • Benign essential HTN   • Tobacco abuse   • Dislocation of hip joint prosthesis   • Fracture of proximal humerus   • Mechanical complication of internal orthopedic device   • Wear of articular bearing surface of internal prosthetic joint   • History of repair of hip joint   • History of operative procedure on hip   • Hyperglycemia   • Hepatic steatosis   • Diverticulosis   • Chest pain, atypical   • History of colon polyps   • Family history of colon cancer in mother   • Allergic rhinitis   • Lung nodule seen on imaging study   • Acute respiratory failure with hypoxia   • Abscess of right lung with pneumonia   • Empyema   • Sepsis   • Pleural effusion, right   • Other emphysema   • Pulmonary nodule (RLL)   • Diastolic dysfunction, grade 1   • Physical debility   • Cryptogenic stroke   • Acute right MCA stroke       Past Medical History:   Diagnosis Date   • Arthritis    • Cataract    • Colon polyps     FOLLOWED BY DR. JOSEPH LAU   • Hypertension        Past Surgical History:   Procedure Laterality Date   • COLONOSCOPY  10/2015    [facility]  Follow-up in 5 years.   • COLONOSCOPY N/A 2022    2 BENIGN POLYPS IN DESCENDING, 5 MM BENIGN POLYP IN SIGMOID, MULTIPLE SMALL AND LARGE DIVERTICULA IN SIGMOID, RESCOPE IN 3 YRS, DR. JOSEPH LAU AT [facility]   • EYE SURGERY     • JOINT REPLACEMENT  ?    Left total hip replacement in .  In 2015 patient had left hip revision   • TONSILLECTOMY         PT ASSESSMENT (last 12 hours)     IRF PT Evaluation and Treatment     Row Name 23 1018          PT Time and Intention    Document  Type daily treatment  -EE     Mode of Treatment physical therapy;individual therapy  -EE     Patient/Family/Caregiver Comments/Observations Pt sitting up in WC, agreeable to PT.  -EE     Row Name 05/29/23 1018          General Information    Existing Precautions/Restrictions fall;oxygen therapy device and L/min  -EE     Row Name 05/29/23 1018          Pain Assessment    Pretreatment Pain Rating 0/10 - no pain  -EE     Posttreatment Pain Rating 0/10 - no pain  -EE     Row Name 05/29/23 1018          Cognition/Psychosocial    Affect/Mental Status (Cognition) flat/blunted affect  -EE     Orientation Status (Cognition) oriented x 3  -EE     Follows Commands (Cognition) follows one-step commands;verbal cues/prompting required  -EE     Personal Safety Interventions fall prevention program maintained;gait belt;muscle strengthening facilitated;nonskid shoes/slippers when out of bed;supervised activity  -EE     Cognitive Function WFL  -EE     Row Name 05/29/23 1018          Transfer Assessment/Treatment    Comment, (Transfers) 10x STS from chair for functional strengthening  -EE     Row Name 05/29/23 1018          Sit-Stand Transfer    Sit-Stand [place] (Transfers) minimum assist (75% patient effort);verbal cues  -EE     Assistive Device (Sit-Stand Transfers) walker, front-wheeled;wheelchair  -EE     Comment, (Sit-Stand Transfer) cues for hand placement  -EE     Row Name 05/29/23 1018          Stand-Sit Transfer    Stand-Sit [place] (Transfers) contact guard;verbal cues  -EE     Assistive Device (Stand-Sit Transfers) walker, front-wheeled;wheelchair  -EE     Comment, (Stand-Sit Transfer) cues for hand placement  -EE     Row Name 05/29/23 1018          Gait/Stairs (Locomotion)    [place] Level (Gait) contact guard;verbal cues  -EE     Assistive Device (Gait) walker, front-wheeled  -EE     Distance in Feet (Gait) 160' x 1, 80' x 1  -EE     Pattern (Gait) step-through  -EE     Deviations/Abnormal Patterns (Gait)  raina decreased;stride length decreased  -EE     Bilateral Gait Deviations forward flexed posture;heel strike decreased;weight shift ability decreased  -EE     Left Sided Gait Deviations heel strike decreased;knee hyperextension  -EE     Gait Assessment/Intervention cues for upright posture and L heel strike  -EE     Row Name 05/29/23 1018          Safety Issues, Functional Mobility    Impairments Affecting Function (Mobility) balance;endurance/activity tolerance;strength;shortness of breath  -EE     Row Name 05/29/23 1018          Hip (Therapeutic Exercise)    Hip Strengthening (Therapeutic Exercise) sitting;bilateral;marching while seated;10 repetitions  -EE     Row Name 05/29/23 1018          Knee (Therapeutic Exercise)    Knee Strengthening (Therapeutic Exercise) bilateral;LAQ (long arc quad);10 repetitions  -EE     Row Name 05/29/23 1018          Positioning and Restraints    Pre-Treatment Position sitting in chair/recliner  -EE     Post Treatment Position wheelchair  -EE     In Wheelchair sitting;call light within reach;encouraged to call for assist;with SLP;exit alarm on;notified nsg  -EE     Row Name 05/29/23 1018          Vital Signs    Pre SpO2 (%) 99  -EE     O2 Delivery Pre Treatment supplemental O2  2 L O2  -EE     Intra SpO2 (%) 84  lowest observed after 10x STS  -EE     O2 Delivery Intra Treatment supplemental O2  1 L O2  -EE     Post SpO2 (%) 94  -EE     O2 Delivery Post Treatment supplemental O2  1 L O2  -EE     Pre Patient Position Sitting  -EE     Intra Patient Position Standing  -EE     Post Patient Position Sitting  -EE           User Key  (r) = Recorded By, (t) = Taken By, (c) = Cosigned By    Initials Name Provider Type    EE Tena Garay, PT Physical Therapist              Wound 05/14/23 1556 Right gluteal Pressure Injury (Active)   Pressure Injury Stage 2 05/29/23 0932   Dressing Appearance open to air 05/29/23 0932   Closure None 05/29/23 0932   Base red 05/29/23 0932   Periwound redness  05/29/23 0932   Periwound Temperature warm 05/29/23 0932   Periwound Skin Turgor soft 05/29/23 0932   Edges irregular 05/29/23 0932   Drainage Amount none 05/29/23 0932   Care, Wound cleansed with;soap and water 05/29/23 0932   Dressing Care open to air 05/29/23 0932   Periwound Care barrier ointment applied 05/29/23 0932       Wound 05/19/23 2055 Bilateral gluteal (Active)   Dressing Appearance open to air 05/29/23 0932   Closure None 05/29/23 0932   Base red;moist 05/29/23 0932   Periwound Temperature warm 05/29/23 0932   Periwound Skin Turgor soft 05/29/23 0932   Edges irregular 05/29/23 0932   Drainage Characteristics/Odor serosanguineous 05/29/23 0932   Drainage Amount scant 05/29/23 0932   Care, Wound cleansed with;soap and water 05/29/23 0932   Dressing Care open to air 05/29/23 0932   Periwound Care barrier ointment applied 05/29/23 0932     Physical Therapy Education     Title: PT OT SLP Therapies (Done)     Topic: Physical Therapy (Done)     Point: Mobility training (Done)     Learning Progress Summary           Patient Acceptance, E,TB, VU by  at 5/27/2023 0932    Acceptance, E, VU by WN at 5/26/2023 0038    Eager, E,TB,D, NR by  at 5/25/2023 1019    Acceptance, E,D, VU,NR by  at 5/24/2023 1136    Acceptance, E,TB, VU,NR by EE at 5/23/2023 1139    Acceptance, E,TB, NR,VU by  at 5/22/2023 1221    Acceptance, E, NR by  at 5/20/2023 1216                   Point: Home exercise program (Done)     Learning Progress Summary           Patient Acceptance, E,TB, VU by  at 5/27/2023 0932    Acceptance, E, VU by WN at 5/26/2023 0038    Eager, E,TB,D, NR by  at 5/25/2023 1019    Acceptance, E,D, VU,NR by  at 5/24/2023 1136    Acceptance, E,TB, VU,NR by EE at 5/23/2023 1139    Acceptance, E,TB, NR,VU by EE at 5/22/2023 1221    Acceptance, E, NR by  at 5/20/2023 1216                   Point: Body mechanics (Done)     Learning Progress Summary           Patient Acceptance, E, VU by WN at 5/26/2023 0038     Acceptance, E,D, VU,NR by EE at 5/24/2023 1136    Acceptance, E,TB, VU,NR by  at 5/23/2023 1139    Acceptance, E,TB, NR,VU by  at 5/22/2023 1221    Acceptance, E, NR by  at 5/20/2023 1216                   Point: Precautions (Done)     Learning Progress Summary           Patient Acceptance, E, VU by MD at 5/26/2023 1014    Acceptance, E, VU by  at 5/26/2023 0038    Acceptance, E,D, VU,NR by EE at 5/24/2023 1136    Acceptance, E,TB, VU,NR by EE at 5/23/2023 1139    Acceptance, E,TB, NR,VU by  at 5/22/2023 1221    Acceptance, E, NR by  at 5/20/2023 1216                               User Key     Initials Effective Dates Name Provider Type Discipline     06/16/21 -  Jess Wang, PT Physical Therapist PT    EE 06/16/21 -  Tena Garay, PT Physical Therapist PT    MD 06/16/21 -  Melissa Wise, PT Physical Therapist PT     06/16/21 -  Jesusita Mendoza, PT Physical Therapist PT    WN 08/23/22 -  Reno Lazo RN Registered Nurse Nurse                PT Recommendation and Plan                          Time Calculation:      PT Charges     Row Name 05/29/23 1047             Time Calculation    Start Time 1000  -EE      Stop Time 1030  -EE      Time Calculation (min) 30 min  -EE      PT Received On 05/29/23  -EE      PT - Next Appointment 05/30/23  -EE         Time Calculation- PT    Total Timed Code Minutes- PT 30 minute(s)  -EE            User Key  (r) = Recorded By, (t) = Taken By, (c) = Cosigned By    Initials Name Provider Type    EE Tena Garay, PT Physical Therapist                Therapy Charges for Today     Code Description Service Date Service Provider Modifiers Qty    58184552215 HC PT THERAPEUTIC ACT EA 15 MIN 5/29/2023 Tena Garay, PT GP 2                   Tena Garay, PT  5/29/2023

## 2023-05-29 NOTE — PROGRESS NOTES
Inpatient Rehabilitation Plan of Care Note    Plan of Care  Care Plan Reviewed - Updates as Follows    Safety    [RN] Potential for Injury(Active)  Current Status(05/29/2023): Patient at risk for injury related to mobility  issues.  Weekly Goal(06/06/2023): Patient will use call light appropriately and have no  injury while on Rehab.  Discharge Goal: Patient will have no injury while on Rehab.    Performed Intervention(s)  Items within reach, environmental set-up for reduce risk of fall  Bed alarms and chair alarms and safety rounds hourly      Psychosocial    [RN] Coping/Adjustment(Active)  Current Status(05/29/2023): Patient at risk for ineffective coping, but has a  supportive .  Weekly Goal(06/07/2023): Patient will verbalize needs and concerns related to  current situation.  Discharge Goal: Patient will have healthy coping skills at time of discharge.    Performed Intervention(s)  Medication as ordered and PRN  Verbalize needs and concerns  Therapeutic environmental set up      Sphincter Control    [RN] Bladder Management(Active)  Current Status(05/29/2023): Patient continent 75% of the time.  Weekly Goal(06/07/2023): Patient will be continent 100%  Discharge Goal: Patient will be continent 100% of the time.    [RN] Bowel Management(Active)  Current Status(05/29/2023): Patient incontinent 100% of the time.  Weekly Goal(06/06/2023): Patient will be continent 50% of the time.  Discharge Goal: Patient will be continent 100% of the time.    Performed Intervention(s)  Incontinent care as needed and ointment applied as ordered.  Take to bathroom in a timely manner  Proper diet and adequate fluid intake.      Body Systems    [RN] Integumentary(Active)  Current Status(05/29/2023): Patient has stage 2 pressure injury to cocyx  Weekly Goal(06/06/2023): Patients skin will be healing with no more  deterioration.  Discharge Goal: Patients skin will be healed with no further injury.    Performed  Intervention(s)  Medication, turn every 2 hours and proper nutrition.    Signed by: Leesa Montez RN

## 2023-05-30 LAB
BASOPHILS # BLD AUTO: 0.04 10*3/MM3 (ref 0–0.2)
BASOPHILS NFR BLD AUTO: 0.3 % (ref 0–1.5)
DEPRECATED RDW RBC AUTO: 44.5 FL (ref 37–54)
EOSINOPHIL # BLD AUTO: 0.16 10*3/MM3 (ref 0–0.4)
EOSINOPHIL NFR BLD AUTO: 1.3 % (ref 0.3–6.2)
ERYTHROCYTE [DISTWIDTH] IN BLOOD BY AUTOMATED COUNT: 13.8 % (ref 12.3–15.4)
HCT VFR BLD AUTO: 31.3 % (ref 34–46.6)
HGB BLD-MCNC: 10.7 G/DL (ref 12–15.9)
IMM GRANULOCYTES # BLD AUTO: 0.08 10*3/MM3 (ref 0–0.05)
IMM GRANULOCYTES NFR BLD AUTO: 0.6 % (ref 0–0.5)
LYMPHOCYTES # BLD AUTO: 2.53 10*3/MM3 (ref 0.7–3.1)
LYMPHOCYTES NFR BLD AUTO: 19.9 % (ref 19.6–45.3)
MCH RBC QN AUTO: 29.8 PG (ref 26.6–33)
MCHC RBC AUTO-ENTMCNC: 34.2 G/DL (ref 31.5–35.7)
MCV RBC AUTO: 87.2 FL (ref 79–97)
MONOCYTES # BLD AUTO: 0.89 10*3/MM3 (ref 0.1–0.9)
MONOCYTES NFR BLD AUTO: 7 % (ref 5–12)
NEUTROPHILS NFR BLD AUTO: 70.9 % (ref 42.7–76)
NEUTROPHILS NFR BLD AUTO: 9.04 10*3/MM3 (ref 1.7–7)
NRBC BLD AUTO-RTO: 0 /100 WBC (ref 0–0.2)
PLATELET # BLD AUTO: 383 10*3/MM3 (ref 140–450)
PMV BLD AUTO: 10.3 FL (ref 6–12)
RBC # BLD AUTO: 3.59 10*6/MM3 (ref 3.77–5.28)
WBC NRBC COR # BLD: 12.74 10*3/MM3 (ref 3.4–10.8)

## 2023-05-30 PROCEDURE — 25010000002 MEROPENEM PER 100 MG: Performed by: HOSPITALIST

## 2023-05-30 PROCEDURE — 97110 THERAPEUTIC EXERCISES: CPT

## 2023-05-30 PROCEDURE — 25010000002 ENOXAPARIN PER 10 MG: Performed by: PHYSICAL MEDICINE & REHABILITATION

## 2023-05-30 PROCEDURE — 85025 COMPLETE CBC W/AUTO DIFF WBC: CPT | Performed by: PHYSICAL MEDICINE & REHABILITATION

## 2023-05-30 PROCEDURE — 97530 THERAPEUTIC ACTIVITIES: CPT

## 2023-05-30 PROCEDURE — 97535 SELF CARE MNGMENT TRAINING: CPT

## 2023-05-30 PROCEDURE — 97129 THER IVNTJ 1ST 15 MIN: CPT

## 2023-05-30 PROCEDURE — 97130 THER IVNTJ EA ADDL 15 MIN: CPT

## 2023-05-30 RX ADMIN — MEROPENEM 1 G: 1 INJECTION, POWDER, FOR SOLUTION INTRAVENOUS at 11:55

## 2023-05-30 RX ADMIN — ZINC OXIDE 1 APPLICATION: 200 OINTMENT TOPICAL at 08:58

## 2023-05-30 RX ADMIN — ZINC OXIDE 1 APPLICATION: 200 OINTMENT TOPICAL at 19:48

## 2023-05-30 RX ADMIN — ASPIRIN 81 MG: 81 TABLET, CHEWABLE ORAL at 08:58

## 2023-05-30 RX ADMIN — GUAIFENESIN 600 MG: 600 TABLET, EXTENDED RELEASE ORAL at 19:47

## 2023-05-30 RX ADMIN — Medication 1 APPLICATION: at 19:48

## 2023-05-30 RX ADMIN — GUAIFENESIN 600 MG: 600 TABLET, EXTENDED RELEASE ORAL at 08:59

## 2023-05-30 RX ADMIN — Medication 1 APPLICATION: at 17:31

## 2023-05-30 RX ADMIN — METOPROLOL TARTRATE 12.5 MG: 25 TABLET, FILM COATED ORAL at 08:58

## 2023-05-30 RX ADMIN — Medication 10 ML: at 19:49

## 2023-05-30 RX ADMIN — Medication 1 APPLICATION: at 11:55

## 2023-05-30 RX ADMIN — METOPROLOL TARTRATE 12.5 MG: 25 TABLET, FILM COATED ORAL at 19:48

## 2023-05-30 RX ADMIN — Medication 1 APPLICATION: at 08:58

## 2023-05-30 RX ADMIN — MEROPENEM 1 G: 1 INJECTION, POWDER, FOR SOLUTION INTRAVENOUS at 19:47

## 2023-05-30 RX ADMIN — CLOPIDOGREL BISULFATE 75 MG: 75 TABLET, FILM COATED ORAL at 08:59

## 2023-05-30 RX ADMIN — Medication 10 ML: at 08:59

## 2023-05-30 RX ADMIN — DOXYCYCLINE 100 MG: 100 CAPSULE ORAL at 19:48

## 2023-05-30 RX ADMIN — ATORVASTATIN CALCIUM 80 MG: 80 TABLET, FILM COATED ORAL at 19:47

## 2023-05-30 RX ADMIN — DOXYCYCLINE 100 MG: 100 CAPSULE ORAL at 08:58

## 2023-05-30 RX ADMIN — CITALOPRAM 20 MG: 20 TABLET, FILM COATED ORAL at 08:59

## 2023-05-30 RX ADMIN — ENOXAPARIN SODIUM 40 MG: 100 INJECTION SUBCUTANEOUS at 19:49

## 2023-05-30 RX ADMIN — MEROPENEM 1 G: 1 INJECTION, POWDER, FOR SOLUTION INTRAVENOUS at 04:15

## 2023-05-30 RX ADMIN — Medication 1000 MCG: at 08:58

## 2023-05-30 NOTE — THERAPY TREATMENT NOTE
Inpatient Rehabilitation - Physical Therapy Treatment Note       [facility]     Patient Name: Katherine Williamson  : 1950  MRN: [id number]    Today's Date: 2023                    Admit Date: 2023      Visit Dx:     ICD-10-CM ICD-9-CM   1. Decreased functional mobility and endurance  Z74.09 780.99       Patient Active Problem List   Diagnosis   • Benign essential HTN   • Tobacco abuse   • Dislocation of hip joint prosthesis   • Fracture of proximal humerus   • Mechanical complication of internal orthopedic device   • Wear of articular bearing surface of internal prosthetic joint   • History of repair of hip joint   • History of operative procedure on hip   • Hyperglycemia   • Hepatic steatosis   • Diverticulosis   • Chest pain, atypical   • History of colon polyps   • Family history of colon cancer in mother   • Allergic rhinitis   • Lung nodule seen on imaging study   • Acute respiratory failure with hypoxia   • Abscess of right lung with pneumonia   • Empyema   • Sepsis   • Pleural effusion, right   • Other emphysema   • Pulmonary nodule (RLL)   • Diastolic dysfunction, grade 1   • Physical debility   • Cryptogenic stroke   • Acute right MCA stroke       Past Medical History:   Diagnosis Date   • Arthritis    • Cataract    • Colon polyps     FOLLOWED BY DR. JOSEPH LAU   • Hypertension        Past Surgical History:   Procedure Laterality Date   • COLONOSCOPY  10/2015    [id number]-Xm. Kaplan.  Follow-up in 5 years.   • COLONOSCOPY N/A 2022    2 BENIGN POLYPS IN DESCENDING, 5 MM BENIGN POLYP IN SIGMOID, MULTIPLE SMALL AND LARGE DIVERTICULA IN SIGMOID, RESCOPE IN 3 YRS, DR. JOSEPH LAU AT [facility]   • EYE SURGERY     • JOINT REPLACEMENT  ?    Left total hip replacement in .  In 2015 patient had left hip revision   • TONSILLECTOMY         PT ASSESSMENT (last 12 hours)     IRF PT Evaluation and Treatment     Row Name 23 1015          PT Time and Intention    Document  Type daily treatment  -EE     Mode of Treatment physical therapy;individual therapy  -EE     Patient/Family/Caregiver Comments/Observations Pt sitting up in WC, agreeable to PT. Increased fatigue in PM.  -EE     Row Name 05/30/23 1015          General Information    Existing Precautions/Restrictions fall  -EE     Comment, General Information pt on room air during session today; closely monitoring O2 sats to ensure SpO2 >= 90%  -EE     Row Name 05/30/23 1015          Pain Assessment    Pretreatment Pain Rating 0/10 - no pain  -EE     Posttreatment Pain Rating 0/10 - no pain  -EE     Row Name 05/30/23 1015          Cognition/Psychosocial    Affect/Mental Status (Cognition) flat/blunted affect  -EE     Orientation Status (Cognition) oriented x 3  -EE     Follows Commands (Cognition) follows one-step commands;verbal cues/prompting required  -EE     Personal Safety Interventions fall prevention program maintained;gait belt;muscle strengthening facilitated;nonskid shoes/slippers when out of bed;toileting scheduled;supervised activity  -EE     Row Name 05/30/23 1015          Transfer Assessment/Treatment    Comment, (Transfers) 5x STS from WC for functional strengthening  -EE     Row Name 05/30/23 1015          Sit-Stand Transfer    Sit-Stand [place] (Transfers) minimum assist (75% patient effort);verbal cues  -EE     Assistive Device (Sit-Stand Transfers) walker, front-wheeled  -EE     Comment, (Sit-Stand Transfer) cues for hand placement  -EE     Row Name 05/30/23 1015          Stand-Sit Transfer    Stand-Sit [place] (Transfers) contact guard;verbal cues  -EE     Assistive Device (Stand-Sit Transfers) walker, front-wheeled  -EE     Comment, (Stand-Sit Transfer) cues for hand placement  -EE     Row Name 05/30/23 1015          Gait/Stairs (Locomotion)    [place] Level (Gait) contact guard;verbal cues  -EE     Assistive Device (Gait) walker, front-wheeled  -EE     Distance in Feet (Gait) 160' x 2  -EE      "Pattern (Gait) step-through  -EE     Deviations/Abnormal Patterns (Gait) raina decreased;stride length decreased  -EE     Bilateral Gait Deviations forward flexed posture;heel strike decreased;weight shift ability decreased  -EE     Left Sided Gait Deviations heel strike decreased;knee hyperextension  L foot ER  -EE     Gait Assessment/Intervention cues for upright posture  -EE     Row Name 05/30/23 1015          Safety Issues, Functional Mobility    Impairments Affecting Function (Mobility) balance;endurance/activity tolerance;strength;shortness of breath  -EE     Row Name 05/30/23 1015          Balance    Comment, Balance Static standing without UE support 2 x 45 sec with min/CGA, tends to stand with slight L lean but able to weight shift to midline with vc's and tc's.   Standing B LE alt step taps to 4\" step x20 reps with BUE support on // bars and CGA.  -EE     Row Name 05/30/23 1015          Motor Skills    Additional Documentation Advanced Stepping/Walking Interventions (Group)  -EE     Row Name 05/30/23 1015          Hip (Therapeutic Exercise)    Hip Strengthening (Therapeutic Exercise) sitting;bilateral;marching while seated;1 lb free weight;10 repetitions  -EE     Row Name 05/30/23 1015          Knee (Therapeutic Exercise)    Knee Strengthening (Therapeutic Exercise) sitting;bilateral;LAQ (long arc quad);10 repetitions  -EE     Row Name 05/30/23 1015          Advanced Stepping/Walking Interventions    Stepping/Walking Interventions side stepping  -EE     Side Stepping (Stepping/Walking Interventions) 2 x 8', CGA with B UE support on // bars  -EE     Row Name 05/30/23 1015          Positioning and Restraints    Pre-Treatment Position sitting in chair/recliner  -EE     Post Treatment Position wheelchair  -EE     In Wheelchair sitting;exit alarm on;with SLP  -EE     Row Name 05/30/23 1015          Vital Signs    Pre SpO2 (%) 94  -EE     O2 Delivery Pre Treatment room air  -EE     Intra SpO2 (%) 90  -EE     " O2 Delivery Intra Treatment room air  -EE     Post SpO2 (%) 93  -EE     O2 Delivery Post Treatment room air  -EE           User Key  (r) = Recorded By, (t) = Taken By, (c) = Cosigned By    Initials Name Provider Type    Tena Paula, PT Physical Therapist              Wound 05/14/23 1556 Right gluteal Pressure Injury (Active)   Closure None 05/30/23 0900   Base red 05/30/23 0900   Periwound redness 05/30/23 0900   Periwound Temperature warm 05/30/23 0900   Periwound Skin Turgor soft 05/30/23 0900   Edges irregular 05/30/23 0900   Drainage Amount none 05/30/23 0900       Wound 05/19/23 2055 Bilateral gluteal (Active)   Dressing Appearance open to air 05/30/23 0900   Closure None 05/30/23 0900   Base red;moist 05/30/23 0900   Periwound Temperature warm 05/30/23 0900   Periwound Skin Turgor soft 05/30/23 0900   Edges irregular 05/30/23 0900   Drainage Characteristics/Odor serosanguineous 05/30/23 0900   Drainage Amount scant 05/30/23 0900     Physical Therapy Education     Title: PT OT SLP Therapies (Done)     Topic: Physical Therapy (Done)     Point: Mobility training (Done)     Learning Progress Summary           Patient Acceptance, E,TB, VU,NR by EE at 5/30/2023 1131    Acceptance, E,TB, VU by SUMMER at 5/27/2023 0932    Acceptance, E, VU by WN at 5/26/2023 0038    Eager, E,TB,D, NR by KP at 5/25/2023 1019    Acceptance, E,D, VU,NR by EE at 5/24/2023 1136    Acceptance, E,TB, VU,NR by EE at 5/23/2023 1139    Acceptance, E,TB, NR,VU by EE at 5/22/2023 1221    Acceptance, E, NR by  at 5/20/2023 1216                   Point: Home exercise program (Done)     Learning Progress Summary           Patient Acceptance, E,TB, VU,NR by EE at 5/30/2023 1131    Acceptance, E,TB, VU by KP at 5/27/2023 0932    Acceptance, E, VU by WN at 5/26/2023 0038    Eager, E,TB,D, NR by KP at 5/25/2023 1019    Acceptance, E,D, VU,NR by EE at 5/24/2023 1136    Acceptance, E,TB, VU,NR by EE at 5/23/2023 1139    Acceptance, E,TB, NR,VU by EE at  5/22/2023 1221    Acceptance, E, NR by LH at 5/20/2023 1216                   Point: Body mechanics (Done)     Learning Progress Summary           Patient Acceptance, E,TB, VU,NR by EE at 5/30/2023 1131    Acceptance, E, VU by WN at 5/26/2023 0038    Acceptance, E,D, VU,NR by EE at 5/24/2023 1136    Acceptance, E,TB, VU,NR by EE at 5/23/2023 1139    Acceptance, E,TB, NR,VU by EE at 5/22/2023 1221    Acceptance, E, NR by LH at 5/20/2023 1216                   Point: Precautions (Done)     Learning Progress Summary           Patient Acceptance, E,TB, VU,NR by EE at 5/30/2023 1131    Acceptance, E, VU by MD at 5/26/2023 1014    Acceptance, E, VU by WN at 5/26/2023 0038    Acceptance, E,D, VU,NR by EE at 5/24/2023 1136    Acceptance, E,TB, VU,NR by EE at 5/23/2023 1139    Acceptance, E,TB, NR,VU by EE at 5/22/2023 1221    Acceptance, E, NR by LH at 5/20/2023 1216                               User Key     Initials Effective Dates Name Provider Type Discipline     06/16/21 -  Jess Wang, PT Physical Therapist PT     06/16/21 -  Tena Garay, PT Physical Therapist PT    MD 06/16/21 -  Melissa Wise, PT Physical Therapist PT     06/16/21 -  Jesusita Mendoza, PT Physical Therapist PT    WN 08/23/22 -  Reno Lazo RN Registered Nurse Nurse                PT Recommendation and Plan                          Time Calculation:      PT Charges     Row Name 05/30/23 1334 05/30/23 1132          Time Calculation    Start Time 1300  -EE 1000  -EE     Stop Time 1330  -EE 1030  -EE     Time Calculation (min) 30 min  -EE 30 min  -EE     PT Received On 05/30/23  -EE 05/30/23  -EE     PT - Next Appointment 05/31/23  -EE 05/30/23  -EE        Time Calculation- PT    Total Timed Code Minutes- PT 30 minute(s)  -EE 30 minute(s)  -EE           User Key  (r) = Recorded By, (t) = Taken By, (c) = Cosigned By    Initials Name Provider Type    Tena Paula PT Physical Therapist                Therapy Charges for Today     Code  Description Service Date Service Provider Modifiers Qty    40202213937  PT THERAPEUTIC ACT EA 15 MIN 5/29/2023 Tena Garay, PT GP 2    [id number]  PT THER PROC EA 15 MIN 5/30/2023 Tena Garay, PT GP 1    [id number]  PT THERAPEUTIC ACT EA 15 MIN 5/30/2023 Tena Garay, PT GP 3                   Tena Garay, PT  5/30/2023

## 2023-05-30 NOTE — PROGRESS NOTES
Inpatient Rehabilitation Plan of Care Note    Plan of Care  Care Plan Reviewed - No updates at this time.    Safety    [RN] Potential for Injury(Active)  Current Status(05/29/2023): Patient at risk for injury related to mobility  issues.  Weekly Goal(06/06/2023): Patient will use call light appropriately and have no  injury while on Rehab.  Discharge Goal: Patient will have no injury while on Rehab.    Performed Intervention(s)  Items within reach, environmental set-up for reduce risk of fall  Bed alarms and chair alarms and safety rounds hourly      Psychosocial    [RN] Coping/Adjustment(Active)  Current Status(05/29/2023): Patient at risk for ineffective coping, but has a  supportive .  Weekly Goal(06/07/2023): Patient will verbalize needs and concerns related to  current situation.  Discharge Goal: Patient will have healthy coping skills at time of discharge.    Performed Intervention(s)  Medication as ordered and PRN  Verbalize needs and concerns  Therapeutic environmental set up      Sphincter Control    [RN] Bladder Management(Active)  Current Status(05/29/2023): Patient continent 75% of the time.  Weekly Goal(06/07/2023): Patient will be continent 100%  Discharge Goal: Patient will be continent 100% of the time.    [RN] Bowel Management(Active)  Current Status(05/29/2023): Patient incontinent 100% of the time.  Weekly Goal(06/06/2023): Patient will be continent 50% of the time.  Discharge Goal: Patient will be continent 100% of the time.    Performed Intervention(s)  Incontinent care as needed and ointment applied as ordered.  Take to bathroom in a timely manner  Proper diet and adequate fluid intake.      Body Systems    [RN] Integumentary(Active)  Current Status(05/29/2023): Patient has stage 2 pressure injury to cocyx  Weekly Goal(06/06/2023): Patients skin will be healing with no more  deterioration.  Discharge Goal: Patients skin will be healed with no further injury.    Performed  Intervention(s)  Medication, turn every 2 hours and proper nutrition.    Signed by: Makeda Dozier RN

## 2023-05-30 NOTE — THERAPY TREATMENT NOTE
Inpatient Rehabilitation - Speech Language Pathology Treatment Note    [facility]     Patient Name: Katherine Williamson  : 1950  MRN: [id number]    Today's Date: 2023                   Admit Date: 2023       Visit Dx:      ICD-10-CM ICD-9-CM   1. Decreased functional mobility and endurance  Z74.09 780.99       Patient Active Problem List   Diagnosis   • Benign essential HTN   • Tobacco abuse   • Dislocation of hip joint prosthesis   • Fracture of proximal humerus   • Mechanical complication of internal orthopedic device   • Wear of articular bearing surface of internal prosthetic joint   • History of repair of hip joint   • History of operative procedure on hip   • Hyperglycemia   • Hepatic steatosis   • Diverticulosis   • Chest pain, atypical   • History of colon polyps   • Family history of colon cancer in mother   • Allergic rhinitis   • Lung nodule seen on imaging study   • Acute respiratory failure with hypoxia   • Abscess of right lung with pneumonia   • Empyema   • Sepsis   • Pleural effusion, right   • Other emphysema   • Pulmonary nodule (RLL)   • Diastolic dysfunction, grade 1   • Physical debility   • Cryptogenic stroke   • Acute right MCA stroke       Past Medical History:   Diagnosis Date   • Arthritis    • Cataract    • Colon polyps     FOLLOWED BY DR. JOSEPH LAU   • Hypertension        Past Surgical History:   Procedure Laterality Date   • COLONOSCOPY  10/2015    Vejcf-[id number]-Oy. [facility].  Follow-up in 5 years.   • COLONOSCOPY N/A 2022    2 BENIGN POLYPS IN DESCENDING, 5 MM BENIGN POLYP IN SIGMOID, MULTIPLE SMALL AND LARGE DIVERTICULA IN SIGMOID, RESCOPE IN 3 YRS, DR. JOSEPH LAU AT [facility]   • EYE SURGERY     • JOINT REPLACEMENT  ?    Left total hip replacement in .  In 2015 patient had left hip revision   • TONSILLECTOMY         SLP Recommendation and Plan                                                            SLP EVALUATION (last 72 hours)     SLP SLC  "Evaluation     Row Name 05/30/23 1330 05/30/23 0900 05/29/23 1044             Communication Assessment/Intervention    Document Type therapy note (daily note)  -AL therapy note (daily note)  -AL therapy note (daily note)  -JT      Subjective Information -- -- no complaints  -JT      Patient Observations -- -- alert;cooperative;agree to therapy  -JT      Patient/Family/Caregiver Comments/Observations Pt participated well.  -AL Pt upright in wheelchair. Participated well.  -AL pt sitting up in wc, seen in room  -JT      Patient Effort -- -- good  -JT      Symptoms Noted During/After Treatment -- -- none  -JT         Pain Scale: Numbers Pre/Post-Treatment    Pretreatment Pain Rating 0/10 - no pain  -AL 0/10 - no pain  -AL 0/10 - no pain  -JT      Posttreatment Pain Rating -- -- 0/10 - no pain  -JT            User Key  (r) = Recorded By, (t) = Taken By, (c) = Cosigned By    Initials Name Effective Dates    Danni Middleton 06/16/21 -     Sarita Landry MS CCC-SLP 06/16/21 -                    EDUCATION    The patient has been educated in the following areas:       Cognitive Impairment.             SLP GOALS     Row Name 05/30/23 1330 05/30/23 0900 05/29/23 1000       (STG) Swallow Management Recall Goal 1 (SLP)    Comment (Swallow Management Recall Goal 1, SLP) -- Reviewed recommendation for chin tuck with liquids and solids. Pt reported sometimes feeling \"a vinita\" of discomfort in her upper chest when eating. Plan to discuss with MD.  -AL --       Attention Goal 1 (SLP)    Improve Attention by Goal 1 (SLP) complete selective attention task;complete divided attention task;90%;independently (over 90% accuracy)  -AL -- --    Time Frame (Attention Goal 1, SLP) by discharge  -AL -- --    Progress/Outcomes (Attention Goal 1, SLP) good progress toward goal  -AL -- --    Comment (Attention Goal 1, SLP) Calendar logic task: Pt exhibited impulsivity during this task. She misinterpreted one direction and required " MOD cues to correct answer.  -AL -- --       Reasoning Goal 1 (SLP)    Improve Reasoning Through Goal 1 (SLP) -- complete high level reasoning task;90%;independently (over 90% accuracy)  -AL --    Progress/Outcomes (Reasoning Goal 1, SLP) -- good progress toward goal  -AL --    Comment (Reasoning Goal 1, SLP) -- Moderate level logic puzzle: 75% with NO cues, 100% with MIN-MOD cues.  -AL --       Functional Math Skills Goal 1 (SLP)    Progress/Outcomes (Functional Math Skills Goal 1, SLP) -- -- goal ongoing  -JT    Comment (Functional Math Skills Goal 1, SLP) -- -- checkbook balancing task 50% independently;  -JT       Executive Functional Skills Goal 1 (SLP)    Improve Executive Function Skills Goal 1 (SLP) organization/planning activity  -AL -- --    Time Frame (Executive Function Skills Goal 1, SLP) by discharge  -AL -- --    Progress/Outcomes (Executive Function Skills Goal 1, SLP) goal ongoing  -AL -- --    Comment (Executive Function Skills Goal 1, SLP) Medicine management with pill box and pt's own medicine list: 65% with NO cues, 100% with MIN-MOD cues. Pt with difficulty keeping up with which medicine she was on and exhibited mild impulsivity filling box.  -AL -- --          User Key  (r) = Recorded By, (t) = Taken By, (c) = Cosigned By    Initials Name Provider Type    Danni Middleton Speech and Language Pathologist    Sarita Landry, MS CCC-SLP Speech and Language Pathologist                            Time Calculation:        Time Calculation- SLP     Row Name 05/30/23 1517 05/30/23 1129          Time Calculation- SLP    SLP Start Time 1330  -AL 0900  -AL     SLP Stop Time 1400  -AL 0930  -AL     SLP Time Calculation (min) 30 min  -AL 30 min  -AL           User Key  (r) = Recorded By, (t) = Taken By, (c) = Cosigned By    Initials Name Provider Type    Sarita Landry, MS CCC-SLP Speech and Language Pathologist                  Therapy Charges for Today     Code Description Service  Date Service Provider Modifiers Qty    02222376597 HC ST DEV OF COGN SKILLS INITIAL 15 MIN 5/30/2023 Sarita Comer, MS CCC-SLP  1    [id number]  ST DEV OF COGN SKILLS EACH ADDT'L 15 MIN 5/30/2023 Sarita Comer, MS CCC-SLP  3                           Sarita Comer MS CCC-SLP  5/30/2023

## 2023-05-30 NOTE — THERAPY TREATMENT NOTE
Inpatient Rehabilitation - Occupational Therapy Treatment Note    [facility]     Patient Name: Katherine Williamson  : 1950  MRN: [id number]    Today's Date: 2023                 Admit Date: 2023         ICD-10-CM ICD-9-CM   1. Decreased functional mobility and endurance  Z74.09 780.99       Patient Active Problem List   Diagnosis   • Benign essential HTN   • Tobacco abuse   • Dislocation of hip joint prosthesis   • Fracture of proximal humerus   • Mechanical complication of internal orthopedic device   • Wear of articular bearing surface of internal prosthetic joint   • History of repair of hip joint   • History of operative procedure on hip   • Hyperglycemia   • Hepatic steatosis   • Diverticulosis   • Chest pain, atypical   • History of colon polyps   • Family history of colon cancer in mother   • Allergic rhinitis   • Lung nodule seen on imaging study   • Acute respiratory failure with hypoxia   • Abscess of right lung with pneumonia   • Empyema   • Sepsis   • Pleural effusion, right   • Other emphysema   • Pulmonary nodule (RLL)   • Diastolic dysfunction, grade 1   • Physical debility   • Cryptogenic stroke   • Acute right MCA stroke       Past Medical History:   Diagnosis Date   • Arthritis    • Cataract    • Colon polyps     FOLLOWED BY DR. JOSEPH LAU   • Hypertension        Past Surgical History:   Procedure Laterality Date   • COLONOSCOPY  10/2015    Urfgb-phvgrnsvshgs-Zv. Kaplan.  Follow-up in 5 years.   • COLONOSCOPY N/A 2022    2 BENIGN POLYPS IN DESCENDING, 5 MM BENIGN POLYP IN SIGMOID, MULTIPLE SMALL AND LARGE DIVERTICULA IN SIGMOID, RESCOPE IN 3 YRS, DR. JOSEPH LAU AT [facility]   • EYE SURGERY     • JOINT REPLACEMENT  ?    Left total hip replacement in .  In 2015 patient had left hip revision   • TONSILLECTOMY               IRF OT ASSESSMENT FLOWSHEET (last 12 hours)     IRF OT Evaluation and Treatment     Row Name 23 1531          OT Time and Intention     Document Type daily treatment  -AF     Mode of Treatment occupational therapy  -AF     Patient Effort good  -AF     Row Name 05/30/23 1531          General Information    Patient/Family/Caregiver Comments/Observations pt sititng upin w/c in AM and up in recliner chair iN PM  -AF     Existing Precautions/Restrictions fall  -AF     Row Name 05/30/23 1531          Pain Assessment    Pretreatment Pain Rating 0/10 - no pain  -AF     Posttreatment Pain Rating 0/10 - no pain  -AF     Row Name 05/30/23 1531          Cognition/Psychosocial    Affect/Mental Status (Cognition) flat/blunted affect  -AF     Orientation Status (Cognition) oriented x 3  -AF     Follows Commands (Cognition) follows one-step commands;verbal cues/prompting required  -AF     Personal Safety Interventions fall prevention program maintained;gait belt;nonskid shoes/slippers when out of bed  -AF     Cognitive Function WFL  -AF     Row Name 05/30/23 1531          Bathing    Comment (Bathing) deferred  -AF     Row Name 05/30/23 1531          Upper Body Dressing    Comment (Upper Body Dressing) completed prior to OT  -AF     Row Name 05/30/23 1531          Lower Body Dressing    Comment (Lower Body Dressing) completed prior to OT  -AF     Row Name 05/30/23 1531          Grooming    [place] Level (Grooming) grooming skills;set up  -AF     Position (Grooming) sink side;supported sitting  -AF     Row Name 05/30/23 1531          Toileting    [place] Level (Toileting) toileting skills;minimum assist (75% patient effort);moderate assist (50% patient effort);verbal cues  -AF     Assistive Device Use (Toileting) grab bar/safety frame;raised toilet seat  -AF     Position (Toileting) supported sitting;supported standing  -AF     Comment (Toileting) RWX level  -AF     Row Name 05/30/23 1531          Functional Mobility    Functional Mobility- Comment walked to and from bathroom with RWX CGA  -AF     Row Name 05/30/23 1531          Sit-Stand Transfer     Sit-Stand Tower (Transfers) minimum assist (75% patient effort)  -AF     Assistive Device (Sit-Stand Transfers) walker, front-wheeled  -AF     Row Name 05/30/23 1531          Stand-Sit Transfer    Stand-Sit Tower (Transfers) contact guard;verbal cues  -AF     Assistive Device (Stand-Sit Transfers) walker, front-wheeled  -AF     Row Name 05/30/23 1531          Toilet Transfer    Type (Toilet Transfer) stand-sit;sit-stand  -AF     Tower Level (Toilet Transfer) minimum assist (75% patient effort)  -AF     Assistive Device (Toilet Transfer) commode;grab bars/safety frame;walker, front-wheeled  -AF     Row Name 05/30/23 1531          Shoulder (Therapeutic Exercise)    Shoulder Strengthening (Therapeutic Exercise) bilateral;scapular stabilization;internal rotation;external rotation;2 lb free weight;20 repititions  -AF     Row Name 05/30/23 1531          Elbow/Forearm (Therapeutic Exercise)    Elbow/Forearm Strengthening (Therapeutic Exercise) bilateral;flexion;extension;pronation;supination;sitting;2 lb free weight;20 repititions  -AF     Row Name 05/30/23 1531          Wrist (Therapeutic Exercise)    Wrist Strengthening (Therapeutic Exercise) bilateral;flexion;extension;2 lb free weight;20 repititions  -AF     Row Name 05/30/23 1531          Balance    Static Sitting Balance standby assist  -AF     Static Standing Balance contact guard  use of grab bar or walker during ADL tasks  -AF     Row Name 05/30/23 1531          Positioning and Restraints    Pre-Treatment Position sitting in chair/recliner  -AF     Post Treatment Position wheelchair  -AF     In Wheelchair sitting;exit alarm on;with PT  in AM and PM sessions  -           User Key  (r) = Recorded By, (t) = Taken By, (c) = Cosigned By    Initials Name Effective Dates    AF Silke Moreno, OTR 06/16/21 -                  Occupational Therapy Education     Title: PT OT SLP Therapies (Done)     Topic: Occupational Therapy (Done)     Point: ADL  training (Done)     Description:   Instruct learner(s) on proper safety adaptation and remediation techniques during self care or transfers.   Instruct in proper use of assistive devices.              Learning Progress Summary           Patient Acceptance, E, VU by WN at 5/26/2023 0038    Acceptance, E,TB, VU by SG at 5/20/2023 1516                   Point: Home exercise program (Done)     Description:   Instruct learner(s) on appropriate technique for monitoring, assisting and/or progressing therapeutic exercises/activities.              Learning Progress Summary           Patient Acceptance, E, VU by WN at 5/26/2023 0038                   Point: Precautions (Done)     Description:   Instruct learner(s) on prescribed precautions during self-care and functional transfers.              Learning Progress Summary           Patient Acceptance, E, VU by WN at 5/26/2023 0038                   Point: Body mechanics (Done)     Description:   Instruct learner(s) on proper positioning and spine alignment during self-care, functional mobility activities and/or exercises.              Learning Progress Summary           Patient Acceptance, E, VU by WN at 5/26/2023 0038    Acceptance, E, VU,DU,NR by AF at 5/23/2023 1448    Comment: safety and technique with transfers                               User Key     Initials Effective Dates Name Provider Type Discipline     06/16/21 -  Verena Hewitt OTR Occupational Therapist OT    AF 06/16/21 -  Silke Moreno OTR Occupational Therapist OT     08/23/22 -  Reno Lazo, RN Registered Nurse Nurse                    OT Recommendation and Plan                         Time Calculation:      Time Calculation- OT     Row Name 05/30/23 1536 05/30/23 1532          Time Calculation- OT    OT Start Time 1230  -AF 0930  -AF     OT Stop Time 1300  -AF 1000  -AF     OT Time Calculation (min) 30 min  -AF 30 min  -AF           User Key  (r) = Recorded By, (t) = Taken By, (c) = Cosigned  By    Initials Name Provider Type    AF Silke Moreno, OTENID Occupational Therapist              Therapy Charges for Today     Code Description Service Date Service Provider Modifiers Qty    81816559964 HC OT SELF CARE/MGMT/TRAIN EA 15 MIN 5/30/2023 Silke Moreno, OTENID GO 2    [id number]  OT THER PROC EA 15 MIN 5/30/2023 Silke Moreno, OTR GO 2                   SONG Kaiser  5/30/2023

## 2023-05-30 NOTE — PROGRESS NOTES
LOS: 11 days   Patient Care Team:  Zelalem Flor APRN as PCP - General (Internal Medicine)  Brandon Mitchell MD as Consulting Physician (Orthopedic Surgery)      ANNIE CERDA  1950    Diagnoses    1. DECREASED FUNCTIONAL MOBILITY AND ENDURANCE       ADMITTING DIAGNOSIS:  CVA      Subjective     Patient is tolerating activities.  On room air during speech therapy and at rest.  With physical therapy has been utilizing 1 L with activity, 1.5 L at night.  Ambulate 160 feet.    Objective     Vitals:    05/30/23 0513   BP: 116/71   Pulse: 79   Resp: 18   Temp: 97.9 °F (36.6 °C)   SpO2: 92%       PHYSICAL EXAM:   MENTAL STATUS -  AWAKE / ALERT  HEENT-   SCLERAE ANICTERIC, CONJUNCTIVAE PINK, OP MOIST, NO JVD,  LUNGS -decreased air movement bilaterally   Room air in the room     HEART- RRR, NO RUB, MURMUR, OR GALLOP  ABD - NORMOACTIVE BOWEL SOUNDS, SOFT, NT.    EXT - NO EDEMA OR CYANOSIS     Right upper extremity PICC line site unremarkable without any swelling about the area.  NEURO -oriented x4  MOTOR EXAM -takes resistance bilaterally        MEDICATIONS  Scheduled Meds:aspirin, 81 mg, Oral, Q24H  atorvastatin, 80 mg, Oral, Nightly  citalopram, 20 mg, Oral, Daily  clopidogrel, 75 mg, Oral, Daily  doxycycline, 100 mg, Oral, Q12H  enoxaparin, 40 mg, Subcutaneous, Q24H  guaiFENesin, 600 mg, Oral, BID  hydrocortisone-bacitracin-zinc oxide-nystatin, 1 application, Topical, BID  magnesium hydroxide, 10 mL, Oral, Once  Menthol-Zinc Oxide, 1 application, Topical, 4x Daily  meropenem, 1 g, Intravenous, Q8H  metoprolol tartrate, 12.5 mg, Oral, Q12H  sodium chloride, 10 mL, Intravenous, Q12H  sodium chloride, 10 mL, Intravenous, Q12H  cyanocobalamin, 1,000 mcg, Oral, Daily      Continuous Infusions:   PRN Meds:.•  acetaminophen  •  ipratropium-albuterol  •  loperamide  •  senna-docusate sodium  •  sodium chloride  •  sodium chloride  •  sodium chloride  •  sodium chloride  •  sodium chloride      RESULTS  No results  "found for: POCGLU  Results from last 7 days   Lab Units 05/30/23  0411 05/29/23  0423 05/28/23  0417   WBC 10*3/mm3 12.74* 11.89* 11.02*   HEMOGLOBIN g/dL 10.7* 10.3* 9.8*   HEMATOCRIT % 31.3* 30.2* 30.0*   PLATELETS 10*3/mm3 383 381 376     Results from last 7 days   Lab Units 05/29/23  0423 05/25/23  0501   SODIUM mmol/L 138 138   POTASSIUM mmol/L 3.3* 3.5   CHLORIDE mmol/L 103 104   CO2 mmol/L 31.9* 29.0   BUN mg/dL 5* 13   CREATININE mg/dL 0.23* 0.24*   CALCIUM mg/dL 8.5* 8.1*   GLUCOSE mg/dL 82 90       New Radiology:  ELIGIO negative for vegetations  CXR 5/25 revealed increased opacification of the RLL     Prior radiology:  MRI brain: \"Bilateral multifocal areas of infarction.  No evidence of hemorrhagic transformation. \"    ASSESSMENT and PLAN    Acute right MCA stroke    1. Acute R MCA stroke and bilateral infarcts  Stroke prophylaxis-aspirin/Plavix/atorvastatin  Zio patch placed on May 19 for 2 weeks  - 5/20 - Admit for inpt rehab w/ PT, OT, SLP, psychology and rehab medical and nursing care. Continue secondary stroke prophylaxis     2. Pneumonia and right lung abscess and Pleural effusions  - 5/20 - Continue IV antibx. ID following    - 5/21 - Continues to improve   The chest x-ray is reassuring   Expect her to be able to come off the oxygen in the next few days   She will need to have a follow-up chest imaging 6 weeks down the road   Finish the antibiotic course per ID, last day 6/8/2023   -5/22-on 1 L oxygen with activities and maintaining sats  -5/23- Per Pulmonology, continue Merrem. Continue to wean off supplemental oxygen  -5/24-oxygen weaned to 0.5 L nasal cannula  -5/25 -on room air and therapy, more 2 L last night  -5/26 Placed on 2L NC last night, WBC decreased to 15. CXR revealed increased opacification in RLL. Will continue Merrem and appreciate recommendations from Pulmonolgy. CDiff negative and bowel movements decreased this morning.  -5/30-WBC stable around 12 K.  On room air at " rest.    ID-meropenem and doxycycline-last dose June 8, 2023  Repeat CT chest June 6, 2023  May 25-WBC increased to 18 K from 8K 5 days ago.  No fever.  Vital signs stable.  On room air during speech therapy session.  Not acutely ill-appearing.  Had 3 loose semiformed stool today but received bowel program yesterday for recent constipation.   PICC line site unremarkable.   Increased diffuse purpleish petechiae on the anterior lower legs.  She had a few earlier in the week but there more prominent today  Calves are soft without any significant edema.  Continues on meropenem and doxycycline.   Patient reviewed with pulmonology service who will assess.  Although with the recent bowel program, given the white blood cell count increased will check C. difficile with the loose stool today  Will review with infectious disease service  May 26-Persistent right empyema, similar to prior exam. Increased consolidation in the superior segment right lower lobe, left lower lobe. Multiple  small nodular densities in both lower lungs, indeterminate. Increased  cavitation in the right lower lobe. Decreased left pleural effusion  May 30-WBC improved 12 K.  Continues on antibiotics.        3. Acute hypoxic respiratory failure     4. Emphysema d/t Tobacco use     5. Hypertension-continue antihypertensive regimen and avoid hypotensive episodes.     6. DVT prophylaxis -   - 5/20 - Start Lovenox SQ daily    7.  Dysphagia  -5/22-diet advanced to regular solid thin liquids    8.  GI-constipation-May 24-add Senokot-Colace.  Milk of magnesia x1.  Patient maintaining hydration  May 25-loose stool semiformed x3 today.  Changed Senokot Colace to as needed    TEAM CONF - MAY 23 - BED MIN. TRANSFERS MIN MOD ASSIST. GAIT 60 FEET MIN RW. TOILET TRANSFERS MOD ASSIST RW. SATS 1 L WITH ACTIVITIES WITH SATS >=90%. BATH MOD. LBD MOD.UBD MIN. GROOMING SET UP. TOILETING MAX. MILD EXECUTIVE FUNCTION DEFICITS. BNE PENDING. DIET ADVANCED TO REGULAR CONSISTENCY,  GOOD INTAKE. PULM EXPECTS WILL BE OFF OXYGEN IN NEXT FEW DAYS. REPEAT CHEST IMAGING IN 6 WEEKS.  ELOS - 2 WEEKS.     Team conference-May 30  PT: Walking 160 ft, on 1L with walking which she was previously on RA with wakling. Min assist for bed transfer with RW. Min assist with toilet transfers  OT: Bathing min, Dressing lower body mod assist, Dressing upper body is set up, toileting is mod assist. Fatigues easily with therapy, wants to stay in   ST: Mild high level deficits, attention to detail with check booking, not working on med management  Nursing: Potassium supplementation, WBC is 12 which is stable, on ABX Doxy PO and Merrem IV, Drops to 80% if not on supplemental O2, on 1-2L NC. Coccyx pressure wound. On IV ABX until 6/28/2023  Psych: apathetic, flat affect, but was agreeable to do testing  RD: Started on Kwasi   ELOS: June 9th    Now admit for comprehensive acute inpatient rehabilitation .  This would be an interdisciplinary program with physical therapy 1 hour,  occupational therapy 1 hour, and speech therapy 1 hour, 5 days a week.  Rehabilitation nursing for carryover, monitoring of neurologic and pulmonary   status, bowel and bladder, and skin  Ongoing physician follow-up.  Weekly team conferences.  Goals are to achieve a level of supervision with  mobility and self-care and improved endurance.   Rehabilitation prognosis fair.  Medical prognosis fair.  Estimated length of stay is approximately 2 weeks, but is only an estimation.    .     The patient's functional status and clinical status is unchanged from preadmission assessment and the patient continues appropriate for acute inpatient rehabilitation.  Goal is for home with outpatient   therapies.  Barrier to discharge: Impaired mobility self-care endurance- work on vision, strength, gross and fine motor control, balance, progressive ambulation, ADLs to overcome.           Sebastian Collins MD      During rounds, used appropriate personal protective  equipment including mask and gloves.  Additional gown if indicated.  Mask used was standard procedure mask. Appropriate PPE was worn during the entire visit.  Hand hygiene was completed before and after.

## 2023-05-30 NOTE — PROGRESS NOTES
Case Management  Inpatient Rehabilitation Team Conference    Conference Date/Time: 5/30/2023 7:55:20 AM    Team Conference Attendees:  Dr. Sebastian Whitt, Tabby Beltran, Pharmacist  Sanaz Pham, LUTHERW  Tena Garay, PT  Silke Moreno, OT  Sarita Comer, SLP  Sanaz Radford, CTRS  Monse Ruvalcaba, MATILDE Montez, MATILDE Chun, Dietician    Demographics            Age: 72Y            Gender: Female    Admission Date: 5/19/2023 6:49:00 PM  Rehabilitation Diagnosis:  Acute R MCA stroke and bilateral infarcts  Past Medical History: Past Medical History:  DiagnosisDate  ?Arthritis?  ?Cataract?  ?Colon polyps?  ?FOLLOWED BY DR. JOSEPH LAU  ?Hypertension?    ?  Past Surgical History:  Surgical History?Expand by Default  ????  Past Surgical History:  ProcedureLateralityDate  ?COLONOSCOPY?10/2015  ?Polyp-hyperplastic?Dr. Bermudez. ?Follow-up in 5 years.  ?COLONOSCOPYN/A1/12/2022  ?2 BENIGN POLYPS IN DESCENDING, 5 MM BENIGN POLYP IN SIGMOID, MULTIPLE SMALL  AND LARGE DIVERTICULA IN SIGMOID, RESCOPE IN 3 YRS, DR. JOSEPH LAU AT [facility]  ?EYE SURGERY??  ?JOINT REPLACEMENT?2005?  ?Left total hip replacement in 2005. ?In December 2015 patient had left hip  revision  ?TONSILLECTOMY??    ?  ?  ?      Plan of Care  Anticipated Discharge Date/Estimated Length of Stay: ELOS: 2 weeks  Anticipated Discharge Destination: Community discharge with assistance  Discharge Plan : Patient lives with  in one story walk out ranch style  home with two step entry.  Family conference scheduled for 6/1 at 3:00 p.m.  D/C plan is home with  who plans to take FMLA. Friend can also assist  intermittently.  Medical Necessity Expected Level Rationale: Goals are to achieve a level of SBA  / CGA with ADL's and mobility.  Rehab prognosis fair.  Medical prognosis fair.  Intensity and Duration: an average of 3 hours/5 days per week  Medical Supervision and 24 Hour Rehab Nursing: x  Physical Therapy: x  PT Intensity/Duration: 1  hour / day, 5 days / week, for approximately 2 weeks.  Occupational Therapy: x  OT Intensity/Duration: 1 hour / day, 5 days / week, for approximately 2 weeks.  Speech and Language Therapy: x  SLP Intensity/Duration: 1 hour / day, 5 days / week, for approximately 2 weeks.  Social Work: x  Therapeutic Recreation: x  Psychology: x  Registered Dietician: x  Updated (if changes indicated)    Anticipated Discharge Date/Estimated Length of Stay:   ELOS: 6/9    Based on the patient's medical and functional status, their prognosis and  expected level of functional improvement is: Goals are to achieve a level of SBA  / CGA with ADL's and mobility.  Rehab prognosis fair.  Medical prognosis fair.      Interdisciplinary Problem/Goals/Status    All Rehab Problems:  Safety    [RN] Potential for Injury(Active)  Current Status(05/29/2023): Patient at risk for injury related to mobility  issues.  Weekly Goal(06/06/2023): Patient will use call light appropriately and have no  injury while on Rehab.  Discharge Goal: Patient will have no injury while on Rehab.        Psychosocial    [RN] Coping/Adjustment(Active)  Current Status(05/29/2023): Patient at risk for ineffective coping, but has a  supportive .  Weekly Goal(06/07/2023): Patient will verbalize needs and concerns related to  current situation.  Discharge Goal: Patient will have healthy coping skills at time of discharge.        Sphincter Control    [RN] Bladder Management(Active)  Current Status(05/29/2023): Patient continent 75% of the time.  Weekly Goal(06/07/2023): Patient will be continent 100%  Discharge Goal: Patient will be continent 100% of the time.    [RN] Bowel Management(Active)  Current Status(05/29/2023): Patient incontinent 100% of the time.  Weekly Goal(06/06/2023): Patient will be continent 50% of the time.  Discharge Goal: Patient will be continent 100% of the time.        Body Systems    [RN] Integumentary(Active)  Current Status(05/29/2023): Patient  has stage 2 pressure injury to cocyx  Weekly Goal(06/06/2023): Patients skin will be healing with no more  deterioration.  Discharge Goal: Patients skin will be healed with no further injury.        Self Care    [OT] Bathing(Active)  Current Status(05/30/2023): MIN  Weekly Goal(06/06/2023): CGA  Discharge Goal: CGA/SBA    [OT] Dressing (Lower)(Active)  Current Status(05/30/2023): MOD  Weekly Goal(06/06/2023): MIN  Discharge Goal: CGA    [OT] Dressing (Upper)(Active)  Current Status(05/30/2023): set up  Weekly Goal(06/06/2023): set up  Discharge Goal: set up    [OT] Grooming(Active)  Current Status(05/30/2023): set up  Weekly Goal(06/06/2023): Supervision  Discharge Goal: Supervision    [OT] Toileting(Active)  Current Status(05/30/2023): MOD  Weekly Goal(06/06/2023): MIN  Discharge Goal: CGA        Mobility    [OT] Toilet Transfers(Active)  Current Status(05/30/2023): MIN A RWX  Weekly Goal(06/06/2023): CGA  Discharge Goal: CGA/SBA    [OT] Tub/Shower Transfers(Active)  Current Status(05/30/2023): MOD  Weekly Goal(06/06/2023): MIN  Discharge Goal: CGA/SBA    [PT] Stairs(Active)  Current Status(05/29/2023): TBD  Weekly Goal(06/06/2023): PT only  Discharge Goal: 4 CGA    [PT] Walk(Active)  Current Status(05/29/2023): 160' Min/CGA RWX  Weekly Goal(06/06/2023): to and from BR, CGA, RWx  Discharge Goal: 100' CGA/SBA RWX    [PT] Bed/Chair/Wheelchair(Active)  Current Status(05/29/2023): Min RWX  Weekly Goal(06/06/2023): CGA RWX  Discharge Goal: CGA/SBA RWX    [PT] Bed Mobility(Active)  Current Status(05/29/2023): CGA  Weekly Goal(06/06/2023): SBA  Discharge Goal: SBA        Cognition    [ST] Executive Functions(Active)  Current Status(05/26/2023): Mild high-level executive function deficit.  Completes checkbook balancing with MIN cues for organization and attention to  detail.  Weekly Goal(05/30/2023): Will complete home management tasks independently.  Discharge Goal: Adequate executive function skills for home  environment.        Comments: 5/23 Min A bed mob, Mod / Min rwx for transfers, amb 60' Min A rwx.  Mod A rwx for toilet transfer.  Max A toileting.  Flat affect.  Mod A bathing  and LBD.  Mild high level executive function deficit.  Upgraded to regular w/  thins.  Cont bladder, incontinent of bowel.  Weaning oxygen.    5/30 Bed mob CGA, Min A rwx transfers, amb 160' Min A CGA rwx, Min A rwx for  toilet transfer, Mod A shower transfer.  Mod A toileting.  Min A bathing.  Mod A  LBD.  Mild high level executive function deficit.  Min - Mild attention  impairment.  Cont most of the time with urine, incont of bowel all the time  currently.  Pressure inj, cream in use.  On IV abx.    Signed by: Monse Ruvalcaba RN    Physician CoSigned By: Sebastian Collins 05/30/2023 11:29:00

## 2023-05-30 NOTE — PROGRESS NOTES
Team conference  PT: Walking 160 ft, on 1L with walking which she was previously on RA with wakling. Min assist for bed transfer with RW. Min assist with toilet transfers  OT: Bathing min, Dressing lower body mod assist, Dressing upper body is set up, toileting is mod assist. Fatigues easily with therapy, wants to stay in   ST: Mild high level deficits, attention to detail with check booking, not working on med management  Nursing: Potassium supplementation, WBC is 12 which is stable, on ABX Doxy PO and Merrem IV, Drops to 80% if not on supplemental O2, on 1-2L NC. Coccyx pressure wound. On IV ABX until 6/28/2023  Psych: apathetic, flat affect, but was agreeable to do testing  RD: Started on Kwasi   ELOS: June 9th

## 2023-05-30 NOTE — PROGRESS NOTES
Inpatient Rehabilitation Plan of Care Note    Plan of Care  Care Plan Reviewed - No updates at this time.    Safety    Performed Intervention(s)  Items within reach, environmental set-up for reduce risk of fall  Bed alarms and chair alarms and safety rounds hourly      Psychosocial    Performed Intervention(s)  Medication as ordered and PRN  Verbalize needs and concerns  Therapeutic environmental set up      Sphincter Control    Performed Intervention(s)  Incontinent care as needed and ointment applied as ordered.  Take to bathroom in a timely manner  Proper diet and adequate fluid intake.      Body Systems    Performed Intervention(s)  Medication, turn every 2 hours and proper nutrition.    Signed by: Leesa Montez RN

## 2023-05-30 NOTE — PROGRESS NOTES
Recreational Therapy Note    Patient Name: Katherine Williamson   MRN: [id number]    Therapeutic Recreation Eval and Treat (last 12 hours)     Therapeutic Recreation Eval & Treat     Row Name 05/30/23 1400       Therapeutic Recreation Participation    Recreation Therapy Participation games  -    Games card games  5 Crowns  -    Objectives of Recreation Participation increase;sense of autonomy by choosing level of participation;motivation and activity level through successful participation  -    Comment, Recreation Participation occ cues to recall directions  -    Recreation Therapy Summary of Participation active participation  -          User Key  (r) = Recorded By, (t) = Taken By, (c) = Cosigned By    Initials Name Provider Type    SS Sanaz Radford, CTRS Recreational Therapist                  CHUNG Hollins  5/30/2023

## 2023-05-30 NOTE — PROGRESS NOTES
Inpatient Rehabilitation Functional Measures Assessment and Plan of Care    Plan of Care  Updated Problems/Interventions  Self Care    [OT] Bathing(Active)  Current Status(05/30/2023): MIN  Weekly Goal(06/06/2023): CGA  Discharge Goal: CGA/SBA    [OT] Dressing (Lower)(Active)  Current Status(05/30/2023): MOD  Weekly Goal(06/06/2023): MIN  Discharge Goal: CGA    [OT] Dressing (Upper)(Active)  Current Status(05/30/2023): set up  Weekly Goal(06/06/2023): set up  Discharge Goal: set up    [OT] Grooming(Active)  Current Status(05/30/2023): set up  Weekly Goal(06/06/2023): Supervision  Discharge Goal: Supervision    [OT] Toileting(Active)  Current Status(05/30/2023): MOD  Weekly Goal(06/06/2023): MIN  Discharge Goal: CGA        Mobility    [OT] Toilet Transfers(Active)  Current Status(05/30/2023): MIN A RWX  Weekly Goal(06/06/2023): CGA  Discharge Goal: CGA/SBA    [OT] Tub/Shower Transfers(Active)  Current Status(05/30/2023): MOD  Weekly Goal(06/06/2023): MIN  Discharge Goal: CGA/SBA    Functional Measures  TAMMY Eating:  Branch  TAMMY Grooming: Branch  TAMMY Bathing:  Branch  TAMMY Upper Body Dressing:  Branch  TAMMY Lower Body Dressing:  Branch  TAMMY Toileting:  Branch    TAMMY Bladder Management  Level of Assistance:  Branch  Frequency/Number of Accidents this Shift:  Branch    TAMMY Bowel Management  Level of Assistance: Branch  Frequency/Number of Accidents this Shift: Branch    TAMMY Bed/Chair/Wheelchair Transfer:  Branch  TAMMY Toilet Transfer:  Branch  TAMMY Tub/Shower Transfer:  Branch    Previously Documented Mode of Locomotion at Discharge: Field  TAMMY Expected Mode of Locomotion at Discharge: Branch  TAMMY Walk/Wheelchair:  Branch  TAMMY Stairs:  Branch    TAMMY Comprehension:  Branch  TAMMY Expression:  Branch  [facility] Social Interaction:  Branch  [facility] Problem Solving:  Branch  TAMMY Memory:  Branch    Therapy Mode Minutes  Occupational Therapy: Branch  Physical Therapy: Branch  Speech Language Pathology:  Branch    Signed by: Silke Moreno  OT

## 2023-05-30 NOTE — PROGRESS NOTES
Reviewed team conference report with patient and , by phone. Discussed current status, progress, goals for d/c and d/c date set for 6/9. They are agreeable to plan. Family conference scheduled for this Thursday at 3:00 p.m. to discuss progress and help plan for d/c home.

## 2023-05-30 NOTE — PLAN OF CARE
Problem: Rehabilitation (IRF) Plan of Care  Goal: Plan of Care Review  Outcome: Ongoing, Progressing  Flowsheets (Taken 5/30/2023 0502)  Progress: improving  Plan of Care Reviewed With: patient  Outcome Evaluation: Patient slept fair tonight, A&Ox4 calm and cooperative. No c.o any pain. Continues with IV antibiotic. PICC line to RUE. Uses O2 at 1.5L at HS via NC. Spot checked pt and her O2 desats to 85% on room air when asleep. No unsafe behavior.

## 2023-05-31 LAB
BASOPHILS # BLD AUTO: 0.04 10*3/MM3 (ref 0–0.2)
BASOPHILS NFR BLD AUTO: 0.4 % (ref 0–1.5)
DEPRECATED RDW RBC AUTO: 43.7 FL (ref 37–54)
EOSINOPHIL # BLD AUTO: 0.13 10*3/MM3 (ref 0–0.4)
EOSINOPHIL NFR BLD AUTO: 1.2 % (ref 0.3–6.2)
ERYTHROCYTE [DISTWIDTH] IN BLOOD BY AUTOMATED COUNT: 13.7 % (ref 12.3–15.4)
FUNGUS WND CULT: NORMAL
HCT VFR BLD AUTO: 31.1 % (ref 34–46.6)
HGB BLD-MCNC: 10.6 G/DL (ref 12–15.9)
IMM GRANULOCYTES # BLD AUTO: 0.07 10*3/MM3 (ref 0–0.05)
IMM GRANULOCYTES NFR BLD AUTO: 0.6 % (ref 0–0.5)
LYMPHOCYTES # BLD AUTO: 2.94 10*3/MM3 (ref 0.7–3.1)
LYMPHOCYTES NFR BLD AUTO: 26.3 % (ref 19.6–45.3)
MCH RBC QN AUTO: 29.5 PG (ref 26.6–33)
MCHC RBC AUTO-ENTMCNC: 34.1 G/DL (ref 31.5–35.7)
MCV RBC AUTO: 86.6 FL (ref 79–97)
MONOCYTES # BLD AUTO: 0.85 10*3/MM3 (ref 0.1–0.9)
MONOCYTES NFR BLD AUTO: 7.6 % (ref 5–12)
MYCOBACTERIUM SPEC CULT: NORMAL
MYCOBACTERIUM SPEC CULT: NORMAL
NEUTROPHILS NFR BLD AUTO: 63.9 % (ref 42.7–76)
NEUTROPHILS NFR BLD AUTO: 7.17 10*3/MM3 (ref 1.7–7)
NIGHT BLUE STAIN TISS: NORMAL
NIGHT BLUE STAIN TISS: NORMAL
NRBC BLD AUTO-RTO: 0 /100 WBC (ref 0–0.2)
PLATELET # BLD AUTO: 378 10*3/MM3 (ref 140–450)
PMV BLD AUTO: 10.5 FL (ref 6–12)
RBC # BLD AUTO: 3.59 10*6/MM3 (ref 3.77–5.28)
WBC NRBC COR # BLD: 11.2 10*3/MM3 (ref 3.4–10.8)

## 2023-05-31 PROCEDURE — 97110 THERAPEUTIC EXERCISES: CPT

## 2023-05-31 PROCEDURE — 97130 THER IVNTJ EA ADDL 15 MIN: CPT

## 2023-05-31 PROCEDURE — 97129 THER IVNTJ 1ST 15 MIN: CPT

## 2023-05-31 PROCEDURE — 25010000002 MEROPENEM PER 100 MG: Performed by: HOSPITALIST

## 2023-05-31 PROCEDURE — 25010000002 ENOXAPARIN PER 10 MG: Performed by: PHYSICAL MEDICINE & REHABILITATION

## 2023-05-31 PROCEDURE — 97535 SELF CARE MNGMENT TRAINING: CPT

## 2023-05-31 PROCEDURE — 97530 THERAPEUTIC ACTIVITIES: CPT

## 2023-05-31 PROCEDURE — 85025 COMPLETE CBC W/AUTO DIFF WBC: CPT | Performed by: PHYSICAL MEDICINE & REHABILITATION

## 2023-05-31 RX ADMIN — GUAIFENESIN 600 MG: 600 TABLET, EXTENDED RELEASE ORAL at 21:44

## 2023-05-31 RX ADMIN — DOXYCYCLINE 100 MG: 100 CAPSULE ORAL at 09:11

## 2023-05-31 RX ADMIN — Medication 10 ML: at 21:45

## 2023-05-31 RX ADMIN — CITALOPRAM 20 MG: 20 TABLET, FILM COATED ORAL at 09:12

## 2023-05-31 RX ADMIN — Medication 10 ML: at 09:15

## 2023-05-31 RX ADMIN — ZINC OXIDE 1 APPLICATION: 200 OINTMENT TOPICAL at 09:14

## 2023-05-31 RX ADMIN — METOPROLOL TARTRATE 12.5 MG: 25 TABLET, FILM COATED ORAL at 09:12

## 2023-05-31 RX ADMIN — ASPIRIN 81 MG: 81 TABLET, CHEWABLE ORAL at 09:11

## 2023-05-31 RX ADMIN — ACETAMINOPHEN 650 MG: 325 TABLET, FILM COATED ORAL at 21:44

## 2023-05-31 RX ADMIN — ZINC OXIDE 1 APPLICATION: 200 OINTMENT TOPICAL at 21:44

## 2023-05-31 RX ADMIN — Medication 1000 MCG: at 09:12

## 2023-05-31 RX ADMIN — GUAIFENESIN 600 MG: 600 TABLET, EXTENDED RELEASE ORAL at 09:12

## 2023-05-31 RX ADMIN — MEROPENEM 1 G: 1 INJECTION, POWDER, FOR SOLUTION INTRAVENOUS at 05:38

## 2023-05-31 RX ADMIN — DOXYCYCLINE 100 MG: 100 CAPSULE ORAL at 21:43

## 2023-05-31 RX ADMIN — ENOXAPARIN SODIUM 40 MG: 100 INJECTION SUBCUTANEOUS at 21:43

## 2023-05-31 RX ADMIN — METOPROLOL TARTRATE 12.5 MG: 25 TABLET, FILM COATED ORAL at 21:44

## 2023-05-31 RX ADMIN — CLOPIDOGREL BISULFATE 75 MG: 75 TABLET, FILM COATED ORAL at 09:12

## 2023-05-31 RX ADMIN — MEROPENEM 1 G: 1 INJECTION, POWDER, FOR SOLUTION INTRAVENOUS at 21:43

## 2023-05-31 RX ADMIN — MEROPENEM 1 G: 1 INJECTION, POWDER, FOR SOLUTION INTRAVENOUS at 12:33

## 2023-05-31 RX ADMIN — ATORVASTATIN CALCIUM 80 MG: 80 TABLET, FILM COATED ORAL at 21:44

## 2023-05-31 NOTE — PROGRESS NOTES
Inpatient Rehabilitation Plan of Care Note    Plan of Care  Care Plan Reviewed - No updates at this time.    Safety    Performed Intervention(s)  Items within reach, environmental set-up for reduce risk of fall  Bed alarms and chair alarms and safety rounds hourly      Psychosocial    Performed Intervention(s)  Medication as ordered and PRN  Verbalize needs and concerns  Therapeutic environmental set up      Sphincter Control    Performed Intervention(s)  Incontinent care as needed and ointment applied as ordered.  Take to bathroom in a timely manner  Proper diet and adequate fluid intake.      Body Systems    Performed Intervention(s)  Medication, turn every 2 hours and proper nutrition.    Signed by: Aditi Drake RN

## 2023-05-31 NOTE — PROGRESS NOTES
Recreational Therapy Note    Patient Name: Katherine Williamson   MRN: [id number]    Therapeutic Recreation Eval and Treat (last 12 hours)     Therapeutic Recreation Eval & Treat     Row Name 05/31/23 1400       Therapeutic Recreation Participation    Recreation Therapy Participation games  -SS    Games other (see comments)  10,000 dice game  -    Objectives of Recreation Participation increase;sense of autonomy by choosing level of participation;motivation and activity level through successful participation  -SS    Comment, Recreation Participation occ cues to recall directions, WFL for scorekeeping task  -SS    Recreation Therapy Summary of Participation active participation  -SS          User Key  (r) = Recorded By, (t) = Taken By, (c) = Cosigned By    Initials Name Provider Type    SS Sanaz Radford, CTRS Recreational Therapist                  CHUNG Hollins  5/31/2023

## 2023-05-31 NOTE — THERAPY PROGRESS REPORT/RE-CERT
Inpatient Rehabilitation - Physical Therapy Progress Note       [facility]     Patient Name: Katherine Williamson  : 1950  MRN: [id number]    Today's Date: 2023                    Admit Date: 2023      Visit Dx:     ICD-10-CM ICD-9-CM   1. Decreased functional mobility and endurance  Z74.09 780.99       Patient Active Problem List   Diagnosis   • Benign essential HTN   • Tobacco abuse   • Dislocation of hip joint prosthesis   • Fracture of proximal humerus   • Mechanical complication of internal orthopedic device   • Wear of articular bearing surface of internal prosthetic joint   • History of repair of hip joint   • History of operative procedure on hip   • Hyperglycemia   • Hepatic steatosis   • Diverticulosis   • Chest pain, atypical   • History of colon polyps   • Family history of colon cancer in mother   • Allergic rhinitis   • Lung nodule seen on imaging study   • Acute respiratory failure with hypoxia   • Abscess of right lung with pneumonia   • Empyema   • Sepsis   • Pleural effusion, right   • Other emphysema   • Pulmonary nodule (RLL)   • Diastolic dysfunction, grade 1   • Physical debility   • Cryptogenic stroke   • Acute right MCA stroke       Past Medical History:   Diagnosis Date   • Arthritis    • Cataract    • Colon polyps     FOLLOWED BY DR. JOSEPH LAU   • Hypertension        Past Surgical History:   Procedure Laterality Date   • COLONOSCOPY  10/2015    [facility].  Follow-up in 5 years.   • COLONOSCOPY N/A 2022    2 BENIGN POLYPS IN DESCENDING, 5 MM BENIGN POLYP IN SIGMOID, MULTIPLE SMALL AND LARGE DIVERTICULA IN SIGMOID, RESCOPE IN 3 YRS, DR. JOSEPH LAU AT [facility]   • EYE SURGERY     • JOINT REPLACEMENT  ?    Left total hip replacement in .  In 2015 patient had left hip revision   • TONSILLECTOMY         PT ASSESSMENT (last 12 hours)     IRF PT Evaluation and Treatment     Row Name 23 1015          PT Time and Intention    Document Type  progress note  -EE     Mode of Treatment physical therapy;individual therapy  -EE     Patient/Family/Caregiver Comments/Observations Pt sitting up in WC in AM and PM, agreeable to PT.  -EE     Row Name 05/31/23 1015          General Information    Existing Precautions/Restrictions fall;oxygen therapy device and L/min  -EE     Comment, General Information Pt on 0.5 L O2 via nc during AM session; able to tolerate remaining on room air during PM session  -EE     Row Name 05/31/23 1015          Pain Assessment    Pretreatment Pain Rating 0/10 - no pain  -EE     Posttreatment Pain Rating 0/10 - no pain  -EE     Row Name 05/31/23 1015          Cognition/Psychosocial    Affect/Mental Status (Cognition) flat/blunted affect  -EE     Orientation Status (Cognition) oriented x 3  -EE     Follows Commands (Cognition) follows one-step commands;verbal cues/prompting required  -EE     Personal Safety Interventions fall prevention program maintained;gait belt;muscle strengthening facilitated;nonskid shoes/slippers when out of bed;supervised activity  -EE     Cognitive Function WFL  -EE     Row Name 05/31/23 1015          Transfer Assessment/Treatment    Comment, (Transfers) 10x STS from elevated mat surface w/o UE support; min/CGA for balance  -EE     Row Name 05/31/23 1015          Bed-Chair Transfer    Bed-Chair [place] (Transfers) minimum assist (75% patient effort);verbal cues  -EE     Assistive Device (Bed-Chair Transfers) walker, front-wheeled  -EE     Row Name 05/31/23 1015          Chair-Bed Transfer    Chair-Bed [place] (Transfers) minimum assist (75% patient effort);verbal cues  -EE     Assistive Device (Chair-Bed Transfers) walker, front-wheeled  -EE     Row Name 05/31/23 1015          Sit-Stand Transfer    Sit-Stand [place] (Transfers) minimum assist (75% patient effort);verbal cues  -EE     Assistive Device (Sit-Stand Transfers) walker, front-wheeled  -EE     Comment, (Sit-Stand Transfer) cues for hand  "placement  -EE     Row Name 05/31/23 1015          Stand-Sit Transfer    Stand-Sit [place] (Transfers) contact guard;verbal cues  -EE     Assistive Device (Stand-Sit Transfers) walker, front-wheeled  -EE     Comment, (Stand-Sit Transfer) cues for hand placement  -EE     Row Name 05/31/23 1015          Gait/Stairs (Locomotion)    [place] Level (Gait) contact guard;verbal cues  -EE     Assistive Device (Gait) walker, front-wheeled  -EE     Distance in Feet (Gait) 160' x 2, 80' x 1  -EE     Pattern (Gait) step-through  -EE     Deviations/Abnormal Patterns (Gait) raina decreased;stride length decreased  -EE     Bilateral Gait Deviations forward flexed posture;heel strike decreased;weight shift ability decreased  -EE     Left Sided Gait Deviations heel strike decreased;knee hyperextension  L foot ER  -EE     Gait Assessment/Intervention cues for upright posture and B heel strike  -EE     [place] Level (Stairs) contact guard;verbal cues  -EE     Handrail Location (Stairs) both sides  -EE     Number of Steps (Stairs) seven 4\" steps  -EE     Ascending Technique (Stairs) step-to-step  -EE     Descending Technique (Stairs) step-to-step  -EE     Row Name 05/31/23 1015          Safety Issues, Functional Mobility    Impairments Affecting Function (Mobility) balance;endurance/activity tolerance;strength;shortness of breath  -EE     Row Name 05/31/23 1015          Positioning and Restraints    Pre-Treatment Position sitting in chair/recliner  -EE     Row Name 05/31/23 1015          Vital Signs    Pre SpO2 (%) 92  -EE     O2 Delivery Pre Treatment supplemental O2  0.5 L  -EE     Intra SpO2 (%) 88  lowest observed during AM session  -EE     O2 Delivery Intra Treatment supplemental O2  0.5 L  -EE     Post SpO2 (%) 92  at end of PM session  -EE     O2 Delivery Post Treatment room air  at end of PM session  -EE     Pre Patient Position Sitting  -EE     Intra Patient Position Standing  -EE     Post Patient Position " Sitting  -EE     Row Name 05/31/23 1015          Weekly Progress Summary (PT)    Functional Goal Overall Progress (PT) progressing toward functional goals as expected  -EE     Weekly Progress Summary (PT) Pt has made steady progress with strength and endurance, as evidenced by increased independence with transfers and tolerance of increased ambulation distance. Pt continues to demonstrate L LE weakness as well as SOA with activity, and still fluctuates between tolerating activity on room air and continuing to require supplemental O2. Pt also continues to require up to min A at times with mobility due to weakness. Pt will continue to benefit from PT to address impairments and maximize independence prior to DC home.  -EE     Impairments Still Limiting Function (PT) balance impairment;strength deficit;functional activity tolerance impairment  -EE     Row Name 05/31/23 1015          Bed Mobility Goal 1 (PT-IRF)    Activity/Assistive Device (Bed Mobility Goal 1, PT-IRF) bed mobility activities, all  -EE     [place] Level (Bed Mobility Goal 1, PT-IRF) standby assist  -EE     Time Frame (Bed Mobility Goal 1, PT-IRF) 2 weeks;long-term goal (LTG)  -EE     Progress/Outcomes (Bed Mobility Goal 1, PT-IRF) goal ongoing;continuing progress toward goal  -EE     Row Name 05/31/23 1015          Transfer Goal 1 (PT-IRF)    Activity/Assistive Device (Transfer Goal 1, PT-IRF) bed-to-chair/chair-to-bed;sit-to-stand/stand-to-sit;walker, rolling  -EE     [place] Level (Transfer Goal 1, PT-IRF) standby assist  -EE     Time Frame (Transfer Goal 1, PT-IRF) 2 weeks;long-term goal (LTG)  -EE     Progress/Outcomes (Transfer Goal 1, PT-IRF) goal ongoing;continuing progress toward goal  -EE     Row Name 05/31/23 1015          Transfer Goal 2 (PT-IRF)    Activity/Assistive Device (Transfer Goal 2, PT-IRF) car transfer  -EE     [place] Level (Transfer Goal 2, PT-IRF) contact guard required  -EE     Time Frame (Transfer Goal 2,  PT-IRF) 2 weeks;long-term goal (LTG)  -EE     Progress/Outcomes (Transfer Goal 2, PT-IRF) goal ongoing;continuing progress toward goal  -EE     Row Name 05/31/23 1015          Gait/Walking Locomotion Goal 1 (PT-IRF)    Activity/Assistive Device (Gait/Walking Locomotion Goal 1, PT-IRF) assistive device use;walker, rolling  -EE     Gait/Walking Locomotion Distance Goal 1 (PT-IRF) 80  -EE     [place] Level (Gait/Walking Locomotion Goal 1, PT-IRF) standby assist  -EE     Time Frame (Gait/Walking Locomotion Goal 1, PT-IRF) 2 weeks;long-term goal (LTG)  -EE     Progress/Outcomes (Gait/Walking Locomotion Goal 1, PT-IRF) goal ongoing;continuing progress toward goal  -EE     Row Name 05/31/23 1015          Stairs Goal 1 (PT-IRF)    Activity/Assistive Device (Stairs Goal 1, PT-IRF) descending stairs;ascending stairs  -EE     Number of Stairs (Stairs Goal 1, PT-IRF) 4  -EE     [place] Level (Stairs Goal 1, PT-IRF) contact guard required  -EE     Time Frame (Stairs Goal 1, PT-IRF) 2 weeks;long-term goal (LTG)  -EE     Progress/Outcomes (Stairs Goal 1, PT-IRF) goal ongoing  -EE           User Key  (r) = Recorded By, (t) = Taken By, (c) = Cosigned By    Initials Name Provider Type    Tena Paula, PT Physical Therapist              Wound 05/14/23 1556 Right gluteal Pressure Injury (Active)   Dressing Appearance open to air 05/30/23 1948   Base red 05/30/23 1948   Care, Wound cleansed with;soap and water;barrier applied 05/30/23 1948   Periwound Care barrier ointment applied 05/30/23 1948       Wound 05/19/23 2055 Bilateral gluteal (Active)   Dressing Appearance open to air 05/30/23 1948   Base red 05/30/23 1948   Drainage Characteristics/Odor serosanguineous 05/30/23 1948   Drainage Amount scant 05/30/23 1948   Care, Wound cleansed with;soap and water;barrier applied 05/30/23 1948     Physical Therapy Education     Title: PT OT SLP Therapies (Done)     Topic: Physical Therapy (Done)     Point: Mobility training  (Done)     Learning Progress Summary           Patient Acceptance, E,TB, VU,NR by EE at 5/31/2023 1200    Acceptance, E,TB, VU,NR by EE at 5/30/2023 1131    Acceptance, E,TB, VU by KP at 5/27/2023 0932    Acceptance, E, VU by WN at 5/26/2023 0038    Eager, E,TB,D, NR by KP at 5/25/2023 1019    Acceptance, E,D, VU,NR by EE at 5/24/2023 1136    Acceptance, E,TB, VU,NR by EE at 5/23/2023 1139    Acceptance, E,TB, NR,VU by EE at 5/22/2023 1221    Acceptance, E, NR by  at 5/20/2023 1216                   Point: Home exercise program (Done)     Learning Progress Summary           Patient Acceptance, E,TB, VU,NR by EE at 5/30/2023 1131    Acceptance, E,TB, VU by KP at 5/27/2023 0932    Acceptance, E, VU by WN at 5/26/2023 0038    Eager, E,TB,D, NR by KP at 5/25/2023 1019    Acceptance, E,D, VU,NR by EE at 5/24/2023 1136    Acceptance, E,TB, VU,NR by EE at 5/23/2023 1139    Acceptance, E,TB, NR,VU by EE at 5/22/2023 1221    Acceptance, E, NR by  at 5/20/2023 1216                   Point: Body mechanics (Done)     Learning Progress Summary           Patient Acceptance, E,TB, VU,NR by EE at 5/31/2023 1200    Acceptance, E,TB, VU,NR by EE at 5/30/2023 1131    Acceptance, E, VU by WN at 5/26/2023 0038    Acceptance, E,D, VU,NR by EE at 5/24/2023 1136    Acceptance, E,TB, VU,NR by EE at 5/23/2023 1139    Acceptance, E,TB, NR,VU by EE at 5/22/2023 1221    Acceptance, E, NR by  at 5/20/2023 1216                   Point: Precautions (Done)     Learning Progress Summary           Patient Acceptance, E,TB, VU,NR by EE at 5/31/2023 1200    Acceptance, E,TB, VU,NR by EE at 5/30/2023 1131    Acceptance, E, VU by MD at 5/26/2023 1014    Acceptance, E, VU by WN at 5/26/2023 0038    Acceptance, E,D, VU,NR by EE at 5/24/2023 1136    Acceptance, E,TB, VU,NR by EE at 5/23/2023 1139    Acceptance, E,TB, NR,VU by EE at 5/22/2023 1221    Acceptance, E, NR by  at 5/20/2023 1216                               User Key     Initials Effective  Dates Name Provider Type Discipline     06/16/21 -  Jess Wang, PT Physical Therapist PT    EE 06/16/21 -  Tena Garay, PT Physical Therapist PT    MD 06/16/21 -  Melissa Wise, PT Physical Therapist PT    KP 06/16/21 -  Jesusita Mendoza, PT Physical Therapist PT    WN 08/23/22 -  Reno Lazo, RN Registered Nurse Nurse                PT Recommendation and Plan                          Time Calculation:      PT Charges     Row Name 05/31/23 1415 05/31/23 1200          Time Calculation    Start Time 1300  -EE 1000  -EE     Stop Time 1330  -EE 1030  -EE     Time Calculation (min) 30 min  -EE 30 min  -EE     PT Received On 05/31/23  -EE 05/31/23  -EE     PT - Next Appointment 06/01/23  -EE 05/31/23  -EE     PT Goal Re-Cert Due Date -- 06/07/23  -EE        Time Calculation- PT    Total Timed Code Minutes- PT 30 minute(s)  -EE 30 minute(s)  -EE           User Key  (r) = Recorded By, (t) = Taken By, (c) = Cosigned By    Initials Name Provider Type    EE Tena Garay, PT Physical Therapist                Therapy Charges for Today     Code Description Service Date Service Provider Modifiers Qty    37132741629 HC PT THER PROC EA 15 MIN 5/30/2023 Tena Garay, PT GP 1    81558282547 HC PT THERAPEUTIC ACT EA 15 MIN 5/30/2023 Tena Garay, PT GP 3    44778786039 HC PT THERAPEUTIC ACT EA 15 MIN 5/31/2023 Tena Garay, PT GP 3    75714148940 HC PT THER PROC EA 15 MIN 5/31/2023 Tena Garay, PT GP 1    70790166937 HC PT THER SUPP EA 15 MIN 5/31/2023 Tena Garay, PT GP 1                   Tena Garay PT  5/31/2023

## 2023-05-31 NOTE — PLAN OF CARE
Goal Outcome Evaluation:  Plan of Care Reviewed With: patient           Outcome Evaluation: Ms Williamson is alert and oriented x4, continent/ incontinent of b/b and wears purewick at night. She is assist  x1, takes medication with water, and new orders for wound care. She tolerated all therapies, family conference tomorrow, and no safety issues. Daily weight, O2 NC attimes throughout day and worn at night. PICC line flushes with good bloodreturn, no other issues at this time.

## 2023-05-31 NOTE — PROGRESS NOTES
Inpatient Rehabilitation Plan of Care Note    Plan of Care  Care Plan Reviewed - Updates as Follows    Safety    [RN] Potential for Injury(Active)  Current Status(05/31/2023): Patient at risk for injury related to mobility  issues.  Weekly Goal(06/06/2023): Patient will use call light appropriately and have no  injury while on Rehab.  Discharge Goal: Patient will have no injury while on Rehab.    Performed Intervention(s)  Items within reach, environmental set-up for reduce risk of fall  Bed alarms and chair alarms and safety rounds hourly      Psychosocial    [RN] Coping/Adjustment(Active)  Current Status(05/31/2023): Patient at risk for ineffective coping, but has a  supportive .  Weekly Goal(06/07/2023): Patient will verbalize needs and concerns related to  current situation.  Discharge Goal: Patient will have healthy coping skills at time of discharge.    Performed Intervention(s)  Medication as ordered and PRN  Verbalize needs and concerns  Therapeutic environmental set up      Sphincter Control    [RN] Bladder Management(Active)  Current Status(05/31/2023): Patient continent 75% of the time.  Weekly Goal(06/07/2023): Patient will be continent 100%  Discharge Goal: Patient will be continent 100% of the time.    [RN] Bowel Management(Active)  Current Status(05/31/2023): Patient incontinent 100% of the time.  Weekly Goal(06/06/2023): Patient will be continent 50% of the time.  Discharge Goal: Patient will be continent 100% of the time.    Performed Intervention(s)  Incontinent care as needed and ointment applied as ordered.  Take to bathroom in a timely manner  Proper diet and adequate fluid intake.      Body Systems    [RN] Integumentary(Active)  Current Status(05/31/2023): Patient has stage 2 pressure injury to cocyx  Weekly Goal(06/06/2023): Patients skin will be healing with no more  deterioration.  Discharge Goal: Patients skin will be healed with no further injury.    Performed  Intervention(s)  Medication, turn every 2 hours and proper nutrition.    Signed by: Bia Muhammad, RN

## 2023-05-31 NOTE — THERAPY TREATMENT NOTE
Inpatient Rehabilitation - Speech Language Pathology Treatment Note    [facility]     Patient Name: Katherine Williamson  : 1950  MRN: [id number]    Today's Date: 2023                   Admit Date: 2023       Visit Dx:      ICD-10-CM ICD-9-CM   1. Decreased functional mobility and endurance  Z74.09 780.99       Patient Active Problem List   Diagnosis   • Benign essential HTN   • Tobacco abuse   • Dislocation of hip joint prosthesis   • Fracture of proximal humerus   • Mechanical complication of internal orthopedic device   • Wear of articular bearing surface of internal prosthetic joint   • History of repair of hip joint   • History of operative procedure on hip   • Hyperglycemia   • Hepatic steatosis   • Diverticulosis   • Chest pain, atypical   • History of colon polyps   • Family history of colon cancer in mother   • Allergic rhinitis   • Lung nodule seen on imaging study   • Acute respiratory failure with hypoxia   • Abscess of right lung with pneumonia   • Empyema   • Sepsis   • Pleural effusion, right   • Other emphysema   • Pulmonary nodule (RLL)   • Diastolic dysfunction, grade 1   • Physical debility   • Cryptogenic stroke   • Acute right MCA stroke       Past Medical History:   Diagnosis Date   • Arthritis    • Cataract    • Colon polyps     FOLLOWED BY DR. JOSEPH LAU   • Hypertension        Past Surgical History:   Procedure Laterality Date   • COLONOSCOPY  10/2015    Zzlap-pajclenzhaiw-Qg. Kaplan.  Follow-up in 5 years.   • COLONOSCOPY N/A 2022    2 BENIGN POLYPS IN DESCENDING, 5 MM BENIGN POLYP IN SIGMOID, MULTIPLE SMALL AND LARGE DIVERTICULA IN SIGMOID, RESCOPE IN 3 YRS, DR. JOSEPH LAU AT [facility]   • EYE SURGERY     • JOINT REPLACEMENT  ?    Left total hip replacement in .  In 2015 patient had left hip revision   • TONSILLECTOMY         SLP Recommendation and Plan                                                            SLP EVALUATION (last 72 hours)     SLP SLC  Evaluation     Row Name 05/31/23 1330 05/31/23 0915 05/30/23 1330 05/30/23 0900 05/29/23 1044       Communication Assessment/Intervention    Document Type therapy note (daily note)  -AL therapy note (daily note)  -AL therapy note (daily note)  -AL therapy note (daily note)  -AL therapy note (daily note)  -JT    Subjective Information -- -- -- -- no complaints  -JT    Patient Observations -- -- -- -- alert;cooperative;agree to therapy  -JT    Patient/Family/Caregiver Comments/Observations Pt is pleasant and cooperative.  -AL Pt participated well. Missed 15 minutes of session due to nursing care.  -AL Pt participated well.  -AL Pt upright in wheelchair. Participated well.  -AL pt sitting up in wc, seen in room  -JT    Patient Effort -- -- -- -- good  -JT    Symptoms Noted During/After Treatment -- -- -- -- none  -JT       Pain Scale: Numbers Pre/Post-Treatment    Pretreatment Pain Rating 0/10 - no pain  -AL 0/10 - no pain  -AL 0/10 - no pain  -AL 0/10 - no pain  -AL 0/10 - no pain  -JT    Posttreatment Pain Rating -- -- -- -- 0/10 - no pain  -JT          User Key  (r) = Recorded By, (t) = Taken By, (c) = Cosigned By    Initials Name Effective Dates    Danni Middleton 06/16/21 -     Sarita Landry MS CCC-SLP 06/16/21 -                    EDUCATION    The patient has been educated in the following areas:       Cognitive Impairment.             SLP GOALS     Row Name 05/31/23 1330 05/31/23 0915 05/30/23 1330       Attention Goal 1 (SLP)    Improve Attention by Goal 1 (SLP) -- -- complete selective attention task;complete divided attention task;90%;independently (over 90% accuracy)  -AL    Time Frame (Attention Goal 1, SLP) -- -- by discharge  -AL    Progress/Outcomes (Attention Goal 1, SLP) good progress toward goal  -AL -- good progress toward goal  -AL    Comment (Attention Goal 1, SLP) Blink card sort: 25 cards in 1 minute, 15 seconds with NO cues. Mildly reduced speed in game play against clinician.   "-AL -- Calendar logic task: Pt exhibited impulsivity during this task. She misinterpreted one direction and required MOD cues to correct answer.  -AL       Reasoning Goal 1 (SLP)    Improve Reasoning Through Goal 1 (SLP) complete high level reasoning task;90%;independently (over 90% accuracy)  -AL -- --    Time Frame (Reasoning Goal 1, SLP) short term goal (STG);1 week  -AL -- --    Progress/Outcomes (Reasoning Goal 1, SLP) good progress toward goal  -AL -- --    Comment (Reasoning Goal 1, SLP) Moderate level logic puzzle: 100% with NO cues, goal met x1.  -AL -- --       Functional Math Skills Goal 1 (SLP)    Progress/Outcomes (Functional Math Skills Goal 1, SLP) -- goal ongoing  -AL --    Comment (Functional Math Skills Goal 1, SLP) -- Checkbook balancin% with NO cues, 100% with MIN cues.  -AL --       Executive Functional Skills Goal 1 (SLP)    Improve Executive Function Skills Goal 1 (SLP) organization/planning activity  -AL -- organization/planning activity  -AL    Time Frame (Executive Function Skills Goal 1, SLP) by discharge  -AL -- by discharge  -AL    Progress/Outcomes (Executive Function Skills Goal 1, SLP) goal ongoing  -AL -- goal ongoing  -AL    Comment (Executive Function Skills Goal 1, SLP) Medicine management with pill box and pt's own medicine list: 4/7 with NO cues, 7/7 with MIN cues.  -AL -- Medicine management with pill box and pt's own medicine list: 65% with NO cues, 100% with MIN-MOD cues. Pt with difficulty keeping up with which medicine she was on and exhibited mild impulsivity filling box.  -AL    Row Name 23 0900 23 1000          (STG) Swallow Management Recall Goal 1 (SLP)    Comment (Swallow Management Recall Goal 1, SLP) Reviewed recommendation for chin tuck with liquids and solids. Pt reported sometimes feeling \"a vinita\" of discomfort in her upper chest when eating. Plan to discuss with MD.  -AL --        Reasoning Goal 1 (SLP)    Improve Reasoning Through Goal 1 (SLP) " complete high level reasoning task;90%;independently (over 90% accuracy)  -AL --     Progress/Outcomes (Reasoning Goal 1, SLP) good progress toward goal  -AL --     Comment (Reasoning Goal 1, SLP) Moderate level logic puzzle: 75% with NO cues, 100% with MIN-MOD cues.  -AL --        Functional Math Skills Goal 1 (SLP)    Progress/Outcomes (Functional Math Skills Goal 1, SLP) -- goal ongoing  -JT     Comment (Functional Math Skills Goal 1, SLP) -- checkbook balancing task 50% independently;  -JT           User Key  (r) = Recorded By, (t) = Taken By, (c) = Cosigned By    Initials Name Provider Type    Danni Middleton Speech and Language Pathologist    Sraita Landry, MS CCC-SLP Speech and Language Pathologist                            Time Calculation:        Time Calculation- SLP     Row Name 05/31/23 1357 05/31/23 0932          Time Calculation- SLP    SLP Start Time 1330  -AL 0915  -AL     SLP Stop Time 1400  -AL 0930  -AL     SLP Time Calculation (min) 30 min  -AL 15 min  -AL           User Key  (r) = Recorded By, (t) = Taken By, (c) = Cosigned By    Initials Name Provider Type    Sarita Landry, MS CCC-SLP Speech and Language Pathologist                  Therapy Charges for Today     Code Description Service Date Service Provider Modifiers Qty    04037142346 HC ST DEV OF COGN SKILLS INITIAL 15 MIN 5/30/2023 Sarita Comer MS CCC-SLP  1    95575877540 HC ST DEV OF COGN SKILLS EACH ADDT'L 15 MIN 5/30/2023 Sarita Comer MS CCC-SLP  3    28461919925 HC ST DEV OF COGN SKILLS INITIAL 15 MIN 5/31/2023 Sarita Comer MS CCC-SLP  1    93930480026 HC ST DEV OF COGN SKILLS EACH ADDT'L 15 MIN 5/31/2023 Sarita Comer MS CCC-SLP  3                           Sarita Comer MS CCC-SLP  5/31/2023

## 2023-05-31 NOTE — THERAPY TREATMENT NOTE
Inpatient Rehabilitation - Occupational Therapy Treatment Note    [facility]     Patient Name: Katherine Williamson  : 1950  MRN: [id number]    Today's Date: 2023                 Admit Date: 2023         ICD-10-CM ICD-9-CM   1. Decreased functional mobility and endurance  Z74.09 780.99       Patient Active Problem List   Diagnosis   • Benign essential HTN   • Tobacco abuse   • Dislocation of hip joint prosthesis   • Fracture of proximal humerus   • Mechanical complication of internal orthopedic device   • Wear of articular bearing surface of internal prosthetic joint   • History of repair of hip joint   • History of operative procedure on hip   • Hyperglycemia   • Hepatic steatosis   • Diverticulosis   • Chest pain, atypical   • History of colon polyps   • Family history of colon cancer in mother   • Allergic rhinitis   • Lung nodule seen on imaging study   • Acute respiratory failure with hypoxia   • Abscess of right lung with pneumonia   • Empyema   • Sepsis   • Pleural effusion, right   • Other emphysema   • Pulmonary nodule (RLL)   • Diastolic dysfunction, grade 1   • Physical debility   • Cryptogenic stroke   • Acute right MCA stroke       Past Medical History:   Diagnosis Date   • Arthritis    • Cataract    • Colon polyps     FOLLOWED BY DR. JOSEPH LAU   • Hypertension        Past Surgical History:   Procedure Laterality Date   • COLONOSCOPY  10/2015    [facility]qbpo-iungueulaw[facility]. [facility].  Follow-up in 5 years.   • COLONOSCOPY N/A 2022    2 BENIGN POLYPS IN DESCENDING, 5 MM BENIGN POLYP IN SIGMOID, MULTIPLE SMALL AND LARGE DIVERTICULA IN SIGMOID, RESCOPE IN 3 YRS, DR. JOSEPH LAU AT [facility]   • EYE SURGERY     • JOINT REPLACEMENT  ?    Left total hip replacement in .  In 2015 patient had left hip revision   • TONSILLECTOMY               IRF OT ASSESSMENT FLOWSHEET (last 12 hours)     IRF OT Evaluation and Treatment     Row Name 23 1510          OT Time and Intention     Document Type daily treatment  -AF     Mode of Treatment occupational therapy  -AF     Patient Effort good  -AF     Row Name 05/31/23 1510          General Information    Patient/Family/Caregiver Comments/Observations pt sitting up in w/c in Am and PM sessions  -AF     Existing Precautions/Restrictions fall;oxygen therapy device and L/min  -AF     Comment, General Information pt on .5L and RA  -AF     Row Name 05/31/23 1510          Pain Assessment    Pretreatment Pain Rating 0/10 - no pain  -AF     Posttreatment Pain Rating 0/10 - no pain  -AF     Row Name 05/31/23 1510          Cognition/Psychosocial    Affect/Mental Status (Cognition) flat/blunted affect  -AF     Orientation Status (Cognition) oriented x 3  -AF     Follows Commands (Cognition) follows one-step commands;verbal cues/prompting required  -AF     Personal Safety Interventions fall prevention program maintained;gait belt;nonskid shoes/slippers when out of bed  -AF     Row Name 05/31/23 1510          Grooming    Towner Level (Grooming) grooming skills;standby assist  -AF     Comment (Grooming) stood at sink to wash hands RWX level  -AF     Row Name 05/31/23 1510          Toileting    Towner Level (Toileting) toileting skills;minimum assist (75% patient effort);contact guard assist;verbal cues  -AF     Assistive Device Use (Toileting) grab bar/safety frame;raised toilet seat  -AF     Position (Toileting) supported standing;supported sitting  -AF     Comment (Toileting) RWX level  -AF     Row Name 05/31/23 1510          Functional Mobility    Functional Mobility- Comment walked to and from bathroom with RWX CGA  -AF     Row Name 05/31/23 1510          Transfer Assessment/Treatment    Comment, (Transfers) sit to stands MIN from lower surfaces  -AF     Row Name 05/31/23 1510          Sit-Stand Transfer    Sit-Stand [place] (Transfers) minimum assist (75% patient effort)  -AF     Assistive Device (Sit-Stand Transfers) walker, front-wheeled   -AF     Row Name 05/31/23 1510          Stand-Sit Transfer    Stand-Sit [place] (Transfers) minimum assist (75% patient effort)  -AF     Assistive Device (Stand-Sit Transfers) walker, front-wheeled  -AF     Row Name 05/31/23 1510          Toilet Transfer    Type (Toilet Transfer) stand-sit;sit-stand  -AF     [place] Level (Toilet Transfer) minimum assist (75% patient effort)  -AF     Assistive Device (Toilet Transfer) commode;grab bars/safety frame;walker, front-wheeled  -AF     Row Name 05/31/23 1510          Shoulder (Therapeutic Exercise)    Shoulder Strengthening (Therapeutic Exercise) bilateral;external rotation;internal rotation;scapular stabilization;sitting;2 lb free weight;resistance band;red;10 repetitions;3 sets  -AF     Row Name 05/31/23 1510          Elbow/Forearm (Therapeutic Exercise)    Elbow/Forearm Strengthening (Therapeutic Exercise) bilateral;flexion;extension;supination;sitting;2 lb free weight;10 repetitions;2 sets  -AF     Row Name 05/31/23 1510          Wrist (Therapeutic Exercise)    Wrist Strengthening (Therapeutic Exercise) bilateral;extension;flexion;2 lb free weight;10 repetitions;2 sets  -AF     Row Name 05/31/23 1510          Hand (Therapeutic Exercise)    Hand Strengthening (Therapeutic Exercise) bilateral; strengthening;hand gripper;10 repetitions;2 sets  -AF     Row Name 05/31/23 1510          Balance    Static Sitting Balance standby assist  -AF     Static Standing Balance contact guard  -AF     Row Name 05/31/23 1510          Positioning and Restraints    Pre-Treatment Position sitting in chair/recliner  -AF     Post Treatment Position chair  -AF     In Chair sitting;call light within reach;encouraged to call for assist;exit alarm on  in PM  -AF     In Wheelchair sitting;exit alarm on;with PT  in AM  -AF           User Key  (r) = Recorded By, (t) = Taken By, (c) = Cosigned By    Initials Name Effective Dates    AF Silke Moreno, OTR 06/16/21 -                   Occupational Therapy Education     Title: PT OT SLP Therapies (Done)     Topic: Occupational Therapy (Done)     Point: ADL training (Done)     Description:   Instruct learner(s) on proper safety adaptation and remediation techniques during self care or transfers.   Instruct in proper use of assistive devices.              Learning Progress Summary           Patient Acceptance, E, VU by WN at 5/26/2023 0038    Acceptance, E,TB, VU by SG at 5/20/2023 1516                   Point: Home exercise program (Done)     Description:   Instruct learner(s) on appropriate technique for monitoring, assisting and/or progressing therapeutic exercises/activities.              Learning Progress Summary           Patient Acceptance, E, VU by WN at 5/26/2023 0038                   Point: Precautions (Done)     Description:   Instruct learner(s) on prescribed precautions during self-care and functional transfers.              Learning Progress Summary           Patient Acceptance, E, VU by WN at 5/26/2023 0038                   Point: Body mechanics (Done)     Description:   Instruct learner(s) on proper positioning and spine alignment during self-care, functional mobility activities and/or exercises.              Learning Progress Summary           Patient Acceptance, E, VU by WN at 5/26/2023 0038    Acceptance, E, VU,DU,NR by AF at 5/23/2023 1448    Comment: safety and technique with transfers                               User Key     Initials Effective Dates Name Provider Type Discipline    SG 06/16/21 - 05/30/23 Verena Hewitt, OTR Occupational Therapist OT    AF 06/16/21 -  Silke Moreno OTR Occupational Therapist OT    WN 08/23/22 -  Reno Lazo, RN Registered Nurse Nurse                    OT Recommendation and Plan                         Time Calculation:      Time Calculation- OT     Row Name 05/31/23 1515 05/31/23 1514          Time Calculation- OT    OT Start Time 1400  -AF 0930  -AF     OT Stop Time 1430  -AF  1000  -AF     OT Time Calculation (min) 30 min  -AF 30 min  -AF           User Key  (r) = Recorded By, (t) = Taken By, (c) = Cosigned By    Initials Name Provider Type    Silke Singh OTENID Occupational Therapist              Therapy Charges for Today     Code Description Service Date Service Provider Modifiers Qty    40259926437 HC OT SELF CARE/MGMT/TRAIN EA 15 MIN 5/30/2023 Silke Moreno OTR GO 2    98303364833 HC OT THER PROC EA 15 MIN 5/30/2023 Silke Moreno OTENID GO 2    20925252586 HC OT SELF CARE/MGMT/TRAIN EA 15 MIN 5/31/2023 Silke Moreno OTR GO 1    10355774184 HC OT THER PROC EA 15 MIN 5/31/2023 Silke Moreno OTENID GO 3                   SONG Kaiser  5/31/2023

## 2023-05-31 NOTE — PROGRESS NOTES
LOS: 12 days   Patient Care Team:  Zelalem Flor APRN as PCP - General (Internal Medicine)  Brandon Mitchell MD as Consulting Physician (Orthopedic Surgery)      ANNIE CERDA  1950    Diagnoses    1. DECREASED FUNCTIONAL MOBILITY AND ENDURANCE       ADMITTING DIAGNOSIS:  CVA      Subjective     Patient is tolerating activities.    On 0.5 L at rest today.  Reports comfortable with her breathing    Objective     Vitals:    05/31/23 1228   BP: 104/71   Pulse: 82   Resp:    Temp: 98.2 °F (36.8 °C)   SpO2: 92%       PHYSICAL EXAM:   MENTAL STATUS -  AWAKE / ALERT  HEENT-   SCLERAE ANICTERIC, CONJUNCTIVAE PINK, OP MOIST, NO JVD,  LUNGS -decreased air movement bilaterally   Minimal rhonchi right lowerlung  0.5 L nasal cannula     HEART- RRR, NO RUB, MURMUR, OR GALLOP  ABD - NORMOACTIVE BOWEL SOUNDS, SOFT, NT.    EXT - NO EDEMA OR CYANOSIS     Right upper extremity PICC line site unremarkable without any swelling about the area.  NEURO -oriented x4  MOTOR EXAM -takes resistance bilaterally        MEDICATIONS  Scheduled Meds:aspirin, 81 mg, Oral, Q24H  atorvastatin, 80 mg, Oral, Nightly  citalopram, 20 mg, Oral, Daily  clopidogrel, 75 mg, Oral, Daily  doxycycline, 100 mg, Oral, Q12H  enoxaparin, 40 mg, Subcutaneous, Q24H  guaiFENesin, 600 mg, Oral, BID  hydrocortisone-bacitracin-zinc oxide-nystatin, 1 application, Topical, BID  magnesium hydroxide, 10 mL, Oral, Once  meropenem, 1 g, Intravenous, Q8H  metoprolol tartrate, 12.5 mg, Oral, Q12H  sodium chloride, 10 mL, Intravenous, Q12H  sodium chloride, 10 mL, Intravenous, Q12H  cyanocobalamin, 1,000 mcg, Oral, Daily      Continuous Infusions:   PRN Meds:.•  acetaminophen  •  ipratropium-albuterol  •  loperamide  •  senna-docusate sodium  •  sodium chloride  •  sodium chloride  •  sodium chloride  •  sodium chloride  •  sodium chloride      RESULTS  No results found for: POCGLU  Results from last 7 days   Lab Units 05/31/23  0518 05/30/23  0411 05/29/23  0423  "  WBC 10*3/mm3 11.20* 12.74* 11.89*   HEMOGLOBIN g/dL 10.6* 10.7* 10.3*   HEMATOCRIT % 31.1* 31.3* 30.2*   PLATELETS 10*3/mm3 378 383 381     Results from last 7 days   Lab Units 05/29/23  0423 05/25/23  0501   SODIUM mmol/L 138 138   POTASSIUM mmol/L 3.3* 3.5   CHLORIDE mmol/L 103 104   CO2 mmol/L 31.9* 29.0   BUN mg/dL 5* 13   CREATININE mg/dL 0.23* 0.24*   CALCIUM mg/dL 8.5* 8.1*   GLUCOSE mg/dL 82 90       New Radiology:  ELIGIO negative for vegetations  CXR 5/25 revealed increased opacification of the RLL     Prior radiology:  MRI brain: \"Bilateral multifocal areas of infarction.  No evidence of hemorrhagic transformation. \"    ASSESSMENT and PLAN    Acute right MCA stroke    1. Acute R MCA stroke and bilateral infarcts  Stroke prophylaxis-aspirin/Plavix/atorvastatin  Zio patch placed on May 19 for 2 weeks  - 5/20 - Admit for inpt rehab w/ PT, OT, SLP, psychology and rehab medical and nursing care. Continue secondary stroke prophylaxis     2. Pneumonia and right lung abscess and Pleural effusions  - 5/20 - Continue IV antibx. ID following    - 5/21 - Continues to improve   The chest x-ray is reassuring   Expect her to be able to come off the oxygen in the next few days   She will need to have a follow-up chest imaging 6 weeks down the road   Finish the antibiotic course per ID, last day 6/8/2023   -5/22-on 1 L oxygen with activities and maintaining sats  -5/23- Per Pulmonology, continue Merrem. Continue to wean off supplemental oxygen  -5/24-oxygen weaned to 0.5 L nasal cannula  -5/25 -on room air and therapy, more 2 L last night  -5/26 Placed on 2L NC last night, WBC decreased to 15. CXR revealed increased opacification in RLL. Will continue Merrem and appreciate recommendations from Pulmonolgy. CDiff negative and bowel movements decreased this morning.  -5/30-WBC stable around 12 K.  On room air at rest.    ID-meropenem and doxycycline-last dose June 8, 2023  Repeat CT chest June 6, 2023  May 25-WBC increased to " 18 K from 8K 5 days ago.  No fever.  Vital signs stable.  On room air during speech therapy session.  Not acutely ill-appearing.  Had 3 loose semiformed stool today but received bowel program yesterday for recent constipation.   PICC line site unremarkable.   Increased diffuse purpleish petechiae on the anterior lower legs.  She had a few earlier in the week but there more prominent today  Calves are soft without any significant edema.  Continues on meropenem and doxycycline.   Patient reviewed with pulmonology service who will assess.  Although with the recent bowel program, given the white blood cell count increased will check C. difficile with the loose stool today  Will review with infectious disease service  May 26-Persistent right empyema, similar to prior exam. Increased consolidation in the superior segment right lower lobe, left lower lobe. Multiple  small nodular densities in both lower lungs, indeterminate. Increased  cavitation in the right lower lobe. Decreased left pleural effusion  May 30-WBC improved 12 K.  Continues on antibiotics.        3. Acute hypoxic respiratory failure     4. Emphysema d/t Tobacco use     5. Hypertension-continue antihypertensive regimen and avoid hypotensive episodes.     6. DVT prophylaxis -   - 5/20 - Start Lovenox SQ daily    7.  Dysphagia  -5/22-diet advanced to regular solid thin liquids    8.  GI-constipation-May 24-add Senokot-Colace.  Milk of magnesia x1.  Patient maintaining hydration  May 25-loose stool semiformed x3 today.  Changed Senokot Colace to as needed    TEAM CONF - MAY 23 - BED MIN. TRANSFERS MIN MOD ASSIST. GAIT 60 FEET MIN RW. TOILET TRANSFERS MOD ASSIST RW. SATS 1 L WITH ACTIVITIES WITH SATS >=90%. BATH MOD. LBD MOD.UBD MIN. GROOMING SET UP. TOILETING MAX. MILD EXECUTIVE FUNCTION DEFICITS. BNE PENDING. DIET ADVANCED TO REGULAR CONSISTENCY, GOOD INTAKE. PULM EXPECTS WILL BE OFF OXYGEN IN NEXT FEW DAYS. REPEAT CHEST IMAGING IN 6 WEEKS.  ELOS - 2 WEEKS.      Team conference-May 30  PT: Walking 160 ft, on 1L with walking which she was previously on RA with wakling. Min assist for bed transfer with RW. Min assist with toilet transfers  OT: Bathing min, Dressing lower body mod assist, Dressing upper body is set up, toileting is mod assist. Fatigues easily with therapy, wants to stay in   ST: Mild high level deficits, attention to detail with check booking, not working on med management  Nursing: Potassium supplementation, WBC is 12 which is stable, on ABX Doxy PO and Merrem IV, Drops to 80% if not on supplemental O2, on 1-2L NC. Coccyx pressure wound. On IV ABX until 6/28/2023  Psych: apathetic, flat affect, but was agreeable to do testing  RD: Started on Kwasi   ELOS: June 9th    Now admit for comprehensive acute inpatient rehabilitation .  This would be an interdisciplinary program with physical therapy 1 hour,  occupational therapy 1 hour, and speech therapy 1 hour, 5 days a week.  Rehabilitation nursing for carryover, monitoring of neurologic and pulmonary   status, bowel and bladder, and skin  Ongoing physician follow-up.  Weekly team conferences.  Goals are to achieve a level of supervision with  mobility and self-care and improved endurance.   Rehabilitation prognosis fair.  Medical prognosis fair.  Estimated length of stay is approximately 2 weeks, but is only an estimation.    .     The patient's functional status and clinical status is unchanged from preadmission assessment and the patient continues appropriate for acute inpatient rehabilitation.  Goal is for home with outpatient   therapies.  Barrier to discharge: Impaired mobility self-care endurance- work on vision, strength, gross and fine motor control, balance, progressive ambulation, ADLs to overcome.           Sebastian Collins MD      During rounds, used appropriate personal protective equipment including mask and gloves.  Additional gown if indicated.  Mask used was standard procedure mask.  Appropriate PPE was worn during the entire visit.  Hand hygiene was completed before and after.

## 2023-05-31 NOTE — PLAN OF CARE
Goal Outcome Evaluation:      Slept well. Meds with water. Using Purewick for urine collection tonight. O2 at 2L/NC, while sleeping Sats 93%, denied shortness of breath. PICC line flushed well. IV antibiotics given. Labs (CBC) to be drawn from PICC this am. No complaints. Daily weights ordered for this am.

## 2023-05-31 NOTE — NURSING NOTE
CWON Note: pt seen for follow up evaluation of skin issues POA to rehab unit. Pt is alert and able to turn and reposition with minimal assistance, rarely  incontinent of bowel and bladder now.  She is on a low air loss mattress for adequate pressure redistribution.      Original gluteal DTI has healed. Right buttocks has reepithelialized with only 1 small opening remaining. Left buttocks continues to have pink partial thickness skin loss secondary to friction and MASD from IAD,3.5x 2.5cms. Since pt has regained her continence for the most part, I believe she is ready to have a dressing placed over this wound. Left buttocks wound was cleansed with NS, patted dry. Opticel ag with Optifoam dressing placed, this can be changed daily. Orders updated in Epic. I have discussed with RN, Darling. Pt will need to continue frequent off-loading, continue use of a pressure redistribution W/C cushion. Will continue to follow weekly.

## 2023-05-31 NOTE — THERAPY TREATMENT NOTE
Inpatient Rehabilitation - Speech Language Pathology Treatment Note    [facility]     Patient Name: Katherine Williamson  : 1950  MRN: [id number]    Today's Date: 2023                   Admit Date: 2023       Visit Dx:      ICD-10-CM ICD-9-CM   1. Decreased functional mobility and endurance  Z74.09 780.99       Patient Active Problem List   Diagnosis   • Benign essential HTN   • Tobacco abuse   • Dislocation of hip joint prosthesis   • Fracture of proximal humerus   • Mechanical complication of internal orthopedic device   • Wear of articular bearing surface of internal prosthetic joint   • History of repair of hip joint   • History of operative procedure on hip   • Hyperglycemia   • Hepatic steatosis   • Diverticulosis   • Chest pain, atypical   • History of colon polyps   • Family history of colon cancer in mother   • Allergic rhinitis   • Lung nodule seen on imaging study   • Acute respiratory failure with hypoxia   • Abscess of right lung with pneumonia   • Empyema   • Sepsis   • Pleural effusion, right   • Other emphysema   • Pulmonary nodule (RLL)   • Diastolic dysfunction, grade 1   • Physical debility   • Cryptogenic stroke   • Acute right MCA stroke       Past Medical History:   Diagnosis Date   • Arthritis    • Cataract    • Colon polyps     FOLLOWED BY DR. JOSEPH LAU   • Hypertension        Past Surgical History:   Procedure Laterality Date   • COLONOSCOPY  10/2015    Xcxsq-exximavqppga-Ek. Kaplan.  Follow-up in 5 years.   • COLONOSCOPY N/A 2022    2 BENIGN POLYPS IN DESCENDING, 5 MM BENIGN POLYP IN SIGMOID, MULTIPLE SMALL AND LARGE DIVERTICULA IN SIGMOID, RESCOPE IN 3 YRS, DR. JOSEPH LAU AT [facility]   • EYE SURGERY     • JOINT REPLACEMENT  ?    Left total hip replacement in .  In 2015 patient had left hip revision   • TONSILLECTOMY         SLP Recommendation and Plan                                                            SLP EVALUATION (last 72 hours)     SLP SLC  Evaluation     Row Name 05/31/23 1500 05/31/23 1330 05/31/23 0915 05/30/23 1330 05/30/23 0900       Communication Assessment/Intervention    Document Type therapy note (daily note)  -AL therapy note (daily note)  -AL therapy note (daily note)  -AL therapy note (daily note)  -AL therapy note (daily note)  -AL    Patient/Family/Caregiver Comments/Observations Pt sitting upright in recliner in room.  -AL Pt is pleasant and cooperative.  -AL Pt participated well. Missed 15 minutes of session due to nursing care.  -AL Pt participated well.  -AL Pt upright in wheelchair. Participated well.  -AL       Pain Scale: Numbers Pre/Post-Treatment    Pretreatment Pain Rating 0/10 - no pain  -AL 0/10 - no pain  -AL 0/10 - no pain  -AL 0/10 - no pain  -AL 0/10 - no pain  -AL    Row Name 05/29/23 1044                   Communication Assessment/Intervention    Document Type therapy note (daily note)  -JT        Subjective Information no complaints  -JT        Patient Observations alert;cooperative;agree to therapy  -JT        Patient/Family/Caregiver Comments/Observations pt sitting up in wc, seen in room  -JT        Patient Effort good  -JT        Symptoms Noted During/After Treatment none  -JT           Pain Scale: Numbers Pre/Post-Treatment    Pretreatment Pain Rating 0/10 - no pain  -JT        Posttreatment Pain Rating 0/10 - no pain  -JT              User Key  (r) = Recorded By, (t) = Taken By, (c) = Cosigned By    Initials Name Effective Dates    Danni Middleton 06/16/21 -     Sarita Landry MS CCC-SLP 06/16/21 -                    EDUCATION    The patient has been educated in the following areas:       Cognitive Impairment.             SLP GOALS     Row Name 05/31/23 1500 05/31/23 1330 05/31/23 0915       Attention Goal 1 (SLP)    Improve Attention by Goal 1 (SLP) complete selective attention task;complete divided attention task;90%;independently (over 90% accuracy)  -AL -- --    Time Frame (Attention Goal 1,  "SLP) by discharge  -AL -- --    Progress/Outcomes (Attention Goal 1, SLP) good progress toward goal  -AL good progress toward goal  -AL --    Comment (Attention Goal 1, SLP) Attention to detail for logic calendar task (find the date): Pt initially with incorrect answer; however, when cued to go through directions again slowly, she was able to get correct answer.  -AL Blink card sort: 25 cards in 1 minute, 15 seconds with NO cues. Mildly reduced speed in game play against clinician.  -AL --       Reasoning Goal 1 (SLP)    Improve Reasoning Through Goal 1 (SLP) -- complete high level reasoning task;90%;independently (over 90% accuracy)  -AL --    Time Frame (Reasoning Goal 1, SLP) -- short term goal (STG);1 week  -AL --    Progress/Outcomes (Reasoning Goal 1, SLP) -- good progress toward goal  -AL --    Comment (Reasoning Goal 1, SLP) -- Moderate level logic puzzle: 100% with NO cues, goal met x1.  -AL --       Functional Math Skills Goal 1 (SLP)    Progress/Outcomes (Functional Math Skills Goal 1, SLP) -- -- goal ongoing  -AL    Comment (Functional Math Skills Goal 1, SLP) -- -- Checkbook balancin% with NO cues, 100% with MIN cues.  -AL       Executive Functional Skills Goal 1 (SLP)    Improve Executive Function Skills Goal 1 (SLP) -- organization/planning activity  -AL --    Time Frame (Executive Function Skills Goal 1, SLP) -- by discharge  -AL --    Progress/Outcomes (Executive Function Skills Goal 1, SLP) -- goal ongoing  -AL --    Comment (Executive Function Skills Goal 1, SLP) -- Medicine management with pill box and pt's own medicine list:  with NO cues, 7 with MIN cues.  -AL --    Row Name 23 1330 23 0900 23 1000       (STG) Swallow Management Recall Goal 1 (SLP)    Comment (Swallow Management Recall Goal 1, SLP) -- Reviewed recommendation for chin tuck with liquids and solids. Pt reported sometimes feeling \"a vinita\" of discomfort in her upper chest when eating. Plan to discuss with " MD.  -AL --       Attention Goal 1 (SLP)    Improve Attention by Goal 1 (SLP) complete selective attention task;complete divided attention task;90%;independently (over 90% accuracy)  -AL -- --    Time Frame (Attention Goal 1, SLP) by discharge  -AL -- --    Progress/Outcomes (Attention Goal 1, SLP) good progress toward goal  -AL -- --    Comment (Attention Goal 1, SLP) Calendar logic task: Pt exhibited impulsivity during this task. She misinterpreted one direction and required MOD cues to correct answer.  -AL -- --       Reasoning Goal 1 (SLP)    Improve Reasoning Through Goal 1 (SLP) -- complete high level reasoning task;90%;independently (over 90% accuracy)  -AL --    Progress/Outcomes (Reasoning Goal 1, SLP) -- good progress toward goal  -AL --    Comment (Reasoning Goal 1, SLP) -- Moderate level logic puzzle: 75% with NO cues, 100% with MIN-MOD cues.  -AL --       Functional Math Skills Goal 1 (SLP)    Progress/Outcomes (Functional Math Skills Goal 1, SLP) -- -- goal ongoing  -JT    Comment (Functional Math Skills Goal 1, SLP) -- -- checkbook balancing task 50% independently;  -JT       Executive Functional Skills Goal 1 (SLP)    Improve Executive Function Skills Goal 1 (SLP) organization/planning activity  -AL -- --    Time Frame (Executive Function Skills Goal 1, SLP) by discharge  -AL -- --    Progress/Outcomes (Executive Function Skills Goal 1, SLP) goal ongoing  -AL -- --    Comment (Executive Function Skills Goal 1, SLP) Medicine management with pill box and pt's own medicine list: 65% with NO cues, 100% with MIN-MOD cues. Pt with difficulty keeping up with which medicine she was on and exhibited mild impulsivity filling box.  -AL -- --          User Key  (r) = Recorded By, (t) = Taken By, (c) = Cosigned By    Initials Name Provider Type    Danni Middleton Speech and Language Pathologist    Sarita Landry MS CCC-SLP Speech and Language Pathologist                            Time  Calculation:        Time Calculation- SLP     Row Name 05/31/23 1517 05/31/23 1357 05/31/23 0932       Time Calculation- SLP    SLP Start Time 1500  -AL 1330  -AL 0915  -AL    SLP Stop Time 1515  -AL 1400  -AL 0930  -AL    SLP Time Calculation (min) 15 min  -AL 30 min  -AL 15 min  -AL          User Key  (r) = Recorded By, (t) = Taken By, (c) = Cosigned By    Initials Name Provider Type    Sarita Landry, MS CCC-SLP Speech and Language Pathologist                  Therapy Charges for Today     Code Description Service Date Service Provider Modifiers Qty    79342460809 HC ST DEV OF COGN SKILLS INITIAL 15 MIN 5/30/2023 Sarita Comer, MS CCC-SLP  1    48617600457 HC ST DEV OF COGN SKILLS EACH ADDT'L 15 MIN 5/30/2023 Sarita Comer, MS CCC-SLP  3    06222724046 HC ST DEV OF COGN SKILLS INITIAL 15 MIN 5/31/2023 Sarita Comer, MS CCC-SLP  1    97452811533 HC ST DEV OF COGN SKILLS EACH ADDT'L 15 MIN 5/31/2023 Sarita Comer MS CCC-SLP  3                           Sarita Comer MS CCC-SLP  5/31/2023

## 2023-06-01 LAB
APTT HEX PL PPP: 0 SEC
APTT IMM NP PPP: NORMAL SEC
APTT PPP 1:1 SALINE: NORMAL SEC
APTT PPP: 25.2 SEC
B2 GLYCOPROT1 IGA SER-ACNC: <10 SAU
B2 GLYCOPROT1 IGG SER-ACNC: <10 SGU
B2 GLYCOPROT1 IGM SER-ACNC: <10 SMU
CARDIOLIPIN IGG SER IA-ACNC: <10 GPL
CARDIOLIPIN IGM SER IA-ACNC: <10 MPL
CONFIRM DRVVT: NORMAL SEC
DRVVT SCREEN TO CONFIRM RATIO: NORMAL RATIO
INR PPP: 1.1 RATIO
LABORATORY COMMENT REPORT: NORMAL
PROTHROMBIN TIME: 11.1 SEC
SCREEN DRVVT: 36.9 SEC
THROMBIN TIME: 14.8 SEC

## 2023-06-01 PROCEDURE — 25010000002 ENOXAPARIN PER 10 MG: Performed by: PHYSICAL MEDICINE & REHABILITATION

## 2023-06-01 PROCEDURE — 97130 THER IVNTJ EA ADDL 15 MIN: CPT

## 2023-06-01 PROCEDURE — 25010000002 MEROPENEM PER 100 MG: Performed by: HOSPITALIST

## 2023-06-01 PROCEDURE — 97116 GAIT TRAINING THERAPY: CPT

## 2023-06-01 PROCEDURE — 97535 SELF CARE MNGMENT TRAINING: CPT

## 2023-06-01 PROCEDURE — 97129 THER IVNTJ 1ST 15 MIN: CPT

## 2023-06-01 PROCEDURE — 97110 THERAPEUTIC EXERCISES: CPT

## 2023-06-01 RX ADMIN — Medication 10 ML: at 21:03

## 2023-06-01 RX ADMIN — GUAIFENESIN 600 MG: 600 TABLET, EXTENDED RELEASE ORAL at 20:01

## 2023-06-01 RX ADMIN — ZINC OXIDE 1 APPLICATION: 200 OINTMENT TOPICAL at 08:10

## 2023-06-01 RX ADMIN — ENOXAPARIN SODIUM 40 MG: 100 INJECTION SUBCUTANEOUS at 19:46

## 2023-06-01 RX ADMIN — ASPIRIN 81 MG: 81 TABLET, CHEWABLE ORAL at 08:09

## 2023-06-01 RX ADMIN — Medication 10 ML: at 08:09

## 2023-06-01 RX ADMIN — METOPROLOL TARTRATE 12.5 MG: 25 TABLET, FILM COATED ORAL at 08:09

## 2023-06-01 RX ADMIN — MEROPENEM 1 G: 1 INJECTION, POWDER, FOR SOLUTION INTRAVENOUS at 05:02

## 2023-06-01 RX ADMIN — METOPROLOL TARTRATE 12.5 MG: 25 TABLET, FILM COATED ORAL at 19:58

## 2023-06-01 RX ADMIN — MEROPENEM 1 G: 1 INJECTION, POWDER, FOR SOLUTION INTRAVENOUS at 19:47

## 2023-06-01 RX ADMIN — ATORVASTATIN CALCIUM 80 MG: 80 TABLET, FILM COATED ORAL at 20:01

## 2023-06-01 RX ADMIN — DOXYCYCLINE 100 MG: 100 CAPSULE ORAL at 08:09

## 2023-06-01 RX ADMIN — Medication 1000 MCG: at 08:09

## 2023-06-01 RX ADMIN — GUAIFENESIN 600 MG: 600 TABLET, EXTENDED RELEASE ORAL at 08:09

## 2023-06-01 RX ADMIN — Medication 10 ML: at 08:10

## 2023-06-01 RX ADMIN — DOXYCYCLINE 100 MG: 100 CAPSULE ORAL at 19:57

## 2023-06-01 RX ADMIN — CLOPIDOGREL BISULFATE 75 MG: 75 TABLET, FILM COATED ORAL at 08:09

## 2023-06-01 RX ADMIN — CITALOPRAM 20 MG: 20 TABLET, FILM COATED ORAL at 08:09

## 2023-06-01 RX ADMIN — MEROPENEM 1 G: 1 INJECTION, POWDER, FOR SOLUTION INTRAVENOUS at 12:00

## 2023-06-01 NOTE — THERAPY TREATMENT NOTE
Inpatient Rehabilitation - Occupational Therapy Treatment Note    [facility]     Patient Name: Katherine Williamson  : 1950  MRN: [id number]    Today's Date: 2023                 Admit Date: 2023         ICD-10-CM ICD-9-CM   1. Decreased functional mobility and endurance  Z74.09 780.99       Patient Active Problem List   Diagnosis   • Benign essential HTN   • Tobacco abuse   • Dislocation of hip joint prosthesis   • Fracture of proximal humerus   • Mechanical complication of internal orthopedic device   • Wear of articular bearing surface of internal prosthetic joint   • History of repair of hip joint   • History of operative procedure on hip   • Hyperglycemia   • Hepatic steatosis   • Diverticulosis   • Chest pain, atypical   • History of colon polyps   • Family history of colon cancer in mother   • Allergic rhinitis   • Lung nodule seen on imaging study   • Acute respiratory failure with hypoxia   • Abscess of right lung with pneumonia   • Empyema   • Sepsis   • Pleural effusion, right   • Other emphysema   • Pulmonary nodule (RLL)   • Diastolic dysfunction, grade 1   • Physical debility   • Cryptogenic stroke   • Acute right MCA stroke       Past Medical History:   Diagnosis Date   • Arthritis    • Cataract    • Colon polyps     FOLLOWED BY DR. JOSEPH LAU   • Hypertension        Past Surgical History:   Procedure Laterality Date   • COLONOSCOPY  10/2015    Ahxzm-hvzizdwmdwhe-Gc. [facility].  Follow-up in 5 years.   • COLONOSCOPY N/A 2022    2 BENIGN POLYPS IN DESCENDING, 5 MM BENIGN POLYP IN SIGMOID, MULTIPLE SMALL AND LARGE DIVERTICULA IN SIGMOID, RESCOPE IN 3 YRS, DR. JOSEPH LAU AT [facility]   • EYE SURGERY     • JOINT REPLACEMENT  ?    Left total hip replacement in .  In 2015 patient had left hip revision   • TONSILLECTOMY               IRF OT ASSESSMENT FLOWSHEET (last 12 hours)     IRF OT Evaluation and Treatment     Row Name 23 1205          OT Time and Intention     Document Type daily treatment  -AF     Mode of Treatment occupational therapy  -AF     Patient Effort good  -AF     Row Name 06/01/23 1205          General Information    Patient/Family/Caregiver Comments/Observations pt sitting up in w/c in room agreeable to a shower  -AF     Existing Precautions/Restrictions fall;oxygen therapy device and L/min  -AF     Limitations/Impairments safety/cognitive  -AF     Row Name 06/01/23 1205          Pain Assessment    Pretreatment Pain Rating 0/10 - no pain  -AF     Posttreatment Pain Rating 0/10 - no pain  -AF     Row Name 06/01/23 1205          Cognition/Psychosocial    Affect/Mental Status (Cognition) flat/blunted affect  -AF     Orientation Status (Cognition) oriented x 3  -AF     Follows Commands (Cognition) follows one-step commands;verbal cues/prompting required  -AF     Personal Safety Interventions fall prevention program maintained;gait belt;nonskid shoes/slippers when out of bed  -AF     Cognitive Function WFL  -AF     Row Name 06/01/23 1205          Bathing    [place] Level (Bathing) bathing skills;lower body;upper body;contact guard assist;minimum assist (75% patient effort);verbal cues  -AF     Assistive Device (Bathing) grab bar/tub rail;hand held shower spray hose;tub bench  -AF     Position (Bathing) supported sitting;supported standing  -AF     Row Name 06/01/23 1205          Upper Body Dressing    [place] Level (Upper Body Dressing) upper body dressing skills;set up assistance  -AF     Position (Upper Body Dressing) supported sitting  -AF     Set-up Assistance (Upper Body Dressing) obtain clothing  -AF     Row Name 06/01/23 1205          Lower Body Dressing    [place] Level (Lower Body Dressing) doff;don;pants/bottoms;shoes/slippers;socks;underwear;minimum assist (75% patient effort);verbal cues  -AF     Position (Lower Body Dressing) supported sitting;supported standing  -AF     Set-up Assistance (Lower Body Dressing) obtain clothing  -AF      Row Name 06/01/23 1205          Grooming    [place] Level (Grooming) grooming skills;set up  -AF     Position (Grooming) sink side;supported sitting  -AF     Comment (Grooming) w/c level to blowdry and fix hair  -AF     Row Name 06/01/23 1205          Toileting    [place] Level (Toileting) toileting skills;contact guard assist;minimum assist (75% patient effort)  -AF     Assistive Device Use (Toileting) grab bar/safety frame  -AF     Comment (Toileting) on BSC in shower room used grab bars for sit to stands  -AF     Row Name 06/01/23 1205          Transfer Assessment/Treatment    Comment, (Transfers) sit to stand with ADL tasks iwth CGA/MIN with use of grab bars, fatigues at end of tasks  -AF     Row Name 06/01/23 1205          Sit-Stand Transfer    Sit-Stand [place] (Transfers) minimum assist (75% patient effort);verbal cues  -AF     Assistive Device (Sit-Stand Transfers) walker, front-wheeled  -AF     Row Name 06/01/23 1205          Stand-Sit Transfer    Stand-Sit [place] (Transfers) minimum assist (75% patient effort);verbal cues  -AF     Assistive Device (Stand-Sit Transfers) walker, front-wheeled  -AF     Row Name 06/01/23 1205          Toilet Transfer    Type (Toilet Transfer) stand-sit;sit-stand  -AF     [place] Level (Toilet Transfer) minimum assist (75% patient effort)  -AF     Assistive Device (Toilet Transfer) grab bars/safety frame;commode, bedside without drop arms  -AF     Row Name 06/01/23 1205          Shower Transfer    Type (Shower Transfer) stand pivot/stand step  -AF     [place] Level (Shower Transfer) minimum assist (75% patient effort)  -AF     Assistive Device (Shower Transfer) tub bench;grab bar, tub/shower;wheelchair  -AF     Row Name 06/01/23 1205          Motor Skills    Functional Endurance fair with ADL tasks, required seated rest breaks between standing tasks  -AF     Row Name 06/01/23 1205          Balance    Static Sitting Balance standby assist  -AF      Dynamic Sitting Balance contact guard;standby assist  -AF     Static Standing Balance contact guard  -AF     Dynamic Standing Balance contact guard  with ADL tasks and use of grab bar  -AF     Row Name 06/01/23 1205          Positioning and Restraints    Pre-Treatment Position sitting in chair/recliner  -AF     Post Treatment Position wheelchair  -AF     In Wheelchair sitting;exit alarm on;with other staff  with RT  -AF           User Key  (r) = Recorded By, (t) = Taken By, (c) = Cosigned By    Initials Name Effective Dates    AF Silke Moreno, OTR 06/16/21 -                  Occupational Therapy Education     Title: PT OT SLP Therapies (Done)     Topic: Occupational Therapy (Done)     Point: ADL training (Done)     Description:   Instruct learner(s) on proper safety adaptation and remediation techniques during self care or transfers.   Instruct in proper use of assistive devices.              Learning Progress Summary           Patient Acceptance, E, VU by WN at 5/26/2023 0038    Acceptance, E,TB, VU by SG at 5/20/2023 1516                   Point: Home exercise program (Done)     Description:   Instruct learner(s) on appropriate technique for monitoring, assisting and/or progressing therapeutic exercises/activities.              Learning Progress Summary           Patient Acceptance, E, VU by WN at 5/26/2023 0038                   Point: Precautions (Done)     Description:   Instruct learner(s) on prescribed precautions during self-care and functional transfers.              Learning Progress Summary           Patient Acceptance, E, VU by WN at 5/26/2023 0038                   Point: Body mechanics (Done)     Description:   Instruct learner(s) on proper positioning and spine alignment during self-care, functional mobility activities and/or exercises.              Learning Progress Summary           Patient Acceptance, E, VU by WN at 5/26/2023 0038    Acceptance, E, VU,DU,NR by JOSEPH at 5/23/2023 5038    Comment:  safety and technique with transfers                               User Key     Initials Effective Dates Name Provider Type Discipline    SG 06/16/21 - 05/30/23 Verena Hewitt, OTR Occupational Therapist OT    AF 06/16/21 -  Silke Moreno OTENID Occupational Therapist OT    WN 08/23/22 -  Reno Lazo RN Registered Nurse Nurse                    OT Recommendation and Plan                         Time Calculation:      Time Calculation- OT     Row Name 06/01/23 1209             Time Calculation- OT    OT Start Time 0930  -AF      OT Stop Time 1030  -AF      OT Time Calculation (min) 60 min  -AF            User Key  (r) = Recorded By, (t) = Taken By, (c) = Cosigned By    Initials Name Provider Type    AF Slike Moreno, OTR Occupational Therapist              Therapy Charges for Today     Code Description Service Date Service Provider Modifiers Qty    57473135535 HC OT SELF CARE/MGMT/TRAIN EA 15 MIN 5/31/2023 Silke Moreno, OTR GO 1    06348896192 HC OT THER PROC EA 15 MIN 5/31/2023 Silke Moreno OTR GO 3    47834732905 HC OT SELF CARE/MGMT/TRAIN EA 15 MIN 6/1/2023 Silke Moreno OTR GO 4                   SONG Kaiser  6/1/2023

## 2023-06-01 NOTE — PROGRESS NOTES
Recreational Therapy Note    Patient Name: Katherine Williamson   MRN: [id number]    Therapeutic Recreation Eval and Treat (last 12 hours)     Therapeutic Recreation Eval & Treat     Row Name 06/01/23 1400       Therapeutic Recreation Participation    Recreation Therapy Participation games  -TW    Games card games  Phase 10  -TW    Objectives of Recreation Participation increase;sense of autonomy by choosing level of participation;positive attitudes leading to a healthy leisure lifestyle  -TW    Comment, Recreation Participation occ cues to recall directions; BUE use to hold/place cards  -TW    Recreation Therapy Summary of Participation active participation  -TW          User Key  (r) = Recorded By, (t) = Taken By, (c) = Cosigned By    Initials Name Provider Type    TW Opal Madden CTRS Recreational Therapist                  CHUNG Ma  6/1/2023

## 2023-06-01 NOTE — THERAPY TREATMENT NOTE
Inpatient Rehabilitation - Physical Therapy Treatment Note       [facility]     Patient Name: Katherine Williamson  : 1950  MRN: [id number]    Today's Date: 2023                    Admit Date: 2023      Visit Dx:     ICD-10-CM ICD-9-CM   1. Decreased functional mobility and endurance  Z74.09 780.99       Patient Active Problem List   Diagnosis   • Benign essential HTN   • Tobacco abuse   • Dislocation of hip joint prosthesis   • Fracture of proximal humerus   • Mechanical complication of internal orthopedic device   • Wear of articular bearing surface of internal prosthetic joint   • History of repair of hip joint   • History of operative procedure on hip   • Hyperglycemia   • Hepatic steatosis   • Diverticulosis   • Chest pain, atypical   • History of colon polyps   • Family history of colon cancer in mother   • Allergic rhinitis   • Lung nodule seen on imaging study   • Acute respiratory failure with hypoxia   • Abscess of right lung with pneumonia   • Empyema   • Sepsis   • Pleural effusion, right   • Other emphysema   • Pulmonary nodule (RLL)   • Diastolic dysfunction, grade 1   • Physical debility   • Cryptogenic stroke   • Acute right MCA stroke       Past Medical History:   Diagnosis Date   • Arthritis    • Cataract    • Colon polyps     FOLLOWED BY DR. JOSEPH LAU   • Hypertension        Past Surgical History:   Procedure Laterality Date   • COLONOSCOPY  10/2015    [facility]. [facility].  Follow-up in 5 years.   • COLONOSCOPY N/A 2022    2 BENIGN POLYPS IN DESCENDING, 5 MM BENIGN POLYP IN SIGMOID, MULTIPLE SMALL AND LARGE DIVERTICULA IN SIGMOID, RESCOPE IN 3 YRS, DR. JOSEPH LAU AT [facility]   • EYE SURGERY     • JOINT REPLACEMENT  ?    Left total hip replacement in .  In 2015 patient had left hip revision   • TONSILLECTOMY         PT ASSESSMENT (last 12 hours)     IRF PT Evaluation and Treatment     Row Name 23 0846          PT Time and Intention    Document Type  "daily treatment  -MD     Mode of Treatment physical therapy  -MD     Patient/Family/Caregiver Comments/Observations Pt sitting in WC showing no signs of acute distress.  -MD     Row Name 06/01/23 0846          Pain Assessment    Pretreatment Pain Rating 0/10 - no pain  -MD     Row Name 06/01/23 0846          Cognition/Psychosocial    Orientation Status (Cognition) oriented x 3  -MD     Follows Commands (Cognition) follows one-step commands;verbal cues/prompting required  -MD     Personal Safety Interventions fall prevention program maintained;gait belt  -MD     Row Name 06/01/23 0846          Sit-Stand Transfer    Sit-Stand [place] (Transfers) verbal cues;minimum assist (75% patient effort)  -MD     Assistive Device (Sit-Stand Transfers) walker, front-wheeled  -MD     Row Name 06/01/23 0846          Stand-Sit Transfer    Stand-Sit [place] (Transfers) verbal cues;standby assist;contact guard  -MD     Assistive Device (Stand-Sit Transfers) walker, front-wheeled  -MD     Row Name 06/01/23 0846          Gait/Stairs (Locomotion)    [place] Level (Gait) verbal cues;contact guard;standby assist  -MD     Assistive Device (Gait) walker, front-wheeled  -MD     Distance in Feet (Gait) 160'x2 and 200'x1 and 80'x1  -MD     Pattern (Gait) step-through  -MD     Deviations/Abnormal Patterns (Gait) raina decreased;stride length decreased  -MD     Bilateral Gait Deviations forward flexed posture;heel strike decreased;weight shift ability decreased  -MD     Left Sided Gait Deviations heel strike decreased;knee hyperextension  -MD     [place] Level (Stairs) contact guard;verbal cues  -MD     Handrail Location (Stairs) both sides  -MD     Number of Steps (Stairs) 4  6\" steps  -MD     Ascending Technique (Stairs) step-to-step  -MD     Descending Technique (Stairs) step-to-step  -MD     Row Name 06/01/23 0846          Hip (Therapeutic Exercise)    Hip Strengthening (Therapeutic Exercise) bilateral;marching while " seated;10 repetitions  -MD     Row Name 06/01/23 0846          Knee (Therapeutic Exercise)    Knee Strengthening (Therapeutic Exercise) bilateral;LAQ (long arc quad);hamstring curls;10 repetitions  -MD     Row Name 06/01/23 0846          Ankle (Therapeutic Exercise)    Ankle Strengthening (Therapeutic Exercise) bilateral;dorsiflexion;plantarflexion;10 repetitions  -MD     Row Name 06/01/23 0846          Positioning and Restraints    Pre-Treatment Position sitting in chair/recliner  -MD     Post Treatment Position chair  -MD     In Bed supine  -MD     In Chair sitting;call light within reach  -MD     In Wheelchair --  -MD     Row Name 06/01/23 0846          Vital Signs    Intra SpO2 (%) 93  -MD     O2 Delivery Intra Treatment room air  -MD           User Key  (r) = Recorded By, (t) = Taken By, (c) = Cosigned By    Initials Name Provider Type    Melissa Fischer, PT Physical Therapist              Wound 05/19/23 2055 Bilateral gluteal (Active)   Dressing Appearance dry;intact 06/01/23 0806   Closure None 06/01/23 0806   Base dressing in place, unable to visualize 06/01/23 0806     Physical Therapy Education     Title: PT OT SLP Therapies (Done)     Topic: Physical Therapy (Done)     Point: Mobility training (Done)     Learning Progress Summary           Patient Acceptance, E,TB, VU,NR by EE at 5/31/2023 1200    Acceptance, E,TB, VU,NR by EE at 5/30/2023 1131    Acceptance, E,TB, VU by KP at 5/27/2023 0932    Acceptance, E, VU by WN at 5/26/2023 0038    Eager, E,TB,D, NR by KP at 5/25/2023 1019    Acceptance, E,D, VU,NR by EE at 5/24/2023 1136    Acceptance, E,TB, VU,NR by EE at 5/23/2023 1139    Acceptance, E,TB, NR,VU by EE at 5/22/2023 1221    Acceptance, E, NR by  at 5/20/2023 1216                   Point: Home exercise program (Done)     Learning Progress Summary           Patient Acceptance, E,TB, VU,NR by EE at 5/30/2023 1131    Acceptance, E,TB, VU by KP at 5/27/2023 0932    Acceptance, E, VU by WN at 5/26/2023  0038    Eager, E,TB,D, NR by  at 5/25/2023 1019    Acceptance, E,D, VU,NR by EE at 5/24/2023 1136    Acceptance, E,TB, VU,NR by EE at 5/23/2023 1139    Acceptance, E,TB, NR,VU by EE at 5/22/2023 1221    Acceptance, E, NR by  at 5/20/2023 1216                   Point: Body mechanics (Done)     Learning Progress Summary           Patient Acceptance, E,TB, VU,NR by EE at 5/31/2023 1200    Acceptance, E,TB, VU,NR by EE at 5/30/2023 1131    Acceptance, E, VU by WN at 5/26/2023 0038    Acceptance, E,D, VU,NR by EE at 5/24/2023 1136    Acceptance, E,TB, VU,NR by EE at 5/23/2023 1139    Acceptance, E,TB, NR,VU by EE at 5/22/2023 1221    Acceptance, E, NR by  at 5/20/2023 1216                   Point: Precautions (Done)     Learning Progress Summary           Patient Acceptance, E, VU by MD at 6/1/2023 0848    Acceptance, E,TB, VU,NR by EE at 5/31/2023 1200    Acceptance, E,TB, VU,NR by EE at 5/30/2023 1131    Acceptance, E, VU by MD at 5/26/2023 1014    Acceptance, E, VU by WN at 5/26/2023 0038    Acceptance, E,D, VU,NR by EE at 5/24/2023 1136    Acceptance, E,TB, VU,NR by EE at 5/23/2023 1139    Acceptance, E,TB, NR,VU by EE at 5/22/2023 1221    Acceptance, E, NR by  at 5/20/2023 1216                               User Key     Initials Effective Dates Name Provider Type Discipline     06/16/21 -  Jess Wang, PT Physical Therapist PT    EE 06/16/21 -  Tena Garay, PT Physical Therapist PT    MD 06/16/21 -  Melissa Wise, PT Physical Therapist PT    KP 06/16/21 -  Jesusita Mendoza, PT Physical Therapist PT    WN 08/23/22 -  Reno Lazo, RN Registered Nurse Nurse                PT Recommendation and Plan                          Time Calculation:      PT Charges     Row Name 06/01/23 1322 06/01/23 0846          Time Calculation    Start Time 1300  -MD 0830  -MD     Stop Time 1330  -MD 0900  -MD     Time Calculation (min) 30 min  -MD 30 min  -MD     PT Received On -- 06/01/23  -MD     PT - Next Appointment --  06/02/23  -MD           User Key  (r) = Recorded By, (t) = Taken By, (c) = Cosigned By    Initials Name Provider Type    Melissa Fischer, PT Physical Therapist                Therapy Charges for Today     Code Description Service Date Service Provider Modifiers Qty    57259393458  GAIT TRAINING EA 15 MIN 6/1/2023 Melissa Wise, PT GP 2    [id number]  PT THER PROC EA 15 MIN 6/1/2023 Melissa Wise, PT GP 2                   Melissa Wise, SHIRLEY  6/1/2023

## 2023-06-01 NOTE — THERAPY TREATMENT NOTE
Inpatient Rehabilitation - Speech Language Pathology Treatment Note    [facility]     Patient Name: Katherine Williamson  : 1950  MRN: [id number]    Today's Date: 2023                   Admit Date: 2023       Visit Dx:      ICD-10-CM ICD-9-CM   1. Decreased functional mobility and endurance  Z74.09 780.99       Patient Active Problem List   Diagnosis   • Benign essential HTN   • Tobacco abuse   • Dislocation of hip joint prosthesis   • Fracture of proximal humerus   • Mechanical complication of internal orthopedic device   • Wear of articular bearing surface of internal prosthetic joint   • History of repair of hip joint   • History of operative procedure on hip   • Hyperglycemia   • Hepatic steatosis   • Diverticulosis   • Chest pain, atypical   • History of colon polyps   • Family history of colon cancer in mother   • Allergic rhinitis   • Lung nodule seen on imaging study   • Acute respiratory failure with hypoxia   • Abscess of right lung with pneumonia   • Empyema   • Sepsis   • Pleural effusion, right   • Other emphysema   • Pulmonary nodule (RLL)   • Diastolic dysfunction, grade 1   • Physical debility   • Cryptogenic stroke   • Acute right MCA stroke       Past Medical History:   Diagnosis Date   • Arthritis    • Cataract    • Colon polyps     FOLLOWED BY DR. JOSEPH LAU   • Hypertension        Past Surgical History:   Procedure Laterality Date   • COLONOSCOPY  10/2015    [facility].  Follow-up in 5 years.   • COLONOSCOPY N/A 2022    2 BENIGN POLYPS IN DESCENDING, 5 MM BENIGN POLYP IN SIGMOID, MULTIPLE SMALL AND LARGE DIVERTICULA IN SIGMOID, RESCOPE IN 3 YRS, DR. JOSEPH LAU AT [facility]   • EYE SURGERY     • JOINT REPLACEMENT  ?    Left total hip replacement in .  In 2015 patient had left hip revision   • TONSILLECTOMY         SLP Recommendation and Plan                                                            SLP EVALUATION (last 72 hours)     SLP SLC  Evaluation     Row Name 06/01/23 1230 06/01/23 0900 05/31/23 1500 05/31/23 1330 05/31/23 0915       Communication Assessment/Intervention    Document Type therapy note (daily note)  -AL therapy note (daily note)  -AL therapy note (daily note)  -AL therapy note (daily note)  -AL therapy note (daily note)  -AL    Patient/Family/Caregiver Comments/Observations Pt participated well.  -AL Pt is pleasant and cooperative.  -AL Pt sitting upright in recliner in room.  -AL Pt is pleasant and cooperative.  -AL Pt participated well. Missed 15 minutes of session due to nursing care.  -AL       Pain Scale: Numbers Pre/Post-Treatment    Pretreatment Pain Rating 0/10 - no pain  -AL 0/10 - no pain  -AL 0/10 - no pain  -AL 0/10 - no pain  -AL 0/10 - no pain  -AL    Row Name 05/30/23 1330 05/30/23 0900                Communication Assessment/Intervention    Document Type therapy note (daily note)  -AL therapy note (daily note)  -AL       Patient/Family/Caregiver Comments/Observations Pt participated well.  -AL Pt upright in wheelchair. Participated well.  -AL          Pain Scale: Numbers Pre/Post-Treatment    Pretreatment Pain Rating 0/10 - no pain  -AL 0/10 - no pain  -AL             User Key  (r) = Recorded By, (t) = Taken By, (c) = Cosigned By    Initials Name Effective Dates    Sarita Landry, MS CCC-SLP 06/16/21 -                    EDUCATION    The patient has been educated in the following areas:       Cognitive Impairment.             SLP GOALS     Row Name 06/01/23 1230 06/01/23 0900 05/31/23 1500       Attention Goal 1 (SLP)    Improve Attention by Goal 1 (SLP) -- complete selective attention task;complete divided attention task;90%;independently (over 90% accuracy)  -AL complete selective attention task;complete divided attention task;90%;independently (over 90% accuracy)  -AL    Time Frame (Attention Goal 1, SLP) -- -- by discharge  -AL    Progress/Outcomes (Attention Goal 1, SLP) -- good progress toward goal  -AL  "good progress toward goal  -AL    Comment (Attention Goal 1, SLP) -- Required NO cues for \"Blink\" card sort task.  -AL Attention to detail for logic calendar task (find the date): Pt initially with incorrect answer; however, when cued to go through directions again slowly, she was able to get correct answer.  -AL       Memory Skills Goal 1 (SLP)    Improve Memory Skills Through Goal 1 (SLP) -- recalling unrelated word lists with an imposed delay  -AL --    Progress/Outcomes (Memory Skills Goal 1, SLP) -- good progress toward goal  -AL --    Comment (Memory Skills Goal 1, SLP) -- Recalled 3/3 errands after 15 minutes and at end of session.  -AL --       Reasoning Goal 1 (SLP)    Improve Reasoning Through Goal 1 (SLP) complete high level reasoning task;90%;independently (over 90% accuracy)  -AL complete high level reasoning task;90%;independently (over 90% accuracy)  -AL --    Time Frame (Reasoning Goal 1, SLP) -- short term goal (STG);1 week  -AL --    Progress/Outcomes (Reasoning Goal 1, SLP) good progress toward goal  -AL -- --    Comment (Reasoning Goal 1, SLP) Moderate level logic puzzle: 80% with NO cues, 100% with MIN cues.  -AL Moderate level logic puzzle: 75% with NO cues, 100% with MIN-MOD cues.  -AL --       Functional Math Skills Goal 1 (SLP)    Improve Functional Math Skills Through Goal 1 (SLP) -- complete functional math task;100%;independently (over 90% accuracy)  -AL --    Time Frame (Functional Math Skills Goal 1, SLP) -- 1 week  -AL --    Progress/Outcomes (Functional Math Skills Goal 1, SLP) -- goal ongoing  -AL --    Comment (Functional Math Skills Goal 1, SLP) -- Basketball ticket prices task: 60% with NO cues, 100% with MIN-MOD cues.  -AL --       Executive Functional Skills Goal 1 (SLP)    Improve Executive Function Skills Goal 1 (SLP) organization/planning activity  -AL -- --    Time Frame (Executive Function Skills Goal 1, SLP) by discharge  -AL -- --    Progress/Outcomes (Executive Function " Skills Goal 1, SLP) good progress toward goal  -AL -- --    Comment (Executive Function Skills Goal 1, SLP) Medicine management with pill box and pt's own medicine list: 75% with NO cues, 100% with MIN cues.  -AL -- --    Row Name 23 1330 23 0915 23 1330       Attention Goal 1 (SLP)    Improve Attention by Goal 1 (SLP) -- -- complete selective attention task;complete divided attention task;90%;independently (over 90% accuracy)  -AL    Time Frame (Attention Goal 1, SLP) -- -- by discharge  -AL    Progress/Outcomes (Attention Goal 1, SLP) good progress toward goal  -AL -- good progress toward goal  -AL    Comment (Attention Goal 1, SLP) Blink card sort: 25 cards in 1 minute, 15 seconds with NO cues. Mildly reduced speed in game play against clinician.  -AL -- Calendar logic task: Pt exhibited impulsivity during this task. She misinterpreted one direction and required MOD cues to correct answer.  -AL       Reasoning Goal 1 (SLP)    Improve Reasoning Through Goal 1 (SLP) complete high level reasoning task;90%;independently (over 90% accuracy)  -AL -- --    Time Frame (Reasoning Goal 1, SLP) short term goal (STG);1 week  -AL -- --    Progress/Outcomes (Reasoning Goal 1, SLP) good progress toward goal  -AL -- --    Comment (Reasoning Goal 1, SLP) Moderate level logic puzzle: 100% with NO cues, goal met x1.  -AL -- --       Functional Math Skills Goal 1 (SLP)    Progress/Outcomes (Functional Math Skills Goal 1, SLP) -- goal ongoing  -AL --    Comment (Functional Math Skills Goal 1, SLP) -- Checkbook balancin% with NO cues, 100% with MIN cues.  -AL --       Executive Functional Skills Goal 1 (SLP)    Improve Executive Function Skills Goal 1 (SLP) organization/planning activity  -AL -- organization/planning activity  -AL    Time Frame (Executive Function Skills Goal 1, SLP) by discharge  -AL -- by discharge  -AL    Progress/Outcomes (Executive Function Skills Goal 1, SLP) goal ongoing  -AL -- goal  "ongoing  -AL    Comment (Executive Function Skills Goal 1, SLP) Medicine management with pill box and pt's own medicine list: 4/7 with NO cues, 7/7 with MIN cues.  -AL -- Medicine management with pill box and pt's own medicine list: 65% with NO cues, 100% with MIN-MOD cues. Pt with difficulty keeping up with which medicine she was on and exhibited mild impulsivity filling box.  -AL    Row Name 05/30/23 0900             ([facility]) Swallow Management Recall Goal 1 (SLP)    Comment (Swallow Management Recall Goal 1, SLP) Reviewed recommendation for chin tuck with liquids and solids. Pt reported sometimes feeling \"a vinita\" of discomfort in her upper chest when eating. Plan to discuss with MD.  -AL         Reasoning Goal 1 (SLP)    Improve Reasoning Through Goal 1 (SLP) complete high level reasoning task;90%;independently (over 90% accuracy)  -AL      Progress/Outcomes (Reasoning Goal 1, SLP) good progress toward goal  -AL      Comment (Reasoning Goal 1, SLP) Moderate level logic puzzle: 75% with NO cues, 100% with MIN-MOD cues.  -AL            User Key  (r) = Recorded By, (t) = Taken By, (c) = Cosigned By    Initials Name Provider Type    Sarita Landry MS CCC-SLP Speech and Language Pathologist                            Time Calculation:        Time Calculation- SLP     Row Name 06/01/23 1306 06/01/23 0927          Time Calculation- SLP    SLP Start Time 1230  -AL 0900  -AL     SLP Stop Time 1300  -AL 0930  -AL     SLP Time Calculation (min) 30 min  -AL 30 min  -AL     SLP Received On 06/01/23  -AL --           User Key  (r) = Recorded By, (t) = Taken By, (c) = Cosigned By    Initials Name Provider Type    Sarita Landry MS CCC-SLP Speech and Language Pathologist                  Therapy Charges for Today     Code Description Service Date Service Provider Modifiers Qty    62046284272 HC ST DEV OF COGN SKILLS INITIAL 15 MIN 5/31/2023 Sarita Comer MS CCC-SLP  1    06293133648 HC ST DEV OF COGN SKILLS EACH " ADDT'L 15 MIN 5/31/2023 Sarita Comer, MS CCC-SLP  3    [id number] HC ST DEV OF COGN SKILLS INITIAL 15 MIN 6/1/2023 Sarita Comer, MS CCC-SLP  1    [id number] HC ST DEV OF COGN SKILLS EACH ADDT'L 15 MIN 6/1/2023 Sarita Comer, MS CCC-SLP  3                           Sarita Comer MS CCC-SLP  6/1/2023

## 2023-06-01 NOTE — PROGRESS NOTES
Family conference held today with patient, , her friend, and God son, Cristobal. OT, ST, NSG, and SW present. PT sent written report. Discussed progress, d/c recommendations and d/c date set for 6/9.     PT report given by SHAJI. PT has been focusing on leg strength, endurance and balance. Patient has made progress in all areas. Left leg still weaker than right but has been steadily improving. Patient requires SBA for bed mobility except occasionally needing help lifting left leg back into bed when more fatigued. Patient requires CG to Min assist for sit to stand transfers. Patient able to walk 160 feet with rolling walker and CGA. Patient able to climb seven 4 inch steps with 2 handrails and CGA. PT noted patient has fluctuated between tolerating activity on room air and at times requiring approximately 0.5 L of oxygen. PT trying to work with patient to wean off O2. PT will continue to work on strengthening and mobility over the next week and continue to practice stairs and work on sit to stand transfers as this is the most difficult for her. PT anticipates patient to be CGA at d/c with transfers and able to walk household distances using rolling walker with CG/SBA. Recommended patient continue to use rolling walker at home. Would recommend transport wheelchair for community distances.     OT reported patient able to do bathing seated in shower with SBA. Patient able to dress UE with SBA and Min assist with dressing LE. Transfers to commode and shower chair require CGA. OT noted patient still has weakness but endurance improving. OT is practicing with patient walking to/from bathroom with rolling walker and CGA. OT working on static standing tasks such as standing to brush teeth. OT also working on UE strengthening. Recommended patient use shower chair and have someone providing supervision at home when doing shower. Goal is for patient to be at CG/SBA level with ADL's at d/c.     ST reported patient making steady  progress with cognitive tasks. Patient has mild difficulty with attention and concentration. ST reported patient was able to do check book balancing task and med mgt task with 75% accuracy. ST reported short term memory is good. Patient still requiring min cues for reasoning and logic puzzles. ST recommending patient have supervision with medication and finance management at home. Recommended patient continue to use chin tuck when swallowing.     NSG gave list of medications and reviewed. Will check stop date for IV antibiotic. Currently on IV antibiotic every 8 hours. Discussed wound on bottom and dressing change once daily. Wound is healing. Discussed trying to wean off oxygen but may need to do overnight oximetry prior to d/c or walk test to determine if will need for home. Discussed chest CT scheduled for 6/6 as outpatient. Will see if can order here since still inpatient. Patient has follow up appointment with ID on 6/8 so will see if can see here. Discussed follow ups with PCP, neurologist and pulmonologist.   NSG will need to do teaching regarding dressing change for wound on bottom.     Discussed equipment for home. Patient has rolling walker and grab bars in shower. Has built in seat in shower but not sure this will be appropriate so OT to discuss other shower chair options with patient. Will order BSC. Discussed getting transport wheelchair for longer distances.     Home health PT, OT, ST, NSG recommended at this time. Discussed options for  agencies. Will make referral to [facility].     Discussed family teaching next week with . Will schedule.   D/C plan is home with  who will plan to be home with patient initially. Discharge date planned for 6/9. Will assist with plans.

## 2023-06-01 NOTE — PROGRESS NOTES
"Nutrition Services    Patient Name:  Katherine Williamson  YOB: 1950  MRN: [id number]  Admit Date:  5/19/2023      Assessment Date:  06/01/23    FOLLOW UP NOTE - CLINICAL NUTRITION    Comments:   Visited pt in room who reported fair intakes, making do with hospital selections + friends bringing in occasional meals. Pt not liking Kwasi in orange flavor (pt's preferred, fruit punch flavor, currently out of stock); would like to trial unflavored mixed with coffee BID. Will order + bring extra packets of all flavors to room for pt to experiment with different combinations and take home excess at d/c, if any remain. Pt wanting regular coffee, even in the afternoon, saying therapies wipe her out and the pick me up is welcomed. Expected d/c 6/9.     Recommendations:   1. Changin Kwasi to unflavored, mixed with regular coffee, BID.  2. Continue to encourage good intakes, prioritizing protein + using nourishment room selections when needed.    RD will continue to follow-up and trend intakes, per protocol.      Encounter Information        Reason for Encounter Follow-up   Current Issues S/p R MCA stroke     Current Nutrition Orders & Evaluation of Intake       Oral Nutrition     Current PO Diet Diet: Regular/House Diet; Texture: Regular Texture (IDDSI 7); Fluid Consistency: Thin (IDDSI 0)   Supplement N/a   PO Evaluation    % PO Intake/# of days %   Factors Affecting Intake  dislikes hospital food, limited food preferences     Anthropometrics         Height   Weight Height: 160 cm (63\")  Weight: 68.7 kg (151 lb 7.3 oz) (05/31/23 0520)   BMI kg/m2 Body mass index is 26.83 kg/m².  Overweight (25 - 29.9)   Weight trend Stable     Physical Findings          Physical Appearance alert, oriented   Oral/Mouth Cavity tooth or teeth missing   Edema  1+ (trace), 2+ (mild)   Gastrointestinal non-distended , normoactive, last bowel movement:5/30   Skin  pressure injury bilateral gluteal PI (S2)   Tubes/Drains/Lines central " line/PICC     Labs       Pertinent Labs Reviewed, listed below     Results from last 7 days   Lab Units 05/29/23  0423   SODIUM mmol/L 138   POTASSIUM mmol/L 3.3*   CHLORIDE mmol/L 103   CO2 mmol/L 31.9*   BUN mg/dL 5*   CREATININE mg/dL 0.23*   CALCIUM mg/dL 8.5*   GLUCOSE mg/dL 82     Results from last 7 days   Lab Units 05/31/23  0518   HEMOGLOBIN g/dL 10.6*   HEMATOCRIT % 31.1*   WBC 10*3/mm3 11.20*     Results from last 7 days   Lab Units 05/31/23  0518 05/30/23  0411 05/29/23  0423 05/28/23  0417 05/26/23  0432   PLATELETS 10*3/mm3 378 383 381 376 330     COVID19   Date Value Ref Range Status   05/02/2023 Not Detected Not Detected - Ref. Range Final     Lab Results   Component Value Date    HGBA1C 6.20 (H) 05/15/2023          Medications           Scheduled Medications aspirin, 81 mg, Oral, Q24H  atorvastatin, 80 mg, Oral, Nightly  citalopram, 20 mg, Oral, Daily  clopidogrel, 75 mg, Oral, Daily  doxycycline, 100 mg, Oral, Q12H  enoxaparin, 40 mg, Subcutaneous, Q24H  guaiFENesin, 600 mg, Oral, BID  hydrocortisone-bacitracin-zinc oxide-nystatin, 1 application, Topical, BID  magnesium hydroxide, 10 mL, Oral, Once  meropenem, 1 g, Intravenous, Q8H  metoprolol tartrate, 12.5 mg, Oral, Q12H  sodium chloride, 10 mL, Intravenous, Q12H  sodium chloride, 10 mL, Intravenous, Q12H  cyanocobalamin, 1,000 mcg, Oral, Daily       Infusions     PRN Medications •  acetaminophen  •  ipratropium-albuterol  •  loperamide  •  senna-docusate sodium  •  sodium chloride  •  sodium chloride  •  sodium chloride  •  sodium chloride  •  sodium chloride     PLAN OF CARE  Intervention Goal        Intervention Goal(s) Maintain nutrition status, Improved nutrition related labs, Tolerate PO , Increase intake, Continue positive trend, Maintain weight and No significant weight loss     Nutrition Intervention        RD Action Advise available snack, Adjusted supplement, Encourage intake, Follow Tx Progress and Care plan reviewed     Prescription         Diet Prescription    Supplement Prescription Kwasi (unflavored) mixed in regular coffee BID at breakfast and lunch.    EN/PN Prescription    Prescription Ordered Yes   --  Monitor/Evaluation        Monitor Per protocol, PO intake, Supplement intake, Pertinent labs, Weight, Skin status, GI status, Symptoms   Discharge Plan Pending clinical course   Education Will educate as needed       Electronically signed by:  Pina Chun RD  06/01/23 14:30 EDT

## 2023-06-01 NOTE — PLAN OF CARE
Problem: Rehabilitation (IRF) Plan of Care  Goal: Plan of Care Review  Outcome: Ongoing, Progressing  Flowsheets (Taken 6/1/2023 0330)  Progress: improving  Plan of Care Reviewed With: patient  Outcome Evaluation: Pt is A&Ox4, up w/ asst x1 to WC, generalized weakness, 2L O2 at HS, cont/incont of B/B, does wear a purewick at night, dressing to coccyx, C/D/I, pt refused bedtime dressing change, pt on IV abx, PICC line dressing C/D/I, dressing change due on 6/1, pt is resting well, will continue to monitor.   Goal Outcome Evaluation:  Plan of Care Reviewed With: patient        Progress: improving  Outcome Evaluation: Pt is A&Ox4, up w/ asst x1 to WC, generalized weakness, 2L O2 at HS, cont/incont of B/B, does wear a purewick at night, dressing to coccyx, C/D/I, pt refused bedtime dressing change, pt on IV abx, PICC line dressing C/D/I, dressing change due on 6/1, pt is resting well, will continue to monitor.

## 2023-06-01 NOTE — PLAN OF CARE
Goal Outcome Evaluation:  Plan of Care Reviewed With: patient        Progress: improving     Patient is calm and cooperative. Alert  and oriented x 4. Using the call light for assistance. 1 person transfers. Generalized weakness.  Will continue to monitor.

## 2023-06-01 NOTE — PROGRESS NOTES
[facility]     Progress Note    Patient Name: Katherine Williamson  : 1950  MRN: [id number]  Primary Care Physician:  Zelalem Flor APRN  Date of admission: 2023    Subjective Pt is awake and alert. Pt remains pleased with her progress and dc plans.   Subjective     Chief Complaint: same.     History of Present Illness  Patient Reports     Review of Systems    Objective  Exam unchanged Calves soft and NT. Resp unlabored and regular.   Objective     Vitals:   Temp:  [97.3 °F (36.3 °C)-98.6 °F (37 °C)] 98.6 °F (37 °C)  Heart Rate:  [79-96] 79  Resp:  [16-18] 16  BP: (104-128)/(70-81) 123/76  Flow (L/min):  [2] 2    Physical Exam     Result Review    Result Review:  I have personally reviewed the results from the time of this admission to 2023 08:52 EDT and agree with these findings:  []  Laboratory list / accordion  []  Microbiology  []  Radiology  []  EKG/Telemetry   []  Cardiology/Vascular   []  Pathology  []  Old records  []  Other:  Most notable findings include: No new labs to review this am.     Assessment & Plan  Continue to prepare for dc home . Pt remains medically stable and is making fxnl progress toward dc goals.   Assessment / Plan     Brief Patient Summary:  Katherine Williamson is a 72 y.o. female who     Active Hospital Problems:  Active Hospital Problems    Diagnosis    • **Acute right MCA stroke      Plan:       DVT prophylaxis:  Medical DVT prophylaxis orders are present.    CODE STATUS:    Code Status (Patient has no pulse and is not breathing): CPR (Attempt to Resuscitate)  Medical Interventions (Patient has pulse or is breathing): Full Support    Disposition:  I expect patient to be discharged home .     Karthikeyan Kaba MD

## 2023-06-02 LAB
ANION GAP SERPL CALCULATED.3IONS-SCNC: 5.7 MMOL/L (ref 5–15)
BASOPHILS # BLD AUTO: 0.06 10*3/MM3 (ref 0–0.2)
BASOPHILS NFR BLD AUTO: 0.5 % (ref 0–1.5)
BUN SERPL-MCNC: 11 MG/DL (ref 8–23)
BUN/CREAT SERPL: 45.8 (ref 7–25)
CALCIUM SPEC-SCNC: 8.6 MG/DL (ref 8.6–10.5)
CHLORIDE SERPL-SCNC: 99 MMOL/L (ref 98–107)
CO2 SERPL-SCNC: 30.3 MMOL/L (ref 22–29)
CREAT SERPL-MCNC: 0.24 MG/DL (ref 0.57–1)
DEPRECATED RDW RBC AUTO: 44.4 FL (ref 37–54)
EGFRCR SERPLBLD CKD-EPI 2021: 120.6 ML/MIN/1.73
EOSINOPHIL # BLD AUTO: 0.08 10*3/MM3 (ref 0–0.4)
EOSINOPHIL NFR BLD AUTO: 0.6 % (ref 0.3–6.2)
ERYTHROCYTE [DISTWIDTH] IN BLOOD BY AUTOMATED COUNT: 14.1 % (ref 12.3–15.4)
GLUCOSE SERPL-MCNC: 84 MG/DL (ref 65–99)
HCT VFR BLD AUTO: 31.8 % (ref 34–46.6)
HGB BLD-MCNC: 10.6 G/DL (ref 12–15.9)
IMM GRANULOCYTES # BLD AUTO: 0.07 10*3/MM3 (ref 0–0.05)
IMM GRANULOCYTES NFR BLD AUTO: 0.6 % (ref 0–0.5)
LYMPHOCYTES # BLD AUTO: 2.82 10*3/MM3 (ref 0.7–3.1)
LYMPHOCYTES NFR BLD AUTO: 22.7 % (ref 19.6–45.3)
MCH RBC QN AUTO: 29.1 PG (ref 26.6–33)
MCHC RBC AUTO-ENTMCNC: 33.3 G/DL (ref 31.5–35.7)
MCV RBC AUTO: 87.4 FL (ref 79–97)
MONOCYTES # BLD AUTO: 0.78 10*3/MM3 (ref 0.1–0.9)
MONOCYTES NFR BLD AUTO: 6.3 % (ref 5–12)
NEUTROPHILS NFR BLD AUTO: 69.3 % (ref 42.7–76)
NEUTROPHILS NFR BLD AUTO: 8.63 10*3/MM3 (ref 1.7–7)
NRBC BLD AUTO-RTO: 0 /100 WBC (ref 0–0.2)
PLATELET # BLD AUTO: 370 10*3/MM3 (ref 140–450)
PMV BLD AUTO: 10.7 FL (ref 6–12)
POTASSIUM SERPL-SCNC: 3.4 MMOL/L (ref 3.5–5.2)
RBC # BLD AUTO: 3.64 10*6/MM3 (ref 3.77–5.28)
SODIUM SERPL-SCNC: 135 MMOL/L (ref 136–145)
WBC NRBC COR # BLD: 12.44 10*3/MM3 (ref 3.4–10.8)

## 2023-06-02 PROCEDURE — 97535 SELF CARE MNGMENT TRAINING: CPT | Performed by: OCCUPATIONAL THERAPIST

## 2023-06-02 PROCEDURE — 97110 THERAPEUTIC EXERCISES: CPT | Performed by: OCCUPATIONAL THERAPIST

## 2023-06-02 PROCEDURE — 97116 GAIT TRAINING THERAPY: CPT

## 2023-06-02 PROCEDURE — 25010000002 ENOXAPARIN PER 10 MG: Performed by: PHYSICAL MEDICINE & REHABILITATION

## 2023-06-02 PROCEDURE — 97130 THER IVNTJ EA ADDL 15 MIN: CPT

## 2023-06-02 PROCEDURE — 97129 THER IVNTJ 1ST 15 MIN: CPT

## 2023-06-02 PROCEDURE — 97110 THERAPEUTIC EXERCISES: CPT

## 2023-06-02 PROCEDURE — 80048 BASIC METABOLIC PNL TOTAL CA: CPT | Performed by: PHYSICAL MEDICINE & REHABILITATION

## 2023-06-02 PROCEDURE — 25010000002 MEROPENEM PER 100 MG: Performed by: HOSPITALIST

## 2023-06-02 PROCEDURE — 85025 COMPLETE CBC W/AUTO DIFF WBC: CPT | Performed by: PHYSICAL MEDICINE & REHABILITATION

## 2023-06-02 RX ORDER — POTASSIUM CHLORIDE 750 MG/1
40 TABLET, FILM COATED, EXTENDED RELEASE ORAL DAILY
Status: DISCONTINUED | OUTPATIENT
Start: 2023-06-02 | End: 2023-06-03

## 2023-06-02 RX ADMIN — Medication 10 ML: at 20:49

## 2023-06-02 RX ADMIN — GUAIFENESIN 600 MG: 600 TABLET, EXTENDED RELEASE ORAL at 20:48

## 2023-06-02 RX ADMIN — ATORVASTATIN CALCIUM 80 MG: 80 TABLET, FILM COATED ORAL at 20:48

## 2023-06-02 RX ADMIN — ENOXAPARIN SODIUM 40 MG: 100 INJECTION SUBCUTANEOUS at 20:48

## 2023-06-02 RX ADMIN — MEROPENEM 1 G: 1 INJECTION, POWDER, FOR SOLUTION INTRAVENOUS at 07:18

## 2023-06-02 RX ADMIN — METOPROLOL TARTRATE 12.5 MG: 25 TABLET, FILM COATED ORAL at 20:49

## 2023-06-02 RX ADMIN — ZINC OXIDE 1 APPLICATION: 200 OINTMENT TOPICAL at 07:20

## 2023-06-02 RX ADMIN — GUAIFENESIN 600 MG: 600 TABLET, EXTENDED RELEASE ORAL at 07:19

## 2023-06-02 RX ADMIN — Medication 10 ML: at 07:20

## 2023-06-02 RX ADMIN — DOXYCYCLINE 100 MG: 100 CAPSULE ORAL at 20:48

## 2023-06-02 RX ADMIN — MEROPENEM 1 G: 1 INJECTION, POWDER, FOR SOLUTION INTRAVENOUS at 14:08

## 2023-06-02 RX ADMIN — POTASSIUM CHLORIDE 40 MEQ: 750 TABLET, EXTENDED RELEASE ORAL at 12:13

## 2023-06-02 RX ADMIN — CITALOPRAM 20 MG: 20 TABLET, FILM COATED ORAL at 07:19

## 2023-06-02 RX ADMIN — Medication 1000 MCG: at 07:19

## 2023-06-02 RX ADMIN — ASPIRIN 81 MG: 81 TABLET, CHEWABLE ORAL at 07:19

## 2023-06-02 RX ADMIN — MEROPENEM 1 G: 1 INJECTION, POWDER, FOR SOLUTION INTRAVENOUS at 20:57

## 2023-06-02 RX ADMIN — CLOPIDOGREL BISULFATE 75 MG: 75 TABLET, FILM COATED ORAL at 07:19

## 2023-06-02 RX ADMIN — METOPROLOL TARTRATE 12.5 MG: 25 TABLET, FILM COATED ORAL at 07:18

## 2023-06-02 RX ADMIN — DOXYCYCLINE 100 MG: 100 CAPSULE ORAL at 07:20

## 2023-06-02 NOTE — PROGRESS NOTES
Inpatient Rehabilitation Plan of Care Note    Plan of Care  Care Plan Reviewed - No updates at this time.    Safety    Performed Intervention(s)  Items within reach, environmental set-up for reduce risk of fall  Bed alarms and chair alarms and safety rounds hourly      Psychosocial    Performed Intervention(s)  Medication as ordered and PRN  Verbalize needs and concerns  Therapeutic environmental set up      Sphincter Control    Performed Intervention(s)  Incontinent care as needed and ointment applied as ordered.  Take to bathroom in a timely manner  Proper diet and adequate fluid intake.      Body Systems    Performed Intervention(s)  Medication, turn every 2 hours and proper nutrition.    Signed by: Bryon Porter RN

## 2023-06-02 NOTE — PROGRESS NOTES
Recreational Therapy Note    Patient Name: Katherine Williamson   MRN: [id number]    Therapeutic Recreation Eval and Treat (last 12 hours)     Therapeutic Recreation Eval & Treat     Row Name 06/02/23 1500       Therapeutic Recreation Participation    Recreation Therapy Participation games  -SS    Games card games  Phase 10  -    Objectives of Recreation Participation increase;sense of autonomy by choosing level of participation  -    Comment, Recreation Participation occ cues to recall directions  -    Recreation Therapy Summary of Participation active participation  -          User Key  (r) = Recorded By, (t) = Taken By, (c) = Cosigned By    Initials Name Provider Type    SS Sanaz Radford, CTRS Recreational Therapist                  CHUNG Hollins  6/2/2023

## 2023-06-02 NOTE — THERAPY TREATMENT NOTE
Inpatient Rehabilitation - Physical Therapy Treatment Note       [facility]     Patient Name: Katherine Williamson  : 1950  MRN: [id number]    Today's Date: 2023                    Admit Date: 2023      Visit Dx:     ICD-10-CM ICD-9-CM   1. Decreased functional mobility and endurance  Z74.09 780.99       Patient Active Problem List   Diagnosis   • Benign essential HTN   • Tobacco abuse   • Dislocation of hip joint prosthesis   • Fracture of proximal humerus   • Mechanical complication of internal orthopedic device   • Wear of articular bearing surface of internal prosthetic joint   • History of repair of hip joint   • History of operative procedure on hip   • Hyperglycemia   • Hepatic steatosis   • Diverticulosis   • Chest pain, atypical   • History of colon polyps   • Family history of colon cancer in mother   • Allergic rhinitis   • Lung nodule seen on imaging study   • Acute respiratory failure with hypoxia   • Abscess of right lung with pneumonia   • Empyema   • Sepsis   • Pleural effusion, right   • Other emphysema   • Pulmonary nodule (RLL)   • Diastolic dysfunction, grade 1   • Physical debility   • Cryptogenic stroke   • Acute right MCA stroke       Past Medical History:   Diagnosis Date   • Arthritis    • Cataract    • Colon polyps     FOLLOWED BY DR. JOSEPH LAU   • Hypertension        Past Surgical History:   Procedure Laterality Date   • COLONOSCOPY  10/2015    [facility][id number].  Follow-up in 5 years.   • COLONOSCOPY N/A 2022    2 BENIGN POLYPS IN DESCENDING, 5 MM BENIGN POLYP IN SIGMOID, MULTIPLE SMALL AND LARGE DIVERTICULA IN SIGMOID, RESCOPE IN 3 YRS, DR. JOSEPH LAU AT [facility]   • EYE SURGERY     • JOINT REPLACEMENT  ?    Left total hip replacement in .  In 2015 patient had left hip revision   • TONSILLECTOMY         PT ASSESSMENT (last 12 hours)     IRF PT Evaluation and Treatment     Row Name 23 0920          PT Time and Intention    Document Type  daily treatment  -MD     Mode of Treatment physical therapy  -MD     Patient/Family/Caregiver Comments/Observations Pt sitting in WC showing no signs of acute distress.  -MD     Row Name 06/02/23 0920          Pain Assessment    Pretreatment Pain Rating 0/10 - no pain  -MD     Row Name 06/02/23 0920          Cognition/Psychosocial    Orientation Status (Cognition) oriented x 3  -MD     Follows Commands (Cognition) follows one-step commands;verbal cues/prompting required  -MD     Personal Safety Interventions fall prevention program maintained;gait belt  -MD     Cognitive Function WFL  -MD     Row Name 06/02/23 0920          Transfer Assessment/Treatment    Comment, (Transfers) 5 sit-stand transfers w VC to increase PAULINA.  Pt required Min-CGA  -MD     Row Name 06/02/23 0920          Sit-Stand Transfer    Sit-Stand [place] (Transfers) minimum assist (75% patient effort);verbal cues  -MD     Assistive Device (Sit-Stand Transfers) walker, front-wheeled  -MD     Row Name 06/02/23 0920          Stand-Sit Transfer    Stand-Sit [place] (Transfers) minimum assist (75% patient effort);verbal cues  -MD     Assistive Device (Stand-Sit Transfers) walker, front-wheeled  -MD     Row Name 06/02/23 0920          Gait/Stairs (Locomotion)    [place] Level (Gait) verbal cues;contact guard  -MD     Assistive Device (Gait) walker, front-wheeled  -MD     Distance in Feet (Gait) 160' and 200'  -MD     Pattern (Gait) step-through  -MD     Deviations/Abnormal Patterns (Gait) raina decreased;stride length decreased  -MD     Bilateral Gait Deviations forward flexed posture;heel strike decreased;weight shift ability decreased  -MD     Left Sided Gait Deviations heel strike decreased;knee hyperextension  -MD     Row Name 06/02/23 0920          Motor Skills    Additional Documentation Advanced Stepping/Walking Interventions (Group)  -MD     Row Name 06/02/23 0920          Hip (Therapeutic Exercise)    Hip Strengthening  (Therapeutic Exercise) bilateral;marching while seated;sitting;mini squats;standing;10 repetitions  -MD     Row Name 06/02/23 0920          Knee (Therapeutic Exercise)    Knee Strengthening (Therapeutic Exercise) bilateral;SAQ (short arc quad);hamstring curls;sitting;10 repetitions  -MD     Row Name 06/02/23 0920          Ankle (Therapeutic Exercise)    Ankle Strengthening (Therapeutic Exercise) bilateral;dorsiflexion;plantarflexion;10 repetitions;sitting  -MD     Row Name 06/02/23 0920          Advanced Stepping/Walking Interventions    Side Stepping (Stepping/Walking Interventions) 8'x4 side stepping w CGA at greyson bars  -MD     Row Name 06/02/23 0920          Positioning and Restraints    Pre-Treatment Position sitting in chair/recliner  -MD     Post Treatment Position chair  -MD     In Chair reclined;sitting;call light within reach  -MD           User Key  (r) = Recorded By, (t) = Taken By, (c) = Cosigned By    Initials Name Provider Type    Melissa Fischer, PT Physical Therapist              Wound 05/19/23 2055 Bilateral gluteal (Active)   Pressure Injury Stage 2 06/02/23 1225   Dressing Appearance dry;intact 06/02/23 1225   Closure None 06/02/23 1225   Base moist;pink;red;slough 06/02/23 1225   Periwound moist 06/02/23 1225   Periwound Temperature warm 06/02/23 1225   Periwound Skin Turgor soft 06/02/23 1225   Edges irregular 06/02/23 1225   Drainage Characteristics/Odor serosanguineous;yellow 06/02/23 1225   Drainage Amount scant 06/02/23 1225   Care, Wound cleansed with;sterile normal saline 06/02/23 1225   Dressing Care dressing changed 06/02/23 1225     Physical Therapy Education     Title: PT OT SLP Therapies (Done)     Topic: Physical Therapy (Done)     Point: Mobility training (Done)     Learning Progress Summary           Patient Acceptance, E,TB, VU,NR by EE at 5/31/2023 1200    Acceptance, E,TB, VU,NR by EE at 5/30/2023 1131    Acceptance, E,TB, VU by KP at 5/27/2023 0932    Acceptance, E, VU by WN at  5/26/2023 0038    Eager, E,TB,D, NR by KP at 5/25/2023 1019    Acceptance, E,D, VU,NR by EE at 5/24/2023 1136    Acceptance, E,TB, VU,NR by EE at 5/23/2023 1139    Acceptance, E,TB, NR,VU by EE at 5/22/2023 1221    Acceptance, E, NR by  at 5/20/2023 1216                   Point: Home exercise program (Done)     Learning Progress Summary           Patient Acceptance, E,TB, VU,NR by EE at 5/30/2023 1131    Acceptance, E,TB, VU by KP at 5/27/2023 0932    Acceptance, E, VU by WN at 5/26/2023 0038    Eager, E,TB,D, NR by KP at 5/25/2023 1019    Acceptance, E,D, VU,NR by EE at 5/24/2023 1136    Acceptance, E,TB, VU,NR by EE at 5/23/2023 1139    Acceptance, E,TB, NR,VU by EE at 5/22/2023 1221    Acceptance, E, NR by  at 5/20/2023 1216                   Point: Body mechanics (Done)     Learning Progress Summary           Patient Acceptance, E,TB, VU,NR by EE at 5/31/2023 1200    Acceptance, E,TB, VU,NR by EE at 5/30/2023 1131    Acceptance, E, VU by WN at 5/26/2023 0038    Acceptance, E,D, VU,NR by EE at 5/24/2023 1136    Acceptance, E,TB, VU,NR by EE at 5/23/2023 1139    Acceptance, E,TB, NR,VU by EE at 5/22/2023 1221    Acceptance, E, NR by  at 5/20/2023 1216                   Point: Precautions (Done)     Learning Progress Summary           Patient Acceptance, E, VU by MD at 6/2/2023 1345    Acceptance, E, VU by MD at 6/1/2023 0848    Acceptance, E,TB, VU,NR by EE at 5/31/2023 1200    Acceptance, E,TB, VU,NR by EE at 5/30/2023 1131    Acceptance, E, VU by MD at 5/26/2023 1014    Acceptance, E, VU by  at 5/26/2023 0038    Acceptance, E,D, VU,NR by EE at 5/24/2023 1136    Acceptance, E,TB, VU,NR by EE at 5/23/2023 1139    Acceptance, E,TB, NR,VU by  at 5/22/2023 1221    Acceptance, E, NR by  at 5/20/2023 1216                               User Key     Initials Effective Dates Name Provider Type Discipline     06/16/21 -  Jess Wang, PT Physical Therapist PT     06/16/21 -  Tena Garay, PT Physical  Therapist PT    MD 06/16/21 -  Melissa Wise PT Physical Therapist PT    KP 06/16/21 -  Jesusita Mendoza, PT Physical Therapist PT    WN 08/23/22 -  Reno Lazo, RN Registered Nurse Nurse                PT Recommendation and Plan                          Time Calculation:      PT Charges     Row Name 06/02/23 1335 06/02/23 0920          Time Calculation    Start Time 1300  -MD 0830  -MD     Stop Time 1330  -MD 0900  -MD     Time Calculation (min) 30 min  -MD 30 min  -MD     PT Received On -- 06/02/23  -MD     PT - Next Appointment -- 06/03/23  -MD           User Key  (r) = Recorded By, (t) = Taken By, (c) = Cosigned By    Initials Name Provider Type    Melissa Fischer, PT Physical Therapist                Therapy Charges for Today     Code Description Service Date Service Provider Modifiers Qty    73546626723 HC GAIT TRAINING EA 15 MIN 6/1/2023 Melissa Wise, PT GP 2    86726860865 HC PT THER PROC EA 15 MIN 6/1/2023 Melissa Wise, PT GP 2    28675330185 HC PT THER PROC EA 15 MIN 6/2/2023 Melissa Wise, PT GP 2    34650436526 HC GAIT TRAINING EA 15 MIN 6/2/2023 Melissa Wise, PT GP 2                   Melissa Wise, PT  6/2/2023

## 2023-06-02 NOTE — THERAPY TREATMENT NOTE
Inpatient Rehabilitation - Occupational Therapy Treatment Note    [facility]     Patient Name: Katherine Williamson  : 1950  MRN: [id number]    Today's Date: 2023                 Admit Date: 2023         ICD-10-CM ICD-9-CM   1. Decreased functional mobility and endurance  Z74.09 780.99       Patient Active Problem List   Diagnosis   • Benign essential HTN   • Tobacco abuse   • Dislocation of hip joint prosthesis   • Fracture of proximal humerus   • Mechanical complication of internal orthopedic device   • Wear of articular bearing surface of internal prosthetic joint   • History of repair of hip joint   • History of operative procedure on hip   • Hyperglycemia   • Hepatic steatosis   • Diverticulosis   • Chest pain, atypical   • History of colon polyps   • Family history of colon cancer in mother   • Allergic rhinitis   • Lung nodule seen on imaging study   • Acute respiratory failure with hypoxia   • Abscess of right lung with pneumonia   • Empyema   • Sepsis   • Pleural effusion, right   • Other emphysema   • Pulmonary nodule (RLL)   • Diastolic dysfunction, grade 1   • Physical debility   • Cryptogenic stroke   • Acute right MCA stroke       Past Medical History:   Diagnosis Date   • Arthritis    • Cataract    • Colon polyps     FOLLOWED BY DR. JOSEPH LAU   • Hypertension        Past Surgical History:   Procedure Laterality Date   • COLONOSCOPY  10/2015    Tjvgw-xfcmshwglavx-Hf. [facility].  Follow-up in 5 years.   • COLONOSCOPY N/A 2022    2 BENIGN POLYPS IN DESCENDING, 5 MM BENIGN POLYP IN SIGMOID, MULTIPLE SMALL AND LARGE DIVERTICULA IN SIGMOID, RESCOPE IN 3 YRS, DR. JOSEPH LAU AT [facility]   • EYE SURGERY     • JOINT REPLACEMENT  ?    Left total hip replacement in .  In 2015 patient had left hip revision   • TONSILLECTOMY               IRF OT ASSESSMENT FLOWSHEET (last 12 hours)     IRF OT Evaluation and Treatment     Row Name 23 1549          OT Time and Intention     Document Type daily treatment  -     Mode of Treatment individual therapy;occupational therapy  -KP     Patient Effort good  -KP     Symptoms Noted During/After Treatment none  -     Row Name 06/02/23 1549          General Information    Patient/Family/Caregiver Comments/Observations pt supine in bed in am. in wc in pm  -     Existing Precautions/Restrictions fall  -KP     Limitations/Impairments safety/cognitive  -     Row Name 06/02/23 1549          Pain Assessment    Pretreatment Pain Rating 0/10 - no pain  -     Posttreatment Pain Rating 0/10 - no pain  -     Row Name 06/02/23 1549          Cognition/Psychosocial    Orientation Status (Cognition) oriented x 3  -KP     Follows Commands (Cognition) follows one-step commands;verbal cues/prompting required  -     Personal Safety Interventions fall prevention program maintained;gait belt;muscle strengthening facilitated;nonskid shoes/slippers when out of bed  -     Cognitive Function WFL  -     Row Name 06/02/23 1549          Bathing    [place] Level (Bathing) bathing skills;upper body;set up;standby assist;lower body;contact guard assist  -     Assistive Device (Bathing) grab bar/tub rail  -     Position (Bathing) supported sitting;supported standing;sink side  -     Set-up Assistance (Bathing) obtain supplies;adjust water temperature  -     Row Name 06/02/23 1549          Upper Body Dressing    [place] Level (Upper Body Dressing) upper body dressing skills;doff;don;pull over garment;set up assistance;supervision  -     Position (Upper Body Dressing) supported sitting  -     Set-up Assistance (Upper Body Dressing) obtain clothing  -     Row Name 06/02/23 1549          Lower Body Dressing    [place] Level (Lower Body Dressing) doff;don;pants/bottoms;shoes/slippers;underwear;set up;contact guard assist  -     Position (Lower Body Dressing) supported sitting;supported standing  -     Set-up Assistance (Lower Body  Dressing) obtain clothing  -[facility] Name 06/02/23 1549          Grooming    [place] Level (Grooming) grooming skills;set up  -     Position (Grooming) sink side;supported sitting  -     Set-up Assistance (Grooming) obtain supplies  -[facility] Name 06/02/23 1549          Toileting    [place] Level (Toileting) toileting skills;adjust/manage clothing;perform perineal hygiene;set up assistance;contact guard assist  -     Assistive Device Use (Toileting) grab bar/safety frame  -     Position (Toileting) supported standing;supported sitting  -[facility] Name 06/02/23 1549          Bed Mobility    Supine-Sit [place] (Bed Mobility) set up;standby assist  -     Sit-Supine [place] (Bed Mobility) not tested  -[facility] Name 06/02/23 1549          Bed-Chair Transfer    Bed-Chair [place] (Transfers) set up;verbal cues;contact guard  -     Assistive Device (Bed-Chair Transfers) wheelchair  -KP     Row Name 06/02/23 1549          Sit-Stand Transfer    Sit-Stand [place] (Transfers) set up;verbal cues;contact guard  -     Assistive Device (Sit-Stand Transfers) wheelchair  [facility] Name 06/02/23 1549          Stand-Sit Transfer    Stand-Sit [place] (Transfers) set up;verbal cues;contact guard  -     Assistive Device (Stand-Sit Transfers) wheelchair  -KP     Row Name 06/02/23 1549          Toilet Transfer    Type (Toilet Transfer) sit-stand;stand-sit;stand pivot/stand step  -     [place] Level (Toilet Transfer) set up;contact guard  -     Assistive Device (Toilet Transfer) grab bars/safety frame;wheelchair  -[facility] Name 06/02/23 1549          Shoulder (Therapeutic Exercise)    Shoulder Strengthening (Therapeutic Exercise) bilateral;extension;flexion;horizontal aBduction/aDduction;sitting;scapular stabilization;10 repetitions;2 sets  2 # dowel vinita  -[facility] Name 06/02/23 1549          Elbow/Forearm (Therapeutic Exercise)    Elbow/Forearm Strengthening  (Therapeutic Exercise) left;right;flexion;extension;sitting;pronation;supination;2 lb free weight;10 repetitions;3 sets  -     Row Name 06/02/23 1549          Wrist (Therapeutic Exercise)    Wrist Strengthening (Therapeutic Exercise) right;left;flexion;extension;2 lb free weight;10 repetitions;3 sets  -KP     Row Name 06/02/23 1549          Hand (Therapeutic Exercise)    Hand Strengthening (Therapeutic Exercise) right;left;finger flexion;finger aBduction; strengthening;hand gripper;10 repetitions;3 sets  -     Row Name 06/02/23 1549          Balance    Static Sitting Balance standby assist  -     Dynamic Sitting Balance standby assist  -     Sit to Stand Dynamic Balance contact guard  -     Static Standing Balance contact guard  -     Dynamic Standing Balance contact guard  -     Position/Device Used, Standing Balance supported  -     Row Name 06/02/23 1549          Positioning and Restraints    Pre-Treatment Position in bed  -KP     Post Treatment Position wheelchair  -     In Wheelchair sitting;with PT;exit alarm on  am and pm  -     Row Name 06/02/23 1549          Weekly Progress Summary (OT)    Overall Progress Toward Functional Goals (OT) progressing toward functional goals as expected  -           User Key  (r) = Recorded By, (t) = Taken By, (c) = Cosigned By    Initials Name Effective Dates     Liberty Baer, OTR 05/31/23 -                  Occupational Therapy Education     Title: PT OT SLP Therapies (Done)     Topic: Occupational Therapy (Done)     Point: ADL training (Done)     Description:   Instruct learner(s) on proper safety adaptation and remediation techniques during self care or transfers.   Instruct in proper use of assistive devices.              Learning Progress Summary           Patient Acceptance, E, VU by AF at 6/1/2023 5092    Comment: pt and family present for meeting with rehab team, recommend  OT at d/c, shower chair with back and BSC over commode.  discussed current ADL status and goals for the last week of rehab.    Acceptance, E, VU by WN at 5/26/2023 0038    Acceptance, E,TB, VU by SG at 5/20/2023 1516   Family Acceptance, E, VU by AF at 6/1/2023 1539    Comment: pt and family present for meeting with rehab team, recommend HH OT at d/c, shower chair with back and BSC over commode. discussed current ADL status and goals for the last week of rehab.                   Point: Home exercise program (Done)     Description:   Instruct learner(s) on appropriate technique for monitoring, assisting and/or progressing therapeutic exercises/activities.              Learning Progress Summary           Patient Acceptance, E, VU by AF at 6/1/2023 1539    Comment: pt and family present for meeting with rehab team, recommend HH OT at d/c, shower chair with back and BSC over commode. discussed current ADL status and goals for the last week of rehab.    Acceptance, E, VU by WN at 5/26/2023 0038   Family Acceptance, E, VU by AF at 6/1/2023 1539    Comment: pt and family present for meeting with rehab team, recommend HH OT at d/c, shower chair with back and BSC over commode. discussed current ADL status and goals for the last week of rehab.                   Point: Precautions (Done)     Description:   Instruct learner(s) on prescribed precautions during self-care and functional transfers.              Learning Progress Summary           Patient Acceptance, E, VU by AF at 6/1/2023 1539    Comment: pt and family present for meeting with rehab team, recommend HH OT at d/c, shower chair with back and BSC over commode. discussed current ADL status and goals for the last week of rehab.    Acceptance, E, VU by WN at 5/26/2023 0038   Family Acceptance, E, VU by AF at 6/1/2023 1539    Comment: pt and family present for meeting with rehab team, recommend HH OT at d/c, shower chair with back and BSC over commode. discussed current ADL status and goals for the last week of rehab.                    Point: Body mechanics (Done)     Description:   Instruct learner(s) on proper positioning and spine alignment during self-care, functional mobility activities and/or exercises.              Learning Progress Summary           Patient Acceptance, E, VU by AF at 6/1/2023 1539    Comment: pt and family present for meeting with rehab team, recommend HH OT at d/c, shower chair with back and BSC over commode. discussed current ADL status and goals for the last week of rehab.    Acceptance, E, VU by WN at 5/26/2023 0038    Acceptance, E, VU,DU,NR by AF at 5/23/2023 1448    Comment: safety and technique with transfers   Family Acceptance, E, VU by AF at 6/1/2023 1539    Comment: pt and family present for meeting with rehab team, recommend HH OT at d/c, shower chair with back and BSC over commode. discussed current ADL status and goals for the last week of rehab.                               User Key     Initials Effective Dates Name Provider Type Discipline    SG 06/16/21 - 05/30/23 Verena Hewitt, OTR Occupational Therapist OT    AF 06/16/21 -  Silke Moreno OTR Occupational Therapist OT    WN 08/23/22 -  Reno Lazo, RN Registered Nurse Nurse                    OT Recommendation and Plan                         Time Calculation:      Time Calculation- OT     Row Name 06/02/23 1553 06/02/23 0800          Time Calculation- OT    OT Start Time 1230  -KP 0800  -KP     OT Stop Time 1300  -KP 0830  -KP     OT Time Calculation (min) 30 min  - 30 min  -           User Key  (r) = Recorded By, (t) = Taken By, (c) = Cosigned By    Initials Name Provider Type     Liberty Baer OTR Occupational Therapist              Therapy Charges for Today     Code Description Service Date Service Provider Modifiers Qty    20954865237 HC OT SELF CARE/MGMT/TRAIN EA 15 MIN 6/2/2023 Liberty Baer OTR GO 2    15138356122 HC OT THER PROC EA 15 MIN 6/2/2023 Liberty Baer OTR GO 2                    Liberty Baer, OTR  6/2/2023

## 2023-06-02 NOTE — PLAN OF CARE
Goal Outcome Evaluation:  Plan of Care Reviewed With: patient        Progress: improving   Patient is calm and cooperative. Alert and oriented x 4. Generalized weakness. 1 person assist. Dressing to bilateral buttock changed. Scant amount of drainage noted. Cleansed area and applied Hydrofiber AG and covered with foam border dressing. PICC line flushed without difficulty. She is on intermittent IV antibiotic. Will continue to monitor.

## 2023-06-02 NOTE — THERAPY TREATMENT NOTE
Inpatient Rehabilitation - Speech Language Pathology Treatment Note    [facility]     Patient Name: Katherine Williamson  : 1950  MRN: [id number]    Today's Date: 2023                   Admit Date: 2023       Visit Dx:      ICD-10-CM ICD-9-CM   1. Decreased functional mobility and endurance  Z74.09 780.99       Patient Active Problem List   Diagnosis   • Benign essential HTN   • Tobacco abuse   • Dislocation of hip joint prosthesis   • Fracture of proximal humerus   • Mechanical complication of internal orthopedic device   • Wear of articular bearing surface of internal prosthetic joint   • History of repair of hip joint   • History of operative procedure on hip   • Hyperglycemia   • Hepatic steatosis   • Diverticulosis   • Chest pain, atypical   • History of colon polyps   • Family history of colon cancer in mother   • Allergic rhinitis   • Lung nodule seen on imaging study   • Acute respiratory failure with hypoxia   • Abscess of right lung with pneumonia   • Empyema   • Sepsis   • Pleural effusion, right   • Other emphysema   • Pulmonary nodule (RLL)   • Diastolic dysfunction, grade 1   • Physical debility   • Cryptogenic stroke   • Acute right MCA stroke       Past Medical History:   Diagnosis Date   • Arthritis    • Cataract    • Colon polyps     FOLLOWED BY DR. JOSEPH LAU   • Hypertension        Past Surgical History:   Procedure Laterality Date   • COLONOSCOPY  10/2015    Dsvno-[id number]-Rp. [facility].  Follow-up in 5 years.   • COLONOSCOPY N/A 2022    2 BENIGN POLYPS IN DESCENDING, 5 MM BENIGN POLYP IN SIGMOID, MULTIPLE SMALL AND LARGE DIVERTICULA IN SIGMOID, RESCOPE IN 3 YRS, DR. JOSEPH LAU AT [facility]   • EYE SURGERY     • JOINT REPLACEMENT  ?    Left total hip replacement in .  In 2015 patient had left hip revision   • TONSILLECTOMY         SLP Recommendation and Plan                                                            SLP EVALUATION (last 72 hours)     SLP SLC  "Evaluation     Row Name 06/02/23 1330 06/02/23 1100 06/01/23 1230 06/01/23 0900 05/31/23 1500       Communication Assessment/Intervention    Document Type therapy note (daily note)  -AL therapy note (daily note)  -AL therapy note (daily note)  -AL therapy note (daily note)  -AL therapy note (daily note)  -AL    Patient/Family/Caregiver Comments/Observations Pt participated well.  -AL Pt participated well.  -AL Pt participated well.  -AL Pt is pleasant and cooperative.  -AL Pt sitting upright in recliner in room.  -AL       Pain Scale: Numbers Pre/Post-Treatment    Pretreatment Pain Rating 0/10 - no pain  -AL 0/10 - no pain  -AL 0/10 - no pain  -AL 0/10 - no pain  -AL 0/10 - no pain  -AL    Row Name 05/31/23 1330 05/31/23 0915                Communication Assessment/Intervention    Document Type therapy note (daily note)  -AL therapy note (daily note)  -AL       Patient/Family/Caregiver Comments/Observations Pt is pleasant and cooperative.  -AL Pt participated well. Missed 15 minutes of session due to nursing care.  -AL          Pain Scale: Numbers Pre/Post-Treatment    Pretreatment Pain Rating 0/10 - no pain  -AL 0/10 - no pain  -AL             User Key  (r) = Recorded By, (t) = Taken By, (c) = Cosigned By    Initials Name Effective Dates    Sarita Landry, MS CCC-SLP 06/16/21 -                    EDUCATION    The patient has been educated in the following areas:       Cognitive Impairment.             SLP GOALS     Row Name 06/02/23 1330 06/02/23 1100 06/01/23 1230       Attention Goal 1 (SLP)    Progress/Outcomes (Attention Goal 1, SLP) -- good progress toward goal  -AL --    Comment (Attention Goal 1, SLP) -- Alternating attention for \"Blink\" card sort task: required NO cues in game play against clinician.  -AL --       Reasoning Goal 1 (SLP)    Improve Reasoning Through Goal 1 (SLP) complete high level reasoning task;90%;independently (over 90% accuracy)  -AL -- complete high level reasoning " "task;90%;independently (over 90% accuracy)  -AL    Time Frame (Reasoning Goal 1, SLP) short term goal (STG);1 week  -AL -- --    Progress/Outcomes (Reasoning Goal 1, SLP) good progress toward goal  -AL -- good progress toward goal  -AL    Comment (Reasoning Goal 1, SLP) Moderate level logic puzzle: 70% with NO cues, 100% with MIN cues.  -AL -- Moderate level logic puzzle: 80% with NO cues, 100% with MIN cues.  -AL       Functional Math Skills Goal 1 (SLP)    Progress/Outcomes (Functional Math Skills Goal 1, SLP) -- good progress toward goal  -AL --    Comment (Functional Math Skills Goal 1, SLP) -- Checkbook balancing task: 100% with NO cues. SLP assisted pt in method for checking her answer.  -AL --       Executive Functional Skills Goal 1 (SLP)    Improve Executive Function Skills Goal 1 (SLP) -- -- organization/planning activity  -AL    Time Frame (Executive Function Skills Goal 1, SLP) -- -- by discharge  -AL    Progress/Outcomes (Executive Function Skills Goal 1, SLP) good progress toward goal  -AL -- good progress toward goal  -AL    Comment (Executive Function Skills Goal 1, SLP) Medicine management with pill box: 100% with NO cues, goal met x1.  -AL -- Medicine management with pill box and pt's own medicine list: 75% with NO cues, 100% with MIN cues.  -AL    Row Name 06/01/23 0900 05/31/23 1500 05/31/23 1330       Attention Goal 1 (SLP)    Improve Attention by Goal 1 (SLP) complete selective attention task;complete divided attention task;90%;independently (over 90% accuracy)  -AL complete selective attention task;complete divided attention task;90%;independently (over 90% accuracy)  -AL --    Time Frame (Attention Goal 1, SLP) -- by discharge  -AL --    Progress/Outcomes (Attention Goal 1, SLP) good progress toward goal  -AL good progress toward goal  -AL good progress toward goal  -AL    Comment (Attention Goal 1, SLP) Required NO cues for \"Blink\" card sort task.  -AL Attention to detail for logic calendar " task (find the date): Pt initially with incorrect answer; however, when cued to go through directions again slowly, she was able to get correct answer.  -AL Blink card sort: 25 cards in 1 minute, 15 seconds with NO cues. Mildly reduced speed in game play against clinician.  -AL       Memory Skills Goal 1 (SLP)    Improve Memory Skills Through Goal 1 (SLP) recalling unrelated word lists with an imposed delay  -AL -- --    Progress/Outcomes (Memory Skills Goal 1, SLP) good progress toward goal  -AL -- --    Comment (Memory Skills Goal 1, SLP) Recalled 3/3 errands after 15 minutes and at end of session.  -AL -- --       Reasoning Goal 1 (SLP)    Improve Reasoning Through Goal 1 (SLP) complete high level reasoning task;90%;independently (over 90% accuracy)  -AL -- complete high level reasoning task;90%;independently (over 90% accuracy)  -AL    Time Frame (Reasoning Goal 1, SLP) short term goal (STG);1 week  -AL -- short term goal (STG);1 week  -AL    Progress/Outcomes (Reasoning Goal 1, SLP) -- -- good progress toward goal  -AL    Comment (Reasoning Goal 1, SLP) Moderate level logic puzzle: 75% with NO cues, 100% with MIN-MOD cues.  -AL -- Moderate level logic puzzle: 100% with NO cues, goal met x1.  -AL       Functional Math Skills Goal 1 (SLP)    Improve Functional Math Skills Through Goal 1 (SLP) complete functional math task;100%;independently (over 90% accuracy)  -AL -- --    Time Frame (Functional Math Skills Goal 1, SLP) 1 week  -AL -- --    Progress/Outcomes (Functional Math Skills Goal 1, SLP) goal ongoing  -AL -- --    Comment (Functional Math Skills Goal 1, SLP) Basketball ticket prices task: 60% with NO cues, 100% with MIN-MOD cues.  -AL -- --       Executive Functional Skills Goal 1 (SLP)    Improve Executive Function Skills Goal 1 (SLP) -- -- organization/planning activity  -AL    Time Frame (Executive Function Skills Goal 1, SLP) -- -- by discharge  -AL    Progress/Outcomes (Executive Function Skills Goal  1, SLP) -- -- goal ongoing  -AL    Comment (Executive Function Skills Goal 1, SLP) -- -- Medicine management with pill box and pt's own medicine list: 4/7 with NO cues, 7/7 with MIN cues.  -AL    Row Name 23 0915             Functional Math Skills Goal 1 (SLP)    Progress/Outcomes (Functional Math Skills Goal 1, SLP) goal ongoing  -AL      Comment (Functional Math Skills Goal 1, SLP) Checkbook balancin% with NO cues, 100% with MIN cues.  -AL            User Key  (r) = Recorded By, (t) = Taken By, (c) = Cosigned By    Initials Name Provider Type    Sarita Landry MS CCC-SLP Speech and Language Pathologist                            Time Calculation:        Time Calculation- SLP     Row Name 23 1449 23 1228          Time Calculation- SLP    SLP Start Time 1330  -AL 1100  -AL     SLP Stop Time 1400  -AL 1130  -AL     SLP Time Calculation (min) 30 min  -AL 30 min  -AL           User Key  (r) = Recorded By, (t) = Taken By, (c) = Cosigned By    Initials Name Provider Type    Sarita Landry MS CCC-SLP Speech and Language Pathologist                  Therapy Charges for Today     Code Description Service Date Service Provider Modifiers Qty    21289179782 HC ST DEV OF COGN SKILLS INITIAL 15 MIN 2023 Sarita Comer MS CCC-SLP  1    41350066416 HC ST DEV OF COGN SKILLS EACH ADDT'L 15 MIN 2023 Sarita Comer MS CCC-SLP  3    68844362531 HC ST DEV OF COGN SKILLS INITIAL 15 MIN 2023 Sarita Comer MS CCC-SLP  1    55915406858 HC ST DEV OF COGN SKILLS EACH ADDT'L 15 MIN 2023 Sarita Comer MS CCC-SLP  3                           Sarita Comer MS CCC-SLP  2023

## 2023-06-02 NOTE — PROGRESS NOTES
Inpatient Rehabilitation Plan of Care Note    Plan of Care  Care Plan Reviewed - No updates at this time.    Safety    Performed Intervention(s)  Items within reach, environmental set-up for reduce risk of fall  Bed alarms and chair alarms and safety rounds hourly      Psychosocial    Performed Intervention(s)  Medication as ordered and PRN  Verbalize needs and concerns  Therapeutic environmental set up      Sphincter Control    Performed Intervention(s)  Incontinent care as needed and ointment applied as ordered.  Take to bathroom in a timely manner  Proper diet and adequate fluid intake.      Body Systems    Performed Intervention(s)  Medication, turn every 2 hours and proper nutrition.    Signed by: Kezia Chaparro RN

## 2023-06-02 NOTE — PLAN OF CARE
Problem: Rehabilitation (IRF) Plan of Care  Goal: Plan of Care Review  Outcome: Ongoing, Progressing  Flowsheets (Taken 6/2/2023 0310)  Progress: improving  Plan of Care Reviewed With: patient  Outcome Evaluation: Pt is A&Ox4, pleasant, up w/ asst x1 to WC, picc line to LEWIS, dressing changed on day shift on 6/1, due again on 6/8, pt refused O2 tonight, when spot checked pt was 92%, optiform dressing to coccyx C/D/I, encouraged pt to stay off bottom as much as possible, pt remains on IV abx for another week, no c/o pain, purewick at night, resting well, will continue to monitor.   Goal Outcome Evaluation:  Plan of Care Reviewed With: patient        Progress: improving  Outcome Evaluation: Pt is A&Ox4, pleasant, up w/ asst x1 to WC, picc line to LEWIS, dressing changed on day shift on 6/1, due again on 6/8, pt refused O2 tonight, when spot checked pt was 92%, optiform dressing to coccyx C/D/I, encouraged pt to stay off bottom as much as possible, pt remains on IV abx for another week, no c/o pain, purewick at night, resting well, will continue to monitor.

## 2023-06-02 NOTE — PROGRESS NOTES
LOS: 14 days   Patient Care Team:  Zelalem Flor APRN as PCP - General (Internal Medicine)  Brandon Mitchell MD as Consulting Physician (Orthopedic Surgery)      ANNIE CERDA  1950    Diagnoses    1. DECREASED FUNCTIONAL MOBILITY AND ENDURANCE       ADMITTING DIAGNOSIS:  CVA      Subjective     Pt complains of B/L LE swelling in ankles. They are better than they were last night, but get worse with activity. Otherwise, denies chest pain, sob, fevers.     Objective     Vitals:    06/01/23 1900   BP: 132/70   Pulse: 92   Resp: 16   Temp: 97.9 °F (36.6 °C)   SpO2: 90%       PHYSICAL EXAM:   MENTAL STATUS -  AWAKE / ALERT  HEENT-   SCLERAE ANICTERIC, CONJUNCTIVAE PINK, OP MOIST, NO JVD,  LUNGS -decreased air movement bilaterally      HEART- RRR, NO RUB, MURMUR, OR GALLOP  ABD - NORMOACTIVE BOWEL SOUNDS, SOFT, NT.    EXT - B/L LE edema up to ankle      Right upper extremity PICC line site unremarkable without any swelling about the area.  NEURO -oriented x4  MOTOR EXAM -takes resistance bilaterally        MEDICATIONS  Scheduled Meds:aspirin, 81 mg, Oral, Q24H  atorvastatin, 80 mg, Oral, Nightly  citalopram, 20 mg, Oral, Daily  clopidogrel, 75 mg, Oral, Daily  doxycycline, 100 mg, Oral, Q12H  enoxaparin, 40 mg, Subcutaneous, Q24H  guaiFENesin, 600 mg, Oral, BID  hydrocortisone-bacitracin-zinc oxide-nystatin, 1 application, Topical, BID  magnesium hydroxide, 10 mL, Oral, Once  meropenem, 1 g, Intravenous, Q8H  metoprolol tartrate, 12.5 mg, Oral, Q12H  sodium chloride, 10 mL, Intravenous, Q12H  sodium chloride, 10 mL, Intravenous, Q12H  cyanocobalamin, 1,000 mcg, Oral, Daily      Continuous Infusions:   PRN Meds:.•  acetaminophen  •  ipratropium-albuterol  •  loperamide  •  senna-docusate sodium  •  sodium chloride  •  sodium chloride  •  sodium chloride  •  sodium chloride  •  sodium chloride      RESULTS  No results found for: POCGLU  Results from last 7 days   Lab Units 06/02/23  0603 05/31/23  0518  "05/30/23  0411   WBC 10*3/mm3 12.44* 11.20* 12.74*   HEMOGLOBIN g/dL 10.6* 10.6* 10.7*   HEMATOCRIT % 31.8* 31.1* 31.3*   PLATELETS 10*3/mm3 370 378 383     Results from last 7 days   Lab Units 06/02/23  0603 05/29/23  0423   SODIUM mmol/L 135* 138   POTASSIUM mmol/L 3.4* 3.3*   CHLORIDE mmol/L 99 103   CO2 mmol/L 30.3* 31.9*   BUN mg/dL 11 5*   CREATININE mg/dL 0.24* 0.23*   CALCIUM mg/dL 8.6 8.5*   GLUCOSE mg/dL 84 82       New Radiology:  ELIGIO negative for vegetations  CXR 5/25 revealed increased opacification of the RLL     Prior radiology:  MRI brain: \"Bilateral multifocal areas of infarction.  No evidence of hemorrhagic transformation. \"    ASSESSMENT and PLAN    Acute right MCA stroke    1. Acute R MCA stroke and bilateral infarcts  Stroke prophylaxis-aspirin/Plavix/atorvastatin  Zio patch placed on May 19 for 2 weeks  - 5/20 - Admit for inpt rehab w/ PT, OT, SLP, psychology and rehab medical and nursing care. Continue secondary stroke prophylaxis  -6/2 - will d/c zio patch today as has been 2 weeks      2. Pneumonia and right lung abscess and Pleural effusions  - 5/20 - Continue IV antibx. ID following    - 5/21 - Continues to improve   The chest x-ray is reassuring   Expect her to be able to come off the oxygen in the next few days   She will need to have a follow-up chest imaging 6 weeks down the road   Finish the antibiotic course per ID, last day 6/8/2023   -5/22-on 1 L oxygen with activities and maintaining sats  -5/23- Per Pulmonology, continue Merrem. Continue to wean off supplemental oxygen  -5/24-oxygen weaned to 0.5 L nasal cannula  -5/25 -on room air and therapy, more 2 L last night  -5/26 Placed on 2L NC last night, WBC decreased to 15. CXR revealed increased opacification in RLL. Will continue Merrem and appreciate recommendations from Pulmonolgy. CDiff negative and bowel movements decreased this morning.  -5/30-WBC stable around 12 K.  On room air at rest.    ID-meropenem and doxycycline-last " dose June 8, 2023  Repeat CT chest June 6, 2023  May 25-WBC increased to 18 K from 8K 5 days ago.  No fever.  Vital signs stable.  On room air during speech therapy session.  Not acutely ill-appearing.  Had 3 loose semiformed stool today but received bowel program yesterday for recent constipation.   PICC line site unremarkable.   Increased diffuse purpleish petechiae on the anterior lower legs.  She had a few earlier in the week but there more prominent today  Calves are soft without any significant edema.  Continues on meropenem and doxycycline.   Patient reviewed with pulmonology service who will assess.  Although with the recent bowel program, given the white blood cell count increased will check C. difficile with the loose stool today  Will review with infectious disease service  May 26-Persistent right empyema, similar to prior exam. Increased consolidation in the superior segment right lower lobe, left lower lobe. Multiple  small nodular densities in both lower lungs, indeterminate. Increased  cavitation in the right lower lobe. Decreased left pleural effusion  May 30-WBC improved 12 K.  Continues on antibiotics.        3. Acute hypoxic respiratory failure     4. Emphysema d/t Tobacco use     5. Hypertension-continue antihypertensive regimen and avoid hypotensive episodes.     6. DVT prophylaxis -   - 5/20 - Start Lovenox SQ daily    7.  Dysphagia  -5/22-diet advanced to regular solid thin liquids    8.  GI-constipation-May 24-add Senokot-Colace.  Milk of magnesia x1.  Patient maintaining hydration  May 25-loose stool semiformed x3 today.  Changed Senokot Colace to as needed    B/L LE swelling  -6/2 will start compression stockings for B/L LE     Hypokalemia  -6/2: K+ 3.4, will give 40Meq     TEAM CONF - MAY 23 - BED MIN. TRANSFERS MIN MOD ASSIST. GAIT 60 FEET MIN RW. TOILET TRANSFERS MOD ASSIST RW. SATS 1 L WITH ACTIVITIES WITH SATS >=90%. BATH MOD. LBD MOD.UBD MIN. GROOMING SET UP. TOILETING MAX. MILD  EXECUTIVE FUNCTION DEFICITS. BNE PENDING. DIET ADVANCED TO REGULAR CONSISTENCY, GOOD INTAKE. PULM EXPECTS WILL BE OFF OXYGEN IN NEXT FEW DAYS. REPEAT CHEST IMAGING IN 6 WEEKS.  ELOS - 2 WEEKS.     Team conference-May 30  PT: Walking 160 ft, on 1L with walking which she was previously on RA with wakling. Min assist for bed transfer with RW. Min assist with toilet transfers  OT: Bathing min, Dressing lower body mod assist, Dressing upper body is set up, toileting is mod assist. Fatigues easily with therapy, wants to stay in   ST: Mild high level deficits, attention to detail with check booking, not working on med management  Nursing: Potassium supplementation, WBC is 12 which is stable, on ABX Doxy PO and Merrem IV, Drops to 80% if not on supplemental O2, on 1-2L NC. Coccyx pressure wound. On IV ABX until 6/28/2023  Psych: apathetic, flat affect, but was agreeable to do testing  RD: Started on Kwasi   ELOS: June 9th    Now admit for comprehensive acute inpatient rehabilitation .  This would be an interdisciplinary program with physical therapy 1 hour,  occupational therapy 1 hour, and speech therapy 1 hour, 5 days a week.  Rehabilitation nursing for carryover, monitoring of neurologic and pulmonary   status, bowel and bladder, and skin  Ongoing physician follow-up.  Weekly team conferences.  Goals are to achieve a level of supervision with  mobility and self-care and improved endurance.   Rehabilitation prognosis fair.  Medical prognosis fair.  Estimated length of stay is approximately 2 weeks, but is only an estimation.    .     The patient's functional status and clinical status is unchanged from preadmission assessment and the patient continues appropriate for acute inpatient rehabilitation.  Goal is for home with outpatient   therapies.  Barrier to discharge: Impaired mobility self-care endurance- work on vision, strength, gross and fine motor control, balance, progressive ambulation, ADLs to overcome.            Milton Pham DO      During rounds, used appropriate personal protective equipment including mask and gloves.  Additional gown if indicated.  Mask used was standard procedure mask. Appropriate PPE was worn during the entire visit.  Hand hygiene was completed before and after.      Patient has been staffed and discussed with attending Physician, Dr. Karthikeyan Kaba.

## 2023-06-03 PROCEDURE — 25010000002 MEROPENEM PER 100 MG: Performed by: HOSPITALIST

## 2023-06-03 PROCEDURE — 97112 NEUROMUSCULAR REEDUCATION: CPT

## 2023-06-03 PROCEDURE — 97530 THERAPEUTIC ACTIVITIES: CPT

## 2023-06-03 RX ORDER — POTASSIUM CHLORIDE 750 MG/1
10 TABLET, FILM COATED, EXTENDED RELEASE ORAL DAILY
Status: DISCONTINUED | OUTPATIENT
Start: 2023-06-04 | End: 2023-06-09 | Stop reason: HOSPADM

## 2023-06-03 RX ADMIN — ATORVASTATIN CALCIUM 80 MG: 80 TABLET, FILM COATED ORAL at 20:14

## 2023-06-03 RX ADMIN — MEROPENEM 1 G: 1 INJECTION, POWDER, FOR SOLUTION INTRAVENOUS at 05:16

## 2023-06-03 RX ADMIN — METOPROLOL TARTRATE 12.5 MG: 25 TABLET, FILM COATED ORAL at 08:02

## 2023-06-03 RX ADMIN — MEROPENEM 1 G: 1 INJECTION, POWDER, FOR SOLUTION INTRAVENOUS at 12:30

## 2023-06-03 RX ADMIN — DOXYCYCLINE 100 MG: 100 CAPSULE ORAL at 20:14

## 2023-06-03 RX ADMIN — DOXYCYCLINE 100 MG: 100 CAPSULE ORAL at 08:02

## 2023-06-03 RX ADMIN — GUAIFENESIN 600 MG: 600 TABLET, EXTENDED RELEASE ORAL at 08:02

## 2023-06-03 RX ADMIN — MEROPENEM 1 G: 1 INJECTION, POWDER, FOR SOLUTION INTRAVENOUS at 20:23

## 2023-06-03 RX ADMIN — ZINC OXIDE 1 APPLICATION: 200 OINTMENT TOPICAL at 08:46

## 2023-06-03 RX ADMIN — CITALOPRAM 20 MG: 20 TABLET, FILM COATED ORAL at 08:02

## 2023-06-03 RX ADMIN — Medication 1000 MCG: at 08:02

## 2023-06-03 RX ADMIN — ASPIRIN 81 MG: 81 TABLET, CHEWABLE ORAL at 08:02

## 2023-06-03 RX ADMIN — POTASSIUM CHLORIDE 40 MEQ: 750 TABLET, EXTENDED RELEASE ORAL at 08:02

## 2023-06-03 RX ADMIN — CLOPIDOGREL BISULFATE 75 MG: 75 TABLET, FILM COATED ORAL at 08:02

## 2023-06-03 RX ADMIN — METOPROLOL TARTRATE 12.5 MG: 25 TABLET, FILM COATED ORAL at 20:14

## 2023-06-03 RX ADMIN — GUAIFENESIN 600 MG: 600 TABLET, EXTENDED RELEASE ORAL at 20:14

## 2023-06-03 RX ADMIN — Medication 10 ML: at 08:03

## 2023-06-03 RX ADMIN — Medication 10 ML: at 08:04

## 2023-06-03 RX ADMIN — Medication 10 ML: at 20:17

## 2023-06-03 NOTE — PROGRESS NOTES
[facility]     Progress Note    Patient Name: Katherine Williamson  : 1950  MRN: [id number]  Primary Care Physician:  Zelalem Flor APRN  Date of admission: 2023    Subjective Pt is awake and alert. Notes prolonged bleeding overnight from Lovenox injecton site. Pt is amb >300 ft with PT and remains Eloquis.   Subjective     Chief Complaint same    History of Present Illness  Patient Report    Review of Systems    Objective Exam unchanged  Bipedal edema unchanged, Resp unlabored and regular.   Objective     Vitals:   Temp:  [97.5 °F (36.4 °C)-98.5 °F (36.9 °C)] 97.9 °F (36.6 °C)  Heart Rate:  [81-91] 81  Resp:  [16-20] 16  BP: (115-131)/(66-85) 127/66  Flow (L/min):  [1] 1    Physical Exam     Result Review    Result Review:  I have personally reviewed the results from the time of this admission to 6/3/2023 12:35 EDT and agree with these findings:  []  Laboratory list / accordion  []  Microbiology  []  Radiology  []  EKG/Telemetry   []  Cardiology/Vascular   []  Pathology  []  Old records  []  Other:  Most notable findings include No new labs to review,       Assessment & Plan Continue to prepare for dc. I will stop Lovenox and change K+ suppl to 10 meq daily.  I reviewed most recent MRI scan with pt.   Assessment / Plan     Brief Patient Summary:  Katherine Williamson is a 72 y.o. female wh    Active Hospital Problems:  Active Hospital Problems    Diagnosis     **Acute right MCA stroke      Plan:       DVT prophylaxis:  No DVT prophylaxis order currently exists.    CODE STATUS:    Code Status (Patient has no pulse and is not breathing): CPR (Attempt to Resuscitate)  Medical Interventions (Patient has pulse or is breathing): Full Support    Disposition:  I expect patient to be discharged home.     Karthikeyan Kaba MD

## 2023-06-03 NOTE — PROGRESS NOTES
Inpatient Rehabilitation Plan of Care Note    Plan of Care  Care Plan Reviewed - No updates at this time.    Safety    Performed Intervention(s)  Items within reach, environmental set-up for reduce risk of fall  Bed alarms and chair alarms and safety rounds hourly      Psychosocial    Performed Intervention(s)  Medication as ordered and PRN  Verbalize needs and concerns  Therapeutic environmental set up      Sphincter Control    Performed Intervention(s)  Incontinent care as needed and ointment applied as ordered.  Take to bathroom in a timely manner  Proper diet and adequate fluid intake.      Body Systems    Performed Intervention(s)  Medication, turn every 2 hours and proper nutrition.    Signed by: Vernell Brown RN

## 2023-06-03 NOTE — THERAPY TREATMENT NOTE
Acute Care - Physical Therapy Treatment Note       [facility]     Patient Name: Katherine Williamson  : 1950  MRN: [id number]    Today's Date: 6/3/2023                    Admit Date: 2023      Visit Dx:     ICD-10-CM ICD-9-CM   1. Decreased functional mobility and endurance  Z74.09 780.99       Patient Active Problem List   Diagnosis    Benign essential HTN    Tobacco abuse    Dislocation of hip joint prosthesis    Fracture of proximal humerus    Mechanical complication of internal orthopedic device    Wear of articular bearing surface of internal prosthetic joint    History of repair of hip joint    History of operative procedure on hip    Hyperglycemia    Hepatic steatosis    Diverticulosis    Chest pain, atypical    History of colon polyps    Family history of colon cancer in mother    Allergic rhinitis    Lung nodule seen on imaging study    Acute respiratory failure with hypoxia    Abscess of right lung with pneumonia    Empyema    Sepsis    Pleural effusion, right    Other emphysema    Pulmonary nodule (RLL)    Diastolic dysfunction, grade 1    Physical debility    Cryptogenic stroke    Acute right MCA stroke       Past Medical History:   Diagnosis Date    Arthritis     Cataract     Colon polyps     FOLLOWED BY DR. JOSEPH LAU    Hypertension        Past Surgical History:   Procedure Laterality Date    COLONOSCOPY  10/2015    [id number]. Kaplan.  Follow-up in 5 years.    COLONOSCOPY N/A 2022    2 BENIGN POLYPS IN DESCENDING, 5 MM BENIGN POLYP IN SIGMOID, MULTIPLE SMALL AND LARGE DIVERTICULA IN SIGMOID, RESCOPE IN 3 YRS, DR. JOSEPH LAU AT [facility]    EYE SURGERY      JOINT REPLACEMENT  ?    Left total hip replacement in .  In 2015 patient had left hip revision    TONSILLECTOMY         PT ASSESSMENT (last 12 hours)       IRF PT Evaluation and Treatment       Row Name 23 1133          PT Time and Intention    Document Type daily treatment  -LB     Mode of Treatment  physical therapy  -LB     Patient/Family/Caregiver Comments/Observations pt sitting up in wheelchair  -LB       Row Name 06/03/23 1133          General Information    Existing Precautions/Restrictions fall  -LB       Row Name 06/03/23 1133          Pain Assessment    Pretreatment Pain Rating 0/10 - no pain  -LB     Posttreatment Pain Rating 0/10 - no pain  -LB     Pre/Posttreatment Pain Comment pt soreness on bottom  -LB       Row Name 06/03/23 1133          Cognition/Psychosocial    Follows Commands (Cognition) follows one-step commands;verbal cues/prompting required  -LB     Personal Safety Interventions gait belt;fall prevention program maintained;muscle strengthening facilitated;nonskid shoes/slippers when out of bed  -LB       Row Name 06/03/23 1133          Sit-Stand Transfer    Sit-Stand Cocke (Transfers) set up;verbal cues;contact guard  -LB     Assistive Device (Sit-Stand Transfers) walker, front-wheeled;wheelchair  -LB     Comment, (Sit-Stand Transfer) cues for hand placement and weight shift forward  -LB       Row Name 06/03/23 1133          Stand-Sit Transfer    Stand-Sit Cocke (Transfers) set up;verbal cues;contact guard  -LB     Assistive Device (Stand-Sit Transfers) walker, front-wheeled;wheelchair  -LB     Comment, (Stand-Sit Transfer) cues to reach back  -LB       Row Name 06/03/23 1133          Gait/Stairs (Locomotion)    Cocke Level (Gait) verbal cues;contact guard  -LB     Assistive Device (Gait) walker, front-wheeled  -LB     Distance in Feet (Gait) 160; 80  -LB     Deviations/Abnormal Patterns (Gait) raina decreased;stride length decreased  -LB     Bilateral Gait Deviations forward flexed posture  -LB     Left Sided Gait Deviations heel strike decreased  -LB       Row Name 06/03/23 1133          Safety Issues, Functional Mobility    Impairments Affecting Function (Mobility) balance;endurance/activity tolerance;strength;shortness of breath  -LB       Row Name 06/03/23 1133           Hip (Therapeutic Exercise)    Hip Strengthening (Therapeutic Exercise) bilateral;marching while seated;aBduction;aDduction;15 repititions  -LB       Row Name 06/03/23 1133          Knee (Therapeutic Exercise)    Knee Strengthening (Therapeutic Exercise) bilateral;LAQ (long arc quad);15 repititions  -LB       Row Name 06/03/23 1133          Positioning and Restraints    Pre-Treatment Position in bed  -LB     Post Treatment Position wheelchair  -LB     In Wheelchair call light within reach;encouraged to call for assist;exit alarm on  -LB               User Key  (r) = Recorded By, (t) = Taken By, (c) = Cosigned By      Initials Name Provider Type    LB Mary Kay Herman, PT Physical Therapist                  Wound 05/19/23 2055 Bilateral gluteal (Active)   Pressure Injury Stage 2 06/02/23 2050   Dressing Appearance dry;intact 06/02/23 2050   Closure DARIUS 06/02/23 2050   Base dressing in place, unable to visualize 06/02/23 2050   Periwound moist 06/02/23 1225   Periwound Temperature warm 06/02/23 1225   Periwound Skin Turgor soft 06/02/23 1225   Edges irregular 06/02/23 1225   Drainage Characteristics/Odor serosanguineous;yellow 06/02/23 1225   Drainage Amount scant 06/02/23 1225   Care, Wound cleansed with;sterile normal saline 06/02/23 1225   Dressing Care dressing reinforced;foam 06/02/23 2050     Physical Therapy Education       Title: PT OT SLP Therapies (Done)       Topic: Physical Therapy (Done)       Point: Mobility training (Done)       Learning Progress Summary             Patient Acceptance, E,TB, VU,NR by EE at 5/31/2023 1200    Acceptance, E,TB, VU,NR by EE at 5/30/2023 1131    Acceptance, E,TB, VU by KP at 5/27/2023 0932    Acceptance, E, VU by WN at 5/26/2023 0038    Eager, E,TB,D, NR by KP at 5/25/2023 1019    Acceptance, E,D, VU,NR by EE at 5/24/2023 1136    Acceptance, E,TB, VU,NR by EE at 5/23/2023 1139    Acceptance, E,TB, NR,VU by EE at 5/22/2023 1221    Acceptance, E, NR by  at 5/20/2023  1216                         Point: Home exercise program (Done)       Learning Progress Summary             Patient Acceptance, E,TB, VU,NR by EE at 5/30/2023 1131    Acceptance, E,TB, VU by KP at 5/27/2023 0932    Acceptance, E, VU by WN at 5/26/2023 0038    Eager, E,TB,D, NR by KP at 5/25/2023 1019    Acceptance, E,D, VU,NR by EE at 5/24/2023 1136    Acceptance, E,TB, VU,NR by EE at 5/23/2023 1139    Acceptance, E,TB, NR,VU by EE at 5/22/2023 1221    Acceptance, E, NR by  at 5/20/2023 1216                         Point: Body mechanics (Done)       Learning Progress Summary             Patient Acceptance, E,TB, VU,NR by EE at 5/31/2023 1200    Acceptance, E,TB, VU,NR by EE at 5/30/2023 1131    Acceptance, E, VU by WN at 5/26/2023 0038    Acceptance, E,D, VU,NR by EE at 5/24/2023 1136    Acceptance, E,TB, VU,NR by EE at 5/23/2023 1139    Acceptance, E,TB, NR,VU by  at 5/22/2023 1221    Acceptance, E, NR by  at 5/20/2023 1216                         Point: Precautions (Done)       Learning Progress Summary             Patient Acceptance, E, VU by MD at 6/2/2023 1345    Acceptance, E, VU by MD at 6/1/2023 0848    Acceptance, E,TB, VU,NR by EE at 5/31/2023 1200    Acceptance, E,TB, VU,NR by EE at 5/30/2023 1131    Acceptance, E, VU by MD at 5/26/2023 1014    Acceptance, E, VU by WN at 5/26/2023 0038    Acceptance, E,D, VU,NR by EE at 5/24/2023 1136    Acceptance, E,TB, VU,NR by EE at 5/23/2023 1139    Acceptance, E,TB, NR,VU by  at 5/22/2023 1221    Acceptance, E, NR by  at 5/20/2023 1216                                         User Key       Initials Effective Dates Name Provider Type Discipline     06/16/21 -  Jess Wang, PT Physical Therapist PT    EE 06/16/21 -  Tena Garay, PT Physical Therapist PT    MD 06/16/21 -  Melissa Wise, PT Physical Therapist PT    KP 06/16/21 -  Jesusita Mendoza, PT Physical Therapist PT    WN 08/23/22 -  Reno Lazo, RN Registered Nurse Nurse                    PT  Recommendation and Plan                          Time Calculation:      PT Charges       Row Name 06/03/23 1140             Time Calculation    Start Time 1100  -LB      Stop Time 1130  -LB      Time Calculation (min) 30 min  -LB      PT Received On 06/03/23  -LB      PT - Next Appointment 06/05/23  -LB                User Key  (r) = Recorded By, (t) = Taken By, (c) = Cosigned By      Initials Name Provider Type    LB Mary Kay Herman, PT Physical Therapist                    Therapy Charges for Today       Code Description Service Date Service Provider Modifiers Qty    69364873555  PT NEUROMUSC RE EDUCATION EA 15 MIN 6/3/2023 Mary Kay Herman, PT GP 1    [id number]  PT THERAPEUTIC ACT EA 15 MIN 6/3/2023 Mary Kay Hemran, PT GP 1    [id number]  PT THER SUPP EA 15 MIN 6/3/2023 Mary Kay Herman, PT GP 1                     Mary Kay Herman, PT  6/3/2023

## 2023-06-03 NOTE — PLAN OF CARE
Goal Outcome Evaluation:  Plan of Care Reviewed With: patient        Progress: improving  Outcome Evaluation: Pt is A&OX4, calm and cooperative. Flat affect. Assist X1 to wc. Con of B&B, urgency noted. Tolerated RA when OOB, 1L of O2 when lying in bed. Meds whole with water. PICC in E, dsg due to  be changed on 6/08, on IV ABX. Planned DC for 6/09. Dsg to bottom changed. Edema noted im BL lower extremities. Daily wt. No c/o pain thus far this shift. No safety concerns noted.

## 2023-06-03 NOTE — PLAN OF CARE
Goal Outcome Evaluation:  Plan of Care Reviewed With: patient             Problem: Rehabilitation (IRF) Plan of Care  Goal: Plan of Care Review  Outcome: Ongoing, Progressing  Flowsheets (Taken 6/3/2023 6297)  Plan of Care Reviewed With: patient  Outcome Evaluation:   Alert and oriented x 4. Cooperative and calm. Takes all meds whole PO with thins. Continent with urgency   insisted on wearing Purewick at HS. Pinkness to tomi-area. Foam dressing dry and intact to buttocks. PICC to right arm with good blood return. Continues on IV abx r/t PNA. Dressing to be changed 6/8. Denies pain this evening. Independent with bed mobility and able to turn self. Daily weight. Sats were checked at midnight and were 89% on room air, 1L applied. Sats WNL. No unsafe behaviors. d/C 6/9. Call light within reach.

## 2023-06-04 PROCEDURE — 25010000002 MEROPENEM PER 100 MG: Performed by: HOSPITALIST

## 2023-06-04 RX ADMIN — Medication 1000 MCG: at 08:35

## 2023-06-04 RX ADMIN — MEROPENEM 1 G: 1 INJECTION, POWDER, FOR SOLUTION INTRAVENOUS at 05:08

## 2023-06-04 RX ADMIN — Medication 10 ML: at 20:42

## 2023-06-04 RX ADMIN — Medication 10 ML: at 08:37

## 2023-06-04 RX ADMIN — MEROPENEM 1 G: 1 INJECTION, POWDER, FOR SOLUTION INTRAVENOUS at 20:40

## 2023-06-04 RX ADMIN — CLOPIDOGREL BISULFATE 75 MG: 75 TABLET, FILM COATED ORAL at 08:35

## 2023-06-04 RX ADMIN — ZINC OXIDE 1 APPLICATION: 200 OINTMENT TOPICAL at 08:37

## 2023-06-04 RX ADMIN — GUAIFENESIN 600 MG: 600 TABLET, EXTENDED RELEASE ORAL at 20:40

## 2023-06-04 RX ADMIN — ATORVASTATIN CALCIUM 80 MG: 80 TABLET, FILM COATED ORAL at 20:40

## 2023-06-04 RX ADMIN — ZINC OXIDE 1 APPLICATION: 200 OINTMENT TOPICAL at 20:42

## 2023-06-04 RX ADMIN — GUAIFENESIN 600 MG: 600 TABLET, EXTENDED RELEASE ORAL at 08:35

## 2023-06-04 RX ADMIN — DOXYCYCLINE 100 MG: 100 CAPSULE ORAL at 20:40

## 2023-06-04 RX ADMIN — CITALOPRAM 20 MG: 20 TABLET, FILM COATED ORAL at 08:35

## 2023-06-04 RX ADMIN — METOPROLOL TARTRATE 12.5 MG: 25 TABLET, FILM COATED ORAL at 08:34

## 2023-06-04 RX ADMIN — ASPIRIN 81 MG: 81 TABLET, CHEWABLE ORAL at 08:35

## 2023-06-04 RX ADMIN — DOXYCYCLINE 100 MG: 100 CAPSULE ORAL at 08:35

## 2023-06-04 RX ADMIN — Medication 10 ML: at 20:41

## 2023-06-04 RX ADMIN — POTASSIUM CHLORIDE 10 MEQ: 750 TABLET, EXTENDED RELEASE ORAL at 08:35

## 2023-06-04 RX ADMIN — ACETAMINOPHEN 650 MG: 325 TABLET, FILM COATED ORAL at 19:13

## 2023-06-04 RX ADMIN — METOPROLOL TARTRATE 12.5 MG: 25 TABLET, FILM COATED ORAL at 20:40

## 2023-06-04 RX ADMIN — MEROPENEM 1 G: 1 INJECTION, POWDER, FOR SOLUTION INTRAVENOUS at 12:24

## 2023-06-04 NOTE — PROGRESS NOTES
Inpatient Rehabilitation Plan of Care Note    Plan of Care  Care Plan Reviewed - Updates as Follows    Safety    [RN] Potential for Injury(Active)  Current Status(06/04/2023): Patient at risk for injury related to mobility  issues.  Weekly Goal(06/09/2023): Patient will use call light appropriately and have no  injury while on Rehab.  Discharge Goal: Patient will have no injury while on Rehab.    Performed Intervention(s)  Items within reach, environmental set-up for reduce risk of fall  Bed alarms and chair alarms and safety rounds hourly      Psychosocial    [RN] Coping/Adjustment(Active)  Current Status(06/04/2023): Patient at risk for ineffective coping, but has a  supportive .  Weekly Goal(06/07/2023): Patient will verbalize needs and concerns related to  current situation.  Discharge Goal: Patient will have healthy coping skills at time of discharge.    Performed Intervention(s)  Medication as ordered and PRN  Verbalize needs and concerns  Therapeutic environmental set up      Sphincter Control    [RN] Bladder Management(Active)  Current Status(06/04/2023): Patient continent 75% of the time.  Weekly Goal(06/09/2023): Patient will be continent 100%  Discharge Goal: Patient will be continent 100% of the time.    [RN] Bowel Management(Active)  Current Status(06/04/2023): Patient incontinent 100% of the time.  Weekly Goal(06/09/2023): Patient will be continent 50% of the time.  Discharge Goal: Patient will be continent 100% of the time.    Performed Intervention(s)  Incontinent care as needed and ointment applied as ordered.  Take to bathroom in a timely manner  Proper diet and adequate fluid intake.      Body Systems    [RN] Integumentary(Active)  Current Status(06/04/2023): Patient has stage 2 pressure injury to cocyx  Weekly Goal(06/09/2023): Patients skin will be healing with no more  deterioration.  Discharge Goal: Patients skin will be healed with no further injury.    Performed  Intervention(s)  Medication, turn every 2 hours and proper nutrition.    Signed by: Shirley Cordero RN

## 2023-06-04 NOTE — PLAN OF CARE
Goal Outcome Evaluation:  Plan of Care Reviewed With: patient             Problem: Rehabilitation (IRF) Plan of Care  Goal: Plan of Care Review  Outcome: Ongoing, Progressing  Flowsheets (Taken 6/4/2023 6116)  Plan of Care Reviewed With: patient  Outcome Evaluation: Alert and oriented x 4. Flat affect. Pleasant and cooperative. Meds whole PO with thins. Continent of bowel and bladder with urgency. PICC to LUE, patent and flushes well. Continues IV abx q8 r/t PNA. Foam dressing to buttock dry and intact. BLE edema and propped on pillows. Denies pain. 89% on RA, 1 L humidified nc applied at HS. Independent with bed mobility. No unsafe behaviors. Call light within reach.

## 2023-06-04 NOTE — PROGRESS NOTES
[facility]     Progress Note    Patient Name: Katherine Williamson  : 1950  MRN: [id number]  Primary Care Physician:  Zelalem Flor APRN  Date of admission: 2023    Subjective  Pt is awake and alert. Pt is generally pleased with her progress in therapies.   Subjective     Chief Complaint: same    History of Present Illness  Patient Reports     Review of Systems    Objective  exam unchanged calves soft and NT. Bipedal edema persists.   Objective     Vitals:   Temp:  [96.8 °F (36 °C)-97.8 °F (36.6 °C)] 97.8 °F (36.6 °C)  Heart Rate:  [65-80] 80  Resp:  [16-17] 16  BP: (109-132)/(67-72) 132/72  Flow (L/min):  [1] 1    Physical Exam     Result Review    Result Review:  I have personally reviewed the results from the time of this admission to 2023 12:28 EDT and agree with these findings:  []  Laboratory list / accordion  []  Microbiology  []  Radiology  []  EKG/Telemetry   []  Cardiology/Vascular   []  Pathology  []  Old records  []  Other:  Most notable findings include: No new labs to review.       Assessment & Plan  Continue to prepare for dc. Pt remains medically stable.   Assessment / Plan     Brief Patient Summary:  Katherine Williamson is a 72 y.o. female who    Active Hospital Problems:  Active Hospital Problems    Diagnosis     **Acute right MCA stroke      Plan:       DVT prophylaxis:  No DVT prophylaxis order currently exists.    CODE STATUS:    Code Status (Patient has no pulse and is not breathing): CPR (Attempt to Resuscitate)  Medical Interventions (Patient has pulse or is breathing): Full Support    Disposition:  I expect patient to be discharged home.     Karthikeyan Kaba MD

## 2023-06-05 LAB
ANION GAP SERPL CALCULATED.3IONS-SCNC: 5.9 MMOL/L (ref 5–15)
BASOPHILS # BLD AUTO: 0.06 10*3/MM3 (ref 0–0.2)
BASOPHILS NFR BLD AUTO: 0.5 % (ref 0–1.5)
BUN SERPL-MCNC: 9 MG/DL (ref 8–23)
BUN/CREAT SERPL: 30 (ref 7–25)
CALCIUM SPEC-SCNC: 8.7 MG/DL (ref 8.6–10.5)
CHLORIDE SERPL-SCNC: 99 MMOL/L (ref 98–107)
CO2 SERPL-SCNC: 27.1 MMOL/L (ref 22–29)
CREAT SERPL-MCNC: 0.3 MG/DL (ref 0.57–1)
DEPRECATED RDW RBC AUTO: 45.3 FL (ref 37–54)
EGFRCR SERPLBLD CKD-EPI 2021: 114.3 ML/MIN/1.73
EOSINOPHIL # BLD AUTO: 0.21 10*3/MM3 (ref 0–0.4)
EOSINOPHIL NFR BLD AUTO: 1.8 % (ref 0.3–6.2)
ERYTHROCYTE [DISTWIDTH] IN BLOOD BY AUTOMATED COUNT: 14.4 % (ref 12.3–15.4)
GLUCOSE SERPL-MCNC: 78 MG/DL (ref 65–99)
HCT VFR BLD AUTO: 30.4 % (ref 34–46.6)
HGB BLD-MCNC: 10.2 G/DL (ref 12–15.9)
IMM GRANULOCYTES # BLD AUTO: 0.03 10*3/MM3 (ref 0–0.05)
IMM GRANULOCYTES NFR BLD AUTO: 0.3 % (ref 0–0.5)
LYMPHOCYTES # BLD AUTO: 2.96 10*3/MM3 (ref 0.7–3.1)
LYMPHOCYTES NFR BLD AUTO: 25.9 % (ref 19.6–45.3)
MCH RBC QN AUTO: 29.2 PG (ref 26.6–33)
MCHC RBC AUTO-ENTMCNC: 33.6 G/DL (ref 31.5–35.7)
MCV RBC AUTO: 87.1 FL (ref 79–97)
MONOCYTES # BLD AUTO: 0.71 10*3/MM3 (ref 0.1–0.9)
MONOCYTES NFR BLD AUTO: 6.2 % (ref 5–12)
NEUTROPHILS NFR BLD AUTO: 65.3 % (ref 42.7–76)
NEUTROPHILS NFR BLD AUTO: 7.44 10*3/MM3 (ref 1.7–7)
NRBC BLD AUTO-RTO: 0 /100 WBC (ref 0–0.2)
PLATELET # BLD AUTO: 292 10*3/MM3 (ref 140–450)
PMV BLD AUTO: 11.3 FL (ref 6–12)
POTASSIUM SERPL-SCNC: 3.8 MMOL/L (ref 3.5–5.2)
RBC # BLD AUTO: 3.49 10*6/MM3 (ref 3.77–5.28)
SODIUM SERPL-SCNC: 132 MMOL/L (ref 136–145)
WBC NRBC COR # BLD: 11.41 10*3/MM3 (ref 3.4–10.8)

## 2023-06-05 PROCEDURE — 97530 THERAPEUTIC ACTIVITIES: CPT

## 2023-06-05 PROCEDURE — 97535 SELF CARE MNGMENT TRAINING: CPT

## 2023-06-05 PROCEDURE — 97129 THER IVNTJ 1ST 15 MIN: CPT

## 2023-06-05 PROCEDURE — 86140 C-REACTIVE PROTEIN: CPT | Performed by: INTERNAL MEDICINE

## 2023-06-05 PROCEDURE — 85025 COMPLETE CBC W/AUTO DIFF WBC: CPT | Performed by: PHYSICAL MEDICINE & REHABILITATION

## 2023-06-05 PROCEDURE — 97116 GAIT TRAINING THERAPY: CPT

## 2023-06-05 PROCEDURE — 80048 BASIC METABOLIC PNL TOTAL CA: CPT | Performed by: PHYSICAL MEDICINE & REHABILITATION

## 2023-06-05 PROCEDURE — 25010000002 MEROPENEM PER 100 MG: Performed by: HOSPITALIST

## 2023-06-05 PROCEDURE — 97130 THER IVNTJ EA ADDL 15 MIN: CPT

## 2023-06-05 PROCEDURE — 97110 THERAPEUTIC EXERCISES: CPT

## 2023-06-05 RX ADMIN — Medication 1000 MCG: at 09:28

## 2023-06-05 RX ADMIN — ACETAMINOPHEN 650 MG: 325 TABLET, FILM COATED ORAL at 09:33

## 2023-06-05 RX ADMIN — ATORVASTATIN CALCIUM 80 MG: 80 TABLET, FILM COATED ORAL at 19:45

## 2023-06-05 RX ADMIN — METOPROLOL TARTRATE 12.5 MG: 25 TABLET, FILM COATED ORAL at 19:45

## 2023-06-05 RX ADMIN — DOXYCYCLINE 100 MG: 100 CAPSULE ORAL at 19:45

## 2023-06-05 RX ADMIN — METOPROLOL TARTRATE 12.5 MG: 25 TABLET, FILM COATED ORAL at 09:29

## 2023-06-05 RX ADMIN — CITALOPRAM 20 MG: 20 TABLET, FILM COATED ORAL at 09:28

## 2023-06-05 RX ADMIN — GUAIFENESIN 600 MG: 600 TABLET, EXTENDED RELEASE ORAL at 19:45

## 2023-06-05 RX ADMIN — Medication 10 ML: at 09:52

## 2023-06-05 RX ADMIN — GUAIFENESIN 600 MG: 600 TABLET, EXTENDED RELEASE ORAL at 09:28

## 2023-06-05 RX ADMIN — MEROPENEM 1 G: 1 INJECTION, POWDER, FOR SOLUTION INTRAVENOUS at 04:36

## 2023-06-05 RX ADMIN — POTASSIUM CHLORIDE 10 MEQ: 750 TABLET, EXTENDED RELEASE ORAL at 09:29

## 2023-06-05 RX ADMIN — ASPIRIN 81 MG: 81 TABLET, CHEWABLE ORAL at 09:28

## 2023-06-05 RX ADMIN — MEROPENEM 1 G: 1 INJECTION, POWDER, FOR SOLUTION INTRAVENOUS at 12:00

## 2023-06-05 RX ADMIN — ACETAMINOPHEN 650 MG: 325 TABLET, FILM COATED ORAL at 19:45

## 2023-06-05 RX ADMIN — Medication 10 ML: at 20:21

## 2023-06-05 RX ADMIN — ZINC OXIDE 1 APPLICATION: 200 OINTMENT TOPICAL at 09:52

## 2023-06-05 RX ADMIN — CLOPIDOGREL BISULFATE 75 MG: 75 TABLET, FILM COATED ORAL at 09:28

## 2023-06-05 RX ADMIN — MEROPENEM 1 G: 1 INJECTION, POWDER, FOR SOLUTION INTRAVENOUS at 19:46

## 2023-06-05 RX ADMIN — DOXYCYCLINE 100 MG: 100 CAPSULE ORAL at 09:29

## 2023-06-05 NOTE — PROGRESS NOTES
Inpatient Rehabilitation Plan of Care Note    Plan of Care  Updated Problems/Interventions  Mobility    [PT] Stairs(Active)  Current Status(06/05/2023): 4 steps, 2 HR, CGA  Weekly Goal(06/09/2023): PT only  Discharge Goal: 4 steps, 2 HR, CGA    [PT] Walk(Active)  Current Status(06/05/2023): 160' SBA-CGA RWX  Weekly Goal(06/09/2023): to and from BR, SBA, RWx  Discharge Goal: 160' SBA RWX    [PT] Bed/Chair/Wheelchair(Active)  Current Status(06/05/2023): SBA-CGA, RWx  Weekly Goal(06/09/2023): SBA, RWx  Discharge Goal: SBA, RWx    [PT] Bed Mobility(Active)  Current Status(06/05/2023): CGA  Weekly Goal(06/09/2023): SBA  Discharge Goal: SBA    Signed by: Melissa Wise, PT

## 2023-06-05 NOTE — PROGRESS NOTES
LOS: 17 days   Patient Care Team:  Zelalem Flor APRN as PCP - General (Internal Medicine)  Brandon Mitchell MD as Consulting Physician (Orthopedic Surgery)      ANNIE CERDA  1950    Diagnoses    DECREASED FUNCTIONAL MOBILITY AND ENDURANCE       ADMITTING DIAGNOSIS:  CVA      Subjective       On room air during the day and at night.  Reports comfortable with her breathing.  Still uses a pure wick at nighttime.  Does not plan to use while at home  Objective     Vitals:    06/05/23 1500   BP: 109/69   Pulse: 82   Resp: 18   Temp: 97.9 °F (36.6 °C)   SpO2: 91%       PHYSICAL EXAM:   MENTAL STATUS -  AWAKE / ALERT  HEENT-   SCLERAE ANICTERIC, CONJUNCTIVAE PINK, OP MOIST, NO JVD,  LUNGS -decreased air movement bilaterally   Clear to auscultation.  On room air.     HEART- RRR, NO RUB, MURMUR, OR GALLOP  ABD - NORMOACTIVE BOWEL SOUNDS, SOFT, NT.    EXT - NO EDEMA OR CYANOSIS     Right upper extremity PICC line site unremarkable without any swelling about the area.  NEURO -oriented x4  MOTOR EXAM -takes resistance bilaterally        MEDICATIONS  Scheduled Meds:aspirin, 81 mg, Oral, Q24H  atorvastatin, 80 mg, Oral, Nightly  citalopram, 20 mg, Oral, Daily  clopidogrel, 75 mg, Oral, Daily  doxycycline, 100 mg, Oral, Q12H  guaiFENesin, 600 mg, Oral, BID  hydrocortisone-bacitracin-zinc oxide-nystatin, 1 application, Topical, BID  magnesium hydroxide, 10 mL, Oral, Once  meropenem, 1 g, Intravenous, Q8H  metoprolol tartrate, 12.5 mg, Oral, Q12H  potassium chloride, 10 mEq, Oral, Daily  sodium chloride, 10 mL, Intravenous, Q12H  sodium chloride, 10 mL, Intravenous, Q12H  cyanocobalamin, 1,000 mcg, Oral, Daily      Continuous Infusions:   PRN Meds:.  acetaminophen    ipratropium-albuterol    loperamide    senna-docusate sodium    sodium chloride    sodium chloride    sodium chloride    sodium chloride    sodium chloride      RESULTS  No results found for: POCGLU  Results from last 7 days   Lab Units 06/05/23  6000  "06/02/23  0603 05/31/23  0518   WBC 10*3/mm3 11.41* 12.44* 11.20*   HEMOGLOBIN g/dL 10.2* 10.6* 10.6*   HEMATOCRIT % 30.4* 31.8* 31.1*   PLATELETS 10*3/mm3 292 370 378       Results from last 7 days   Lab Units 06/05/23  0436 06/02/23  0603   SODIUM mmol/L 132* 135*   POTASSIUM mmol/L 3.8 3.4*   CHLORIDE mmol/L 99 99   CO2 mmol/L 27.1 30.3*   BUN mg/dL 9 11   CREATININE mg/dL 0.30* 0.24*   CALCIUM mg/dL 8.7 8.6   GLUCOSE mg/dL 78 84         New Radiology:  ELIGIO negative for vegetations  CXR 5/25 revealed increased opacification of the RLL     Prior radiology:  MRI brain: \"Bilateral multifocal areas of infarction.  No evidence of hemorrhagic transformation. \"    ASSESSMENT and PLAN    Acute right MCA stroke    1. Acute R MCA stroke and bilateral infarcts  Stroke prophylaxis-aspirin/Plavix/atorvastatin  Zio patch placed on May 19 for 2 weeks  - 5/20 - Admit for inpt rehab w/ PT, OT, SLP, psychology and rehab medical and nursing care. Continue secondary stroke prophylaxis     2. Pneumonia and right lung abscess and Pleural effusions  - 5/20 - Continue IV antibx. ID following    - 5/21 - Continues to improve   The chest x-ray is reassuring   Expect her to be able to come off the oxygen in the next few days   She will need to have a follow-up chest imaging 6 weeks down the road   Finish the antibiotic course per ID, last day 6/8/2023   -5/22-on 1 L oxygen with activities and maintaining sats  -5/23- Per Pulmonology, continue Merrem. Continue to wean off supplemental oxygen  -5/24-oxygen weaned to 0.5 L nasal cannula  -5/25 -on room air and therapy, more 2 L last night  -5/26 Placed on 2L NC last night, WBC decreased to 15. CXR revealed increased opacification in RLL. Will continue Merrem and appreciate recommendations from Pulmonolgy. CDiff negative and bowel movements decreased this morning.  -5/30-WBC stable around 12 K.  On room air at rest.  -6/5-on room air    ID-meropenem and doxycycline-last dose June 8, " 2023  Repeat CT chest June 6, 2023  May 25-WBC increased to 18 K from 8K 5 days ago.  No fever.  Vital signs stable.  On room air during speech therapy session.  Not acutely ill-appearing.  Had 3 loose semiformed stool today but received bowel program yesterday for recent constipation.   PICC line site unremarkable.   Increased diffuse purpleish petechiae on the anterior lower legs.  She had a few earlier in the week but there more prominent today  Calves are soft without any significant edema.  Continues on meropenem and doxycycline.   Patient reviewed with pulmonology service who will assess.  Although with the recent bowel program, given the white blood cell count increased will check C. difficile with the loose stool today  Will review with infectious disease service  May 26-Persistent right empyema, similar to prior exam. Increased consolidation in the superior segment right lower lobe, left lower lobe. Multiple  small nodular densities in both lower lungs, indeterminate. Increased  cavitation in the right lower lobe. Decreased left pleural effusion  May 30-WBC improved 12 K.  Continues on antibiotics.   June 6-she had a CT of the chest on May 26.  We will see if she still needs to have the 1 done on June 6.  Get input from infectious disease service, scheduled to see them on June 8.      3. Acute hypoxic respiratory failure     4. Emphysema d/t Tobacco use     5. Hypertension-continue antihypertensive regimen and avoid hypotensive episodes.     6. DVT prophylaxis -   - 5/20 - Start Lovenox SQ daily    7.  Dysphagia  -5/22-diet advanced to regular solid thin liquids    8.  GI-constipation-May 24-add Senokot-Colace.  Milk of magnesia x1.  Patient maintaining hydration  May 25-loose stool semiformed x3 today.  Changed Senokot Colace to as needed    TEAM CONF - MAY 23 - BED MIN. TRANSFERS MIN MOD ASSIST. GAIT 60 FEET MIN RW. TOILET TRANSFERS MOD ASSIST RW. SATS 1 L WITH ACTIVITIES WITH SATS >=90%. BATH MOD. LBD  MOD.UBD MIN. GROOMING SET UP. TOILETING MAX. MILD EXECUTIVE FUNCTION DEFICITS. BNE PENDING. DIET ADVANCED TO REGULAR CONSISTENCY, GOOD INTAKE. PULM EXPECTS WILL BE OFF OXYGEN IN NEXT FEW DAYS. REPEAT CHEST IMAGING IN 6 WEEKS.  ELOS - 2 WEEKS.     Team conference-May 30  PT: Walking 160 ft, on 1L with walking which she was previously on RA with wakling. Min assist for bed transfer with RW. Min assist with toilet transfers  OT: Bathing min, Dressing lower body mod assist, Dressing upper body is set up, toileting is mod assist. Fatigues easily with therapy, wants to stay in   ST: Mild high level deficits, attention to detail with check booking, not working on med management  Nursing: Potassium supplementation, WBC is 12 which is stable, on ABX Doxy PO and Merrem IV, Drops to 80% if not on supplemental O2, on 1-2L NC. Coccyx pressure wound. On IV ABX until 6/28/2023  Psych: apathetic, flat affect, but was agreeable to do testing  RD: Started on Kwasi   ELOS: June 9th    Now admit for comprehensive acute inpatient rehabilitation .  This would be an interdisciplinary program with physical therapy 1 hour,  occupational therapy 1 hour, and speech therapy 1 hour, 5 days a week.  Rehabilitation nursing for carryover, monitoring of neurologic and pulmonary   status, bowel and bladder, and skin  Ongoing physician follow-up.  Weekly team conferences.  Goals are to achieve a level of supervision with  mobility and self-care and improved endurance.   Rehabilitation prognosis fair.  Medical prognosis fair.  Estimated length of stay is approximately 2 weeks, but is only an estimation.    .     The patient's functional status and clinical status is unchanged from preadmission assessment and the patient continues appropriate for acute inpatient rehabilitation.  Goal is for home with outpatient   therapies.  Barrier to discharge: Impaired mobility self-care endurance- work on vision, strength, gross and fine motor control, balance,  progressive ambulation, ADLs to overcome.           Sebastian Collins MD      During rounds, used appropriate personal protective equipment including mask and gloves.  Additional gown if indicated.  Mask used was standard procedure mask. Appropriate PPE was worn during the entire visit.  Hand hygiene was completed before and after.

## 2023-06-05 NOTE — THERAPY TREATMENT NOTE
Inpatient Rehabilitation - Occupational Therapy Treatment Note    [facility]     Patient Name: Katherine Williamson  : 1950  MRN: [id number]    Today's Date: 2023                 Admit Date: 2023         ICD-10-CM ICD-9-CM   1. Decreased functional mobility and endurance  Z74.09 780.99       Patient Active Problem List   Diagnosis    Benign essential HTN    Tobacco abuse    Dislocation of hip joint prosthesis    Fracture of proximal humerus    Mechanical complication of internal orthopedic device    Wear of articular bearing surface of internal prosthetic joint    History of repair of hip joint    History of operative procedure on hip    Hyperglycemia    Hepatic steatosis    Diverticulosis    Chest pain, atypical    History of colon polyps    Family history of colon cancer in mother    Allergic rhinitis    Lung nodule seen on imaging study    Acute respiratory failure with hypoxia    Abscess of right lung with pneumonia    Empyema    Sepsis    Pleural effusion, right    Other emphysema    Pulmonary nodule (RLL)    Diastolic dysfunction, grade 1    Physical debility    Cryptogenic stroke    Acute right MCA stroke       Past Medical History:   Diagnosis Date    Arthritis     Cataract     Colon polyps     FOLLOWED BY DR. JOSEPH LAU    Hypertension        Past Surgical History:   Procedure Laterality Date    COLONOSCOPY  10/2015    [facility]  Follow-up in 5 years.    COLONOSCOPY N/A 2022    2 BENIGN POLYPS IN DESCENDING, 5 MM BENIGN POLYP IN SIGMOID, MULTIPLE SMALL AND LARGE DIVERTICULA IN SIGMOID, RESCOPE IN 3 YRS, DR. JOSEPH LAU AT [facility]    EYE SURGERY      JOINT REPLACEMENT  ?    Left total hip replacement in .  In 2015 patient had left hip revision    TONSILLECTOMY               IRF OT ASSESSMENT FLOWSHEET (last 12 hours)       IRF OT Evaluation and Treatment       Row Name 23 1517          OT Time and Intention    Document Type daily treatment  -AF      Mode of Treatment occupational therapy  -AF     Patient Effort good  -AF       Row Name 06/05/23 1517          General Information    Patient/Family/Caregiver Comments/Observations pt sitting up in recliner chair in AM and up in w/c in PM  -AF     Existing Precautions/Restrictions fall  -AF     Comment, General Information on room air  -AF       Row Name 06/05/23 1517          Pain Assessment    Pretreatment Pain Rating 0/10 - no pain  -AF     Posttreatment Pain Rating 0/10 - no pain  -AF       Row Name 06/05/23 1517          Cognition/Psychosocial    Affect/Mental Status (Cognition) flat/blunted affect;WFL  -AF     Orientation Status (Cognition) oriented x 3  -AF     Follows Commands (Cognition) follows one-step commands;verbal cues/prompting required  -AF     Personal Safety Interventions fall prevention program maintained;gait belt;nonskid shoes/slippers when out of bed  -AF     Cognitive Function WFL  -AF       Row Name 06/05/23 1517          Grooming    [place] Level (Grooming) grooming skills;set up  -AF     Position (Grooming) sink side;supported sitting  -AF     Comment (Grooming) w/c level  -AF       Row Name 06/05/23 1517          Transfer Assessment/Treatment    Comment, (Transfers) stand pivot transfer w/c <> recliner chair CGA, sit to stand at counter top CGA/SBA  -AF       Row Name 06/05/23 1517          Motor Skills    Results, 9 Hole Peg Test of Fine Motor Coordination in standing FMC tasks with BUEs without UE support on counter top to increased endurance and ADL tasks  -AF     Therapeutic Exercise aerobic  -AF       Row Name 06/05/23 1517          Shoulder (Therapeutic Exercise)    Shoulder Strengthening (Therapeutic Exercise) bilateral;scapular stabilization;sitting;2 lb free weight;10 repetitions;2 sets  -AF       Row Name 06/05/23 1517          Elbow/Forearm (Therapeutic Exercise)    Elbow/Forearm Strengthening (Therapeutic Exercise)  bilateral;flexion;extension;supination;pronation;sitting;2 lb free weight;10 repetitions;2 sets  -AF       Row Name 06/05/23 1517          Wrist (Therapeutic Exercise)    Wrist Strengthening (Therapeutic Exercise) bilateral;flexion;extension;2 lb free weight;10 repetitions;2 sets  -AF       Row Name 06/05/23 1517          Aerobic Exercise    Type (Aerobic Exercise) arm bike;for 2 minutes  x 2 sets seated  -AF       Row Name 06/05/23 1517          Balance    Static Sitting Balance standby assist  -AF     Static Standing Balance contact guard;standby assist  -AF     Comment, Balance static standing to increase endurance for ADL tasks and transfers, stood 2 trials 3-4 mins  -AF       Row Name 06/05/23 1517          Positioning and Restraints    Pre-Treatment Position sitting in chair/recliner  -AF     Post Treatment Position wheelchair  -AF     In Chair sitting;call light within reach;encouraged to call for assist;exit alarm on  in AM  -AF     In Wheelchair sitting;exit alarm on;with PT  in PM  -AF               User Key  (r) = Recorded By, (t) = Taken By, (c) = Cosigned By      Initials Name Effective Dates    AF Silke Moreno, OTR 06/16/21 -                      Occupational Therapy Education       Title: PT OT SLP Therapies (Done)       Topic: Occupational Therapy (Done)       Point: ADL training (Done)       Description:   Instruct learner(s) on proper safety adaptation and remediation techniques during self care or transfers.   Instruct in proper use of assistive devices.                  Learning Progress Summary             Patient Acceptance, E, VU by AF at 6/1/2023 1139    Comment: pt and family present for meeting with rehab team, recommend  OT at d/c, shower chair with back and BSC over commode. discussed current ADL status and goals for the last week of rehab.    Acceptance, E, VU by WN at 5/26/2023 0038    Acceptance, E,TB, VU by SG at 5/20/2023 1516   Family Acceptance, E, VU by AF at 6/1/2023 5420     Comment: pt and family present for meeting with rehab team, recommend HH OT at d/c, shower chair with back and BSC over commode. discussed current ADL status and goals for the last week of rehab.                         Point: Home exercise program (Done)       Description:   Instruct learner(s) on appropriate technique for monitoring, assisting and/or progressing therapeutic exercises/activities.                  Learning Progress Summary             Patient Acceptance, E, VU by AF at 6/1/2023 1539    Comment: pt and family present for meeting with rehab team, recommend HH OT at d/c, shower chair with back and BSC over commode. discussed current ADL status and goals for the last week of rehab.    Acceptance, E, VU by WN at 5/26/2023 0038   Family Acceptance, E, VU by AF at 6/1/2023 1539    Comment: pt and family present for meeting with rehab team, recommend HH OT at d/c, shower chair with back and BSC over commode. discussed current ADL status and goals for the last week of rehab.                         Point: Precautions (Done)       Description:   Instruct learner(s) on prescribed precautions during self-care and functional transfers.                  Learning Progress Summary             Patient Acceptance, E, VU by AF at 6/1/2023 1539    Comment: pt and family present for meeting with rehab team, recommend HH OT at d/c, shower chair with back and BSC over commode. discussed current ADL status and goals for the last week of rehab.    Acceptance, E, VU by WN at 5/26/2023 0038   Family Acceptance, E, VU by AF at 6/1/2023 1539    Comment: pt and family present for meeting with rehab team, recommend HH OT at d/c, shower chair with back and BSC over commode. discussed current ADL status and goals for the last week of rehab.                         Point: Body mechanics (Done)       Description:   Instruct learner(s) on proper positioning and spine alignment during self-care, functional mobility activities and/or  exercises.                  Learning Progress Summary             Patient Acceptance, E, VU by AF at 6/1/2023 1539    Comment: pt and family present for meeting with rehab team, recommend HH OT at d/c, shower chair with back and BSC over commode. discussed current ADL status and goals for the last week of rehab.    Acceptance, E, VU by WN at 5/26/2023 0038    Acceptance, E, VU,DU,NR by AF at 5/23/2023 1448    Comment: safety and technique with transfers   Family Acceptance, E, VU by AF at 6/1/2023 1539    Comment: pt and family present for meeting with rehab team, recommend HH OT at d/c, shower chair with back and BSC over commode. discussed current ADL status and goals for the last week of rehab.                                         User Key       Initials Effective Dates Name Provider Type Discipline    SG 06/16/21 - 05/30/23 Verena Hewitt, OTR Occupational Therapist OT    AF 06/16/21 -  Silke Moreno OTR Occupational Therapist OT    WN 08/23/22 -  Reno Lazo, RN Registered Nurse Nurse                        OT Recommendation and Plan                         Time Calculation:      Time Calculation- OT       Row Name 06/05/23 1523 06/05/23 1522          Time Calculation- OT    OT Start Time 1230  -AF 0930  -AF     OT Stop Time 1300  -AF 1000  -AF     OT Time Calculation (min) 30 min  -AF 30 min  -AF               User Key  (r) = Recorded By, (t) = Taken By, (c) = Cosigned By      Initials Name Provider Type    AF Silke Moreno, OTR Occupational Therapist                  Therapy Charges for Today       Code Description Service Date Service Provider Modifiers Qty    22742153369 HC OT SELF CARE/MGMT/TRAIN EA 15 MIN 6/5/2023 Silke Moreno OTR GO 1    12671964806 HC OT THER PROC EA 15 MIN 6/5/2023 Silke Moreno OTENID GO 3                     SONG Kaiser  6/5/2023

## 2023-06-05 NOTE — PROGRESS NOTES
Inpatient Rehabilitation Plan of Care Note    Plan of Care  Care Plan Reviewed - No updates at this time.    Safety    Performed Intervention(s)  Items within reach, environmental set-up for reduce risk of fall  Bed alarms and chair alarms and safety rounds hourly      Psychosocial    Performed Intervention(s)  Medication as ordered and PRN  Verbalize needs and concerns  Therapeutic environmental set up      Sphincter Control    Performed Intervention(s)  Incontinent care as needed and ointment applied as ordered.  Take to bathroom in a timely manner  Proper diet and adequate fluid intake.      Body Systems    Performed Intervention(s)  Medication, turn every 2 hours and proper nutrition.    Signed by: Anabella Estrada RN

## 2023-06-05 NOTE — NURSING NOTE
06/05/23 0755   Wound 05/19/23 2055 Bilateral gluteal   Placement Date/Time: 05/19/23 2055   Present on Hospital Admission: Yes  Side: Bilateral  Location: gluteal   Wound Image    Base clean;moist;pink  (largest opening to left buttocks, small opening remains on right buttocks)   Periwound intact;blanchable;contracted  (deep red discoloration of skin)   Periwound Temperature warm   Wound Length (cm) 3.5 cm   Wound Width (cm) 2.5 cm   Wound Depth (cm) 0.1 cm   Wound Surface Area (cm^2) 8.75 cm^2   Wound Volume (cm^3) 0.875 cm^3   Drainage Characteristics/Odor serosanguineous   Drainage Amount scant   Care, Wound cleansed with;sterile normal saline   Dressing Care dressing changed;silver impregnated;hydrofiber;silicone;foam   Periwound Care dry periwound area maintained   Wound 05/14/23 1556 Right gluteal Pressure Injury   Placement Date/Time: 05/14/23 1556   Present on Hospital Admission: No  Side: Right  Location: gluteal  Primary Wound Type: Pressure Injury   Base closed/resurfaced;epithelialization   Care, Wound cleansed with;sterile normal saline   Dressing Care dressing changed;silicone;foam  (dressing for protection)   Skin Interventions   Pressure Reduction Devices specialty bed utilized;positioning supports utilized;chair cushion utilized   Pressure Reduction Techniques positioned off wounds;pressure points protected;heels elevated off bed     CWON Note: pt seen for follow up evaluation of skin issues POA to rehab unit. Pt is alert and able to turn and reposition with minimal assistance, rarely incontinent of bowel and bladder now.  She is on a low air loss mattress for adequate pressure redistribution.      Original gluteal DTI has healed. Right buttocks has reepithelialized with only 1 small opening remaining. Left buttocks continues to have pink partial thickness skin loss secondary to friction and MASD from IAD, 3.5x 2.5cms. Left buttocks wound was cleansed with NS, patted dry. [email or web address] with  Optifoam dressing placed, this can be changed daily, can be transitioned to 3x week when she discharges home.  Pt will need to continue frequent off-loading, continue use of a pressure redistribution W/C cushion. D/C scheduled for later this week. Will plan to see pt prior to D/C. .

## 2023-06-05 NOTE — PROGRESS NOTES
"Nutrition Services    Patient Name:  Katherine Williamson  YOB: 1950  MRN: [id number]  Admit Date:  5/19/2023      Assessment Date:  06/05/23    FOLLOW UP NOTE - CLINICAL NUTRITION    Comments:   Visited pt in room who reported good intakes, c/w EMR review of PO improved to % at recent meals. Pt found a liking for Kwasi, unflavored in coffee in the mornings, and orange flavor mixed in yogurt at lunch time (says it tastes like a dreamsicle). Will adjust supplement to align with preferences. Also delivered extra packets of Kwasi to room for pt to take home at d/c. Pt denies N/V. BLE edema noted. Last BM 6/4. Expected d/c 6/9.     Recommendations:   1. Changin Kwasi to unflavored, mixed with regular coffee,  at breakfast daily + orange flavor, with vanilla yogurt at lunch, daily.   2. Continue to encourage good intakes, prioritizing protein + using nourishment room selections when needed.    RD will continue to follow-up and trend intakes, per protocol.      Encounter Information        Reason for Encounter Follow-up   Current Issues S/p R MCA stroke     Current Nutrition Orders & Evaluation of Intake       Oral Nutrition     Current PO Diet Diet: Regular/House Diet; Texture: Regular Texture (IDDSI 7); Fluid Consistency: Thin (IDDSI 0)   Supplement N/a   PO Evaluation    % PO Intake/# of days %   Factors Affecting Intake  dislikes hospital food, limited food preferences     Anthropometrics         Height   Weight Height: 160 cm (63\")  Weight: 68.6 kg (151 lb 3.8 oz) (06/05/23 0500)   BMI kg/m2 Body mass index is 26.79 kg/m².  Overweight (25 - 29.9)   Weight trend Stable     Physical Findings          Physical Appearance alert, oriented   Oral/Mouth Cavity tooth or teeth missing   Edema  1+ (trace), 2+ (mild)   Gastrointestinal non-distended , normoactive, last bowel movement:6/4   Skin  pressure injury bilateral gluteal PI (S2)   Tubes/Drains/Lines central line/PICC     Labs       Pertinent Labs " Reviewed, listed below     Results from last 7 days   Lab Units 06/05/23  0436 06/02/23  0603   SODIUM mmol/L 132* 135*   POTASSIUM mmol/L 3.8 3.4*   CHLORIDE mmol/L 99 99   CO2 mmol/L 27.1 30.3*   BUN mg/dL 9 11   CREATININE mg/dL 0.30* 0.24*   CALCIUM mg/dL 8.7 8.6   GLUCOSE mg/dL 78 84       Results from last 7 days   Lab Units 06/05/23  0436   HEMOGLOBIN g/dL 10.2*   HEMATOCRIT % 30.4*   WBC 10*3/mm3 11.41*       Results from last 7 days   Lab Units 06/05/23  0436 06/02/23  0603 05/31/23  0518 05/30/23  0411   PLATELETS 10*3/mm3 292 370 378 383       COVID19   Date Value Ref Range Status   05/02/2023 Not Detected Not Detected - Ref. Range Final     Lab Results   Component Value Date    HGBA1C 6.20 (H) 05/15/2023          Medications           Scheduled Medications aspirin, 81 mg, Oral, Q24H  atorvastatin, 80 mg, Oral, Nightly  citalopram, 20 mg, Oral, Daily  clopidogrel, 75 mg, Oral, Daily  doxycycline, 100 mg, Oral, Q12H  guaiFENesin, 600 mg, Oral, BID  hydrocortisone-bacitracin-zinc oxide-nystatin, 1 application, Topical, BID  magnesium hydroxide, 10 mL, Oral, Once  meropenem, 1 g, Intravenous, Q8H  metoprolol tartrate, 12.5 mg, Oral, Q12H  potassium chloride, 10 mEq, Oral, Daily  sodium chloride, 10 mL, Intravenous, Q12H  sodium chloride, 10 mL, Intravenous, Q12H  cyanocobalamin, 1,000 mcg, Oral, Daily       Infusions     PRN Medications   acetaminophen    ipratropium-albuterol    loperamide    senna-docusate sodium    sodium chloride    sodium chloride    sodium chloride    sodium chloride    sodium chloride     PLAN OF CARE  Intervention Goal        Intervention Goal(s) Maintain nutrition status, Improved nutrition related labs, Tolerate PO , Increase intake, Continue positive trend, Maintain weight and No significant weight loss     Nutrition Intervention        RD Action Advise available snack, Adjusted supplement, Encourage intake, Follow Tx Progress and Care plan reviewed     Prescription        Diet  Prescription    Supplement Prescription Kwasi (unflavored) mixed in regular coffee, daily at breakfast. Kwasi (orange) with vanilla yogurt, daily at Lunch   EN/PN Prescription    Prescription Ordered Yes   --  Monitor/Evaluation        Monitor Per protocol, PO intake, Supplement intake, Pertinent labs, Weight, Skin status, GI status, Symptoms   Discharge Plan Pending clinical course   Education Will educate as needed       Electronically signed by:  Pina Chun RD  06/05/23 15:39 EDT

## 2023-06-05 NOTE — THERAPY TREATMENT NOTE
Inpatient Rehabilitation - Physical Therapy Treatment Note       [facility]     Patient Name: Katherine Williamson  : 1950  MRN: [id number]    Today's Date: 2023                    Admit Date: 2023      Visit Dx:     ICD-10-CM ICD-9-CM   1. Decreased functional mobility and endurance  Z74.09 780.99       Patient Active Problem List   Diagnosis    Benign essential HTN    Tobacco abuse    Dislocation of hip joint prosthesis    Fracture of proximal humerus    Mechanical complication of internal orthopedic device    Wear of articular bearing surface of internal prosthetic joint    History of repair of hip joint    History of operative procedure on hip    Hyperglycemia    Hepatic steatosis    Diverticulosis    Chest pain, atypical    History of colon polyps    Family history of colon cancer in mother    Allergic rhinitis    Lung nodule seen on imaging study    Acute respiratory failure with hypoxia    Abscess of right lung with pneumonia    Empyema    Sepsis    Pleural effusion, right    Other emphysema    Pulmonary nodule (RLL)    Diastolic dysfunction, grade 1    Physical debility    Cryptogenic stroke    Acute right MCA stroke       Past Medical History:   Diagnosis Date    Arthritis     Cataract     Colon polyps     FOLLOWED BY DR. JOSEPH LAU    Hypertension        Past Surgical History:   Procedure Laterality Date    COLONOSCOPY  10/2015    [address] [facility].  Follow-up in 5 years.    COLONOSCOPY N/A 2022    2 BENIGN POLYPS IN DESCENDING, 5 MM BENIGN POLYP IN SIGMOID, MULTIPLE SMALL AND LARGE DIVERTICULA IN SIGMOID, RESCOPE IN 3 YRS, DR. JOSEPH LAU AT [facility]    EYE SURGERY      JOINT REPLACEMENT  ?    Left total hip replacement in .  In 2015 patient had left hip revision    TONSILLECTOMY         PT ASSESSMENT (last 12 hours)       IRF PT Evaluation and Treatment       Row Name 23 0842          PT Time and Intention    Document Type daily treatment  -MD     Mode  of Treatment physical therapy  -MD     Patient/Family/Caregiver Comments/Observations Pt sitting in WC showing no signs of acute distress.  -MD       Row Name 06/05/23 0842          Pain Assessment    Pretreatment Pain Rating 9/10  -MD     Pain Location - buttock  -MD       Row Name 06/05/23 0842          Pain Scale: Word Pre/Post-Treatment    Pain Intervention(s) Repositioned;Ambulation/increased activity  -MD       Row Name 06/05/23 0842          Cognition/Psychosocial    Orientation Status (Cognition) oriented x 3  -MD     Follows Commands (Cognition) follows one-step commands;verbal cues/prompting required  -MD     Personal Safety Interventions fall prevention program maintained;gait belt  -MD     Cognitive Function WFL  -MD       Row Name 06/05/23 0842          Transfer Assessment/Treatment    Transfers car transfer  -MD       Row Name 06/05/23 0842          Sit-Stand Transfer    Sit-Stand [place] (Transfers) verbal cues;contact guard;standby assist  -MD     Assistive Device (Sit-Stand Transfers) walker, front-wheeled;wheelchair  -MD       Row Name 06/05/23 0842          Stand-Sit Transfer    Stand-Sit [place] (Transfers) verbal cues;standby assist  -MD     Assistive Device (Stand-Sit Transfers) walker, front-wheeled;wheelchair  -MD       Row Name 06/05/23 0842          Car Transfer    Type (Car Transfer) sit-stand;stand-sit  -MD     [place] Level (Car Transfer) verbal cues;contact guard  -MD     Assistive Device (Car Transfer) walker, front-wheeled  -MD       Row Name 06/05/23 0842          Gait/Stairs (Locomotion)    [place] Level (Gait) verbal cues;standby assist  -MD     Assistive Device (Gait) walker, front-wheeled  -MD     Distance in Feet (Gait) 160'x2 and 80'x2  -MD     Pattern (Gait) step-through  -MD     Deviations/Abnormal Patterns (Gait) raina decreased;stride length decreased  -MD     Bilateral Gait Deviations forward flexed posture  -MD     Left Sided Gait Deviations heel  strike decreased  -MD     [place] Level (Stairs) verbal cues;contact guard  -MD     Handrail Location (Stairs) right side (ascending);both sides  initially R side only to prepare for going home.  -MD     Number of Steps (Stairs) 4  -MD     Ascending Technique (Stairs) step-to-step  -MD     Descending Technique (Stairs) step-to-step  -MD       Row Name 06/05/23 0842          Hip (Therapeutic Exercise)    Hip Strengthening (Therapeutic Exercise) bilateral;aBduction;aDduction;marching while seated;10 repetitions  -MD       Row Name 06/05/23 0842          Knee (Therapeutic Exercise)    Knee Strengthening (Therapeutic Exercise) bilateral;LAQ (long arc quad);hamstring curls;10 repetitions  -MD       Row Name 06/05/23 0842          Positioning and Restraints    Pre-Treatment Position sitting in chair/recliner  -MD     Post Treatment Position wheelchair  -MD     In Wheelchair sitting;exit alarm on;with SLP  -MD               User Key  (r) = Recorded By, (t) = Taken By, (c) = Cosigned By      Initials Name Provider Type    Melissa Fischer, PT Physical Therapist                  Wound 05/14/23 1556 Right gluteal Pressure Injury (Active)   Base closed/resurfaced;epithelialization 06/05/23 0755   Care, Wound cleansed with;sterile normal saline 06/05/23 0755   Dressing Care dressing changed;silicone;foam 06/05/23 0755       Wound 05/19/23 2055 Bilateral gluteal (Active)   Wound Image   06/05/23 0755   Closure DARIUS 06/04/23 2040   Base clean;moist;pink 06/05/23 0755   Periwound intact;blanchable;contracted 06/05/23 0755   Periwound Temperature warm 06/05/23 0755   Wound Length (cm) 3.5 cm 06/05/23 0755   Wound Width (cm) 2.5 cm 06/05/23 0755   Wound Depth (cm) 0.1 cm 06/05/23 0755   Wound Surface Area (cm^2) 8.75 cm^2 06/05/23 0755   Wound Volume (cm^3) 0.875 cm^3 06/05/23 0755   Drainage Characteristics/Odor serosanguineous 06/05/23 0755   Drainage Amount scant 06/05/23 0755   Care, Wound cleansed with;sterile normal saline  06/05/23 0755   Dressing Care dressing changed;silver impregnated;hydrofiber;silicone;foam 06/05/23 0755   Periwound Care dry periwound area maintained 06/05/23 0755     Physical Therapy Education       Title: PT OT SLP Therapies (Done)       Topic: Physical Therapy (Done)       Point: Mobility training (Done)       Learning Progress Summary             Patient Acceptance, E,TB, VU,NR by EE at 5/31/2023 1200    Acceptance, E,TB, VU,NR by EE at 5/30/2023 1131    Acceptance, E,TB, VU by KP at 5/27/2023 0932    Acceptance, E, VU by WN at 5/26/2023 0038    Eager, E,TB,D, NR by  at 5/25/2023 1019    Acceptance, E,D, VU,NR by EE at 5/24/2023 1136    Acceptance, E,TB, VU,NR by EE at 5/23/2023 1139    Acceptance, E,TB, NR,VU by EE at 5/22/2023 1221    Acceptance, E, NR by  at 5/20/2023 1216                         Point: Home exercise program (Done)       Learning Progress Summary             Patient Acceptance, E,TB, VU,NR by EE at 5/30/2023 1131    Acceptance, E,TB, VU by KP at 5/27/2023 0932    Acceptance, E, VU by WN at 5/26/2023 0038    Eager, E,TB,D, NR by  at 5/25/2023 1019    Acceptance, E,D, VU,NR by EE at 5/24/2023 1136    Acceptance, E,TB, VU,NR by EE at 5/23/2023 1139    Acceptance, E,TB, NR,VU by EE at 5/22/2023 1221    Acceptance, E, NR by  at 5/20/2023 1216                         Point: Body mechanics (Done)       Learning Progress Summary             Patient Acceptance, E,TB, VU,NR by EE at 5/31/2023 1200    Acceptance, E,TB, VU,NR by EE at 5/30/2023 1131    Acceptance, E, VU by WN at 5/26/2023 0038    Acceptance, E,D, VU,NR by EE at 5/24/2023 1136    Acceptance, E,TB, VU,NR by EE at 5/23/2023 1139    Acceptance, E,TB, NR,VU by EE at 5/22/2023 1221    Acceptance, E, NR by  at 5/20/2023 1216                         Point: Precautions (Done)       Learning Progress Summary             Patient Acceptance, E, VU by MD at 6/5/2023 0847    Acceptance, E, VU by MD at 6/2/2023 1345    Acceptance, E, VU by  MD at 6/1/2023 0848    Acceptance, E,TB, VU,NR by  at 5/31/2023 1200    Acceptance, E,TB, VU,NR by EE at 5/30/2023 1131    Acceptance, E, VU by MD at 5/26/2023 1014    Acceptance, E, VU by  at 5/26/2023 0038    Acceptance, E,D, VU,NR by EE at 5/24/2023 1136    Acceptance, E,TB, VU,NR by  at 5/23/2023 1139    Acceptance, E,TB, NR,VU by  at 5/22/2023 1221    Acceptance, E, NR by  at 5/20/2023 1216                                         User Key       Initials Effective Dates Name Provider Type Discipline     06/16/21 -  Jess Wang, PT Physical Therapist PT    EE 06/16/21 -  Tena Garay, PT Physical Therapist PT    MD 06/16/21 -  Melissa Wise PT Physical Therapist PT     06/16/21 -  Jesusita Mendoza, PT Physical Therapist PT    WN 08/23/22 -  Reno Lazo RN Registered Nurse Nurse                    PT Recommendation and Plan                          Time Calculation:      PT Charges       Row Name 06/05/23 1332 06/05/23 1308          Time Calculation    Start Time 0830  -MD 1300  -MD     Stop Time 0900  -MD 1330  -MD     Time Calculation (min) 30 min  -MD 30 min  -MD     PT Received On -- 06/05/23  -MD     PT - Next Appointment -- 06/06/23  -MD               User Key  (r) = Recorded By, (t) = Taken By, (c) = Cosigned By      Initials Name Provider Type    Melissa Fischer, PT Physical Therapist                    Therapy Charges for Today       Code Description Service Date Service Provider Modifiers Qty    38627169506 HC PT THERAPEUTIC ACT EA 15 MIN 6/5/2023 Melissa Wise, PT GP 2    23160095048 HC GAIT TRAINING EA 15 MIN 6/5/2023 Melissa Wise, PT GP 2                     Melissa Wise, PT  6/5/2023

## 2023-06-05 NOTE — PROGRESS NOTES
Inpatient Rehabilitation Functional Measures Assessment and Plan of Care    Plan of Care  Updated Problems/Interventions  Cognition    [ST] Executive Functions(Active)  Current Status(06/05/2023): Now completes medicine management with NO cues and  checkbook balancing with intermittent MIN cues.  Weekly Goal(06/09/2023): Will complete home management tasks independently.  Discharge Goal: Adequate executive function skills for home environment.    Signed by: Sarita Comer, SLP

## 2023-06-05 NOTE — PROGRESS NOTES
Inpatient Rehabilitation Functional Measures Assessment and Plan of Care    Plan of Care  Updated Problems/Interventions  Self Care    [OT] Bathing(Active)  Current Status(06/05/2023): MIN  Weekly Goal(06/09/2023): CGA/SBA  Discharge Goal: CGA/SBA    [OT] Dressing (Lower)(Active)  Current Status(06/05/2023): MIN/CGA  Weekly Goal(06/09/2023): CGA  Discharge Goal: CGA    [OT] Dressing (Upper)(Active)  Current Status(06/05/2023): set up  Weekly Goal(06/09/2023): set up  Discharge Goal: set up    [OT] Grooming(Active)  Current Status(06/05/2023): supervision  Weekly Goal(06/09/2023): Supervision  Discharge Goal: Supervision    [OT] Toileting(Active)  Current Status(06/05/2023): MIN/CGA  Weekly Goal(06/09/2023): CGA/SBA  Discharge Goal: CGA/SBA        Mobility    [OT] Toilet Transfers(Active)  Current Status(06/05/2023): CGA  Weekly Goal(06/09/2023): CGA/SBA  Discharge Goal: CGA/SBA    [OT] Tub/Shower Transfers(Active)  Current Status(06/05/2023): MIN  Weekly Goal(06/09/2023): CGA/SBA  Discharge Goal: CGA/SBA    Functional Measures  TAMMY Eating:  Branch  TAMMY Grooming: Branch  TAMMY Bathing:  Branch  TAMMY Upper Body Dressing:  Branch  TAMMY Lower Body Dressing:  Branch  TAMMY Toileting:  Branch    TAMMY Bladder Management  Level of Assistance:  Branch  Frequency/Number of Accidents this Shift:  Branch    TAMMY Bowel Management  Level of Assistance: Branch  Frequency/Number of Accidents this Shift: Branch    TAMMY Bed/Chair/Wheelchair Transfer:  Branch  TAMMY Toilet Transfer:  Branch  TAMMY Tub/Shower Transfer:  Branch    Previously Documented Mode of Locomotion at Discharge: Field  TAMMY Expected Mode of Locomotion at Discharge: Branch  TAMMY Walk/Wheelchair:  Branch  TAMMY Stairs:  Branch    TAMMY Comprehension:  Branch  TAMMY Expression:  Branch  TAMMY Social Interaction:  Branch  TAMMY Problem Solving:  Branch  TAMMY Memory:  Branch    Therapy Mode Minutes  Occupational Therapy: Branch  Physical Therapy: Branch  Speech Language Pathology:   Joe    Signed by: Silke Moreno OT

## 2023-06-05 NOTE — PLAN OF CARE
Goal Outcome Evaluation:  Plan of Care Reviewed With: patient        Progress: improving  Outcome Evaluation: Katherine rested well this shift.  Turns self in bed.  PICC line in place, dressing in tact.  flushes w/good blood return.  IV antibiotics x2 infusing.  1L O2 .  c/o pain to buttocks, tylenol for comfort.

## 2023-06-05 NOTE — PROGRESS NOTES
Recreational Therapy Note    Patient Name: Katherine Williamson   MRN: [id number]    Therapeutic Recreation Eval and Treat (last 12 hours)       Therapeutic Recreation Eval & Treat       Row Name 06/05/23 1500       Therapeutic Recreation Participation    Recreation Therapy Participation games  -SS    Games card games  Phase 10  -    Objectives of Recreation Participation increase;sense of autonomy by choosing level of participation  -    Comment, Recreation Participation occ cues to recall directions  -    Recreation Therapy Summary of Participation active participation  -              User Key  (r) = Recorded By, (t) = Taken By, (c) = Cosigned By      Initials Name Provider Type    SS Sanaz Radford, CTRS Recreational Therapist                      CHUNG Hollins  6/5/2023

## 2023-06-05 NOTE — THERAPY TREATMENT NOTE
Inpatient Rehabilitation - Speech Language Pathology Treatment Note    [facility]     Patient Name: Katherine Williamson  : 1950  MRN: [id number]    Today's Date: 2023                   Admit Date: 2023       Visit Dx:      ICD-10-CM ICD-9-CM   1. Decreased functional mobility and endurance  Z74.09 780.99       Patient Active Problem List   Diagnosis    Benign essential HTN    Tobacco abuse    Dislocation of hip joint prosthesis    Fracture of proximal humerus    Mechanical complication of internal orthopedic device    Wear of articular bearing surface of internal prosthetic joint    History of repair of hip joint    History of operative procedure on hip    Hyperglycemia    Hepatic steatosis    Diverticulosis    Chest pain, atypical    History of colon polyps    Family history of colon cancer in mother    Allergic rhinitis    Lung nodule seen on imaging study    Acute respiratory failure with hypoxia    Abscess of right lung with pneumonia    Empyema    Sepsis    Pleural effusion, right    Other emphysema    Pulmonary nodule (RLL)    Diastolic dysfunction, grade 1    Physical debility    Cryptogenic stroke    Acute right MCA stroke       Past Medical History:   Diagnosis Date    Arthritis     Cataract     Colon polyps     FOLLOWED BY DR. JOSEPH LAU    Hypertension        Past Surgical History:   Procedure Laterality Date    COLONOSCOPY  10/2015    [facility].  Follow-up in 5 years.    COLONOSCOPY N/A 2022    2 BENIGN POLYPS IN DESCENDING, 5 MM BENIGN POLYP IN SIGMOID, MULTIPLE SMALL AND LARGE DIVERTICULA IN SIGMOID, RESCOPE IN 3 YRS, DR. JOSEPH LAU AT [facility]    EYE SURGERY      JOINT REPLACEMENT  ?    Left total hip replacement in .  In 2015 patient had left hip revision    TONSILLECTOMY         SLP Recommendation and Plan                                                            SLP EVALUATION (last 72 hours)       SLP SLC Evaluation       Row Name 23  1330 06/05/23 1030                Communication Assessment/Intervention    Document Type therapy note (daily note)  -AL therapy note (daily note)  -AL       Patient/Family/Caregiver Comments/Observations Pt participated well.  -AL Pt participated well.  -AL          Pain Scale: Numbers Pre/Post-Treatment    Pretreatment Pain Rating 0/10 - no pain  -AL 0/10 - no pain  -AL                 User Key  (r) = Recorded By, (t) = Taken By, (c) = Cosigned By      Initials Name Effective Dates    Sarita Landry, MS CCC-SLP 06/16/21 -                        EDUCATION    The patient has been educated in the following areas:       Cognitive Impairment.             SLP GOALS       Row Name 06/05/23 1330 06/05/23 1030          Reasoning Goal 1 (SLP)    Improve Reasoning Through Goal 1 (SLP) complete high level reasoning task;90%;independently (over 90% accuracy)  -AL complete high level reasoning task;90%;independently (over 90% accuracy)  -AL     Time Frame (Reasoning Goal 1, SLP) -- short term goal (STG);1 week  -AL     Progress/Outcomes (Reasoning Goal 1, SLP) good progress toward goal  -AL good progress toward goal  -AL     Comment (Reasoning Goal 1, SLP) Complex exclusion style logic puzzle: 20% with NO cues, 100% with MIN-MOD cues. Reduced attention to detail impacted task.  -AL Moderate level logic puzzle:  95% with NO cues, 100% with MIN cues. Goal met x1.  -AL        Functional Math Skills Goal 1 (SLP)    Improve Functional Math Skills Through Goal 1 (SLP) -- complete functional math task;100%;independently (over 90% accuracy)  -AL     Time Frame (Functional Math Skills Goal 1, SLP) -- 1 week  -AL     Progress/Outcomes (Functional Math Skills Goal 1, SLP) -- good progress toward goal  -AL     Comment (Functional Math Skills Goal 1, SLP) -- [employer] prices task: 80% with NO cues, 100% with MIN cues.  -AL        Executive Functional Skills Goal 1 (SLP)    Improve Executive Function Skills Goal 1 (SLP) --  organization/planning activity  -AL     Time Frame (Executive Function Skills Goal 1, SLP) -- by discharge  -AL     Progress/Outcomes (Executive Function Skills Goal 1, SLP) good progress toward goal  -AL --     Comment (Executive Function Skills Goal 1, SLP) Medicine management with pill box: 100% with NO cues.  -AL Checkbook balancin/8 with NO cues,  with MIN cues.  -AL               User Key  (r) = Recorded By, (t) = Taken By, (c) = Cosigned By      Initials Name Provider Type    Sarita Landry MS CCC-SLP Speech and Language Pathologist                                Time Calculation:        Time Calculation- SLP       Row Name 23 1442 23 1059          Time Calculation- SLP    SLP Start Time 1330  -AL 1030  -AL     SLP Stop Time 1400  -AL 1100  -AL     SLP Time Calculation (min) 30 min  -AL 30 min  -AL               User Key  (r) = Recorded By, (t) = Taken By, (c) = Cosigned By      Initials Name Provider Type    Sarita Landry MS CCC-SLP Speech and Language Pathologist                      Therapy Charges for Today       Code Description Service Date Service Provider Modifiers Qty    75330152410 HC ST DEV OF COGN SKILLS INITIAL 15 MIN 2023 Sarita Comer MS CCC-SLP  1    80149446261 HC ST DEV OF COGN SKILLS EACH ADDT'L 15 MIN 2023 Sarita Comer MS CCC-SLP  3                             Sarita Comer MS CCC-SLP  2023

## 2023-06-05 NOTE — PROGRESS NOTES
Case Management  Inpatient Rehabilitation Plan of Care and Discharge Plan Note    Rehabilitation Diagnosis:  Branch  Date of Onset:  Branch    Medical Summary:  Branch  Past Medical History: Branch    Plan of Care  Updated Problems/Interventions  Field    Expected Intensity:  Branch  Interdisciplinary Team:  Joe  Estimated Length of Stay/Anticipated Discharge Date: Branch  Anticipated Discharge Destination:  Anticipated discharge destination from inpatient rehabilitation is community  discharge with assistance. Patient lives with  in one story walk out  ranch style home with two step entry.  Family conference held with patient, , friend and God son on 6/1  D/C plan is home with  who plans to take FMLA. Friend can also assist  intermittently.   PT, OT, ST, NSG      Based on the patient's medical and functional status, their prognosis and  expected level of functional improvement is:  Joe    Signed by: GEORGES Franks

## 2023-06-06 LAB — CRP SERPL-MCNC: 4.45 MG/DL (ref 0–0.5)

## 2023-06-06 PROCEDURE — 97116 GAIT TRAINING THERAPY: CPT

## 2023-06-06 PROCEDURE — 97530 THERAPEUTIC ACTIVITIES: CPT

## 2023-06-06 PROCEDURE — 97535 SELF CARE MNGMENT TRAINING: CPT

## 2023-06-06 PROCEDURE — 99232 SBSQ HOSP IP/OBS MODERATE 35: CPT | Performed by: INTERNAL MEDICINE

## 2023-06-06 PROCEDURE — 97130 THER IVNTJ EA ADDL 15 MIN: CPT

## 2023-06-06 PROCEDURE — 25010000002 MEROPENEM PER 100 MG: Performed by: HOSPITALIST

## 2023-06-06 PROCEDURE — 97110 THERAPEUTIC EXERCISES: CPT

## 2023-06-06 PROCEDURE — 97129 THER IVNTJ 1ST 15 MIN: CPT

## 2023-06-06 RX ADMIN — MEROPENEM 1 G: 1 INJECTION, POWDER, FOR SOLUTION INTRAVENOUS at 20:08

## 2023-06-06 RX ADMIN — CLOPIDOGREL BISULFATE 75 MG: 75 TABLET, FILM COATED ORAL at 09:36

## 2023-06-06 RX ADMIN — ACETAMINOPHEN 650 MG: 325 TABLET, FILM COATED ORAL at 16:24

## 2023-06-06 RX ADMIN — GUAIFENESIN 600 MG: 600 TABLET, EXTENDED RELEASE ORAL at 20:07

## 2023-06-06 RX ADMIN — Medication 10 ML: at 20:07

## 2023-06-06 RX ADMIN — ASPIRIN 81 MG: 81 TABLET, CHEWABLE ORAL at 09:35

## 2023-06-06 RX ADMIN — ACETAMINOPHEN 650 MG: 325 TABLET, FILM COATED ORAL at 21:51

## 2023-06-06 RX ADMIN — MEROPENEM 1 G: 1 INJECTION, POWDER, FOR SOLUTION INTRAVENOUS at 12:09

## 2023-06-06 RX ADMIN — MEROPENEM 1 G: 1 INJECTION, POWDER, FOR SOLUTION INTRAVENOUS at 04:40

## 2023-06-06 RX ADMIN — ZINC OXIDE 1 APPLICATION: 200 OINTMENT TOPICAL at 09:40

## 2023-06-06 RX ADMIN — CITALOPRAM 20 MG: 20 TABLET, FILM COATED ORAL at 09:35

## 2023-06-06 RX ADMIN — POTASSIUM CHLORIDE 10 MEQ: 750 TABLET, EXTENDED RELEASE ORAL at 09:36

## 2023-06-06 RX ADMIN — ATORVASTATIN CALCIUM 80 MG: 80 TABLET, FILM COATED ORAL at 20:07

## 2023-06-06 RX ADMIN — DOXYCYCLINE 100 MG: 100 CAPSULE ORAL at 09:35

## 2023-06-06 RX ADMIN — Medication 1000 MCG: at 09:36

## 2023-06-06 RX ADMIN — DOXYCYCLINE 100 MG: 100 CAPSULE ORAL at 20:07

## 2023-06-06 RX ADMIN — METOPROLOL TARTRATE 12.5 MG: 25 TABLET, FILM COATED ORAL at 09:36

## 2023-06-06 RX ADMIN — GUAIFENESIN 600 MG: 600 TABLET, EXTENDED RELEASE ORAL at 09:35

## 2023-06-06 RX ADMIN — METOPROLOL TARTRATE 12.5 MG: 25 TABLET, FILM COATED ORAL at 20:07

## 2023-06-06 NOTE — THERAPY TREATMENT NOTE
Inpatient Rehabilitation - Speech Language Pathology Treatment Note    [facility]     Patient Name: Katherine Williamson  : 1950  MRN: [id number]    Today's Date: 2023                   Admit Date: 2023       Visit Dx:      ICD-10-CM ICD-9-CM   1. Decreased functional mobility and endurance  Z74.09 780.99       Patient Active Problem List   Diagnosis    Benign essential HTN    Tobacco abuse    Dislocation of hip joint prosthesis    Fracture of proximal humerus    Mechanical complication of internal orthopedic device    Wear of articular bearing surface of internal prosthetic joint    History of repair of hip joint    History of operative procedure on hip    Hyperglycemia    Hepatic steatosis    Diverticulosis    Chest pain, atypical    History of colon polyps    Family history of colon cancer in mother    Allergic rhinitis    Lung nodule seen on imaging study    Acute respiratory failure with hypoxia    Abscess of right lung with pneumonia    Empyema    Sepsis    Pleural effusion, right    Other emphysema    Pulmonary nodule (RLL)    Diastolic dysfunction, grade 1    Physical debility    Cryptogenic stroke    Acute right MCA stroke       Past Medical History:   Diagnosis Date    Arthritis     Cataract     Colon polyps     FOLLOWED BY DR. JOSEPH LAU    Hypertension        Past Surgical History:   Procedure Laterality Date    COLONOSCOPY  10/2015    Zdihm-[id number]-Ws. Kaplan.  Follow-up in 5 years.    COLONOSCOPY N/A 2022    2 BENIGN POLYPS IN DESCENDING, 5 MM BENIGN POLYP IN SIGMOID, MULTIPLE SMALL AND LARGE DIVERTICULA IN SIGMOID, RESCOPE IN 3 YRS, DR. JOSEPH LAU AT [facility]    EYE SURGERY      JOINT REPLACEMENT  ?    Left total hip replacement in .  In 2015 patient had left hip revision    TONSILLECTOMY         SLP Recommendation and Plan                                                            SLP EVALUATION (last 72 hours)       SLP SLC Evaluation       Row Name 23  1430 06/06/23 1330 06/05/23 1330 06/05/23 1030          Communication Assessment/Intervention    Document Type therapy note (daily note)  -AL therapy note (daily note)  -AL therapy note (daily note)  -AL therapy note (daily note)  -AL     Patient/Family/Caregiver Comments/Observations Pt participated well. Seen bedside.  -AL Pt participated well.  -AL Pt participated well.  -AL Pt participated well.  -AL        Pain Scale: Numbers Pre/Post-Treatment    Pretreatment Pain Rating 0/10 - no pain  -AL 0/10 - no pain  -AL 0/10 - no pain  -AL 0/10 - no pain  -AL               User Key  (r) = Recorded By, (t) = Taken By, (c) = Cosigned By      Initials Name Effective Dates    Sarita Landry, MS CCC-SLP 06/16/21 -                        EDUCATION    The patient has been educated in the following areas:       Cognitive Impairment.             SLP GOALS       Row Name 06/06/23 1430 06/06/23 1330 06/05/23 1330       Reasoning Goal 1 (SLP)    Improve Reasoning Through Goal 1 (SLP) complete high level reasoning task;90%;independently (over 90% accuracy)  -AL complete high level reasoning task;90%;independently (over 90% accuracy)  -AL complete high level reasoning task;90%;independently (over 90% accuracy)  -AL    Time Frame (Reasoning Goal 1, SLP) short term goal (STG);1 week  -AL -- --    Progress/Outcomes (Reasoning Goal 1, SLP) good progress toward goal  -AL good progress toward goal  -AL good progress toward goal  -AL    Comment (Reasoning Goal 1, SLP) Moderate level logic puzzle task: 80% with NO cues, 100% with MIN cues.  -AL Complex exclusion style logic puzzle: 2/12 with NO cues, 12/12 with MIN-MOD cues. Reduced attention to detail impacted task.  -AL Complex exclusion style logic puzzle: 20% with NO cues, 100% with MIN-MOD cues. Reduced attention to detail impacted task.  -AL       Functional Math Skills Goal 1 (SLP)    Improve Functional Math Skills Through Goal 1 (SLP) complete functional math  task;100%;independently (over 90% accuracy)  -AL -- --    Time Frame (Functional Math Skills Goal 1, SLP) 1 week  -AL -- --    Progress/Outcomes (Functional Math Skills Goal 1, SLP) good progress toward goal  -AL -- --    Comment (Functional Math Skills Goal 1, SLP) Checkbook balancing task: 85% with NO cues ,1005 with MIN cues.  -AL -- --       Executive Functional Skills Goal 1 (SLP)    Progress/Outcomes (Executive Function Skills Goal 1, SLP) -- -- good progress toward goal  -AL    Comment (Executive Function Skills Goal 1, SLP) -- -- Medicine management with pill box: 100% with NO cues.  -AL      Row Name 23 1030             Reasoning Goal 1 (SLP)    Improve Reasoning Through Goal 1 (SLP) complete high level reasoning task;90%;independently (over 90% accuracy)  -AL      Time Frame (Reasoning Goal 1, SLP) short term goal (STG);1 week  -AL      Progress/Outcomes (Reasoning Goal 1, SLP) good progress toward goal  -AL      Comment (Reasoning Goal 1, SLP) Moderate level logic puzzle:  95% with NO cues, 100% with MIN cues. Goal met x1.  -AL         Functional Math Skills Goal 1 (SLP)    Improve Functional Math Skills Through Goal 1 (SLP) complete functional math task;100%;independently (over 90% accuracy)  -AL      Time Frame (Functional Math Skills Goal 1, SLP) 1 week  -AL      Progress/Outcomes (Functional Math Skills Goal 1, SLP) good progress toward goal  -AL      Comment (Functional Math Skills Goal 1, SLP) Deli prices task: 80% with NO cues, 100% with MIN cues.  -AL         Executive Functional Skills Goal 1 (SLP)    Improve Executive Function Skills Goal 1 (SLP) organization/planning activity  -AL      Time Frame (Executive Function Skills Goal 1, SLP) by discharge  -AL      Comment (Executive Function Skills Goal 1, SLP) Checkbook balancin/8 with NO cues, 8/8 with MIN cues.  -AL                User Key  (r) = Recorded By, (t) = Taken By, (c) = Cosigned By      Initials Name Provider Type    AL  Sarita Comer, MS CCC-SLP Speech and Language Pathologist                                Time Calculation:        Time Calculation- SLP       Row Name 06/06/23 1515 06/06/23 1513          Time Calculation- SLP    SLP Start Time 1430  -AL 1330  -AL     SLP Stop Time 1500  -AL 1400  -AL     SLP Time Calculation (min) 30 min  -AL 30 min  -AL               User Key  (r) = Recorded By, (t) = Taken By, (c) = Cosigned By      Initials Name Provider Type    Sarita Landry, MS CCC-SLP Speech and Language Pathologist                      Therapy Charges for Today       Code Description Service Date Service Provider Modifiers Qty    48185131367 HC ST DEV OF COGN SKILLS INITIAL 15 MIN 6/5/2023 Sarita Comer, MS CCC-SLP  1    47801320130 HC ST DEV OF COGN SKILLS EACH ADDT'L 15 MIN 6/5/2023 Sarita Comer, MS CCC-SLP  3    20439209341 HC ST DEV OF COGN SKILLS INITIAL 15 MIN 6/6/2023 Sarita Comer, MS CCC-SLP  1    04250274132 HC ST DEV OF COGN SKILLS EACH ADDT'L 15 MIN 6/6/2023 Sarita Comer MS CCC-SLP  3                             Sarita Comer MS CCC-SLP  6/6/2023

## 2023-06-06 NOTE — PROGRESS NOTES
Case Management  Inpatient Rehabilitation Team Conference    Conference Date/Time: 6/6/2023 7:40:04 AM    Team Conference Attendees:  Dr. Sebastian Beltran, Pharmacist  Sanaz Pham, [facility]  Melissa Wise, PT  Silke Moreno, OT  Sarita Comer, SLP  Sanaz Radford, CTRS  Monse Ruvalcaba, RN  Susana Ugalde, RN   Estelle Neville, Psychologist  Pina Chun, Dietician    Demographics            Age: 72Y            Gender: Female    Admission Date: 5/19/2023 6:49:00 PM  Rehabilitation Diagnosis:  Acute R MCA stroke and bilateral infarcts  Past Medical History: Past Medical History:  DiagnosisDate  ?Arthritis?  ?Cataract?  ?Colon polyps?  ?FOLLOWED BY DR. JOSEPH LAU  ?Hypertension?    ?  Past Surgical History:  Surgical History?Expand by Default  ????  Past Surgical History:  ProcedureLateralityDate  ?COLONOSCOPY?10/2015  ?Polyp-hyperplastic?Dr. Bermudez. ?Follow-up in 5 years.  ?COLONOSCOPYN/A1/12/2022  ?2 BENIGN POLYPS IN DESCENDING, 5 MM BENIGN POLYP IN SIGMOID, MULTIPLE SMALL  AND LARGE DIVERTICULA IN SIGMOID, RESCOPE IN 3 YRS, DR. JOSEPH LAU AT [facility]  ?EYE SURGERY??  ?JOINT REPLACEMENT?2005?  ?Left total hip replacement in 2005. ?In December 2015 patient had left hip  revision  ?TONSILLECTOMY??    ?  ?  ?      Plan of Care  Anticipated Discharge Date/Estimated Length of Stay: ELOS: 6/9  Anticipated Discharge Destination: Community discharge with assistance  Discharge Plan : Patient lives with  in one story walk out ranch style  home with two step entry.  Family conference held with patient, , friend and God son on 6/1  D/C plan is home with  who plans to take FMLA. Friend can also assist  intermittently.  HH PT, OT, ST, NSG  Medical Necessity Expected Level Rationale: Goals are to achieve a level of SBA  / CGA with ADL's and mobility.  Rehab prognosis fair.  Medical prognosis fair.  Intensity and Duration: an average of 3 hours/5 days per week  Medical Supervision and 24 Hour Rehab Nursing:  x  Physical Therapy: x  PT Intensity/Duration: 1 hour / day, 5 days / week, for approximately 2 weeks.  Occupational Therapy: x  OT Intensity/Duration: 1 hour / day, 5 days / week, for approximately 2 weeks.  Speech and Language Therapy: x  SLP Intensity/Duration: 1 hour / day, 5 days / week, for approximately 2 weeks.  Social Work: x  Therapeutic Recreation: x  Psychology: x  Registered Dietician: x  Updated (if changes indicated)    Anticipated Discharge Date/Estimated Length of Stay:   ELOS: 6/9    Based on the patient's medical and functional status, their prognosis and  expected level of functional improvement is: Goals are to achieve a level of SBA  / CGA with ADL's and mobility.  Rehab prognosis fair.  Medical prognosis fair.      Interdisciplinary Problem/Goals/Status    All Rehab Problems:  Safety    [RN] Potential for Injury(Active)  Current Status(06/04/2023): Patient at risk for injury related to mobility  issues.  Weekly Goal(06/09/2023): Patient will use call light appropriately and have no  injury while on Rehab.  Discharge Goal: Patient will have no injury while on Rehab.        Psychosocial    [RN] Coping/Adjustment(Active)  Current Status(06/04/2023): Patient at risk for ineffective coping, but has a  supportive .  Weekly Goal(06/09/2023): Patient will verbalize needs and concerns related to  current situation.  Discharge Goal: Patient will have healthy coping skills at time of discharge.        Sphincter Control    [RN] Bladder Management(Active)  Current Status(06/04/2023): Patient continent 75% of the time.  Weekly Goal(06/09/2023): Patient will be continent 100%  Discharge Goal: Patient will be continent 100% of the time.    [RN] Bowel Management(Active)  Current Status(06/04/2023): Patient incontinent 100% of the time.  Weekly Goal(06/09/2023): Patient will be continent 50% of the time.  Discharge Goal: Patient will be continent 100% of the time.        Body Systems    [RN]  Integumentary(Active)  Current Status(06/04/2023): Patient has stage 2 pressure injury to cocyx  Weekly Goal(06/09/2023): Patients skin will be healing with no more  deterioration.  Discharge Goal: Patients skin will be healed with no further injury.        Self Care    [OT] Bathing(Active)  Current Status(06/05/2023): MIN  Weekly Goal(06/09/2023): CGA/SBA  Discharge Goal: CGA/SBA    [OT] Dressing (Lower)(Active)  Current Status(06/05/2023): MIN/CGA  Weekly Goal(06/09/2023): CGA  Discharge Goal: CGA    [OT] Dressing (Upper)(Active)  Current Status(06/05/2023): set up  Weekly Goal(06/09/2023): set up  Discharge Goal: set up    [OT] Grooming(Active)  Current Status(06/05/2023): supervision  Weekly Goal(06/09/2023): Supervision  Discharge Goal: Supervision    [OT] Toileting(Active)  Current Status(06/05/2023): MIN/CGA  Weekly Goal(06/09/2023): CGA/SBA  Discharge Goal: CGA/SBA        Mobility    [OT] Toilet Transfers(Active)  Current Status(06/05/2023): CGA  Weekly Goal(06/09/2023): CGA/SBA  Discharge Goal: CGA/SBA    [OT] Tub/Shower Transfers(Active)  Current Status(06/05/2023): MIN  Weekly Goal(06/09/2023): CGA/SBA  Discharge Goal: CGA/SBA    [PT] Stairs(Active)  Current Status(06/05/2023): 4 steps, 2 HR, CGA  Weekly Goal(06/09/2023): PT only  Discharge Goal: 4 steps, 2 HR, CGA    [PT] Walk(Active)  Current Status(06/05/2023): 160' SBA-CGA RWX  Weekly Goal(06/09/2023): to and from BR, SBA, RWx  Discharge Goal: 160' SBA RWX    [PT] Bed/Chair/Wheelchair(Active)  Current Status(06/05/2023): SBA-CGA, RWx  Weekly Goal(06/09/2023): SBA, RWx  Discharge Goal: SBA, RWx    [PT] Bed Mobility(Active)  Current Status(06/05/2023): CGA  Weekly Goal(06/09/2023): SBA  Discharge Goal: SBA        Cognition    [ST] Executive Functions(Active)  Current Status(06/05/2023): Now completes medicine management with NO cues and  checkbook balancing with intermittent MIN cues.  Weekly Goal(06/09/2023): Will complete home management tasks  independently.  Discharge Goal: Adequate executive function skills for home environment.        Comments: 5/23 Min A bed mob, Mod / Min rwx for transfers, amb 60' Min A rwx.  Mod A rwx for toilet transfer.  Max A toileting.  Flat affect.  Mod A bathing  and LBD.  Mild high level executive function deficit.  Upgraded to regular w/  thins.  Cont bladder, incontinent of bowel.  Weaning oxygen.    5/30 Bed mob CGA, Min A rwx transfers, amb 160' Min A CGA rwx, Min A rwx for  toilet transfer, Mod A shower transfer.  Mod A toileting.  Min A bathing.  Mod A  LBD.  Mild high level executive function deficit.  Min - Mild attention  impairment.  Cont most of the time with urine, incont of bowel all the time  currently.  Pressure inj, cream in use.  On IV abx.    6/6 Bed mob CGA, transfers SBA - CGA rwx, ambulating 160' SBA - CGA rwx,  completed 4 steps w/ 2 handrails CGA.  CGA toilet transfer, Min A shower  transfer.  Min / CGA toileting, Min A bathing.  Min / CGA LBD.  Can complete  medicine task now with no cues.  Continent of urine 75%, incontinent 100% of  bowel.    Signed by: Monse Ruvalcaba RN    Physician CoSigned By: Sebastian Collins 06/06/2023 13:12:20

## 2023-06-06 NOTE — PLAN OF CARE
Problem: Rehabilitation ([facility]) Plan of Care  Goal: Plan of Care Review  Outcome: Ongoing, Progressing  Flowsheets (Taken 6/6/2023 0446)  Progress: improving  Plan of Care Reviewed With: patient  Outcome Evaluation: Pt is A&Ox4, cooperative, up w/ asst x1, meds whole w/ water, dressing to buttocks, C/D/I, pt on IV Merrem, picc line to RUE, C/D/I, dressing change due on 6/8, pt on room air, did give tylenol with night time meds for buttock pain, encourage to roll off bottom as much as possible, soreness to R foot, resting well tonight will continue to monitor.   Goal Outcome Evaluation:  Plan of Care Reviewed With: patient        Progress: improving  Outcome Evaluation: Pt is A&Ox4, cooperative, up w/ asst x1, meds whole w/ water, dressing to buttocks, C/D/I, pt on IV Merrem, picc line to RUE, C/D/I, dressing change due on 6/8, pt on room air, did give tylenol with night time meds for buttock pain, encourage to roll off bottom as much as possible, soreness to R foot, resting well tonight will continue to monitor.

## 2023-06-06 NOTE — PROGRESS NOTES
Inpatient Rehabilitation Plan of Care Note    Plan of Care  Care Plan Reviewed - No updates at this time.    Psychosocial    [RN] Coping/Adjustment(Active)  Current Status(06/04/2023): Patient at risk for ineffective coping, but has a  supportive .  Weekly Goal(06/09/2023): Patient will verbalize needs and concerns related to  current situation.  Discharge Goal: Patient will have healthy coping skills at time of discharge.    Performed Intervention(s)  Medication as ordered and PRN  Verbalize needs and concerns  Therapeutic environmental set up      Safety    Performed Intervention(s)  Items within reach, environmental set-up for reduce risk of fall  Bed alarms and chair alarms and safety rounds hourly      Sphincter Control    Performed Intervention(s)  Incontinent care as needed and ointment applied as ordered.  Take to bathroom in a timely manner  Proper diet and adequate fluid intake.      Body Systems    Performed Intervention(s)  Medication, turn every 2 hours and proper nutrition.  low-airloss mattress in place    Signed by: Bryon Porter RN

## 2023-06-06 NOTE — THERAPY TREATMENT NOTE
Inpatient Rehabilitation - Occupational Therapy Treatment Note    [facility]     Patient Name: Katherine Williamson  : 1950  MRN: [id number]    Today's Date: 2023                 Admit Date: 2023         ICD-10-CM ICD-9-CM   1. Decreased functional mobility and endurance  Z74.09 780.99       Patient Active Problem List   Diagnosis    Benign essential HTN    Tobacco abuse    Dislocation of hip joint prosthesis    Fracture of proximal humerus    Mechanical complication of internal orthopedic device    Wear of articular bearing surface of internal prosthetic joint    History of repair of hip joint    History of operative procedure on hip    Hyperglycemia    Hepatic steatosis    Diverticulosis    Chest pain, atypical    History of colon polyps    Family history of colon cancer in mother    Allergic rhinitis    Lung nodule seen on imaging study    Acute respiratory failure with hypoxia    Abscess of right lung with pneumonia    Empyema    Sepsis    Pleural effusion, right    Other emphysema    Pulmonary nodule (RLL)    Diastolic dysfunction, grade 1    Physical debility    Cryptogenic stroke    Acute right MCA stroke       Past Medical History:   Diagnosis Date    Arthritis     Cataract     Colon polyps     FOLLOWED BY DR. JOSEPH LAU    Hypertension        Past Surgical History:   Procedure Laterality Date    COLONOSCOPY  10/2015    [facility].  Follow-up in 5 years.    COLONOSCOPY N/A 2022    2 BENIGN POLYPS IN DESCENDING, 5 MM BENIGN POLYP IN SIGMOID, MULTIPLE SMALL AND LARGE DIVERTICULA IN SIGMOID, RESCOPE IN 3 YRS, DR. JOSEPH LAU AT [facility]    EYE SURGERY      JOINT REPLACEMENT  ?    Left total hip replacement in .  In 2015 patient had left hip revision    TONSILLECTOMY               IRF OT ASSESSMENT FLOWSHEET (last 12 hours)       IRF OT Evaluation and Treatment       Row Name 23 1457          OT Time and Intention    Document Type daily treatment  -AF      Mode of Treatment occupational therapy  -AF     Patient Effort good  -AF       Row Name 06/06/23 1457          General Information    Patient/Family/Caregiver Comments/Observations pt sitting up in recliner chair in AM and PM sessions  -AF     Existing Precautions/Restrictions fall  -AF     Comment, General Information on room air  -AF       Row Name 06/06/23 1457          Pain Assessment    Pretreatment Pain Rating 0/10 - no pain  -AF     Posttreatment Pain Rating 0/10 - no pain  -AF       Row Name 06/06/23 1457          Cognition/Psychosocial    Affect/Mental Status (Cognition) flat/blunted affect;WFL  -AF     Orientation Status (Cognition) oriented x 3  -AF     Follows Commands (Cognition) follows one-step commands;verbal cues/prompting required  -AF     Personal Safety Interventions fall prevention program maintained;gait belt;nonskid shoes/slippers when out of bed  -AF       Row Name 06/06/23 1457          Upper Body Dressing    [place] Level (Upper Body Dressing) upper body dressing skills;set up assistance  -AF     Position (Upper Body Dressing) supported sitting  -AF     Comment (Upper Body Dressing) w/c level  -AF       Row Name 06/06/23 1457          Grooming    [place] Level (Grooming) grooming skills;set up  -AF     Position (Grooming) sink side;supported sitting  -AF     Comment (Grooming) w/c level  -AF       Row Name 06/06/23 1457          Transfer Assessment/Treatment    Comment, (Transfers) to and from recliner chair and w/c SBA  -AF       Row Name 06/06/23 1457          Shoulder (Therapeutic Exercise)    Shoulder Strengthening (Therapeutic Exercise) bilateral;flexion;extension;scapular stabilization;sitting;10 repetitions;2 sets  #2 dowel vinita  -AF       Row Name 06/06/23 1457          Elbow/Forearm (Therapeutic Exercise)    Elbow/Forearm Strengthening (Therapeutic Exercise) bilateral;flexion;extension;sitting;2 lb free weight;10 repetitions;3 sets  -AF       Row Name 06/06/23 1457           Aerobic Exercise    Type (Aerobic Exercise) arm bike;for 3 minutes  -AF     Comment, Aerobic Exercise (Therapeutic Exercise) seated  -AF       Row Name 06/06/23 1457          Balance    Static Sitting Balance standby assist  -AF     Dynamic Sitting Balance standby assist  -AF     Static Standing Balance standby assist  -AF     Dynamic Standing Balance contact guard;standby assist  -AF     Comment, Balance pt participated in standing endurance tasks, stood 2 trials 4 mins each time with 3 min seated rest break  -AF       Row Name 06/06/23 1457          Positioning and Restraints    Pre-Treatment Position sitting in chair/recliner  -AF     Post Treatment Position wheelchair  -AF     In Chair sitting;exit alarm on;encouraged to call for assist;call light within reach  in AM  -AF     In Wheelchair sitting;exit alarm on;with PT  in PM  -AF               User Key  (r) = Recorded By, (t) = Taken By, (c) = Cosigned By      Initials Name Effective Dates    AF Silke Moreno, OTR 06/16/21 -                      Occupational Therapy Education       Title: PT OT SLP Therapies (Done)       Topic: Occupational Therapy (Done)       Point: ADL training (Done)       Description:   Instruct learner(s) on proper safety adaptation and remediation techniques during self care or transfers.   Instruct in proper use of assistive devices.                  Learning Progress Summary             Patient Acceptance, E, VU by AF at 6/1/2023 0374    Comment: pt and family present for meeting with rehab team, recommend HH OT at d/c, shower chair with back and BSC over commode. discussed current ADL status and goals for the last week of rehab.    Acceptance, E, VU by WN at 5/26/2023 0038    Acceptance, E,TB, VU by SG at 5/20/2023 1516   Family Acceptance, E, VU by AF at 6/1/2023 1539    Comment: pt and family present for meeting with rehab team, recommend HH OT at d/c, shower chair with back and BSC over commode. discussed current ADL status and  goals for the last week of rehab.                         Point: Home exercise program (Done)       Description:   Instruct learner(s) on appropriate technique for monitoring, assisting and/or progressing therapeutic exercises/activities.                  Learning Progress Summary             Patient Acceptance, E, VU by AF at 6/1/2023 1539    Comment: pt and family present for meeting with rehab team, recommend HH OT at d/c, shower chair with back and BSC over commode. discussed current ADL status and goals for the last week of rehab.    Acceptance, E, VU by WN at 5/26/2023 0038   Family Acceptance, E, VU by AF at 6/1/2023 1539    Comment: pt and family present for meeting with rehab team, recommend HH OT at d/c, shower chair with back and BSC over commode. discussed current ADL status and goals for the last week of rehab.                         Point: Precautions (Done)       Description:   Instruct learner(s) on prescribed precautions during self-care and functional transfers.                  Learning Progress Summary             Patient Acceptance, E, VU by AF at 6/1/2023 1539    Comment: pt and family present for meeting with rehab team, recommend HH OT at d/c, shower chair with back and BSC over commode. discussed current ADL status and goals for the last week of rehab.    Acceptance, E, VU by WN at 5/26/2023 0038   Family Acceptance, E, VU by AF at 6/1/2023 1539    Comment: pt and family present for meeting with rehab team, recommend HH OT at d/c, shower chair with back and BSC over commode. discussed current ADL status and goals for the last week of rehab.                         Point: Body mechanics (Done)       Description:   Instruct learner(s) on proper positioning and spine alignment during self-care, functional mobility activities and/or exercises.                  Learning Progress Summary             Patient Acceptance, E, VU by AF at 6/1/2023 1539    Comment: pt and family present for meeting  with rehab team, recommend HH OT at d/c, shower chair with back and BSC over commode. discussed current ADL status and goals for the last week of rehab.    Acceptance, E, VU by WN at 5/26/2023 0038    Acceptance, E, VU,DU,NR by AF at 5/23/2023 1448    Comment: safety and technique with transfers   Family Acceptance, E, VU by AF at 6/1/2023 1539    Comment: pt and family present for meeting with rehab team, recommend HH OT at d/c, shower chair with back and BSC over commode. discussed current ADL status and goals for the last week of rehab.                                         User Key       Initials Effective Dates Name Provider Type Discipline    SG 06/16/21 - 05/30/23 Verena Hewitt OTR Occupational Therapist OT    AF 06/16/21 -  Silke Moreno OTENID Occupational Therapist OT    WN 08/23/22 -  Reno Lazo, RN Registered Nurse Nurse                        OT Recommendation and Plan                         Time Calculation:      Time Calculation- OT       Row Name 06/06/23 1501 06/06/23 1458          Time Calculation- OT    OT Start Time 1230  -AF 0930  -AF     OT Stop Time 1300  -AF 1000  -AF     OT Time Calculation (min) 30 min  -AF 30 min  -AF               User Key  (r) = Recorded By, (t) = Taken By, (c) = Cosigned By      Initials Name Provider Type    AF Silke Moreno, OTENID Occupational Therapist                  Therapy Charges for Today       Code Description Service Date Service Provider Modifiers Qty    68024026812 HC OT SELF CARE/MGMT/TRAIN EA 15 MIN 6/5/2023 Silke Moreno, OTR GO 1    40265272779 HC OT THER PROC EA 15 MIN 6/5/2023 Silke Moreno, OTR GO 3    41828970211 HC OT THERAPEUTIC ACT EA 15 MIN 6/6/2023 Silke Moreno, OTR GO 1    31628490072 HC OT SELF CARE/MGMT/TRAIN EA 15 MIN 6/6/2023 Silke Moreno, OTR GO 2    19779160731 HC OT THER PROC EA 15 MIN 6/6/2023 Silke Moreno, OTR GO 1                     SONG Kaiser  6/6/2023

## 2023-06-06 NOTE — PROGRESS NOTES
Inpatient Rehabilitation Plan of Care Note    Plan of Care  Care Plan Reviewed - Updates as Follows    Safety    Performed Intervention(s)  Items within reach, environmental set-up for reduce risk of fall  Bed alarms and chair alarms and safety rounds hourly      Psychosocial    Performed Intervention(s)  Medication as ordered and PRN  Verbalize needs and concerns  Therapeutic environmental set up      Sphincter Control    Performed Intervention(s)  Incontinent care as needed and ointment applied as ordered.  Take to bathroom in a timely manner  Proper diet and adequate fluid intake.      Body Systems    Performed Intervention(s)  Medication, turn every 2 hours and proper nutrition.  low-airloss mattress in place    Signed by: Mayra Shi RN

## 2023-06-06 NOTE — PROGRESS NOTES
Reviewed team conference report regarding progress, goals for d/c and d/c planned for Friday, 6/9, with patient and , by phone. Informed  that ID saw patient today and plan to do CT of chest in the morning to determine whether will need to continue with IV antibiotics or not. Patient doing okay on room air during the day. Will see if will need any O2 at night. Patient feels she is doing fine without oxygen.  is planning to be off work on Friday, day of d/c, but can come for teaching on Thursday if knows ahead of time. Will discuss with therapists regarding time for  to be here for teaching and will call him tomorrow. [facility] following for therapies and nursing at home. Will assist with plans.

## 2023-06-06 NOTE — PROGRESS NOTES
LOS: 18 days   Patient Care Team:  Zelalem Flor APRN as PCP - General (Internal Medicine)  Brandon Mitchell MD as Consulting Physician (Orthopedic Surgery)      ANNIE CERDA  1950    Diagnoses    DECREASED FUNCTIONAL MOBILITY AND ENDURANCE       ADMITTING DIAGNOSIS:  CVA      Subjective   On room air during the daytime.  Wore some oxygen at nighttime later.  Overall feels comfortable with her breathing.  Feels she is making progress in therapies.  Follow-up CT of the chest pending.    Objective     Vitals:    06/06/23 0935   BP: 112/65   Pulse: 101   Resp:    Temp:    SpO2:        PHYSICAL EXAM:   MENTAL STATUS -  AWAKE / ALERT  HEENT-   SCLERAE ANICTERIC, CONJUNCTIVAE PINK, OP MOIST, NO JVD,  LUNGS -decreased air movement bilaterally   Clear to auscultation.  On room air.     HEART- RRR, NO RUB, MURMUR, OR GALLOP  ABD - NORMOACTIVE BOWEL SOUNDS, SOFT, NT.    EXT - NO EDEMA OR CYANOSIS     Right upper extremity PICC line site unremarkable without any swelling about the area.  NEURO -oriented x4  MOTOR EXAM -takes resistance bilaterally        MEDICATIONS  Scheduled Meds:aspirin, 81 mg, Oral, Q24H  atorvastatin, 80 mg, Oral, Nightly  citalopram, 20 mg, Oral, Daily  clopidogrel, 75 mg, Oral, Daily  doxycycline, 100 mg, Oral, Q12H  guaiFENesin, 600 mg, Oral, BID  hydrocortisone-bacitracin-zinc oxide-nystatin, 1 application, Topical, BID  magnesium hydroxide, 10 mL, Oral, Once  meropenem, 1 g, Intravenous, Q8H  metoprolol tartrate, 12.5 mg, Oral, Q12H  potassium chloride, 10 mEq, Oral, Daily  sodium chloride, 10 mL, Intravenous, Q12H  sodium chloride, 10 mL, Intravenous, Q12H  cyanocobalamin, 1,000 mcg, Oral, Daily      Continuous Infusions:   PRN Meds:.  acetaminophen    ipratropium-albuterol    loperamide    senna-docusate sodium    sodium chloride    sodium chloride    sodium chloride    sodium chloride    sodium chloride      RESULTS  No results found for: POCGLU  Results from last 7 days   Lab Units  "06/05/23  0436 06/02/23  0603 05/31/23  0518   WBC 10*3/mm3 11.41* 12.44* 11.20*   HEMOGLOBIN g/dL 10.2* 10.6* 10.6*   HEMATOCRIT % 30.4* 31.8* 31.1*   PLATELETS 10*3/mm3 292 370 378       Results from last 7 days   Lab Units 06/05/23  0436 06/02/23  0603   SODIUM mmol/L 132* 135*   POTASSIUM mmol/L 3.8 3.4*   CHLORIDE mmol/L 99 99   CO2 mmol/L 27.1 30.3*   BUN mg/dL 9 11   CREATININE mg/dL 0.30* 0.24*   CALCIUM mg/dL 8.7 8.6   GLUCOSE mg/dL 78 84         New Radiology:  ELIGIO negative for vegetations  CXR 5/25 revealed increased opacification of the RLL     Prior radiology:  MRI brain: \"Bilateral multifocal areas of infarction.  No evidence of hemorrhagic transformation. \"    ASSESSMENT and PLAN    Acute right MCA stroke    1. Acute R MCA stroke and bilateral infarcts  Stroke prophylaxis-aspirin/Plavix/atorvastatin  Zio patch placed on May 19 for 2 weeks  - 5/20 - Admit for inpt rehab w/ PT, OT, SLP, psychology and rehab medical and nursing care. Continue secondary stroke prophylaxis     2. Pneumonia and right lung abscess and Pleural effusions  - 5/20 - Continue IV antibx. ID following    - 5/21 - Continues to improve   The chest x-ray is reassuring   Expect her to be able to come off the oxygen in the next few days   She will need to have a follow-up chest imaging 6 weeks down the road   Finish the antibiotic course per ID, last day 6/8/2023   -5/22-on 1 L oxygen with activities and maintaining sats  -5/23- Per Pulmonology, continue Merrem. Continue to wean off supplemental oxygen  -5/24-oxygen weaned to 0.5 L nasal cannula  -5/25 -on room air and therapy, more 2 L last night  -5/26 Placed on 2L NC last night, WBC decreased to 15. CXR revealed increased opacification in RLL. Will continue Merrem and appreciate recommendations from Pulmonolgy. CDiff negative and bowel movements decreased this morning.  -5/30-WBC stable around 12 K.  On room air at rest.  -6/5-on room air    ID-meropenem and doxycycline-last dose " June 8, 2023  Repeat CT chest June 6, 2023  May 25-WBC increased to 18 K from 8K 5 days ago.  No fever.  Vital signs stable.  On room air during speech therapy session.  Not acutely ill-appearing.  Had 3 loose semiformed stool today but received bowel program yesterday for recent constipation.   PICC line site unremarkable.   Increased diffuse purpleish petechiae on the anterior lower legs.  She had a few earlier in the week but there more prominent today  Calves are soft without any significant edema.  Continues on meropenem and doxycycline.   Patient reviewed with pulmonology service who will assess.  Although with the recent bowel program, given the white blood cell count increased will check C. difficile with the loose stool today  Will review with infectious disease service  May 26-Persistent right empyema, similar to prior exam. Increased consolidation in the superior segment right lower lobe, left lower lobe. Multiple  small nodular densities in both lower lungs, indeterminate. Increased  cavitation in the right lower lobe. Decreased left pleural effusion  May 30-WBC improved 12 K.  Continues on antibiotics.   June 5-she had a CT of the chest on May 26.  -will see if she still needs to have the 1 done on June 6.  Get input from infectious disease service, scheduled to see them on June 8.  June 6-seen by infectious disease service-repeat CT chest for the AM. Depending on results, will either stop antibiotics, extend antibiotics, or consult thoracic surgery.       3. Acute hypoxic respiratory failure     4. Emphysema d/t Tobacco use     5. Hypertension-continue antihypertensive regimen and avoid hypotensive episodes.     6. DVT prophylaxis -   - 5/20 - Start Lovenox SQ daily    7.  Dysphagia  -5/22-diet advanced to regular solid thin liquids    8.  GI-constipation-May 24-add Senokot-Colace.  Milk of magnesia x1.  Patient maintaining hydration  May 25-loose stool semiformed x3 today.  Changed Senokot Colace to  as needed    TEAM CONF - MAY 23 - BED MIN. TRANSFERS MIN MOD ASSIST. GAIT 60 FEET MIN RW. TOILET TRANSFERS MOD ASSIST RW. SATS 1 L WITH ACTIVITIES WITH SATS >=90%. BATH MOD. LBD MOD.UBD MIN. GROOMING SET UP. TOILETING MAX. MILD EXECUTIVE FUNCTION DEFICITS. BNE PENDING. DIET ADVANCED TO REGULAR CONSISTENCY, GOOD INTAKE. PULM EXPECTS WILL BE OFF OXYGEN IN NEXT FEW DAYS. REPEAT CHEST IMAGING IN 6 WEEKS.  ELOS - 2 WEEKS.     Team conference-May 30  PT: Walking 160 ft, on 1L with walking which she was previously on RA with wakling. Min assist for bed transfer with RW. Min assist with toilet transfers  OT: Bathing min, Dressing lower body mod assist, Dressing upper body is set up, toileting is mod assist. Fatigues easily with therapy, wants to stay in   ST: Mild high level deficits, attention to detail with check booking, not working on med management  Nursing: Potassium supplementation, WBC is 12 which is stable, on ABX Doxy PO and Merrem IV, Drops to 80% if not on supplemental O2, on 1-2L NC. Coccyx pressure wound. On IV ABX until 6/28/2023  Psych: apathetic, flat affect, but was agreeable to do testing  RD: Started on Kwasi   ELOS: June 9th    Team conference-June 6-   Bed mob CGA, transfers SBA - CGA rwx, ambulating 160' SBA - CGA rwx,  completed 4 steps w/ 2 handrails CGA.  CGA toilet transfer, Min A shower  transfer.  Min / CGA toileting, Min A bathing.  Min / CGA LBD.  Can complete  medicine task now with no cues.  Continent of urine 75%, incontinent 100% of  bowel.  ELOS-Friday, June 9      Now admit for comprehensive acute inpatient rehabilitation .  This would be an interdisciplinary program with physical therapy 1 hour,  occupational therapy 1 hour, and speech therapy 1 hour, 5 days a week.  Rehabilitation nursing for carryover, monitoring of neurologic and pulmonary   status, bowel and bladder, and skin  Ongoing physician follow-up.  Weekly team conferences.  Goals are to achieve a level of supervision with   mobility and self-care and improved endurance.   Rehabilitation prognosis fair.  Medical prognosis fair.  Estimated length of stay is approximately 2 weeks, but is only an estimation.    .     The patient's functional status and clinical status is unchanged from preadmission assessment and the patient continues appropriate for acute inpatient rehabilitation.  Goal is for home with outpatient   therapies.  Barrier to discharge: Impaired mobility self-care endurance- work on vision, strength, gross and fine motor control, balance, progressive ambulation, ADLs to overcome.           Sebastian Collins MD      During rounds, used appropriate personal protective equipment including mask and gloves.  Additional gown if indicated.  Mask used was standard procedure mask. Appropriate PPE was worn during the entire visit.  Hand hygiene was completed before and after.

## 2023-06-06 NOTE — PLAN OF CARE
Goal Outcome Evaluation:  Plan of Care Reviewed With: patient        Progress: improving  Outcome Evaluation: Katherine is alert and oriented pleasant and cooperative worked hard in therapies today, buttocks sore took some tylenol this afternoon, dressing intact to bottom wound did not want dressing changed yet will let me know when she is ready for dressing change, RUE PICC clean and intact with good blood return IV ABTS continued, continent of bowel and bladder this shift, no BM today did have one yesterday,  visited this afternoon, no unsafe behaivors this shift.

## 2023-06-06 NOTE — THERAPY TREATMENT NOTE
Inpatient Rehabilitation - Physical Therapy Treatment Note       [facility]     Patient Name: Katherine Williamson  : 1950  MRN: [id number]    Today's Date: 2023                    Admit Date: 2023      Visit Dx:     ICD-10-CM ICD-9-CM   1. Decreased functional mobility and endurance  Z74.09 780.99       Patient Active Problem List   Diagnosis    Benign essential HTN    Tobacco abuse    Dislocation of hip joint prosthesis    Fracture of proximal humerus    Mechanical complication of internal orthopedic device    Wear of articular bearing surface of internal prosthetic joint    History of repair of hip joint    History of operative procedure on hip    Hyperglycemia    Hepatic steatosis    Diverticulosis    Chest pain, atypical    History of colon polyps    Family history of colon cancer in mother    Allergic rhinitis    Lung nodule seen on imaging study    Acute respiratory failure with hypoxia    Abscess of right lung with pneumonia    Empyema    Sepsis    Pleural effusion, right    Other emphysema    Pulmonary nodule (RLL)    Diastolic dysfunction, grade 1    Physical debility    Cryptogenic stroke    Acute right MCA stroke       Past Medical History:   Diagnosis Date    Arthritis     Cataract     Colon polyps     FOLLOWED BY DR. JOSEPH LAU    Hypertension        Past Surgical History:   Procedure Laterality Date    COLONOSCOPY  10/2015    Xlgov-tfoodpsemgln-Rs. [facility].  Follow-up in 5 years.    COLONOSCOPY N/A 2022    2 BENIGN POLYPS IN DESCENDING, 5 MM BENIGN POLYP IN SIGMOID, MULTIPLE SMALL AND LARGE DIVERTICULA IN SIGMOID, RESCOPE IN 3 YRS, DR. JOSEPH LAU AT [facility]    EYE SURGERY      JOINT REPLACEMENT  ?    Left total hip replacement in .  In 2015 patient had left hip revision    TONSILLECTOMY         PT ASSESSMENT (last 12 hours)       IRF PT Evaluation and Treatment       Row Name 23 0854          PT Time and Intention    Document Type daily treatment  -MD     Mode  of Treatment physical therapy  -MD     Patient/Family/Caregiver Comments/Observations Pt sitting in WC showing no signs of acute distress.  -MD       Row Name 06/06/23 0854          Pain Assessment    Pretreatment Pain Rating 0/10 - no pain  -MD       Row Name 06/06/23 0854          Cognition/Psychosocial    Orientation Status (Cognition) oriented x 3  -MD     Follows Commands (Cognition) follows one-step commands;verbal cues/prompting required  -MD     Personal Safety Interventions fall prevention program maintained;gait belt  -MD     Cognitive Function WFL  -MD       Row Name 06/06/23 0854          Bed Mobility    Supine-Sit [place] (Bed Mobility) standby assist;verbal cues  -MD     Comment, (Bed Mobility) pt educated to roll to her side to decreased shear on bottom  -MD       Row Name 06/06/23 0854          Sit-Stand Transfer    Sit-Stand [place] (Transfers) standby assist  -MD     Assistive Device (Sit-Stand Transfers) walker, front-wheeled  -MD       Row Name 06/06/23 0854          Stand-Sit Transfer    Stand-Sit [place] (Transfers) verbal cues;standby assist  -MD     Assistive Device (Stand-Sit Transfers) walker, front-wheeled;wheelchair  -MD       Row Name 06/06/23 0854          Gait/Stairs (Locomotion)    [place] Level (Gait) verbal cues;standby assist  -MD     Assistive Device (Gait) walker, front-wheeled  -MD     Distance in Feet (Gait) 160'x2 and 80'x1  -MD     Pattern (Gait) step-through  -MD     Deviations/Abnormal Patterns (Gait) raina decreased;stride length decreased  -MD     Bilateral Gait Deviations forward flexed posture  -MD     Left Sided Gait Deviations heel strike decreased  -MD       Row Name 06/06/23 0854          Hip (Therapeutic Exercise)    Hip Strengthening (Therapeutic Exercise) bilateral;aBduction;aDduction;marching while seated;red;20 repititions  -MD       Row Name 06/06/23 0854          Knee (Therapeutic Exercise)    Knee Strengthening (Therapeutic Exercise)  bilateral;hamstring curls;LAQ (long arc quad);20 repititions;red  -MD       Row Name 06/06/23 0854          Positioning and Restraints    Pre-Treatment Position in bed  -MD     Post Treatment Position wheelchair  -MD     In Wheelchair with SLP  -MD               User Key  (r) = Recorded By, (t) = Taken By, (c) = Cosigned By      Initials Name Provider Type    Melissa Fischer, PT Physical Therapist                  Wound 05/19/23 2055 Bilateral gluteal (Active)   Dressing Appearance dry;intact 06/05/23 1943   Closure DARIUS 06/05/23 1943   Base dressing in place, unable to visualize 06/05/23 1943   Dressing Care dressing changed 06/05/23 1700     Physical Therapy Education       Title: PT OT SLP Therapies (Done)       Topic: Physical Therapy (Done)       Point: Mobility training (Done)       Learning Progress Summary             Patient Acceptance, E,TB, VU,NR by EE at 5/31/2023 1200    Acceptance, E,TB, VU,NR by  at 5/30/2023 1131    Acceptance, E,TB, VU by  at 5/27/2023 0932    Acceptance, E, VU by  at 5/26/2023 0038    Eager, E,TB,D, NR by  at 5/25/2023 1019    Acceptance, E,D, VU,NR by  at 5/24/2023 1136    Acceptance, E,TB, VU,NR by  at 5/23/2023 1139    Acceptance, E,TB, NR,VU by  at 5/22/2023 1221    Acceptance, E, NR by  at 5/20/2023 1216                         Point: Home exercise program (Done)       Learning Progress Summary             Patient Acceptance, E,TB, VU,NR by EE at 5/30/2023 1131    Acceptance, E,TB, VU by  at 5/27/2023 0932    Acceptance, E, VU by  at 5/26/2023 0038    Eager, E,TB,D, NR by  at 5/25/2023 1019    Acceptance, E,D, VU,NR by  at 5/24/2023 1136    Acceptance, E,TB, VU,NR by  at 5/23/2023 1139    Acceptance, E,TB, NR,VU by  at 5/22/2023 1221    Acceptance, E, NR by  at 5/20/2023 1216                         Point: Body mechanics (Done)       Learning Progress Summary             Patient Acceptance, E,TB, VU,NR by  at 5/31/2023 1200    Acceptance, E,TB,  VU,NR by EE at 5/30/2023 1131    Acceptance, E, VU by WN at 5/26/2023 0038    Acceptance, E,D, VU,NR by EE at 5/24/2023 1136    Acceptance, E,TB, VU,NR by EE at 5/23/2023 1139    Acceptance, E,TB, NR,VU by EE at 5/22/2023 1221    Acceptance, E, NR by  at 5/20/2023 1216                         Point: Precautions (Done)       Learning Progress Summary             Patient Acceptance, E, VU by MD at 6/6/2023 1421    Acceptance, E, VU by MD at 6/5/2023 0847    Acceptance, E, VU by MD at 6/2/2023 1345    Acceptance, E, VU by MD at 6/1/2023 0848    Acceptance, E,TB, VU,NR by EE at 5/31/2023 1200    Acceptance, E,TB, VU,NR by EE at 5/30/2023 1131    Acceptance, E, VU by MD at 5/26/2023 1014    Acceptance, E, VU by WN at 5/26/2023 0038    Acceptance, E,D, VU,NR by EE at 5/24/2023 1136    Acceptance, E,TB, VU,NR by EE at 5/23/2023 1139    Acceptance, E,TB, NR,VU by EE at 5/22/2023 1221    Acceptance, E, NR by  at 5/20/2023 1216                                         User Key       Initials Effective Dates Name Provider Type Discipline     06/16/21 -  Jess Wang, PT Physical Therapist PT    EE 06/16/21 -  Tena Garay, PT Physical Therapist PT    MD 06/16/21 -  Melissa Wise, PT Physical Therapist PT     06/16/21 -  Jesusita Mendoza, PT Physical Therapist PT    WN 08/23/22 -  Reno Lazo, RN Registered Nurse Nurse                    PT Recommendation and Plan                          Time Calculation:      PT Charges       Row Name 06/06/23 1417 06/06/23 0854          Time Calculation    Start Time 1300  -MD 0830  -MD     Stop Time 1330  -MD 0900  -MD     Time Calculation (min) 30 min  -MD 30 min  -MD     PT Received On -- 06/06/23  -MD     PT - Next Appointment -- 06/07/23  -MD               User Key  (r) = Recorded By, (t) = Taken By, (c) = Cosigned By      Initials Name Provider Type    Melissa Fischer, PT Physical Therapist                    Therapy Charges for Today       Code Description Service Date Service  Provider Modifiers Qty    89142096702 HC PT THERAPEUTIC ACT EA 15 MIN 6/5/2023 Melissa Wise, PT GP 2    06099588862 HC GAIT TRAINING EA 15 MIN 6/5/2023 Melissa Wise, PT GP 2    21886611508 HC PT THERAPEUTIC ACT EA 15 MIN 6/6/2023 Melissa Wise, PT GP 2    88050315032 HC GAIT TRAINING EA 15 MIN 6/6/2023 Melissa Wise, PT GP 2                     Melissa Wise, PT  6/6/2023

## 2023-06-06 NOTE — PROGRESS NOTES
Inpatient Rehabilitation Plan of Care Note    Plan of Care  Care Plan Reviewed - No updates at this time.    Safety    Performed Intervention(s)  Items within reach, environmental set-up for reduce risk of fall  Bed alarms and chair alarms and safety rounds hourly      Psychosocial    Performed Intervention(s)  Medication as ordered and PRN  Verbalize needs and concerns  Therapeutic environmental set up      Sphincter Control    Performed Intervention(s)  Incontinent care as needed and ointment applied as ordered.  Take to bathroom in a timely manner  Proper diet and adequate fluid intake.      Body Systems    Performed Intervention(s)  Medication, turn every 2 hours and proper nutrition.  low-airloss mattress in place    Signed by: Susana Ugalde RN

## 2023-06-06 NOTE — PROGRESS NOTES
Recreational Therapy Note    Patient Name: Katherine Williamson   MRN: [id number]    Therapeutic Recreation Eval and Treat (last 12 hours)       Therapeutic Recreation Eval & Treat       Row Name 06/06/23 1400       Therapeutic Recreation Participation    Recreation Therapy Participation games  -SS    Games other (see comments)  Yazayda  -    Objectives of Recreation Participation increase;sense of autonomy by choosing level of participation  -    Comment, Recreation Participation occ cues to recall directions  -    Recreation Therapy Summary of Participation active participation  -              User Key  (r) = Recorded By, (t) = Taken By, (c) = Cosigned By      Initials Name Provider Type    SS Sanaz Radford, CTRS Recreational Therapist                      CHUNG Hollins  6/6/2023

## 2023-06-06 NOTE — PROGRESS NOTES
ID NOTE    CC: f/u pneumonia (right worse than left) w/ possible abscess    Subj: She has been improving in rehab following her stroke. She reports some LLE weakness but otherwise feels like she doesn't have any deficits. She has been on room air during the day and 1L NC at night. She says overall her respiratory status is improving. She is tolerating meropenem and doxy w/o rash or diarrhea. She hopes to be discharged from rehab to home on 6/9/23. ID asked to re-evaluate since she had an appt scheduled w/ me on 6/8.      Medications:    Current Facility-Administered Medications:     acetaminophen (TYLENOL) tablet 650 mg, 650 mg, Oral, Q6H PRN, Bharat Calabrese MD, 650 mg at 06/05/23 1945    aspirin chewable tablet 81 mg, 81 mg, Oral, Q24H, Bharat Calabrese MD, 81 mg at 06/05/23 0928    atorvastatin (LIPITOR) tablet 80 mg, 80 mg, Oral, Nightly, Bharat Calabrese MD, 80 mg at 06/05/23 1945    citalopram (CeleXA) tablet 20 mg, 20 mg, Oral, Daily, Bharat Calabrese MD, 20 mg at 06/05/23 0928    clopidogrel (PLAVIX) tablet 75 mg, 75 mg, Oral, Daily, Bharat Calabrese MD, 75 mg at 06/05/23 0928    doxycycline (MONODOX) capsule 100 mg, 100 mg, Oral, Q12H, Bharat Calabrese MD, 100 mg at 06/05/23 1945    guaiFENesin (MUCINEX) 12 hr tablet 600 mg, 600 mg, Oral, BID, Bharat Calabrese MD, 600 mg at 06/05/23 1945    hydrocortisone-bacitracin-zinc oxide-nystatin (MAGIC BARRIER) ointment 1 application, 1 application, Topical, BID, Karthikeyan Kaba MD, 1 application at 06/05/23 0952    ipratropium-albuterol (DUO-NEB) nebulizer solution 3 mL, 3 mL, Nebulization, Q4H PRN, Bharat Calabrese MD    loperamide (IMODIUM A-D) 1 MG/7.5ML suspension 2 mg, 2 mg, Oral, 4x Daily PRN, Jennifer Hood MD, 2 mg at 05/20/23 1748    magnesium hydroxide (MILK OF MAGNESIA) suspension 10 mL, 10 mL, Oral, Once, Sebastian Collins MD    meropenem (MERREM) 1 g in sodium chloride 0.9 % 100 mL IVPB-VTB, 1 g, Intravenous, Q8H, Bharat Calabrese MD, 1 g at 06/06/23 6870     metoprolol tartrate (LOPRESSOR) tablet 12.5 mg, 12.5 mg, Oral, Q12H, Bharat Calabrese MD, 12.5 mg at 06/05/23 1945    potassium chloride (K-DUR,KLOR-CON) ER tablet 10 mEq, 10 mEq, Oral, Daily, Karthikeyan Kaba MD, 10 mEq at 06/05/23 0929    sennosides-docusate (PERICOLACE) 8.6-50 MG per tablet 1 tablet, 1 tablet, Oral, BID PRN, Sebastian Collins MD    sodium chloride 0.9 % flush 10 mL, 10 mL, Intravenous, Q12H, Rigoberto Chan MD, 10 mL at 06/05/23 2021    sodium chloride 0.9 % flush 10 mL, 10 mL, Intravenous, PRN, Rigoberto Chan MD    sodium chloride 0.9 % flush 10 mL, 10 mL, Intravenous, Q12H, Rigoberto Chan MD, 10 mL at 06/05/23 2021    sodium chloride 0.9 % flush 10 mL, 10 mL, Intravenous, PRN, Rigoberto Chan MD    sodium chloride 0.9 % flush 20 mL, 20 mL, Intravenous, PRN, Rigoberto Chan MD    sodium chloride 0.9 % flush 20 mL, 20 mL, Intravenous, PRN, Rigoberto Chan MD    sodium chloride 0.9 % infusion 40 mL, 40 mL, Intravenous, PRN, Rigoberto Chan MD    vitamin B-12 (CYANOCOBALAMIN) tablet 1,000 mcg, 1,000 mcg, Oral, Daily, Bharat Calabrese MD, 1,000 mcg at 06/05/23 0928      Objective   Vital Signs   Temp:  [97.9 °F (36.6 °C)-98.2 °F (36.8 °C)] 98.2 °F (36.8 °C)  Heart Rate:  [82-86] 82  Resp:  [16-18] 16  BP: (109-150)/(63-71) 135/63    Physical Exam:   General: awake, alert, NAD, very nice  Eyes: no scleral icterus  ENT:  no thrush  Cardiovascular: NR  Respiratory: clear anteriorly; no wheezing; normal WOB on RA  GI: Abdomen is soft, not tender or distended  Skin: No rashes  Psychiatric: Normal mood and affect   Vasc: RUE PICC w/o erythema    Labs:   CBC, BMP, and CRP reviewed today  Lab Results   Component Value Date    WBC 11.41 (H) 06/05/2023    HGB 10.2 (L) 06/05/2023    HCT 30.4 (L) 06/05/2023    MCV 87.1 06/05/2023     06/05/2023     Lab Results   Component Value Date    GLUCOSE 78 06/05/2023     "CALCIUM 8.7 06/05/2023     (L) 06/05/2023    K 3.8 06/05/2023    CO2 27.1 06/05/2023    CL 99 06/05/2023    BUN 9 06/05/2023    CREATININE 0.30 (L) 06/05/2023    EGFR 114.3 06/05/2023    BCR 30.0 (H) 06/05/2023    ANIONGAP 5.9 06/05/2023     Lab Results   Component Value Date    CRP 4.45 (H) 06/05/2023     Crypto Ag negative  Urine Histo Ag negative  Serum Histo Ag negative  Histoplasma Ab negative  Urine Blasto Ag negative  Aspergillus Ag 0.07 (negative)  ANCA negative      Microbiology:  5/2 RPP: negative  5/2 BCx: Corynebacterium in 1/2 sets  5/2 SpCx: normal aishwarya  5/2 MRSA nares: negative  5/2 Strep pneumo Ag; negative  5/2 Legionella Ag: negative  5/4 BCx: negative  5/5 C diff: negative  5/6 SpCx: normal aishwarya  5/9 AFB Cx: NGTD  5/9 Fungus Cx: NGTD  5/10 SpCx: rare normal aishwarya  5/10 L Thoracentesis Cx: negative  5/14 BCx: negative  5/26 C diff: negative    New Radiology:  5/26 CT chest showed R empyema similar to prior; increased consolidation in the RLL, LLL and small nodular densities w/ cavitation in the RLL; decreased left effusion    Prior radiology:  -MRI brain: \"Bilateral multifocal areas of infarction.  No evidence of hemorrhagic transformation. \"  -ELIGIO negative for vegetations    ASSESSMENT/PLAN:  Pneumonia and right lung abscess  Pleural effusions  Acute hypoxic respiratory failure  Emphysema  Tobacco use  Acute R MCA stroke and bilateral infarcts    She is afebrile and now mostly on room air with subjective improvement in her respiratory status. The end of her 4-week course of meropenem and doxycycline is approaching. I will order a repeat CT chest for the AM. Depending on results, will either stop antibiotics, extend antibiotics, or consult thoracic surgery.     ID will follow.       "

## 2023-06-07 ENCOUNTER — TELEPHONE (OUTPATIENT)
Dept: CARDIOLOGY | Facility: CLINIC | Age: 73
End: 2023-06-07
Payer: COMMERCIAL

## 2023-06-07 LAB
FUNGUS WND CULT: NORMAL
MAXIMAL PREDICTED HEART RATE: 150 BPM
MYCOBACTERIUM SPEC CULT: NORMAL
MYCOBACTERIUM SPEC CULT: NORMAL
NIGHT BLUE STAIN TISS: NORMAL
NIGHT BLUE STAIN TISS: NORMAL
STRESS TARGET HR: 128 BPM

## 2023-06-07 PROCEDURE — 25010000002 MEROPENEM PER 100 MG: Performed by: HOSPITALIST

## 2023-06-07 PROCEDURE — 97110 THERAPEUTIC EXERCISES: CPT

## 2023-06-07 PROCEDURE — 97129 THER IVNTJ 1ST 15 MIN: CPT

## 2023-06-07 PROCEDURE — 97130 THER IVNTJ EA ADDL 15 MIN: CPT

## 2023-06-07 PROCEDURE — 97530 THERAPEUTIC ACTIVITIES: CPT

## 2023-06-07 PROCEDURE — 99232 SBSQ HOSP IP/OBS MODERATE 35: CPT | Performed by: INTERNAL MEDICINE

## 2023-06-07 RX ADMIN — Medication 1000 MCG: at 08:32

## 2023-06-07 RX ADMIN — ASPIRIN 81 MG: 81 TABLET, CHEWABLE ORAL at 08:32

## 2023-06-07 RX ADMIN — Medication 10 ML: at 08:41

## 2023-06-07 RX ADMIN — MEROPENEM 1 G: 1 INJECTION, POWDER, FOR SOLUTION INTRAVENOUS at 11:48

## 2023-06-07 RX ADMIN — ZINC OXIDE 1 APPLICATION: 200 OINTMENT TOPICAL at 08:33

## 2023-06-07 RX ADMIN — MEROPENEM 1 G: 1 INJECTION, POWDER, FOR SOLUTION INTRAVENOUS at 05:07

## 2023-06-07 RX ADMIN — CLOPIDOGREL BISULFATE 75 MG: 75 TABLET, FILM COATED ORAL at 08:32

## 2023-06-07 RX ADMIN — GUAIFENESIN 600 MG: 600 TABLET, EXTENDED RELEASE ORAL at 21:12

## 2023-06-07 RX ADMIN — GUAIFENESIN 600 MG: 600 TABLET, EXTENDED RELEASE ORAL at 08:32

## 2023-06-07 RX ADMIN — CITALOPRAM 20 MG: 20 TABLET, FILM COATED ORAL at 08:32

## 2023-06-07 RX ADMIN — ATORVASTATIN CALCIUM 80 MG: 80 TABLET, FILM COATED ORAL at 21:12

## 2023-06-07 RX ADMIN — Medication 10 ML: at 21:14

## 2023-06-07 RX ADMIN — DOXYCYCLINE 100 MG: 100 CAPSULE ORAL at 21:12

## 2023-06-07 RX ADMIN — DOXYCYCLINE 100 MG: 100 CAPSULE ORAL at 08:32

## 2023-06-07 RX ADMIN — METOPROLOL TARTRATE 12.5 MG: 25 TABLET, FILM COATED ORAL at 08:37

## 2023-06-07 RX ADMIN — METOPROLOL TARTRATE 12.5 MG: 25 TABLET, FILM COATED ORAL at 21:12

## 2023-06-07 RX ADMIN — POTASSIUM CHLORIDE 10 MEQ: 750 TABLET, EXTENDED RELEASE ORAL at 08:32

## 2023-06-07 RX ADMIN — MEROPENEM 1 G: 1 INJECTION, POWDER, FOR SOLUTION INTRAVENOUS at 21:13

## 2023-06-07 NOTE — PROGRESS NOTES
Inpatient Rehabilitation Plan of Care Note    Plan of Care  Care Plan Reviewed - Updates as Follows    Safety    [RN] Potential for Injury(Active)  Current Status(06/07/2023): Patient at risk for injury related to mobility  issues.  Weekly Goal(06/15/2023): Patient will use call light appropriately and have no  injury while on Rehab.  Discharge Goal: Patient will have no injury while on Rehab.    Performed Intervention(s)  Items within reach, environmental set-up for reduce risk of fall  Bed alarms and chair alarms and safety rounds hourly      Psychosocial    [RN] Coping/Adjustment(Active)  Current Status(06/07/2023): Patient at risk for ineffective coping, but has a  supportive .  Weekly Goal(06/16/2023): Patient will verbalize needs and concerns related to  current situation.  Discharge Goal: Patient will have healthy coping skills at time of discharge.    Performed Intervention(s)  Medication as ordered and PRN  Verbalize needs and concerns  Therapeutic environmental set up      Sphincter Control    [RN] Bladder Management(Active)  Current Status(06/07/2023): Patient continent 75% of the time.  Weekly Goal(06/16/2023): Patient will be continent 100%  Discharge Goal: Patient will be continent 100% of the time.    [RN] Bowel Management(Active)  Current Status(06/07/2023): Patient incontinent 100% of the time.  Weekly Goal(06/15/2023): Patient will be continent 50% of the time.  Discharge Goal: Patient will be continent 100% of the time.    Performed Intervention(s)  Incontinent care as needed and ointment applied as ordered.  Take to bathroom in a timely manner  Proper diet and adequate fluid intake.      Body Systems    [RN] Integumentary(Active)  Current Status(06/07/2023): Patient has stage 2 pressure injury to cocyx  Weekly Goal(06/15/2023): Patients skin will be healing with no more  deterioration.  Discharge Goal: Patients skin will be healed with no further injury.    Performed  Intervention(s)  Medication, turn every 2 hours and proper nutrition.  low-airloss mattress in place    Signed by: Shirley Cordero RN

## 2023-06-07 NOTE — THERAPY TREATMENT NOTE
Inpatient Rehabilitation - Speech Language Pathology Treatment Note    [facility]     Patient Name: Katherine Williamson  : 1950  MRN: [id number]    Today's Date: 2023                   Admit Date: 2023       Visit Dx:      ICD-10-CM ICD-9-CM   1. Decreased functional mobility and endurance  Z74.09 780.99       Patient Active Problem List   Diagnosis    Benign essential HTN    Tobacco abuse    Dislocation of hip joint prosthesis    Fracture of proximal humerus    Mechanical complication of internal orthopedic device    Wear of articular bearing surface of internal prosthetic joint    History of repair of hip joint    History of operative procedure on hip    Hyperglycemia    Hepatic steatosis    Diverticulosis    Chest pain, atypical    History of colon polyps    Family history of colon cancer in mother    Allergic rhinitis    Lung nodule seen on imaging study    Acute respiratory failure with hypoxia    Abscess of right lung with pneumonia    Empyema    Sepsis    Pleural effusion, right    Other emphysema    Pulmonary nodule (RLL)    Diastolic dysfunction, grade 1    Physical debility    Cryptogenic stroke    Acute right MCA stroke       Past Medical History:   Diagnosis Date    Arthritis     Cataract     Colon polyps     FOLLOWED BY DR. JOSEPH LAU    Hypertension        Past Surgical History:   Procedure Laterality Date    COLONOSCOPY  10/2015    Gizou-teihchvvnykf-Xo. Kaplan.  Follow-up in 5 years.    COLONOSCOPY N/A 2022    2 BENIGN POLYPS IN DESCENDING, 5 MM BENIGN POLYP IN SIGMOID, MULTIPLE SMALL AND LARGE DIVERTICULA IN SIGMOID, RESCOPE IN 3 YRS, DR. JOSEPH LAU AT [facility]    EYE SURGERY      JOINT REPLACEMENT  ?    Left total hip replacement in .  In 2015 patient had left hip revision    TONSILLECTOMY         SLP Recommendation and Plan                                                            SLP EVALUATION (last 72 hours)       SLP SLC Evaluation       Row Name 23  1330 06/07/23 1030 06/06/23 1430 06/06/23 1330 06/05/23 1330       Communication Assessment/Intervention    Document Type therapy note (daily note)  -AL therapy note (daily note)  -AL therapy note (daily note)  -AL therapy note (daily note)  -AL therapy note (daily note)  -AL    Patient/Family/Caregiver Comments/Observations Pt participated well.  -AL Pt is pleasant and cooperative.  -AL Pt participated well. Seen bedside.  -AL Pt participated well.  -AL Pt participated well.  -AL       Pain Scale: Numbers Pre/Post-Treatment    Pretreatment Pain Rating 0/10 - no pain  -AL 0/10 - no pain  -AL 0/10 - no pain  -AL 0/10 - no pain  -AL 0/10 - no pain  -AL      Row Name 06/05/23 1030                   Communication Assessment/Intervention    Document Type therapy note (daily note)  -AL        Patient/Family/Caregiver Comments/Observations Pt participated well.  -AL           Pain Scale: Numbers Pre/Post-Treatment    Pretreatment Pain Rating 0/10 - no pain  -AL                  User Key  (r) = Recorded By, (t) = Taken By, (c) = Cosigned By      Initials Name Effective Dates    Sarita Landry, MS CCC-SLP 06/16/21 -                        EDUCATION    The patient has been educated in the following areas:       Cognitive Impairment.             SLP GOALS       Row Name 06/07/23 1330 06/07/23 1030 06/06/23 1430       Reasoning Goal 1 (SLP)    Improve Reasoning Through Goal 1 (SLP) complete high level reasoning task;90%;independently (over 90% accuracy)  -AL -- complete high level reasoning task;90%;independently (over 90% accuracy)  -AL    Time Frame (Reasoning Goal 1, SLP) short term goal (STG);1 week  -AL -- short term goal (STG);1 week  -AL    Progress/Outcomes (Reasoning Goal 1, SLP) good progress toward goal  -AL -- good progress toward goal  -AL    Comment (Reasoning Goal 1, SLP) Reasoning for moderately complex logic puzzle: 65% with NO cues, 100% with MIN-MOD cues. Introduced game, Mastermind, to pt. She required  MOD cues for logic during game.  -AL Initiated moderate level logic puzzle.  -AL Moderate level logic puzzle task: 80% with NO cues, 100% with MIN cues.  -AL       Functional Math Skills Goal 1 (SLP)    Improve Functional Math Skills Through Goal 1 (SLP) -- complete functional math task;100%;independently (over 90% accuracy)  -AL complete functional math task;100%;independently (over 90% accuracy)  -AL    Time Frame (Functional Math Skills Goal 1, SLP) -- -- 1 week  -AL    Progress/Outcomes (Functional Math Skills Goal 1, SLP) -- good progress toward goal  -AL good progress toward goal  -AL    Comment (Functional Math Skills Goal 1, SLP) -- Home renovation math: 65% with NO cues, 100% with MIN-MOD cues. Reduced attention to detail and mild impulsivity noted.  -AL Checkbook balancing task: 85% with NO cues ,1005 with MIN cues.  -AL      Row Name 06/06/23 1330 06/05/23 1330 06/05/23 1030       Reasoning Goal 1 (SLP)    Improve Reasoning Through Goal 1 (SLP) complete high level reasoning task;90%;independently (over 90% accuracy)  -AL complete high level reasoning task;90%;independently (over 90% accuracy)  -AL complete high level reasoning task;90%;independently (over 90% accuracy)  -AL    Time Frame (Reasoning Goal 1, SLP) -- -- short term goal (STG);1 week  -AL    Progress/Outcomes (Reasoning Goal 1, SLP) good progress toward goal  -AL good progress toward goal  -AL good progress toward goal  -AL    Comment (Reasoning Goal 1, SLP) Complex exclusion style logic puzzle: 2/12 with NO cues, 12/12 with MIN-MOD cues. Reduced attention to detail impacted task.  -AL Complex exclusion style logic puzzle: 20% with NO cues, 100% with MIN-MOD cues. Reduced attention to detail impacted task.  -AL Moderate level logic puzzle:  95% with NO cues, 100% with MIN cues. Goal met x1.  -AL       Functional Math Skills Goal 1 (SLP)    Improve Functional Math Skills Through Goal 1 (SLP) -- -- complete functional math  task;100%;independently (over 90% accuracy)  -AL    Time Frame (Functional Math Skills Goal 1, SLP) -- -- 1 week  -AL    Progress/Outcomes (Functional Math Skills Goal 1, SLP) -- -- good progress toward goal  -AL    Comment (Functional Math Skills Goal 1, SLP) -- -- Deli prices task: 80% with NO cues, 100% with MIN cues.  -AL       Executive Functional Skills Goal 1 (SLP)    Improve Executive Function Skills Goal 1 (SLP) -- -- organization/planning activity  -AL    Time Frame (Executive Function Skills Goal 1, SLP) -- -- by discharge  -AL    Progress/Outcomes (Executive Function Skills Goal 1, SLP) -- good progress toward goal  -AL --    Comment (Executive Function Skills Goal 1, SLP) -- Medicine management with pill box: 100% with NO cues.  -AL Checkbook balancin/8 with NO cues, 8/8 with MIN cues.  -AL              User Key  (r) = Recorded By, (t) = Taken By, (c) = Cosigned By      Initials Name Provider Type    Sarita Landry MS CCC-SLP Speech and Language Pathologist                                Time Calculation:        Time Calculation- SLP       Row Name 23 1455 23 1054          Time Calculation- SLP    SLP Start Time 1330  -AL 1030  -AL     SLP Stop Time 1400  -AL 1100  -AL     SLP Time Calculation (min) 30 min  -AL 30 min  -AL               User Key  (r) = Recorded By, (t) = Taken By, (c) = Cosigned By      Initials Name Provider Type    Sarita Landry MS CCC-SLP Speech and Language Pathologist                      Therapy Charges for Today       Code Description Service Date Service Provider Modifiers Qty    01807039219 HC ST DEV OF COGN SKILLS INITIAL 15 MIN 2023 Sarita Comer MS CCC-SLP  1    46592295724 HC ST DEV OF COGN SKILLS EACH ADDT'L 15 MIN 2023 Sarita Comer MS CCC-SLP  3    39777204197 HC ST DEV OF COGN SKILLS INITIAL 15 MIN 2023 Sarita Comer MS CCC-SLP  1    42323844047 HC ST DEV OF COGN SKILLS EACH ADDT'L 15 MIN 2023 Sarita Comer  Elisha, MS CCC-SLP  3                             Sarita Comer, MS CCC-SLP  6/7/2023

## 2023-06-07 NOTE — PROGRESS NOTES
Recreational Therapy Note    Patient Name: Katherine Williamson   MRN: [id number]    Therapeutic Recreation Eval and Treat (last 12 hours)       Therapeutic Recreation Eval & Treat       Row Name 06/07/23 1400       Therapeutic Recreation Participation    Recreation Therapy Participation games;group/social activities  -    Games card games  [place]  -    Objectives of Recreation Participation increase;sense of autonomy by choosing level of participation;positive attitudes leading to a healthy leisure lifestyle  -    Comment, Recreation Participation occ cues to recall directions  -    Recreation Therapy Summary of Participation active participation  -              User Key  (r) = Recorded By, (t) = Taken By, (c) = Cosigned By      Initials Name Provider Type    SS Sanaz Radford, CTRS Recreational Therapist                      CHUNG Hollins  6/7/2023

## 2023-06-07 NOTE — PLAN OF CARE
Goal Outcome Evaluation:  Plan of Care Reviewed With: patient             Problem: Rehabilitation (IRF) Plan of Care  Goal: Plan of Care Review  Outcome: Ongoing, Progressing  Flowsheets (Taken 6/7/2023 0321)  Plan of Care Reviewed With: patient  Outcome Evaluation: Alert and oriented x 4. Has been cooperative with care. Takes all meds whole PO with thins. Continent of bowel and bladder, with urgency. Tx completed to buttocks. Dressing intact and dry. PICC to right arm for IV abx r/t PNA. Dressing change due 6/8. CT in am. Wears 1L humidified 02 nc at HS. Uses waffle cushion while in bed. Tylenol administered at 2151 for burning to wound on buttock. excited for D/C 6/9. Independent with bed mobility. no unsafe behaviors. Call light within reach.

## 2023-06-07 NOTE — THERAPY TREATMENT NOTE
Inpatient Rehabilitation - Occupational Therapy Treatment Note    [facility]     Patient Name: Katherine Williamson  : 1950  MRN: [id number]    Today's Date: 2023                 Admit Date: 2023         ICD-10-CM ICD-9-CM   1. Decreased functional mobility and endurance  Z74.09 780.99       Patient Active Problem List   Diagnosis    Benign essential HTN    Tobacco abuse    Dislocation of hip joint prosthesis    Fracture of proximal humerus    Mechanical complication of internal orthopedic device    Wear of articular bearing surface of internal prosthetic joint    History of repair of hip joint    History of operative procedure on hip    Hyperglycemia    Hepatic steatosis    Diverticulosis    Chest pain, atypical    History of colon polyps    Family history of colon cancer in mother    Allergic rhinitis    Lung nodule seen on imaging study    Acute respiratory failure with hypoxia    Abscess of right lung with pneumonia    Empyema    Sepsis    Pleural effusion, right    Other emphysema    Pulmonary nodule (RLL)    Diastolic dysfunction, grade 1    Physical debility    Cryptogenic stroke    Acute right MCA stroke       Past Medical History:   Diagnosis Date    Arthritis     Cataract     Colon polyps     FOLLOWED BY DR. JOSEPH LAU    Hypertension        Past Surgical History:   Procedure Laterality Date    COLONOSCOPY  10/2015    Fvvvk-tgykfjsbeihu-Wt. [facility].  Follow-up in 5 years.    COLONOSCOPY N/A 2022    2 BENIGN POLYPS IN DESCENDING, 5 MM BENIGN POLYP IN SIGMOID, MULTIPLE SMALL AND LARGE DIVERTICULA IN SIGMOID, RESCOPE IN 3 YRS, DR. JOSEPH LAU AT [facility]    EYE SURGERY      JOINT REPLACEMENT  ?    Left total hip replacement in .  In 2015 patient had left hip revision    TONSILLECTOMY               IRF OT ASSESSMENT FLOWSHEET (last 12 hours)       IRF OT Evaluation and Treatment       Row Name 23 1100 23 0900       OT Time and Intention    Document Type daily  treatment  -KN daily treatment  -KN    Mode of Treatment occupational therapy  -KN occupational therapy  -KN    Patient Effort good  -KN good  -KN    Symptoms Noted During/After Treatment none  -KN none  -KN      Row Name 06/07/23 1100 06/07/23 0900       General Information    Patient Profile Reviewed yes  -KN yes  -KN    Patient/Family/Caregiver Comments/Observations from speech  -KN from PT  -KN    Existing Precautions/Restrictions fall  -KN fall  -KN    Limitations/Impairments -- safety/cognitive  -KN    Comment, General Information -- RA  -KN      Row Name 06/07/23 1100 06/07/23 0900       Pain Assessment    Pretreatment Pain Rating 0/10 - no pain  -KN 0/10 - no pain  -KN    Posttreatment Pain Rating 0/10 - no pain  -KN 0/10 - no pain  -KN      Row Name 06/07/23 1100 06/07/23 0900       Cognition/Psychosocial    Orientation Status (Cognition) oriented x 3  -KN oriented x 3  -KN    Cognitive Function -- WFL  -KN      Row Name 06/07/23 0900          Motor Skills    Therapeutic Exercise aerobic;elbow/forearm;shoulder;wrist;hand  -KN       Row Name 06/07/23 0900          Shoulder (Therapeutic Exercise)    Shoulder (Therapeutic Exercise) strengthening exercise  -KN     Shoulder Strengthening (Therapeutic Exercise) bilateral;flexion;extension;external rotation;internal rotation;2 lb free weight;3 sets;10 repetitions  -KN       Row Name 06/07/23 0900          Elbow/Forearm (Therapeutic Exercise)    Elbow/Forearm (Therapeutic Exercise) strengthening exercise  -KN     Elbow/Forearm Strengthening (Therapeutic Exercise) bilateral;flexion;pronation;supination;2 lb free weight;3 sets;10 repetitions  -KN       Row Name 06/07/23 0900          Wrist (Therapeutic Exercise)    Wrist (Therapeutic Exercise) strengthening exercise  -KN     Wrist Strengthening (Therapeutic Exercise) bilateral;radial deviation;ulnar deviation;flexion;extension;2 lb free weight;3 sets;10 repetitions  -KN       Row Name 06/07/23 0900          Hand  (Therapeutic Exercise)    Hand (Therapeutic Exercise) strengthening exercise  -KN     Hand Strengthening (Therapeutic Exercise) bilateral;hand gripper;3 sets;10 repetitions  -KN       Row Name 06/07/23 0900          Aerobic Exercise    Type (Aerobic Exercise) arm bike;for 5 minutes  seated  -KN     Comment, Aerobic Exercise (Therapeutic Exercise) seated  -KN       Row Name 06/07/23 1100          Balance    Static Standing Balance standby assist  -KN     Dynamic Standing Balance contact guard  -KN     Comment, Balance Pt completed a variety of static/dynamic balance task  Pt stoop at counter placing large pegs above head on board. Pt competed reaching across midline grabbing cones and placing them on dowel. Pt completed task tossing cornhole bags to target.  -KN               User Key  (r) = Recorded By, (t) = Taken By, (c) = Cosigned By      Initials Name Effective Dates    KN Aaron Neumann, OT 08/02/22 -                      Occupational Therapy Education       Title: PT OT SLP Therapies (Done)       Topic: Occupational Therapy (Done)       Point: ADL training (Done)       Description:   Instruct learner(s) on proper safety adaptation and remediation techniques during self care or transfers.   Instruct in proper use of assistive devices.                  Learning Progress Summary             Patient Acceptance, E, VU by AF at 6/1/2023 1539    Comment: pt and family present for meeting with rehab team, recommend HH OT at d/c, shower chair with back and BSC over commode. discussed current ADL status and goals for the last week of rehab.    Acceptance, E, VU by WN at 5/26/2023 0038    Acceptance, E,TB, VU by SG at 5/20/2023 1516   Family Acceptance, E, VU by AF at 6/1/2023 1539    Comment: pt and family present for meeting with rehab team, recommend HH OT at d/c, shower chair with back and BSC over commode. discussed current ADL status and goals for the last week of rehab.                         Point: Home exercise  program (Done)       Description:   Instruct learner(s) on appropriate technique for monitoring, assisting and/or progressing therapeutic exercises/activities.                  Learning Progress Summary             Patient Acceptance, E, VU by AF at 6/1/2023 1539    Comment: pt and family present for meeting with rehab team, recommend HH OT at d/c, shower chair with back and BSC over commode. discussed current ADL status and goals for the last week of rehab.    Acceptance, E, VU by WN at 5/26/2023 0038   Family Acceptance, E, VU by AF at 6/1/2023 1539    Comment: pt and family present for meeting with rehab team, recommend HH OT at d/c, shower chair with back and BSC over commode. discussed current ADL status and goals for the last week of rehab.                         Point: Precautions (Done)       Description:   Instruct learner(s) on prescribed precautions during self-care and functional transfers.                  Learning Progress Summary             Patient Acceptance, E, VU by AF at 6/1/2023 1539    Comment: pt and family present for meeting with rehab team, recommend HH OT at d/c, shower chair with back and BSC over commode. discussed current ADL status and goals for the last week of rehab.    Acceptance, E, VU by WN at 5/26/2023 0038   Family Acceptance, E, VU by AF at 6/1/2023 1539    Comment: pt and family present for meeting with rehab team, recommend HH OT at d/c, shower chair with back and BSC over commode. discussed current ADL status and goals for the last week of rehab.                         Point: Body mechanics (Done)       Description:   Instruct learner(s) on proper positioning and spine alignment during self-care, functional mobility activities and/or exercises.                  Learning Progress Summary             Patient Acceptance, E, VU by AF at 6/1/2023 1539    Comment: pt and family present for meeting with rehab team, recommend HH OT at d/c, shower chair with back and BSC over  commode. discussed current ADL status and goals for the last week of rehab.    Acceptance, E, VU by WN at 5/26/2023 0038    Acceptance, E, VU,DU,NR by AF at 5/23/2023 1448    Comment: safety and technique with transfers   Family Acceptance, E, VU by AF at 6/1/2023 1539    Comment: pt and family present for meeting with rehab team, recommend HH OT at d/c, shower chair with back and BSC over commode. discussed current ADL status and goals for the last week of rehab.                                         User Key       Initials Effective Dates Name Provider Type Discipline    SG 06/16/21 - 05/30/23 Verena Hewitt, OTR Occupational Therapist OT    AF 06/16/21 -  Silke Moreno OTR Occupational Therapist OT    WN 08/23/22 -  Reno Lazo, RN Registered Nurse Nurse                        OT Recommendation and Plan                         Time Calculation:      Time Calculation- OT       Row Name 06/07/23 1138 06/07/23 1000          Time Calculation- OT    OT Start Time 1100  -KN 0900  -KN     OT Stop Time 1130  -KN 0930  -KN     OT Time Calculation (min) 30 min  -KN 30 min  -KN     OT Received On 06/07/23  -KN --        Timed Charges    80391 - OT Therapeutic Exercise Minutes -- 30  -KN     22043 - OT Therapeutic Activity Minutes 30  -KN --        Total Minutes    Timed Charges Total Minutes 30  -KN 30  -KN      Total Minutes 30  -KN 30  -KN               User Key  (r) = Recorded By, (t) = Taken By, (c) = Cosigned By      Initials Name Provider Type    KN Aaron Neumann OT Occupational Therapist                  Therapy Charges for Today       Code Description Service Date Service Provider Modifiers Qty    41160065646 HC OT THER PROC EA 15 MIN 6/7/2023 Aaron Neumann OT GO 2    51570809673 HC OT THERAPEUTIC ACT EA 15 MIN 6/7/2023 Aaron Neumann OT GO 2                     Aaron Neumann OT  6/7/2023

## 2023-06-07 NOTE — PROGRESS NOTES
Met with patient and  in room.  will plan to come tomorrow, 6/8, for family teaching with PT at 8:30 and OT after that. Patient still waiting to have CT done. Will await results of this to see if ID will want patient to continue IV antibiotics at home. [facility] following for home PT, OT, ST, NSG. Will assist with d/c plan for home on Friday, 6/9.

## 2023-06-07 NOTE — PROGRESS NOTES
LOS: 19 days   Patient Care Team:  Zelalem Flor APRN as PCP - General (Internal Medicine)  Brandon Mitchell MD as Consulting Physician (Orthopedic Surgery)      ANNIE CERDA  1950    Diagnoses    DECREASED FUNCTIONAL MOBILITY AND ENDURANCE       ADMITTING DIAGNOSIS:  CVA      Subjective   Comfortable with her breathing at rest.  On room air during the day.  Did with some nasal cannula oxygen at nighttime.  Petechiae on the bilateral shins is unchanged.  Patient seen with infectious disease service      Objective     Vitals:    06/07/23 0540   BP: 122/58   Pulse: 83   Resp: 18   Temp: 98.1 °F (36.7 °C)   SpO2: 94%       PHYSICAL EXAM:   MENTAL STATUS -  AWAKE / ALERT  HEENT-   SCLERAE ANICTERIC, CONJUNCTIVAE PINK, OP MOIST, NO JVD,  LUNGS -decreased air movement bilaterally.  Crackles at the right side with decreased air movement on the right side compared to the left  Clear to auscultation.  On room air.     HEART- RRR, NO RUB, MURMUR, OR GALLOP  ABD - NORMOACTIVE BOWEL SOUNDS, SOFT, NT.    EXT - NO EDEMA OR CYANOSIS  Petechiae bilateral shins and distal right anterior thigh  Right upper extremity PICC line site unremarkable without any swelling about the area.  NEURO -oriented x4  MOTOR EXAM -takes resistance bilaterally        MEDICATIONS  Scheduled Meds:aspirin, 81 mg, Oral, Q24H  atorvastatin, 80 mg, Oral, Nightly  citalopram, 20 mg, Oral, Daily  clopidogrel, 75 mg, Oral, Daily  doxycycline, 100 mg, Oral, Q12H  guaiFENesin, 600 mg, Oral, BID  hydrocortisone-bacitracin-zinc oxide-nystatin, 1 application, Topical, BID  magnesium hydroxide, 10 mL, Oral, Once  meropenem, 1 g, Intravenous, Q8H  metoprolol tartrate, 12.5 mg, Oral, Q12H  potassium chloride, 10 mEq, Oral, Daily  sodium chloride, 10 mL, Intravenous, Q12H  sodium chloride, 10 mL, Intravenous, Q12H  cyanocobalamin, 1,000 mcg, Oral, Daily      Continuous Infusions:   PRN Meds:.  acetaminophen    ipratropium-albuterol    loperamide     "senna-docusate sodium    sodium chloride    sodium chloride    sodium chloride    sodium chloride    sodium chloride      RESULTS  No results found for: POCGLU  Results from last 7 days   Lab Units 06/05/23  0436 06/02/23  0603   WBC 10*3/mm3 11.41* 12.44*   HEMOGLOBIN g/dL 10.2* 10.6*   HEMATOCRIT % 30.4* 31.8*   PLATELETS 10*3/mm3 292 370       Results from last 7 days   Lab Units 06/05/23  0436 06/02/23  0603   SODIUM mmol/L 132* 135*   POTASSIUM mmol/L 3.8 3.4*   CHLORIDE mmol/L 99 99   CO2 mmol/L 27.1 30.3*   BUN mg/dL 9 11   CREATININE mg/dL 0.30* 0.24*   CALCIUM mg/dL 8.7 8.6   GLUCOSE mg/dL 78 84         New Radiology:  ELIGIO negative for vegetations  CXR 5/25 revealed increased opacification of the RLL     Prior radiology:  MRI brain: \"Bilateral multifocal areas of infarction.  No evidence of hemorrhagic transformation. \"    ASSESSMENT and PLAN    Acute right MCA stroke    1. Acute R MCA stroke and bilateral infarcts  Stroke prophylaxis-aspirin/Plavix/atorvastatin  Zio patch placed on May 19 for 2 weeks  - 5/20 - Admit for inpt rehab w/ PT, OT, SLP, psychology and rehab medical and nursing care. Continue secondary stroke prophylaxis     2. Pneumonia and right lung abscess and Pleural effusions  - 5/20 - Continue IV antibx. ID following    - 5/21 - Continues to improve   The chest x-ray is reassuring   Expect her to be able to come off the oxygen in the next few days   She will need to have a follow-up chest imaging 6 weeks down the road   Finish the antibiotic course per ID, last day 6/8/2023   -5/22-on 1 L oxygen with activities and maintaining sats  -5/23- Per Pulmonology, continue Merrem. Continue to wean off supplemental oxygen  -5/24-oxygen weaned to 0.5 L nasal cannula  -5/25 -on room air and therapy, more 2 L last night  -5/26 Placed on 2L NC last night, WBC decreased to 15. CXR revealed increased opacification in RLL. Will continue Merrem and appreciate recommendations from Pulmonolgy. CDiff negative " and bowel movements decreased this morning.  -5/30-WBC stable around 12 K.  On room air at rest.  -6/5-on room air    ID-meropenem and doxycycline-last dose June 8, 2023  Repeat CT chest June 6, 2023  May 25-WBC increased to 18 K from 8K 5 days ago.  No fever.  Vital signs stable.  On room air during speech therapy session.  Not acutely ill-appearing.  Had 3 loose semiformed stool today but received bowel program yesterday for recent constipation.   PICC line site unremarkable.   Increased diffuse purpleish petechiae on the anterior lower legs.  She had a few earlier in the week but there more prominent today  Calves are soft without any significant edema.  Continues on meropenem and doxycycline.   Patient reviewed with pulmonology service who will assess.  Although with the recent bowel program, given the white blood cell count increased will check C. difficile with the loose stool today  Will review with infectious disease service  May 26-Persistent right empyema, similar to prior exam. Increased consolidation in the superior segment right lower lobe, left lower lobe. Multiple  small nodular densities in both lower lungs, indeterminate. Increased  cavitation in the right lower lobe. Decreased left pleural effusion  May 30-WBC improved 12 K.  Continues on antibiotics.   June 5-she had a CT of the chest on May 26.  -will see if she still needs to have the 1 done on June 6.  Get input from infectious disease service, scheduled to see them on June 8.  June 6-seen by infectious disease service-repeat CT chest for the AM. Depending on results, will either stop antibiotics, extend antibiotics, or consult thoracic surgery.   June 7-CT results pending.  Patient discussed with infectious disease service      3. Acute hypoxic respiratory failure     4. Emphysema d/t Tobacco use     5. Hypertension-continue antihypertensive regimen and avoid hypotensive episodes.     6. DVT prophylaxis -   - 5/20 - Start Lovenox SQ  daily    7.  Dysphagia  -5/22-diet advanced to regular solid thin liquids    8.  GI-constipation-May 24-add Senokot-Colace.  Milk of magnesia x1.  Patient maintaining hydration  May 25-loose stool semiformed x3 today.  Changed Senokot Colace to as needed    TEAM CONF - MAY 23 - BED MIN. TRANSFERS MIN MOD ASSIST. GAIT 60 FEET MIN RW. TOILET TRANSFERS MOD ASSIST RW. SATS 1 L WITH ACTIVITIES WITH SATS >=90%. BATH MOD. LBD MOD.UBD MIN. GROOMING SET UP. TOILETING MAX. MILD EXECUTIVE FUNCTION DEFICITS. BNE PENDING. DIET ADVANCED TO REGULAR CONSISTENCY, GOOD INTAKE. PULM EXPECTS WILL BE OFF OXYGEN IN NEXT FEW DAYS. REPEAT CHEST IMAGING IN 6 WEEKS.  ELOS - 2 WEEKS.     Team conference-May 30  PT: Walking 160 ft, on 1L with walking which she was previously on RA with wakling. Min assist for bed transfer with RW. Min assist with toilet transfers  OT: Bathing min, Dressing lower body mod assist, Dressing upper body is set up, toileting is mod assist. Fatigues easily with therapy, wants to stay in   ST: Mild high level deficits, attention to detail with check booking, not working on med management  Nursing: Potassium supplementation, WBC is 12 which is stable, on ABX Doxy PO and Merrem IV, Drops to 80% if not on supplemental O2, on 1-2L NC. Coccyx pressure wound. On IV ABX until 6/28/2023  Psych: apathetic, flat affect, but was agreeable to do testing  RD: Started on Kwasi   ELOS: June 9th    Team conference-June 6-   Bed mob CGA, transfers SBA - CGA rwx, ambulating 160' SBA - CGA rwx,  completed 4 steps w/ 2 handrails CGA.  CGA toilet transfer, Min A shower  transfer.  Min / CGA toileting, Min A bathing.  Min / CGA LBD.  Can complete  medicine task now with no cues.  Continent of urine 75%, incontinent 100% of  bowel.  ELOS-Friday, June 9      Now admit for comprehensive acute inpatient rehabilitation .  This would be an interdisciplinary program with physical therapy 1 hour,  occupational therapy 1 hour, and speech therapy 1  hour, 5 days a week.  Rehabilitation nursing for carryover, monitoring of neurologic and pulmonary   status, bowel and bladder, and skin  Ongoing physician follow-up.  Weekly team conferences.  Goals are to achieve a level of supervision with  mobility and self-care and improved endurance.   Rehabilitation prognosis fair.  Medical prognosis fair.  Estimated length of stay is approximately 2 weeks, but is only an estimation.    .     The patient's functional status and clinical status is unchanged from preadmission assessment and the patient continues appropriate for acute inpatient rehabilitation.  Goal is for home with outpatient   therapies.  Barrier to discharge: Impaired mobility self-care endurance- work on vision, strength, gross and fine motor control, balance, progressive ambulation, ADLs to overcome.           Sebastian Collins MD      During rounds, used appropriate personal protective equipment including mask and gloves.  Additional gown if indicated.  Mask used was standard procedure mask. Appropriate PPE was worn during the entire visit.  Hand hygiene was completed before and after.

## 2023-06-07 NOTE — PLAN OF CARE
Goal Outcome Evaluation:  Plan of Care Reviewed With: patient        Progress: improving  Outcome Evaluation: Pt is A&OX4, calm and cooperative. Flat affect. Con of B&B, barbarack at HS, Last BM 6/7. Assist X1 to wc. Edema in BL lower extremites. Dsg changed to buttocks. Meds whole with water. PICC in [facility], dsg d/t be changed 6/08. ID saw pt this AM, waiting for chest CT. Tolerating RA during the day, 1L of O2 at HS. No unsafe behaviors noted, pt uses call light for assistance.

## 2023-06-07 NOTE — THERAPY PROGRESS REPORT/RE-CERT
Inpatient Rehabilitation - Physical Therapy Progress Note       [facility]     Patient Name: Katherine Williamson  : 1950  MRN: [id number]    Today's Date: 2023                    Admit Date: 2023      Visit Dx:     ICD-10-CM ICD-9-CM   1. Decreased functional mobility and endurance  Z74.09 780.99       Patient Active Problem List   Diagnosis    Benign essential HTN    Tobacco abuse    Dislocation of hip joint prosthesis    Fracture of proximal humerus    Mechanical complication of internal orthopedic device    Wear of articular bearing surface of internal prosthetic joint    History of repair of hip joint    History of operative procedure on hip    Hyperglycemia    Hepatic steatosis    Diverticulosis    Chest pain, atypical    History of colon polyps    Family history of colon cancer in mother    Allergic rhinitis    Lung nodule seen on imaging study    Acute respiratory failure with hypoxia    Abscess of right lung with pneumonia    Empyema    Sepsis    Pleural effusion, right    Other emphysema    Pulmonary nodule (RLL)    Diastolic dysfunction, grade 1    Physical debility    Cryptogenic stroke    Acute right MCA stroke       Past Medical History:   Diagnosis Date    Arthritis     Cataract     Colon polyps     FOLLOWED BY DR. JOSEPH LAU    Hypertension        Past Surgical History:   Procedure Laterality Date    COLONOSCOPY  10/2015    Ozwop-bbknjeadkdih-Ab. Kaplan.  Follow-up in 5 years.    COLONOSCOPY N/A 2022    2 BENIGN POLYPS IN DESCENDING, 5 MM BENIGN POLYP IN SIGMOID, MULTIPLE SMALL AND LARGE DIVERTICULA IN SIGMOID, RESCOPE IN 3 YRS, [facility]    EYE SURGERY      JOINT REPLACEMENT  ?    Left total hip replacement in .  In 2015 patient had left hip revision    TONSILLECTOMY         PT ASSESSMENT (last 12 hours)       IRF PT Evaluation and Treatment       Row Name 23 1230          PT Time and Intention    Document Type daily treatment;progress note   -     Mode of Treatment physical therapy  -     Patient/Family/Caregiver Comments/Observations Agreeable to PT  -       Row Name 06/07/23 1230          General Information    Patient Profile Reviewed yes  -     General Observations of Patient Pt off O2 since this am .  Denies SOA at rest and sats >95% at rest.  -     Existing Precautions/Restrictions fall  -       Row Name 06/07/23 1230          Pain Assessment    Pretreatment Pain Rating 0/10 - no pain  -     Posttreatment Pain Rating 0/10 - no pain  -       Row Name 06/07/23 1230          Cognition/Psychosocial    Affect/Mental Status (Cognition) WFL  Anxious for d/c to home.  -     Orientation Status (Cognition) oriented x 3  -     Follows Commands (Cognition) follows one-step commands;verbal cues/prompting required  -     Personal Safety Interventions fall prevention program maintained;gait belt;nonskid shoes/slippers when out of bed;supervised activity  -     Cognitive Function WFL  -       Row Name 06/07/23 1230          Bed Mobility    Supine-Sit Harnett (Bed Mobility) supervision;verbal cues  -     Assistive Device (Bed Mobility) bed rails  -       Row Name 06/07/23 1230          Bed-Chair Transfer    Bed-Chair Harnett (Transfers) contact guard;standby assist;set up  -     Assistive Device (Bed-Chair Transfers) wheelchair  -       Row Name 06/07/23 1230          Sit-Stand Transfer    Sit-Stand Harnett (Transfers) supervision;standby assist;verbal cues  -     Assistive Device (Sit-Stand Transfers) walker, front-wheeled  -     Comment, (Sit-Stand Transfer) Vc for hand placement  -       Row Name 06/07/23 1230          Stand-Sit Transfer    Stand-Sit Harnett (Transfers) standby assist;supervision;verbal cues  -     Assistive Device (Stand-Sit Transfers) walker, front-wheeled;wheelchair  -     Comment, (Stand-Sit Transfer) VC fro hand placement  -       Row Name 06/07/23 1230          Gait/Stairs  (Locomotion)    Weakley Level (Gait) standby assist;verbal cues  -     Assistive Device (Gait) walker, front-wheeled  -     Distance in Feet (Gait) 160 x 3, 80, 40  -     Pattern (Gait) step-through  -     Deviations/Abnormal Patterns (Gait) raina decreased;stride length decreased  -     Bilateral Gait Deviations forward flexed posture  -     Left Sided Gait Deviations heel strike decreased  MIld ER with fatigue  -     Gait Assessment/Intervention VC fro upright posture.  -       Row Name 06/07/23 1230          Motor Skills    Additional Documentation Advanced Stepping/Walking Interventions (Group)  -       Row Name 06/07/23 1230          Hip (Therapeutic Exercise)    Hip Strengthening (Therapeutic Exercise) standing;bilateral;10 repetitions;aBduction  step taps to 4 inch step. - at hmei bar  -       Row Name 06/07/23 1230          Ankle (Therapeutic Exercise)    Ankle Strengthening (Therapeutic Exercise) standing;bilateral;plantarflexion;10 repetitions  at greyson bar  -       Row Name 06/07/23 1230          Advanced Stepping/Walking Interventions    Stepping/Walking Interventions side stepping  amb around greyson bar with R UE support only x 30 ft, CG  -       Row Name 06/07/23 1230          Positioning and Restraints    Pre-Treatment Position in bed  -     Post Treatment Position wheelchair  -     In Wheelchair sitting;encouraged to call for assist;exit alarm on;with other staff  -       Row Name 06/07/23 1230          Weekly Progress Summary (PT)    Functional Goal Overall Progress (PT) progressing toward functional goals as expected  -     Weekly Progress Summary (PT) Pt continues tomake progress toward d/c gaols in preparation for return home later this week.  Pt is on track to achieve goals of SBA/CG  for d/c.  Pt continues to struggle with LE weakness, deneralized decondiitoningand dec endurance which are limiting further indep at this time.  -       Row Name 06/07/23 1230           Bed Mobility Goal 1 (PT-IRF)    Activity/Assistive Device (Bed Mobility Goal 1, PT-IRF) bed mobility activities, all  -KP     Cassia Level (Bed Mobility Goal 1, PT-IRF) standby assist  -KP     Time Frame (Bed Mobility Goal 1, PT-IRF) long-term goal (LTG)  -KP     Progress/Outcomes (Bed Mobility Goal 1, PT-IRF) goal met  -KP       Row Name 06/07/23 1230          Transfer Goal 1 (PT-IRF)    Activity/Assistive Device (Transfer Goal 1, PT-IRF) bed-to-chair/chair-to-bed;sit-to-stand/stand-to-sit;walker, rolling  -KP     Cassia Level (Transfer Goal 1, PT-IRF) standby assist  -KP     Time Frame (Transfer Goal 1, PT-IRF) long-term goal (LTG);2 weeks  -KP     Progress/Outcomes (Transfer Goal 1, PT-IRF) goal met  -KP       Row Name 06/07/23 1230          Transfer Goal 2 (PT-IRF)    Activity/Assistive Device (Transfer Goal 2, PT-IRF) car transfer  -KP     Cassia Level (Transfer Goal 2, PT-IRF) contact guard required  -KP     Time Frame (Transfer Goal 2, PT-IRF) 2 weeks;long-term goal (LTG)  -KP     Progress/Outcomes (Transfer Goal 2, PT-IRF) goal ongoing;continuing progress toward goal  -KP       Row Name 06/07/23 1230          Gait/Walking Locomotion Goal 1 (PT-IRF)    Activity/Assistive Device (Gait/Walking Locomotion Goal 1, PT-IRF) assistive device use;walker, rolling  -KP     Gait/Walking Locomotion Distance Goal 1 (PT-IRF) 80  -KP     Cassia Level (Gait/Walking Locomotion Goal 1, PT-IRF) standby assist  -KP     Time Frame (Gait/Walking Locomotion Goal 1, PT-IRF) long-term goal (LTG);2 weeks  -KP     Progress/Outcomes (Gait/Walking Locomotion Goal 1, PT-IRF) continuing progress toward goal;goal ongoing  -KP       Row Name 06/07/23 1230          Stairs Goal 1 (PT-IRF)    Activity/Assistive Device (Stairs Goal 1, PT-IRF) descending stairs;ascending stairs  -KP     Number of Stairs (Stairs Goal 1, PT-IRF) 4  -KP     Cassia Level (Stairs Goal 1, PT-IRF) contact guard required  -KP      Time Frame (Stairs Goal 1, PT-IRF) 2 weeks;long-term goal (LTG)  -     Progress/Outcomes (Stairs Goal 1, PT-IRF) continuing progress toward goal;goal ongoing  -KP               User Key  (r) = Recorded By, (t) = Taken By, (c) = Cosigned By      Initials Name Provider Type    Jesusita So, PT Physical Therapist                  Wound 05/19/23 2055 Bilateral gluteal (Active)   Pressure Injury Stage 2 06/06/23 2151   Dressing Appearance dressing loose 06/07/23 0832   Closure None;Adhesive bandage 06/07/23 0832   Base red;moist;pink 06/07/23 0832   Periwound swelling;moist 06/07/23 0832   Periwound Temperature warm 06/07/23 0832   Periwound Skin Turgor soft 06/07/23 0832   Edges irregular 06/07/23 0832   Drainage Characteristics/Odor sanguineous 06/07/23 0832   Drainage Amount scant 06/07/23 0832   Care, Wound cleansed with;sterile normal saline 06/07/23 0832   Dressing Care dressing changed 06/07/23 0832   Periwound Care dry periwound area maintained 06/07/23 0832     Physical Therapy Education       Title: PT OT SLP Therapies (Done)       Topic: Physical Therapy (Done)       Point: Mobility training (Done)       Learning Progress Summary             Patient Acceptance, E,TB, VU,DU,NR by  at 6/7/2023 1423    Acceptance, E,TB, VU,NR by  at 5/31/2023 1200    Acceptance, E,TB, VU,NR by  at 5/30/2023 1131    Acceptance, E,TB, VU by  at 5/27/2023 0932    Acceptance, E, VU by  at 5/26/2023 0038    Eager, E,TB,D, NR by  at 5/25/2023 1019    Acceptance, E,D, VU,NR by  at 5/24/2023 1136    Acceptance, E,TB, VU,NR by EE at 5/23/2023 1139    Acceptance, E,TB, NR,VU by  at 5/22/2023 1221    Acceptance, E, NR by  at 5/20/2023 1216                         Point: Home exercise program (Done)       Learning Progress Summary             Patient Acceptance, E,TB, VU,DU,NR by  at 6/7/2023 1423    Acceptance, E,TB, VU,NR by  at 5/30/2023 1131    Acceptance, E,TB, VU by  at 5/27/2023 0932    Acceptance, E,  VU by WN at 5/26/2023 0038    Eager, E,TB,D, NR by  at 5/25/2023 1019    Acceptance, E,D, VU,NR by  at 5/24/2023 1136    Acceptance, E,TB, VU,NR by  at 5/23/2023 1139    Acceptance, E,TB, NR,VU by  at 5/22/2023 1221    Acceptance, E, NR by  at 5/20/2023 1216                         Point: Body mechanics (Done)       Learning Progress Summary             Patient Acceptance, E,TB, VU,NR by  at 5/31/2023 1200    Acceptance, E,TB, VU,NR by  at 5/30/2023 1131    Acceptance, E, VU by WN at 5/26/2023 0038    Acceptance, E,D, VU,NR by  at 5/24/2023 1136    Acceptance, E,TB, VU,NR by  at 5/23/2023 1139    Acceptance, E,TB, NR,VU by  at 5/22/2023 1221    Acceptance, E, NR by  at 5/20/2023 1216                         Point: Precautions (Done)       Learning Progress Summary             Patient Acceptance, E, VU by MD at 6/6/2023 1421    Acceptance, E, VU by MD at 6/5/2023 0847    Acceptance, E, VU by MD at 6/2/2023 1345    Acceptance, E, VU by MD at 6/1/2023 0848    Acceptance, E,TB, VU,NR by  at 5/31/2023 1200    Acceptance, E,TB, VU,NR by  at 5/30/2023 1131    Acceptance, E, VU by MD at 5/26/2023 1014    Acceptance, E, VU by WN at 5/26/2023 0038    Acceptance, E,D, VU,NR by  at 5/24/2023 1136    Acceptance, E,TB, VU,NR by  at 5/23/2023 1139    Acceptance, E,TB, NR,VU by  at 5/22/2023 1221    Acceptance, E, NR by  at 5/20/2023 1216                                         User Key       Initials Effective Dates Name Provider Type Discipline     06/16/21 -  Jess Wang, PT Physical Therapist PT     06/16/21 -  Tena Garay, PT Physical Therapist PT    MD 06/16/21 -  Melissa Wise, PT Physical Therapist PT    KP 06/16/21 -  Jesusita Mendoza PT Physical Therapist PT    WN 08/23/22 -  Reno Lazo, RN Registered Nurse Nurse                    PT Recommendation and Plan                          Time Calculation:      PT Charges       Row Name 06/07/23 1422 06/07/23 1414          Time  Calculation    Start Time 1230  - 0830  -     Stop Time 1300  -KP 0900  -     Time Calculation (min) 30 min  -KP 30 min  -KP     PT Received On -- 06/07/23  -     PT - Next Appointment -- 06/08/23  -     PT Goal Re-Cert Due Date -- 06/14/23  -               User Key  (r) = Recorded By, (t) = Taken By, (c) = Cosigned By      Initials Name Provider Type     Jesusita Mendoza, PT Physical Therapist                                 Jesusita Mendoza, PT  6/7/2023

## 2023-06-07 NOTE — PROGRESS NOTES
Inpatient Rehabilitation Plan of Care Note    Plan of Care  Care Plan Reviewed - Updates as Follows    Safety    [RN] Potential for Injury(Active)  Current Status(06/07/2023): Patient at risk for injury related to mobility  issues.  Weekly Goal(06/14/2023): Patient will use call light appropriately and have no  injury while on Rehab.  Discharge Goal: Patient will have no injury while on Rehab.    Performed Intervention(s)  Items within reach, environmental set-up for reduce risk of fall  Bed alarms and chair alarms and safety rounds hourly      Psychosocial    [RN] Coping/Adjustment(Active)  Current Status(06/07/2023): Patient at risk for ineffective coping, but has a  supportive .  Weekly Goal(06/14/2023): Patient will verbalize needs and concerns related to  current situation.  Discharge Goal: Patient will have healthy coping skills at time of discharge.    Performed Intervention(s)  Medication as ordered and PRN  Verbalize needs and concerns  Therapeutic environmental set up      Sphincter Control    [RN] Bladder Management(Active)  Current Status(06/07/2023): Con of bladder, uses purewick at HS  Weekly Goal(06/14/2023): Patient will be continent 100%  Discharge Goal: Patient will be continent 100% of the time.    [RN] Bowel Management(Active)  Current Status(06/07/2023): Con of bowel.  Weekly Goal(06/14/2023): Patient will be continent 50% of the time.  Discharge Goal: Patient will be continent 100% of the time.    Performed Intervention(s)  Incontinent care as needed and ointment applied as ordered.  Take to bathroom in a timely manner  Proper diet and adequate fluid intake.      Body Systems    [RN] Integumentary(Active)  Current Status(06/07/2023): Patient has stage 2 pressure injury to cocyx  Weekly Goal(06/14/2023): Patients skin will be healing with no more  deterioration.  Discharge Goal: Patients skin will be healed with no further injury.    Performed Intervention(s)  Medication, turn every 2  hours and proper nutrition.  low-airloss mattress in place    Signed by: Vernell Brown, RN

## 2023-06-07 NOTE — PROGRESS NOTES
ID NOTE    CC: f/u pneumonia (right worse than left) w/ possible abscess    Subj: No fever. On room air. Awaiting CT scan. No new symptoms to report today.     Medications:    Current Facility-Administered Medications:     acetaminophen (TYLENOL) tablet 650 mg, 650 mg, Oral, Q6H PRN, Bharat Calabrese MD, 650 mg at 06/06/23 2151    aspirin chewable tablet 81 mg, 81 mg, Oral, Q24H, Bharat Calabrese MD, 81 mg at 06/07/23 0832    atorvastatin (LIPITOR) tablet 80 mg, 80 mg, Oral, Nightly, Bharat Calabrese MD, 80 mg at 06/06/23 2007    citalopram (CeleXA) tablet 20 mg, 20 mg, Oral, Daily, Bharat Calabrese MD, 20 mg at 06/07/23 0832    clopidogrel (PLAVIX) tablet 75 mg, 75 mg, Oral, Daily, Bharat Calabrese MD, 75 mg at 06/07/23 0832    doxycycline (MONODOX) capsule 100 mg, 100 mg, Oral, Q12H, Bharat Calabrese MD, 100 mg at 06/07/23 0832    guaiFENesin (MUCINEX) 12 hr tablet 600 mg, 600 mg, Oral, BID, Bharat Calabrese MD, 600 mg at 06/07/23 0832    hydrocortisone-bacitracin-zinc oxide-nystatin (MAGIC BARRIER) ointment 1 application, 1 application, Topical, BID, Karthikeyan Kaba MD, 1 application at 06/07/23 0833    ipratropium-albuterol (DUO-NEB) nebulizer solution 3 mL, 3 mL, Nebulization, Q4H PRN, Bharat Calabrese MD    loperamide (IMODIUM A-D) 1 MG/7.5ML suspension 2 mg, 2 mg, Oral, 4x Daily PRN, Jennifer Hood MD, 2 mg at 05/20/23 1748    magnesium hydroxide (MILK OF MAGNESIA) suspension 10 mL, 10 mL, Oral, Once, Sebastian Collins MD    meropenem (MERREM) 1 g in sodium chloride 0.9 % 100 mL IVPB-VTB, 1 g, Intravenous, Q8H, Bharat Calabrese MD, 1 g at 06/07/23 0507    metoprolol tartrate (LOPRESSOR) tablet 12.5 mg, 12.5 mg, Oral, Q12H, Bharat Calabrese MD, 12.5 mg at 06/07/23 0837    potassium chloride (K-DUR,KLOR-CON) ER tablet 10 mEq, 10 mEq, Oral, Daily, Karthikeyan Kaba MD, 10 mEq at 06/07/23 0832    sennosides-docusate (PERICOLACE) 8.6-50 MG per tablet 1 tablet, 1 tablet, Oral, BID PRN, Sebastian Collins MD     sodium chloride 0.9 % flush 10 mL, 10 mL, Intravenous, Q12H, Rigoberto Chan MD, 10 mL at 06/07/23 0841    sodium chloride 0.9 % flush 10 mL, 10 mL, Intravenous, PRN, Rigoberto Chan MD    sodium chloride 0.9 % flush 10 mL, 10 mL, Intravenous, Q12H, Rigoberto Chan MD, 10 mL at 06/07/23 0841    sodium chloride 0.9 % flush 10 mL, 10 mL, Intravenous, PRN, Rigoberto Chan MD    sodium chloride 0.9 % flush 20 mL, 20 mL, Intravenous, PRN, Rigoberto Chan MD    sodium chloride 0.9 % flush 20 mL, 20 mL, Intravenous, PRN, Rigoberto Chan MD    sodium chloride 0.9 % infusion 40 mL, 40 mL, Intravenous, PRN, Rigoberto Chan MD    vitamin B-12 (CYANOCOBALAMIN) tablet 1,000 mcg, 1,000 mcg, Oral, Daily, Bharat Calabrese MD, 1,000 mcg at 06/07/23 0832      Objective   Vital Signs   Temp:  [97.3 °F (36.3 °C)-98.1 °F (36.7 °C)] 98.1 °F (36.7 °C)  Heart Rate:  [83-92] 83  Resp:  [16-18] 18  BP: (103-122)/(58-67) 122/58    Physical Exam:   General: awake, alert, NAD, very nice  Eyes: no scleral icterus  ENT:  no thrush  Cardiovascular: NR  Respiratory: clear anteriorly; RLL rales posteriourly; no wheezing; normal WOB on RA  GI: Abdomen is soft, not tender or distended  Skin: petechiae on BLEs  Psychiatric: Normal mood and affect   Vasc: RUE PICC w/o erythema    Labs:   CRp, Fungus and AFB cultures reviewed today  Lab Results   Component Value Date    WBC 11.41 (H) 06/05/2023    HGB 10.2 (L) 06/05/2023    HCT 30.4 (L) 06/05/2023    MCV 87.1 06/05/2023     06/05/2023     Lab Results   Component Value Date    GLUCOSE 78 06/05/2023    CALCIUM 8.7 06/05/2023     (L) 06/05/2023    K 3.8 06/05/2023    CO2 27.1 06/05/2023    CL 99 06/05/2023    BUN 9 06/05/2023    CREATININE 0.30 (L) 06/05/2023    EGFR 114.3 06/05/2023    BCR 30.0 (H) 06/05/2023    ANIONGAP 5.9 06/05/2023     Lab Results   Component Value Date    CRP 4.45 (H) 06/05/2023     Crypto  "Ag negative  Urine Histo Ag negative  Serum Histo Ag negative  Histoplasma Ab negative  Urine Blasto Ag negative  Aspergillus Ag 0.07 (negative)  ANCA negative      Microbiology:  5/2 RPP: negative  5/2 BCx: Corynebacterium in 1/2 sets  5/2 SpCx: normal aishwarya  5/2 MRSA nares: negative  5/2 Strep pneumo Ag; negative  5/2 Legionella Ag: negative  5/4 BCx: negative  5/5 C diff: negative  5/6 SpCx: normal aishwarya  5/9 AFB Cx: NGTD  5/9 Fungus Cx: negative  5/10 SpCx: rare normal aishwarya  5/10 L Thoracentesis Cx: negative  5/14 BCx: negative  5/26 C diff: negative    New Radiology:  6/7 CT chest ordered    Prior radiology:  -MRI brain: \"Bilateral multifocal areas of infarction.  No evidence of hemorrhagic transformation. 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 She also has subjective improvement in her respiratory status. The end of her 4-week course of meropenem and doxycycline is approaching. I ordered a repeat CT chest for today. Depending on results, will either stop antibiotics, extend antibiotics, or consult thoracic surgery.     ID will follow. D/W Dr Collins re: case/plan.       "

## 2023-06-07 NOTE — TELEPHONE ENCOUNTER
I reviewed her Zio patch results.  It looks like she is still in inpatient rehab.  Will you make sure she has follow-up arranged with Debora or me when she gets discharged.

## 2023-06-08 ENCOUNTER — APPOINTMENT (OUTPATIENT)
Dept: CT IMAGING | Facility: HOSPITAL | Age: 73
DRG: 056 | End: 2023-06-08
Payer: COMMERCIAL

## 2023-06-08 PROCEDURE — 97530 THERAPEUTIC ACTIVITIES: CPT

## 2023-06-08 PROCEDURE — 97129 THER IVNTJ 1ST 15 MIN: CPT

## 2023-06-08 PROCEDURE — 97535 SELF CARE MNGMENT TRAINING: CPT

## 2023-06-08 PROCEDURE — 94799 UNLISTED PULMONARY SVC/PX: CPT

## 2023-06-08 PROCEDURE — 71250 CT THORAX DX C-: CPT

## 2023-06-08 PROCEDURE — 97110 THERAPEUTIC EXERCISES: CPT

## 2023-06-08 PROCEDURE — 99232 SBSQ HOSP IP/OBS MODERATE 35: CPT | Performed by: INTERNAL MEDICINE

## 2023-06-08 PROCEDURE — 97116 GAIT TRAINING THERAPY: CPT

## 2023-06-08 PROCEDURE — 97130 THER IVNTJ EA ADDL 15 MIN: CPT

## 2023-06-08 PROCEDURE — 25010000002 MEROPENEM PER 100 MG: Performed by: HOSPITALIST

## 2023-06-08 PROCEDURE — 94762 N-INVAS EAR/PLS OXIMTRY CONT: CPT

## 2023-06-08 PROCEDURE — 25010000002 MEROPENEM PER 100 MG: Performed by: INTERNAL MEDICINE

## 2023-06-08 RX ADMIN — Medication 10 ML: at 09:05

## 2023-06-08 RX ADMIN — CITALOPRAM 20 MG: 20 TABLET, FILM COATED ORAL at 09:03

## 2023-06-08 RX ADMIN — ZINC OXIDE 1 APPLICATION: 200 OINTMENT TOPICAL at 20:32

## 2023-06-08 RX ADMIN — GUAIFENESIN 600 MG: 600 TABLET, EXTENDED RELEASE ORAL at 20:27

## 2023-06-08 RX ADMIN — CLOPIDOGREL BISULFATE 75 MG: 75 TABLET, FILM COATED ORAL at 09:02

## 2023-06-08 RX ADMIN — MEROPENEM 1 G: 1 INJECTION, POWDER, FOR SOLUTION INTRAVENOUS at 05:05

## 2023-06-08 RX ADMIN — Medication 10 ML: at 20:31

## 2023-06-08 RX ADMIN — METOPROLOL TARTRATE 12.5 MG: 25 TABLET, FILM COATED ORAL at 09:03

## 2023-06-08 RX ADMIN — MEROPENEM 1 G: 1 INJECTION, POWDER, FOR SOLUTION INTRAVENOUS at 20:28

## 2023-06-08 RX ADMIN — Medication 1000 MCG: at 09:02

## 2023-06-08 RX ADMIN — POTASSIUM CHLORIDE 10 MEQ: 750 TABLET, EXTENDED RELEASE ORAL at 09:03

## 2023-06-08 RX ADMIN — ASPIRIN 81 MG: 81 TABLET, CHEWABLE ORAL at 09:02

## 2023-06-08 RX ADMIN — ATORVASTATIN CALCIUM 80 MG: 80 TABLET, FILM COATED ORAL at 20:27

## 2023-06-08 RX ADMIN — MEROPENEM 1 G: 1 INJECTION, POWDER, FOR SOLUTION INTRAVENOUS at 12:28

## 2023-06-08 RX ADMIN — METOPROLOL TARTRATE 12.5 MG: 25 TABLET, FILM COATED ORAL at 20:27

## 2023-06-08 RX ADMIN — ZINC OXIDE 1 APPLICATION: 200 OINTMENT TOPICAL at 09:04

## 2023-06-08 RX ADMIN — DOXYCYCLINE 100 MG: 100 CAPSULE ORAL at 09:03

## 2023-06-08 RX ADMIN — DOXYCYCLINE 100 MG: 100 CAPSULE ORAL at 20:27

## 2023-06-08 RX ADMIN — GUAIFENESIN 600 MG: 600 TABLET, EXTENDED RELEASE ORAL at 09:05

## 2023-06-08 NOTE — PROGRESS NOTES
"Section C. BIMS  Brief Interview for Mental Status (BIMS) was conducted.  Repetition of Three Words: Three words  Able to report correct year: Correct  Able to report correct month: Accurate within 5 days  Able to report correct day of the week: Correct  Able to recall \"sock\": Yes, no cue required  Able to recall \"blue\": Yes, no cue required  Able to recall \"bed\": Yes, no cue required    BIMS SUMMARY SCORE: 15 Cognitively intact    Section C. Signs and Symptoms of Delirium (from CAM)  Acute Change in Mental Status:   No  Inattention:   Behavior not present  Disorganized Thinking:   Behavior not present  Altered Level of Consciousness:   Behavior not present    Signed by: Sarita Comer, SLP    "

## 2023-06-08 NOTE — PLAN OF CARE
6 minute walk test performed this date at 12:45 PM.  Pt O2 was 95% on room air sitting, 90% on room air while ambulating for 6 mins and then 95% following ambulation in sitting on room air.

## 2023-06-08 NOTE — THERAPY DISCHARGE NOTE
Inpatient Rehabilitation - Physical Therapy Treatment Note/Discharge  [facility]     Patient Name: Katherine Williamson  : 1950  MRN: [id number]  Today's Date: 2023                Admit Date: 2023    Visit Dx:    ICD-10-CM ICD-9-CM   1. Decreased functional mobility and endurance  Z74.09 780.99     Patient Active Problem List   Diagnosis    Benign essential HTN    Tobacco abuse    Dislocation of hip joint prosthesis    Fracture of proximal humerus    Mechanical complication of internal orthopedic device    Wear of articular bearing surface of internal prosthetic joint    History of repair of hip joint    History of operative procedure on hip    Hyperglycemia    Hepatic steatosis    Diverticulosis    Chest pain, atypical    History of colon polyps    Family history of colon cancer in mother    Allergic rhinitis    Lung nodule seen on imaging study    Acute respiratory failure with hypoxia    Abscess of right lung with pneumonia    Empyema    Sepsis    Pleural effusion, right    Other emphysema    Pulmonary nodule (RLL)    Diastolic dysfunction, grade 1    Physical debility    Cryptogenic stroke    Acute right MCA stroke     Past Medical History:   Diagnosis Date    Arthritis     Cataract     Colon polyps     FOLLOWED BY DR. JOSEPH LAU    Hypertension      Past Surgical History:   Procedure Laterality Date    COLONOSCOPY  10/2015    [facility].  Follow-up in 5 years.    COLONOSCOPY N/A 2022    2 BENIGN POLYPS IN DESCENDING, 5 MM BENIGN POLYP IN SIGMOID, MULTIPLE SMALL AND LARGE DIVERTICULA IN SIGMOID, RESCOPE IN 3 YRS, DR. JOSEPH LAU AT [facility]    EYE SURGERY      JOINT REPLACEMENT  ?    Left total hip replacement in .  In 2015 patient had left hip revision    TONSILLECTOMY         PT ASSESSMENT (last 12 hours)       IRF PT Evaluation and Treatment       Row Name 23 0843          PT Time and Intention    Document Type discharge evaluation  -MD     Mode of  Treatment physical therapy  -MD     Patient/Family/Caregiver Comments/Observations Pt supine in bed showing no signs of acute distress.  -MD       Row Name 06/08/23 0843          Pain Assessment    Pretreatment Pain Rating 0/10 - no pain  -MD       Row Name 06/08/23 0843          Cognition/Psychosocial    Orientation Status (Cognition) oriented x 3  -MD     Follows Commands (Cognition) follows one-step commands;verbal cues/prompting required  -MD     Personal Safety Interventions fall prevention program maintained;gait belt  -MD       Row Name 06/08/23 0843          Mobility    Advanced Gait Activity rough/uneven surfaces  -MD     Additional Documentation Advanced Gait Activity (Row)  -MD       Row Name 06/08/23 0843          Bed Mobility    Bed Mobility rolling left;rolling right  -MD     Rolling Left [place] (Bed Mobility) supervision  -MD     Supine-Sit [place] (Bed Mobility) supervision  -MD       Row Name 06/08/23 0843          Bed-Chair Transfer    Bed-Chair [place] (Transfers) standby assist  -MD     Assistive Device (Bed-Chair Transfers) wheelchair  -MD       Row Name 06/08/23 0843          Sit-Stand Transfer    Sit-Stand [place] (Transfers) standby assist  -MD     Assistive Device (Sit-Stand Transfers) walker, front-wheeled  -MD       Row Name 06/08/23 0843          Stand-Sit Transfer    Stand-Sit [place] (Transfers) standby assist  -MD     Assistive Device (Stand-Sit Transfers) walker, front-wheeled;wheelchair  -MD       Row Name 06/08/23 0843          Car Transfer    Type (Car Transfer) sit-stand;stand-sit  -MD     [place] Level (Car Transfer) verbal cues;contact guard  -MD     Assistive Device (Car Transfer) walker, front-wheeled  -MD       Row Name 06/08/23 0843          Gait/Stairs (Locomotion)    [place] Level (Gait) standby assist;verbal cues  -MD     Assistive Device (Gait) walker, front-wheeled  -MD     Distance in Feet (Gait) 80'x1 and 300'x1  -MD     Pattern  (Gait) step-through  -MD     Deviations/Abnormal Patterns (Gait) raina decreased;stride length decreased  -MD     Bilateral Gait Deviations forward flexed posture  -MD     [place] Level (Stairs) verbal cues;contact guard  -MD     Handrail Location (Stairs) both sides  -MD     Number of Steps (Stairs) 4  -MD     Ascending Technique (Stairs) step-to-step  -MD     Descending Technique (Stairs) step-to-step  -MD     Comment, (Gait/Stairs) Pt completed family teaching w spouse Ronnie.  Pt able to go up and down 2 steps w/o HR CGA and HHA provided by .  -MD       Row Name 06/08/23 0843          Rough/Uneven Surface Gait Skills (Mobility)    [place], Gait on Rough/Uneven Surface (Mobility) verbal cues;contact guard  -MD     Assistive Device (Rough/Uneven Surface Gait) walker, front-wheeled  -MD     Distance in Feet (Rough/Uneven Surface Gait) 10'x2  -MD     Comment, Gait Rough/Uneven Surface (Mobility) Pt required Mod A when retrieving obect from floor due to loss of balance posterior.  -MD       Row Name 06/08/23 0843          Positioning and Restraints    Pre-Treatment Position sitting in chair/recliner  -MD     Post Treatment Position wheelchair  -MD     In Wheelchair sitting;exit alarm on;with other staff  -MD       Row Name 06/08/23 0843          Vital Signs    Pre SpO2 (%) 95  -MD     O2 Delivery Pre Treatment room air  -MD     Intra SpO2 (%) 90  -MD     O2 Delivery Intra Treatment room air  -MD     Post SpO2 (%) 95  -MD     O2 Delivery Post Treatment room air  -MD     Pre Patient Position Sitting  -MD     Intra Patient Position Standing  -MD     Post Patient Position Sitting  -MD       Row Name 06/08/23 0843          Transfer Goal 2 (PT-IRF)    Progress/Outcomes (Transfer Goal 2, PT-IRF) goal met  -MD       Row Name 06/08/23 0843          Gait/Walking Locomotion Goal 1 (PT-IRF)    Progress/Outcomes (Gait/Walking Locomotion Goal 1, PT-IRF) goal met  -MD       Row Name 06/08/23 0843          Stairs  Goal 1 (PT-IRF)    Progress/Outcomes (Stairs Goal 1, PT-IRF) goal partially met  pt only has 2 steps at home w/o HR.  Pt able to ascend/descend 2 steps w/o HR CGA/HHA from   -MD               User Key  (r) = Recorded By, (t) = Taken By, (c) = Cosigned By      Initials Name Provider Type    Melissa Fischer PT Physical Therapist                    Physical Therapy Education       Title: PT OT SLP Therapies (Done)       Topic: Physical Therapy (Done)       Point: Mobility training (Done)       Learning Progress Summary             Patient Acceptance, E,D, VU,DU by MD at 6/8/2023 1610    Comment: Pt and spouse completed FT for bed mobility, transfers (including car transfers), ambulation and stairs this date.  Pt spouse states he is comfortable w level of assist required for pt safety at home.    Acceptance, E,TB, VU,DU,NR by  at 6/7/2023 1423    Acceptance, E,TB, VU,NR by  at 5/31/2023 1200    Acceptance, E,TB, VU,NR by  at 5/30/2023 1131    Acceptance, E,TB, VU by  at 5/27/2023 0932    Acceptance, E, VU by  at 5/26/2023 0038    Eager, E,TB,D, NR by  at 5/25/2023 1019    Acceptance, E,D, VU,NR by  at 5/24/2023 1136    Acceptance, E,TB, VU,NR by  at 5/23/2023 1139    Acceptance, E,TB, NR,VU by  at 5/22/2023 1221    Acceptance, E, NR by  at 5/20/2023 1216   Family Acceptance, E,D, VU,DU by MD at 6/8/2023 1610    Comment: Pt and spouse completed FT for bed mobility, transfers (including car transfers), ambulation and stairs this date.  Pt spouse states he is comfortable w level of assist required for pt safety at home.                         Point: Home exercise program (Done)       Learning Progress Summary             Patient Acceptance, E,TB, VU,DU,NR by  at 6/7/2023 1423    Acceptance, E,TB, VU,NR by  at 5/30/2023 1131    Acceptance, E,TB, VU by  at 5/27/2023 0932    Acceptance, E, VU by TAYLOR at 5/26/2023 0038    MAIRA Sharma TB,D, NR by SUMMER at 5/25/2023 1019    Acceptance, E,WILBERT, VU,NR by   at 5/24/2023 1136    Acceptance, E,TB, VU,NR by EE at 5/23/2023 1139    Acceptance, E,TB, NR,VU by EE at 5/22/2023 1221    Acceptance, E, NR by  at 5/20/2023 1216                         Point: Body mechanics (Done)       Learning Progress Summary             Patient Acceptance, E,TB, VU,NR by EE at 5/31/2023 1200    Acceptance, E,TB, VU,NR by EE at 5/30/2023 1131    Acceptance, E, VU by WN at 5/26/2023 0038    Acceptance, E,D, VU,NR by EE at 5/24/2023 1136    Acceptance, E,TB, VU,NR by EE at 5/23/2023 1139    Acceptance, E,TB, NR,VU by EE at 5/22/2023 1221    Acceptance, E, NR by LH at 5/20/2023 1216                         Point: Precautions (Done)       Learning Progress Summary             Patient Acceptance, E, VU by MD at 6/6/2023 1421    Acceptance, E, VU by MD at 6/5/2023 0847    Acceptance, E, VU by MD at 6/2/2023 1345    Acceptance, E, VU by MD at 6/1/2023 0848    Acceptance, E,TB, VU,NR by EE at 5/31/2023 1200    Acceptance, E,TB, VU,NR by EE at 5/30/2023 1131    Acceptance, E, VU by MD at 5/26/2023 1014    Acceptance, E, VU by WN at 5/26/2023 0038    Acceptance, E,D, VU,NR by EE at 5/24/2023 1136    Acceptance, E,TB, VU,NR by EE at 5/23/2023 1139    Acceptance, E,TB, NR,VU by EE at 5/22/2023 1221    Acceptance, E, NR by  at 5/20/2023 1216                                         User Key       Initials Effective Dates Name Provider Type Discipline     06/16/21 -  Jess Wang, PT Physical Therapist PT    EE 06/16/21 -  Tena Garay, PT Physical Therapist PT    MD 06/16/21 -  Melissa Wise, PT Physical Therapist PT    SUMMER 06/16/21 -  Jesusita Mendoza, PT Physical Therapist PT    WN 08/23/22 -  Reno Lazo, RN Registered Nurse Nurse                    PT Recommendation and Plan                  Time Calculation:    PT Charges       Row Name 06/08/23 1610 06/08/23 0843          Time Calculation    Start Time 1230  -MD 0830  -MD     Stop Time 1300  -MD 0900  -MD     Time Calculation (min) 30 min  -MD  30 min  -MD     PT Received On -- 06/08/23  -MD               User Key  (r) = Recorded By, (t) = Taken By, (c) = Cosigned By      Initials Name Provider Type    Melissa Fischer, PT Physical Therapist                    Therapy Charges for Today       Code Description Service Date Service Provider Modifiers Qty    04036508438  PT THERAPEUTIC ACT EA 15 MIN 6/8/2023 Melissa Wise, PT GP 1    82145038241 HC GAIT TRAINING EA 15 MIN 6/8/2023 Melissa Wise, PT GP 2    [id number]  PT THER PROC EA 15 MIN 6/8/2023 Melissa Wise, PT GP 1                      Melissa Wise, PT  6/8/2023

## 2023-06-08 NOTE — PROGRESS NOTES
SECTION GG      Self Care Performance Discharge:   Oral Hygiene: [place] sets up or cleans up; patient completes activity. [place]  assists only prior to or following the activity.   Toileting Hygiene: : [place] provides verbal cues and/or touching/steadying  and/or contact guard assistance as patient completes activity.   Shower/Bathe Self: [place] provides verbal cues and/or touching/steadying and/or  contact guard assistance as patient completes activity.   Upper Body Dressing: [place] sets up or cleans up; patient completes activity.  [place] assists only prior to or following the activity.   Lower Body Dressing: [place] provides verbal cues and/or touching/steadying  and/or contact guard assistance as patient completes activity.   Putting On/Taking Off Footwear: [place] sets up or cleans up; patient completes  activity. [place] assists only prior to or following the activity.    Mobility Toilet Transfer Discharge: [place] provides verbal cues or  touching/steadying assistance as patient completes activity.    Signed by: Silke Moreno, OT

## 2023-06-08 NOTE — PROGRESS NOTES
here today for family teaching. Discussed home health services (PT, OT, ST, NSG) to be provided by [facility] and order to continue IV antibiotic at home for 2 more weeks per Dr. Chan, ID. Referral made to [facility] to provide IV antibiotic. Will confirm delivery time for IV antibiotic and teaching with them in the morning.  NSG also did teaching with  today regarding wound care to bottom. D/C plan is home with  tomorrow, 6/9. Will assist with plans.

## 2023-06-08 NOTE — PROGRESS NOTES
Inpatient Rehabilitation Plan of Care Note    Plan of Care  Care Plan Reviewed - No updates at this time.    Safety    [RN] Potential for Injury(Active)  Current Status(06/08/2023): Patient at risk for injury related to mobility  issues.  Weekly Goal(06/14/2023): Patient will use call light appropriately and have no  injury while on Rehab.  Discharge Goal: Patient will have no injury while on Rehab.    Performed Intervention(s)  Items within reach, environmental set-up for reduce risk of fall  Bed alarms and chair alarms and safety rounds hourly      Psychosocial    [RN] Coping/Adjustment(Active)  Current Status(06/08/2023): Patient at risk for ineffective coping, but has a  supportive .  Weekly Goal(06/14/2023): Patient will verbalize needs and concerns related to  current situation.  Discharge Goal: Patient will have healthy coping skills at time of discharge.    Performed Intervention(s)  Medication as ordered and PRN  Verbalize needs and concerns  Therapeutic environmental set up      Sphincter Control    [RN] Bladder Management(Active)  Current Status(06/08/2023): Con of bladder, uses purewick at HS  Weekly Goal(06/14/2023): Patient will be continent 100%  Discharge Goal: Patient will be continent 100% of the time.    [RN] Bowel Management(Active)  Current Status(06/08/2023): Con of bowel.  Weekly Goal(06/14/2023): Patient will be continent 50% of the time.  Discharge Goal: Patient will be continent 100% of the time.    Performed Intervention(s)  Incontinent care as needed and ointment applied as ordered.  Take to bathroom in a timely manner  Proper diet and adequate fluid intake.      Body Systems    [RN] Integumentary(Active)  Current Status(06/08/2023): Patient has stage 2 pressure injury to cocyx  Weekly Goal(06/14/2023): Patients skin will be healing with no more  deterioration.  Discharge Goal: Patients skin will be healed with no further injury.    Performed Intervention(s)  Medication, turn every  2 hours and proper nutrition.  low-airloss mattress in place    Signed by: Shirley Cordero RN

## 2023-06-08 NOTE — PROGRESS NOTES
ID NOTE    CC: f/u pneumonia (right worse than left) w/ possible abscess    Subj: No fever. She remains on room air. Had CT scan (see below). No new symptoms to report today. Eager for DC    Medications:    Current Facility-Administered Medications:     acetaminophen (TYLENOL) tablet 650 mg, 650 mg, Oral, Q6H PRN, Bharat Calabrese MD, 650 mg at 06/06/23 2151    aspirin chewable tablet 81 mg, 81 mg, Oral, Q24H, Bharat Calabrese MD, 81 mg at 06/08/23 0902    atorvastatin (LIPITOR) tablet 80 mg, 80 mg, Oral, Nightly, Bharat Calabrese MD, 80 mg at 06/07/23 2112    citalopram (CeleXA) tablet 20 mg, 20 mg, Oral, Daily, Bharat Calabrese MD, 20 mg at 06/08/23 0903    clopidogrel (PLAVIX) tablet 75 mg, 75 mg, Oral, Daily, Bharat Calabrese MD, 75 mg at 06/08/23 0902    doxycycline (MONODOX) capsule 100 mg, 100 mg, Oral, Q12H, Bharat Calabrese MD, 100 mg at 06/08/23 0903    guaiFENesin (MUCINEX) 12 hr tablet 600 mg, 600 mg, Oral, BID, Bharat Calabrese MD, 600 mg at 06/08/23 0905    hydrocortisone-bacitracin-zinc oxide-nystatin (MAGIC BARRIER) ointment 1 application, 1 application, Topical, BID, Karthikeyan Kaba MD, 1 application at 06/08/23 0904    ipratropium-albuterol (DUO-NEB) nebulizer solution 3 mL, 3 mL, Nebulization, Q4H PRN, Bharat Calabrese MD    loperamide (IMODIUM A-D) 1 MG/7.5ML suspension 2 mg, 2 mg, Oral, 4x Daily PRN, Jennifer Hood MD, 2 mg at 05/20/23 1748    magnesium hydroxide (MILK OF MAGNESIA) suspension 10 mL, 10 mL, Oral, Once, Sebastian Collins MD    meropenem (MERREM) 1 g in sodium chloride 0.9 % 100 mL IVPB-VTB, 1 g, Intravenous, Q8H, Bharat Calabrese MD, 1 g at 06/08/23 0505    metoprolol tartrate (LOPRESSOR) tablet 12.5 mg, 12.5 mg, Oral, Q12H, Bharat Calabrese MD, 12.5 mg at 06/08/23 0903    potassium chloride (K-DUR,KLOR-CON) ER tablet 10 mEq, 10 mEq, Oral, Daily, Karthikeyan Kaba MD, 10 mEq at 06/08/23 0903    sennosides-docusate (PERICOLACE) 8.6-50 MG per tablet 1 tablet, 1 tablet, Oral, BID PRN,  Sebastian Collins MD    sodium chloride 0.9 % flush 10 mL, 10 mL, Intravenous, Q12H, Rigoberto Chan MD, 10 mL at 06/08/23 0905    sodium chloride 0.9 % flush 10 mL, 10 mL, Intravenous, PRN, Rigoberto Chan MD    sodium chloride 0.9 % flush 10 mL, 10 mL, Intravenous, Q12H, Rigoberto Chan MD, 10 mL at 06/08/23 0905    sodium chloride 0.9 % flush 10 mL, 10 mL, Intravenous, PRN, Rigoberto Chan MD    sodium chloride 0.9 % flush 20 mL, 20 mL, Intravenous, PRN, Rigoberto Chan MD    sodium chloride 0.9 % flush 20 mL, 20 mL, Intravenous, PRN, Rigoberto Chan MD    sodium chloride 0.9 % infusion 40 mL, 40 mL, Intravenous, PRN, Rigoberto Chan MD    vitamin B-12 (CYANOCOBALAMIN) tablet 1,000 mcg, 1,000 mcg, Oral, Daily, Bharat Calabrese MD, 1,000 mcg at 06/08/23 0902      Objective   Vital Signs   Temp:  [97 °F (36.1 °C)-97.6 °F (36.4 °C)] 97 °F (36.1 °C)  Heart Rate:  [71-86] 82  Resp:  [16-18] 18  BP: (108-125)/(61-75) 125/73    Physical Exam:   General: awake, alert, NAD, very nice, in wheelchair  Eyes: no scleral icterus  ENT:  no thrush  Cardiovascular: NR  Respiratory: clear anteriorly; RLL rales posteriorly; no wheezing; normal WOB on RA  GI: Abdomen is soft, not tender or distended  Skin: petechiae on BLEs  Psychiatric: Normal mood and affect   Vasc: RUE PICC w/o erythema    Labs:     Lab Results   Component Value Date    WBC 11.41 (H) 06/05/2023    HGB 10.2 (L) 06/05/2023    HCT 30.4 (L) 06/05/2023    MCV 87.1 06/05/2023     06/05/2023     Lab Results   Component Value Date    GLUCOSE 78 06/05/2023    CALCIUM 8.7 06/05/2023     (L) 06/05/2023    K 3.8 06/05/2023    CO2 27.1 06/05/2023    CL 99 06/05/2023    BUN 9 06/05/2023    CREATININE 0.30 (L) 06/05/2023    EGFR 114.3 06/05/2023    BCR 30.0 (H) 06/05/2023    ANIONGAP 5.9 06/05/2023     Lab Results   Component Value Date    CRP 4.45 (H) 06/05/2023     Crypto Ag  "negative  Urine Histo Ag negative  Serum Histo Ag negative  Histoplasma Ab negative  Urine Blasto Ag negative  Aspergillus Ag 0.07 (negative)  ANCA negative      Microbiology:  5/2 RPP: negative  5/2 BCx: Corynebacterium in 1/2 sets  5/2 SpCx: normal aishwarya  5/2 MRSA nares: negative  5/2 Strep pneumo Ag; negative  5/2 Legionella Ag: negative  5/4 BCx: negative  5/5 C diff: negative  5/6 SpCx: normal aishwarya  5/9 AFB Cx: NGTD  5/9 Fungus Cx: negative  5/10 SpCx: rare normal aishwarya  5/10 L Thoracentesis Cx: negative  5/14 BCx: negative  5/26 C diff: negative    New Radiology:  6/8 CT chest:  \"1. Increased density of consolidation in the right middle lobe   2. Large area of cavitation in the right lower lobe again demonstrated.   Consolidation in the right lower lobe posterior to the cavitation has   decreased   3. Consolidation elsewhere in the right lower lobe stable   4. Decreased consolidation in the base of the left lower     Prior radiology:  -MRI brain: \"Bilateral multifocal areas of infarction.  No evidence of hemorrhagic transformation. \"  -ELIGIO negative for vegetations    ASSESSMENT/PLAN:  Pneumonia and right lung abscess  Pleural effusions  Acute hypoxic respiratory failure  Emphysema  Tobacco use  Acute R MCA stroke and bilateral infarcts    CT chest shows improvement on the left side. However, there is still cavitation and consolidation on the right side. This is despite 5 weeks of antibiotic therapy. Clinically she is improved but the radiographic improvement is lagging. I have asked thoracic surgery to evaluate and they will also d/w pulmonary re: next steps.     For now, I am going to plan to extend her antibiotics of meropenem 1 g IV q8h and doxycycline 100 mg PO BID through 6/22/23. Keep PICC Line. Will probably need  arranged for home antibiotics.     ID will follow. D/W Dr Collins re: antibiotic plan.       "

## 2023-06-08 NOTE — PROGRESS NOTES
Inpatient Rehabilitation Plan of Care Note    Plan of Care  Care Plan Reviewed - No updates at this time.    Safety    Performed Intervention(s)  Items within reach, environmental set-up for reduce risk of fall  Bed alarms and chair alarms and safety rounds hourly      Psychosocial    Performed Intervention(s)  Medication as ordered and PRN  Verbalize needs and concerns  Therapeutic environmental set up      Sphincter Control    Performed Intervention(s)  Incontinent care as needed and ointment applied as ordered.  Take to bathroom in a timely manner  Proper diet and adequate fluid intake.      Body Systems    Performed Intervention(s)  Medication, turn every 2 hours and proper nutrition.  low-airloss mattress in place    Signed by: Vernell Brown RN

## 2023-06-08 NOTE — PROGRESS NOTES
LOS: 20 days   Patient Care Team:  Zelalem Flor APRN as PCP - General (Internal Medicine)  Brandon Mitchell MD as Consulting Physician (Orthopedic Surgery)      ANNIE CERDA  1950    Diagnoses    DECREASED FUNCTIONAL MOBILITY AND ENDURANCE       ADMITTING DIAGNOSIS:  CVA      Subjective     Tolerates therapies.  On room air during the daytime.  Wears nasal cannula oxygen at nighttime.  No new weakness.        Objective     Vitals:    06/08/23 1201   BP: 106/68   Pulse: 84   Resp: 18   Temp: 97.2 °F (36.2 °C)   SpO2: 90%       PHYSICAL EXAM:   MENTAL STATUS -  AWAKE / ALERT  HEENT-   SCLERAE ANICTERIC, CONJUNCTIVAE PINK, OP MOIST, NO JVD,  LUNGS -decreased air movement bilaterally.  Crackles at the right side with decreased air movement on the right side compared to the left  Clear to auscultation.  On room air.     HEART- RRR, NO RUB, MURMUR, OR GALLOP  ABD - NORMOACTIVE BOWEL SOUNDS, SOFT, NT.    EXT - NO EDEMA OR CYANOSIS  Petechiae bilateral shins and distal right anterior thigh  Right upper extremity PICC line site unremarkable without any swelling about the area.  NEURO -oriented x4  MOTOR EXAM -takes resistance bilaterally        MEDICATIONS  Scheduled Meds:aspirin, 81 mg, Oral, Q24H  atorvastatin, 80 mg, Oral, Nightly  citalopram, 20 mg, Oral, Daily  clopidogrel, 75 mg, Oral, Daily  doxycycline, 100 mg, Oral, Q12H  guaiFENesin, 600 mg, Oral, BID  hydrocortisone-bacitracin-zinc oxide-nystatin, 1 application, Topical, BID  magnesium hydroxide, 10 mL, Oral, Once  meropenem, 1 g, Intravenous, Q8H  metoprolol tartrate, 12.5 mg, Oral, Q12H  potassium chloride, 10 mEq, Oral, Daily  sodium chloride, 10 mL, Intravenous, Q12H  sodium chloride, 10 mL, Intravenous, Q12H  cyanocobalamin, 1,000 mcg, Oral, Daily      Continuous Infusions:   PRN Meds:.  acetaminophen    ipratropium-albuterol    loperamide    senna-docusate sodium    sodium chloride    sodium chloride    sodium chloride    sodium chloride     "sodium chloride      RESULTS  No results found for: POCGLU  Results from last 7 days   Lab Units 06/05/23  0436 06/02/23  0603   WBC 10*3/mm3 11.41* 12.44*   HEMOGLOBIN g/dL 10.2* 10.6*   HEMATOCRIT % 30.4* 31.8*   PLATELETS 10*3/mm3 292 370       Results from last 7 days   Lab Units 06/05/23  0436 06/02/23  0603   SODIUM mmol/L 132* 135*   POTASSIUM mmol/L 3.8 3.4*   CHLORIDE mmol/L 99 99   CO2 mmol/L 27.1 30.3*   BUN mg/dL 9 11   CREATININE mg/dL 0.30* 0.24*   CALCIUM mg/dL 8.7 8.6   GLUCOSE mg/dL 78 84         New Radiology:  ELIGIO negative for vegetations  CXR 5/25 revealed increased opacification of the RLL    6/8 CT chest:  \"1. Increased density of consolidation in the right middle lobe   2. Large area of cavitation in the right lower lobe again demonstrated.   Consolidation in the right lower lobe posterior to the cavitation has   decreased   3. Consolidation elsewhere in the right lower lobe stable   4. Decreased consolidation in the base of the left lower      Prior radiology:  MRI brain: \"Bilateral multifocal areas of infarction.  No evidence of hemorrhagic transformation. \"    ASSESSMENT and PLAN    Acute right MCA stroke    1. Acute R MCA stroke and bilateral infarcts  Stroke prophylaxis-aspirin/Plavix/atorvastatin  Zio patch placed on May 19 for 2 weeks  - 5/20 - Admit for inpt rehab w/ PT, OT, SLP, psychology and rehab medical and nursing care. Continue secondary stroke prophylaxis     2. Pneumonia and right lung abscess and Pleural effusions  - 5/20 - Continue IV antibx. ID following    - 5/21 - Continues to improve   The chest x-ray is reassuring   Expect her to be able to come off the oxygen in the next few days   She will need to have a follow-up chest imaging 6 weeks down the road   Finish the antibiotic course per ID, last day 6/8/2023   -5/22-on 1 L oxygen with activities and maintaining sats  -5/23- Per Pulmonology, continue Merrem. Continue to wean off supplemental oxygen  -5/24-oxygen weaned to " 0.5 L nasal cannula  -5/25 -on room air and therapy, more 2 L last night  -5/26 Placed on 2L NC last night, WBC decreased to 15. CXR revealed increased opacification in RLL. Will continue Merrem and appreciate recommendations from Pulmonolgy. CDiff negative and bowel movements decreased this morning.  -5/30-WBC stable around 12 K.  On room air at rest.  -6/5-on room air    ID-meropenem and doxycycline-last dose June 8, 2023  Repeat CT chest June 6, 2023  May 25-WBC increased to 18 K from 8K 5 days ago.  No fever.  Vital signs stable.  On room air during speech therapy session.  Not acutely ill-appearing.  Had 3 loose semiformed stool today but received bowel program yesterday for recent constipation.   PICC line site unremarkable.   Increased diffuse purpleish petechiae on the anterior lower legs.  She had a few earlier in the week but there more prominent today  Calves are soft without any significant edema.  Continues on meropenem and doxycycline.   Patient reviewed with pulmonology service who will assess.  Although with the recent bowel program, given the white blood cell count increased will check C. difficile with the loose stool today  Will review with infectious disease service  May 26-Persistent right empyema, similar to prior exam. Increased consolidation in the superior segment right lower lobe, left lower lobe. Multiple  small nodular densities in both lower lungs, indeterminate. Increased  cavitation in the right lower lobe. Decreased left pleural effusion  May 30-WBC improved 12 K.  Continues on antibiotics.   June 5-she had a CT of the chest on May 26.  -will see if she still needs to have the 1 done on June 6.  Get input from infectious disease service, scheduled to see them on June 8.  June 6-seen by infectious disease service-repeat CT chest for the AM. Depending on results, will either stop antibiotics, extend antibiotics, or consult thoracic surgery.   June 7-CT results pending.  Patient  discussed with infectious disease service  June 8-Per infectious disease service-[CT chest shows improvement on the left side. However, there is still cavitation and consolidation on the right side. This is despite 5 weeks of antibiotic therapy. Clinically she is improved but the radiographic improvement is lagging. - have asked thoracic surgery to evaluate and they will also d/w pulmonary re: next steps. For now, - going to plan to extend her antibiotics of meropenem 1 g IV q8h and doxycycline 100 mg PO BID through 6/22/23. Keep PICC Line]      3. Acute hypoxic respiratory failure  June 8-6 minute walk test performed this date at 12:45 PM. Pt O2 was 95% on room air sitting, 90% on room air while ambulating for 6 mins and then 95% following ambulation in sitting on room air.   -will obtain overnight pulse oximetry tonight     4. Emphysema d/t Tobacco use     5. Hypertension-continue antihypertensive regimen and avoid hypotensive episodes.     6. DVT prophylaxis -   - 5/20 - Start Lovenox SQ daily    7.  Dysphagia  -5/22-diet advanced to regular solid thin liquids    8.  GI-constipation-May 24-add Senokot-Colace.  Milk of magnesia x1.  Patient maintaining hydration  May 25-loose stool semiformed x3 today.  Changed Senokot Colace to as needed    TEAM CONF - MAY 23 - BED MIN. TRANSFERS MIN MOD ASSIST. GAIT 60 FEET MIN RW. TOILET TRANSFERS MOD ASSIST RW. SATS 1 L WITH ACTIVITIES WITH SATS >=90%. BATH MOD. LBD MOD.UBD MIN. GROOMING SET UP. TOILETING MAX. MILD EXECUTIVE FUNCTION DEFICITS. BNE PENDING. DIET ADVANCED TO REGULAR CONSISTENCY, GOOD INTAKE. PULM EXPECTS WILL BE OFF OXYGEN IN NEXT FEW DAYS. REPEAT CHEST IMAGING IN 6 WEEKS.  ELOS - 2 WEEKS.     Team conference-May 30  PT: Walking 160 ft, on 1L with walking which she was previously on RA with wak[place]. Min assist for bed transfer with RW. Min assist with toilet transfers  OT: Bathing min, Dressing lower body mod assist, Dressing upper body is set up, toileting is  mod assist. Fatigues easily with therapy, wants to stay in   ST: Mild high level deficits, attention to detail with check booking, not working on med management  Nursing: Potassium supplementation, WBC is 12 which is stable, on ABX Doxy PO and Merrem IV, Drops to 80% if not on supplemental O2, on 1-2L NC. Coccyx pressure wound. On IV ABX until 6/28/2023  Psych: apathetic, flat affect, but was agreeable to do testing  RD: Started on Kwasi   ELOS: June 9th    Team conference-June 6-   Bed mob CGA, transfers SBA - CGA rwx, ambulating 160' SBA - CGA rwx,  completed 4 steps w/ 2 handrails CGA.  CGA toilet transfer, Min A shower  transfer.  Min / CGA toileting, Min A bathing.  Min / CGA LBD.  Can complete  medicine task now with no cues.  Continent of urine 75%, incontinent 100% of  bowel.  ELOS-Friday, June 9      Now admit for comprehensive acute inpatient rehabilitation .  This would be an interdisciplinary program with physical therapy 1 hour,  occupational therapy 1 hour, and speech therapy 1 hour, 5 days a week.  Rehabilitation nursing for carryover, monitoring of neurologic and pulmonary   status, bowel and bladder, and skin  Ongoing physician follow-up.  Weekly team conferences.  Goals are to achieve a level of supervision with  mobility and self-care and improved endurance.   Rehabilitation prognosis fair.  Medical prognosis fair.  Estimated length of stay is approximately 2 weeks, but is only an estimation.    .     The patient's functional status and clinical status is unchanged from preadmission assessment and the patient continues appropriate for acute inpatient rehabilitation.  Goal is for home with outpatient   therapies.  Barrier to discharge: Impaired mobility self-care endurance- work on vision, strength, gross and fine motor control, balance, progressive ambulation, ADLs to overcome.           Sebastian Collins MD      During rounds, used appropriate personal protective equipment including mask and  gloves.  Additional gown if indicated.  Mask used was standard procedure mask. Appropriate PPE was worn during the entire visit.  Hand hygiene was completed before and after.

## 2023-06-08 NOTE — PLAN OF CARE
Goal Outcome Evaluation:  Plan of Care Reviewed With: patient             Problem: Rehabilitation ([facility]) Plan of Care  Goal: Plan of Care Review  Outcome: Ongoing, Progressing  Flowsheets (Taken 6/8/2023 0255)  Plan of Care Reviewed With: patient  Outcome Evaluation:  Pt is alert and oriented x 4. Flat at times. Calm and cooperative. Takes all meds whole PO with thins. Continent of bowel and bladder. Does have urgency and frequency. Assist x 1 with RW. Wears 1L humidified nc at HS. PICC to right arm dry and intact. Dsg to be changed 6/8. Continues on IV abx r/t PNA; awaiting CT results. Requests Purewick at HS. Excited for d/c friday 6/9. Independent with bed mobility. No unsafe behaviors. Call light within reach.

## 2023-06-08 NOTE — PROGRESS NOTES
SECTION GG      Mobility Performance Discharge:     Roll Left and Right: [place] provides verbal cues and/or touching/steadying  and/or contact guard assistance as patient completes activity. Assistance may be  provided throughout the activity or intermittently.   Sit to Lying: [place] provides verbal cues and/or touching/steadying and/or  contact guard assistance as patient completes activity. Assistance may be  provided throughout the activity or intermittently.   Lying to Sitting on Side of Bed: Not attempted due to medical or safety  concerns.   Sit to Stand: [place] provides verbal cues and/or touching/steadying and/or  contact guard assistance as patient completes activity. Assistance may be  provided throughout the activity or intermittently.   Chair/Bed to Chair Transfer: [place] provides verbal cues and/or  touching/steadying and/or contact guard assistance as patient completes  activity. Assistance may be provided throughout the activity or intermittently.   Car Transfer: [place] provides verbal cues and/or touching/steadying and/or  contact guard assistance as patient completes activity. Assistance may be  provided throughout the activity or intermittently.   Walk 10 Feet:   [place] provides verbal cues and/or touching/steadying and/or  contact guard assistance as patient completes activity. Assistance may be  provided throughout the activity or intermittently.  Walk 50 Feet with 2 Turns:   [place] provides verbal cues and/or  touching/steadying and/or contact guard assistance as patient completes  activity. Assistance may be provided throughout the activity or intermittently.  Walk 150 Feet:   [place] provides verbal cues and/or touching/steadying and/or  contact guard assistance as patient completes activity. Assistance may be  provided throughout the activity or intermittently.  Walking 10 Feet on Uneven Surfaces:   [place] provides verbal cues and/or  touching/steadying and/or contact guard assistance as  patient completes  activity. Assistance may be provided throughout the activity or intermittently.  1 Step Over Curb or Up/Down Stair:   [place] provides verbal cues and/or  touching/steadying and/or contact guard assistance as patient completes  activity. Assistance may be provided throughout the activity or intermittently.  4 Steps Up and Down, With/Without Rail:   [place] provides verbal cues and/or  touching/steadying and/or contact guard assistance as patient completes  activity. Assistance may be provided throughout the activity or intermittently.  12 Steps Up and Down, With/Without Rail:   Not attempted due to medical or  safety concerns.  Picking up an Object:   [place] does more than half the effort. [place] lifts or  holds trunk or limbs and provides more than half the effort. Uses Wheelchair  and/or Scooter: No    Section J. Health Conditions (Pain):  Pain Interference with Therapy Activities:   Rarely or not at all  Pain Interference with Day-to-Day Activities:   Rarely or not at all    Signed by: Melsisa Wise, PT

## 2023-06-08 NOTE — THERAPY DISCHARGE NOTE
Inpatient Rehabilitation - Speech Language Pathology Treatment Note/Discharge  [facility]     Patient Name: Katherine Williamson  : 1950  MRN: [id number]  Today's Date: 2023               Admit Date: 2023     Visit Dx:    ICD-10-CM ICD-9-CM   1. Decreased functional mobility and endurance  Z74.09 780.99     Patient Active Problem List   Diagnosis    Benign essential HTN    Tobacco abuse    Dislocation of hip joint prosthesis    Fracture of proximal humerus    Mechanical complication of internal orthopedic device    Wear of articular bearing surface of internal prosthetic joint    History of repair of hip joint    History of operative procedure on hip    Hyperglycemia    Hepatic steatosis    Diverticulosis    Chest pain, atypical    History of colon polyps    Family history of colon cancer in mother    Allergic rhinitis    Lung nodule seen on imaging study    Acute respiratory failure with hypoxia    Abscess of right lung with pneumonia    Empyema    Sepsis    Pleural effusion, right    Other emphysema    Pulmonary nodule (RLL)    Diastolic dysfunction, grade 1    Physical debility    Cryptogenic stroke    Acute right MCA stroke     Past Medical History:   Diagnosis Date    Arthritis     Cataract     Colon polyps     FOLLOWED BY DR. JOSEPH LAU    Hypertension      Past Surgical History:   Procedure Laterality Date    COLONOSCOPY  10/2015    Djpsc-[id number]-Uf. Kaplan.  Follow-up in 5 years.    COLONOSCOPY N/A 2022    2 BENIGN POLYPS IN DESCENDING, 5 MM BENIGN POLYP IN SIGMOID, MULTIPLE SMALL AND LARGE DIVERTICULA IN SIGMOID, RESCOPE IN 3 YRS, DR. JOSEPH LAU AT [facility]    EYE SURGERY      JOINT REPLACEMENT  ?    Left total hip replacement in .  In 2015 patient had left hip revision    TONSILLECTOMY         SLP Recommendation and Plan  Pt made good progress during inpatient stay. At discharge, pt presented with a mild high-level cognitive-communication deficit. She met goal for  recall of 3 errands after 15 minutes and exhibited adequate short-term memory for daily events. Attention to detail for functional tasks required occasional MIN cues. Pt completed medicine management independently and required occasional MIN cues for checkbook balancing.She required MIN-MOD cues to complete a complex functional math task (home renovation calculations). She required MIN-MOD cues to complete high-level reasoning tasks (complex logic puzzle). Pt exhibited improved deficit awareness at discharge. Recommend 24 hour indirect supervision and initial assist with finance and medicine management. Recommend Home Health speech therapy to continue to address cognitive skills, as pt would like to return to driving and independent living.     SLP EVALUATION (last 72 hours)       SLP SLC Evaluation       Row Name 06/08/23 1330 06/08/23 1030 06/07/23 1330 06/07/23 1030 06/06/23 1430       Communication Assessment/Intervention    Document Type discharge treatment  -AL therapy note (daily note)  -AL therapy note (daily note)  -AL therapy note (daily note)  -AL therapy note (daily note)  -AL    Patient/Family/Caregiver Comments/Observations Pt is pleasant and cooperative.  -AL Pt is pleasant and cooperative.  -AL Pt participated well.  -AL Pt is pleasant and cooperative.  -AL Pt participated well. Seen bedside.  -AL       Pain Scale: Numbers Pre/Post-Treatment    Pretreatment Pain Rating 0/10 - no pain  -AL 0/10 - no pain  -AL 0/10 - no pain  -AL 0/10 - no pain  -AL 0/10 - no pain  -AL      Row Name 06/06/23 1330                   Communication Assessment/Intervention    Document Type therapy note (daily note)  -AL        Patient/Family/Caregiver Comments/Observations Pt participated well.  -AL           Pain Scale: Numbers Pre/Post-Treatment    Pretreatment Pain Rating 0/10 - no pain  -AL                  User Key  (r) = Recorded By, (t) = Taken By, (c) = Cosigned By      Initials Name Effective Dates    MAGY Comer  Sarita Tello MS CCC-SLP 06/16/21 -                        EDUCATION  The patient has been educated in the following areas:   Cognitive Impairment.           SLP GOALS       Row Name 06/08/23 1330 06/08/23 1030 06/07/23 1330       Patient will demonstrate functional cognitive-linguistic skills for return to discharge environment    [place] with use of compensatory strategies  -AL -- --    Time frame by discharge  -AL -- --    Progress/Outcomes goal met  -AL -- --       Attention Goal 1 (SLP)    Improve Attention by Goal 1 (SLP) complete selective attention task;complete divided attention task;90%;independently (over 90% accuracy)  -AL -- --    Time Frame (Attention Goal 1, SLP) by discharge  -AL -- --    Progress/Outcomes (Attention Goal 1, SLP) goal met  -AL -- --       Memory Skills Goal 1 (SLP)    Improve Memory Skills Through Goal 1 (SLP) recalling unrelated word lists with an imposed delay  -AL -- --    Time Frame (Memory Skills Goal 1, SLP) by discharge  -AL -- --    Progress/Outcomes (Memory Skills Goal 1, SLP) goal met  -AL -- --       Reasoning Goal 1 (SLP)    Improve Reasoning Through Goal 1 (SLP) complete high level reasoning task;90%;independently (over 90% accuracy)  -AL -- complete high level reasoning task;90%;independently (over 90% accuracy)  -AL    Time Frame (Reasoning Goal 1, SLP) short term goal (STG);1 week  -AL -- short term goal (STG);1 week  -AL    Progress/Outcomes (Reasoning Goal 1, SLP) good progress toward goal  -AL -- good progress toward goal  -AL    Comment (Reasoning Goal 1, SLP) Reasoning for high-level exclusion style logic puzzle: 15% with NO cues, 100% with MIN-MOD cues.  -AL -- Reasoning for moderately complex logic puzzle: 65% with NO cues, 100% with MIN-MOD cues. Introduced game, Mastermind, to pt. She required MOD cues for logic during game.  -AL       Functional Math Skills Goal 1 (SLP)    Improve Functional Math Skills Through Goal 1 (SLP) complete functional math  task;100%;independently (over 90% accuracy)  -AL complete functional math task;100%;independently (over 90% accuracy)  -AL --    Time Frame (Functional Math Skills Goal 1, SLP) 1 week  -AL 1 week  -AL --    Progress/Outcomes (Functional Math Skills Goal 1, SLP) good progress toward goal  -AL -- --    Comment (Functional Math Skills Goal 1, SLP) -- Home renovation math: 50% with NO cues, 100% with MIN-MOD cues.  -AL --       Executive Functional Skills Goal 1 (SLP)    Improve Executive Function Skills Goal 1 (SLP) organization/planning activity;90%;independently (over 90% accuracy)  -AL -- --    Time Frame (Executive Function Skills Goal 1, SLP) by discharge  -AL -- --    Progress/Outcomes (Executive Function Skills Goal 1, SLP) goal met  -AL -- --    Comment (Executive Function Skills Goal 1, SLP) Reviewed importance of managing stroke risk factors upon discharge in order to prevent another stroke. Pt voiced understanding. Pt in agreement with continued speech therapy with Home Health.  -AL -- --      Row Name 06/07/23 1030 06/06/23 1430 06/06/23 1330       Reasoning Goal 1 (SLP)    Improve Reasoning Through Goal 1 (SLP) -- complete high level reasoning task;90%;independently (over 90% accuracy)  -AL complete high level reasoning task;90%;independently (over 90% accuracy)  -AL    Time Frame (Reasoning Goal 1, SLP) -- short term goal (STG);1 week  -AL --    Progress/Outcomes (Reasoning Goal 1, SLP) -- good progress toward goal  -AL good progress toward goal  -AL    Comment (Reasoning Goal 1, SLP) Initiated moderate level logic puzzle.  -AL Moderate level logic puzzle task: 80% with NO cues, 100% with MIN cues.  -AL Complex exclusion style logic puzzle: 2/12 with NO cues, 12/12 with MIN-MOD cues. Reduced attention to detail impacted task.  -AL       Functional Math Skills Goal 1 (SLP)    Improve Functional Math Skills Through Goal 1 (SLP) complete functional math task;100%;independently (over 90% accuracy)  -AL  complete functional math task;100%;independently (over 90% accuracy)  -AL --    Time Frame (Functional Math Skills Goal 1, SLP) -- 1 week  -AL --    Progress/Outcomes (Functional Math Skills Goal 1, SLP) good progress toward goal  -AL good progress toward goal  -AL --    Comment (Functional Math Skills Goal 1, SLP) Home renovation math: 65% with NO cues, 100% with MIN-MOD cues. Reduced attention to detail and mild impulsivity noted.  -AL Checkbook balancing task: 85% with NO cues ,1005 with MIN cues.  -AL --              User Key  (r) = Recorded By, (t) = Taken By, (c) = Cosigned By      Initials Name Provider Type    Sarita Landry MS CCC-SLP Speech and Language Pathologist                        Time Calculation:    Time Calculation- SLP       Row Name 06/08/23 1439 06/08/23 1107          Time Calculation- SLP    SLP Start Time 1330  -AL 1030  -AL     SLP Stop Time 1400  -AL 1100  -AL     SLP Time Calculation (min) 30 min  -AL 30 min  -AL               User Key  (r) = Recorded By, (t) = Taken By, (c) = Cosigned By      Initials Name Provider Type    Sarita Landry MS CCC-SLP Speech and Language Pathologist                    Therapy Charges for Today       Code Description Service Date Service Provider Modifiers Qty    96946760447 HC ST DEV OF COGN SKILLS INITIAL 15 MIN 6/7/2023 Sarita Comer MS CCC-SLP  1    50532631839 HC ST DEV OF COGN SKILLS EACH ADDT'L 15 MIN 6/7/2023 Sarita Comer MS CCC-SLP  3    99294515515 HC ST DEV OF COGN SKILLS INITIAL 15 MIN 6/8/2023 Sarita Comer MS CCC-SLP  1    26007369984 HC ST DEV OF COGN SKILLS EACH ADDT'L 15 MIN 6/8/2023 Sarita Comer MS CCC-SLP  3                     SLP Discharge Summary  Anticipated Discharge Disposition (SLP): home with OP services    MS JOHAN DesirSLP  6/8/2023

## 2023-06-08 NOTE — PROGRESS NOTES
Recreational Therapy Note    Patient Name: Katherine Williamson   MRN: [id number]    Therapeutic Recreation Eval and Treat (last 12 hours)       Therapeutic Recreation Eval & Treat       Row Name 06/08/23 1400       Therapeutic Recreation Participation    Recreation Therapy Participation games;group/social activities  -SS    Games other (see comments)  [employer]  -    Objectives of Recreation Participation increase;sense of autonomy by choosing level of participation;motivation and activity level through successful participation;positive attitudes leading to a healthy leisure lifestyle  -    Comment, Recreation Participation occ cues to recall directions  -    Recreation Therapy Summary of Participation active participation  -              User Key  (r) = Recorded By, (t) = Taken By, (c) = Cosigned By      Initials Name Provider Type    SS Sanaz Radford, CTRS Recreational Therapist                      CHUNG Hollins  6/8/2023

## 2023-06-08 NOTE — PLAN OF CARE
Goal Outcome Evaluation:  Plan of Care Reviewed With: patient        Progress: improving  Outcome Evaluation: Pt is A&OX4, calm and cooperative. Flat affect. Con of B&Btaiwo at HS, Last BM 6/8. Assist X1 to wc. Edema in BL lower extremites. Dsg changed to buttocks,  educated on dsg change. Supplies sent home with pts . Meds whole with water. PICC in [facility], dsg changed today. ID saw pt this AM with results of chest CT, see note. Tolerating RA during the day, 1L of O2 at HS. No unsafe behaviors noted, pt uses call light for assistance.

## 2023-06-09 ENCOUNTER — HOME CARE VISIT (OUTPATIENT)
Dept: HOME HEALTH SERVICES | Facility: HOME HEALTHCARE | Age: 73
End: 2023-06-09
Payer: COMMERCIAL

## 2023-06-09 ENCOUNTER — TRANSCRIBE ORDERS (OUTPATIENT)
Dept: HOME HEALTH SERVICES | Facility: HOME HEALTHCARE | Age: 73
End: 2023-06-09
Payer: MEDICARE

## 2023-06-09 VITALS
WEIGHT: 146 LBS | OXYGEN SATURATION: 94 % | BODY MASS INDEX: 25.87 KG/M2 | RESPIRATION RATE: 18 BRPM | DIASTOLIC BLOOD PRESSURE: 70 MMHG | HEIGHT: 63 IN | SYSTOLIC BLOOD PRESSURE: 119 MMHG | TEMPERATURE: 97.2 F | HEART RATE: 87 BPM

## 2023-06-09 DIAGNOSIS — I63.511 ACUTE RIGHT MCA STROKE: Primary | ICD-10-CM

## 2023-06-09 DIAGNOSIS — R13.12 OROPHARYNGEAL DYSPHAGIA: ICD-10-CM

## 2023-06-09 DIAGNOSIS — J85.1 ABSCESS OF LOWER LOBE OF RIGHT LUNG WITH PNEUMONIA: ICD-10-CM

## 2023-06-09 LAB
ANION GAP SERPL CALCULATED.3IONS-SCNC: 6 MMOL/L (ref 5–15)
BASOPHILS # BLD AUTO: 0.06 10*3/MM3 (ref 0–0.2)
BASOPHILS NFR BLD AUTO: 0.5 % (ref 0–1.5)
BUN SERPL-MCNC: 11 MG/DL (ref 8–23)
BUN/CREAT SERPL: 44 (ref 7–25)
CALCIUM SPEC-SCNC: 9 MG/DL (ref 8.6–10.5)
CHLORIDE SERPL-SCNC: 103 MMOL/L (ref 98–107)
CO2 SERPL-SCNC: 27 MMOL/L (ref 22–29)
CREAT SERPL-MCNC: 0.25 MG/DL (ref 0.57–1)
DEPRECATED RDW RBC AUTO: 47.6 FL (ref 37–54)
EGFRCR SERPLBLD CKD-EPI 2021: 119.4 ML/MIN/1.73
EOSINOPHIL # BLD AUTO: 0.13 10*3/MM3 (ref 0–0.4)
EOSINOPHIL NFR BLD AUTO: 1.1 % (ref 0.3–6.2)
ERYTHROCYTE [DISTWIDTH] IN BLOOD BY AUTOMATED COUNT: 14.6 % (ref 12.3–15.4)
GLUCOSE SERPL-MCNC: 82 MG/DL (ref 65–99)
HCT VFR BLD AUTO: 30.6 % (ref 34–46.6)
HGB BLD-MCNC: 10.3 G/DL (ref 12–15.9)
IMM GRANULOCYTES # BLD AUTO: 0.04 10*3/MM3 (ref 0–0.05)
IMM GRANULOCYTES NFR BLD AUTO: 0.3 % (ref 0–0.5)
LYMPHOCYTES # BLD AUTO: 2.69 10*3/MM3 (ref 0.7–3.1)
LYMPHOCYTES NFR BLD AUTO: 23.5 % (ref 19.6–45.3)
MCH RBC QN AUTO: 29.9 PG (ref 26.6–33)
MCHC RBC AUTO-ENTMCNC: 33.7 G/DL (ref 31.5–35.7)
MCV RBC AUTO: 89 FL (ref 79–97)
MONOCYTES # BLD AUTO: 0.75 10*3/MM3 (ref 0.1–0.9)
MONOCYTES NFR BLD AUTO: 6.6 % (ref 5–12)
NEUTROPHILS NFR BLD AUTO: 68 % (ref 42.7–76)
NEUTROPHILS NFR BLD AUTO: 7.78 10*3/MM3 (ref 1.7–7)
NRBC BLD AUTO-RTO: 0 /100 WBC (ref 0–0.2)
PLATELET # BLD AUTO: 265 10*3/MM3 (ref 140–450)
PMV BLD AUTO: 11.3 FL (ref 6–12)
POTASSIUM SERPL-SCNC: 3.7 MMOL/L (ref 3.5–5.2)
RBC # BLD AUTO: 3.44 10*6/MM3 (ref 3.77–5.28)
SODIUM SERPL-SCNC: 136 MMOL/L (ref 136–145)
WBC NRBC COR # BLD: 11.45 10*3/MM3 (ref 3.4–10.8)

## 2023-06-09 PROCEDURE — 85025 COMPLETE CBC W/AUTO DIFF WBC: CPT | Performed by: PHYSICAL MEDICINE & REHABILITATION

## 2023-06-09 PROCEDURE — 99232 SBSQ HOSP IP/OBS MODERATE 35: CPT | Performed by: INTERNAL MEDICINE

## 2023-06-09 PROCEDURE — 25010000002 MEROPENEM PER 100 MG: Performed by: INTERNAL MEDICINE

## 2023-06-09 PROCEDURE — 80048 BASIC METABOLIC PNL TOTAL CA: CPT | Performed by: PHYSICAL MEDICINE & REHABILITATION

## 2023-06-09 PROCEDURE — G0299 HHS/HOSPICE OF RN EA 15 MIN: HCPCS

## 2023-06-09 RX ORDER — SODIUM CHLORIDE 9 MG/ML
40 INJECTION, SOLUTION INTRAVENOUS AS NEEDED
Start: 2023-06-09

## 2023-06-09 RX ORDER — POTASSIUM CHLORIDE 750 MG/1
10 TABLET, FILM COATED, EXTENDED RELEASE ORAL DAILY
Qty: 30 TABLET | Refills: 1 | Status: SHIPPED | OUTPATIENT
Start: 2023-06-09

## 2023-06-09 RX ORDER — ASPIRIN 81 MG/1
81 TABLET, CHEWABLE ORAL EVERY 24 HOURS
Qty: 90 TABLET | Refills: 0 | Status: SHIPPED | OUTPATIENT
Start: 2023-06-09

## 2023-06-09 RX ORDER — CLOPIDOGREL BISULFATE 75 MG/1
75 TABLET ORAL DAILY
Qty: 90 TABLET | Refills: 0 | Status: SHIPPED | OUTPATIENT
Start: 2023-06-09

## 2023-06-09 RX ORDER — DOXYCYCLINE 100 MG/1
100 CAPSULE ORAL EVERY 12 HOURS SCHEDULED
Qty: 28 CAPSULE | Refills: 0 | Status: SHIPPED | OUTPATIENT
Start: 2023-06-09 | End: 2023-06-23

## 2023-06-09 RX ORDER — CITALOPRAM 20 MG/1
20 TABLET ORAL DAILY
Qty: 90 TABLET | Refills: 0 | Status: SHIPPED | OUTPATIENT
Start: 2023-06-09

## 2023-06-09 RX ORDER — ATORVASTATIN CALCIUM 80 MG/1
80 TABLET, FILM COATED ORAL NIGHTLY
Qty: 30 TABLET | Refills: 1 | Status: SHIPPED | OUTPATIENT
Start: 2023-06-09

## 2023-06-09 RX ORDER — GUAIFENESIN 600 MG/1
600 TABLET, EXTENDED RELEASE ORAL 2 TIMES DAILY
Qty: 60 TABLET | Refills: 1 | Status: SHIPPED | OUTPATIENT
Start: 2023-06-09

## 2023-06-09 RX ORDER — ACETAMINOPHEN 325 MG/1
650 TABLET ORAL EVERY 6 HOURS PRN
Start: 2023-06-09

## 2023-06-09 RX ADMIN — DOXYCYCLINE 100 MG: 100 CAPSULE ORAL at 09:45

## 2023-06-09 RX ADMIN — ASPIRIN 81 MG: 81 TABLET, CHEWABLE ORAL at 09:45

## 2023-06-09 RX ADMIN — Medication 10 ML: at 09:49

## 2023-06-09 RX ADMIN — GUAIFENESIN 600 MG: 600 TABLET, EXTENDED RELEASE ORAL at 09:45

## 2023-06-09 RX ADMIN — CLOPIDOGREL BISULFATE 75 MG: 75 TABLET, FILM COATED ORAL at 09:45

## 2023-06-09 RX ADMIN — MEROPENEM 1 G: 1 INJECTION, POWDER, FOR SOLUTION INTRAVENOUS at 12:06

## 2023-06-09 RX ADMIN — METOPROLOL TARTRATE 12.5 MG: 25 TABLET, FILM COATED ORAL at 09:45

## 2023-06-09 RX ADMIN — Medication 1000 MCG: at 09:45

## 2023-06-09 RX ADMIN — POTASSIUM CHLORIDE 10 MEQ: 750 TABLET, EXTENDED RELEASE ORAL at 09:45

## 2023-06-09 RX ADMIN — MEROPENEM 1 G: 1 INJECTION, POWDER, FOR SOLUTION INTRAVENOUS at 05:22

## 2023-06-09 RX ADMIN — ZINC OXIDE 1 APPLICATION: 200 OINTMENT TOPICAL at 09:48

## 2023-06-09 RX ADMIN — CITALOPRAM 20 MG: 20 TABLET, FILM COATED ORAL at 09:45

## 2023-06-09 NOTE — PROGRESS NOTES
LOS: 21 days   Patient Care Team:  Zelalem Flor APRN as PCP - General (Internal Medicine)  Brandon Mitchell MD as Consulting Physician (Orthopedic Surgery)      ANNIE CERDA  1950    Diagnoses    DECREASED FUNCTIONAL MOBILITY AND ENDURANCE       ADMITTING DIAGNOSIS:  CVA      Subjective      Patient is comfortable with her breathing at rest.  Tolerating activities.  Strength overall continues improved.  Reviewed anticipated discharge home today after review by cardiology and pulmonology services.  Patient reviewed with infectious disease service.      Objective     Vitals:    06/09/23 0507   BP: 138/80   Pulse: 89   Resp: 16   Temp: 97.7 °F (36.5 °C)   SpO2: 95%       PHYSICAL EXAM:   MENTAL STATUS -  AWAKE / ALERT  HEENT-   SCLERAE ANICTERIC, CONJUNCTIVAE PINK, OP MOIST, NO JVD,  LUNGS -decreased air movement bilaterally.  Crackles at the right side with decreased air movement on the right side compared to the left  Clear to auscultation.  On room air.     HEART- RRR, NO RUB, MURMUR, OR GALLOP  ABD - NORMOACTIVE BOWEL SOUNDS, SOFT, NT.    EXT - NO EDEMA OR CYANOSIS  Petechiae bilateral shins and distal right anterior thigh  Right upper extremity PICC line site unremarkable without any swelling about the area.  NEURO -oriented x4  MOTOR EXAM -takes resistance bilaterally        MEDICATIONS  Scheduled Meds:aspirin, 81 mg, Oral, Q24H  atorvastatin, 80 mg, Oral, Nightly  citalopram, 20 mg, Oral, Daily  clopidogrel, 75 mg, Oral, Daily  doxycycline, 100 mg, Oral, Q12H  guaiFENesin, 600 mg, Oral, BID  hydrocortisone-bacitracin-zinc oxide-nystatin, 1 application, Topical, BID  magnesium hydroxide, 10 mL, Oral, Once  meropenem, 1 g, Intravenous, Q8H  metoprolol tartrate, 12.5 mg, Oral, Q12H  potassium chloride, 10 mEq, Oral, Daily  sodium chloride, 10 mL, Intravenous, Q12H  sodium chloride, 10 mL, Intravenous, Q12H  cyanocobalamin, 1,000 mcg, Oral, Daily      Continuous Infusions:   PRN Meds:.   "acetaminophen    ipratropium-albuterol    loperamide    senna-docusate sodium    sodium chloride    sodium chloride    sodium chloride    sodium chloride    sodium chloride      RESULTS  No results found for: POCGLU  Results from last 7 days   Lab Units 06/09/23  0509 06/05/23  0436   WBC 10*3/mm3 11.45* 11.41*   HEMOGLOBIN g/dL 10.3* 10.2*   HEMATOCRIT % 30.6* 30.4*   PLATELETS 10*3/mm3 265 292       Results from last 7 days   Lab Units 06/09/23  0509 06/05/23  0436   SODIUM mmol/L 136 132*   POTASSIUM mmol/L 3.7 3.8   CHLORIDE mmol/L 103 99   CO2 mmol/L 27.0 27.1   BUN mg/dL 11 9   CREATININE mg/dL 0.25* 0.30*   CALCIUM mg/dL 9.0 8.7   GLUCOSE mg/dL 82 78       ZIO patch results June 8 -  Study Description    Monitor placed on patient by MB on 5/19/2023  at 16:52 EDT. Instructions were provided to patient on use of holter monitor and duration of monitoring.  The monitor was scanned on 6/7/2023. The patient was monitored for 13 days 0 hours. Indications for this exam include unspecified atrial fibrillation. Average HR:  81. Min HR:  53. Max HR:  226.     Study Findings    Patient diary was not submitted. No symptoms reported during the monitoring period. No complications noted. The predominant rhythm noted during the testing period was sinus rhythm. Premature atrial contractions occured rarely. There were 2 episodes of supraventricular tachycardia, longest lasting 17 beats and the fastest with a rate of 139 bpm. Premature ventricular contractions occured rarely. Ventricular couplets. There was a 19 beat episode of ventricular tachycardia with a rate of 226 bpm. Sinoatrial node conduction was normal. No atrioventricular block noted.     Study Impressions    An abnormal monitor study. 19 beat episode of asymptomatic ventricular tachycardia       New Radiology:  ELIGIO negative for vegetations  CXR 5/25 revealed increased opacification of the RLL    6/8 CT chest:  \"1. Increased density of consolidation in the right " "middle lobe   2. Large area of cavitation in the right lower lobe again demonstrated.   Consolidation in the right lower lobe posterior to the cavitation has   decreased   3. Consolidation elsewhere in the right lower lobe stable   4. Decreased consolidation in the base of the left lower      Prior radiology:  MRI brain: \"Bilateral multifocal areas of infarction.  No evidence of hemorrhagic transformation. \"    ASSESSMENT and PLAN    Acute right MCA stroke    1. Acute R MCA stroke and bilateral infarcts  Stroke prophylaxis-aspirin/Plavix/atorvastatin  Zio patch placed on May 19 for 2 weeks  - 5/20 - Admit for inpt rehab w/ PT, OT, SLP, psychology and rehab medical and nursing care. Continue secondary stroke prophylaxis     2. Pneumonia and right lung abscess and Pleural effusions  - 5/20 - Continue IV antibx. ID following    - 5/21 - Continues to improve   The chest x-ray is reassuring   Expect her to be able to come off the oxygen in the next few days   She will need to have a follow-up chest imaging 6 weeks down the road   Finish the antibiotic course per ID, last day 6/8/2023   -5/22-on 1 L oxygen with activities and maintaining sats  -5/23- Per Pulmonology, continue Merrem. Continue to wean off supplemental oxygen  -5/24-oxygen weaned to 0.5 L nasal cannula  -5/25 -on room air and therapy, more 2 L last night  -5/26 Placed on 2L NC last night, WBC decreased to 15. CXR revealed increased opacification in RLL. Will continue Merrem and appreciate recommendations from Pulmonolgy. CDiff negative and bowel movements decreased this morning.  -5/30-WBC stable around 12 K.  On room air at rest.  -6/5-on room air    ID-meropenem and doxycycline-last dose June 8, 2023  Repeat CT chest June 6, 2023  May 25-WBC increased to 18 K from 8K 5 days ago.  No fever.  Vital signs stable.  On room air during speech therapy session.  Not acutely ill-appearing.  Had 3 loose semiformed stool today but received bowel program yesterday " for recent constipation.   PICC line site unremarkable.   Increased diffuse purpleish petechiae on the anterior lower legs.  She had a few earlier in the week but there more prominent today  Calves are soft without any significant edema.  Continues on meropenem and doxycycline.   Patient reviewed with pulmonology service who will assess.  Although with the recent bowel program, given the white blood cell count increased will check C. difficile with the loose stool today  Will review with infectious disease service  May 26-Persistent right empyema, similar to prior exam. Increased consolidation in the superior segment right lower lobe, left lower lobe. Multiple  small nodular densities in both lower lungs, indeterminate. Increased  cavitation in the right lower lobe. Decreased left pleural effusion  May 30-WBC improved 12 K.  Continues on antibiotics.   June 5-she had a CT of the chest on May 26.  -will see if she still needs to have the 1 done on June 6.  Get input from infectious disease service, scheduled to see them on June 8.  June 6-seen by infectious disease service-repeat CT chest for the AM. Depending on results, will either stop antibiotics, extend antibiotics, or consult thoracic surgery.   June 7-CT results pending.  Patient discussed with infectious disease service  June 8-Per infectious disease service-[CT chest shows improvement on the left side. However, there is still cavitation and consolidation on the right side. This is despite 5 weeks of antibiotic therapy. Clinically she is improved but the radiographic improvement is lagging. - have asked thoracic surgery to evaluate and they will also d/w pulmonary re: next steps. For now, - going to plan to extend her antibiotics of meropenem 1 g IV q8h and doxycycline 100 mg PO BID through 6/22/23. Keep PICC Line]      3. Acute hypoxic respiratory failure  June 8-6 minute walk test performed this date at 12:45 PM. Pt O2 was 95% on room air sitting, 90% on  room air while ambulating for 6 mins and then 95% following ambulation in sitting on room air.   -will obtain overnight pulse oximetry tonight     4. Emphysema d/t Tobacco use     5. Hypertension-continue antihypertensive regimen and avoid hypotensive episodes.     6. DVT prophylaxis -   - 5/20 - Start Lovenox SQ daily    7.  Dysphagia  -5/22-diet advanced to regular solid thin liquids    8.  GI-constipation-May 24-add Senokot-Colace.  Milk of magnesia x1.  Patient maintaining hydration  May 25-loose stool semiformed x3 today.  Changed Senokot Colace to as needed    9. Skin -   June 9 - Original gluteal DTI has healed. Right buttocks has reepithelialized. Left buttocks continues to have pink partial thickness skin loss secondary to friction and MASD from IAD, 2.5x 2.5cms. Surrounding skin is only light pink today, not the deep red color. Left buttocks wound cleansed with NS, patted dry. Opticel ag with Optifoam dressing placed, this can be changed 3x week at home.  Pt will need to continue frequent off-loading, continue use of a pressure redistribution W/C cushion. HH to follow at home for wound assessments.      10. ZIO patch result back June 8 - shows 19 beat episode of asymptomatic ventricular tachycardia - get Cardiology input.         TEAM CONF - MAY 23 - BED MIN. TRANSFERS MIN MOD ASSIST. GAIT 60 FEET MIN RW. TOILET TRANSFERS MOD ASSIST RW. SATS 1 L WITH ACTIVITIES WITH SATS >=90%. BATH MOD. LBD MOD.UBD MIN. GROOMING SET UP. TOILETING MAX. MILD EXECUTIVE FUNCTION DEFICITS. BNE PENDING. DIET ADVANCED TO REGULAR CONSISTENCY, GOOD INTAKE. PULM EXPECTS WILL BE OFF OXYGEN IN NEXT FEW DAYS. REPEAT CHEST IMAGING IN 6 WEEKS.  ELOS - 2 WEEKS.     Team conference-May 30  PT: Walking 160 ft, on 1L with walking which she was previously on RA with wakling. Min assist for bed transfer with RW. Min assist with toilet transfers  OT: Bathing min, Dressing lower body mod assist, Dressing upper body is set up, toileting is mod  assist. Fatigues easily with therapy, wants to stay in   ST: Mild high level deficits, attention to detail with check booking, not working on med management  Nursing: Potassium supplementation, WBC is 12 which is stable, on ABX Doxy PO and Merrem IV, Drops to 80% if not on supplemental O2, on 1-2L NC. Coccyx pressure wound. On IV ABX until 6/28/2023  Psych: apathetic, flat affect, but was agreeable to do testing  RD: Started on Kwasi   ELOS: June 9th    Team conference-June 6-   Bed mob CGA, transfers SBA - CGA rwx, ambulating 160' SBA - CGA rwx,  completed 4 steps w/ 2 handrails CGA.  CGA toilet transfer, Min A shower  transfer.  Min / CGA toileting, Min A bathing.  Min / CGA LBD.  Can complete  medicine task now with no cues.  Continent of urine 75%, incontinent 100% of  bowel.  ELOS-Friday, June 9      Now admit for comprehensive acute inpatient rehabilitation .  This would be an interdisciplinary program with physical therapy 1 hour,  occupational therapy 1 hour, and speech therapy 1 hour, 5 days a week.  Rehabilitation nursing for carryover, monitoring of neurologic and pulmonary   status, bowel and bladder, and skin  Ongoing physician follow-up.  Weekly team conferences.  Goals are to achieve a level of supervision with  mobility and self-care and improved endurance.   Rehabilitation prognosis fair.  Medical prognosis fair.  Estimated length of stay is approximately 2 weeks, but is only an estimation.    .     The patient's functional status and clinical status is unchanged from preadmission assessment and the patient continues appropriate for acute inpatient rehabilitation.  Goal is for home with outpatient   therapies.  Barrier to discharge: Impaired mobility self-care endurance- work on vision, strength, gross and fine motor control, balance, progressive ambulation, ADLs to overcome.           Sebastian Collins MD      During rounds, used appropriate personal protective equipment including mask and  gloves.  Additional gown if indicated.  Mask used was standard procedure mask. Appropriate PPE was worn during the entire visit.  Hand hygiene was completed before and after.

## 2023-06-09 NOTE — PLAN OF CARE
Goal Outcome Evaluation:  Plan of Care Reviewed With: patient        Progress: improving  Outcome Evaluation: AAOx4. Cooperative with all care. Transfers with WX assist of 1; no safety concerns noted today. Katherine is preparing for D/C home today. She will be seen by  for outpatient therapies, woundcare and IV antibiotics; the patient and this RN communicated with Daja from  team this am. WOCRN did dressing change to sacral area today; mepilex intact at this time. PICC to E. No new skin concerns. VS stable. Education for D/C completed. Medication received from pharmacy.

## 2023-06-09 NOTE — Clinical Note
72 year old female admitted to home care services s/p acute hospitalization 5/2-5/19/23 at [facility]. Patient transferred to [facility] acute rehab for further aggressive rehabilitative care 5/19-6/9/23. Patient initally was diagnosed with Abscess of right lung with pneumonia, sepsis, right pleural effusion,emphysema, pulmonary nodule RLL,acute respiratory failure with hypoxia, HTN, and tobacco abuse. During course of stay patient had acute stroke that required tPA and transferred to the intensive care unit.  This has resulted in some left hemiparesis and mild speech disturbance but the symptoms have improved after administering TNK on 5/14.  CTA showed right MCA perfusion deficit but no proximal occlusion appeared to have a distal M3 occlusion report and perfusion deficit of the right posterior parietal lobe. Patients prior functional status was independent. Current level of functioning requires assistance with ADLS. Patient lives with spouse in single family home with couple steps into home.  Patient can ambulate with walker, states still has some slight weakness to left leg. Patient has BSC, SHOWER CHAIR,HAND HELD, TRANSPORT W/C,FWW. Wound to left buttock, patient sent home with some supplies, and  able to perform wound care. Patient has LEWIS S/L PICC LINE for IV Meropenum 1gm/100ml NS every 8 hours thru 6/22/23 to infuse over 15 minutes. However, patient and caregiver did not want to infuse that quickly so will be infusing over 30 minutes. Sn instructed pcg  how to administer above iv antibiotic, and flushing before and after. Pcg demonstrated knowledge. SN visits 1w4,2 prn focus of care teaching disease process of absces of lung and pneumonia, CVA disease process, medication teaching and med effects, weekly labs and picc line care, wound care assessment of left buttock stage 2. PT eval/tx for therepeutic exercises and strengtheing,  OT eval/tx for ADLS, strengthening, ST for  cognition/dysphagia. Patient scored 15 on BIMS.

## 2023-06-09 NOTE — PROGRESS NOTES
"  PROGRESS NOTE  Patient Name: Katherine Williamson  Age/Sex: 72 y.o. female  : 1950  MRN: [id number]    Date of Admission: 2023  Date of Encounter Visit: 23   LOS: 21 days   Patient Care Team:  Zelalem Flor APRN as PCP - General (Internal Medicine)  Stephen, Brandon Meyer MD as Consulting Physician (Orthopedic Surgery)    Chief Complaint: Pneumonia with empyema on antibiotic, hypoxemia that resolved, new onset diarrhea    Hospital course: Patient continues to improve  She has been on room air, her white count has been normal, she finished her course of 4 weeks of meropenem and had a follow-up CT scan however that showed there are still a lot of areas of consolidation and cavitation in her lungs and that would trigger the concern.  Thoracic surgery was consulted and they referred back to us.  The case was discussed with the patient, the films were reviewed and was discussed with infectious disease  The patient is denying any pleurisy no fever no chills no night sweats no productive cough no hemoptysis    REVIEW OF SYSTEMS:   CONSTITUTIONAL: Low-grade fever       Ventilator/Non-Invasive Ventilation Settings (From admission, onward)      Room air              Vital Signs  Temp:  [96.8 °F (36 °C)-97.7 °F (36.5 °C)] 97.2 °F (36.2 °C)  Heart Rate:  [87-89] 87  Resp:  [16-18] 18  BP: (119-138)/(70-80) 119/70  SpO2:  [90 %-95 %] 94 %  on  Flow (L/min):  [1] 1 Device (Oxygen Therapy): room air    Intake/Output Summary (Last 24 hours) at 2023 1550  Last data filed at 2023 1300  Gross per 24 hour   Intake 850 ml   Output 400 ml   Net 450 ml     Flowsheet Rows      Flowsheet Row First Filed Value   Admission Height 160 cm (63\") Documented at 2023 190   Admission Weight 61.2 kg (135 lb) Documented at 2023          Body mass index is 25.86 kg/m².      23  0540 23  0542 23  0507   Weight: 70.2 kg (154 lb 12.2 oz) 69 kg (152 lb 1.9 oz) 66.2 kg (146 lb)       Physical " Exam:  GEN: Looking better, in no distress, awake and responsive, on  room air  EYES:   Sclerae clear. No icterus. PERRL. Normal EOM  ENT:   External ears/nose normal, no oral lesions, no thrush, mucous membranes moist  NECK:  Supple, midline trachea, no JVD  LUNGS: Normal chest on inspection, diminished breath sounds bilaterally, she does have some friction rub on the base posteriorly on the right with some minimal crackles no wheezes, breathing is nonlabored  CV:  Regular rhythm and rate. Normal S1/S2. No murmurs, gallops, or rubs noted.  ABD:  Soft, nontender and nondistended. Normal bowel sounds. No guarding  EXT:  Moves all extremities well. No cyanosis. No redness.    No edema.   Skin:  abnormally dry over the lower extremities with some scattered petechial type hemorrhage that is only noted on the lower extremity and not sure if this is a coagulopathic kind of bleed or just because of the dryness and the skin scratching.       Results Review:    Results From Last 14 Days   Lab Units 06/05/23  0436   CRP mg/dL 4.45*     Results from last 7 days   Lab Units 06/09/23  0509 06/05/23  0436   SODIUM mmol/L 136 132*   POTASSIUM mmol/L 3.7 3.8   CHLORIDE mmol/L 103 99   CO2 mmol/L 27.0 27.1   BUN mg/dL 11 9   CREATININE mg/dL 0.25* 0.30*   CALCIUM mg/dL 9.0 8.7   ANION GAP mmol/L 6.0 5.9                 Results from last 7 days   Lab Units 06/09/23  0509 06/05/23  0436   WBC 10*3/mm3 11.45* 11.41*   HEMOGLOBIN g/dL 10.3* 10.2*   HEMATOCRIT % 30.6* 30.4*   PLATELETS 10*3/mm3 265 292   MCV fL 89.0 87.1   NEUTROPHIL % % 68.0 65.3   LYMPHOCYTE % % 23.5 25.9   MONOCYTES % % 6.6 6.2   EOSINOPHIL % % 1.1 1.8   BASOPHIL % % 0.5 0.5   IMM GRAN % % 0.3 0.3      Latest Reference Range & Units Most Recent   RNP Antibodies 0.0 - 0.9 AI <0.2  5/9/23 14:00   Vann Antibodies 0.0 - 0.9 AI <0.2  5/9/23 14:00   Sjogren's Anti-SS-A 0.0 - 0.9 AI <0.2  5/9/23 14:00   Sjogren's Anti-SS-B 0.0 - 0.9 AI <0.2  5/9/23 14:00   DUTCH-1 IgG 0.0 -  0.9 AI <0.2  5/9/23 14:00      Latest Reference Range & Units Most Recent   Anti-Centromere B Antibodies 0.0 - 0.9 AI <0.2  5/9/23 14:00   Antichromatin Antibodies 0.0 - 0.9 AI <0.2  5/9/23 14:00   Anti-DNA (DS) Ab Qn 0 - 9 IU/mL <1  5/9/23 14:00   C-ANCA Neg:<1:20 titer <1:20  5/9/23 14:00   DUTCH-1 IgG 0.0 - 0.9 AI <0.2  5/9/23 14:00   P-ANCA Neg:<1:20 titer <1:20  5/9/23 14:00   Atypical pANCA Neg:<1:20 titer <1:20  5/9/23 14:00      Latest Reference Range & Units Most Recent   Antiscleroderma-70 Antibodies 0.0 - 0.9 AI <0.2  5/9/23 14:00           Latest Reference Range & Units Most Recent   pH, Fluid  8.03  5/10/23 11:10   Glucose, Fluid mg/dL 142  5/10/23 11:10   Protein, Total, Fluid g/dL 1.4  5/10/23 11:10      Latest Reference Range & Units Most Recent   ANTI-MPO ANTIBODIES 0.0 - 0.9 units <0.2  5/9/23 14:00   ANTI-PR3 ANTIBODIES 0.0 - 0.9 units <0.2  5/9/23 14:00               Invalid input(s): LDLCALC          No results found for: POCGLU                                Imaging:   Imaging Results (All)       Procedure Component Value Units Date/Time                  I reviewed the patient's new clinical results.  I personally viewed and interpreted the patient's imaging results: The chest CT is actually looking better, the area of dense consolidation is starting to cavitate with much less inflammation around it, the left lung has cleared up.  There is a loculated effusion still.  There are some other scattered minor increase in the density of the consolidation and the overall picture is that of a less diffuse inflammation in the lung        Medication Review:   aspirin, 81 mg, Oral, Q24H  atorvastatin, 80 mg, Oral, Nightly  citalopram, 20 mg, Oral, Daily  clopidogrel, 75 mg, Oral, Daily  doxycycline, 100 mg, Oral, Q12H  guaiFENesin, 600 mg, Oral, BID  hydrocortisone-bacitracin-zinc oxide-nystatin, 1 application, Topical, BID  magnesium hydroxide, 10 mL, Oral, Once  meropenem, 1 g, Intravenous, Q8H  metoprolol  tartrate, 12.5 mg, Oral, Q12H  potassium chloride, 10 mEq, Oral, Daily  sodium chloride, 10 mL, Intravenous, Q12H  sodium chloride, 10 mL, Intravenous, Q12H  cyanocobalamin, 1,000 mcg, Oral, Daily             ASSESSMENT:   1. Right lower lobe lung abscess/pneumonia, on meropenem, planned for 4 weeks with stop date 6/8/2023  2. Sepsis, improved  3. Right loculated pleural effusion, s/p thoracentesis  4. Acute hypoxic respiratory failure, improved  5. Emphysema, upper lobe predominant, COPD but no exacerbation  6. Peripheral pleural-based nodule, 2.4 cm on CT chest 4/3/23, could have been related to early pneumonia.  Underlying malignancy cannot be excluded  7. Tobacco abuse  8. Essential hypertension  9. Acute right MCA stroke status post TNK  10. Bilateral stroke and MRI looking for possible embolic so far no evidence on echo  11. Labile GI symptoms was constipation and diarrhea  12. New onset leukocytosis as of 5/25/2023    PLAN:  Patient is not having any fever, no cough, no clinical symptoms to suggest worsening pneumonia, the CAT scan films were reviewed and there has been sustained improvement compared to when she was in the ICU but not much changes over the last 2 weeks.  Given the clinical picture, I would continue to monitor, infectious disease would like to extend the antibiotic course with meropenem for additional 2 more weeks and that should be okay and I will follow-up in 2 more months with a noncontrast chest CT or sooner in case the patient started having symptoms of recurrent infection before then.  Patient is cleared for discharge from the pulmonary standpoint  More recommendations to follow as an outpatient      Disposition: Per primary team    Jennifer Hood MD  06/09/23  15:50 EDT          Dictated utilizing Dragon dictation

## 2023-06-09 NOTE — PROGRESS NOTES
ID NOTE    CC: f/u pneumonia (right worse than left) w/ possible abscess    Subj: No fever. She remains on room air. D/w thoracic surgery yesterday who recommended a bronchoscopy so I consulted pulmonary yesterday afternoon. Evaluation is pending. Patient is eager for discharge.     Medications:    Current Facility-Administered Medications:     acetaminophen (TYLENOL) tablet 650 mg, 650 mg, Oral, Q6H PRN, Bharat Calabrese MD, 650 mg at 06/06/23 2151    aspirin chewable tablet 81 mg, 81 mg, Oral, Q24H, Bharat Calabrese MD, 81 mg at 06/09/23 0945    atorvastatin (LIPITOR) tablet 80 mg, 80 mg, Oral, Nightly, Bharat Calabrese MD, 80 mg at 06/08/23 2027    citalopram (CeleXA) tablet 20 mg, 20 mg, Oral, Daily, Bharat Calabrese MD, 20 mg at 06/09/23 0945    clopidogrel (PLAVIX) tablet 75 mg, 75 mg, Oral, Daily, Bharat Calabrese MD, 75 mg at 06/09/23 0945    doxycycline (MONODOX) capsule 100 mg, 100 mg, Oral, Q12H, Rigoberto Chan MD, 100 mg at 06/09/23 0945    guaiFENesin (MUCINEX) 12 hr tablet 600 mg, 600 mg, Oral, BID, Bharat Calabrese MD, 600 mg at 06/09/23 0945    hydrocortisone-bacitracin-zinc oxide-nystatin (MAGIC BARRIER) ointment 1 application, 1 application, Topical, BID, Karthikeyan Kaba MD, 1 application at 06/09/23 0948    magnesium hydroxide (MILK OF MAGNESIA) suspension 10 mL, 10 mL, Oral, Once, Sebastian Collins MD    meropenem (MERREM) 1 g in sodium chloride 0.9 % 100 mL IVPB-VTB, 1 g, Intravenous, Q8H, Rigoberto Chan MD, 1 g at 06/09/23 0522    metoprolol tartrate (LOPRESSOR) tablet 12.5 mg, 12.5 mg, Oral, Q12H, Bharat Calabrese MD, 12.5 mg at 06/09/23 0945    potassium chloride (K-DUR,KLOR-CON) ER tablet 10 mEq, 10 mEq, Oral, Daily, Karthikeyan Kaba MD, 10 mEq at 06/09/23 0945    sodium chloride 0.9 % flush 10 mL, 10 mL, Intravenous, Q12H, Rigoberto Chan MD, 10 mL at 06/08/23 0905    sodium chloride 0.9 % flush 10 mL, 10 mL, Intravenous, PRN, Rigoberto Chan  MD Kanu    sodium chloride 0.9 % flush 10 mL, 10 mL, Intravenous, Q12H, Rigoberto Chan MD, 10 mL at 06/09/23 0949    sodium chloride 0.9 % flush 10 mL, 10 mL, Intravenous, PRN, Rigoberto Chan MD    sodium chloride 0.9 % flush 20 mL, 20 mL, Intravenous, PRN, Rigoberto Chan MD    sodium chloride 0.9 % flush 20 mL, 20 mL, Intravenous, PRN, Rigoberto Chan MD    sodium chloride 0.9 % infusion 40 mL, 40 mL, Intravenous, PRN, Rigoberto Chan MD    vitamin B-12 (CYANOCOBALAMIN) tablet 1,000 mcg, 1,000 mcg, Oral, Daily, Bharat Calabrese MD, 1,000 mcg at 06/09/23 0945      Objective   Vital Signs   Temp:  [96.8 °F (36 °C)-97.7 °F (36.5 °C)] 97.7 °F (36.5 °C)  Heart Rate:  [84-89] 89  Resp:  [16-18] 16  BP: (106-138)/(68-80) 138/80    Physical Exam:   General: awake, alert, NAD, very nice, in wheelchair  Eyes: no scleral icterus  Cardiovascular: NR  Respiratory: clear anteriorly; mild RLL rales posteriorly; no wheezing; normal WOB on RA  GI: Abdomen is soft, not tender or distended  Skin: petechiae on BLEs  Psychiatric: Normal mood and affect   Vasc: RUE PICC w/o erythema    Labs:   CBC, Crt, and K reviewed today  Lab Results   Component Value Date    WBC 11.45 (H) 06/09/2023    HGB 10.3 (L) 06/09/2023    HCT 30.6 (L) 06/09/2023    MCV 89.0 06/09/2023     06/09/2023     Lab Results   Component Value Date    GLUCOSE 82 06/09/2023    CALCIUM 9.0 06/09/2023     06/09/2023    K 3.7 06/09/2023    CO2 27.0 06/09/2023     06/09/2023    BUN 11 06/09/2023    CREATININE 0.25 (L) 06/09/2023    EGFR 119.4 06/09/2023    BCR 44.0 (H) 06/09/2023    ANIONGAP 6.0 06/09/2023     Lab Results   Component Value Date    CRP 4.45 (H) 06/05/2023     Crypto Ag negative  Urine Histo Ag negative  Serum Histo Ag negative  Histoplasma Ab negative  Urine Blasto Ag negative  Aspergillus Ag 0.07 (negative)  ANCA negative    Microbiology:  5/2 RPP: negative  5/2 BCx:  "Corynebacterium in 1/2 sets  5/2 SpCx: normal aishwarya  5/2 MRSA nares: negative  5/2 Strep pneumo Ag; negative  5/2 Legionella Ag: negative  5/4 BCx: negative  5/5 C diff: negative  5/6 SpCx: normal aishwarya  5/9 AFB Cx: NGTD  5/9 Fungus Cx: negative  5/10 SpCx: rare normal aishwarya  5/10 L Thoracentesis Cx: negative  5/14 BCx: negative  5/26 C diff: negative    Prior Radiology:  6/8 CT chest:  \"1. Increased density of consolidation in the right middle lobe   2. Large area of cavitation in the right lower lobe again demonstrated.   Consolidation in the right lower lobe posterior to the cavitation has   decreased   3. Consolidation elsewhere in the right lower lobe stable   4. Decreased consolidation in the base of the left lower     -MRI brain: \"Bilateral multifocal areas of infarction.  No evidence of hemorrhagic transformation. \"    -ELIGIO negative for vegetations    ASSESSMENT/PLAN:  Pneumonia and right lung abscess  Pleural effusions  Acute hypoxic respiratory failure  Emphysema  Tobacco use  Acute R MCA stroke and bilateral infarcts    Given CT chest findings yesterday, d/w thoracic surgery who recommended a consult be placed to pulmonary re: evaluation for bronchoscopy. I will follow-up their evaluation.     Thankfully she remains afebrile and on room air. She is very eager for discharge.     I plan to extend her antibiotics of meropenem 1 g IV q8h and doxycycline 100 mg PO BID through 6/27/23 at which time she will follow-up w/ me in the ID clinic. Weekly CBC w/ diff, BMP, and CRP faxed to me at [phone].     ID will follow. D/W Dr Collins yesterday.       "

## 2023-06-09 NOTE — CONSULTS
Date of Hospital Visit: 23  Encounter Provider: MARGARITO Reyes  Place of Service: [facility] CARDIOLOGY  Patient Name: Katherine Williamson  :1950  Referral Provider: Karthikeyan Kaba, *    Chief complaint: decreased functional  mobility secondary tos recent stroke     History of Present Illness    Ms. Williamson is a 72 year old female with emphysema, tobacco use, hypertension, hyperlipidemia,c arotid artery disease, CVA.  She has been seen by Dr. Nixon before in 2020 for chest pain stress.  Nuclear stress test in May 2020 was normal.  Echocardiogram May 2020 showed normal LV function, grade 1 diastolic dysfunction and no significant valve disease.  Carotid Doppler on May 15, 2023 showed a less than 50% right internal carotid artery stenosis and no significant stenosis of the left carotid artery.  Transthoracic echo on May 15, 2023 showed normal LV function, grade 1 diastolic dysfunction, negative saline study and no valve disease.     She was admitted on May 2 with shortness of breath, cough and brown sputum proBNP of 1345.  Chest x-ray showed pneumonia and/or neoplasm and she was admitted for community-acquired pneumonia.  On May 14 she developed an acute right MCA stroke and was treated with tenecteplase.  She was ultimately discharged with a 2-week mobile telemetry.    She has been in acute inpatient rehab and her Zio patch has resulted with nonsustained ventricular tachycardia and nonsustained supraventricular tachycardia.  Cardiology has been consulted to address these results and patient.    Patient denies having palpitations, heart racing or fluttering or skipping while wearing the monitor.  She denies chest pain tightness or pressure in her breathing has slowly improved over the last couple weeks reportedly.  She has no lower extremity edema and is anticipating discharge today.    Past Medical History:   Diagnosis Date    Arthritis     Cataract     Colon polyps      FOLLOWED BY DR. JOSEPH LAU    Hypertension        Past Surgical History:   Procedure Laterality Date    COLONOSCOPY  10/2015    [id number]-[id number]. Kaplan.  Follow-up in 5 years.    COLONOSCOPY N/A 1/12/2022    2 BENIGN POLYPS IN DESCENDING, 5 MM BENIGN POLYP IN SIGMOID, MULTIPLE SMALL AND LARGE DIVERTICULA IN SIGMOID, RESCOPE IN 3 YRS, DR. JOSEPH LAU AT [facility]    EYE SURGERY      JOINT REPLACEMENT  2005?    Left total hip replacement in 2005.  In December 2015 patient had left hip revision    TONSILLECTOMY         Prior to Admission medications    Medication Sig Start Date End Date Taking? Authorizing Provider   acetaminophen (TYLENOL) 325 MG tablet Take 2 tablets by mouth Every 6 (Six) Hours As Needed for Mild Pain or Headache. 6/9/23  Yes Sebastian Collins MD   aspirin 81 MG chewable tablet Chew 1 tablet Daily. 6/9/23  Yes Sebastian Collins MD   atorvastatin (LIPITOR) 80 MG tablet Take 1 tablet by mouth Every Night. 6/9/23  Yes Sebastian Collins MD   Cholecalciferol (VITAMIN D) 1000 UNITS tablet Take 1 tablet by mouth Daily.   Yes Angela Vásquez MD   citalopram (CeleXA) 20 MG tablet Take 1 tablet by mouth Daily. 6/9/23  Yes Sebastian Collins MD   clopidogrel (PLAVIX) 75 MG tablet Take 1 tablet by mouth Daily. 6/9/23  Yes Sebastian Collins MD   cyanocobalamin (VITAMIN B-12) 1000 MCG tablet Take 1 tablet by mouth Daily. 6/9/23  Yes Sebastian Collins MD   cyanocobalamin (VITAMIN B-12) 500 MCG tablet Take 1 tablet by mouth Daily.   Yes Angela Vásquez MD   doxycycline (MONODOX) 100 MG capsule Take 1 capsule by mouth Every 12 (Twelve) Hours for 28 doses. Indications: Pneumonia 6/9/23 6/23/23 Yes Sebastian Collins MD   guaiFENesin (MUCINEX) 600 MG 12 hr tablet Take 1 tablet by mouth 2 (Two) Times a Day. 6/9/23  Yes Sebastian Collins MD   hydrocortisone-bacitracin-zinc oxide-nystatin (MAGIC BARRIER) Apply 1 application topically to the appropriate area as  directed 2 (Two) Times a Day. 6/9/23  Yes Sebastian Collins MD   irbesartan (Avapro) 150 MG tablet Take 1 tablet by mouth Every Night. 1/9/23  Yes Zelalem Flor APRN   meropenem (MERREM) 1gm IVPB in 100ml NS (VTB) Infuse 1 g into a venous catheter Every 8 (Eight) Hours for 41 doses. Indications: Pneumonia 6/9/23 6/23/23 Yes Sebastian Collins MD   metoprolol tartrate (LOPRESSOR) 25 MG tablet Take 0.5 tablets by mouth Every 12 (Twelve) Hours. 6/9/23  Yes Sebastian Collins MD   omeprazole (priLOSEC) 40 MG capsule Take 1 capsule by mouth Daily.   Yes Angela Vásquez MD   potassium chloride 10 MEQ CR tablet Take 1 tablet by mouth Daily. 6/9/23  Yes Sebastian Collins MD   sodium chloride 0.9 % solution Infuse 40 mL into a venous catheter As Needed (PICC line care). 6/9/23  Yes Sebastian Collins MD   vitamin C (ASCORBIC ACID) 500 MG tablet Take 1 tablet by mouth Daily.   Yes Angela Vásquez MD       Social History     Socioeconomic History    Marital status:    Tobacco Use    Smoking status: Every Day     Packs/day: 1.00     Years: 51.00     Pack years: 51.00     Types: Cigarettes     Start date: 1/1/1970    Smokeless tobacco: Never   Vaping Use    Vaping Use: Never used   Substance and Sexual Activity    Alcohol use: Yes     Alcohol/week: 30.0 standard drinks     Types: 30 Cans of beer per week     Comment: 4-6 per day, but very few in the past month    Drug use: Never    Sexual activity: Not Currently     Partners: Male     Birth control/protection: None       Family History   Problem Relation Age of Onset    Cancer Mother     Breast cancer Mother     Arthritis Mother     Deep vein thrombosis Mother     Heart attack Father     Heart disease Father         Had quadruple bypass    Heart attack Maternal Grandmother     Heart disease Maternal Grandmother     Malig Hyperthermia Neg Hx        Review of Systems     Objective:     Vitals:    06/08/23 1201 06/08/23 1900 06/08/23 2217  "06/09/23 0507   BP: 106/68 125/78  138/80   BP Location: Left arm Left arm  Left arm   Patient Position: Sitting Lying  Lying   Pulse: 84 88  89   Resp: 18 18  16   Temp: 97.2 °F (36.2 °C) 96.8 °F (36 °C)  97.7 °F (36.5 °C)   TempSrc: Oral Oral  Oral   SpO2: 90% 90% 95% 95%   Weight:    66.2 kg (146 lb)   Height:         Body mass index is 25.86 kg/m².    Last Weight and Admission Weight        06/09/23  0507   Weight: 66.2 kg (146 lb)     Flowsheet Rows      Flowsheet Row First Filed Value   Admission Height 160 cm (63\") Documented at 05/19/2023 1700   Admission Weight 59.7 kg (131 lb 9.8 oz) Documented at 05/19/2023 1700              Intake/Output Summary (Last 24 hours) at 6/9/2023 1114  Last data filed at 6/9/2023 0900  Gross per 24 hour   Intake 890 ml   Output 400 ml   Net 490 ml         Physical Exam  Constitutional:       Appearance: Normal appearance.   Cardiovascular:      Rate and Rhythm: Normal rate and regular rhythm.   Pulmonary:      Effort: Pulmonary effort is normal.      Comments: Diminished RLL  Abdominal:      General: Bowel sounds are normal. There is no distension.      Palpations: Abdomen is soft.   Musculoskeletal:      Comments: In wheelchair   Neurological:      Mental Status: She is alert.               Lab Review:                Results from last 7 days   Lab Units 06/09/23  0509   SODIUM mmol/L 136   POTASSIUM mmol/L 3.7   CHLORIDE mmol/L 103   CO2 mmol/L 27.0   BUN mg/dL 11   CREATININE mg/dL 0.25*   GLUCOSE mg/dL 82   CALCIUM mg/dL 9.0         Results from last 7 days   Lab Units 06/09/23  0509   WBC 10*3/mm3 11.45*   HEMOGLOBIN g/dL 10.3*   HEMATOCRIT % 30.6*   PLATELETS 10*3/mm3 265                         I personally viewed and interpreted the patient's EKG/Telemetry data    Assessment/Plan:   1.  Decreased functional capacity  2.  Recent right MCA stroke May 2022 and bilateral infarcts.  She is maintained on aspirin, plavix and statin  3. HTN  4. HLD  5. Carotid artery stenosis "   6. Nonsustained SVT   7. Nonsustained VT-asymptomatic   8.  PNA with right lung abscess  9. emphysema     She had asymptomatic and nonsustained ventricular tachycardia and supraventricular tachycardia.     Would continue metoprolol at discharge. ARB is to be held at discharge and we can re-evaluate need for more BP medication outpatient.     Pulmonary to see for possible bronch.    Cardiology will sign off. Plan for 1 month follow up in our office to further discuss possible implantable loop recorder after she has completed all antibiotics from an ID standpoint.     Thank you for consult,        MARGARITO Reyes  [facility]   [address] [place] [address] [address]  [place] [address]  Ph: [phone]  Fax: [phone]

## 2023-06-09 NOTE — DISCHARGE SUMMARY
[facility] - REHABILITATION UNIT    ANNIE CERDA  1950    ADMIT DATE:  5/19/2023  6:49 PM  DISCHARGE DATE:  06/09/23      CHIEF COMPLAINT:    72-year-old female with past medical history remarkable for arthritis, hypertension was in her usual state of her health until 30 March i.e. Friday afternoon when she developed sudden onset of right mid back/chest discomfort that gets worse with coughing and taking deep breath.  Her symptoms started after she helped her friend cleaning her home.  Besides this discomfort that she had she was able to carry out routines of her activities including yard work and pulling weeds over the weekend since the onset of pain.  Her symptoms continued to get worse to the point that she went to see her primary care provider and was evaluated with plans to do CT scan of the chest PE protocol as her D-dimer came back positive.  CT scan of chest PE protocol on 4/3/2023 revealed severe emphysematous changes in the lung without any consolidation pleural effusion or pneumothorax she was noted to have triangular peripheral right lower lobe 2.4 x 2.1 cm nodule with no pleural effusion or pneumothorax.  Differential diagnosis for this nodule based on shape and location included: Pneumonia, infarct or neoplasm.  Follow-up examination in 3 months was recommended.  Patient was started on prednisone Dosepak on 4/3/2023 and follow-up call back/communication from the patient on 4/4/2023 suggested improvement in her discomfort.  After she completed her Dosepak her symptoms recurred so she came back to the emergency room at our facility on 4/10/2023.  Review of system documented at that time shows she denies fever or chill or decrease in appetite.  A work-up revealed white blood cell count of 13,000 and fairly unremarkable CMP.  An assessment of pleurisy/thoracic radiculopathy versus zoster versus pulmonary embolism versus intercostal strain was raised.  She was given IV Toradol with  improvement and was subsequently discharged on 5-day course of oral Toradol along with omeprazole for GI prophylaxis with instructions to follow-up with PCP and if she continues to get better may consider alternative such as meloxicam.  Patient had a follow-up visit with her primary care provider for back pain dyspnea on exertion on 4/24/2023 with persistent discomfort in her back along with shortness of breath and swelling of bilateral lower extremity left greater than right.  She he was also noted to have lost 4 pounds since beginning of the April.  It is documented that she continues to smoke.  Arrangements were made for echocardiogram and pulmonary function test and consideration for thoracic x-ray and MRI if her back pain does not improve with conservative management.  Review system performed that day shows fatigue weight change shortness of breath but no fever or chills.  Patient is also scheduled to have CT scan of the chest and pulmonary function test.  It appears that patient CT scan of the chest performed-imaging on 4/26/2023.  Patient called and communicated with the care provider on 5/1/2023 but with a result of her CT chest that was not uploaded in her [employer].  It was at that point recommended the patient and the need to come in and see her provider in the office to see the extent of her discomfort and declining function and associated shortness of breath or if she is not well may have to go to the emergency room.  Earlier today [facility] imaging called about the result of the CT scan of the chest performed on 4/26/2023 and findings were concerning for thick-walled pleural effusion with gas in it concerning for empyema as well as worsening pneumonia in the right lower lobe with previously noted lung nodule of 3 mm increased to 7 mm for which PET scan was recommended.  Patient also had in the meantime got herself a pulse ox meter and reported that her oxygen levels are less than 90 and she was  recommended to go to the ER immediately.  In the emergency room she was noted to be hypoxic with worsening lung infiltrate and parameters positive for sepsis.  Pulmonary service was consulted patient had blood cultures drawn and was started on Zithromax and ceftriaxone.  Patient is significant foul-smelling breath that has been going on for the last couple of weeks.       On 5/14/23 at approximately 2:45, PICC team nurse was with patient when she suddenly developed difficulty talking and left body weakness.  Rapid response was called and I was notified. Stroke alert was initiated.  NIHSS was 22.     The head CT showed no hemorrhage TNK recommended and administered at 15:41.  CTA and CT perfusion were reviewed and this showed significant right MCA perfusion deficit but no proximal occlusion. There appeared to be a distal M3 occlusion. I discussed this with Dr. Martinez who concurred. He did not recommend thrombectomy given the distal location.     After medical stabilization pt was felt to be a good candidate for comprehensive acute inpatient rehab    HISTORY OF PRESENT ILLNESS:        HOSPITAL COURSE:    Patient participated in a comprehensive acute inpatient rehabilitation program.     During the hospital stay the following areas were addressed:    1. Acute R MCA stroke and bilateral infarcts  Stroke prophylaxis-aspirin/Plavix/atorvastatin  Zio patch placed on May 19 for 2 weeks  - 5/20 - Admit for inpt rehab w/ PT, OT, SLP, psychology and rehab medical and nursing care. Continue secondary stroke prophylaxis     2. Pneumonia and right lung abscess and Pleural effusions  - 5/20 - Continue IV antibx. ID following    - 5/21 - Continues to improve   The chest x-ray is reassuring   Expect her to be able to come off the oxygen in the next few days   She will need to have a follow-up chest imaging 6 weeks down the road   Finish the antibiotic course per ID, last day 6/8/2023   -5/22-on 1 L oxygen with activities and  maintaining sats  -5/23- Per Pulmonology, continue Merrem. Continue to wean off supplemental oxygen  -5/24-oxygen weaned to 0.5 L nasal cannula  -5/25 -on room air and therapy, more 2 L last night  -5/26 Placed on 2L NC last night, WBC decreased to 15. CXR revealed increased opacification in RLL. Will continue Merrem and appreciate recommendations from Pulmonolgy. CDiff negative and bowel movements decreased this morning.  -5/30-WBC stable around 12 K.  On room air at rest.  -6/5-on room air     ID-meropenem and doxycycline-last dose June 8, 2023  Repeat CT chest June 6, 2023  May 25-WBC increased to 18 K from 8K 5 days ago.  No fever.  Vital signs stable.  On room air during speech therapy session.  Not acutely ill-appearing.  Had 3 loose semiformed stool today but received bowel program yesterday for recent constipation.   PICC line site unremarkable.   Increased diffuse purpleish petechiae on the anterior lower legs.  She had a few earlier in the week but there more prominent today  Calves are soft without any significant edema.  Continues on meropenem and doxycycline.   Patient reviewed with pulmonology service who will assess.  Although with the recent bowel program, given the white blood cell count increased will check C. difficile with the loose stool today  Will review with infectious disease service  May 26-Persistent right empyema, similar to prior exam. Increased consolidation in the superior segment right lower lobe, left lower lobe. Multiple  small nodular densities in both lower lungs, indeterminate. Increased  cavitation in the right lower lobe. Decreased left pleural effusion  May 30-WBC improved 12 K.  Continues on antibiotics.   June 5-she had a CT of the chest on May 26.  -will see if she still needs to have the 1 done on June 6.  Get input from infectious disease service, scheduled to see them on June 8.  June 6-seen by infectious disease service-repeat CT chest for the AM. Depending on results,  will either stop antibiotics, extend antibiotics, or consult thoracic surgery.   June 7-CT results pending.  Patient discussed with infectious disease service  June 8-Per infectious disease service-[CT chest shows improvement on the left side. However, there is still cavitation and consolidation on the right side. This is despite 5 weeks of antibiotic therapy. Clinically she is improved but the radiographic improvement is lagging. - have asked thoracic surgery to evaluate and they will also d/w pulmonary re: next steps. For now, - going to plan to extend her antibiotics of meropenem 1 g IV q8h and doxycycline 100 mg PO BID through 6/22/23. Keep PICC Line]  Per pulmonology-[ follow-up in 2 more months with a noncontrast chest CT or sooner in case the patient started having symptoms of recurrent infection before then]      3. Acute hypoxic respiratory failure  June 8-6 minute walk test performed this date at 12:45 PM. Pt O2 was 95% on room air sitting, 90% on room air while ambulating for 6 mins and then 95% following ambulation in sitting on room air.   -will obtain overnight pulse oximetry tonight     4. Emphysema d/t Tobacco use     5. Hypertension-continue antihypertensive regimen and avoid hypotensive episodes.     6. DVT prophylaxis -   - 5/20 - Start Lovenox SQ daily     7.  Dysphagia  -5/22-diet advanced to regular solid thin liquids     8.  GI-constipation-May 24-add Senokot-Colace.  Milk of magnesia x1.  Patient maintaining hydration  May 25-loose stool semiformed x3 today.  Changed Senokot Colace to as needed     9. Skin -   June 9 - Original gluteal DTI has healed. Right buttocks has reepithelialized. Left buttocks continues to have pink partial thickness skin loss secondary to friction and MASD from IAD, 2.5x 2.5cms. Surrounding skin is only light pink today, not the deep red color. Left buttocks wound cleansed with NS, patted dry. Opticel ag with Optifoam dressing placed, this can be changed 3x week  at home.  Pt will need to continue frequent off-loading, continue use of a pressure redistribution W/C cushion. HH to follow at home for wound assessments.       10. ZIO patch result back June 8 - shows 19 beat episode of asymptomatic ventricular tachycardia -  Per cardiology-She had asymptomatic and nonsustained ventricular tachycardia and supraventricular tachycardia.  Plan for 1 month follow up in our office to further discuss possible implantable loop recorder after she has completed all antibiotics from an ID standpoint           TEAM CONF - MAY 23 - BED MIN. TRANSFERS MIN MOD ASSIST. GAIT 60 FEET MIN RW. TOILET TRANSFERS MOD ASSIST RW. SATS 1 L WITH ACTIVITIES WITH SATS >=90%. BATH MOD. LBD MOD.UBD MIN. GROOMING SET UP. TOILETING MAX. MILD EXECUTIVE FUNCTION DEFICITS. BNE PENDING. DIET ADVANCED TO REGULAR CONSISTENCY, GOOD INTAKE. PULM EXPECTS WILL BE OFF OXYGEN IN NEXT FEW DAYS. REPEAT CHEST IMAGING IN 6 WEEKS.  ELOS - 2 WEEKS.      Team conference-May 30  PT: Walking 160 ft, on 1L with walking which she was previously on RA with wakling. Min assist for bed transfer with RW. Min assist with toilet transfers  OT: Bathing min, Dressing lower body mod assist, Dressing upper body is set up, toileting is mod assist. Fatigues easily with therapy, wants to stay in   ST: Mild high level deficits, attention to detail with check booking, not working on med management  Nursing: Potassium supplementation, WBC is 12 which is stable, on ABX Doxy PO and Merrem IV, Drops to 80% if not on supplemental O2, on 1-2L NC. Coccyx pressure wound. On IV ABX until 6/28/2023  Psych: apathetic, flat affect, but was agreeable to do testing  RD: Started on Kwasi   ELOS: June 9th     Team conference-June 6-   Bed mob CGA, transfers SBA - CGA rwx, ambulating 160' SBA - CGA rwx,  completed 4 steps w/ 2 handrails CGA.  CGA toilet transfer, Min A shower  transfer.  Min / CGA toileting, Min A bathing.  Min / CGA LBD.  Can complete  medicine  task now with no cues.  Continent of urine 75%, incontinent 100% of  bowel.  ELOS-Friday, June 9    Disposition-June 9-Home today.  Medications and follow-up reviewed.            Goal is for home with home health  therapies.  Barrier to discharge: Impaired mobility self-care endurance- worked on vision, strength, gross and fine motor control, balance, progressive ambulation, ADLs to overcome.            Physical Exam near the time of discharge:    MENTAL STATUS -  AWAKE / ALERT  HEENT-   SCLERAE ANICTERIC, CONJUNCTIVAE PINK, OP MOIST, NO JVD,  LUNGS -decreased air movement bilaterally.  Crackles at the right side with decreased air movement on the right side compared to the left  Clear to auscultation.  On room air.     HEART- RRR, NO RUB, MURMUR, OR GALLOP  ABD - NORMOACTIVE BOWEL SOUNDS, SOFT, NT.    EXT - NO EDEMA OR CYANOSIS  Petechiae bilateral shins and distal right anterior thigh  Right upper extremity PICC line site unremarkable without any swelling about the area.  NEURO -oriented x4  MOTOR EXAM -takes resistance bilaterally       RESULTS:  No results found for: POCGLU  Results from last 7 days   Lab Units 06/09/23  0509 06/05/23  0436   WBC 10*3/mm3 11.45* 11.41*   HEMOGLOBIN g/dL 10.3* 10.2*   HEMATOCRIT % 30.6* 30.4*   PLATELETS 10*3/mm3 265 292     Results from last 7 days   Lab Units 06/09/23  0509 06/05/23  0436   SODIUM mmol/L 136 132*   POTASSIUM mmol/L 3.7 3.8   CHLORIDE mmol/L 103 99   CO2 mmol/L 27.0 27.1   BUN mg/dL 11 9   CREATININE mg/dL 0.25* 0.30*   CALCIUM mg/dL 9.0 8.7   GLUCOSE mg/dL 82 78        ZIO patch results June 8 -  Study Description     Monitor placed on patient by MB on 5/19/2023  at 16:52 EDT. Instructions were provided to patient on use of holter monitor and duration of monitoring.  The monitor was scanned on 6/7/2023. The patient was monitored for 13 days 0 hours. Indications for this exam include unspecified atrial fibrillation. Average HR:  81. Min HR:  53. Max HR:  226.  "     Study Findings     Patient diary was not submitted. No symptoms reported during the monitoring period. No complications noted. The predominant rhythm noted during the testing period was sinus rhythm. Premature atrial contractions occured rarely. There were 2 episodes of supraventricular tachycardia, longest lasting 17 beats and the fastest with a rate of 139 bpm. Premature ventricular contractions occured rarely. Ventricular couplets. There was a 19 beat episode of ventricular tachycardia with a rate of 226 bpm. Sinoatrial node conduction was normal. No atrioventricular block noted.      Study Impressions     An abnormal monitor study. 19 beat episode of asymptomatic ventricular tachycardia         New Radiology:  ELIGIO negative for vegetations  CXR 5/25 revealed increased opacification of the RLL     6/8 CT chest:  \"1. Increased density of consolidation in the right middle lobe   2. Large area of cavitation in the right lower lobe again demonstrated.   Consolidation in the right lower lobe posterior to the cavitation has   decreased   3. Consolidation elsewhere in the right lower lobe stable   4. Decreased consolidation in the base of the left lower      Prior radiology:  MRI brain: \"Bilateral multifocal areas of infarction.  No evidence of hemorrhagic transformation. \"              Discharge Medications        New Medications        Instructions Start Date   acetaminophen 325 MG tablet  Commonly known as: TYLENOL   650 mg, Oral, Every 6 Hours PRN      Aspirin Low Dose 81 MG chewable tablet  Generic drug: aspirin   81 mg, Oral, Every 24 Hours      atorvastatin 80 MG tablet  Commonly known as: LIPITOR   80 mg, Oral, Nightly      citalopram 20 MG tablet  Commonly known as: CeleXA   20 mg, Oral, Daily      clopidogrel 75 MG tablet  Commonly known as: PLAVIX   75 mg, Oral, Daily      doxycycline 100 MG capsule  Commonly known as: MONODOX   100 mg, Oral, Every 12 Hours Scheduled      hydrocortisone-bacitracin-zinc " oxide-nystatin  Commonly known as: MAGIC BARRIER   1 application, Topical, 2 Times Daily      meropenem (MERREM) 1gm IVPB in 100ml NS (VTB)   1 g, Intravenous, Every 8 Hours      metoprolol tartrate 25 MG tablet  Commonly known as: LOPRESSOR   12.5 mg, Oral, Every 12 Hours Scheduled      Mucus Relief 600 MG 12 hr tablet  Generic drug: guaiFENesin   600 mg, Oral, 2 Times Daily      potassium chloride 10 MEQ CR tablet   10 mEq, Oral, Daily      sodium chloride 0.9 % solution   40 mL, Intravenous, As Needed             Changes to Medications        Instructions Start Date   vitamin B-12 1000 MCG tablet  Commonly known as: CYANOCOBALAMIN  What changed:   · medication strength  · how much to take   1,000 mcg, Oral, Daily             Continue These Medications        Instructions Start Date   cholecalciferol 25 MCG (1000 UT) tablet  Commonly known as: VITAMIN D3   1,000 Units, Oral, Daily      vitamin C 500 MG tablet  Commonly known as: ASCORBIC ACID   500 mg, Oral, Daily             Stop These Medications      irbesartan 150 MG tablet  Commonly known as: Avapro     omeprazole 40 MG capsule  Commonly known as: priLOSEC               Contact information for follow-up providers       Zelalem Flor APRN. Go on 6/23/2023.    Specialty: Internal Medicine  Why: At 3:15 P.M.  Contact information:  [address] [facility] [address]  [place] [address]  [phone]               Elijah Berrios MD Follow up.    Specialty: Pulmonary Disease  Why: Repeat CT 4-6 weeks for nodule  --------    Pt. will have a follow up appointment first with Dr. Mark Barrett on 6/15 at 9:20 Per Indu.  Contact information:  [address] [facility] [address]  [place] [address]  [phone]               Rigoberto Chan MD Follow up.    Specialty: Infectious Diseases  Why: Pt. will have a follow up appointment first with Dr. Mark Barrett on 6/15 at 9:20 Per Indu.  Contact information:  [address] [facility] [address]  [facility]  [address]  [phone]               Indigo Curry MD Follow up.    Specialty: Neurology  Why: Dr. scott is a hospitalist. Pt. is already scheduled with AYLA Irizarry on Aug. 24th. at 4:00 P.M.  Contact information:  [address] [facility] [address]  [facility] [address]  [phone]               Marsha Ferris PA Follow up on 8/24/2023.    Specialties: Physician Assistant, Neurology  Why: 8/24 @ 4pm  Contact information:  [address] [facility] [address]  [place] [address]  [phone]               Mark Barrett MD. Go on 6/15/2023.    Specialty: Pulmonary Disease  Why: At 9:P20 A.M.  Contact information:  [address] [facility] [address]  [place] [address]  [phone]               Sebastian Collins MD Follow up in 1 month(s).    Specialty: Physical Medicine and Rehabilitation  Contact information:  [address] [facility] [address]  [place] [address]  [phone]               Talya Clemons MD Follow up in 4 week(s).    Specialty: Cardiology  Contact information:  [address]  [facility] [address]  [facility] [address]  [phone]                       Contact information for after-discharge care       Durable Medical Equipment       [facility] Follow up.    Service: Durable Medical Equipment  Why: They will supply home oxygen concentrator. Please call when leaving rehab so they can deliver to home.  Contact information:  [address]  [facility] [address]  [phone]                     Dialysis/Infusion       [facility] Follow up.    Service: Infusion and IV Therapy  Why: will be providing home IV antibiotic  Contact information:  [address]  [facility] [address]  [phone]                     Home Medical Care       [facility] .    Services: Home Nursing, Home Rehabilitation  Why: You will be receiving home PT, OT, ST, NSG. They will call to schedule in home visits. Nurse will be out tonight for next dose of  antibiotic.  Contact information:  [address]  [facility] [address] [phone]                                   AVS and Handouts       ED (4/10/23 - 4/10/23)   -  Emergency Department      ED After Visit Summary Printed on 4/10/23 at  9:05 AM    Acute Back Pain  Adult (English)  Printed on 4/10/23 at  9:05 AM                    Discharge Instructions         ID-extend her antibiotics of meropenem 1 g IV q8h and doxycycline 100 mg PO BID through 6/22/23.     Dx:  stroke/ pulmonary abscess - Home health PT, OT, ST, NSG - Nursing for skin wound/ antibiotic administration/ PICC line care/ lab work with CBC and CMP weekly through June 22, 2023 to Infectious Disease - Dr Chan.        Arrange home O2 1Liter/minute nasal cannula at night. Overnight pulse oximetry Jun 8-9 showed 23 minutes < 89%, range 76-98 with pulse range             >30 on discharge    Sbeastian Collins MD

## 2023-06-09 NOTE — PROGRESS NOTES
Patient is discharging today. Spoke to patient who is agreeable to home health and has no current home health . [facility] is to provide home JOE's and will deliver to the room today . - meropenem 1 g IV q8h  through 6/22/23. Labs - CBC and CMP weekly through June 22, 2023 to Infectious Disease - Dr Chan. We will see for teaching visit tonight for 8:15 PM dose. Face sheet information is correct and wife requested that with her cell phone it screens calls and we would need to leave a message and she would return call please.  Noted orders per Dr Collins for SN, PT,OT and ST. Will transcribe home health orders . Thank you !!

## 2023-06-09 NOTE — PROGRESS NOTES
Patient to d/c home today, 6/9, with . [facility] will be delivering the antibiotic to patient's room this morning sometime and will do teaching with patient and . Discussed with patient about delivery of antibiotic and teaching. Also discussed  nurse to come out to home for next dose. Per [facility] liaison, she talked with patient by phone in room to let her know nurse will be out tonight for her evening dose. Plan is for patient to get her 12:15 dose here and then d/c after that runs. Discussed home oxygen for night time use. Referral made to [facility] for oxygen concentrator. Patient will d/c home with . [facility] to provide home PT, OT, ST, NSG.

## 2023-06-09 NOTE — PROGRESS NOTES
ZIO patch result back June 8 - shows 19 beat episode of asymptomatic ventricular tachycardia - get Cardiology input.

## 2023-06-09 NOTE — NURSING NOTE
06/09/23 0725   Wound 05/19/23 2055 Bilateral gluteal   Placement Date/Time: 05/19/23 2055   Present on Hospital Admission: Yes  Side: Bilateral  Location: gluteal   Base clean;moist;pink   Periwound blanchable;pink   Periwound Temperature warm   Wound Length (cm) 2.5 cm   Wound Width (cm) 2.5 cm   Wound Depth (cm) 0.1 cm   Wound Surface Area (cm^2) 6.25 cm^2   Wound Volume (cm^3) 0.625 cm^3   Drainage Characteristics/Odor serosanguineous   Drainage Amount small   Care, Wound cleansed with;sterile normal saline   Dressing Care dressing changed;silver impregnated;hydrofiber;silicone;foam   Periwound Care dry periwound area maintained     CWON Note: pt seen for follow up evaluation of skin issues POA to rehab unit. Pt is alert and able to turn and reposition with minimal assistance,.  She remains on a low air loss mattress for adequate pressure redistribution, she is discharging home today. Waffle cushion for her chair     Original gluteal DTI has healed. Right buttocks has reepithelialized. Left buttocks continues to have pink partial thickness skin loss secondary to friction and MASD from IAD, 2.5x 2.5cms. Surrounding skin is only light pink today, not the deep red color. Left buttocks wound cleansed with NS, patted dry. Opticel ag with Optifoam dressing placed, this can be changed 3x week at home.  Pt will need to continue frequent off-loading, continue use of a pressure redistribution W/C cushion. HH to follow at home for wound assessments.

## 2023-06-09 NOTE — PROGRESS NOTES
SECTION GG    Eating Performance Discharge: Patient completed the activities by themself with  no assistance from a helper.    Section B. Health Literacy  Frequency of Needing Assistance Reading:  Never    Section D. Mood  Presence of little interest or pleasure in doing things:   No  Frequency of having little interest or pleasure in doing things:   Never or 1  day  Presence of feeling down, depressed, or hopeless:   No  Frequency of feeling down, depressed, or hopeless:   Never or 1 day   Interview Ended. Above responses do not meet criteria to continue  Total Severity Score:   0    Section D. Social Isolation  Frequency of Feeling Lonely or Isolated:  Never    Section J. Health Conditions (Pain Effect on Sleep)  Pain Effect on Sleep:   Frequently    Signed by: Mayra Sih RN

## 2023-06-09 NOTE — PROGRESS NOTES
Inpatient Rehabilitation Discharge  Section A. Transportation  Issues Due to Lack of Transportation:  No    Section A. Medication List  Subsequent Provider:  06 - Home under care of organized home health service  organization  Medication List to Subsequent Provider at Discharge:  Yes - Current reconciled  medication list provided to the subsequent provider  Route(s) of Medication List Transmission to Subsequent Provider:  Electronic  Health Record, Verbal  Medication List to Patient:  Not applicable.  Patient discharged to a subsequent  provider as defined in 44D    Signed by: GEORGES Franks

## 2023-06-09 NOTE — PLAN OF CARE
Goal Outcome Evaluation:  Plan of Care Reviewed With: patient             Problem: Rehabilitation (IRF) Plan of Care  Goal: Plan of Care Review  Outcome: Ongoing, Progressing  Flowsheets (Taken 6/9/2023 0216)  Plan of Care Reviewed With: patient  Outcome Evaluation:  Alert and oriented x 4. Cooperative and pleasant with care. Takes all meds whole PO with thins. Continent of bowel and bladder; does have urgency. Dressing intact to buttocks. Assist x 1 with RW. PICC to RUE, dressing changed 6/8. 1L humidified applied at HS; resp placed on overnight sleep study. Tolerating well so far. Independent with bed mobility. Refuses waffle boots while in bed. uses waffle cushion while in bed. no pain or discomfort. Anxious and excited for d/c in am. No unsafe behaviors. Call light within reach.

## 2023-06-10 ENCOUNTER — READMISSION MANAGEMENT (OUTPATIENT)
Dept: CALL CENTER | Facility: HOSPITAL | Age: 73
End: 2023-06-10
Payer: COMMERCIAL

## 2023-06-10 VITALS
RESPIRATION RATE: 18 BRPM | HEART RATE: 85 BPM | WEIGHT: 140 LBS | SYSTOLIC BLOOD PRESSURE: 118 MMHG | OXYGEN SATURATION: 89 % | TEMPERATURE: 97.7 F | BODY MASS INDEX: 24.8 KG/M2 | HEIGHT: 63 IN | DIASTOLIC BLOOD PRESSURE: 68 MMHG

## 2023-06-10 NOTE — HOME HEALTH
72 year old female admitted to home care services s/p acute hospitalization 5/2-5/19/23 at [facility]. Patient transferred to [facility] acute rehab for further aggressive rehabilitative care 5/19-6/9/23. Patient initally was diagnosed with Abscess of right lung with pneumonia, sepsis, right pleural effusion,emphysema, pulmonary nodule RLL,acute respiratory failure with hypoxia, HTN, and tobacco abuse. During course of stay patient had  acute stroke that required tPA and transferred to the intensive care unit.  This has resulted in some left hemiparesis and mild speech disturbance but the symptoms have improved after administering TNK on 5/14.  CTA showed right MCA perfusion deficit but no proximal occlusion appeared to have a distal M3 occlusion report and perfusion deficit of the right posterior parietal lobe. Patients prior functional status was independent. Current level of functioning requires assistance with ADLS. Patient lives with spouse in single family home with couple steps into home.  Patient can ambulate with walker, states still has some slight weakness to left leg. Patient has BSC, SHOWER CHAIR,HAND HELD, TRANSPORT W/C,FWW. Wound to left buttock, patient sent home with some supplies, and  able to perform wound care. Patient has LEWIS S/L PICC LINE for IV Meropenum 1gm/100ml NS every 8 hours thru 6/22/23 to infuse over 15 minutes. However, patient and caregiver did not want to infuse that quickly so will be infusing over 30 minutes. Sn instructed pcg  how to administer above iv antibiotic, and flushing before and after. Pcg demonstrated knowledge. SN visits 1w4,2 prn focus of care teaching disease process of absces of lung and pneumonia, CVA disease process, medication teaching and med effects, weekly labs and picc line care, wound care assessment of left buttock stage 2. PT eval/tx for therepeutic exercises and strengtheing,  OT eval/tx for ADLS, strengthening, ST for  cognition/dysphagia. Patient scored 15 on BIMS.

## 2023-06-11 NOTE — OUTREACH NOTE
Prep Survey      Flowsheet Row Responses   [facility] patient discharged from? [facility]   Is LACE score < 7 ? No   Eligibility [facility]   Date of Admission 05/19/23   Date of Discharge 06/09/23   Discharge Disposition Home-Health Care Sv   Discharge diagnosis Acute right MCA stroke   Does the patient have one of the following disease processes/diagnoses(primary or secondary)? Stroke   Does the patient have Home health ordered? Yes   What is the Home health agency?  [facility]   Is there a DME ordered? Yes   What DME was ordered? IV therapy X 2weeks   Medication alerts for this patient see AVS for meds. IV antibx for 2 weeks   Prep survey completed? Yes            Heather GUNDERSON - Registered Nurse

## 2023-06-12 ENCOUNTER — HOME CARE VISIT (OUTPATIENT)
Dept: HOME HEALTH SERVICES | Facility: HOME HEALTHCARE | Age: 73
End: 2023-06-12
Payer: COMMERCIAL

## 2023-06-12 ENCOUNTER — TRANSITIONAL CARE MANAGEMENT TELEPHONE ENCOUNTER (OUTPATIENT)
Dept: CALL CENTER | Facility: HOSPITAL | Age: 73
End: 2023-06-12
Payer: COMMERCIAL

## 2023-06-12 ENCOUNTER — LAB REQUISITION (OUTPATIENT)
Dept: LAB | Facility: HOSPITAL | Age: 73
End: 2023-06-12
Payer: COMMERCIAL

## 2023-06-12 VITALS
HEART RATE: 86 BPM | OXYGEN SATURATION: 89 % | DIASTOLIC BLOOD PRESSURE: 64 MMHG | SYSTOLIC BLOOD PRESSURE: 102 MMHG | RESPIRATION RATE: 18 BRPM

## 2023-06-12 VITALS — OXYGEN SATURATION: 9 % | DIASTOLIC BLOOD PRESSURE: 64 MMHG | SYSTOLIC BLOOD PRESSURE: 102 MMHG

## 2023-06-12 DIAGNOSIS — I63.511 CEREBRAL INFARCTION DUE TO UNSPECIFIED OCCLUSION OR STENOSIS OF RIGHT MIDDLE CEREBRAL ARTERY: ICD-10-CM

## 2023-06-12 DIAGNOSIS — J85.1 ABSCESS OF LUNG WITH PNEUMONIA: ICD-10-CM

## 2023-06-12 LAB
ALBUMIN SERPL-MCNC: 2.5 G/DL (ref 3.5–5.2)
ALBUMIN/GLOB SERPL: 0.6 G/DL
ALP SERPL-CCNC: 272 U/L (ref 39–117)
ALT SERPL W P-5'-P-CCNC: 92 U/L (ref 1–33)
ANION GAP SERPL CALCULATED.3IONS-SCNC: 8.3 MMOL/L (ref 5–15)
AST SERPL-CCNC: 55 U/L (ref 1–32)
BASOPHILS # BLD AUTO: 0.05 10*3/MM3 (ref 0–0.2)
BASOPHILS NFR BLD AUTO: 0.4 % (ref 0–1.5)
BILIRUB SERPL-MCNC: 0.6 MG/DL (ref 0–1.2)
BUN SERPL-MCNC: 13 MG/DL (ref 8–23)
BUN/CREAT SERPL: 52 (ref 7–25)
CALCIUM SPEC-SCNC: 9 MG/DL (ref 8.6–10.5)
CHLORIDE SERPL-SCNC: 99 MMOL/L (ref 98–107)
CO2 SERPL-SCNC: 28.7 MMOL/L (ref 22–29)
CREAT SERPL-MCNC: 0.25 MG/DL (ref 0.57–1)
DEPRECATED RDW RBC AUTO: 56 FL (ref 37–54)
EGFRCR SERPLBLD CKD-EPI 2021: 119.4 ML/MIN/1.73
EOSINOPHIL # BLD AUTO: 0.17 10*3/MM3 (ref 0–0.4)
EOSINOPHIL NFR BLD AUTO: 1.4 % (ref 0.3–6.2)
ERYTHROCYTE [DISTWIDTH] IN BLOOD BY AUTOMATED COUNT: 17.1 % (ref 12.3–15.4)
GLOBULIN UR ELPH-MCNC: 4.1 GM/DL
GLUCOSE SERPL-MCNC: 94 MG/DL (ref 65–99)
HCT VFR BLD AUTO: 33.2 % (ref 34–46.6)
HGB BLD-MCNC: 11.2 G/DL (ref 12–15.9)
IMM GRANULOCYTES # BLD AUTO: 0.04 10*3/MM3 (ref 0–0.05)
IMM GRANULOCYTES NFR BLD AUTO: 0.3 % (ref 0–0.5)
LYMPHOCYTES # BLD AUTO: 3.56 10*3/MM3 (ref 0.7–3.1)
LYMPHOCYTES NFR BLD AUTO: 28.7 % (ref 19.6–45.3)
MCH RBC QN AUTO: 30.3 PG (ref 26.6–33)
MCHC RBC AUTO-ENTMCNC: 33.7 G/DL (ref 31.5–35.7)
MCV RBC AUTO: 89.7 FL (ref 79–97)
MONOCYTES # BLD AUTO: 0.55 10*3/MM3 (ref 0.1–0.9)
MONOCYTES NFR BLD AUTO: 4.4 % (ref 5–12)
NEUTROPHILS NFR BLD AUTO: 64.8 % (ref 42.7–76)
NEUTROPHILS NFR BLD AUTO: 8.04 10*3/MM3 (ref 1.7–7)
NRBC BLD AUTO-RTO: 0 /100 WBC (ref 0–0.2)
PLATELET # BLD AUTO: 324 10*3/MM3 (ref 140–450)
PMV BLD AUTO: 11.2 FL (ref 6–12)
POTASSIUM SERPL-SCNC: 4.3 MMOL/L (ref 3.5–5.2)
PROT SERPL-MCNC: 6.6 G/DL (ref 6–8.5)
RBC # BLD AUTO: 3.7 10*6/MM3 (ref 3.77–5.28)
SODIUM SERPL-SCNC: 136 MMOL/L (ref 136–145)
WBC NRBC COR # BLD: 12.41 10*3/MM3 (ref 3.4–10.8)

## 2023-06-12 PROCEDURE — G0299 HHS/HOSPICE OF RN EA 15 MIN: HCPCS

## 2023-06-12 PROCEDURE — 80053 COMPREHEN METABOLIC PANEL: CPT | Performed by: INTERNAL MEDICINE

## 2023-06-12 PROCEDURE — G0153 HHCP-SVS OF S/L PATH,EA 15MN: HCPCS

## 2023-06-12 PROCEDURE — 85025 COMPLETE CBC W/AUTO DIFF WBC: CPT | Performed by: INTERNAL MEDICINE

## 2023-06-12 NOTE — Clinical Note
MEDICAL NECESSITY: Ongoing Skilled ST service is not justified/warranted at this time. All needs were met this visit.  TODAYS INTERVENTIONS: Evaluation using the UMS ([employer] Mental Status)  PATIENT'S GOAL: Patient has no concerns about her speech, language or cognitive linguistic skills.   GOAL: Pt will demonstrate cognitive linguistic skills and expressive/receptive language skills that are within functional limits to function safely in her home.  Response: Patient scored 30/30 on the SLUMS, normal range. Patient demonstrates cognitive linguistic skills to function safely in her home.  GOAL STATUS: Goals met on this day. No further visits planned.

## 2023-06-12 NOTE — OUTREACH NOTE
"Call Center TCM Note      Flowsheet Row Responses   [facility] patient discharged from? [place]   Does the patient have one of the following disease processes/diagnoses(primary or secondary)? Stroke   TCM attempt successful? Yes   Call start time 1517   Call end time 1527   Meds reviewed with patient/caregiver? Yes   Is the patient having any side effects they believe may be caused by any medication additions or changes? No   Does the patient have all medications ordered at discharge? Yes   Is the patient taking all medications as directed (includes completed medication regime)? Yes   Medication comments IV antibiotics per PICC line.   Comments Has multiple f/u appts per AVS. PCP appt 06/23/23, within TCM time frame.   Does the patient have an appointment with their PCP within 7 days of discharge? Yes   Has home health visited the patient within 72 hours of discharge? Yes   Home health comments  nurse and speech therapist have visited.   Has all DME been delivered? Yes   DME comments Wearing home at 1L O2 at night. Reports O2 sats are \"fine\"-93% on RA.   Psychosocial issues? No   Does the patient require any assistance with activities of daily living such as eating, bathing, dressing, walking, etc.? Yes   ADL comments Using walker,  assists with showering.   Does the patient have any residual symptoms from stroke/TIA? Yes   Residual symptoms comments Using walker,  assists with showering.   Does the patient understand the diet ordered at discharge? Yes   Did the patient receive a copy of their discharge instructions? Yes   Nursing interventions Reviewed instructions with patient   What is the patient's perception of their health status since discharge? Improving   Nursing interventions Nurse provided patient education   Is the patient/caregiver able to teach back the risk factors for a stroke? High blood pressure-goal below 120/80, High Cholesterol, Carotid or other artery disease   Is the " patient/caregiver able to teach back signs and symptoms related to disease process for when to call PCP? Yes   Is the patient/caregiver able to teach back signs and symptoms related to disease process for when to call 911? Yes   If the patient is a current smoker, are they able to teach back resources for cessation? Not a smoker  [States has recently stopped smoking.]   Is the patient/caregiver able to teach back the hierarchy of who to call/visit for symptoms/problems? PCP, Specialist, Home health nurse, Urgent Care, ED, 911 Yes   Additional teach back comments States monitors BP at home-encouraged to keep record for appts.   Is the patient able to teach back FAST for Stroke? B alance: Watch for sudden loss of balance, E yes: Check for vision loss, F ace: Look for an uneven smile, A rm: Check if one arm is weak, S peech: Listen for slurred speech, T pablo: Call 9-1-1 right away   TCM call completed? Yes   Wrap up additional comments Patient states is slightly improved. States ambulating with walker. States does have sacral ulcer that developed while hospitalized, and  nurse is treating. Continues with IV antibiotics per PICC line. Denies any needs today. TCM complete.   Call end time 1527   Would this patient benefit from a Referral to [facility]? No   Is the patient interested in additional calls from an ambulatory ?  NOTE:  applies to high risk patients requiring additional follow-up. No            Sapphire Benavides RN    6/12/2023, 15:27 EDT

## 2023-06-12 NOTE — PROGRESS NOTES
PPS CMG Coordinator  Inpatient Rehabilitation Admission    Ethnic Group: White.  Marital Status:  Marital Status: .    IRF Admission Date:  05/19/2023  Admission Class: Initial Rehab.  Admit From:  [facility]    Pre-Hospital Living: Home. Pre-Hospital Living  With: (2) Family/Relatives.    Payment Sources: Primary: Not Listed.  Secondary: Medicare Fee for Service  Impairment Group: 01.4 No Paresis  Date of Onset of Impairment: 05/14/2023    Etiologic Diagnosis Code(s):  Rank Code      Description  1    I63.9     Cerebral infarction, unspecified    Comorbidities:  ICD    Are there any arthritis conditions recorded for Impairment Group, Etiologic  Diagnosis, or Comorbid Conditions that meet all of the regulatory requirements  for IRF classification (in 42 .29(b)(2)(x), (xi), and xii))? No    Presence of Pressure Ulcer:  Yes,  patient has an observed/documented pressure  ulcer.    MEDICAL NEEDS  Height on Admission:  63 inches.  Weight on Admission:  132 pounds.    QUALITY INDICATORS  Prior Functioning:  Self Care: Patient completed all the activities by themself, with or without an  assistive device, with no assistance from a helper.  Indoor Mobility: Patient completed the activities by themself, with or without  an assistive device, with no assistance from a helper.  Stairs: Patient completed the activities by themself, with or without an  assistive device, with no assistance from a helper.  Functional Cognition: Patient completed the activities by themself, with or  without an assistive device, with no assistance from a helper.  Prior Device Use: Patient does not use manual or motorized wheelchair or  scooter, mechanical lift, walker, or an orthotic/prosthesis.    Bladder and Bowel: Bladder Continence: Incontinent less than daily (e.g., once  or twice during the 3-day assessment period).  Bowel Continence: Always incontinent (no episodes of continent bowel  movements).    Swallowing/Nutritional Status: Regular food (solids and liquids swallowed safely  without supervision or modified food or liquid consistency).  Special Conditions: Patient did not receive total parenteral nutrition treatment  at the time of admission.  Section A. Ethnicity/ Race/Language  Ethnicity: Not of , /a, [place] Origin  Race: White  Preferred Language: English  Requests  to Communicate:   No    Section I. Active Diagnosis: Comorbidities and Co-existing Conditions:   Patient  does not have PAD, PVD, or Diabetes Mellitus  Section J. Health Conditions: Patient has not had any falls in the past year.  Patient has not had major surgery during the 100 days prior to admission.  Section K. Swallowing/Nutritional Status  Nutritional Approaches on Admission:  Nutritional Approaches on Admission:  None  Section M. Skin Conditions  Unhealed Pressure Ulcer/Injuries at Stage 1 or  Higher on Admission:  Yes.  Number of Unhealed Stage 1: 0  Number of Unhealed Stage 2: 1  Number of Unhealed Stage 3: 0  Number of Unhealed Stage 4: 0  Number of Unhealed Unstageable Due to Non-removable Dressing/Device: 0  Number of Unhealed Unstageable Due to Slough/Eschar: 0  Number of Unhealed Unstageable Pressure Injuries Presenting as Deep Tissue  Injury: 0  Section N. Medication:  Potential Clinically Significant Medication Issues: No issues found during  review  Section . High-Risk Drug Classes: Use and Indication                       Is Taking                    Indication noted  High-RiskDrug Class  E. Anticoagulant     Yes                          Yes  F. Antibiotic        Yes                          Yes    Section O. Special Treatments, Procedures, and Programs  IV Access: Central  (e.g., PICC, tunneled, port)   Oxygen Therapy: Continuous   IV Medications: Antibiotics    Signed by: Monse Ruvalcaba RN

## 2023-06-13 ENCOUNTER — HOME CARE VISIT (OUTPATIENT)
Dept: HOME HEALTH SERVICES | Facility: HOME HEALTHCARE | Age: 73
End: 2023-06-13
Payer: COMMERCIAL

## 2023-06-13 ENCOUNTER — TRANSCRIBE ORDERS (OUTPATIENT)
Dept: ADMINISTRATIVE | Facility: HOSPITAL | Age: 73
End: 2023-06-13
Payer: COMMERCIAL

## 2023-06-13 VITALS
HEART RATE: 91 BPM | SYSTOLIC BLOOD PRESSURE: 118 MMHG | RESPIRATION RATE: 18 BRPM | TEMPERATURE: 96 F | DIASTOLIC BLOOD PRESSURE: 83 MMHG | OXYGEN SATURATION: 92 %

## 2023-06-13 VITALS — DIASTOLIC BLOOD PRESSURE: 76 MMHG | HEART RATE: 86 BPM | OXYGEN SATURATION: 97 % | SYSTOLIC BLOOD PRESSURE: 126 MMHG

## 2023-06-13 DIAGNOSIS — J86.9 EMPYEMA LUNG: Primary | ICD-10-CM

## 2023-06-13 PROCEDURE — G0151 HHCP-SERV OF PT,EA 15 MIN: HCPCS

## 2023-06-13 PROCEDURE — G0152 HHCP-SERV OF OT,EA 15 MIN: HCPCS

## 2023-06-13 NOTE — Clinical Note
OT laura completed. She is appropriate for and in agreement with OT 1w4 for HEP building to address UE/core weaknesses. She is performing her ADLs safely and w/supervision from spouse. She has all needed DME and it is set up safely for her use. POC effective 06-13-23.

## 2023-06-13 NOTE — HOME HEALTH
"CURRENT SITUATION: Dx'd w/abscess of R-lung w/PNA, sepsis, R-pleural effusion, emphysema, pulmonary nodule in RLL, acute resp failur w/hypoxia, HTN, former smoker. She was admitted to [facility] 05-02-23 to 05-19-23. D/c'd to [facility] until 06-19-23. While in [facility], she suffered an acute CVA, received tPA and transferred to ICU. L-hemiparesis and slurred speech from CVA resolved except mild weakness in LLE remains. She also has a PU on L-buttock. She is now using BSC over toilet, shower chair w/back and B-armrests, rolling walker, and a transport chair when out of the home attending medical appts.   SUBJECTIVE: \"I'm not sure I need any OT.\"  Agreeable to OT evaluation.  SOCIAL & ENVIRONMENTAL SITUATION: Pt lives w/spouse and a small dog. Home is clean, not cluttered, all needs met on main level of ranch with walk-out basement. Steps to enter home w/o a railing.  PATIENT'S &/OR CAREGIVER'S GOAL: \"I want to get back to normal and do everything I used to do. Drive, walk w/o this [referring to her walker], work, everything.\"  INTERVENTIONS: OT Eval, ADL training, Home Safety, Therapeutic Exercise/Activity, Transfer/Mobility training, Monitor vitals including SPO2 via pulse-oximeter (notifiy MD if resting O2 <90% on room air), Patient/CG education, Falls risk prevention, Recommendations on AE, DME, AD, environmental adaptations.  ASSESSMENT, MEDICAL NECESSITY, PLAN: Pt appropriate for skilled O.T. to help remediate deficits caused by her current situation/diagnosis in order to improve safety and independence with her ADLs, functional transfers, mobility, condition management, functional strength, activity endurance, and for Pt/CG on appropriate AD, AE, and DME recommendaton/use. She is in agreement to OT at 1w4.  PLAN FOR NEXT VISIT: follow up on HEP issued today. Upgrade HEP to include core strengthening."

## 2023-06-13 NOTE — HOME HEALTH
nursing seeing patient for picc line care labs.   rev with spouse, how to change tubing   he demonstrated for nursing at visit   she also has an unstageable pu to bottom   pics and measurements taken  and dressing applied

## 2023-06-13 NOTE — HOME HEALTH
Pt is a 73yo F who was initially hospitalized secondary to abscess of R lung.  She then experienced a MCA CVA while in the hospital and treated with TPN.  She presents with L LE weakness.   She is on oxygen at HS and IV antibiotics every 8 hours.  She reports she experienced a fall on Friday night upon return home from hospital in her bedroom.  No injury noted, but has had low back pain.  She lives with her significant other in a 1 story home with walk out basement.  Her needs are on the first floor.     PLOF: exercising 3 times per week, independent, no falls, driving.   Skilled PT necessary to instruct pt in HEP, optimize her gait, and decrease her risk for falls.  She tolerated supine and standing HEP today.     Plan for next visit: review and progress HEP as tolerated, gait with AAD, WB activities for fall prevention and L LE strengthening.

## 2023-06-13 NOTE — HOME HEALTH
REASON FOR REFERRAL: Speech Therapy referral post hospitalization for Abscess of lung with pneumonia and stroke. Speech Therapy to evaluate and establish a plan of care.   PRIMARY DIAGNOSIS Cognitive impairment  SECONDARY DIAGNOSIS Cryptogenic stroke,  Acute right MCA stroke.  PERTINENT HISTORY: Lung nodule seen on imaging study, Acute respiratory failure with hypoxia, Abscess of right lung with pneumonia, Empyema, Sepsis, Pleural effusion, right, Other emphysema, Pulmonary nodule (RLL), Diastolic dysfunction, grade 1, Physical debility, Cryptogenic stroke,  Acute right MCA stroke.  PRIOR VFSS or FEES NA   PRIOR LEVEL OF FUNCTION: Patient was living in her single family home, performing all ADL's. Driving, working and walking/gym. Hospitalized 5/2/23 with pneumonia and abscess on right lung and infection. 5/14 had a CVA. Patient was in hospital,  rehab and home.    MEDICAL NECESSITY: Ongoing Skilled ST service is not justified/warranted at this time. All needs were met this visit.  TODAYS INTERVENTIONS: Evaluation using the UMS ([employer] Mental Status)  PATIENT'S GOAL: Patient has no concerns about her speech, language or cognitive linguistic skills.   GOAL: Pt will demonstrate cognitive linguistic skills and expressive/receptive language skills that are within functional limits to function safely in her home.  Response: Patient scored 30/30 on the UMS, normal range. Patient demonstrates cognitive linguistic skills to function safely in her home.  GOAL STATUS: Goals met on this day. No further visits planned.

## 2023-06-14 LAB
MYCOBACTERIUM SPEC CULT: NORMAL
MYCOBACTERIUM SPEC CULT: NORMAL
NIGHT BLUE STAIN TISS: NORMAL
NIGHT BLUE STAIN TISS: NORMAL

## 2023-06-15 NOTE — PROGRESS NOTES
PPS CMG Coordinator  Inpatient Rehabilitation Discharge    Mode of Locomotion: Walking.    Discharge Against Medical Advice:  No.  Discharge Information  Patient Discharged Alive:  Yes  Discharge Destination/Living Setting: Home with Home Health Services  Diagnosis for Interruption/Death: ICD    Impairment Group: Stroke: 01.4 No Paresis    Comorbidities: ICD    Complications: ICD    QUALITY INDICATORS  Section A. Medication List  Subsequent Provider:  06 - Home under care of organized home health service  organization  Medication List to Subsequent Provider at Discharge:  Yes - Current reconciled  medication list provided to the subsequent provider  Route(s) of Medication List Transmission to Subsequent Provider:  Electronic  Health Record  Medication List to Patient:  Not applicable.  Patient discharged to a subsequent  provider as defined in 44D    Section J Health Conditions: Fall(s) Since Admission:  No    Section K. Swallowing/Nutritional Status  Nutritional Approaches Past 7 Days:   None  Nutritional Approaches at Discharge:  None    Section M. Skin Conditions Discharge:  Unhealed Pressure Ulcer(s) at Stage 1 or  Higher:  No    . Current Number of Unhealed Pressure Ulcers  Branch    Section N. Medication:  Medication Intervention: Not applicable - There were no potential clinically  significant medication issues identified since admission or patient is not  taking any medications.  Section . High-Risk Drug Classes: Use and Indication                       Is Taking                    Indication noted  High-RiskDrug Class  F. Antibiotic        Yes                          Yes  I. Antiplatelet      Yes                          Yes    Section O. Special Treatments, Procedures, and Programs  IV Access: Central  (e.g., PICC, tunneled, port)   Oxygen Therapy: Intermittent   Suctioning: As Needed   IV Medications: Antibiotics    Signed by: Monse Ruvalcaba RN

## 2023-06-16 ENCOUNTER — HOME CARE VISIT (OUTPATIENT)
Dept: HOME HEALTH SERVICES | Facility: HOME HEALTHCARE | Age: 73
End: 2023-06-16
Payer: COMMERCIAL

## 2023-06-16 PROCEDURE — G0151 HHCP-SERV OF PT,EA 15 MIN: HCPCS

## 2023-06-18 VITALS
OXYGEN SATURATION: 93 % | SYSTOLIC BLOOD PRESSURE: 102 MMHG | HEART RATE: 86 BPM | DIASTOLIC BLOOD PRESSURE: 60 MMHG | TEMPERATURE: 96.5 F | RESPIRATION RATE: 16 BRPM

## 2023-06-18 NOTE — HOME HEALTH
Pt reports no longer having back pain. No more falls.  Overall, feeling better.    Pt tolerated standing LE HEP, gait training with gait belt on level surfaces for increased opportunities for weight bearing. She continues to exhibit abnormal gait mechanics, path deviation, requiring at least CGA without device.  Continue to recommend walker at all times, unless pts spouse is working on ambulation with her using the belt.  They verbalized understanding.     Plan for next visit: review and progress HEP as tolerated, continue WB activities for improved dynamic standing balance and gait.

## 2023-06-19 ENCOUNTER — HOME CARE VISIT (OUTPATIENT)
Dept: HOME HEALTH SERVICES | Facility: HOME HEALTHCARE | Age: 73
End: 2023-06-19
Payer: COMMERCIAL

## 2023-06-19 VITALS
HEART RATE: 94 BPM | SYSTOLIC BLOOD PRESSURE: 114 MMHG | DIASTOLIC BLOOD PRESSURE: 66 MMHG | RESPIRATION RATE: 17 BRPM | OXYGEN SATURATION: 97 % | TEMPERATURE: 97.6 F

## 2023-06-19 VITALS
RESPIRATION RATE: 16 BRPM | TEMPERATURE: 96.4 F | SYSTOLIC BLOOD PRESSURE: 120 MMHG | DIASTOLIC BLOOD PRESSURE: 70 MMHG | OXYGEN SATURATION: 95 % | HEART RATE: 91 BPM

## 2023-06-19 PROCEDURE — G0152 HHCP-SERV OF OT,EA 15 MIN: HCPCS

## 2023-06-19 PROCEDURE — G0299 HHS/HOSPICE OF RN EA 15 MIN: HCPCS

## 2023-06-19 NOTE — HOME HEALTH
"SUBJECTIVE: \"The nurse covering for Deanne is supposed to be here shortly.\"  PLAN FOR NEXT VISIT: OT D/C"

## 2023-06-19 NOTE — PROGRESS NOTES
PPS CMG Coordinator  Inpatient Rehabilitation Admission    Ethnic Group: White.  Marital Status:  Marital Status: .    IRF Admission Date:  05/19/2023  Admission Class: Initial Rehab.  Admit From:  [facility]    Pre-Hospital Living: Home. Pre-Hospital Living  With: (2) Family/Relatives.    Payment Sources: Primary: Not Listed.  Secondary: Medicare Fee for Service  Impairment Group: 01.4 No Paresis  Date of Onset of Impairment: 05/14/2023    Etiologic Diagnosis Code(s):  Rank Code      Description  1    I63.9     Cerebral infarction, unspecified    Comorbidities:  ICD    Are there any arthritis conditions recorded for Impairment Group, Etiologic  Diagnosis, or Comorbid Conditions that meet all of the regulatory requirements  for IRF classification (in 42 .29(b)(2)(x), (xi), and xii))? No    Presence of Pressure Ulcer:  Yes,  patient has an observed/documented pressure  ulcer.    MEDICAL NEEDS  Height on Admission:  63 inches.  Weight on Admission:  132 pounds.    QUALITY INDICATORS  Prior Functioning:  Self Care: Patient completed all the activities by themself, with or without an  assistive device, with no assistance from a helper.  Indoor Mobility: Patient completed the activities by themself, with or without  an assistive device, with no assistance from a helper.  Stairs: Patient completed the activities by themself, with or without an  assistive device, with no assistance from a helper.  Functional Cognition: Patient completed the activities by themself, with or  without an assistive device, with no assistance from a helper.  Prior Device Use: Patient does not use manual or motorized wheelchair or  scooter, mechanical lift, walker, or an orthotic/prosthesis.    Bladder and Bowel: Bladder Continence: Incontinent less than daily (e.g., once  or twice during the 3-day assessment period).  Bowel Continence: Always incontinent (no episodes of continent bowel  movements).    Swallowing/Nutritional Status: Regular food (solids and liquids swallowed safely  without supervision or modified food or liquid consistency).  Special Conditions: Patient did not receive total parenteral nutrition treatment  at the time of admission.  Section A. Ethnicity/ Race/Language  Ethnicity: Not of , /a, [place] Origin  Race: White  Preferred Language: English  Requests  to Communicate:   No    Section I. Active Diagnosis: Comorbidities and Co-existing Conditions:   Patient  does not have PAD, PVD, or Diabetes Mellitus  Section J. Health Conditions: Patient has not had any falls in the past year.  Patient has not had major surgery during the 100 days prior to admission.  Section K. Swallowing/Nutritional Status  Nutritional Approaches on Admission:  Nutritional Approaches on Admission:  None  Section M. Skin Conditions  Unhealed Pressure Ulcer/Injuries at Stage 1 or  Higher on Admission:  Yes.  Number of Unhealed Stage 1: 0  Number of Unhealed Stage 2: 1  Number of Unhealed Stage 3: 0  Number of Unhealed Stage 4: 0  Number of Unhealed Unstageable Due to Non-removable Dressing/Device: 0  Number of Unhealed Unstageable Due to Slough/Eschar: 0  Number of Unhealed Unstageable Pressure Injuries Presenting as Deep Tissue  Injury: 0  Section N. Medication:  Potential Clinically Significant Medication Issues: No issues found during  review  Section . High-Risk Drug Classes: Use and Indication                       Is Taking                    Indication noted  High-RiskDrug Class  E. Anticoagulant     Yes                          Yes  F. Antibiotic        Yes                          Yes  I. Antiplatelet      Yes                          No    Section O. Special Treatments, Procedures, and Programs  IV Access: Central  (e.g., PICC, tunneled, port)   Oxygen Therapy: Continuous   IV Medications: Antibiotics    Signed by: Monse Ruvalcaba RN

## 2023-06-19 NOTE — HOME HEALTH
PETE PICC line dressing changes.  Labs via PICC to [facility].  Pictures and measurements take of left gluteal pressure ulcer.  Wound care supplies ordered.  Pt tolerated all procedures well.

## 2023-07-24 PROBLEM — I47.10 PAROXYSMAL SVT (SUPRAVENTRICULAR TACHYCARDIA): Status: ACTIVE | Noted: 2023-07-24

## 2023-07-24 PROBLEM — I47.1 PAROXYSMAL SVT (SUPRAVENTRICULAR TACHYCARDIA): Status: ACTIVE | Noted: 2023-07-24

## 2023-07-31 RX ORDER — POTASSIUM CHLORIDE 750 MG/1
TABLET, FILM COATED, EXTENDED RELEASE ORAL
Qty: 90 TABLET | Refills: 1 | Status: SHIPPED | OUTPATIENT
Start: 2023-07-31

## 2023-07-31 RX ORDER — ATORVASTATIN CALCIUM 80 MG/1
TABLET, FILM COATED ORAL
Qty: 90 TABLET | Refills: 1 | Status: SHIPPED | OUTPATIENT
Start: 2023-07-31

## 2023-08-02 ENCOUNTER — OFFICE VISIT (OUTPATIENT)
Dept: INTERNAL MEDICINE | Age: 73
End: 2023-08-02
Payer: COMMERCIAL

## 2023-08-02 VITALS
SYSTOLIC BLOOD PRESSURE: 122 MMHG | WEIGHT: 125 LBS | OXYGEN SATURATION: 100 % | HEART RATE: 66 BPM | TEMPERATURE: 97.6 F | BODY MASS INDEX: 22.15 KG/M2 | HEIGHT: 63 IN | DIASTOLIC BLOOD PRESSURE: 80 MMHG

## 2023-08-02 DIAGNOSIS — R60.0 BILATERAL LOWER EXTREMITY EDEMA: Primary | ICD-10-CM

## 2023-08-02 DIAGNOSIS — S81.801A WOUND OF RIGHT LOWER EXTREMITY, INITIAL ENCOUNTER: ICD-10-CM

## 2023-08-02 LAB
BUN SERPL-MCNC: 9 MG/DL (ref 8–23)
BUN/CREAT SERPL: 14.3 (ref 7–25)
CALCIUM SERPL-MCNC: 10.1 MG/DL (ref 8.6–10.5)
CHLORIDE SERPL-SCNC: 106 MMOL/L (ref 98–107)
CO2 SERPL-SCNC: 22.4 MMOL/L (ref 22–29)
CREAT SERPL-MCNC: 0.63 MG/DL (ref 0.57–1)
EGFRCR SERPLBLD CKD-EPI 2021: 95.6 ML/MIN/1.73
GLUCOSE SERPL-MCNC: 76 MG/DL (ref 65–99)
POTASSIUM SERPL-SCNC: 4.9 MMOL/L (ref 3.5–5.2)
SODIUM SERPL-SCNC: 141 MMOL/L (ref 136–145)

## 2023-08-03 DIAGNOSIS — R60.0 BILATERAL LOWER EXTREMITY EDEMA: Primary | ICD-10-CM

## 2023-08-03 RX ORDER — FUROSEMIDE 20 MG/1
20 TABLET ORAL DAILY PRN
Qty: 30 TABLET | Refills: 0 | Status: SHIPPED | OUTPATIENT
Start: 2023-08-03

## 2023-08-07 ENCOUNTER — OFFICE VISIT (OUTPATIENT)
Dept: WOUND CARE | Facility: HOSPITAL | Age: 73
End: 2023-08-07
Payer: COMMERCIAL

## 2023-08-07 PROCEDURE — G0463 HOSPITAL OUTPT CLINIC VISIT: HCPCS

## 2023-08-09 ENCOUNTER — TELEPHONE (OUTPATIENT)
Dept: NEUROLOGY | Facility: CLINIC | Age: 73
End: 2023-08-09
Payer: COMMERCIAL

## 2023-08-14 ENCOUNTER — OFFICE VISIT (OUTPATIENT)
Dept: WOUND CARE | Facility: HOSPITAL | Age: 73
End: 2023-08-14
Payer: COMMERCIAL

## 2023-08-15 ENCOUNTER — HOSPITAL ENCOUNTER (OUTPATIENT)
Dept: CT IMAGING | Facility: HOSPITAL | Age: 73
Discharge: HOME | End: 2023-08-15
Admitting: INTERNAL MEDICINE
Payer: COMMERCIAL

## 2023-08-15 DIAGNOSIS — J86.9 EMPYEMA LUNG: ICD-10-CM

## 2023-08-15 PROCEDURE — 71250 CT THORAX DX C-: CPT

## 2023-08-26 DIAGNOSIS — R60.0 BILATERAL LOWER EXTREMITY EDEMA: ICD-10-CM

## 2023-08-28 ENCOUNTER — OFFICE VISIT (OUTPATIENT)
Dept: WOUND CARE | Facility: HOSPITAL | Age: 73
End: 2023-08-28
Payer: COMMERCIAL

## 2023-08-28 DIAGNOSIS — R60.0 BILATERAL LOWER EXTREMITY EDEMA: ICD-10-CM

## 2023-08-28 PROCEDURE — G0463 HOSPITAL OUTPT CLINIC VISIT: HCPCS

## 2023-08-28 RX ORDER — FUROSEMIDE 20 MG/1
20 TABLET ORAL DAILY PRN
Qty: 90 TABLET | Refills: 1 | Status: SHIPPED | OUTPATIENT
Start: 2023-08-28 | End: 2023-08-28 | Stop reason: SDUPTHER

## 2023-08-29 RX ORDER — FUROSEMIDE 20 MG/1
20 TABLET ORAL DAILY PRN
Qty: 90 TABLET | Refills: 1 | Status: SHIPPED | OUTPATIENT
Start: 2023-08-29

## 2023-08-29 RX ORDER — IRBESARTAN AND HYDROCHLOROTHIAZIDE 150; 12.5 MG/1; MG/1
TABLET, FILM COATED ORAL
Qty: 90 TABLET | Refills: 1 | Status: SHIPPED | OUTPATIENT
Start: 2023-08-29

## 2023-08-29 RX ORDER — CLOPIDOGREL BISULFATE 75 MG/1
TABLET ORAL
Qty: 90 TABLET | Refills: 1 | Status: SHIPPED | OUTPATIENT
Start: 2023-08-29

## 2023-08-30 ENCOUNTER — TRANSCRIBE ORDERS (OUTPATIENT)
Dept: ADMINISTRATIVE | Facility: HOSPITAL | Age: 73
End: 2023-08-30
Payer: COMMERCIAL

## 2023-08-30 DIAGNOSIS — R91.1 LUNG NODULE: Primary | ICD-10-CM

## 2023-09-05 ENCOUNTER — HOSPITAL ENCOUNTER (OUTPATIENT)
Facility: HOSPITAL | Age: 73
Setting detail: HOSPITAL OUTPATIENT SURGERY
Discharge: HOME | End: 2023-09-05
Attending: INTERNAL MEDICINE | Admitting: INTERNAL MEDICINE
Payer: COMMERCIAL

## 2023-09-05 VITALS
DIASTOLIC BLOOD PRESSURE: 80 MMHG | TEMPERATURE: 96 F | HEART RATE: 68 BPM | SYSTOLIC BLOOD PRESSURE: 135 MMHG | OXYGEN SATURATION: 93 % | BODY MASS INDEX: 22.15 KG/M2 | RESPIRATION RATE: 16 BRPM | WEIGHT: 125 LBS | HEIGHT: 63 IN

## 2023-09-05 DIAGNOSIS — I63.511 ACUTE RIGHT MCA STROKE: ICD-10-CM

## 2023-09-05 DIAGNOSIS — I63.9 CRYPTOGENIC STROKE: ICD-10-CM

## 2023-09-05 DIAGNOSIS — I47.1 PAROXYSMAL SVT (SUPRAVENTRICULAR TACHYCARDIA): ICD-10-CM

## 2023-09-05 PROCEDURE — 33285 INSJ SUBQ CAR RHYTHM MNTR: CPT | Performed by: INTERNAL MEDICINE

## 2023-09-05 PROCEDURE — S0260 H&P FOR SURGERY: HCPCS | Performed by: INTERNAL MEDICINE

## 2023-09-05 PROCEDURE — C1764 EVENT RECORDER, CARDIAC: HCPCS | Performed by: INTERNAL MEDICINE

## 2023-09-05 PROCEDURE — 25010000002 MIDAZOLAM PER 1 MG: Performed by: INTERNAL MEDICINE

## 2023-09-05 DEVICE — ICM LINQ2: Type: IMPLANTABLE DEVICE | Status: FUNCTIONAL

## 2023-09-05 RX ORDER — LIDOCAINE HYDROCHLORIDE AND EPINEPHRINE 10; 10 MG/ML; UG/ML
INJECTION, SOLUTION INFILTRATION; PERINEURAL
Status: DISCONTINUED | OUTPATIENT
Start: 2023-09-05 | End: 2023-09-05 | Stop reason: HOSPADM

## 2023-09-05 RX ORDER — SODIUM CHLORIDE 0.9 % (FLUSH) 0.9 %
10 SYRINGE (ML) INJECTION AS NEEDED
Status: DISCONTINUED | OUTPATIENT
Start: 2023-09-05 | End: 2023-09-05 | Stop reason: HOSPADM

## 2023-09-05 RX ORDER — MIDAZOLAM HYDROCHLORIDE 1 MG/ML
INJECTION INTRAMUSCULAR; INTRAVENOUS
Status: DISCONTINUED | OUTPATIENT
Start: 2023-09-05 | End: 2023-09-05 | Stop reason: HOSPADM

## 2023-09-05 RX ORDER — SODIUM CHLORIDE 9 MG/ML
75 INJECTION, SOLUTION INTRAVENOUS CONTINUOUS
Status: DISCONTINUED | OUTPATIENT
Start: 2023-09-05 | End: 2023-09-05 | Stop reason: HOSPADM

## 2023-09-05 RX ADMIN — SODIUM CHLORIDE 75 ML/HR: 9 INJECTION, SOLUTION INTRAVENOUS at 07:35

## 2023-09-05 NOTE — H&P
Debora Harper APRN   Nurse Practitioner  Specialty: Cardiology     Progress Notes     Addendum     Encounter Date: 2023     Expand All Collapse All    Date of Office Visit: 23  Encounter Provider: MARGARITO Reyes  Place of Service: [facility] CARDIOLOGY  Patient Name: Katherine Williamson  :1950         Chief Complaint   Patient presents with    Hypertension    Diastolic Dysfunction    Follow-up   :      HPI: Katherine Williamson is a 72 y.o. female  with emphysema, tobacco use, hypertension, hyperlipidemia,carotid artery disease, right MCA CVA.      She was previously seen by Dr. Nixon and had a negative nuclear stress test in May 2022. Echocardiogram May 2020 showed normal LV function, grade 1 diastolic dysfunction and no significant valve disease.  Carotid Doppler on May 15, 2023 showed a less than 50% right internal carotid artery stenosis and no significant stenosis of the left carotid artery.  Transthoracic echo on May 15, 2023 showed normal LV function, grade 1 diastolic dysfunction, negative saline study and no valve disease.     She was admitted on May 2 with shortness of breath, cough and brown sputum proBNP of 1345.  Chest x-ray showed pneumonia . Then On May 14 she developed an acute right MCA stroke and was treated with tenecteplase.  She was discharged to acute inpatient rehab with a 2-week mobile telemetry which resulted in nonsustained ventricular tachycardia nonsustained supraventricular tachycardia..  She ultimately was discharged from acute inpatient rehab with a PICC line and continued on IV antibiotics which were completed 2023..     She presents today for hospital discharge follow-up.  Her PICC line has been removed.  She is scheduled for repeat CT and a pulmonology appointment next month.  She is now going to the gym 1 day a week riding the recumbent bike.  She has not been cleared to drive from a rehab standpoint.  She denies chest pain or  "shortness of breath.  She has no palpitations.  She complains of superficial bruising.             Allergies   Allergen Reactions    Hydrocodone-Acetaminophen Itching               Family and social history reviewed.      ROS  All other systems were reviewed and are negative            Objective:      Vitals       Vitals:     07/19/23 1510   BP: 140/80   BP Location: Left arm   Patient Position: Sitting   Pulse: 76   SpO2: 97%   Weight: 54.4 kg (120 lb)   Height: 160 cm (63\")         Body mass index is 21.26 kg/m².     PHYSICAL EXAM:  Pulmonary:      Effort: Pulmonary effort is normal.      Breath sounds: Normal breath sounds.   Cardiovascular:      Normal rate. Regular rhythm.   Edema:     Ankle: bilateral trace edema of the ankle.        Procedures               Current Outpatient Medications   Medication Sig Dispense Refill    acetaminophen (TYLENOL) 500 MG tablet Take 1 tablet by mouth Every 6 (Six) Hours As Needed. Indications: Pain        aspirin 81 MG chewable tablet Chew 1 tablet Daily. 90 tablet 0    atorvastatin (LIPITOR) 80 MG tablet Take 1 tablet by mouth Every Night. Indications: Cerebrovascular Accident or Stroke 30 tablet 1    Cholecalciferol (VITAMIN D) 1000 UNITS tablet Take 1 tablet by mouth Daily.        clopidogrel (PLAVIX) 75 MG tablet Take 1 tablet by mouth Daily. Indications: Cerebrovascular Accident or Stroke 90 tablet 0    cyanocobalamin (VITAMIN B-12) 1000 MCG tablet Take 1 tablet by mouth Daily. 90 tablet 0    metoprolol tartrate (LOPRESSOR) 25 MG tablet Take 0.5 tablets by mouth Every 12 (Twelve) Hours. Indications: High Blood Pressure Disorder 30 tablet 1    potassium chloride 10 MEQ CR tablet Take 1 tablet by mouth Daily. Indications: Low Amount of Potassium in the Blood 30 tablet 1    vitamin C (ASCORBIC ACID) 500 MG tablet Take 1 tablet by mouth Daily. Indications: Inadequate Vitamin C          No current facility-administered medications for this visit.      Assessment:        " Diagnosis Plan   1. Acute right MCA stroke  Loop Recorder Implant       2. Cryptogenic stroke  Loop Recorder Implant       3. Paroxysmal SVT (supraventricular tachycardia)  Loop Recorder Implant                   Orders Placed This Encounter   Procedures    Loop Recorder Implant       Standing Status:   Future       Standing Expiration Date:   7/19/2024       Order Specific Question:   What type of sedation does the patient require?       Answer:   Moderate Sedation       Order Specific Question:   Reason for Exam:       Answer:   right MCA stroke       Order Specific Question:   Release to patient       Answer:   Routine Release            Plan:   1.  Right MCA stroke May 2023 bilateral infarcts.  She is now maintained on aspirin, Plavix and statin therapy.  2-week mobile telemetry did not show significant arrhythmia.  She is agreeable to implantable loop recorder.  We will wait another 6 weeks before scheduling that.  She will see me 1 week after and call with any issues  2.  Pneumonia with right lung abscess.  She is off IV antibiotics since 6/26/2023.  She is scheduled for CT chest August 15 and has follow-up August 30 with Dr. Mark Barrett  3. HTN BP stable but slightly elevated.  May need to resume angiotensin receptor blocker  4. HLD-continue high intensity atorvastatin  5. Carotid artery stenosis-right with less than 50% stenosis.  Continue atorvastatin and aspirin  6. Nonsustained SVT-continue low-dose metoprolol tartrate  7. Nonsustained VT-asymptomatic  8.  Emphysema

## 2023-09-06 ENCOUNTER — CLINICAL SUPPORT NO REQUIREMENTS (OUTPATIENT)
Dept: CARDIOLOGY | Facility: CLINIC | Age: 73
End: 2023-09-06
Payer: COMMERCIAL

## 2023-09-06 ENCOUNTER — TELEPHONE (OUTPATIENT)
Dept: CARDIOLOGY | Facility: CLINIC | Age: 73
End: 2023-09-06
Payer: COMMERCIAL

## 2023-09-06 DIAGNOSIS — I63.9 CRYPTOGENIC STROKE: Primary | ICD-10-CM

## 2023-09-06 NOTE — TELEPHONE ENCOUNTER
Patient called to report she has had the bandage saturated with blood several times since having the implant placed yesterday.  She is concerned and would like someone to call her back as soon as possible./ KRISS     Patient can be reached at [phone].

## 2023-09-25 ENCOUNTER — OFFICE VISIT (OUTPATIENT)
Dept: INTERNAL MEDICINE | Age: 73
End: 2023-09-25

## 2023-09-25 VITALS
DIASTOLIC BLOOD PRESSURE: 72 MMHG | TEMPERATURE: 98.6 F | SYSTOLIC BLOOD PRESSURE: 118 MMHG | WEIGHT: 129.8 LBS | HEART RATE: 81 BPM | BODY MASS INDEX: 23 KG/M2 | OXYGEN SATURATION: 99 % | HEIGHT: 63 IN

## 2023-09-25 DIAGNOSIS — R73.01 IMPAIRED FASTING BLOOD SUGAR: ICD-10-CM

## 2023-09-25 DIAGNOSIS — K76.0 HEPATIC STEATOSIS: ICD-10-CM

## 2023-09-25 DIAGNOSIS — I10 HYPERTENSION, UNSPECIFIED TYPE: ICD-10-CM

## 2023-09-25 DIAGNOSIS — J43.9 PULMONARY EMPHYSEMA, UNSPECIFIED EMPHYSEMA TYPE: ICD-10-CM

## 2023-09-25 DIAGNOSIS — I63.9 CRYPTOGENIC STROKE: Primary | ICD-10-CM

## 2023-09-25 RX ORDER — NICOTINE POLACRILEX 2 MG
GUM BUCCAL
COMMUNITY

## 2023-09-25 NOTE — PROGRESS NOTES
"    I N T E R N A L  M E D I C I N E  TOMEKA STANTON, APRN      ENCOUNTER DATE:  09/25/2023    Katherine Williamson / 72 y.o. / female      CHIEF COMPLAINT / REASON FOR OFFICE VISIT     Hypertension, Numbness (Left foot), and Med Refill      ASSESSMENT & PLAN     Problem List Items Addressed This Visit          Gastrointestinal Abdominal     Hepatic steatosis    Overview     Per abdominal CT scan-November 2019         Relevant Orders    Lipid Panel With / Chol / HDL Ratio       Neuro    Cryptogenic stroke - Primary    Relevant Orders    Lipid Panel With / Chol / HDL Ratio    CBC w AUTO Differential     Other Visit Diagnoses       Pulmonary emphysema, unspecified emphysema type        Hypertension, unspecified type        Relevant Orders    Comprehensive Metabolic Panel    TSH+Free T4    Impaired fasting blood sugar        Relevant Orders    Hemoglobin A1c          Orders Placed This Encounter   Procedures    Comprehensive Metabolic Panel    Lipid Panel With / Chol / HDL Ratio    TSH+Free T4    Hemoglobin A1c    CBC w AUTO Differential     No orders of the defined types were placed in this encounter.      SUMMARY/DISCUSSION  Patient will keep close follow-up with neurology, cardiology and pulmonary.  We will go ahead and recheck liver enzymes along with cholesterol with initiation of atorvastatin.  She has follow-up with neurology upcoming in November in which she will discuss left unilateral numbness, no significant improvement with B12 supplement.  Stroke did affect her left side.  She has no burning or tingling pain  She would like to hold off on preventative care at this point and is acceptable to discuss at November appointment    Next Appointment with me: 11/28/2023    Return for Next scheduled follow up.      VITAL SIGNS     Visit Vitals  /72 (BP Location: Left arm, Patient Position: Sitting, Cuff Size: Adult)   Pulse 81   Temp 98.6 °F (37 °C) (Temporal)   Ht 160 cm (63\")   Wt 58.9 kg (129 lb 12.8 oz)   SpO2 99% "   BMI 22.99 kg/m²     Wt Readings from Last 3 Encounters:   09/25/23 58.9 kg (129 lb 12.8 oz)   09/05/23 56.7 kg (125 lb)   08/02/23 56.7 kg (125 lb)     Body mass index is 22.99 kg/m².    MEDICATIONS AT THE TIME OF OFFICE VISIT     Current Outpatient Medications on File Prior to Visit   Medication Sig    acetaminophen (TYLENOL) 500 MG tablet Take 1 tablet by mouth Every 6 (Six) Hours As Needed. Indications: Pain    aspirin 81 MG chewable tablet Chew 1 tablet Daily.    atorvastatin (LIPITOR) 80 MG tablet TAKE 1 TABLET BY MOUTH EVERY NIGHT    Biotin 1 MG capsule Take  by mouth.    Cholecalciferol (VITAMIN D) 1000 UNITS tablet Take 1 tablet by mouth Daily.    clopidogrel (PLAVIX) 75 MG tablet TAKE 1 TABLET BY MOUTH DAILY.    cyanocobalamin (VITAMIN B-12) 1000 MCG tablet Take 1 tablet by mouth Daily.    furosemide (LASIX) 20 MG tablet Take 1 tablet by mouth Daily As Needed (Swelling of feet and ankles).    metoprolol tartrate (LOPRESSOR) 25 MG tablet TAKE 0.5 TABLET BY MOUTH EVERY 12 HOURS. INDICATIONS: HIGH BLOOD PRESSURE DISORDER    potassium chloride 10 MEQ CR tablet TAKE 1 TABLET BY MOUTH DAILY.    vitamin C (ASCORBIC ACID) 500 MG tablet Take 1 tablet by mouth Daily. Indications: Inadequate Vitamin C     No current facility-administered medications on file prior to visit.      HISTORY OF PRESENT ILLNESS     Patient presents 3 months post hospital follow-up for cryptogenic stroke, findings of abscess of right lung with pneumonia, emphysema.  She is now closely by pulmonary.  She has follow-up with neurology in November.  She is now on Plavix and baby aspirin.  Close management as well with cardiology with loop recorder in place.  Blood pressure stable with metoprolol 25 mg tablet.  No myalgias with atorvastatin 80 mg daily.  She does complain of left foot numbness, unilateral no numbness or tingling or burning sensation.    REVIEW OF SYSTEMS     Constitutional neg except per HPI   Resp stable   CV neg   Musc lower  extremity swelling   Neuro left foot/lower extremity numbness     PHYSICAL EXAMINATION     Physical Exam  Constitutional  No distress  Cardiovascular Rate  normal . Rhythm: regular . Heart sounds:  normal  Pulmonary/Chest  Effort normal. Breath sounds:  normal  Musc trace to +1 lower extremity edema   Psychiatric  Alert. Judgment and thought content normal. Mood normal      REVIEWED DATA     Labs:   Lab Results   Component Value Date    GLUCOSE 76 08/02/2023    BUN 9 08/02/2023    CREATININE 0.63 08/02/2023    EGFRRESULT 95.6 08/02/2023    EGFR 109.4 06/19/2023    BCR 14.3 08/02/2023    K 4.9 08/02/2023    CO2 22.4 08/02/2023    CALCIUM 10.1 08/02/2023    PROTENTOTREF 7.2 04/18/2018    ALBUMIN 2.7 (L) 06/19/2023    BILITOT 0.8 06/19/2023    AST 46 (H) 06/19/2023    ALT 47 (H) 06/19/2023     Lab Results   Component Value Date    WBC 8.92 06/26/2023    HGB 11.6 (L) 06/26/2023    HCT 34.8 06/26/2023    MCV 89.0 06/26/2023     06/26/2023     Lab Results   Component Value Date    TSH 1.160 04/18/2018      Lab Results   Component Value Date    HGBA1C 6.20 (H) 05/15/2023     Lab Results   Component Value Date    CHOL 94 05/15/2023    CHLPL 205 (H) 04/18/2018    TRIG 68 05/15/2023    HDL 32 (L) 05/15/2023    LDL 47 05/15/2023     Brief Urine Lab Results  (Last result in the past 365 days)        Color   Clarity   Blood   Leuk Est   Nitrite   Protein   CREAT   Urine HCG        04/10/23 0836 Yellow   Clear   Negative   Negative   Negative   Negative                 Imaging:           Medical Tests:           Summary of old records / correspondence / consultant report:           Request outside records:           *Dragon dictation used for documentation.

## 2023-11-03 NOTE — PROGRESS NOTES
CC: Hospital follow-up    HPI:  Katherine Williamson is a  72 y.o.  right-handed female past medical history of hypertension and arthritis who is here for hospital follow-up.  Back in May she had an acute team D activation.  She had been admitted with pneumonia and right lung abscess who developed cute onset of left hemiparesis and speech disturbance witnessed by her treating nurse.  She received TNK timely.  CTA showed right MCA perfusion deficit of the right posterior parietal lobe and a distal M3 occlusion.  She had no events on telemetry and TTE was negative for cardiac source.  MRI revealed acute embolic appearing strokes of multiple territories strongly suspicious of embolic etiology.  ELIGIO was also done but unremarkable.  She had loop monitor placed.  She was started on DAPT and high intensity statin.  She continued her PICC line antibiotics.  Loop monitor was placed after cardiac monitor negative for A-fib -showed asymptomatic VT.  She is doing very well no further strokelike symptoms.  Since the time of her stroke she has noticed left foot numbness but not bothersome or painful      Social history: Ex-smoker pack a day, 50-year pack a day history, drinks beer    Family history: Father with heart disease, mother with breast cancer      Pain Scale:        ROS:  Review of Systems   Constitutional:  Negative for fatigue and unexpected weight change.   HENT:  Negative for ear pain, hearing loss, nosebleeds and tinnitus.    Eyes:  Negative for pain and visual disturbance.   Respiratory:  Negative for chest tightness, shortness of breath and wheezing.    Cardiovascular:  Negative for chest pain and palpitations.   Musculoskeletal:  Positive for gait problem (2 falls since LOV).   Skin:  Negative for color change, pallor, rash and wound.   Allergic/Immunologic: Negative for food allergies.   Neurological:  Positive for numbness (left foot). Negative for dizziness, speech difficulty and weakness.   Psychiatric/Behavioral:   Negative for confusion, decreased concentration and sleep disturbance. The patient is not nervous/anxious.        Reviewed ROS conducted by MA and john        Physical Exam:  There were no vitals filed for this visit.   Orthostatic BP:    There is no height or weight on file to calculate BMI.        General: pt well appearing, no distress  HEENT: Normocephalic, conjunctiva normal, external canals normal, no nasal discharge, moist mucous membranes  Neck: No lymphadenopathy, thyroid not enlarged, no JVD  CV: Regular rate and rhythm, no murmurs negative no bruits auscultated at neck, equal pulses  Pulmonary: Normal respiratory effort, clear to auscultation bilaterally  Extremities: no edema, bruising, or skin lesions  Pysch: good eye contact, cooperative, full affect, euthymic mood, good attention, good insight  Mental: alert, conversant, AOx3, provides history, 3/3 recall.  Language fluent, names, repeats.  CN: CN II-XII intact, PERRL, EOMi, no gaze palsy or nystagmus, intact facial sensation, no facial assymetry, intact hearing, symmetric palate elevation, tongue midline, good SCM strength  Motor: no abnormal movements, no pronator drift, normal  bulk and tone, 5/5 strength b/l UE & LE  Sensory: Glove and stocking distribution decreased sensation lower extremities   reflexes: 2+ throughout, neg babinski  Coordination: no ataxia on finger-nose, heel-shin  Gait: normal gait, no ataxia, intact heel and toe walking        Results:      Lab Results   Component Value Date    GLUCOSE 76 08/02/2023    BUN 9 08/02/2023    CREATININE 0.63 08/02/2023    EGFRIFNONA 76 04/18/2018    EGFRIFAFRI 93 04/18/2018    BCR 14.3 08/02/2023    CO2 22.4 08/02/2023    CALCIUM 10.1 08/02/2023    PROTENTOTREF 7.2 04/18/2018    ALBUMIN 2.7 (L) 06/19/2023    LABIL2 1.4 04/18/2018    AST 46 (H) 06/19/2023    ALT 47 (H) 06/19/2023       Lab Results   Component Value Date    WBC 8.92 06/26/2023    HGB 11.6 (L) 06/26/2023    HCT 34.8 06/26/2023     "MCV 89.0 06/26/2023     06/26/2023         .No results found for: \"RPR\"      Lab Results   Component Value Date    TSH 1.160 04/18/2018         No results found for: \"[id number]\"      No results found for: \"FOLATE\"      Lab Results   Component Value Date    HGBA1C 6.20 (H) 05/15/2023         Lab Results   Component Value Date    GLUCOSE 76 08/02/2023    BUN 9 08/02/2023    CREATININE 0.63 08/02/2023    EGFRIFNONA 76 04/18/2018    EGFRIFAFRI 93 04/18/2018    BCR 14.3 08/02/2023    K 4.9 08/02/2023    CO2 22.4 08/02/2023    CALCIUM 10.1 08/02/2023    PROTENTOTREF 7.2 04/18/2018    ALBUMIN 2.7 (L) 06/19/2023    LABIL2 1.4 04/18/2018    AST 46 (H) 06/19/2023    ALT 47 (H) 06/19/2023         Diagnosis:  1.  History of stroke, ESUS  2.  Pneumonia with lung abscess    Impression: 72-year-old right-handed female with past medical history of hypertension and arthritis who was admitted to the hospital with pneumonia found to have right lung abscess and while inpatient developed strokelike symptoms of left hemiparesis and speech impairment.  She was able to she received TNK timely.  Was found to have multiple embolic appearing strokes.  She had ELIGIO negative for vegetation or cardiac source.  No evidence of a thrombotic microangiopathy and had thoracentesis and fluid negative for malignancy.    She finished her PICC line antibiotics and followed with ID.  Has pending CT chest for follow-up.  Loop monitor has since been placed.    She is neurologically is intact but complains of left foot numbness since this day.  There is subtle glove and stocking distribution sensory loss nondermatomal.  Reviewed neuroimaging and large stroke bed of L corona radiata region can explain her contralateral numbness and and suspect poststroke residual given her clinical story.  Little concern for separate neurologic etiology-can continue to monitor       Plan:  Repeat CTA head thrombus resolved dropped down to monotherapy  Continue high " "intensity statin  Encouraged her to refrain from her few \"cheat\" cigarettes    I spent at least 60 minutes interviewing, examining, and counseling patient.  I independently reviewed documentation, laboratory and diagnostic findings, external documentation where applicable, and formulated treatment plan which was discussed with the patient.      "

## 2023-11-09 ENCOUNTER — OFFICE VISIT (OUTPATIENT)
Dept: NEUROLOGY | Facility: CLINIC | Age: 73
End: 2023-11-09
Payer: COMMERCIAL

## 2023-11-09 VITALS
OXYGEN SATURATION: 99 % | BODY MASS INDEX: 24.06 KG/M2 | WEIGHT: 135.8 LBS | DIASTOLIC BLOOD PRESSURE: 70 MMHG | SYSTOLIC BLOOD PRESSURE: 120 MMHG | HEART RATE: 89 BPM | HEIGHT: 63 IN

## 2023-11-09 DIAGNOSIS — I10 BENIGN ESSENTIAL HTN: ICD-10-CM

## 2023-11-09 DIAGNOSIS — I63.9 CRYPTOGENIC STROKE: Primary | ICD-10-CM

## 2023-11-09 PROCEDURE — 99215 OFFICE O/P EST HI 40 MIN: CPT | Performed by: PHYSICIAN ASSISTANT

## 2023-11-09 NOTE — PATIENT INSTRUCTIONS
Try Co -Q10 supplement for your foot paresthesias  We will call about your scheduled CT  F/u after CT in 2-3 mos

## 2023-11-09 NOTE — LETTER
November 9, 2023       No Recipients    Patient: Katherine Williamson   YOB: 1950   Date of Visit: 11/9/2023     Dear MARGARITO Garvin:       Thank you for referring Katherine Williamson to me for evaluation. Below are the relevant portions of my assessment and plan of care.    If you have questions, please do not hesitate to call me. I look forward to following Katherine along with you.         Sincerely,        AYLA Gomes        CC:   No Recipients    Marsha Ferris PA  11/13/23 1221  Sign when Signing Visit  CC: Hospital follow-up    HPI:  Katherine Williamson is a  72 y.o.  right-handed female past medical history of hypertension and arthritis who is here for hospital follow-up.  Back in May she had an acute team D activation.  She had been admitted with pneumonia and right lung abscess who developed cute onset of left hemiparesis and speech disturbance witnessed by her treating nurse.  She received TNK timely.  CTA showed right MCA perfusion deficit of the right posterior parietal lobe and a distal M3 occlusion.  She had no events on telemetry and TTE was negative for cardiac source.  MRI revealed acute embolic appearing strokes of multiple territories strongly suspicious of embolic etiology.  ELIGIO was also done but unremarkable.  She had loop monitor placed.  She was started on DAPT and high intensity statin.  She continued her PICC line antibiotics.  Loop monitor was placed after cardiac monitor negative for A-fib -showed asymptomatic VT.  She is doing very well no further strokelike symptoms.  Since the time of her stroke she has noticed left foot numbness but not bothersome or painful      Social history: Ex-smoker pack a day, 50-year pack a day history, drinks beer    Family history: Father with heart disease, mother with breast cancer      Pain Scale:        ROS:  Review of Systems   Constitutional:  Negative for fatigue and unexpected weight change.   HENT:  Negative for ear pain, hearing loss, nosebleeds  and tinnitus.    Eyes:  Negative for pain and visual disturbance.   Respiratory:  Negative for chest tightness, shortness of breath and wheezing.    Cardiovascular:  Negative for chest pain and palpitations.   Musculoskeletal:  Positive for gait problem (2 falls since LOV).   Skin:  Negative for color change, pallor, rash and wound.   Allergic/Immunologic: Negative for food allergies.   Neurological:  Positive for numbness (left foot). Negative for dizziness, speech difficulty and weakness.   Psychiatric/Behavioral:  Negative for confusion, decreased concentration and sleep disturbance. The patient is not nervous/anxious.        Reviewed ROS conducted by MA and john        Physical Exam:  There were no vitals filed for this visit.   Orthostatic BP:    There is no height or weight on file to calculate BMI.        General: pt well appearing, no distress  HEENT: Normocephalic, conjunctiva normal, external canals normal, no nasal discharge, moist mucous membranes  Neck: No lymphadenopathy, thyroid not enlarged, no JVD  CV: Regular rate and rhythm, no murmurs negative no bruits auscultated at neck, equal pulses  Pulmonary: Normal respiratory effort, clear to auscultation bilaterally  Extremities: no edema, bruising, or skin lesions  Pysch: good eye contact, cooperative, full affect, euthymic mood, good attention, good insight  Mental: alert, conversant, AOx3, provides history, 3/3 recall.  Language fluent, names, repeats.  CN: CN II-XII intact, PERRL, EOMi, no gaze palsy or nystagmus, intact facial sensation, no facial assymetry, intact hearing, symmetric palate elevation, tongue midline, good SCM strength  Motor: no abnormal movements, no pronator drift, normal  bulk and tone, 5/5 strength b/l UE & LE  Sensory: Glove and stocking distribution decreased sensation lower extremities   reflexes: 2+ throughout, neg babinski  Coordination: no ataxia on finger-nose, heel-shin  Gait: normal gait, no ataxia, intact heel and toe  "walking        Results:      Lab Results   Component Value Date    GLUCOSE 76 08/02/2023    BUN 9 08/02/2023    CREATININE 0.63 08/02/2023    EGFRIFNONA 76 04/18/2018    EGFRIFAFRI 93 04/18/2018    BCR 14.3 08/02/2023    CO2 22.4 08/02/2023    CALCIUM 10.1 08/02/2023    PROTENTOTREF 7.2 04/18/2018    ALBUMIN 2.7 (L) 06/19/2023    LABIL2 1.4 04/18/2018    AST 46 (H) 06/19/2023    ALT 47 (H) 06/19/2023       Lab Results   Component Value Date    WBC 8.92 06/26/2023    HGB 11.6 (L) 06/26/2023    HCT 34.8 06/26/2023    MCV 89.0 06/26/2023     06/26/2023         .No results found for: \"RPR\"      Lab Results   Component Value Date    TSH 1.160 04/18/2018         No results found for: \"[id number]\"      No results found for: \"FOLATE\"      Lab Results   Component Value Date    HGBA1C 6.20 (H) 05/15/2023         Lab Results   Component Value Date    GLUCOSE 76 08/02/2023    BUN 9 08/02/2023    CREATININE 0.63 08/02/2023    EGFRIFNONA 76 04/18/2018    EGFRIFAFRI 93 04/18/2018    BCR 14.3 08/02/2023    K 4.9 08/02/2023    CO2 22.4 08/02/2023    CALCIUM 10.1 08/02/2023    PROTENTOTREF 7.2 04/18/2018    ALBUMIN 2.7 (L) 06/19/2023    LABIL2 1.4 04/18/2018    AST 46 (H) 06/19/2023    ALT 47 (H) 06/19/2023         Diagnosis:  1.  History of stroke, ESUS  2.  Pneumonia with lung abscess    Impression: 72-year-old right-handed female with past medical history of hypertension and arthritis who was admitted to the hospital with pneumonia found to have right lung abscess and while inpatient developed strokelike symptoms of left hemiparesis and speech impairment.  She was able to she received TNK timely.  Was found to have multiple embolic appearing strokes.  She had ELIGIO negative for vegetation or cardiac source.  No evidence of a thrombotic microangiopathy and had thoracentesis and fluid negative for malignancy.    She finished her PICC line antibiotics and followed with ID.  Has pending CT chest for follow-up.  Loop monitor has " "since been placed.    She is neurologically is intact but complains of left foot numbness since this day.  There is subtle glove and stocking distribution sensory loss nondermatomal.  Reviewed neuroimaging and large stroke bed of L corona radiata region can explain her contralateral numbness and and suspect poststroke residual given her clinical story.  Little concern for separate neurologic etiology-can continue to monitor       Plan:  Repeat CTA head thrombus resolved dropped down to monotherapy  Continue high intensity statin  Encouraged her to refrain from her few \"cheat\" cigarettes    I spent at least 60 minutes interviewing, examining, and counseling patient.  I independently reviewed documentation, laboratory and diagnostic findings, external documentation where applicable, and formulated treatment plan which was discussed with the patient.    "

## 2023-11-20 ENCOUNTER — HOSPITAL ENCOUNTER (OUTPATIENT)
Facility: HOSPITAL | Age: 73
Discharge: HOME | End: 2023-11-20
Admitting: INTERNAL MEDICINE
Payer: COMMERCIAL

## 2023-11-20 DIAGNOSIS — R91.1 LUNG NODULE: ICD-10-CM

## 2023-11-20 PROCEDURE — 71250 CT THORAX DX C-: CPT

## 2023-11-28 ENCOUNTER — OFFICE VISIT (OUTPATIENT)
Dept: INTERNAL MEDICINE | Age: 73
End: 2023-11-28
Payer: COMMERCIAL

## 2023-11-28 VITALS
BODY MASS INDEX: 24.59 KG/M2 | HEART RATE: 88 BPM | OXYGEN SATURATION: 99 % | SYSTOLIC BLOOD PRESSURE: 124 MMHG | HEIGHT: 63 IN | DIASTOLIC BLOOD PRESSURE: 76 MMHG | WEIGHT: 138.8 LBS | TEMPERATURE: 98 F

## 2023-11-28 DIAGNOSIS — Z78.0 POSTMENOPAUSAL: ICD-10-CM

## 2023-11-28 DIAGNOSIS — Z12.31 ENCOUNTER FOR SCREENING MAMMOGRAM FOR MALIGNANT NEOPLASM OF BREAST: ICD-10-CM

## 2023-11-28 DIAGNOSIS — Z00.00 ENCOUNTER FOR ANNUAL PHYSICAL EXAM: ICD-10-CM

## 2023-11-28 DIAGNOSIS — Z23 NEED FOR COVID-19 VACCINE: Primary | ICD-10-CM

## 2023-11-28 RX ORDER — UBIDECARENONE 100 MG
200 CAPSULE ORAL DAILY
COMMUNITY

## 2023-11-28 NOTE — PROGRESS NOTES
"[facility] [facility]  MARGARITO Garvin Williamson / 72 y.o. / female  11/28/2023                        VITALS     Visit Vitals  /76 (BP Location: Left arm, Patient Position: Sitting, Cuff Size: Adult)   Pulse 88   Temp 98 °F (36.7 °C) (Temporal)   Ht 160 cm (63\")   Wt 63 kg (138 lb 12.8 oz)   SpO2 99%   BMI 24.59 kg/m²       BP Readings from Last 3 Encounters:   11/28/23 124/76   11/09/23 120/70   09/25/23 118/72     Wt Readings from Last 3 Encounters:   11/28/23 63 kg (138 lb 12.8 oz)   11/09/23 61.6 kg (135 lb 12.8 oz)   09/25/23 58.9 kg (129 lb 12.8 oz)      Body mass index is 24.59 kg/m².    MEDICATIONS     Current Outpatient Medications   Medication Sig Dispense Refill    acetaminophen (TYLENOL) 500 MG tablet Take 1 tablet by mouth Every 6 (Six) Hours As Needed. Indications: Pain      aspirin 81 MG chewable tablet Chew 1 tablet Daily. 90 tablet 0    atorvastatin (LIPITOR) 80 MG tablet TAKE 1 TABLET BY MOUTH EVERY NIGHT 90 tablet 1    Biotin 1 MG capsule Take  by mouth.      Cholecalciferol (VITAMIN D) 1000 UNITS tablet Take 1 tablet by mouth Daily.      clopidogrel (PLAVIX) 75 MG tablet TAKE 1 TABLET BY MOUTH DAILY. 90 tablet 1    coenzyme Q10 100 MG capsule Take 2 capsules by mouth Daily.      cyanocobalamin (VITAMIN B-12) 1000 MCG tablet Take 1 tablet by mouth Daily. 90 tablet 0    furosemide (LASIX) 20 MG tablet Take 1 tablet by mouth Daily As Needed (Swelling of feet and ankles). 90 tablet 1    metoprolol tartrate (LOPRESSOR) 25 MG tablet TAKE 0.5 TABLET BY MOUTH EVERY 12 HOURS. INDICATIONS: HIGH BLOOD PRESSURE DISORDER 90 tablet 1    potassium chloride 10 MEQ CR tablet TAKE 1 TABLET BY MOUTH DAILY. 90 tablet 1    vitamin C (ASCORBIC ACID) 500 MG tablet Take 1 tablet by mouth Daily. Indications: Inadequate Vitamin C      COVID-19 mRNA Vac-Storm,Pfizer, 30 MCG/0.3ML suspension Inject 0.3 mL into the appropriate muscle as directed by prescriber 1 (One) Time for 1 dose. 0.3 mL 0     No current " facility-administered medications for this visit.       _____________________________________________________________________________________    CHIEF COMPLAINT     Annual Exam      HISTORY OF PRESENT ILLNESS      Katherine presents for annual health maintenance visit.    Last health maintenance visit: approximately 1 year ago  General health: good  Lifestyle:  Attempting to lose weight?: No   Diet: eats moderately healthy  Exercise: has not been as active recently  Tobacco: Occasional/Social use   Alcohol: occasional/infrequent  Work: Retired  Reproductive health:  Sexually active?: No   Concern for STD?: No   Sexual problems?: No problems   Sees gynecologist: No; postmenopausal   Domestic abuse: no   Depression Screenin/25/2023     9:04 AM   PHQ-2/PHQ-9 Depression Screening   Little Interest or Pleasure in Doing Things 0-->not at all   Feeling Down, Depressed or Hopeless 0-->not at all   PHQ-9: Brief Depression Severity Measure Score 0         PHQ-2: 0 (Not depressed)     PHQ-9: 0 (Negative screening for depression)    Patient Care Team:  Zelalem Flor APRN as PCP - General (Internal Medicine)  Brandon Mitchell MD as Consulting Physician (Orthopedic Surgery)  Elijah eBrrios MD as Consulting Physician (Pulmonary Disease)  ______________________________________________________________________    ALLERGIES  Allergies   Allergen Reactions    Hydrocodone-Acetaminophen Itching        PFSH:     The following portions of the patient's history were reviewed and updated as appropriate: Allergies / Current Medications / Past Medical History / Surgical History / Social History / Family History    PROBLEM LIST   Patient Active Problem List   Diagnosis    Benign essential HTN    Tobacco abuse    Dislocation of hip joint prosthesis    Fracture of proximal humerus    Mechanical complication of internal orthopedic device    Wear of articular bearing surface of internal prosthetic joint    History of repair  of hip joint    History of operative procedure on hip    Hyperglycemia    Hepatic steatosis    Diverticulosis    Chest pain, atypical    History of colon polyps    Family history of colon cancer in mother    Allergic rhinitis    Lung nodule seen on imaging study    Acute respiratory failure with hypoxia    Abscess of right lung with pneumonia    Empyema    Sepsis    Pleural effusion, right    Other emphysema    Pulmonary nodule (RLL)    Diastolic dysfunction, grade 1    Physical debility    Cryptogenic stroke    Acute right MCA stroke    Paroxysmal SVT (supraventricular tachycardia)       PAST MEDICAL HISTORY  Past Medical History:   Diagnosis Date    Arthritis     Cataract     Colon polyps     FOLLOWED BY DR. JOSEPH LAU    Hypertension     Pneumonia 2023    Stroke 2023       SURGICAL HISTORY  Past Surgical History:   Procedure Laterality Date    CARDIAC ELECTROPHYSIOLOGY PROCEDURE N/A 2023    Procedure: Loop insertion  LINQ;  Surgeon: Usha Eason MD;  Location: [facility] INVASIVE LOC[facility];  Service: Cardiovascular;  Laterality: N/A;    COLONOSCOPY  10/2015    Iebrk-burnhnwfdbst-Iq. Kaplan.  Follow-up in 5 years.    COLONOSCOPY N/A 2022    2 BENIGN POLYPS IN DESCENDING, 5 MM BENIGN POLYP IN SIGMOID, MULTIPLE SMALL AND LARGE DIVERTICULA IN SIGMOID, RESCOPE IN 3 YRS, DR. JOSEPH LAU AT [facility]    EYE SURGERY      JOINT REPLACEMENT  ?    Left total hip replacement in .  In 2015 patient had left hip revision    TONSILLECTOMY         SOCIAL HISTORY  Social History     Socioeconomic History    Marital status:    Tobacco Use    Smoking status: Former     Packs/day: 0.00     Years: 51.00     Additional pack years: 0.00     Total pack years: 0.00     Types: Cigarettes     Start date: 1970     Quit date: 2023     Years since quittin.5    Smokeless tobacco: Never    Tobacco comments:     Caffeine - 4 cups coffee daily    Vaping Use    Vaping Use: Never used   Substance  and Sexual Activity    Alcohol use: Yes     Alcohol/week: 15.0 standard drinks of alcohol     Types: 15 Cans of beer per week     Comment: 3-4 beers daily    Drug use: Never    Sexual activity: Not Currently     Partners: Male     Birth control/protection: None, Birth control pill       FAMILY HISTORY  Family History   Problem Relation Age of Onset    Cancer Mother     Breast cancer Mother     Arthritis Mother     Deep vein thrombosis Mother     Hyperlipidemia Mother     Hypertension Mother     Heart attack Father     Heart disease Father         Had quadruple bypass    Hyperlipidemia Father     Hypertension Father     Heart attack Maternal Grandmother     Heart disease Maternal Grandmother     Malig Hyperthermia Neg Hx        IMMUNIZATION HISTORY  Immunization History   Administered Date(s) Administered    Arexvy (RSV, Adults 60+ yrs) 09/20/2023    COVID-19 (PFIZER) Purple Cap Monovalent 03/08/2021, 03/29/2021, 01/21/2022    COVID-19 F23 (PFIZER) 12YRS+ (COMIRNATY) 11/28/2023    Covid-19 (Pfizer) Gray Cap Monovalent 01/21/2022, 08/30/2022    FLUAD TRI 65YR+ 09/25/2021    Fluad Quad 65+ 09/20/2023    Fluzone High Dose =>65 Years ([employer] ONLY) 10/18/2018, 10/02/2019, 09/23/2020, 09/25/2021    Fluzone High-Dose 65+yrs 09/23/2020, 09/25/2021    Pneumococcal Conjugate 13-Valent (PCV13) 11/22/2019    Pneumococcal Conjugate 20-Valent (PCV20) 08/30/2022    Shingrix 09/23/2020, 09/25/2021, 09/25/2021    Zostavax 09/23/2020    Zoster, Unspecified 09/25/2021       ______________________________________________________________________    REVIEW OF SYSTEMS     Review of Systems   Constitutional: Negative.    HENT: Negative.     Eyes: Negative.    Respiratory: Negative.     Cardiovascular: Negative.    Gastrointestinal: Negative.    Endocrine: Negative.    Genitourinary: Negative.    Musculoskeletal: Negative.    Allergic/Immunologic: Negative.    Neurological:  Positive for numbness.   Hematological: Negative.     Psychiatric/Behavioral: Negative.       PHYSICAL EXAMINATION     Physical Exam  Constitutional:       Appearance: Normal appearance.   HENT:      Head: Normocephalic and atraumatic.      Right Ear: Tympanic membrane normal.      Left Ear: Tympanic membrane normal.      Nose: Nose normal. No congestion.      Mouth/Throat:      Mouth: Mucous membranes are moist.      Pharynx: Oropharynx is clear.   Eyes:      Conjunctiva/sclera: Conjunctivae normal.      Pupils: Pupils are equal, round, and reactive to light.   Neck:      Vascular: No carotid bruit.   Cardiovascular:      Rate and Rhythm: Normal rate and regular rhythm.      Pulses: Normal pulses.      Heart sounds: Normal heart sounds.   Pulmonary:      Effort: Pulmonary effort is normal.      Breath sounds: Normal breath sounds.   Abdominal:      General: There is no distension.      Palpations: Abdomen is soft.      Tenderness: There is no abdominal tenderness. There is no right CVA tenderness, left CVA tenderness or guarding.   Genitourinary:     Comments: No longer having pap smears   Musculoskeletal:         General: Normal range of motion.      Cervical back: Normal range of motion.      Right lower leg: No edema.      Left lower leg: No edema.   Lymphadenopathy:      Cervical: No cervical adenopathy.   Skin:     General: Skin is warm and dry.   Neurological:      Mental Status: She is alert and oriented to person, place, and time.      Cranial Nerves: No cranial nerve deficit.      Motor: No weakness.      Gait: Gait normal.   Psychiatric:         Mood and Affect: Mood normal.         Behavior: Behavior normal.         Thought Content: Thought content normal.         Judgment: Judgment normal.       REVIEWED DATA      Labs:    Lab Results   Component Value Date     08/02/2023    K 4.9 08/02/2023    CALCIUM 10.1 08/02/2023    AST 46 (H) 06/19/2023    ALT 47 (H) 06/19/2023    BUN 9 08/02/2023    CREATININE 0.63 08/02/2023    CREATININE 0.36 (L)  "06/19/2023    CREATININE 0.25 (L) 06/12/2023    EGFRIFNONA 76 04/18/2018    EGFRIFAFRI 93 04/18/2018       Lab Results   Component Value Date    HGBA1C 6.20 (H) 05/15/2023    HGBA1C 5.59 04/18/2018    HGBA1C 5.41 04/18/2017    TSH 1.160 04/18/2018    FREET4 1.27 04/18/2018       No results found for: \"PSA\", \"TESTOSTEROTT\", \"TESTFRE\"    Lab Results   Component Value Date    LDL 47 05/15/2023    HDL 32 (L) 05/15/2023    TRIG 68 05/15/2023       No components found for: \"YIBJ842M\"    Lab Results   Component Value Date    WBC 8.92 06/26/2023    HGB 11.6 (L) 06/26/2023    MCV 89.0 06/26/2023     06/26/2023       Lab Results   Component Value Date    GLUCOSEU Negative 04/10/2023    BLOODU Negative 04/10/2023    NITRITEU Negative 04/10/2023    LEUKOCYTESUR Negative 04/10/2023        No results found for: \"HEPCVIRUSABY\"    Imaging:           Medical Tests:       ______________________________________________________________________    ASSESSMENT & PLAN     ANNUAL WELLNESS EXAM / PHYSICAL     Other medical problems addressed today:    Patient presents 3 months post hospital follow-up for cryptogenic stroke, findings of abscess of right lung with pneumonia, emphysema.  She is now closely by pulmonary.  She has follow-up with neurology in November.  She is now on Plavix and baby aspirin.  Close management as well with cardiology with loop recorder in place.  Blood pressure stable with metoprolol 25 mg tablet.  No myalgias with atorvastatin 80 mg daily.  She does complain of left foot numbness, unilateral no numbness or tingling or burning sensation.  Neurology started her on coenzyme every 10.  We will monitor in place.  Cardiology following.  CT chest updated November 20 that showed decreased consolidations in the right lower and middle lobes.  She continues to follow with pulmonary.     Neurology believes the following regarding her left foot numbness : \"she is neurologically is intact but complains of left foot " "numbness since this day.  There is subtle glove and stocking distribution sensory loss nondermatomal.  Reviewed neuroimaging and large stroke bed of L corona radiata region can explain her contralateral numbness and and suspect poststroke residual given her clinical story.  Little concern for separate neurologic etiology-can continue to monitor\"  I literally just finished    Summary/Discussion:     Annual fasting labs are unremarkable except for elevated hemoglobin A1c in the prediabetic range.  She will continue healthy diet and exercise.  Follow all specialty management.  She will see our care team in 6 months    Next Appointment with me: Visit date not found    Return in about 6 months (around 5/28/2024) for Next scheduled follow up.      HEALTHCARE MAINTENANCE ISSUES       Cancer Screening:  Colon: Initial/Next screening at age: CURRENT  Repeat colon cancer screening: every 5 years  Cervical : has discontinued   Testicular: Recommended monthly self exam  Skin: Monthly self skin examination, annual exam by health professional  Lung:  CT through pulmonary   Other:    Screening Labs & Tests:  Lab results reviewed & discussed with with patient or orders placed today.  EKG:  CV Screening: Lipid panel  DEXA (75+ or risk factors):   HEP C (If born [id number] or risk factors): Negative screen  Other:     Immunization/Vaccinations (to be given today unless deferred by patient)  Influenza: Patient had the flu shot this season  Hepatitis A: Recommended here or at pharmacy  Tetanus/Pertussis: Recommended here or at pharmacy  Pneumovax: Up to date  Shingles: Up to date  Other:     Lifestyle Counseling:  Lifestyle Modifications: Improve dietary compliance and Increase intensity/regularity of aerobic exercise  Safety Issues: Always wear seatbelt, Avoid texting while driving   Use sunscreen, regular skin examination  Recommended annual dental/vision examination.  Emotional/Stress/Sleep: Reviewed and  given when " appropriate      Health Maintenance   Topic Date Due    TDAP/TD VACCINES (1 - Tdap) Never done    DXA SCAN  11/11/2018    LIPID PANEL  05/15/2024    ANNUAL PHYSICAL  11/28/2024    MAMMOGRAM  01/23/2025    COLORECTAL CANCER SCREENING  01/12/2032    HEPATITIS C SCREENING  Completed    COVID-19 Vaccine  Completed    INFLUENZA VACCINE  Completed    Pneumococcal Vaccine 65+  Completed    PAP SMEAR  Discontinued    ZOSTER VACCINE  Discontinued     *dragon dictation used for documentation.

## 2023-12-06 ENCOUNTER — TRANSCRIBE ORDERS (OUTPATIENT)
Dept: ADMINISTRATIVE | Facility: HOSPITAL | Age: 73
End: 2023-12-06
Payer: COMMERCIAL

## 2023-12-06 DIAGNOSIS — R91.1 LUNG NODULE: Primary | ICD-10-CM

## 2023-12-12 ENCOUNTER — HOSPITAL ENCOUNTER (OUTPATIENT)
Facility: HOSPITAL | Age: 73
Discharge: HOME | End: 2023-12-12
Admitting: PHYSICIAN ASSISTANT
Payer: COMMERCIAL

## 2023-12-12 DIAGNOSIS — I63.9 CRYPTOGENIC STROKE: ICD-10-CM

## 2023-12-12 PROCEDURE — 70496 CT ANGIOGRAPHY HEAD: CPT

## 2023-12-12 PROCEDURE — 25510000001 IOPAMIDOL PER 1 ML: Performed by: PHYSICIAN ASSISTANT

## 2023-12-12 RX ADMIN — IOPAMIDOL 100 ML: 755 INJECTION, SOLUTION INTRAVENOUS at 15:54

## 2023-12-14 ENCOUNTER — TELEPHONE (OUTPATIENT)
Dept: NEUROLOGY | Facility: CLINIC | Age: 73
End: 2023-12-14
Payer: COMMERCIAL

## 2023-12-14 NOTE — TELEPHONE ENCOUNTER
----- Message from AYLA Gomes sent at 12/14/2023 11:18 AM EST -----  CTA head looks better.  She can stop plavix and keep taking aspirin.  If she starts smoking again I want to know

## 2024-01-10 ENCOUNTER — OFFICE VISIT (OUTPATIENT)
Dept: NEUROLOGY | Facility: CLINIC | Age: 74
End: 2024-01-10
Payer: COMMERCIAL

## 2024-01-10 VITALS
DIASTOLIC BLOOD PRESSURE: 94 MMHG | BODY MASS INDEX: 24.45 KG/M2 | OXYGEN SATURATION: 96 % | SYSTOLIC BLOOD PRESSURE: 180 MMHG | HEIGHT: 63 IN | HEART RATE: 112 BPM | WEIGHT: 138 LBS

## 2024-01-10 DIAGNOSIS — Z86.73 HISTORY OF STROKE: Primary | ICD-10-CM

## 2024-01-10 DIAGNOSIS — I63.9 CRYPTOGENIC STROKE: ICD-10-CM

## 2024-01-10 DIAGNOSIS — Z72.0 TOBACCO ABUSE: ICD-10-CM

## 2024-01-10 DIAGNOSIS — M79.2 NEUROPATHIC PAIN: ICD-10-CM

## 2024-01-18 RX ORDER — GABAPENTIN 100 MG/1
100 CAPSULE ORAL DAILY
Qty: 30 CAPSULE | Refills: 0 | Status: SHIPPED | OUTPATIENT
Start: 2024-01-18

## 2024-01-18 NOTE — PROGRESS NOTES
CC: follow-up r/t stroke    HPI:  Katherine Williamson is a  72 y.o.  right-handed female past medical history of hypertension and arthritis who is here for hospital follow-up.  Back in May she had an acute team D activation.  She had been admitted with pneumonia and right lung abscess who developed cute onset of left hemiparesis and speech disturbance witnessed by her treating nurse.  She received TNK timely.  CTA showed right MCA perfusion deficit of the right posterior parietal lobe and a distal M3 occlusion.  She had no events on telemetry and TTE was negative for cardiac source.  MRI revealed acute embolic appearing strokes of multiple territories strongly suspicious of embolic etiology.  ELIGIO was also done but unremarkable.  She had loop monitor placed.  She was started on DAPT and high intensity statin.  She continued her PICC line antibiotics.  Loop monitor was placed after cardiac monitor negative for A-fib -showed asymptomatic VT.  She is doing very well no further strokelike symptoms.  Since the time of her stroke she has noticed left foot numbness but not bothersome or painful    Since I last saw her foot pain more bothersome.  CTA repeated and improvement seen.  We dropped her down to monotherapy and she continues to not smoke      Social history: Ex-smoker pack a day, 50-year pack a day history, drinks beer    Family history: Father with heart disease, mother with breast cancer      Pain Scale:        ROS:  Review of Systems   Constitutional:  Negative for fatigue and unexpected weight change.   HENT:  Negative for ear pain, hearing loss, nosebleeds and tinnitus.    Eyes:  Negative for pain and visual disturbance.   Respiratory:  Negative for chest tightness, shortness of breath and wheezing.    Cardiovascular:  Negative for chest pain and palpitations.   Musculoskeletal:  Positive for gait problem (2 falls since LOV).   Skin:  Negative for color change, pallor, rash and wound.   Allergic/Immunologic: Negative  for food allergies.   Neurological:  Positive for numbness (left foot). Negative for dizziness, speech difficulty and weakness.   Psychiatric/Behavioral:  Negative for confusion, decreased concentration and sleep disturbance. The patient is not nervous/anxious.        Reviewed ROS conducted by MA and john        Physical Exam:  There were no vitals filed for this visit.   Orthostatic BP:    There is no height or weight on file to calculate BMI.        General: pt well appearing, no distress  HEENT: Normocephalic, conjunctiva normal, external canals normal, no nasal discharge, moist mucous membranes  Neck: No lymphadenopathy, thyroid not enlarged, no JVD  CV: Regular rate and rhythm, no murmurs negative no bruits auscultated at neck, equal pulses  Pulmonary: Normal respiratory effort, clear to auscultation bilaterally  Extremities: no edema, bruising, or skin lesions  Pysch: good eye contact, cooperative, full affect, euthymic mood, good attention, good insight  Mental: alert, conversant, AOx3, provides history, 3/3 recall.  Language fluent, names, repeats.  CN: CN II-XII intact, PERRL, EOMi, no gaze palsy or nystagmus, intact facial sensation, no facial assymetry, intact hearing, symmetric palate elevation, tongue midline, good SCM strength  Motor: no abnormal movements, no pronator drift, normal  bulk and tone, 5/5 strength b/l UE & LE  Sensory: Glove and stocking distribution decreased sensation lower extremities   reflexes: 2+ throughout, neg babinski  Coordination: no ataxia on finger-nose, heel-shin  Gait: normal gait, no ataxia, intact heel and toe walking        Results:      Lab Results   Component Value Date    GLUCOSE 76 08/02/2023    BUN 9 08/02/2023    CREATININE 0.63 08/02/2023    EGFRIFNONA 76 04/18/2018    EGFRIFAFRI 93 04/18/2018    BCR 14.3 08/02/2023    CO2 22.4 08/02/2023    CALCIUM 10.1 08/02/2023    PROTENTOTREF 7.2 04/18/2018    ALBUMIN 2.7 (L) 06/19/2023    LABIL2 1.4 04/18/2018    AST 46 (H)  "06/19/2023    ALT 47 (H) 06/19/2023       Lab Results   Component Value Date    WBC 8.92 06/26/2023    HGB 11.6 (L) 06/26/2023    HCT 34.8 06/26/2023    MCV 89.0 06/26/2023     06/26/2023         .No results found for: \"RPR\"      Lab Results   Component Value Date    TSH 1.160 04/18/2018         No results found for: \"TBQNWCGV39\"      No results found for: \"FOLATE\"      Lab Results   Component Value Date    HGBA1C 6.20 (H) 05/15/2023         Lab Results   Component Value Date    GLUCOSE 76 08/02/2023    BUN 9 08/02/2023    CREATININE 0.63 08/02/2023    EGFRIFNONA 76 04/18/2018    EGFRIFAFRI 93 04/18/2018    BCR 14.3 08/02/2023    K 4.9 08/02/2023    CO2 22.4 08/02/2023    CALCIUM 10.1 08/02/2023    PROTENTOTREF 7.2 04/18/2018    ALBUMIN 2.7 (L) 06/19/2023    LABIL2 1.4 04/18/2018    AST 46 (H) 06/19/2023    ALT 47 (H) 06/19/2023         Diagnosis:  1.  History of stroke, ESUS  2.  Pneumonia with lung abscess    Impression: 72-year-old right-handed female with past medical history of hypertension and arthritis who was admitted to the hospital with pneumonia found to have right lung abscess and while inpatient developed strokelike symptoms of left hemiparesis and speech impairment.  She was able to she received TNK timely.  Was found to have multiple embolic appearing strokes.  She had ELIGIO negative for vegetation or cardiac source.  No evidence of a thrombotic microangiopathy and had thoracentesis and fluid negative for malignancy.    She finished her PICC line antibiotics and followed with ID.  Has pending CT chest for follow-up.  Loop monitor has since been placed.  ESUS either cardioembolic or athero embolic etiology suspected    She is neurologically is intact but complains of left foot numbness since this day.  There is subtle glove and stocking distribution sensory loss nondermatomal.  Reviewed neuroimaging and large stroke bed of L corona radiata region can explain her contralateral numbness and and " "suspect poststroke residual given her clinical story.  Little concern for separate neurologic etiology-can continue to monitor       Plan:  Repeat CTA showed recanalization of prior occluded : A2 and M2.  Continue ASA and high intensity statin  Encouraged her to refrain from her few \"cheat\" cigarettes  Gabapentin RX    F/u in 6-9 mos      I spent at least 60 minutes interviewing, examining, and counseling patient.  I independently reviewed documentation, laboratory and diagnostic findings, external documentation where applicable, and formulated treatment plan which was discussed with the patient.    "

## 2024-01-24 ENCOUNTER — HOSPITAL ENCOUNTER (OUTPATIENT)
Dept: MAMMOGRAPHY | Facility: HOSPITAL | Age: 74
Discharge: HOME | End: 2024-01-24
Admitting: NURSE PRACTITIONER
Payer: COMMERCIAL

## 2024-01-24 DIAGNOSIS — Z12.31 ENCOUNTER FOR SCREENING MAMMOGRAM FOR MALIGNANT NEOPLASM OF BREAST: ICD-10-CM

## 2024-01-24 PROCEDURE — 77067 SCR MAMMO BI INCL CAD: CPT

## 2024-01-24 PROCEDURE — 77063 BREAST TOMOSYNTHESIS BI: CPT

## 2024-01-28 DIAGNOSIS — R60.0 BILATERAL LOWER EXTREMITY EDEMA: ICD-10-CM

## 2024-01-29 RX ORDER — IRBESARTAN 150 MG/1
150 TABLET ORAL
Qty: 90 TABLET | Refills: 1 | OUTPATIENT
Start: 2024-01-29

## 2024-01-29 RX ORDER — ATORVASTATIN CALCIUM 80 MG/1
80 TABLET, FILM COATED ORAL
Qty: 90 TABLET | Refills: 1 | Status: SHIPPED | OUTPATIENT
Start: 2024-01-29

## 2024-01-29 RX ORDER — POTASSIUM CHLORIDE 750 MG/1
10 TABLET, FILM COATED, EXTENDED RELEASE ORAL DAILY
Qty: 90 TABLET | Refills: 1 | Status: SHIPPED | OUTPATIENT
Start: 2024-01-29

## 2024-01-29 RX ORDER — FUROSEMIDE 20 MG/1
20 TABLET ORAL DAILY PRN
Qty: 90 TABLET | Refills: 1 | Status: SHIPPED | OUTPATIENT
Start: 2024-01-29

## 2024-02-22 DIAGNOSIS — R60.0 BILATERAL LOWER EXTREMITY EDEMA: ICD-10-CM

## 2024-02-22 RX ORDER — FUROSEMIDE 20 MG/1
20 TABLET ORAL DAILY PRN
Qty: 90 TABLET | Refills: 1 | Status: SHIPPED | OUTPATIENT
Start: 2024-02-22

## 2024-03-01 ENCOUNTER — OFFICE VISIT (OUTPATIENT)
Dept: INTERNAL MEDICINE | Age: 74
End: 2024-03-01
Payer: COMMERCIAL

## 2024-03-01 VITALS
TEMPERATURE: 98.6 F | HEIGHT: 63 IN | DIASTOLIC BLOOD PRESSURE: 82 MMHG | SYSTOLIC BLOOD PRESSURE: 124 MMHG | WEIGHT: 152.4 LBS | HEART RATE: 76 BPM | OXYGEN SATURATION: 99 % | BODY MASS INDEX: 27 KG/M2

## 2024-03-01 DIAGNOSIS — I51.89 DIASTOLIC DYSFUNCTION: ICD-10-CM

## 2024-03-01 DIAGNOSIS — Z86.73 HISTORY OF STROKE: ICD-10-CM

## 2024-03-01 DIAGNOSIS — I10 BENIGN ESSENTIAL HTN: Primary | ICD-10-CM

## 2024-03-01 DIAGNOSIS — J32.9 SINUSITIS, UNSPECIFIED CHRONICITY, UNSPECIFIED LOCATION: ICD-10-CM

## 2024-03-01 DIAGNOSIS — K76.0 HEPATIC STEATOSIS: ICD-10-CM

## 2024-03-01 DIAGNOSIS — R73.01 IMPAIRED FASTING BLOOD SUGAR: ICD-10-CM

## 2024-03-01 LAB
EXPIRATION DATE: ABNORMAL
EXPIRATION DATE: NORMAL
FLUAV AG UPPER RESP QL IA.RAPID: NOT DETECTED
FLUBV AG UPPER RESP QL IA.RAPID: NOT DETECTED
INTERNAL CONTROL: ABNORMAL
INTERNAL CONTROL: NORMAL
Lab: ABNORMAL
Lab: NORMAL
S PYO AG THROAT QL: NEGATIVE
SARS-COV-2 AG UPPER RESP QL IA.RAPID: NOT DETECTED

## 2024-03-01 RX ORDER — AZITHROMYCIN 250 MG/1
TABLET, FILM COATED ORAL
Qty: 6 TABLET | Refills: 0 | Status: SHIPPED | OUTPATIENT
Start: 2024-03-01

## 2024-03-01 NOTE — PROGRESS NOTES
I N T E R N A L  M E D I C I N E  MARGARITO CARRILLO      ENCOUNTER DATE:  03/01/2024    Katherine Williamson / 73 y.o. / female      CHIEF COMPLAINT / REASON FOR OFFICE VISIT     Bloated (WEIGHT GAIN), Sore Throat (STARTED SUNDAY NIGHT, AT HOME COVID IS NEGATIVE ), Cough, and Nasal Congestion      ASSESSMENT & PLAN     Problem List Items Addressed This Visit       Benign essential HTN - Primary    Relevant Medications    metoprolol tartrate (LOPRESSOR) 25 MG tablet    furosemide (LASIX) 20 MG tablet    Other Relevant Orders    Comprehensive Metabolic Panel    CBC w AUTO Differential    TSH+Free T4    Urinalysis With Culture If Indicated - Urine, Clean Catch    Hepatic steatosis    Overview     Per abdominal CT scan-November 2019         Relevant Orders    Comprehensive Metabolic Panel    Lipid Panel With / Chol / HDL Ratio    Diastolic dysfunction, grade 1    Relevant Orders    Comprehensive Metabolic Panel     Other Visit Diagnoses       Impaired fasting blood sugar        Relevant Orders    Hemoglobin A1c    History of stroke        Relevant Orders    Lipid Panel With / Chol / HDL Ratio    Sinusitis, unspecified chronicity, unspecified location        Relevant Medications    azithromycin (Zithromax Z-Riaz) 250 MG tablet    Other Relevant Orders    POCT SARS-CoV-2 Antigen TOMY + Flu (Completed)    POCT rapid strep A (Completed)          Orders Placed This Encounter   Procedures    Hemoglobin A1c    Comprehensive Metabolic Panel    Lipid Panel With / Chol / HDL Ratio    TSH+Free T4    Urinalysis With Culture If Indicated - Urine, Clean Catch    POCT SARS-CoV-2 Antigen TOMY + Flu    POCT rapid strep A    CBC w AUTO Differential     New Medications Ordered This Visit   Medications    azithromycin (Zithromax Z-Riaz) 250 MG tablet     Sig: Take 2 tablets by mouth on day 1, then 1 tablet daily on days 2-5     Dispense:  6 tablet     Refill:  0       SUMMARY/DISCUSSION  Patient has no abdominal pain, no nausea no vomiting no  "changes in stools.  No cough or shortness of breath.  Trace to +1 lower extremity edema will go ahead and update laboratory values prior to recommending increasing Lasix or further evaluation as she has not had these completed in quite some time since June 2023.  Will go and treat with Z-Riaz for likely sinusitis, will call if no improvement in symptoms.  Can also add on Flonase or Zyrtec    Next Appointment with me: 5/29/2024    No follow-ups on file.      VITAL SIGNS     Visit Vitals  /82 (BP Location: Left arm, Patient Position: Sitting, Cuff Size: Adult)   Pulse 76   Temp 98.6 °F (37 °C) (Temporal)   Ht 160 cm (63\")   Wt 69.1 kg (152 lb 6.4 oz)   SpO2 99%   BMI 27.00 kg/m²     Wt Readings from Last 3 Encounters:   03/01/24 69.1 kg (152 lb 6.4 oz)   01/10/24 62.6 kg (138 lb)   11/28/23 63 kg (138 lb 12.8 oz)     Body mass index is 27 kg/m².  MEDICATIONS AT THE TIME OF OFFICE VISIT     Current Outpatient Medications on File Prior to Visit   Medication Sig    acetaminophen (TYLENOL) 500 MG tablet Take 1 tablet by mouth Every 6 (Six) Hours As Needed. Indications: Pain    aspirin 81 MG chewable tablet Chew 1 tablet Daily.    atorvastatin (LIPITOR) 80 MG tablet TAKE 1 TABLET BY MOUTH EVERY DAY AT NIGHT    Biotin 1 MG capsule Take  by mouth.    Cholecalciferol (VITAMIN D) 1000 UNITS tablet Take 1 tablet by mouth Daily.    cyanocobalamin (VITAMIN B-12) 1000 MCG tablet Take 1 tablet by mouth Daily.    furosemide (LASIX) 20 MG tablet Take 1 tablet by mouth Daily As Needed (Swelling of feet and ankles).    metoprolol tartrate (LOPRESSOR) 25 MG tablet TAKE 0.5 TABLET BY MOUTH EVERY 12 HOURS. INDICATIONS: HIGH BLOOD PRESSURE DISORDER    potassium chloride 10 MEQ CR tablet TAKE 1 TABLET BY MOUTH EVERY DAY    vitamin C (ASCORBIC ACID) 500 MG tablet Take 1 tablet by mouth Daily. Indications: Inadequate Vitamin C    [DISCONTINUED] coenzyme Q10 100 MG capsule Take 2 capsules by mouth Daily.    [DISCONTINUED] gabapentin " (NEURONTIN) 100 MG capsule Take 1 capsule by mouth Daily.     No current facility-administered medications on file prior to visit.          HISTORY OF PRESENT ILLNESS     Recently seen by neurology January 10, 2024, history of stroke ESUS with pneumonia with lung abscess.  Left hemiparesis and speech impairment.  Received TNK timely.  Multiple embolic appearing strokes, ELIGIO negative for vegetation or cardiac source.  She had no evidence of thrombotic microangiopathy and had thoracentesis and fluid negative for malignancy.  Had PICC line antibiotics.  Pending chest CT for follow-up scheduled May 23.  Loop monitor placed.  Started on DAPT and high intensity statin.  A repeat CTA showed recanalization of prior occluded A2 and M2.  She was dropped down to monotherapy and continued on aspirin and high intensity statin.  She was given a gabapentin prescription which she is not currently taking believe that helped her symptoms.  She is complaining of abdominal bloating, lower extremity edema.  She has been taking Lasix 20 mg daily.  Sedatives more than normal.    Almost 1 week now she has also had a sore throat cough and nasal congestion, no increasing shortness of breath or wheezing.  Negative COVID flu and strep today.    REVIEW OF SYSTEMS     Constitutional neg except per HPI   ENT congestion, sore throat  Resp cough   CV neg   GI bloating   Musc lower extremity edema    PHYSICAL EXAMINATION     Physical Exam  Constitutional  No distress  ENT erythema of pharynx, postnasal drip  Cardiovascular Rate  normal . Rhythm: regular . Heart sounds:  normal  Pulmonary/Chest  Effort normal. Breath sounds:  normal  GI negative distention  Musc trace lower extremity edema  Psychiatric  Alert. Judgment and thought content normal. Mood normal      REVIEWED DATA     Labs:   Lab Results   Component Value Date    GLUCOSE 76 08/02/2023    BUN 9 08/02/2023    CREATININE 0.63 08/02/2023    EGFRRESULT 95.6 08/02/2023    EGFR 109.4 06/19/2023     BCR 14.3 08/02/2023    K 4.9 08/02/2023    CO2 22.4 08/02/2023    CALCIUM 10.1 08/02/2023    PROTENTOTREF 7.2 04/18/2018    ALBUMIN 2.7 (L) 06/19/2023    BILITOT 0.8 06/19/2023    AST 46 (H) 06/19/2023    ALT 47 (H) 06/19/2023     Lab Results   Component Value Date    WBC 8.92 06/26/2023    HGB 11.6 (L) 06/26/2023    HCT 34.8 06/26/2023    MCV 89.0 06/26/2023     06/26/2023     Lab Results   Component Value Date    TSH 1.160 04/18/2018     Lab Results   Component Value Date    HGBA1C 6.20 (H) 05/15/2023     Lab Results   Component Value Date    CHOL 94 05/15/2023    CHLPL 205 (H) 04/18/2018    TRIG 68 05/15/2023    HDL 32 (L) 05/15/2023    LDL 47 05/15/2023     Brief Urine Lab Results  (Last result in the past 365 days)        Color   Clarity   Blood   Leuk Est   Nitrite   Protein   CREAT   Urine HCG        04/10/23 0836 Yellow   Clear   Negative   Negative   Negative   Negative                 Imaging:           Medical Tests:           Summary of old records / correspondence / consultant report:           Request outside records:             *Dragon dictation used for documentation.

## 2024-03-04 DIAGNOSIS — R60.0 LOWER EXTREMITY EDEMA: Primary | ICD-10-CM

## 2024-03-04 RX ORDER — POTASSIUM CHLORIDE 20 MEQ/1
20 TABLET, EXTENDED RELEASE ORAL DAILY
Qty: 90 TABLET | Refills: 1 | Status: SHIPPED | OUTPATIENT
Start: 2024-03-04

## 2024-03-04 RX ORDER — FUROSEMIDE 40 MG/1
40 TABLET ORAL 2 TIMES DAILY
Qty: 90 TABLET | Refills: 1 | Status: SHIPPED | OUTPATIENT
Start: 2024-03-04 | End: 2024-03-10

## 2024-03-10 DIAGNOSIS — R60.0 LOWER EXTREMITY EDEMA: Primary | ICD-10-CM

## 2024-03-10 RX ORDER — FUROSEMIDE 20 MG/1
20 TABLET ORAL 2 TIMES DAILY
Qty: 180 TABLET | Refills: 1 | Status: SHIPPED | OUTPATIENT
Start: 2024-03-10

## 2024-04-09 ENCOUNTER — HOSPITAL ENCOUNTER (OUTPATIENT)
Dept: BONE DENSITY | Facility: HOSPITAL | Age: 74
Discharge: HOME | End: 2024-04-09
Admitting: NURSE PRACTITIONER
Payer: COMMERCIAL

## 2024-04-09 DIAGNOSIS — Z78.0 POSTMENOPAUSAL: ICD-10-CM

## 2024-04-09 PROCEDURE — 77080 DXA BONE DENSITY AXIAL: CPT

## 2024-04-24 DIAGNOSIS — R60.0 LOWER EXTREMITY EDEMA: ICD-10-CM

## 2024-04-25 RX ORDER — FUROSEMIDE 40 MG/1
40 TABLET ORAL 2 TIMES DAILY
Qty: 180 TABLET | Refills: 1 | Status: SHIPPED | OUTPATIENT
Start: 2024-04-25

## 2024-05-23 ENCOUNTER — HOSPITAL ENCOUNTER (OUTPATIENT)
Facility: HOSPITAL | Age: 74
Discharge: HOME | End: 2024-05-23
Admitting: INTERNAL MEDICINE
Payer: COMMERCIAL

## 2024-05-23 DIAGNOSIS — R91.1 LUNG NODULE: ICD-10-CM

## 2024-05-23 PROCEDURE — 71250 CT THORAX DX C-: CPT

## 2024-05-29 ENCOUNTER — OFFICE VISIT (OUTPATIENT)
Dept: INTERNAL MEDICINE | Age: 74
End: 2024-05-29
Payer: COMMERCIAL

## 2024-05-29 VITALS
WEIGHT: 155 LBS | HEIGHT: 63 IN | DIASTOLIC BLOOD PRESSURE: 76 MMHG | BODY MASS INDEX: 27.46 KG/M2 | SYSTOLIC BLOOD PRESSURE: 124 MMHG | HEART RATE: 92 BPM | TEMPERATURE: 97.9 F | OXYGEN SATURATION: 98 %

## 2024-05-29 DIAGNOSIS — I63.9 CRYPTOGENIC STROKE: ICD-10-CM

## 2024-05-29 DIAGNOSIS — I10 BENIGN ESSENTIAL HTN: Primary | ICD-10-CM

## 2024-05-29 DIAGNOSIS — R60.0 LOWER EXTREMITY EDEMA: ICD-10-CM

## 2024-05-29 PROCEDURE — 99214 OFFICE O/P EST MOD 30 MIN: CPT | Performed by: NURSE PRACTITIONER

## 2024-05-29 NOTE — PROGRESS NOTES
"    I N T E R N A L  M E D I C I N E  TOMEKA STANTON, MARGARITO      ENCOUNTER DATE:  05/29/2024    Katherine Williamson / 73 y.o. / female      CHIEF COMPLAINT / REASON FOR OFFICE VISIT     Hypertension, Leg Swelling, and History of stroke      ASSESSMENT & PLAN     Problem List Items Addressed This Visit       Benign essential HTN - Primary    Relevant Medications    metoprolol tartrate (LOPRESSOR) 25 MG tablet    furosemide (LASIX) 40 MG tablet    Other Relevant Orders    Basic metabolic panel    Cryptogenic stroke     Other Visit Diagnoses       Lower extremity edema        Relevant Orders    Basic metabolic panel          Orders Placed This Encounter   Procedures    Basic metabolic panel     No orders of the defined types were placed in this encounter.      SUMMARY/DISCUSSION  Recommend discussing with neurology Averya his LDL is at 118, would recommend considering to get LDL at least below 70  Will recheck BMP with increased potassium and Lasix, edema resolved  Blood pressure stable  Continue all specialty management      Next Appointment with me: Visit date not found    Return in about 4 months (around 9/29/2024) for Next scheduled follow up.      VITAL SIGNS     Visit Vitals  /76 (BP Location: Left arm, Patient Position: Sitting, Cuff Size: Adult)   Pulse 92   Temp 97.9 °F (36.6 °C) (Temporal)   Ht 160 cm (63\")   Wt 70.3 kg (155 lb)   SpO2 98%   BMI 27.46 kg/m²     Wt Readings from Last 3 Encounters:   05/29/24 70.3 kg (155 lb)   03/01/24 69.1 kg (152 lb 6.4 oz)   01/10/24 62.6 kg (138 lb)     Body mass index is 27.46 kg/m².    MEDICATIONS AT THE TIME OF OFFICE VISIT     Current Outpatient Medications on File Prior to Visit   Medication Sig    acetaminophen (TYLENOL) 500 MG tablet Take 1 tablet by mouth Every 6 (Six) Hours As Needed. Indications: Pain    aspirin 81 MG chewable tablet Chew 1 tablet Daily.    atorvastatin (LIPITOR) 80 MG tablet TAKE 1 TABLET BY MOUTH EVERY DAY AT NIGHT    Biotin 1 MG capsule Take  by " mouth.    Cholecalciferol (VITAMIN D) 1000 UNITS tablet Take 1 tablet by mouth Daily.    cyanocobalamin (VITAMIN B-12) 1000 MCG tablet Take 1 tablet by mouth Daily.    furosemide (LASIX) 40 MG tablet TAKE 1 TABLET BY MOUTH TWICE A DAY (Patient taking differently: Take 1 tablet by mouth Daily.)    metoprolol tartrate (LOPRESSOR) 25 MG tablet TAKE 0.5 TABLET BY MOUTH EVERY 12 HOURS. INDICATIONS: HIGH BLOOD PRESSURE DISORDER    potassium chloride (K-DUR,KLOR-CON) 20 MEQ CR tablet Take 1 tablet by mouth Daily.    vitamin C (ASCORBIC ACID) 500 MG tablet Take 1 tablet by mouth Daily. Indications: Inadequate Vitamin C    [DISCONTINUED] azithromycin (Zithromax Z-Riaz) 250 MG tablet Take 2 tablets by mouth on day 1, then 1 tablet daily on days 2-5 (Patient not taking: Reported on 5/29/2024)    [DISCONTINUED] potassium chloride 10 MEQ CR tablet TAKE 1 TABLET BY MOUTH EVERY DAY (Patient not taking: Reported on 5/29/2024)     No current facility-administered medications on file prior to visit.      HISTORY OF PRESENT ILLNESS     Seen by neurology January 10, 2024, history of stroke ESUS with pneumonia with lung abscess.  Left hemiparesis and speech impairment.  Received TNK timely.  multiple embolic appearing strokes, ELIGIO negative for vegetation or cardiac source.  She had no evidence of thrombotic microangiopathy and had thoracentesis and fluid negative for malignancy.  Had PICC line antibiotics.  Updated chest CT was performed May 23, 2024 and ordered by Dr. Barrett pulmonary.  Revealed decreased ill-defined somewhat nodular opacity within the right lower lobe and a stable ill-defined density superior segment right lower lobe with continued follow-up recommended.  Showed no new nodules or infiltrates.  No lymphadenopathy or pleural effusion.  Coronary artery calcifications are present with small hiatal hernia. Loop monitor placed was also placed paroxysmal SVT, on metoprolol.  Started on DAPT and high intensity statin.  A  repeat CTA showed recanalization of prior occluded A2 and M2.  She was dropped down to monotherapy and continued on aspirin and high intensity statin.  She was given a gabapentin prescription which she is not currently taking believe that helped her symptoms.  She was complaining of abdominal bloating, lower extremity edema.  She is now taking Lasix 40 mg daily along with potassium chloride 20 mEq daily with improvement      DEXA scan April 9, 2024 showing osteopenia without progression to osteoporosis.  Recommendation of vitamin D supplement.    Mammogram January 24, 2024, benign.    Colonoscopy January 2022 with recall in 3 years.    REVIEW OF SYSTEMS     Constitutional neg except per HPI   ENT congestion, sore throat  Resp cough   CV neg     PHYSICAL EXAMINATION     Physical Exam  Constitutional  No distress  Cardiovascular Rate  normal . Rhythm: regular . Heart sounds:  normal  Pulmonary/Chest  Effort normal. Breath sounds:  normal  GI negative distention  Musc resolved edema with lasix   Psychiatric  Alert. Judgment and thought content normal. Mood normal     REVIEWED DATA     Labs:     Scan through [employer]      Imaging:           Medical Tests:           Summary of old records / correspondence / consultant report:           Request outside records:             *Dragon dictation used for documentation.

## 2024-06-03 ENCOUNTER — TRANSCRIBE ORDERS (OUTPATIENT)
Dept: ADMINISTRATIVE | Facility: HOSPITAL | Age: 74
End: 2024-06-03
Payer: COMMERCIAL

## 2024-06-03 DIAGNOSIS — R91.1 LUNG NODULE: Primary | ICD-10-CM

## 2024-06-04 DIAGNOSIS — R60.0 LOWER EXTREMITY EDEMA: ICD-10-CM

## 2024-06-04 RX ORDER — POTASSIUM CHLORIDE 1500 MG/1
20 TABLET, EXTENDED RELEASE ORAL DAILY
Qty: 90 TABLET | Refills: 1 | Status: SHIPPED | OUTPATIENT
Start: 2024-06-04

## 2024-06-11 ENCOUNTER — TELEPHONE (OUTPATIENT)
Age: 74
End: 2024-06-11

## 2024-06-11 NOTE — TELEPHONE ENCOUNTER
The pt with a MDT LOOP recorder had a remote report come through that shows 1 pause labeled event on 5/27 00:05 with strip that appears to show rhythm suggestive of a block lasting 4 seconds then 1-2 noise/VS beats and then another 7 second pause - there appears to be pwaves indicating block?. Please review. I called and LVM with pt to see if she noticed this event.     Kindly,   Meg Schotanus Device RN

## 2024-06-11 NOTE — TELEPHONE ENCOUNTER
I tried calling her and her spouse.  I got voicemail and left to voicemails.  We need to talk to her and find out whether she has had any symptoms and I think that even if she has not we need to strongly consider a pacemaker given that this was paroxysmal AV block and it was real

## 2024-06-12 NOTE — TELEPHONE ENCOUNTER
I was able to get ahold of the pt and discussed this event with her. She did not recall this event - she was likely asleep. I told her I would relay this information to you and that you likely still would like to discuss this with her.   Kindly,   Meg Schotanus Device RN

## 2024-06-12 NOTE — TELEPHONE ENCOUNTER
She had a period of paroxysmal AV block at midnight during sleep.  I called her.  She reports absolutely 0 episodes of dizziness fainting, near fainting or any symptoms that could be due to bradycardia and has no other episodes on the monitor.  Given the absence of any symptomatic bradycardia I think we continue to follow her.  She was happy with that.  She has a loop recorder so we are seeing her every month in terms of her rhythm

## 2024-06-17 ENCOUNTER — TELEPHONE (OUTPATIENT)
Dept: NEUROLOGY | Facility: CLINIC | Age: 74
End: 2024-06-17
Payer: COMMERCIAL

## 2024-06-17 NOTE — TELEPHONE ENCOUNTER
LVM to pt in regard to new location of the [facility] Neurology clinic and Patient message

## 2024-06-20 NOTE — PROGRESS NOTES
CC: follow-up r/t stroke    HPI:  Katherine Williamson is a  72 y.o.  right-handed female past medical history of hypertension and arthritis who is here for hospital follow-up.  Back in May she had an acute team D activation.  She had been admitted with pneumonia and right lung abscess who developed cute onset of left hemiparesis and speech disturbance witnessed by her treating nurse.  She received TNK timely.  CTA showed right MCA perfusion deficit of the right posterior parietal lobe and a distal M3 occlusion.  She had no events on telemetry and TTE was negative for cardiac source.  MRI revealed acute embolic appearing strokes of multiple territories strongly suspicious of embolic etiology.  ELIGIO was also done but unremarkable.  She had loop monitor placed.  She was started on DAPT and high intensity statin.  She continued her PICC line antibiotics.  Loop monitor was placed after cardiac monitor negative for A-fib -showed asymptomatic VT.  She is doing very well no further strokelike symptoms.  Since the time of her stroke she has noticed left foot numbness but not bothersome or painful    When last seen her foot pain more bothersome.  We started gabapentin but she stopped this for wt gain.  She tells she has gained 25lbs since she stopped smoking.  CTA was repeated in December and improvement seen.  We dropped her down to monotherapy and she continues to not smoke.  No further stroke like symptoms but says she has less patience than before the stroke and easier to say snappy statements that previously she would have refrained from saying outloud      Social history: Ex-smoker pack a day, 50-year pack a day history, drinks beer upto 5 a night    Family history: Father with heart disease, mother with breast cancer      Pain Scale:        ROS:  Review of Systems   Constitutional:  Negative for fatigue and unexpected weight change.   HENT:  Negative for ear pain, hearing loss, nosebleeds and tinnitus.    Eyes:  Negative for  pain and visual disturbance.   Respiratory:  Negative for chest tightness, shortness of breath and wheezing.    Cardiovascular:  Negative for chest pain and palpitations.   Musculoskeletal:  Positive for gait problem (2 falls since LOV).   Skin:  Negative for color change, pallor, rash and wound.   Allergic/Immunologic: Negative for food allergies.   Neurological:  Positive for numbness (left foot). Negative for dizziness, speech difficulty and weakness.   Psychiatric/Behavioral:  Negative for confusion, decreased concentration and sleep disturbance. The patient is not nervous/anxious.        Reviewed ROS conducted by MA and john        Physical Exam:  There were no vitals filed for this visit.   Orthostatic BP:    There is no height or weight on file to calculate BMI.        General: pt well appearing, no distress  HEENT: Normocephalic, conjunctiva normal, external canals normal, no nasal discharge, moist mucous membranes  Neck: No lymphadenopathy, thyroid not enlarged, no JVD  CV: Regular rate and rhythm, no murmurs negative no bruits auscultated at neck, equal pulses  Pulmonary: Normal respiratory effort, clear to auscultation bilaterally  Extremities: no edema, bruising, or skin lesions  Pysch: good eye contact, cooperative, full affect, euthymic mood, good attention, good insight  Mental: alert, conversant, AOx3, provides history  Language fluent, names, repeats.  CN: CN II-XII intact, PERRL, EOMi, no gaze palsy or nystagmus, intact facial sensation, no facial assymetry, intact hearing, symmetric palate elevation, tongue midline, good SCM strength  Motor: no abnormal movements, no pronator drift, normal  bulk and tone, 5/5 strength b/l UE & LE  Sensory: Glove and stocking distribution decreased sensation lower extremities   reflexes: 2+ throughout, neg babinski  Coordination: no ataxia on finger-nose, heel-shin  Gait: normal gait, no ataxia, intact heel and toe walking        Results:      Lab Results  "  Component Value Date    GLUCOSE 76 08/02/2023    BUN 9 08/02/2023    CREATININE 0.63 08/02/2023    EGFRIFNONA 76 04/18/2018    EGFRIFAFRI 93 04/18/2018    BCR 14.3 08/02/2023    CO2 22.4 08/02/2023    CALCIUM 10.1 08/02/2023    PROTENTOTREF 7.2 04/18/2018    ALBUMIN 2.7 (L) 06/19/2023    LABIL2 1.4 04/18/2018    AST 46 (H) 06/19/2023    ALT 47 (H) 06/19/2023       Lab Results   Component Value Date    WBC 8.92 06/26/2023    HGB 11.6 (L) 06/26/2023    HCT 34.8 06/26/2023    MCV 89.0 06/26/2023     06/26/2023         .No results found for: \"RPR\"      Lab Results   Component Value Date    TSH 1.160 04/18/2018         No results found for: \"IGWUIWVQ76\"      No results found for: \"FOLATE\"      Lab Results   Component Value Date    HGBA1C 6.20 (H) 05/15/2023         Lab Results   Component Value Date    GLUCOSE 76 08/02/2023    BUN 9 08/02/2023    CREATININE 0.63 08/02/2023    EGFRIFNONA 76 04/18/2018    EGFRIFAFRI 93 04/18/2018    BCR 14.3 08/02/2023    K 4.9 08/02/2023    CO2 22.4 08/02/2023    CALCIUM 10.1 08/02/2023    PROTENTOTREF 7.2 04/18/2018    ALBUMIN 2.7 (L) 06/19/2023    LABIL2 1.4 04/18/2018    AST 46 (H) 06/19/2023    ALT 47 (H) 06/19/2023         Diagnosis:  1.  History of stroke, ESUS  2.  Pneumonia with lung abscess    Impression: 72-year-old right-handed female with past medical history of hypertension and arthritis who was admitted to the hospital with pneumonia found to have right lung abscess and while inpatient developed strokelike symptoms of left hemiparesis and speech impairment.  She was able to she received TNK timely.  Was found to have multiple embolic appearing strokes.  She had ELIGIO negative for vegetation or cardiac source.  No evidence of a thrombotic microangiopathy and had thoracentesis and fluid negative for malignancy.    She finished her PICC line antibiotics and followed with ID.  Has pending CT chest for follow-up.  Loop monitor has since been placed.  ESUS either " cardioembolic or athero embolic etiology suspected    She is neurologically is intact but complains of left foot numbness since this day.  There is subtle glove and stocking distribution sensory loss nondermatomal.  Reviewed neuroimaging and large stroke bed of L corona radiata region can explain her contralateral numbness and and suspect poststroke residual given her clinical story.  Little concern for separate neurologic etiology-can continue to monitor       Plan:  Continue ASA and high intensity statin for secondary prevention  Encouraged less etoh use  Try cymbalta for bothersome neuropathic sxs    F/u in 9-12 mos    I spent at least 40 minutes interviewing, examining, and counseling patient.  I independently reviewed documentation, laboratory and diagnostic findings, external documentation where applicable, and formulated treatment plan which was discussed with the patient.

## 2024-06-25 ENCOUNTER — OFFICE VISIT (OUTPATIENT)
Dept: NEUROLOGY | Facility: CLINIC | Age: 74
End: 2024-06-25
Payer: COMMERCIAL

## 2024-06-25 ENCOUNTER — TELEPHONE (OUTPATIENT)
Age: 74
End: 2024-06-25
Payer: COMMERCIAL

## 2024-06-25 VITALS
HEART RATE: 66 BPM | SYSTOLIC BLOOD PRESSURE: 112 MMHG | HEIGHT: 63 IN | OXYGEN SATURATION: 96 % | BODY MASS INDEX: 27.46 KG/M2 | DIASTOLIC BLOOD PRESSURE: 76 MMHG | WEIGHT: 155 LBS

## 2024-06-25 DIAGNOSIS — M79.2 NEUROPATHIC PAIN: ICD-10-CM

## 2024-06-25 DIAGNOSIS — I63.9 CRYPTOGENIC STROKE: ICD-10-CM

## 2024-06-25 DIAGNOSIS — Z86.73 HISTORY OF STROKE: Primary | ICD-10-CM

## 2024-06-25 RX ORDER — DULOXETIN HYDROCHLORIDE 30 MG/1
30 CAPSULE, DELAYED RELEASE ORAL DAILY
Qty: 30 CAPSULE | Refills: 2 | Status: SHIPPED | OUTPATIENT
Start: 2024-06-25 | End: 2025-06-25

## 2024-06-25 NOTE — TELEPHONE ENCOUNTER
----- Message from Minnie ZURITA sent at 6/25/2024  2:43 PM EDT -----  Regarding: FW: Loop insertion   Contact: [phone]        ----- Message -----  From: Katherine Williamson  Sent: 6/25/2024   2:17 PM EDT  To: Yaima Weiss [facility]  Subject: Loop insertion                                   I have a notification about a loop insertion procedure that I need to respond to by July 19. It won’t let me go further. I have a loop insertion that was done September of 2023.

## 2024-06-25 NOTE — LETTER
June 25, 2024       No Recipients    Patient: Katherine Williamson   YOB: 1950   Date of Visit: 6/25/2024     Dear Zelalem Flor, DNP, APRN:       Thank you for referring Katherine Williamson to me for evaluation. Below are the relevant portions of my assessment and plan of care.    If you have questions, please do not hesitate to call me. I look forward to following Katherine along with you.         Sincerely,        AYLA Gomes        CC:   No Recipients    Marsha Ferris PA  06/25/24 1534  Sign when Signing Visit  CC: follow-up r/t stroke    HPI:  Katherine Williamson is a  72 y.o.  right-handed female past medical history of hypertension and arthritis who is here for hospital follow-up.  Back in May she had an acute team D activation.  She had been admitted with pneumonia and right lung abscess who developed cute onset of left hemiparesis and speech disturbance witnessed by her treating nurse.  She received TNK timely.  CTA showed right MCA perfusion deficit of the right posterior parietal lobe and a distal M3 occlusion.  She had no events on telemetry and TTE was negative for cardiac source.  MRI revealed acute embolic appearing strokes of multiple territories strongly suspicious of embolic etiology.  ELIGIO was also done but unremarkable.  She had loop monitor placed.  She was started on DAPT and high intensity statin.  She continued her PICC line antibiotics.  Loop monitor was placed after cardiac monitor negative for A-fib -showed asymptomatic VT.  She is doing very well no further strokelike symptoms.  Since the time of her stroke she has noticed left foot numbness but not bothersome or painful    When last seen her foot pain more bothersome.  We started gabapentin but she stopped this for wt gain.  She tells she has gained 25lbs since she stopped smoking.  CTA was repeated in December and improvement seen.  We dropped her down to monotherapy and she continues to not smoke.  No further stroke like symptoms but  says she has less patience than before the stroke and easier to say snappy statements that previously she would have refrained from saying outloud      Social history: Ex-smoker pack a day, 50-year pack a day history, drinks beer upto 5 a night    Family history: Father with heart disease, mother with breast cancer      Pain Scale:        ROS:  Review of Systems   Constitutional:  Negative for fatigue and unexpected weight change.   HENT:  Negative for ear pain, hearing loss, nosebleeds and tinnitus.    Eyes:  Negative for pain and visual disturbance.   Respiratory:  Negative for chest tightness, shortness of breath and wheezing.    Cardiovascular:  Negative for chest pain and palpitations.   Musculoskeletal:  Positive for gait problem (2 falls since LOV).   Skin:  Negative for color change, pallor, rash and wound.   Allergic/Immunologic: Negative for food allergies.   Neurological:  Positive for numbness (left foot). Negative for dizziness, speech difficulty and weakness.   Psychiatric/Behavioral:  Negative for confusion, decreased concentration and sleep disturbance. The patient is not nervous/anxious.        Reviewed ROS conducted by MA and john        Physical Exam:  There were no vitals filed for this visit.   Orthostatic BP:    There is no height or weight on file to calculate BMI.        General: pt well appearing, no distress  HEENT: Normocephalic, conjunctiva normal, external canals normal, no nasal discharge, moist mucous membranes  Neck: No lymphadenopathy, thyroid not enlarged, no JVD  CV: Regular rate and rhythm, no murmurs negative no bruits auscultated at neck, equal pulses  Pulmonary: Normal respiratory effort, clear to auscultation bilaterally  Extremities: no edema, bruising, or skin lesions  Pysch: good eye contact, cooperative, full affect, euthymic mood, good attention, good insight  Mental: alert, conversant, AOx3, provides history  Language fluent, names, repeats.  CN: CN II-XII intact,  "PERRL, EOMi, no gaze palsy or nystagmus, intact facial sensation, no facial assymetry, intact hearing, symmetric palate elevation, tongue midline, good SCM strength  Motor: no abnormal movements, no pronator drift, normal  bulk and tone, 5/5 strength b/l UE & LE  Sensory: Glove and stocking distribution decreased sensation lower extremities   reflexes: 2+ throughout, neg babinski  Coordination: no ataxia on finger-nose, heel-shin  Gait: normal gait, no ataxia, intact heel and toe walking        Results:      Lab Results   Component Value Date    GLUCOSE 76 08/02/2023    BUN 9 08/02/2023    CREATININE 0.63 08/02/2023    EGFRIFNONA 76 04/18/2018    EGFRIFAFRI 93 04/18/2018    BCR 14.3 08/02/2023    CO2 22.4 08/02/2023    CALCIUM 10.1 08/02/2023    PROTENTOTREF 7.2 04/18/2018    ALBUMIN 2.7 (L) 06/19/2023    LABIL2 1.4 04/18/2018    AST 46 (H) 06/19/2023    ALT 47 (H) 06/19/2023       Lab Results   Component Value Date    WBC 8.92 06/26/2023    HGB 11.6 (L) 06/26/2023    HCT 34.8 06/26/2023    MCV 89.0 06/26/2023     06/26/2023         .No results found for: \"RPR\"      Lab Results   Component Value Date    TSH 1.160 04/18/2018         No results found for: \"[id number]\"      No results found for: \"FOLATE\"      Lab Results   Component Value Date    HGBA1C 6.20 (H) 05/15/2023         Lab Results   Component Value Date    GLUCOSE 76 08/02/2023    BUN 9 08/02/2023    CREATININE 0.63 08/02/2023    EGFRIFNONA 76 04/18/2018    EGFRIFAFRI 93 04/18/2018    BCR 14.3 08/02/2023    K 4.9 08/02/2023    CO2 22.4 08/02/2023    CALCIUM 10.1 08/02/2023    PROTENTOTREF 7.2 04/18/2018    ALBUMIN 2.7 (L) 06/19/2023    LABIL2 1.4 04/18/2018    AST 46 (H) 06/19/2023    ALT 47 (H) 06/19/2023         Diagnosis:  1.  History of stroke, ESUS  2.  Pneumonia with lung abscess    Impression: 72-year-old right-handed female with past medical history of hypertension and arthritis who was admitted to the hospital with pneumonia found to have " right lung abscess and while inpatient developed strokelike symptoms of left hemiparesis and speech impairment.  She was able to she received TNK timely.  Was found to have multiple embolic appearing strokes.  She had ELIGIO negative for vegetation or cardiac source.  No evidence of a thrombotic microangiopathy and had thoracentesis and fluid negative for malignancy.    She finished her PICC line antibiotics and followed with ID.  Has pending CT chest for follow-up.  Loop monitor has since been placed.  ESUS either cardioembolic or athero embolic etiology suspected    She is neurologically is intact but complains of left foot numbness since this day.  There is subtle glove and stocking distribution sensory loss nondermatomal.  Reviewed neuroimaging and large stroke bed of L corona radiata region can explain her contralateral numbness and and suspect poststroke residual given her clinical story.  Little concern for separate neurologic etiology-can continue to monitor       Plan:  Continue ASA and high intensity statin for secondary prevention  Encouraged less etoh use  Try cymbalta for bothersome neuropathic sxs    F/u in 9-12 mos    I spent at least 40 minutes interviewing, examining, and counseling patient.  I independently reviewed documentation, laboratory and diagnostic findings, external documentation where applicable, and formulated treatment plan which was discussed with the patient.

## 2024-06-25 NOTE — TELEPHONE ENCOUNTER
I'm not sure what her notification is or that it came from us. Her loop was placed 9/5/2023 and has been communicating appropriately.  I will call her.  NS        Spoke with pt. It sounds like a MyChart issue. She is receiving notifications from 2023 procedures and appointments. I gave her the MyChart number to call. ([phone])    NS

## 2024-07-18 RX ORDER — DULOXETIN HYDROCHLORIDE 30 MG/1
30 CAPSULE, DELAYED RELEASE ORAL DAILY
Qty: 90 CAPSULE | Refills: 0 | Status: SHIPPED | OUTPATIENT
Start: 2024-07-18 | End: 2025-07-18

## 2024-07-27 RX ORDER — ATORVASTATIN CALCIUM 80 MG/1
80 TABLET, FILM COATED ORAL
Qty: 90 TABLET | Refills: 1 | Status: SHIPPED | OUTPATIENT
Start: 2024-07-27

## 2024-08-21 ENCOUNTER — TELEPHONE (OUTPATIENT)
Dept: INTERNAL MEDICINE | Age: 74
End: 2024-08-21
Payer: COMMERCIAL

## 2024-08-21 DIAGNOSIS — I10 BENIGN ESSENTIAL HTN: ICD-10-CM

## 2024-08-21 DIAGNOSIS — I10 HYPERTENSION, UNSPECIFIED TYPE: Primary | ICD-10-CM

## 2024-08-21 DIAGNOSIS — R60.0 LOWER EXTREMITY EDEMA: ICD-10-CM

## 2024-08-21 NOTE — TELEPHONE ENCOUNTER
DUE TO OVERDUE RESULTS I SPOKE TO PT TO SCHEDULE LAB ONLY APPT. PT WANTS LAB ORDER SENT TO [facility]. FAXED LAB ORDERS [phone], PHONE [phone]  ADDRESS: [address] [place] [address] [place] [address]

## 2024-09-25 ENCOUNTER — OFFICE VISIT (OUTPATIENT)
Dept: INTERNAL MEDICINE | Age: 74
End: 2024-09-25
Payer: COMMERCIAL

## 2024-09-25 VITALS
DIASTOLIC BLOOD PRESSURE: 78 MMHG | HEART RATE: 72 BPM | TEMPERATURE: 97.9 F | BODY MASS INDEX: 27.29 KG/M2 | HEIGHT: 63 IN | WEIGHT: 154 LBS | OXYGEN SATURATION: 96 % | SYSTOLIC BLOOD PRESSURE: 132 MMHG

## 2024-09-25 DIAGNOSIS — I10 BENIGN ESSENTIAL HTN: ICD-10-CM

## 2024-09-25 DIAGNOSIS — R19.5 DARK STOOLS: ICD-10-CM

## 2024-09-25 DIAGNOSIS — E78.5 HYPERLIPIDEMIA, UNSPECIFIED HYPERLIPIDEMIA TYPE: ICD-10-CM

## 2024-09-25 DIAGNOSIS — R10.84 GENERALIZED ABDOMINAL PAIN: ICD-10-CM

## 2024-09-25 DIAGNOSIS — R10.9 ABDOMINAL CRAMPING: ICD-10-CM

## 2024-09-25 DIAGNOSIS — R19.7 DIARRHEA, UNSPECIFIED TYPE: Primary | ICD-10-CM

## 2024-09-25 PROCEDURE — 99214 OFFICE O/P EST MOD 30 MIN: CPT | Performed by: NURSE PRACTITIONER

## 2024-09-26 LAB
ALBUMIN SERPL-MCNC: 4.3 G/DL (ref 3.5–5.2)
ALBUMIN/GLOB SERPL: 1.5 G/DL
ALP SERPL-CCNC: 175 U/L (ref 39–117)
ALT SERPL-CCNC: 25 U/L (ref 1–33)
AMYLASE SERPL-CCNC: 65 U/L (ref 28–100)
AST SERPL-CCNC: 28 U/L (ref 1–32)
BASOPHILS # BLD AUTO: 0.05 10*3/MM3 (ref 0–0.2)
BASOPHILS NFR BLD AUTO: 0.6 % (ref 0–1.5)
BILIRUB SERPL-MCNC: 0.7 MG/DL (ref 0–1.2)
BUN SERPL-MCNC: 11 MG/DL (ref 8–23)
BUN/CREAT SERPL: 17.5 (ref 7–25)
CALCIUM SERPL-MCNC: 9.6 MG/DL (ref 8.6–10.5)
CHLORIDE SERPL-SCNC: 101 MMOL/L (ref 98–107)
CHOLEST SERPL-MCNC: 141 MG/DL (ref 0–200)
CHOLEST/HDLC SERPL: 1.62 {RATIO}
CO2 SERPL-SCNC: 26.8 MMOL/L (ref 22–29)
CREAT SERPL-MCNC: 0.63 MG/DL (ref 0.57–1)
EGFRCR SERPLBLD CKD-EPI 2021: 95 ML/MIN/1.73
EOSINOPHIL # BLD AUTO: 0.16 10*3/MM3 (ref 0–0.4)
EOSINOPHIL NFR BLD AUTO: 1.9 % (ref 0.3–6.2)
ERYTHROCYTE [DISTWIDTH] IN BLOOD BY AUTOMATED COUNT: 13 % (ref 12.3–15.4)
FERRITIN SERPL-MCNC: 173 NG/ML (ref 13–150)
GLOBULIN SER CALC-MCNC: 2.9 GM/DL
GLUCOSE SERPL-MCNC: 97 MG/DL (ref 65–99)
HCT VFR BLD AUTO: 44 % (ref 34–46.6)
HDLC SERPL-MCNC: 87 MG/DL (ref 40–60)
HGB BLD-MCNC: 14.9 G/DL (ref 12–15.9)
IMM GRANULOCYTES # BLD AUTO: 0.03 10*3/MM3 (ref 0–0.05)
IMM GRANULOCYTES NFR BLD AUTO: 0.4 % (ref 0–0.5)
IRON SATN MFR SERPL: 25 % (ref 20–50)
IRON SERPL-MCNC: 90 MCG/DL (ref 37–145)
LDLC SERPL CALC-MCNC: 42 MG/DL (ref 0–100)
LIPASE SERPL-CCNC: 19 U/L (ref 13–60)
LYMPHOCYTES # BLD AUTO: 2.45 10*3/MM3 (ref 0.7–3.1)
LYMPHOCYTES NFR BLD AUTO: 28.6 % (ref 19.6–45.3)
MCH RBC QN AUTO: 32.3 PG (ref 26.6–33)
MCHC RBC AUTO-ENTMCNC: 33.9 G/DL (ref 31.5–35.7)
MCV RBC AUTO: 95.4 FL (ref 79–97)
MONOCYTES # BLD AUTO: 0.46 10*3/MM3 (ref 0.1–0.9)
MONOCYTES NFR BLD AUTO: 5.4 % (ref 5–12)
NEUTROPHILS # BLD AUTO: 5.41 10*3/MM3 (ref 1.7–7)
NEUTROPHILS NFR BLD AUTO: 63.1 % (ref 42.7–76)
NRBC BLD AUTO-RTO: 0 /100 WBC (ref 0–0.2)
PLATELET # BLD AUTO: 216 10*3/MM3 (ref 140–450)
POTASSIUM SERPL-SCNC: 4.7 MMOL/L (ref 3.5–5.2)
PROT SERPL-MCNC: 7.2 G/DL (ref 6–8.5)
RBC # BLD AUTO: 4.61 10*6/MM3 (ref 3.77–5.28)
SODIUM SERPL-SCNC: 138 MMOL/L (ref 136–145)
TIBC SERPL-MCNC: 365 MCG/DL
TRIGL SERPL-MCNC: 54 MG/DL (ref 0–150)
UIBC SERPL-MCNC: 275 MCG/DL (ref 112–346)
VLDLC SERPL CALC-MCNC: 12 MG/DL (ref 5–40)
WBC # BLD AUTO: 8.56 10*3/MM3 (ref 3.4–10.8)

## 2024-10-01 LAB
BACTERIA SPEC CULT: NORMAL
BACTERIA SPEC CULT: NORMAL
C DIFF TOX GENS STL QL NAA+PROBE: NEGATIVE
CAMPYLOBACTER STL CULT: NORMAL
E COLI SXT STL QL IA: NEGATIVE
HEMOCCULT STL QL IA: NEGATIVE
SALM + SHIG STL CULT: NORMAL

## 2024-10-14 RX ORDER — DULOXETIN HYDROCHLORIDE 30 MG/1
30 CAPSULE, DELAYED RELEASE ORAL DAILY
Qty: 90 CAPSULE | Refills: 3 | Status: SHIPPED | OUTPATIENT
Start: 2024-10-14

## 2024-10-15 ENCOUNTER — PREP FOR SURGERY (OUTPATIENT)
Dept: OTHER | Facility: HOSPITAL | Age: 74
End: 2024-10-15
Payer: COMMERCIAL

## 2024-10-15 DIAGNOSIS — Z86.0100 HISTORY OF COLON POLYPS: Primary | ICD-10-CM

## 2024-10-15 DIAGNOSIS — Z80.0 FAMILY HISTORY OF COLON CANCER IN MOTHER: ICD-10-CM

## 2024-10-15 DIAGNOSIS — R19.7 DIARRHEA, UNSPECIFIED TYPE: Primary | ICD-10-CM

## 2024-10-23 ENCOUNTER — HOSPITAL ENCOUNTER (OUTPATIENT)
Facility: HOSPITAL | Age: 74
Discharge: HOME | End: 2024-10-23
Admitting: NURSE PRACTITIONER
Payer: COMMERCIAL

## 2024-10-23 DIAGNOSIS — R19.7 DIARRHEA, UNSPECIFIED TYPE: ICD-10-CM

## 2024-10-23 DIAGNOSIS — R10.9 ABDOMINAL CRAMPING: ICD-10-CM

## 2024-10-23 DIAGNOSIS — R10.84 GENERALIZED ABDOMINAL PAIN: ICD-10-CM

## 2024-10-23 DIAGNOSIS — R19.5 DARK STOOLS: ICD-10-CM

## 2024-10-23 PROCEDURE — 25510000001 IOPAMIDOL 61 % SOLUTION: Performed by: NURSE PRACTITIONER

## 2024-10-23 PROCEDURE — 74177 CT ABD & PELVIS W/CONTRAST: CPT

## 2024-10-23 RX ORDER — IOPAMIDOL 612 MG/ML
100 INJECTION, SOLUTION INTRAVASCULAR
Status: COMPLETED | OUTPATIENT
Start: 2024-10-23 | End: 2024-10-23

## 2024-10-23 RX ADMIN — IOPAMIDOL 85 ML: 612 INJECTION, SOLUTION INTRAVENOUS at 09:58

## 2024-10-25 ENCOUNTER — LAB (OUTPATIENT)
Dept: LAB | Facility: HOSPITAL | Age: 74
End: 2024-10-25
Payer: COMMERCIAL

## 2024-10-25 ENCOUNTER — PREP FOR SURGERY (OUTPATIENT)
Dept: SURGERY | Facility: SURGERY CENTER | Age: 74
End: 2024-10-25
Payer: COMMERCIAL

## 2024-10-25 ENCOUNTER — OFFICE VISIT (OUTPATIENT)
Dept: GASTROENTEROLOGY | Facility: CLINIC | Age: 74
End: 2024-10-25
Payer: COMMERCIAL

## 2024-10-25 VITALS
SYSTOLIC BLOOD PRESSURE: 130 MMHG | HEIGHT: 63 IN | WEIGHT: 151.9 LBS | HEART RATE: 90 BPM | DIASTOLIC BLOOD PRESSURE: 80 MMHG | OXYGEN SATURATION: 96 % | BODY MASS INDEX: 26.91 KG/M2 | TEMPERATURE: 96.1 F

## 2024-10-25 DIAGNOSIS — Z80.0 FAMILY HISTORY OF COLON CANCER IN MOTHER: ICD-10-CM

## 2024-10-25 DIAGNOSIS — R79.89 ELEVATED LFTS: ICD-10-CM

## 2024-10-25 DIAGNOSIS — R19.7 DIARRHEA, UNSPECIFIED TYPE: ICD-10-CM

## 2024-10-25 DIAGNOSIS — K44.9 HIATAL HERNIA: ICD-10-CM

## 2024-10-25 DIAGNOSIS — K76.0 FATTY LIVER: ICD-10-CM

## 2024-10-25 DIAGNOSIS — R19.5 DARK STOOLS: ICD-10-CM

## 2024-10-25 DIAGNOSIS — R19.5 DARK STOOLS: Primary | ICD-10-CM

## 2024-10-25 DIAGNOSIS — Z86.0100 HISTORY OF COLON POLYPS: ICD-10-CM

## 2024-10-25 DIAGNOSIS — R19.7 DIARRHEA, UNSPECIFIED TYPE: Primary | ICD-10-CM

## 2024-10-25 LAB
ALPHA1 GLOB MFR UR ELPH: 69 MG/DL (ref 90–200)
CERULOPLASMIN SERPL-MCNC: 25 MG/DL (ref 19–39)
FERRITIN SERPL-MCNC: 161.3 NG/ML (ref 13–150)
GGT SERPL-CCNC: 32 U/L (ref 5–36)
HAV IGM SERPL QL IA: NORMAL
HBV CORE IGM SERPL QL IA: NORMAL
HBV SURFACE AG SERPL QL IA: NORMAL
HCV AB SER QL: NORMAL
IGA1 MFR SER: 396 MG/DL (ref 70–400)
IGG1 SER-MCNC: 1380 MG/DL (ref 700–1600)
IGM SERPL-MCNC: 64 MG/DL (ref 40–230)
IRON 24H UR-MRATE: 105 MCG/DL (ref 37–145)
IRON SATN MFR SERPL: 28 % (ref 20–50)
TIBC SERPL-MCNC: 381 MCG/DL (ref 298–536)
TRANSFERRIN SERPL-MCNC: 256 MG/DL (ref 200–360)

## 2024-10-25 PROCEDURE — 86231 EMA EACH IG CLASS: CPT | Performed by: NURSE PRACTITIONER

## 2024-10-25 PROCEDURE — 86015 ACTIN ANTIBODY EACH: CPT | Performed by: NURSE PRACTITIONER

## 2024-10-25 PROCEDURE — 82977 ASSAY OF GGT: CPT | Performed by: NURSE PRACTITIONER

## 2024-10-25 PROCEDURE — 82784 ASSAY IGA/IGD/IGG/IGM EACH: CPT | Performed by: NURSE PRACTITIONER

## 2024-10-25 PROCEDURE — 86381 MITOCHONDRIAL ANTIBODY EACH: CPT | Performed by: NURSE PRACTITIONER

## 2024-10-25 PROCEDURE — 82103 ALPHA-1-ANTITRYPSIN TOTAL: CPT | Performed by: NURSE PRACTITIONER

## 2024-10-25 PROCEDURE — 83540 ASSAY OF IRON: CPT | Performed by: NURSE PRACTITIONER

## 2024-10-25 PROCEDURE — 80074 ACUTE HEPATITIS PANEL: CPT | Performed by: NURSE PRACTITIONER

## 2024-10-25 PROCEDURE — 84466 ASSAY OF TRANSFERRIN: CPT | Performed by: NURSE PRACTITIONER

## 2024-10-25 PROCEDURE — 36415 COLL VENOUS BLD VENIPUNCTURE: CPT | Performed by: NURSE PRACTITIONER

## 2024-10-25 PROCEDURE — 82390 ASSAY OF CERULOPLASMIN: CPT | Performed by: NURSE PRACTITIONER

## 2024-10-25 PROCEDURE — 99214 OFFICE O/P EST MOD 30 MIN: CPT | Performed by: NURSE PRACTITIONER

## 2024-10-25 PROCEDURE — 82728 ASSAY OF FERRITIN: CPT | Performed by: NURSE PRACTITIONER

## 2024-10-25 PROCEDURE — 86038 ANTINUCLEAR ANTIBODIES: CPT | Performed by: NURSE PRACTITIONER

## 2024-10-25 PROCEDURE — 86364 TISS TRNSGLTMNASE EA IG CLAS: CPT | Performed by: NURSE PRACTITIONER

## 2024-10-25 RX ORDER — SODIUM CHLORIDE, SODIUM LACTATE, POTASSIUM CHLORIDE, CALCIUM CHLORIDE 600; 310; 30; 20 MG/100ML; MG/100ML; MG/100ML; MG/100ML
30 INJECTION, SOLUTION INTRAVENOUS CONTINUOUS PRN
OUTPATIENT
Start: 2024-10-25 | End: 2024-10-25

## 2024-10-25 RX ORDER — SODIUM CHLORIDE 0.9 % (FLUSH) 0.9 %
10 SYRINGE (ML) INJECTION AS NEEDED
OUTPATIENT
Start: 2024-10-25

## 2024-10-25 RX ORDER — SODIUM CHLORIDE 0.9 % (FLUSH) 0.9 %
3 SYRINGE (ML) INJECTION EVERY 12 HOURS SCHEDULED
OUTPATIENT
Start: 2024-10-25

## 2024-10-25 NOTE — PATIENT INSTRUCTIONS
Schedule EGD and colonoscopy for further evaluation.     Check lab work today to evaluate cause of liver enzyme elevation.    For fatty liver, weight loss is recommended. Recommend following a low fat and low sugar diet. Recommend management of diabetes and elevated cholesterol with primary care provider if indicated. Regular exercise is recommended. Alcohol avoidance is recommended.    Okay to continue using Imodium as needed for diarrhea.

## 2024-10-25 NOTE — H&P (VIEW-ONLY)
Chief Complaint   Patient presents with    Diarrhea         History of Present Illness  Patient is a 73-year-old female who presents today for evaluation, referred for Diarrhea.  Stool culture and C. difficile testing were negative and fecal occult blood testing was negative.  She had a CT scan completed earlier this week that showed diverticulosis with no diverticulitis.  No acute findings.  Incidental findings of hepatic steatosis and hiatal hernia. Most recent colonoscopy January 2022 with 3 hyperplastic polyps and diverticulosis.    Patient presents today for evaluation.  She started having diarrhea around September 1 and it has persisted on a daily basis since that time.  She is generally having 2-3 diarrheal stools per day in the morning.  There are no particular foods that seem to make her symptoms better or worse.  She denies any abdominal pain or blood in the stool.  She has been using Imodium which helps with the diarrhea.    She reports for the first 5 weeks that she had the diarrhea, her stool was black in color.  It has since returned to a brown color.    She denies any heartburn, reflux, nausea, or vomiting.    She smokes, generally 3 cigarettes/day down from a pack per day over the last year.    She drinks alcohol, generally 4-6 beers per day.    She is noted to have elevated liver function test.     Result Review :       COLONOSCOPY (01/12/2022 08:43)    Tissue Pathology Exam (01/12/2022 09:03)    Lipase (09/25/2024 11:33)    CBC w AUTO Differential (09/25/2024 11:33)    Amylase (09/25/2024 11:33)    Comprehensive Metabolic Panel (09/25/2024 11:33)    Occult Blood, Fecal By Immunoassay - Stool, Per Rectum (09/27/2024 11:35)    Clostridioides difficile Toxin, PCR - Stool, Per Rectum (09/27/2024 11:35)    Stool Culture (Reference Lab) - Stool, Per Rectum (09/27/2024 11:35)    CT Abdomen Pelvis With Contrast (10/23/2024 09:57)    Vital Signs:   /80   Pulse 90   Temp 96.1 °F (35.6 °C)   Ht 160  "cm (62.99\")   Wt 68.9 kg (151 lb 14.4 oz)   SpO2 96%   BMI 26.91 kg/m²     Body mass index is 26.91 kg/m².     Physical Exam  Vitals reviewed.   Constitutional:       General: She is not in acute distress.     Appearance: She is well-developed.   HENT:      Head: Normocephalic and atraumatic.   Pulmonary:      Effort: Pulmonary effort is normal. No respiratory distress.   Abdominal:      General: Abdomen is flat. Bowel sounds are normal. There is no distension.      Palpations: Abdomen is soft.      Tenderness: There is no abdominal tenderness.   Skin:     General: Skin is dry.      Coloration: Skin is not pale.   Neurological:      Mental Status: She is alert and oriented to person, place, and time.   Psychiatric:         Thought Content: Thought content normal.           Assessment and Plan    Diagnoses and all orders for this visit:    1. Diarrhea, unspecified type (Primary)    2. Dark stools    3. Hiatal hernia    4. Fatty liver    5. Elevated LFTs  -     Alpha - 1 - Antitrypsin  -     JASSON  -     Anti-Smooth Muscle Antibody Titer  -     Ferritin  -     Celiac Disease Panel  -     Ceruloplasmin  -     Gamma GT  -     Hepatitis Panel, Acute  -     IgG, IgA, IgM  -     Iron Profile  -     Mitochondrial Antibodies, M2         Discussion  Patient presents today for evaluation with concerns about persistent diarrhea, concern for colitis, specifically microscopic colitis and recommended updated colonoscopy with biopsies to evaluate for this.  Due to dark stools, recommended EGD at time of colonoscopy to evaluate for any upper GI bleeding as well as to assess for celiac disease.  Will arrange for these to be completed.    Recent CT scan showed evidence of fatty liver, this is likely cause of liver function test elevation but recommended additional blood work to rule out alternative cause.  Suspect fatty liver likely due to regular alcohol consumption.  Reviewed dietary and lifestyle modifications to help with fatty " liver at today's office visit.          Follow Up   Return for Follow up to review results after testing complete.    Patient Instructions   Schedule EGD and colonoscopy for further evaluation.     Check lab work today to evaluate cause of liver enzyme elevation.    For fatty liver, weight loss is recommended. Recommend following a low fat and low sugar diet. Recommend management of diabetes and elevated cholesterol with primary care provider if indicated. Regular exercise is recommended. Alcohol avoidance is recommended.    Okay to continue using Imodium as needed for diarrhea.

## 2024-10-26 LAB
MITOCHONDRIA M2 IGG SER-ACNC: <20 UNITS (ref 0–20)
SMA IGG SER-ACNC: 22 UNITS (ref 0–19)

## 2024-10-28 DIAGNOSIS — R79.89 ELEVATED LFTS: Primary | ICD-10-CM

## 2024-10-28 DIAGNOSIS — E88.01 LOW PLASMA ALPHA-1 ANTITRYPSIN: ICD-10-CM

## 2024-10-28 DIAGNOSIS — R79.89 ELEVATED FERRITIN: ICD-10-CM

## 2024-10-28 LAB
ANA SER QL: NEGATIVE
ENDOMYSIUM IGA SER QL: NEGATIVE
IGA SERPL-MCNC: 395 MG/DL (ref 64–422)
TTG IGA SER-ACNC: <2 U/ML (ref 0–3)

## 2024-10-29 DIAGNOSIS — R60.0 LOWER EXTREMITY EDEMA: ICD-10-CM

## 2024-10-29 DIAGNOSIS — E88.01 LOW PLASMA ALPHA-1 ANTITRYPSIN: ICD-10-CM

## 2024-10-29 DIAGNOSIS — R79.89 ELEVATED FERRITIN: ICD-10-CM

## 2024-10-29 DIAGNOSIS — R79.89 ELEVATED LFTS: Primary | ICD-10-CM

## 2024-10-29 RX ORDER — POTASSIUM CHLORIDE 1500 MG/1
20 TABLET, EXTENDED RELEASE ORAL DAILY
Qty: 90 TABLET | Refills: 1 | Status: SHIPPED | OUTPATIENT
Start: 2024-10-29

## 2024-11-05 ENCOUNTER — HOSPITAL ENCOUNTER (OUTPATIENT)
Dept: CT IMAGING | Facility: HOSPITAL | Age: 74
Discharge: HOME | End: 2024-11-05
Admitting: NURSE PRACTITIONER
Payer: COMMERCIAL

## 2024-11-05 VITALS
OXYGEN SATURATION: 95 % | SYSTOLIC BLOOD PRESSURE: 125 MMHG | HEART RATE: 84 BPM | RESPIRATION RATE: 18 BRPM | WEIGHT: 150 LBS | HEIGHT: 63 IN | DIASTOLIC BLOOD PRESSURE: 65 MMHG | TEMPERATURE: 97.3 F | BODY MASS INDEX: 26.58 KG/M2

## 2024-11-05 DIAGNOSIS — R79.89 ELEVATED LFTS: ICD-10-CM

## 2024-11-05 DIAGNOSIS — R79.89 ELEVATED FERRITIN: ICD-10-CM

## 2024-11-05 DIAGNOSIS — E88.01 LOW PLASMA ALPHA-1 ANTITRYPSIN: ICD-10-CM

## 2024-11-05 LAB
BASOPHILS # BLD AUTO: 0.05 10*3/MM3 (ref 0–0.2)
BASOPHILS NFR BLD AUTO: 0.6 % (ref 0–1.5)
DEPRECATED RDW RBC AUTO: 45.7 FL (ref 37–54)
EOSINOPHIL # BLD AUTO: 0.08 10*3/MM3 (ref 0–0.4)
EOSINOPHIL NFR BLD AUTO: 1 % (ref 0.3–6.2)
ERYTHROCYTE [DISTWIDTH] IN BLOOD BY AUTOMATED COUNT: 12.9 % (ref 12.3–15.4)
HCT VFR BLD AUTO: 44.9 % (ref 34–46.6)
HGB BLD-MCNC: 14.5 G/DL (ref 12–15.9)
IMM GRANULOCYTES # BLD AUTO: 0.02 10*3/MM3 (ref 0–0.05)
IMM GRANULOCYTES NFR BLD AUTO: 0.2 % (ref 0–0.5)
INR PPP: 0.9 (ref 0.8–1.2)
INR PPP: <0.8 (ref 0.8–1.2)
INR PPP: <0.8 (ref 0.8–1.2)
LYMPHOCYTES # BLD AUTO: 1.8 10*3/MM3 (ref 0.7–3.1)
LYMPHOCYTES NFR BLD AUTO: 22.3 % (ref 19.6–45.3)
MCH RBC QN AUTO: 30.7 PG (ref 26.6–33)
MCHC RBC AUTO-ENTMCNC: 32.3 G/DL (ref 31.5–35.7)
MCV RBC AUTO: 95.1 FL (ref 79–97)
MONOCYTES # BLD AUTO: 0.44 10*3/MM3 (ref 0.1–0.9)
MONOCYTES NFR BLD AUTO: 5.4 % (ref 5–12)
NEUTROPHILS NFR BLD AUTO: 5.69 10*3/MM3 (ref 1.7–7)
NEUTROPHILS NFR BLD AUTO: 70.5 % (ref 42.7–76)
NRBC BLD AUTO-RTO: 0 /100 WBC (ref 0–0.2)
PLATELET # BLD AUTO: 154 10*3/MM3 (ref 140–450)
PLATELET # BLD AUTO: 169 10*3/MM3 (ref 140–450)
PMV BLD AUTO: 12.9 FL (ref 6–12)
PROTHROMBIN TIME: 9.5 SECONDS (ref 12.8–15.2)
PROTHROMBIN TIME: <8.1 SECONDS (ref 12.8–15.2)
PROTHROMBIN TIME: <8.1 SECONDS (ref 12.8–15.2)
RBC # BLD AUTO: 4.72 10*6/MM3 (ref 3.77–5.28)
WBC NRBC COR # BLD AUTO: 8.08 10*3/MM3 (ref 3.4–10.8)

## 2024-11-05 PROCEDURE — 85610 PROTHROMBIN TIME: CPT

## 2024-11-05 PROCEDURE — 25010000002 LIDOCAINE 1 % SOLUTION: Performed by: NURSE PRACTITIONER

## 2024-11-05 PROCEDURE — 85049 AUTOMATED PLATELET COUNT: CPT | Performed by: RADIOLOGY

## 2024-11-05 PROCEDURE — 25010000002 FENTANYL CITRATE (PF) 50 MCG/ML SOLUTION: Performed by: RADIOLOGY

## 2024-11-05 PROCEDURE — 77012 CT SCAN FOR NEEDLE BIOPSY: CPT

## 2024-11-05 PROCEDURE — 25010000002 MIDAZOLAM PER 1 MG: Performed by: RADIOLOGY

## 2024-11-05 PROCEDURE — 85025 COMPLETE CBC W/AUTO DIFF WBC: CPT | Performed by: RADIOLOGY

## 2024-11-05 RX ORDER — SODIUM CHLORIDE 9 MG/ML
40 INJECTION, SOLUTION INTRAVENOUS AS NEEDED
Status: DISCONTINUED | OUTPATIENT
Start: 2024-11-05 | End: 2024-11-06 | Stop reason: HOSPADM

## 2024-11-05 RX ORDER — SODIUM CHLORIDE 0.9 % (FLUSH) 0.9 %
10 SYRINGE (ML) INJECTION AS NEEDED
Status: DISCONTINUED | OUTPATIENT
Start: 2024-11-05 | End: 2024-11-06 | Stop reason: HOSPADM

## 2024-11-05 RX ORDER — LIDOCAINE HYDROCHLORIDE 10 MG/ML
20 INJECTION, SOLUTION INFILTRATION; PERINEURAL ONCE
Status: COMPLETED | OUTPATIENT
Start: 2024-11-05 | End: 2024-11-05

## 2024-11-05 RX ORDER — SODIUM CHLORIDE 0.9 % (FLUSH) 0.9 %
3 SYRINGE (ML) INJECTION EVERY 12 HOURS SCHEDULED
Status: DISCONTINUED | OUTPATIENT
Start: 2024-11-05 | End: 2024-11-06 | Stop reason: HOSPADM

## 2024-11-05 RX ORDER — SODIUM CHLORIDE 9 MG/ML
25 INJECTION, SOLUTION INTRAVENOUS ONCE
Status: DISCONTINUED | OUTPATIENT
Start: 2024-11-05 | End: 2024-11-06 | Stop reason: HOSPADM

## 2024-11-05 RX ORDER — FENTANYL CITRATE 50 UG/ML
INJECTION, SOLUTION INTRAMUSCULAR; INTRAVENOUS AS NEEDED
Status: COMPLETED | OUTPATIENT
Start: 2024-11-05 | End: 2024-11-05

## 2024-11-05 RX ORDER — MIDAZOLAM HYDROCHLORIDE 1 MG/ML
INJECTION, SOLUTION INTRAMUSCULAR; INTRAVENOUS AS NEEDED
Status: COMPLETED | OUTPATIENT
Start: 2024-11-05 | End: 2024-11-05

## 2024-11-05 RX ADMIN — FENTANYL CITRATE 50 MCG: 50 INJECTION, SOLUTION INTRAMUSCULAR; INTRAVENOUS at 12:21

## 2024-11-05 RX ADMIN — MIDAZOLAM 1 MG: 1 INJECTION INTRAMUSCULAR; INTRAVENOUS at 12:21

## 2024-11-05 RX ADMIN — LIDOCAINE HYDROCHLORIDE 20 ML: 10 INJECTION, SOLUTION INFILTRATION; PERINEURAL at 12:21

## 2024-11-05 NOTE — NURSING NOTE
Pt IV removed. Pt verbalized understanding, NAD noted. Denies pain. Taken to main entrance in W/C by Юлия HORN II  and released to care of .   
Pt arrived for liver biopsy to bay #2. Pt reports fell prior to arrival to hospital. Pt with abrasions to right side of face.Pt offered ice pack and denies need at this tiime.  Family with pt.   
Pt returned bay #2 after liver biopsy. VSS. Gauze drsg with tegaderm CDI. Pt given ginger ale and box lunch. Pt on bedrest x2 hours. , Ronnie at BS.  
Alert-The patient is alert, awake and responds to voice. The patient is oriented to time, place, and person. The triage nurse is able to obtain subjective information.

## 2024-11-05 NOTE — DISCHARGE INSTRUCTIONS
EDUCATION /DISCHARGE INSTRUCTIONS  CT/US guided biopsy:  A biopsy is a procedure done to remove tissue for further analysis.  Before images are taken to locate the target area.  Images can be obtained using ultrasound, CT or MRI.  A physician will clean your skin with antiseptic soap, place a sterile towel around the site and administer a local anesthetic to numb the area.  The physician will then insert a special needle.  Sometimes images are taken of the needle after it is inserted to ensure the needle is in the correct area to be biopsied.   A sample is obtained and sent to the laboratory for study.  Occasionally the laboratory is unable to make a diagnosis from the sample and the procedure may need to be repeated.  Within a week the radiologist will send a report to your physician.  A pathologist will also examine the tissue and send a report.    Risks of the procedure include but are not limited to:   *  Bleeding    *  Infection   *  Puncture of surrounding organs *  Death     *  Lung collapse if the biopsy is near the chest which may require insertion of a      chest tube to re-inflate the lung if severe.    Benefits of the procedure:  Using x-ray helps to locate the area that requires a biopsy. The procedure is less invasive than a surgical procedure, there are no large incisions and it does not require anesthesia.    Alternatives to the procedure:  A biopsy can be performed surgically.  Risks of a surgical biopsy include exposure to anesthesia, infection, excessive bleeding and injury to abdominal organs.  A benefit of surgical biopsy is the ability to see the area to be biopsied and remove of a larger piece of tissue.    THIS EDUCATION INFORMATION WAS REVIEWED PRIOR TO PROCEDURE AND CONSENT. Patient initials__________________Time___________________    Post Procedure:    *  Expect the biopsy site may be tender up to one week.    *  Rest today (no pushing pulling or straining).   *  Slowly increase activity  tomorrow.    *  If you received sedation do not drive for 24 hours.   *  Keep dressing clean and dry.   *  Leave dressing on puncture site for 24 hours.    *  You may shower when dressing removed.  Call your doctor if experiencing:   *  Signs of infection such as redness, swelling, excessive pain and / or foul        smelling drainage from the puncture site.   *  Chills or fever over 101 degrees (by mouth).   *  Unrelieved pain.   *  Any new or severe symptoms.   *  If experiencing sudden / severe shortness of breath or chest pain go to the       nearest emergency room.   Following the procedure:     Follow-up with the ordering physician as directed.    Continue to take other medications as directed by your physician unless    otherwise instructed.   If applicable, resume taking your blood thinners or Aspirin on ___________.    If you have any concerns please call the Radiology Nurses Desk at [phone].  You are the most important factor in your recovery.  Follow the above instructions carefully.

## 2024-11-06 ENCOUNTER — HOSPITAL ENCOUNTER (EMERGENCY)
Facility: HOSPITAL | Age: 74
Discharge: HOME | End: 2024-11-06
Attending: EMERGENCY MEDICINE | Admitting: EMERGENCY MEDICINE
Payer: COMMERCIAL

## 2024-11-06 ENCOUNTER — APPOINTMENT (OUTPATIENT)
Dept: CT IMAGING | Facility: HOSPITAL | Age: 74
End: 2024-11-06
Payer: COMMERCIAL

## 2024-11-06 ENCOUNTER — TELEPHONE (OUTPATIENT)
Dept: INTERVENTIONAL RADIOLOGY/VASCULAR | Facility: HOSPITAL | Age: 74
End: 2024-11-06
Payer: COMMERCIAL

## 2024-11-06 VITALS
WEIGHT: 149.91 LBS | OXYGEN SATURATION: 93 % | HEIGHT: 63 IN | DIASTOLIC BLOOD PRESSURE: 84 MMHG | RESPIRATION RATE: 18 BRPM | BODY MASS INDEX: 26.56 KG/M2 | TEMPERATURE: 98.5 F | SYSTOLIC BLOOD PRESSURE: 145 MMHG | HEART RATE: 77 BPM

## 2024-11-06 DIAGNOSIS — R79.1 LOW INTERNATIONAL NORMALIZED RATIO (INR): Primary | ICD-10-CM

## 2024-11-06 DIAGNOSIS — S80.01XA CONTUSION OF RIGHT KNEE, INITIAL ENCOUNTER: ICD-10-CM

## 2024-11-06 DIAGNOSIS — R55 NEAR SYNCOPE: ICD-10-CM

## 2024-11-06 DIAGNOSIS — S09.90XA CHI (CLOSED HEAD INJURY), INITIAL ENCOUNTER: Primary | ICD-10-CM

## 2024-11-06 DIAGNOSIS — S00.83XA FACIAL CONTUSION, INITIAL ENCOUNTER: ICD-10-CM

## 2024-11-06 LAB
ANION GAP SERPL CALCULATED.3IONS-SCNC: 8.4 MMOL/L (ref 5–15)
BASOPHILS # BLD AUTO: 0.04 10*3/MM3 (ref 0–0.2)
BASOPHILS NFR BLD AUTO: 0.5 % (ref 0–1.5)
BUN SERPL-MCNC: 9 MG/DL (ref 8–23)
BUN/CREAT SERPL: 13.4 (ref 7–25)
CALCIUM SPEC-SCNC: 9.4 MG/DL (ref 8.6–10.5)
CHLORIDE SERPL-SCNC: 104 MMOL/L (ref 98–107)
CO2 SERPL-SCNC: 26.6 MMOL/L (ref 22–29)
CREAT SERPL-MCNC: 0.67 MG/DL (ref 0.57–1)
DEPRECATED RDW RBC AUTO: 43.1 FL (ref 37–54)
EGFRCR SERPLBLD CKD-EPI 2021: 93.6 ML/MIN/1.73
EOSINOPHIL # BLD AUTO: 0.1 10*3/MM3 (ref 0–0.4)
EOSINOPHIL NFR BLD AUTO: 1.3 % (ref 0.3–6.2)
ERYTHROCYTE [DISTWIDTH] IN BLOOD BY AUTOMATED COUNT: 12.8 % (ref 12.3–15.4)
GLUCOSE SERPL-MCNC: 98 MG/DL (ref 65–99)
HCT VFR BLD AUTO: 42.1 % (ref 34–46.6)
HGB BLD-MCNC: 14.1 G/DL (ref 12–15.9)
IMM GRANULOCYTES # BLD AUTO: 0.01 10*3/MM3 (ref 0–0.05)
IMM GRANULOCYTES NFR BLD AUTO: 0.1 % (ref 0–0.5)
LYMPHOCYTES # BLD AUTO: 2.37 10*3/MM3 (ref 0.7–3.1)
LYMPHOCYTES NFR BLD AUTO: 31.3 % (ref 19.6–45.3)
MCH RBC QN AUTO: 30.9 PG (ref 26.6–33)
MCHC RBC AUTO-ENTMCNC: 33.5 G/DL (ref 31.5–35.7)
MCV RBC AUTO: 92.3 FL (ref 79–97)
MONOCYTES # BLD AUTO: 0.42 10*3/MM3 (ref 0.1–0.9)
MONOCYTES NFR BLD AUTO: 5.6 % (ref 5–12)
NEUTROPHILS NFR BLD AUTO: 4.62 10*3/MM3 (ref 1.7–7)
NEUTROPHILS NFR BLD AUTO: 61.2 % (ref 42.7–76)
NRBC BLD AUTO-RTO: 0 /100 WBC (ref 0–0.2)
PLATELET # BLD AUTO: 192 10*3/MM3 (ref 140–450)
PMV BLD AUTO: 11.5 FL (ref 6–12)
POTASSIUM SERPL-SCNC: 4.3 MMOL/L (ref 3.5–5.2)
QT INTERVAL: 390 MS
QTC INTERVAL: 433 MS
RBC # BLD AUTO: 4.56 10*6/MM3 (ref 3.77–5.28)
SODIUM SERPL-SCNC: 139 MMOL/L (ref 136–145)
WBC NRBC COR # BLD AUTO: 7.56 10*3/MM3 (ref 3.4–10.8)

## 2024-11-06 PROCEDURE — 70450 CT HEAD/BRAIN W/O DYE: CPT

## 2024-11-06 PROCEDURE — 85025 COMPLETE CBC W/AUTO DIFF WBC: CPT | Performed by: PHYSICIAN ASSISTANT

## 2024-11-06 PROCEDURE — 99284 EMERGENCY DEPT VISIT MOD MDM: CPT

## 2024-11-06 PROCEDURE — 36415 COLL VENOUS BLD VENIPUNCTURE: CPT | Performed by: PHYSICIAN ASSISTANT

## 2024-11-06 PROCEDURE — 93005 ELECTROCARDIOGRAM TRACING: CPT | Performed by: PHYSICIAN ASSISTANT

## 2024-11-06 PROCEDURE — 93010 ELECTROCARDIOGRAM REPORT: CPT | Performed by: INTERNAL MEDICINE

## 2024-11-06 PROCEDURE — 80048 BASIC METABOLIC PNL TOTAL CA: CPT | Performed by: PHYSICIAN ASSISTANT

## 2024-11-06 PROCEDURE — 70486 CT MAXILLOFACIAL W/O DYE: CPT

## 2024-11-06 RX ORDER — SODIUM CHLORIDE 0.9 % (FLUSH) 0.9 %
10 SYRINGE (ML) INJECTION AS NEEDED
Status: DISCONTINUED | OUTPATIENT
Start: 2024-11-06 | End: 2024-11-06 | Stop reason: HOSPADM

## 2024-11-06 NOTE — ED PROVIDER NOTES
"SHARED VISIT: This visit was performed by BOTH a physician and an APC. The substantive portion of the medical decision making was performed by this attesting physician who made or approved the management plan and takes responsibility for patient management. All studies in the APC note (if performed) were independently interpreted by me.     The SURINDER and I have discussed this patient's history, physical exam, and treatment plan.  I have reviewed the documentation and personally had a face to face interaction with the patient. I affirm the documentation and agree with the treatment and plan.  The attached note describes my personal findings.      I provided a substantive portion of the care of the patient.  I personally performed the physical exam in its entirety, and below are my findings.     Brief history of present illness: 73-year-old female presents after a fall.  Patient fell on uneven sidewalk yesterday and struck the right side of her face against the ground.  No loss of consciousness.  She has had a mild headache since that time.  She was previously taking a baby aspirin but that held that for 5 days due to a liver biopsy that was completed yesterday.  She also may have generalized fatigue today.    Physical exam:    /84   Pulse 77   Temp 98.5 °F (36.9 °C) (Tympanic)   Resp 18   Ht 160 cm (63\")   Wt 68 kg (149 lb 14.6 oz)   SpO2 93%   BMI 26.56 kg/m²       Physical Exam   Constitutional: No distress.  Nontoxic  Head: Normocephalic and atraumatic.   Oropharynx: Mucous membranes are moist.   Eyes: . No scleral icterus.   Neck: Normal range of motion. Neck supple.   Cardiovascular: Pink warm and well perfused throughout.    Pulmonary/Chest: No respiratory distress.    Abdominal: Soft. There is no rebound or rigidity  Musculoskeletal: Mild tenderness over the right shoulder without obvious deformity, full range of motion intact  Neurological: Alert and oriented.  Speech fluent and easily " intelligible  Skin: Skin is pink, warm, and dry.   Psychiatric: Mood and affect normal.   Nursing note and vitals reviewed.        MDM: Imaging and lab work reassuring.  Patient is well-appearing with small abrasions and bruising from the fall but no internal traumatic injuries.    Please note that portions of this were completed with a voice recognition program.       Note Disclaimer: At [facility], we believe that sharing information builds trust and better relationships. You are receiving this note because you are receiving care at [facility] or recently visited. It is possible you will see health information before a provider has talked with you about it. This kind of information can be easy to misunderstand. To help you fully understand what it means for your health, we urge you to discuss this note with your provider.     Andrés Gallardo MD  11/06/24 7580

## 2024-11-06 NOTE — ED NOTES
Pt to ed from home via PV    Pt also reports she had trip and fall yesterday before her liver biopsy, Pt hit head, no LOC, pt is on aspirin. Pt reports today she had a hot flash and faint. Pt has abrasion to r cheek, shoulder, and knee.

## 2024-11-06 NOTE — ED PROVIDER NOTES
" EMERGENCY DEPARTMENT ENCOUNTER      Room Number:  44/44  PCP: Zelalem Flor DNP, APRN  Independent Historians: Patient  Patient Care Team:  Zelalem Flor DNP, APRN as PCP - General (Internal Medicine)  Brandon Mitchell MD as Consulting Physician (Orthopedic Surgery)  Elijah Berrios MD as Consulting Physician (Pulmonary Disease)  Ivonne Camp APRN as Nurse Practitioner (Nurse Practitioner)       HPI:  Chief Complaint: Facial contusion    A complete HPI/ROS/PMH/PSH/SH/FH are unobtainable due to: None    Chronic or social conditions impacting patient care (Social Determinants of Health): None      Context: Katherine Williamson is a 73 y.o. female with a PMH significant for hypertension, hyperlipidemia, CVA who presents to the ED c/o acute closed head injury.  The patient reports that she sustained a mechanical fall after tripping over an uneven surface on pavement yesterday.  She also reports that she had a liver biopsy performed with her gastroenterology office yesterday.  She struck the right side of her face on the pavement at the time of the fall but denies LOC, nausea, numbness or weakness of the face or extremities, slurred speech, visual disturbance, photophobia, neck pain.  The patient reports that she would not of come in for evaluation but she was encouraged to do so by her PCP office when she called them today for follow-up and for evaluation of her facial contusion.  She does admit that she had a brief episode of lightheadedness which she describes as a \"hot flash\" that was short-lived earlier today.  No symptoms currently.      Upon review of prior external notes (non-ED) -and- Review of prior external test results outside of this encounter it appears the patient was evaluated in the office with GI for diarrhea on October 25, 2024.  The patient had a normal platelet count yesterday as well as a normal iron profile on 10/25/2024.      PAST MEDICAL HISTORY  Active Ambulatory Problems     " Diagnosis Date Noted    Benign essential HTN 04/01/2016    Hyperlipidemia 04/01/2016    Tobacco abuse 04/05/2016    Dislocation of hip joint prosthesis 04/16/2016    Fracture of proximal humerus 08/22/2015    Mechanical complication of internal orthopedic device 04/16/2016    Wear of articular bearing surface of internal prosthetic joint 12/03/2015    History of repair of hip joint 09/28/2015    History of operative procedure on hip 12/03/2015    Hyperglycemia 10/03/2016    Hepatic steatosis 11/14/2019    Diverticulosis 11/14/2019    Chest pain, atypical 04/30/2020    History of colon polyps 10/20/2021    Family history of colon cancer in mother 10/20/2021    Allergic rhinitis 06/06/2022    Lung nodule seen on imaging study 04/05/2023    Acute respiratory failure with hypoxia 05/02/2023    Abscess of right lung with pneumonia 05/02/2023    Empyema 05/02/2023    Sepsis 05/03/2023    Pleural effusion, right 05/03/2023    Other emphysema 05/03/2023    Pulmonary nodule (RLL) 05/03/2023    Diastolic dysfunction, grade 1 05/13/2023    Physical debility 05/13/2023    Cryptogenic stroke 05/17/2023    Acute right MCA stroke 05/19/2023    Paroxysmal SVT (supraventricular tachycardia) 07/24/2023    Diarrhea 09/25/2024    Dark stools 10/25/2024    Hiatal hernia 10/25/2024     Resolved Ambulatory Problems     Diagnosis Date Noted    No Resolved Ambulatory Problems     Past Medical History:   Diagnosis Date    Arthritis     Cataract     Colon polyps     Hypertension     Low back pain     Pneumonia 5/2/2023    Stroke 5/14/2023         PAST SURGICAL HISTORY  Past Surgical History:   Procedure Laterality Date    CARDIAC ELECTROPHYSIOLOGY PROCEDURE N/A 9/5/2023    Procedure: Loop insertion  LINQ;  Surgeon: Usha Eason MD;  Location: [facility] INVASIVE LOCATION;  Service: Cardiovascular;  Laterality: N/A;    COLONOSCOPY  10/2015    Jmzpq-[id number]. [facility].  Follow-up in 5 years.    COLONOSCOPY N/A 1/12/2022    2 BENIGN  POLYPS IN DESCENDING, 5 MM BENIGN POLYP IN SIGMOID, MULTIPLE SMALL AND LARGE DIVERTICULA IN SIGMOID, RESCOPE IN 3 YRS, DR. JOSEPH LAU AT [facility]    EYE SURGERY      JOINT REPLACEMENT  ?    Left total hip replacement in .  In 2015 patient had left hip revision    TONSILLECTOMY           FAMILY HISTORY  Family History   Problem Relation Age of Onset    Colon cancer Mother     Cancer Mother     Breast cancer Mother     Arthritis Mother     Deep vein thrombosis Mother     Hyperlipidemia Mother     Hypertension Mother     Heart attack Father     Heart disease Father         Had quadruple bypass    Hyperlipidemia Father     Hypertension Father     Heart attack Maternal Grandmother     Heart disease Maternal Grandmother     Malig Hyperthermia Neg Hx     Colon polyps Neg Hx     Crohn's disease Neg Hx     Irritable bowel syndrome Neg Hx     Ulcerative colitis Neg Hx          SOCIAL HISTORY  Social History     Socioeconomic History    Marital status:    Tobacco Use    Smoking status: Former     Current packs/day: 0.00     Average packs/day: 1 pack/day for 53.3 years (53.3 ttl pk-yrs)     Types: Cigarettes     Start date: 1970     Quit date: 2023     Years since quittin.5    Smokeless tobacco: Never    Tobacco comments:     Caffeine - 4 cups coffee daily    Vaping Use    Vaping status: Never Used   Substance and Sexual Activity    Alcohol use: Yes     Alcohol/week: 15.0 standard drinks of alcohol     Types: 15 Cans of beer per week     Comment: 3-4 beers daily    Drug use: Never    Sexual activity: Not Currently     Partners: Male     Birth control/protection: None, Birth control pill         ALLERGIES  Hydrocodone-acetaminophen      REVIEW OF SYSTEMS  Included in HPI  All systems reviewed and negative except for those discussed in HPI.      PHYSICAL EXAM    I have reviewed the triage vital signs and nursing notes.    ED Triage Vitals   Temp Heart Rate Resp BP SpO2   24 1224 24  1224 11/06/24 1224 11/06/24 1229 11/06/24 1224   98.5 °F (36.9 °C) 88 18 141/87 95 %      Temp src Heart Rate Source Patient Position BP Location FiO2 (%)   11/06/24 1224 11/06/24 1224 11/06/24 1229 11/06/24 1229 --   Tympanic Monitor Sitting Right arm        Physical Exam  Constitutional:       General: She is not in acute distress.     Appearance: She is well-developed.   HENT:      Head: Normocephalic and atraumatic.     Eyes:      General: No scleral icterus.     Extraocular Movements: Extraocular movements intact.      Conjunctiva/sclera: Conjunctivae normal.      Pupils: Pupils are equal, round, and reactive to light.   Neck:      Trachea: No tracheal deviation.   Cardiovascular:      Rate and Rhythm: Normal rate and regular rhythm.   Pulmonary:      Effort: Pulmonary effort is normal.      Breath sounds: Normal breath sounds.   Abdominal:      Palpations: Abdomen is soft.      Tenderness: There is no abdominal tenderness.   Musculoskeletal:         General: No deformity.      Cervical back: Normal range of motion. No spinous process tenderness or muscular tenderness.   Lymphadenopathy:      Cervical: No cervical adenopathy.   Skin:     General: Skin is warm and dry.   Neurological:      Mental Status: She is alert and oriented to person, place, and time.      Sensory: Sensation is intact.      Motor: Motor function is intact.   Psychiatric:         Behavior: Behavior normal.         Vital signs and nursing notes reviewed.      PPE: I wore a surgical mask throughout my encounters with the pt. I performed hand hygiene on entry into the pt room and upon exit.     LAB RESULTS  Recent Results (from the past 24 hours)   ECG 12 Lead Other; Near syncope    Collection Time: 11/06/24  1:32 PM   Result Value Ref Range    QT Interval 390 ms    QTC Interval 433 ms   Basic Metabolic Panel    Collection Time: 11/06/24  1:40 PM    Specimen: Arm, Left; Blood   Result Value Ref Range    Glucose 98 65 - 99 mg/dL    BUN 9 8 - 23  mg/dL    Creatinine 0.67 0.57 - 1.00 mg/dL    Sodium 139 136 - 145 mmol/L    Potassium 4.3 3.5 - 5.2 mmol/L    Chloride 104 98 - 107 mmol/L    CO2 26.6 22.0 - 29.0 mmol/L    Calcium 9.4 8.6 - 10.5 mg/dL    BUN/Creatinine Ratio 13.4 7.0 - 25.0    Anion Gap 8.4 5.0 - 15.0 mmol/L    eGFR 93.6 >60.0 mL/min/1.73   CBC Auto Differential    Collection Time: 11/06/24  1:40 PM    Specimen: Arm, Left; Blood   Result Value Ref Range    WBC 7.56 3.40 - 10.80 10*3/mm3    RBC 4.56 3.77 - 5.28 10*6/mm3    Hemoglobin 14.1 12.0 - 15.9 g/dL    Hematocrit 42.1 34.0 - 46.6 %    MCV 92.3 79.0 - 97.0 fL    MCH 30.9 26.6 - 33.0 pg    MCHC 33.5 31.5 - 35.7 g/dL    RDW 12.8 12.3 - 15.4 %    RDW-SD 43.1 37.0 - 54.0 fl    MPV 11.5 6.0 - 12.0 fL    Platelets 192 140 - 450 10*3/mm3    Neutrophil % 61.2 42.7 - 76.0 %    Lymphocyte % 31.3 19.6 - 45.3 %    Monocyte % 5.6 5.0 - 12.0 %    Eosinophil % 1.3 0.3 - 6.2 %    Basophil % 0.5 0.0 - 1.5 %    Immature Grans % 0.1 0.0 - 0.5 %    Neutrophils, Absolute 4.62 1.70 - 7.00 10*3/mm3    Lymphocytes, Absolute 2.37 0.70 - 3.10 10*3/mm3    Monocytes, Absolute 0.42 0.10 - 0.90 10*3/mm3    Eosinophils, Absolute 0.10 0.00 - 0.40 10*3/mm3    Basophils, Absolute 0.04 0.00 - 0.20 10*3/mm3    Immature Grans, Absolute 0.01 0.00 - 0.05 10*3/mm3    nRBC 0.0 0.0 - 0.2 /100 WBC         RADIOLOGY  CT Head Without Contrast, CT Facial Bones Without Contrast    Result Date: 11/6/2024  CT HEAD AND FACIAL BONES WITHOUT CONTRAST  HISTORY: Closed head injury, facial pain.  COMPARISON: MRI brain 5/16/2023.  CT HEAD WITHOUT CONTRAST: The brain ventricles are symmetrical. Areas of decreased attenuation appreciated involving the medial aspect of the left right temporal lobe inferolaterally and right insular cortex posteriorly which correspond to the larger areas of acute infarction present on the MRI examination of the brain performed on 5/16/2023. No focal area of decreased attenuation to suggest acute infarction or contusion  is appreciated. Bone windows showed no evidence of a calvarial fracture.  CT FACIAL BONES WITHOUT CONTRAST: Ethmoid, sphenoid and maxillary sinuses are clear. There is no evidence of fracture.   Radiation dose reduction techniques were utilized, including automated exposure control and exposure modulation based on body size.   This report was finalized on 11/6/2024 2:19 PM by Dr. Issac Camacho M.D on Workstation: [id number]         MEDICATIONS GIVEN IN ER  Medications   sodium chloride 0.9 % flush 10 mL (has no administration in time range)         ORDERS PLACED DURING THIS VISIT:  Orders Placed This Encounter   Procedures    CT Head Without Contrast    CT Facial Bones Without Contrast    Basic Metabolic Panel    CBC Auto Differential    ECG 12 Lead Other; Near syncope    Insert Peripheral IV    CBC & Differential         OUTPATIENT MEDICATION MANAGEMENT:  Current Facility-Administered Medications Ordered in Epic   Medication Dose Route Frequency Provider Last Rate Last Admin    sodium chloride 0.9 % flush 10 mL  10 mL Intravenous PRN Ryan Sierra PA         Current Outpatient Medications Ordered in Epic   Medication Sig Dispense Refill    acetaminophen (TYLENOL) 500 MG tablet Take 1 tablet by mouth Every 6 (Six) Hours As Needed. Indications: Pain      aspirin 81 MG chewable tablet Chew 1 tablet Daily. 90 tablet 0    atorvastatin (LIPITOR) 80 MG tablet TAKE 1 TABLET BY MOUTH EVERY DAY AT NIGHT 90 tablet 1    Cholecalciferol (VITAMIN D) 1000 UNITS tablet Take 1 tablet by mouth Daily.      cyanocobalamin (VITAMIN B-12) 1000 MCG tablet Take 1 tablet by mouth Daily. 90 tablet 0    DULoxetine (CYMBALTA) 30 MG capsule TAKE 1 CAPSULE BY MOUTH EVERY DAY 90 capsule 3    furosemide (LASIX) 40 MG tablet TAKE 1 TABLET BY MOUTH TWICE A DAY (Patient taking differently: Take 1 tablet by mouth Every Morning. Pt takes 1 40 mg tablet every morning) 180 tablet 1    Klor-Con M20 20 MEQ CR tablet TAKE 1 TABLET BY MOUTH EVERY DAY  90 tablet 1    metoprolol tartrate (LOPRESSOR) 25 MG tablet Take 1 tablet by mouth 2 (Two) Times a Day. (Patient taking differently: Take 0.5 tablets by mouth 2 (Two) Times a Day. 1/2 pill BID) 90 tablet 1    vitamin C (ASCORBIC ACID) 500 MG tablet Take 1 tablet by mouth Daily. Indications: Inadequate Vitamin C              PROGRESS, DATA ANALYSIS, CONSULTS, AND MEDICAL DECISION MAKING  All labs have been independently interpreted by me.  All radiology studies have been reviewed by me. All EKG's have been independently viewed and interpreted by me.  Discussion below represents my analysis of pertinent findings related to patient's condition, differential diagnosis, treatment plan and final disposition.    Patient presentation and evaluation most consistent with uncomplicated contusion to the right side of her face and her right knee.  She has foregone x-ray of the right knee and wishes to be discharged home prior to evaluation.  No concerning findings on exam in regards to the knee injury at this time.  No worrisome findings on today's evaluation to indicate the need for admission or further workup at this time.  All questions answered and close return precautions given.    DIFFERENTIAL DIAGNOSIS INCLUDE BUT NOT LIMITED TO:     Intracranial bleeding, concussion, facial fracture    Clinical Scores: N/A      ED Course as of 11/06/24 1436   Wed Nov 06, 2024   1335 EKG interpreted by myself  Time: 1332  Rate: 74  Rhythm: NSR  No ST elevation or depression  Normal intervals  Normal morphology   [AB]   1428 CT Head Without Contrast  My independent interpretation of the imaging study is no intracranial hemorrhage [AB]   1428 CT Facial Bones Without Contrast  My independent interpretation of the imaging study is no acute facial fracture [AB]      ED Course User Index  [AB] Andrés Gallardo MD         1438 I rechecked the patient.  I discussed the patient's labs, radiology findings (including all incidental findings),  diagnosis, and plan for discharge.  A repeat exam reveals no new worrisome changes from my initial exam findings.  The patient understands that the fact that they are being discharged does not denote that nothing is abnormal, it indicates that no clinical emergency is present and that they must follow-up as directed in order to properly maintain their health.  Follow-up instructions (specifically listed below) and return to ER precautions were given at this time.  I specifically instructed the patient to follow-up with their PCP.  The patient understands and agrees with the plan, and is ready for discharge.  All questions answered.         AS OF 14:36 EST VITALS:    BP - 162/87  HR - 76  TEMP - 98.5 °F (36.9 °C) (Tympanic)  O2 SATS - 93%    COMPLEXITY OF CARE  Admission was considered but after careful review of the patient's presentation, physical examination, diagnostic results, and response to treatment the patient may be safely discharged with outpatient follow-up.      DIAGNOSIS  Final diagnoses:   CHI (closed head injury), initial encounter   Facial contusion, initial encounter   Contusion of right knee, initial encounter   Near syncope         DISPOSITION  ED Disposition       ED Disposition   Discharge    Condition   Stable    Comment   --                Please note that portions of this document were completed with a voice recognition program.    Note Disclaimer: At [facility], we believe that sharing information builds trust and better relationships. You are receiving this note because you recently visited [facility]. It is possible you will see health information before a provider has talked with you about it. This kind of information can be easy to misunderstand. To help you fully understand what it means for your health, we urge you to discuss this note with your provider.         Ryan Sierra PA  11/06/24 5315

## 2024-11-11 LAB
CYTO UR: NORMAL
DX PRELIMINARY: NORMAL
LAB AP CASE REPORT: NORMAL
LAB AP CLINICAL INFORMATION: NORMAL
LAB AP DIAGNOSIS COMMENT: NORMAL
LAB AP SPECIAL STAINS: NORMAL
PATH REPORT.FINAL DX SPEC: NORMAL
PATH REPORT.GROSS SPEC: NORMAL

## 2024-11-12 ENCOUNTER — PATIENT MESSAGE (OUTPATIENT)
Dept: GASTROENTEROLOGY | Facility: CLINIC | Age: 74
End: 2024-11-12
Payer: COMMERCIAL

## 2024-11-13 PROCEDURE — 93298 REM INTERROG DEV EVAL SCRMS: CPT | Performed by: INTERNAL MEDICINE

## 2024-11-14 ENCOUNTER — PATIENT MESSAGE (OUTPATIENT)
Dept: GASTROENTEROLOGY | Facility: CLINIC | Age: 74
End: 2024-11-14
Payer: COMMERCIAL

## 2024-11-19 DIAGNOSIS — I10 HYPERTENSION, UNSPECIFIED TYPE: Primary | ICD-10-CM

## 2024-11-19 RX ORDER — METOPROLOL TARTRATE 25 MG/1
12.5 TABLET, FILM COATED ORAL 2 TIMES DAILY
Qty: 90 TABLET | Refills: 1 | Status: SHIPPED | OUTPATIENT
Start: 2024-11-19

## 2024-11-27 DIAGNOSIS — R60.0 LOWER EXTREMITY EDEMA: ICD-10-CM

## 2024-11-29 DIAGNOSIS — R60.0 LOWER EXTREMITY EDEMA: ICD-10-CM

## 2024-12-01 RX ORDER — FUROSEMIDE 40 MG/1
40 TABLET ORAL 2 TIMES DAILY
Qty: 180 TABLET | Refills: 1 | Status: SHIPPED | OUTPATIENT
Start: 2024-12-01 | End: 2024-12-01

## 2024-12-01 RX ORDER — FUROSEMIDE 40 MG/1
40 TABLET ORAL 2 TIMES DAILY
Qty: 180 TABLET | Refills: 1 | Status: SHIPPED | OUTPATIENT
Start: 2024-12-01

## 2024-12-01 RX ORDER — ATORVASTATIN CALCIUM 80 MG/1
80 TABLET, FILM COATED ORAL
Qty: 90 TABLET | Refills: 1 | Status: SHIPPED | OUTPATIENT
Start: 2024-12-01

## 2024-12-04 DIAGNOSIS — I10 HYPERTENSION, UNSPECIFIED TYPE: ICD-10-CM

## 2024-12-04 RX ORDER — METOPROLOL TARTRATE 25 MG/1
12.5 TABLET, FILM COATED ORAL 2 TIMES DAILY
Qty: 90 TABLET | Refills: 1 | Status: SHIPPED | OUTPATIENT
Start: 2024-12-04

## 2025-01-03 NOTE — THERAPY TREATMENT NOTE
Inpatient Rehabilitation - Occupational Therapy Treatment Note    [facility]     Patient Name: Katherine Williamson  : 1950  MRN: [id number]    Today's Date: 2023                 Admit Date: 2023         ICD-10-CM ICD-9-CM   1. Decreased functional mobility and endurance  Z74.09 780.99       Patient Active Problem List   Diagnosis   • Benign essential HTN   • Tobacco abuse   • Dislocation of hip joint prosthesis   • Fracture of proximal humerus   • Mechanical complication of internal orthopedic device   • Wear of articular bearing surface of internal prosthetic joint   • History of repair of hip joint   • History of operative procedure on hip   • Hyperglycemia   • Hepatic steatosis   • Diverticulosis   • Chest pain, atypical   • History of colon polyps   • Family history of colon cancer in mother   • Allergic rhinitis   • Lung nodule seen on imaging study   • Acute respiratory failure with hypoxia   • Abscess of right lung with pneumonia   • Empyema   • Sepsis   • Pleural effusion, right   • Other emphysema   • Pulmonary nodule (RLL)   • Diastolic dysfunction, grade 1   • Physical debility   • Cryptogenic stroke   • Acute right MCA stroke       Past Medical History:   Diagnosis Date   • Arthritis    • Cataract    • Colon polyps     FOLLOWED BY DR. JOSEPH LAU   • Hypertension        Past Surgical History:   Procedure Laterality Date   • COLONOSCOPY  10/2015    [id number]. Kaplan.  Follow-up in 5 years.   • COLONOSCOPY N/A 2022    2 BENIGN POLYPS IN DESCENDING, 5 MM BENIGN POLYP IN SIGMOID, MULTIPLE SMALL AND LARGE DIVERTICULA IN SIGMOID, RESCOPE IN 3 YRS, DR. JOSEPH LAU AT [facility]   • EYE SURGERY     • JOINT REPLACEMENT  ?    Left total hip replacement in .  In 2015 patient had left hip revision   • TONSILLECTOMY               IRF OT ASSESSMENT FLOWSHEET (last 12 hours)     IRF OT Evaluation and Treatment     Row Name 23 1437          OT Time and Intention     Document Type daily treatment  -AF     Mode of Treatment occupational therapy  -AF     Patient Effort good  -AF     Row Name 05/24/23 1437          General Information    Patient/Family/Caregiver Comments/Observations pt supine in bed soaked in urine secondary to purewick, purewick was still in place at 0930. sitting up in w/c in room in PM  -AF     Existing Precautions/Restrictions fall  -AF     Row Name 05/24/23 1437          Pain Assessment    Pretreatment Pain Rating 0/10 - no pain  -AF     Posttreatment Pain Rating 0/10 - no pain  -AF     Row Name 05/24/23 1437          Cognition/Psychosocial    Affect/Mental Status (Cognition) flat/blunted affect  -AF     Orientation Status (Cognition) oriented x 3  -AF     Follows Commands (Cognition) follows one-step commands;verbal cues/prompting required  -AF     Personal Safety Interventions fall prevention program maintained;gait belt;nonskid shoes/slippers when out of bed  -AF     Row Name 05/24/23 1437          Bathing    Comment (Bathing) deferred  -AF     Row Name 05/24/23 1437          Lower Body Dressing    [place] Level (Lower Body Dressing) don;pants/bottoms;shoes/slippers;socks;moderate assist (50% patient effort);maximum assist (25% patient effort);verbal cues  -AF     Position (Lower Body Dressing) supported sitting;supported standing  -AF     Set-up Assistance (Lower Body Dressing) obtain clothing  -AF     Row Name 05/24/23 1437          Grooming    [place] Level (Grooming) grooming skills;set up  -AF     Position (Grooming) sink side;supported sitting  -AF     Comment (Grooming) w/c level  -AF     Row Name 05/24/23 1437          Toileting    [place] Level (Toileting) toileting skills;moderate assist (50% patient effort);minimum assist (75% patient effort);verbal cues  -AF     Assistive Device Use (Toileting) grab bar/safety frame;raised toilet seat  -AF     Position (Toileting) supported sitting;supported standing  -AF     Comment (Toileting)  RWX level  -AF     Row Name 05/24/23 1437          Bed Mobility    Supine-Sit [place] (Bed Mobility) contact guard  -AF     Assistive Device (Bed Mobility) bed rails  -AF     Row Name 05/24/23 1437          Functional Mobility    Functional Mobility- Comment walked from EOB to bathroom with RWX MIN A  -AF     Row Name 05/24/23 1437          Sit-Stand Transfer    Sit-Stand [place] (Transfers) minimum assist (75% patient effort);verbal cues  -AF     Assistive Device (Sit-Stand Transfers) walker, front-wheeled;wheelchair  -AF     Row Name 05/24/23 1437          Stand-Sit Transfer    Stand-Sit [place] (Transfers) contact guard;minimum assist (75% patient effort)  -AF     Assistive Device (Stand-Sit Transfers) wheelchair;walker, front-wheeled  -AF     Row Name 05/24/23 1437          Toilet Transfer    Type (Toilet Transfer) stand-sit;sit-stand  -AF     [place] Level (Toilet Transfer) moderate assist (50% patient effort);minimum assist (75% patient effort);verbal cues  -AF     Assistive Device (Toilet Transfer) commode;grab bars/safety frame;walker, front-wheeled;wheelchair  -AF     Row Name 05/24/23 1437          Shoulder (Therapeutic Exercise)    Shoulder Strengthening (Therapeutic Exercise) bilateral;scapular stabilization;sitting;external rotation;internal rotation;2 lb free weight;10 repetitions;3 sets  -     Row Name 05/24/23 1437          Elbow/Forearm (Therapeutic Exercise)    Elbow/Forearm Strengthening (Therapeutic Exercise) bilateral;flexion;extension;supination;pronation;sitting;2 lb free weight;10 repetitions;2 sets  -AF     Row Name 05/24/23 1437          Wrist (Therapeutic Exercise)    Wrist Strengthening (Therapeutic Exercise) bilateral;flexion;extension;2 lb free weight;10 repetitions;2 sets  -AF     Row Name 05/24/23 1437          Hand (Therapeutic Exercise)    Hand Strengthening (Therapeutic Exercise) bilateral; strengthening;hand gripper;10 repetitions;2 sets  -     Row Name  05/24/23 1437          Balance    Static Sitting Balance standby assist  -AF     Static Standing Balance minimal assist;contact guard;verbal cues  -AF     Comment, Balance pt stood RWX level for toileting tasks for 6 mins  -AF     Row Name 05/24/23 1437          Positioning and Restraints    Pre-Treatment Position sitting in chair/recliner  -AF     Post Treatment Position wheelchair  -AF     In Wheelchair sitting;exit alarm on;with PT  in AM and PM sessions  -AF           User Key  (r) = Recorded By, (t) = Taken By, (c) = Cosigned By    Initials Name Effective Dates    AF Silke Moreno, OTR 06/16/21 -                  Occupational Therapy Education     Title: PT OT SLP Therapies (In Progress)     Topic: Occupational Therapy (In Progress)     Point: ADL training (Done)     Description:   Instruct learner(s) on proper safety adaptation and remediation techniques during self care or transfers.   Instruct in proper use of assistive devices.              Learning Progress Summary           Patient Acceptance, E,TB, VU by SG at 5/20/2023 1516                   Point: Home exercise program (Not Started)     Description:   Instruct learner(s) on appropriate technique for monitoring, assisting and/or progressing therapeutic exercises/activities.              Learner Progress:  Not documented in this visit.          Point: Precautions (Not Started)     Description:   Instruct learner(s) on prescribed precautions during self-care and functional transfers.              Learner Progress:  Not documented in this visit.          Point: Body mechanics (Done)     Description:   Instruct learner(s) on proper positioning and spine alignment during self-care, functional mobility activities and/or exercises.              Learning Progress Summary           Patient Acceptance, E, VU,DU,NR by AF at 5/23/2023 1448    Comment: safety and technique with transfers                               User Key     Initials Effective Dates Name  Provider Type Discipline    SG 06/16/21 -  Verena Hewitt OTR Occupational Therapist OT    AF 06/16/21 -  Silke Moreno OTENID Occupational Therapist OT                    OT Recommendation and Plan                         Time Calculation:      Time Calculation- OT     Row Name 05/24/23 1450 05/24/23 1448          Time Calculation- OT    OT Start Time 1230  -AF 0930  -AF     OT Stop Time 1300  -AF 1000  -AF     OT Time Calculation (min) 30 min  -AF 30 min  -AF           User Key  (r) = Recorded By, (t) = Taken By, (c) = Cosigned By    Initials Name Provider Type    AF Silke Moreno, OTENID Occupational Therapist              Therapy Charges for Today     Code Description Service Date Service Provider Modifiers Qty    71290502098 HC OT SELF CARE/MGMT/TRAIN EA 15 MIN 5/23/2023 Silke Moreno, OTR GO 2    26103640815 HC OT THER PROC EA 15 MIN 5/23/2023 Silke Moreno, OTR GO 1    60390156226 HC OT THERAPEUTIC ACT EA 15 MIN 5/23/2023 Silke Moreno, OTR GO 1    98736835356 HC OT SELF CARE/MGMT/TRAIN EA 15 MIN 5/24/2023 Sikle Moreno, OTR GO 2    63712342817 HC OT THER PROC EA 15 MIN 5/24/2023 Silke Moreno, OTR GO 2                   SONG Kaiser  5/24/2023   no

## 2025-01-14 ENCOUNTER — PATIENT MESSAGE (OUTPATIENT)
Age: 75
End: 2025-01-14
Payer: COMMERCIAL

## 2025-01-22 ENCOUNTER — OFFICE VISIT (OUTPATIENT)
Dept: GASTROENTEROLOGY | Facility: CLINIC | Age: 75
End: 2025-01-22
Payer: COMMERCIAL

## 2025-01-22 ENCOUNTER — LAB (OUTPATIENT)
Dept: LAB | Facility: HOSPITAL | Age: 75
End: 2025-01-22
Payer: COMMERCIAL

## 2025-01-22 VITALS
BODY MASS INDEX: 26.29 KG/M2 | HEIGHT: 63 IN | TEMPERATURE: 97 F | WEIGHT: 148.4 LBS | SYSTOLIC BLOOD PRESSURE: 120 MMHG | DIASTOLIC BLOOD PRESSURE: 70 MMHG

## 2025-01-22 DIAGNOSIS — R79.89 ELEVATED FERRITIN: ICD-10-CM

## 2025-01-22 DIAGNOSIS — E88.01 LOW PLASMA ALPHA-1 ANTITRYPSIN: ICD-10-CM

## 2025-01-22 DIAGNOSIS — R19.7 DIARRHEA, UNSPECIFIED TYPE: ICD-10-CM

## 2025-01-22 DIAGNOSIS — R79.89 ELEVATED LFTS: Primary | ICD-10-CM

## 2025-01-22 DIAGNOSIS — K76.0 FATTY LIVER: ICD-10-CM

## 2025-01-22 LAB
ALBUMIN SERPL-MCNC: 4.3 G/DL (ref 3.5–5.2)
ALP SERPL-CCNC: 186 U/L (ref 39–117)
ALT SERPL W P-5'-P-CCNC: 31 U/L (ref 1–33)
AST SERPL-CCNC: 29 U/L (ref 1–32)
BILIRUB CONJ SERPL-MCNC: 0.2 MG/DL (ref 0–0.3)
BILIRUB INDIRECT SERPL-MCNC: 0.7 MG/DL
BILIRUB SERPL-MCNC: 0.9 MG/DL (ref 0–1.2)
PROT SERPL-MCNC: 7.9 G/DL (ref 6–8.5)

## 2025-01-22 PROCEDURE — 82103 ALPHA-1-ANTITRYPSIN TOTAL: CPT | Performed by: NURSE PRACTITIONER

## 2025-01-22 PROCEDURE — 80076 HEPATIC FUNCTION PANEL: CPT | Performed by: NURSE PRACTITIONER

## 2025-01-22 PROCEDURE — 82104 ALPHA-1-ANTITRYPSIN PHENO: CPT | Performed by: NURSE PRACTITIONER

## 2025-01-22 PROCEDURE — 99214 OFFICE O/P EST MOD 30 MIN: CPT | Performed by: NURSE PRACTITIONER

## 2025-01-22 PROCEDURE — 36415 COLL VENOUS BLD VENIPUNCTURE: CPT | Performed by: NURSE PRACTITIONER

## 2025-01-22 NOTE — PROGRESS NOTES
"Chief Complaint   Patient presents with    Diarrhea    Elevated Hepatic Enzymes         History of Present Illness  Patient is a 74-year-old female who presents today for follow-up. She was seen in the office October 2025 with concerns about diarrhea, dark stools, and elevated liver enzymes.  Serologic workup was completed and showed a positive smooth muscle antibody.  She also had an elevated ferritin and a low alpha-1 antitrypsin level.  She underwent a liver biopsy to evaluate for autoimmune hepatitis.  Biopsy showed steatosis and patchy inflammation of uncertain etiology.  They did not see evidence to suggest active autoimmune hepatitis.  It was considered that inflammation could have been from a drug reaction or due to alpha-1 antitrypsin deficiency..  There was no inflammation seen which is felt to possibly be from alpha-1 antitrypsin deficiency or medication.  She is scheduled for EGD and colonoscopy next week.    Patient presents today for follow-up.  She has had continued issues with diarrhea.  She did recently discontinue duloxetine which seem to help somewhat with the diarrhea but it has persisted.  At its worst diarrhea was watery and uncontrollable.  It is often worse after eating and worse in the morning.     Result Review :       Tissue Pathology Exam (11/05/2024 12:25)    Office Visit with Ivonne Camp APRN (10/25/2024)    COLONOSCOPY (01/12/2022 08:43)    Tissue Pathology Exam (01/12/2022 09:03)    Vital Signs:   /70   Temp 97 °F (36.1 °C)   Ht 160 cm (63\")   Wt 67.3 kg (148 lb 6.4 oz)   BMI 26.29 kg/m²     Body mass index is 26.29 kg/m².     Physical Exam  Vitals reviewed.   Constitutional:       General: She is not in acute distress.     Appearance: She is well-developed.   HENT:      Head: Normocephalic and atraumatic.   Pulmonary:      Effort: Pulmonary effort is normal. No respiratory distress.   Abdominal:      General: Abdomen is flat. Bowel sounds are normal. There is no " distension.      Palpations: Abdomen is soft.      Tenderness: There is no abdominal tenderness.   Skin:     General: Skin is dry.      Coloration: Skin is not pale.   Neurological:      Mental Status: She is alert and oriented to person, place, and time.   Psychiatric:         Thought Content: Thought content normal.           Assessment and Plan    Diagnoses and all orders for this visit:    1. Elevated LFTs (Primary)  -     Hepatic Function Panel    2. Elevated ferritin  -     Hemochromatosis Mutation    3. Low plasma alpha-1 antitrypsin  -     Alpha - 1 - Antitrypsin Phenotype    4. Fatty liver    5. Diarrhea, unspecified type         Discussion  Patient presents today for follow-up after liver biopsy.  Biopsy findings reviewed at today's office visit.  No evidence of autoimmune hepatitis.  Biopsy showed fatty liver and nonspecific inflammation, possibly secondary to drug reaction or alpha 1 antitrypsin deficiency.  No iron deposits noted in liver.  We will check lab work today to reassess liver function test and recommended blood work to evaluate for hemochromatosis and alpha-1 antitrypsin deficiency.  Will provide further recommendations dependent on results and clinical course.  Reviewed dietary and lifestyle modifications to help with fatty liver.    Regarding diarrhea, concern for microscopic colitis.  Recommended proceeding with endoscopic evaluation as planned.  If microscopic colitis is confirmed, we will plan to treat with budesonide.          Follow Up   Return for Follow up to review results after testing complete.    Patient Instructions   Check lab work today to reassess liver function test and to evaluate for hemochromatosis and alpha-1 antitrypsin deficiency.    Proceed with EGD and colonoscopy as planned.    For fatty liver, recommend maintaining a healthy weight.  Recommend following a low fat and low sugar diet. Recommend management of diabetes and elevated cholesterol with primary care  provider if indicated. Regular exercise is recommended. Alcohol avoidance is recommended.

## 2025-01-22 NOTE — PATIENT INSTRUCTIONS
Check lab work today to reassess liver function test and to evaluate for hemochromatosis and alpha-1 antitrypsin deficiency.    Proceed with EGD and colonoscopy as planned.    For fatty liver, recommend maintaining a healthy weight.  Recommend following a low fat and low sugar diet. Recommend management of diabetes and elevated cholesterol with primary care provider if indicated. Regular exercise is recommended. Alcohol avoidance is recommended.

## 2025-01-28 ENCOUNTER — ANESTHESIA (OUTPATIENT)
Dept: SURGERY | Facility: SURGERY CENTER | Age: 75
End: 2025-01-28
Payer: COMMERCIAL

## 2025-01-28 ENCOUNTER — HOSPITAL ENCOUNTER (OUTPATIENT)
Facility: SURGERY CENTER | Age: 75
Setting detail: HOSPITAL OUTPATIENT SURGERY
Discharge: HOME | End: 2025-01-28
Attending: INTERNAL MEDICINE | Admitting: INTERNAL MEDICINE
Payer: COMMERCIAL

## 2025-01-28 ENCOUNTER — ANESTHESIA EVENT (OUTPATIENT)
Dept: SURGERY | Facility: SURGERY CENTER | Age: 75
End: 2025-01-28
Payer: COMMERCIAL

## 2025-01-28 VITALS
TEMPERATURE: 98.7 F | BODY MASS INDEX: 26.05 KG/M2 | HEIGHT: 63 IN | OXYGEN SATURATION: 95 % | HEART RATE: 95 BPM | SYSTOLIC BLOOD PRESSURE: 114 MMHG | RESPIRATION RATE: 18 BRPM | DIASTOLIC BLOOD PRESSURE: 77 MMHG | WEIGHT: 147 LBS

## 2025-01-28 DIAGNOSIS — Z86.0100 HISTORY OF COLON POLYPS: ICD-10-CM

## 2025-01-28 DIAGNOSIS — R19.5 DARK STOOLS: ICD-10-CM

## 2025-01-28 DIAGNOSIS — Z80.0 FAMILY HISTORY OF COLON CANCER IN MOTHER: ICD-10-CM

## 2025-01-28 DIAGNOSIS — K44.9 HIATAL HERNIA: ICD-10-CM

## 2025-01-28 DIAGNOSIS — R19.7 DIARRHEA, UNSPECIFIED TYPE: ICD-10-CM

## 2025-01-28 LAB
HFE GENE MUT ANL BLD/T: NORMAL
IMP & REVIEW OF LAB RESULTS: NORMAL

## 2025-01-28 PROCEDURE — 25010000002 LIDOCAINE 2% SOLUTION: Performed by: NURSE ANESTHETIST, CERTIFIED REGISTERED

## 2025-01-28 PROCEDURE — 45380 COLONOSCOPY AND BIOPSY: CPT | Performed by: INTERNAL MEDICINE

## 2025-01-28 PROCEDURE — 88342 IMHCHEM/IMCYTCHM 1ST ANTB: CPT | Performed by: INTERNAL MEDICINE

## 2025-01-28 PROCEDURE — 88305 TISSUE EXAM BY PATHOLOGIST: CPT | Performed by: INTERNAL MEDICINE

## 2025-01-28 PROCEDURE — 25010000002 PROPOFOL 10 MG/ML EMULSION: Performed by: NURSE ANESTHETIST, CERTIFIED REGISTERED

## 2025-01-28 PROCEDURE — 45385 COLONOSCOPY W/LESION REMOVAL: CPT | Performed by: INTERNAL MEDICINE

## 2025-01-28 PROCEDURE — 25010000002 GLYCOPYRROLATE 1 MG/5ML SOLUTION: Performed by: NURSE ANESTHETIST, CERTIFIED REGISTERED

## 2025-01-28 PROCEDURE — 43239 EGD BIOPSY SINGLE/MULTIPLE: CPT | Performed by: INTERNAL MEDICINE

## 2025-01-28 PROCEDURE — 25810000003 LACTATED RINGERS PER 1000 ML: Performed by: NURSE PRACTITIONER

## 2025-01-28 PROCEDURE — 25010000002 PROPOFOL 1000 MG/100ML EMULSION: Performed by: NURSE ANESTHETIST, CERTIFIED REGISTERED

## 2025-01-28 RX ORDER — SODIUM CHLORIDE 0.9 % (FLUSH) 0.9 %
10 SYRINGE (ML) INJECTION AS NEEDED
Status: DISCONTINUED | OUTPATIENT
Start: 2025-01-28 | End: 2025-01-28 | Stop reason: HOSPADM

## 2025-01-28 RX ORDER — LIDOCAINE HYDROCHLORIDE 20 MG/ML
INJECTION, SOLUTION INFILTRATION; PERINEURAL AS NEEDED
Status: DISCONTINUED | OUTPATIENT
Start: 2025-01-28 | End: 2025-01-28 | Stop reason: SURG

## 2025-01-28 RX ORDER — PROPOFOL 10 MG/ML
INJECTION, EMULSION INTRAVENOUS AS NEEDED
Status: DISCONTINUED | OUTPATIENT
Start: 2025-01-28 | End: 2025-01-28 | Stop reason: SURG

## 2025-01-28 RX ORDER — SODIUM CHLORIDE 0.9 % (FLUSH) 0.9 %
3 SYRINGE (ML) INJECTION EVERY 12 HOURS SCHEDULED
Status: DISCONTINUED | OUTPATIENT
Start: 2025-01-28 | End: 2025-01-28 | Stop reason: HOSPADM

## 2025-01-28 RX ORDER — SODIUM CHLORIDE, SODIUM LACTATE, POTASSIUM CHLORIDE, CALCIUM CHLORIDE 600; 310; 30; 20 MG/100ML; MG/100ML; MG/100ML; MG/100ML
30 INJECTION, SOLUTION INTRAVENOUS CONTINUOUS PRN
Status: DISCONTINUED | OUTPATIENT
Start: 2025-01-28 | End: 2025-01-28 | Stop reason: HOSPADM

## 2025-01-28 RX ORDER — GLYCOPYRROLATE 0.2 MG/ML
INJECTION INTRAMUSCULAR; INTRAVENOUS AS NEEDED
Status: DISCONTINUED | OUTPATIENT
Start: 2025-01-28 | End: 2025-01-28 | Stop reason: SURG

## 2025-01-28 RX ADMIN — SODIUM CHLORIDE, POTASSIUM CHLORIDE, SODIUM LACTATE AND CALCIUM CHLORIDE 30 ML/HR: 600; 310; 30; 20 INJECTION, SOLUTION INTRAVENOUS at 10:27

## 2025-01-28 RX ADMIN — PROPOFOL 100 MG: 10 INJECTION, EMULSION INTRAVENOUS at 10:38

## 2025-01-28 RX ADMIN — LIDOCAINE HYDROCHLORIDE 60 MG: 20 INJECTION, SOLUTION INFILTRATION; PERINEURAL at 10:38

## 2025-01-28 RX ADMIN — PROPOFOL 140 MCG/KG/MIN: 10 INJECTION, EMULSION INTRAVENOUS at 10:39

## 2025-01-28 RX ADMIN — GLYCOPYRROLATE 0.2 MCG: 0.2 INJECTION, SOLUTION INTRAMUSCULAR; INTRAVENOUS at 10:38

## 2025-01-28 NOTE — ANESTHESIA PREPROCEDURE EVALUATION
Anesthesia Evaluation     Patient summary reviewed and Nursing notes reviewed   NPO Solid Status: > 8 hours  NPO Liquid Status: > 2 hours           Airway   Mallampati: II  TM distance: >3 FB  Neck ROM: full  No difficulty expected  Dental - normal exam     Pulmonary    (+) pneumonia , pleural effusion, a smoker Former, COPD,  Cardiovascular     ECG reviewed  Patient on routine beta blocker and Beta blocker given within 24 hours of surgery    (+) hypertension, dysrhythmias (parox SVT), CHF (diastolic dysfunction grade I) Diastolic >=55%, hyperlipidemia      Neuro/Psych  (+) CVA (R MCA,  foot numbness only) residual symptoms  GI/Hepatic/Renal/Endo    (+) hiatal hernia, liver disease fatty liver disease    Musculoskeletal     Abdominal    Substance History   (+) alcohol use (15/week)     OB/GYN          Other   arthritis,                   Anesthesia Plan    ASA 3     MAC     (I have reviewed the patient's history and chart with the patient, including all pertinent laboratory results and imaging. I have explained the risks of anesthesia including but not limited to dental damage, corneal abrasion, nerve injury, MI, stroke, aspiration, and death. Patient has agreed to proceed.      There were no vitals taken for this visit.  )  intravenous induction     Anesthetic plan, risks, benefits, and alternatives have been provided, discussed and informed consent has been obtained with: patient.    CODE STATUS:

## 2025-01-28 NOTE — ANESTHESIA POSTPROCEDURE EVALUATION
Patient: Katherine Williamson    Procedure Summary       Date: 01/28/25 Room / Location: [facility]    Anesthesia Start: 1033 Anesthesia Stop: 1101    Procedures:       ESOPHAGOGASTRODUODENOSCOPY WITH BIOPSIES      COLONOSCOPY TO CECUM, RANDOM BIOPSIES, COLD SNARE POLYPECTOMY Diagnosis:       Dark stools      Hiatal hernia      Diarrhea, unspecified type      History of colon polyps      Family history of colon cancer in mother      (Dark stools [R19.5])      (Hiatal hernia [K44.9])      (Diarrhea, unspecified type [R19.7])      (History of colon polyps [Z86.0100])      (Family history of colon cancer in mother [Z80.0])    Surgeons: Tao Walker MD Provider: Marietta Petersen MD    Anesthesia Type: MAC ASA Status: 3            Anesthesia Type: MAC    Vitals  Vitals Value Taken Time   /77 01/28/25 1125   Temp 37.1 °C (98.7 °F) 01/28/25 1102   Pulse 94 01/28/25 1125   Resp 18 01/28/25 1105   SpO2 92 % 01/28/25 1125   Vitals shown include unfiled device data.        Post Anesthesia Care and Evaluation    Patient location during evaluation: bedside  Patient participation: complete - patient participated  Level of consciousness: awake and alert  Pain management: adequate    Airway patency: patent  Anesthetic complications: No anesthetic complications  PONV Status: controlled  Cardiovascular status: acceptable  Respiratory status: acceptable  Hydration status: acceptable

## 2025-01-30 LAB
CYTO UR: NORMAL
LAB AP CASE REPORT: NORMAL
LAB AP SPECIAL STAINS: NORMAL
PATH REPORT.FINAL DX SPEC: NORMAL
PATH REPORT.GROSS SPEC: NORMAL

## 2025-01-31 RX ORDER — PANTOPRAZOLE SODIUM 40 MG/1
40 TABLET, DELAYED RELEASE ORAL DAILY
Qty: 90 TABLET | Refills: 1 | Status: SHIPPED | OUTPATIENT
Start: 2025-01-31

## 2025-02-04 ENCOUNTER — PATIENT MESSAGE (OUTPATIENT)
Dept: GASTROENTEROLOGY | Facility: CLINIC | Age: 75
End: 2025-02-04
Payer: COMMERCIAL

## 2025-02-04 ENCOUNTER — OFFICE VISIT (OUTPATIENT)
Dept: INTERNAL MEDICINE | Age: 75
End: 2025-02-04
Payer: COMMERCIAL

## 2025-02-04 VITALS
BODY MASS INDEX: 26.22 KG/M2 | OXYGEN SATURATION: 99 % | TEMPERATURE: 96.8 F | DIASTOLIC BLOOD PRESSURE: 78 MMHG | SYSTOLIC BLOOD PRESSURE: 118 MMHG | HEIGHT: 63 IN | WEIGHT: 148 LBS | HEART RATE: 78 BPM

## 2025-02-04 DIAGNOSIS — E78.5 HYPERLIPIDEMIA, UNSPECIFIED HYPERLIPIDEMIA TYPE: ICD-10-CM

## 2025-02-04 DIAGNOSIS — R60.0 LOWER EXTREMITY EDEMA: ICD-10-CM

## 2025-02-04 DIAGNOSIS — K52.831 COLLAGENOUS COLITIS: Primary | ICD-10-CM

## 2025-02-04 DIAGNOSIS — I10 HYPERTENSION, UNSPECIFIED TYPE: ICD-10-CM

## 2025-02-04 LAB
A1AT PHENOTYP SERPL IFE: ABNORMAL
A1AT SERPL-MCNC: 75 MG/DL (ref 101–187)

## 2025-02-04 PROCEDURE — 99214 OFFICE O/P EST MOD 30 MIN: CPT | Performed by: NURSE PRACTITIONER

## 2025-02-04 RX ORDER — FUROSEMIDE 40 MG/1
40 TABLET ORAL 2 TIMES DAILY
Qty: 180 TABLET | Refills: 1 | Status: SHIPPED | OUTPATIENT
Start: 2025-02-04

## 2025-02-04 RX ORDER — ATORVASTATIN CALCIUM 80 MG/1
80 TABLET, FILM COATED ORAL
Qty: 90 TABLET | Refills: 1 | Status: SHIPPED | OUTPATIENT
Start: 2025-02-04

## 2025-02-04 RX ORDER — METOPROLOL TARTRATE 25 MG/1
12.5 TABLET, FILM COATED ORAL 2 TIMES DAILY
Qty: 90 TABLET | Refills: 1 | Status: SHIPPED | OUTPATIENT
Start: 2025-02-04

## 2025-02-04 RX ORDER — POTASSIUM CHLORIDE 1500 MG/1
20 TABLET, EXTENDED RELEASE ORAL DAILY
Qty: 90 TABLET | Refills: 1 | Status: SHIPPED | OUTPATIENT
Start: 2025-02-04

## 2025-02-04 NOTE — PROGRESS NOTES
"    I N T E R N A L  M E D I C I N E  TOMEKA STANTON, APRN      ENCOUNTER DATE:  02/04/2025    Katherine Williamson / 74 y.o. / female      CHIEF COMPLAINT / REASON FOR OFFICE VISIT     Hyperlipidemia, Hypertension, and Heartburn      ASSESSMENT & PLAN     Problem List Items Addressed This Visit       Hyperlipidemia    Relevant Medications    atorvastatin (LIPITOR) 80 MG tablet     Other Visit Diagnoses       Collagenous colitis    -  Primary    Relevant Medications    Budesonide ER 9 MG capsule sustained-release 24 hr    Lower extremity edema        Relevant Medications    furosemide (LASIX) 40 MG tablet    potassium chloride (Klor-Con M20) 20 MEQ CR tablet    Hypertension, unspecified type        Relevant Medications    furosemide (LASIX) 40 MG tablet    metoprolol tartrate (LOPRESSOR) 25 MG tablet          No orders of the defined types were placed in this encounter.    New Medications Ordered This Visit   Medications    Budesonide ER 9 MG capsule sustained-release 24 hr     Sig: Take 1 capsule by mouth Daily.     Dispense:  30 capsule     Refill:  0    atorvastatin (LIPITOR) 80 MG tablet     Sig: Take 1 tablet by mouth every night at bedtime.     Dispense:  90 tablet     Refill:  1    furosemide (LASIX) 40 MG tablet     Sig: Take 1 tablet by mouth 2 (Two) Times a Day.     Dispense:  180 tablet     Refill:  1    potassium chloride (Klor-Con M20) 20 MEQ CR tablet     Sig: Take 1 tablet by mouth Daily.     Dispense:  90 tablet     Refill:  1    metoprolol tartrate (LOPRESSOR) 25 MG tablet     Sig: Take 0.5 tablets by mouth 2 (Two) Times a Day. 1/2 pill BID     Dispense:  90 tablet     Refill:  1       SUMMARY/DISCUSSION        Next Appointment with me: Visit date not found    Return in about 6 months (around 8/4/2025) for Next scheduled follow up.      VITAL SIGNS     Visit Vitals  /78   Pulse 78   Temp 96.8 °F (36 °C)   Ht 160 cm (63\")   Wt 67.1 kg (148 lb)   SpO2 99%   BMI 26.22 kg/m²       Wt Readings from Last 3 " Encounters:   02/04/25 67.1 kg (148 lb)   01/28/25 66.7 kg (147 lb)   01/22/25 67.3 kg (148 lb 6.4 oz)     Body mass index is 26.22 kg/m².      MEDICATIONS AT THE TIME OF OFFICE VISIT     Current Outpatient Medications on File Prior to Visit   Medication Sig    acetaminophen (TYLENOL) 500 MG tablet Take 1 tablet by mouth Every 6 (Six) Hours As Needed. Indications: Pain    aspirin 81 MG chewable tablet Chew 1 tablet Daily.    Cholecalciferol (VITAMIN D) 1000 UNITS tablet Take 1 tablet by mouth Daily.    cyanocobalamin (VITAMIN B-12) 1000 MCG tablet Take 1 tablet by mouth Daily.    DULoxetine (CYMBALTA) 30 MG capsule TAKE 1 CAPSULE BY MOUTH EVERY DAY    pantoprazole (PROTONIX) 40 MG EC tablet Take 1 tablet by mouth Daily.    vitamin C (ASCORBIC ACID) 500 MG tablet Take 1 tablet by mouth Daily. Indications: Inadequate Vitamin C    [DISCONTINUED] atorvastatin (LIPITOR) 80 MG tablet Take 1 tablet by mouth every night at bedtime.    [DISCONTINUED] furosemide (LASIX) 40 MG tablet TAKE 1 TABLET BY MOUTH TWICE A DAY    [DISCONTINUED] Klor-Con M20 20 MEQ CR tablet TAKE 1 TABLET BY MOUTH EVERY DAY    [DISCONTINUED] metoprolol tartrate (LOPRESSOR) 25 MG tablet Take 0.5 tablets by mouth 2 (Two) Times a Day. 1/2 pill BID     No current facility-administered medications on file prior to visit.          HISTORY OF PRESENT ILLNESS     Patient had EGD and colonoscopy recently that showed reflux along with collagenous colitis.  She was placed on pantoprazole and was recommended budesonide 9 mg daily but it does not appear that budesonide was called in.  She still having diarrhea.    History of stroke and concurrent hyperlipidemia with last LDL of 42 on statin.  Tolerating well.  Patient was started on Cymbalta for neuropathy by neurology which is also improved her symptoms along with irritability and mood.    Lower extremity edema stable with Lasix 40 mg along with potassium.     Blood pressure stable on metoprolol.  No chest pain  shortness of breath.    REVIEW OF SYSTEMS     Constitutional neg except per HPI   Resp neg  CV neg   GI diarrhea   Psych improved     PHYSICAL EXAMINATION     Physical Exam  Constitutional  No distress  Cardiovascular Rate  normal . Rhythm: regular . Heart sounds:  normal  Pulmonary/Chest  Effort normal. Breath sounds:  normal  Psychiatric  Alert. Judgment and thought content normal. Mood normal      REVIEWED DATA     Labs:   Lab Results   Component Value Date    GLUCOSE 98 11/06/2024    BUN 9 11/06/2024    CREATININE 0.67 11/06/2024    EGFRRESULT 95.0 09/25/2024    EGFR 93.6 11/06/2024    BCR 13.4 11/06/2024    K 4.3 11/06/2024    CO2 26.6 11/06/2024    CALCIUM 9.4 11/06/2024    PROTENTOTREF 7.2 09/25/2024    ALBUMIN 4.3 01/22/2025    BILITOT 0.9 01/22/2025    AST 29 01/22/2025    ALT 31 01/22/2025     Lab Results   Component Value Date    WBC 7.56 11/06/2024    HGB 14.1 11/06/2024    HCT 42.1 11/06/2024    MCV 92.3 11/06/2024     11/06/2024     Lab Results   Component Value Date    CHOL 94 05/15/2023    CHLPL 141 09/25/2024    TRIG 54 09/25/2024    HDL 87 (H) 09/25/2024    LDL 42 09/25/2024      Lab Results   Component Value Date    TSH 1.160 04/18/2018     Brief Urine Lab Results       None          Lab Results   Component Value Date    HGBA1C 6.20 (H) 05/15/2023       Imaging:           Medical Tests:           Summary of old records / correspondence / consultant report:           Request outside records:

## 2025-02-05 DIAGNOSIS — E88.01 ALPHA-1-ANTITRYPSIN DEFICIENCY: Primary | ICD-10-CM

## 2025-02-05 DIAGNOSIS — E88.09 ALPHA-1-ANTICHYMOTRYPSIN DEFICIENCY: ICD-10-CM

## 2025-02-05 RX ORDER — BUDESONIDE 3 MG/1
9 CAPSULE, COATED PELLETS ORAL EVERY MORNING
Qty: 90 CAPSULE | Refills: 1 | Status: SHIPPED | OUTPATIENT
Start: 2025-02-05 | End: 2025-02-05 | Stop reason: ALTCHOICE

## 2025-02-05 RX ORDER — BUDESONIDE 3 MG/1
9 CAPSULE, COATED PELLETS ORAL EVERY MORNING
Qty: 90 CAPSULE | Refills: 2 | Status: SHIPPED | OUTPATIENT
Start: 2025-02-05

## 2025-02-28 ENCOUNTER — HOSPITAL ENCOUNTER (EMERGENCY)
Facility: HOSPITAL | Age: 75
Discharge: HOME | End: 2025-02-28
Attending: EMERGENCY MEDICINE
Payer: COMMERCIAL

## 2025-02-28 ENCOUNTER — APPOINTMENT (OUTPATIENT)
Dept: CT IMAGING | Facility: HOSPITAL | Age: 75
End: 2025-02-28
Payer: COMMERCIAL

## 2025-02-28 VITALS
SYSTOLIC BLOOD PRESSURE: 153 MMHG | DIASTOLIC BLOOD PRESSURE: 91 MMHG | OXYGEN SATURATION: 92 % | RESPIRATION RATE: 16 BRPM | BODY MASS INDEX: 25.87 KG/M2 | WEIGHT: 146 LBS | HEIGHT: 63 IN | HEART RATE: 90 BPM | TEMPERATURE: 97.6 F

## 2025-02-28 DIAGNOSIS — S22.42XA CLOSED FRACTURE OF MULTIPLE RIBS OF LEFT SIDE, INITIAL ENCOUNTER: Primary | ICD-10-CM

## 2025-02-28 PROCEDURE — 71250 CT THORAX DX C-: CPT

## 2025-02-28 PROCEDURE — 99284 EMERGENCY DEPT VISIT MOD MDM: CPT

## 2025-02-28 RX ORDER — LIDOCAINE 4 G/G
1 PATCH TOPICAL
Status: DISCONTINUED | OUTPATIENT
Start: 2025-02-28 | End: 2025-02-28 | Stop reason: HOSPADM

## 2025-02-28 RX ORDER — ACETAMINOPHEN 500 MG
1000 TABLET ORAL ONCE
Status: COMPLETED | OUTPATIENT
Start: 2025-02-28 | End: 2025-02-28

## 2025-02-28 RX ORDER — TRAMADOL HYDROCHLORIDE 50 MG/1
50 TABLET ORAL EVERY 8 HOURS PRN
Qty: 9 TABLET | Refills: 0 | Status: SHIPPED | OUTPATIENT
Start: 2025-02-28

## 2025-02-28 RX ORDER — ACETAMINOPHEN 500 MG
1000 TABLET ORAL EVERY 8 HOURS PRN
Qty: 60 TABLET | Refills: 0 | Status: SHIPPED | OUTPATIENT
Start: 2025-02-28

## 2025-02-28 RX ORDER — LIDOCAINE 50 MG/G
1 PATCH TOPICAL EVERY 24 HOURS
Qty: 6 EACH | Refills: 0 | Status: SHIPPED | OUTPATIENT
Start: 2025-02-28

## 2025-02-28 RX ADMIN — LIDOCAINE 1 PATCH: 4 PATCH TOPICAL at 12:07

## 2025-02-28 RX ADMIN — ACETAMINOPHEN 1000 MG: 500 TABLET, FILM COATED ORAL at 12:05

## 2025-02-28 NOTE — DISCHARGE INSTRUCTIONS
Return to the ER for uncontrolled pain, fever, shortness of breath  Call your PCP today to schedule follow-up next week    Use the incentive spirometer 10 times every 4 hours while awake until pain is resolved

## 2025-02-28 NOTE — ED PROVIDER NOTES
EMERGENCY DEPARTMENT ENCOUNTER  Room Number:  08/08  PCP: Zelalem Flor, HILARIA, APRN  Independent Historians: Patient      HPI:  Chief Complaint: had concerns including Fall and Rib Injury.     A complete HPI/ROS/PMH/PSH/SH/FH are unobtainable due to: None    Chronic or social conditions impacting patient care (Social Determinants of Health): None      Context: The patient is a 74 y.o. female with a medical history of HTN, HLD, tobacco abuse who presents to the ED c/o acute left sided rib pain after tripping in her garage last night and directly striking the area.  She has increased pain with range of motion and breathing, denies any head injury, neck or back pain, chest pain or any extremity pain.  She is not anticoagulated.  Denies any shortness of breath, denies any abdominal pain, is tolerating p.o.      Review of prior external notes (non-ED) -and- Review of prior external test results outside of this encounter:  Labs performed 1/22/2025 and AST ALT and bilirubin was normal, alkaline phosphatase was 186        PAST MEDICAL HISTORY  Active Ambulatory Problems     Diagnosis Date Noted    Benign essential HTN 04/01/2016    Hyperlipidemia 04/01/2016    Tobacco abuse 04/05/2016    Dislocation of hip joint prosthesis 04/16/2016    Fracture of proximal humerus 08/22/2015    Mechanical complication of internal orthopedic device 04/16/2016    Wear of articular bearing surface of internal prosthetic joint 12/03/2015    History of repair of hip joint 09/28/2015    History of operative procedure on hip 12/03/2015    Hyperglycemia 10/03/2016    Hepatic steatosis 11/14/2019    Diverticulosis 11/14/2019    Chest pain, atypical 04/30/2020    History of colon polyps 10/20/2021    Family history of colon cancer in mother 10/20/2021    Allergic rhinitis 06/06/2022    Lung nodule seen on imaging study 04/05/2023    Acute respiratory failure with hypoxia 05/02/2023    Abscess of right lung with pneumonia 05/02/2023    Empyema  05/02/2023    Sepsis 05/03/2023    Pleural effusion, right 05/03/2023    Other emphysema 05/03/2023    Pulmonary nodule (RLL) 05/03/2023    Diastolic dysfunction, grade 1 05/13/2023    Physical debility 05/13/2023    Cryptogenic stroke 05/17/2023    Acute right MCA stroke 05/19/2023    Paroxysmal SVT (supraventricular tachycardia) 07/24/2023    Diarrhea 09/25/2024    Dark stools 10/25/2024    Hiatal hernia 10/25/2024     Resolved Ambulatory Problems     Diagnosis Date Noted    No Resolved Ambulatory Problems     Past Medical History:   Diagnosis Date    Arthritis     Cataract     Collagenous colitis 2025    Colon polyps     Hypertension     Low back pain     Pneumonia 5/2/2023    Stroke 5/14/2023         PAST SURGICAL HISTORY  Past Surgical History:   Procedure Laterality Date    CARDIAC ELECTROPHYSIOLOGY PROCEDURE N/A 9/5/2023    Procedure: Loop insertion  LINQ;  Surgeon: Usha Eason MD;  Location: [facility] INVASIVE LOCATION;  Service: Cardiovascular;  Laterality: N/A;    COLONOSCOPY  10/2015    [facility].  Follow-up in 5 years.    COLONOSCOPY N/A 1/12/2022    2 BENIGN POLYPS IN DESCENDING, 5 MM BENIGN POLYP IN SIGMOID, MULTIPLE SMALL AND LARGE DIVERTICULA IN SIGMOID, RESCOPE IN 3 YRS, DR. JOSEPH LAU AT [facility]    COLONOSCOPY N/A 1/28/2025    Procedure: COLONOSCOPY TO CECUM, RANDOM BIOPSIES, COLD SNARE POLYPECTOMY;  Surgeon: Tao Walker MD;  Location: [facility] [place];  Service: Gastroenterology;  Laterality: N/A;  POLYP X 1, DIVERTICULOSIS, HEMORRHOIDS    ENDOSCOPY N/A 1/28/2025    Procedure: ESOPHAGOGASTRODUODENOSCOPY WITH BIOPSIES;  Surgeon: Tao Walker MD;  Location: [facility] OR;  Service: Gastroenterology;  Laterality: N/A;  ANTRAL ULCERS    EYE SURGERY      JOINT REPLACEMENT  2005?    Left total hip replacement in 2005.  In December 2015 patient had left hip revision    TONSILLECTOMY           FAMILY HISTORY  Family History   Problem Relation Age of Onset    Colon  cancer Mother     Cancer Mother     Breast cancer Mother     Arthritis Mother     Deep vein thrombosis Mother     Hyperlipidemia Mother     Hypertension Mother     Heart attack Father     Heart disease Father         Had quadruple bypass    Hyperlipidemia Father     Hypertension Father     Heart attack Maternal Grandmother     Heart disease Maternal Grandmother     Malig Hyperthermia Neg Hx     Colon polyps Neg Hx     Crohn's disease Neg Hx     Irritable bowel syndrome Neg Hx     Ulcerative colitis Neg Hx          SOCIAL HISTORY  Social History     Socioeconomic History    Marital status:    Tobacco Use    Smoking status: Former     Current packs/day: 0.00     Average packs/day: 1 pack/day for 53.3 years (53.3 ttl pk-yrs)     Types: Cigarettes     Start date: 1970     Quit date: 2023     Years since quittin.8    Smokeless tobacco: Never    Tobacco comments:     Caffeine - 4 cups coffee daily    Vaping Use    Vaping status: Never Used   Substance and Sexual Activity    Alcohol use: Yes     Alcohol/week: 15.0 standard drinks of alcohol     Types: 15 Cans of beer per week     Comment: 3-4 beers daily    Drug use: Never    Sexual activity: Not Currently     Partners: Male     Birth control/protection: None, Birth control pill         ALLERGIES  Hydrocodone      REVIEW OF SYSTEMS  Review of Systems  Included in HPI  All systems reviewed and negative except for those discussed in HPI.      PHYSICAL EXAM    I have reviewed the triage vital signs and nursing notes.    ED Triage Vitals   Temp Heart Rate Resp BP SpO2   25 1048 25 1048 25 1048 25 1116 25 1048   97.6 °F (36.4 °C) 102 12 (!) 168/9 93 %      Temp src Heart Rate Source Patient Position BP Location FiO2 (%)   25 1048 25 1048 -- -- --   Tympanic Monitor          Physical Exam  GENERAL: alert, no acute distress  SKIN: Warm, dry  HENT: Normocephalic, atraumatic  EYES: no scleral icterus  CV: regular rhythm,  regular rate  RESPIRATORY: normal effort, lungs clear.  There is some ecchymosis along the left lateral chest inferiorly, she is directly tender in this area, no step-offs or crepitus  ABDOMEN: nondistended soft nontender normal bowel sounds no guarding or rigidity  MUSCULOSKELETAL: no deformity  NEURO: alert, moves all extremities, follows commands            LAB RESULTS  No results found for this or any previous visit (from the past 24 hours).      RADIOLOGY  CT Chest Without Contrast Diagnostic    Result Date: 2/28/2025  CT CHEST WO CONTRAST DIAGNOSTIC-  INDICATION: Fall, left rib pain  COMPARISON: CT chest May 23, 2024  TECHNIQUE: Routine CT chest without IV contrast. Coronal and sagittal reformats. Radiation dose reduction techniques were utilized, including automated exposure control and exposure modulation based on body size.  FINDINGS:  Chest wall: Left anterior chest wall implantable loop recorder. No lymphadenopathy.  Mediastinum: Coronary artery atherosclerotic calcification. Heart is normal in size. No pericardial effusion. No mediastinal or hilar lymphadenopathy.  Lungs/pleura: No effusions. Airways wall thickening. Moderate centrilobular emphysema. Biapical pleural-parenchymal thickening. Linear opacities in the lateral segment right middle lobe, with volume loss and distortion, suspect scarring, unchanged. Linear opacities in the right lower lobe, with volume loss and distortion, suspect scarring.  Upper abdomen: Hepatic steatosis. Large hiatal hernia.  Osseous structures: Old superior plate compression deformity seen at T7, T10 and T11, with 10% collapse, with large Schmorl's node seen in the superior endplate of T11. Slightly displaced left anterior fifth rib fracture. Mildly displaced left anterior sixth rib fracture.       1. Slightly displaced left anterior fifth rib fracture. Mildly displaced left anterior sixth rib fracture. 2. Airways disease and moderate emphysema with suspected scarring in  the right lung. 3. Hepatic steatosis. 4. Large hiatal hernia  This report was finalized on 2/28/2025 12:19 PM by Dr. Kanu Rodríguez M.D on Workstation: [id number]         MEDICATIONS GIVEN IN ER  Medications   Lidocaine 4 % 1 patch (1 patch Transdermal Medication Applied 2/28/25 1207)   acetaminophen (TYLENOL) tablet 1,000 mg (1,000 mg Oral Given 2/28/25 1205)         ORDERS PLACED DURING THIS VISIT:  Orders Placed This Encounter   Procedures    CT Chest Without Contrast Diagnostic    Incentive Spirometry         OUTPATIENT MEDICATION MANAGEMENT:  Current Facility-Administered Medications Ordered in Epic   Medication Dose Route Frequency Provider Last Rate Last Admin    Lidocaine 4 % 1 patch  1 patch Transdermal Q24H Dorothy Petty PA-C   1 patch at 02/28/25 1207     Current Outpatient Medications Ordered in Epic   Medication Sig Dispense Refill    acetaminophen (TYLENOL) 500 MG tablet Take 2 tablets by mouth Every 8 (Eight) Hours As Needed for Mild Pain. 60 tablet 0    aspirin 81 MG chewable tablet Chew 1 tablet Daily. 90 tablet 0    atorvastatin (LIPITOR) 80 MG tablet Take 1 tablet by mouth every night at bedtime. 90 tablet 1    Budesonide (ENTOCORT EC) 3 MG 24 hr capsule Take 3 capsules by mouth Every Morning. 90 capsule 2    Cholecalciferol (VITAMIN D) 1000 UNITS tablet Take 1 tablet by mouth Daily.      cyanocobalamin (VITAMIN B-12) 1000 MCG tablet Take 1 tablet by mouth Daily. 90 tablet 0    DULoxetine (CYMBALTA) 30 MG capsule TAKE 1 CAPSULE BY MOUTH EVERY DAY 90 capsule 3    furosemide (LASIX) 40 MG tablet Take 1 tablet by mouth 2 (Two) Times a Day. 180 tablet 1    lidocaine (LIDODERM) 5 % Place 1 patch on the skin as directed by provider Daily. Remove & Discard patch within 12 hours or as directed by MD 6 each 0    metoprolol tartrate (LOPRESSOR) 25 MG tablet Take 0.5 tablets by mouth 2 (Two) Times a Day. 1/2 pill BID 90 tablet 1    pantoprazole (PROTONIX) 40 MG EC tablet Take 1 tablet by mouth Daily.  90 tablet 1    potassium chloride (Klor-Con M20) 20 MEQ CR tablet Take 1 tablet by mouth Daily. 90 tablet 1    traMADol (ULTRAM) 50 MG tablet Take 1 tablet by mouth Every 8 (Eight) Hours As Needed for Moderate Pain. 9 tablet 0    vitamin C (ASCORBIC ACID) 500 MG tablet Take 1 tablet by mouth Daily. Indications: Inadequate Vitamin C           PROCEDURES  Procedures            PROGRESS, DATA ANALYSIS, CONSULTS, AND MEDICAL DECISION MAKING  All labs have been independently interpreted by me.  All radiology studies have been reviewed by me. All EKG's have been independently viewed and interpreted by me.  Discussion below represents my analysis of pertinent findings related to patient's condition, differential diagnosis, treatment plan and final disposition.    DIFFERENTIAL    My differential diagnosis includes but is not limited to chest wall contusion, rib fracture, pulmonary contusion, pneumothorax    Clinical Scores:                  ED Course as of 02/28/25 1317   Fri Feb 28, 2025   1254 Reassessed the patient, counseled her on her imaging results, to nondisplaced left-sided rib fractures.  We also discussed incidental findings including but not limited to fatty liver disease.  No hemothorax or signs of pulmonary contusion.  She is not hypoxic, I have offered admission to the hospital for pain control and monitoring, she declined, prefers discharge home.  I think is reasonable at this time but have recommended that she return for any shortness of breath fever or any concerns.  Will prescribe medication for pain control, Lidoderm patches, sent home with an incentive spirometer. [KA]      ED Course User Index  [KA] Dorothy Petty PA-C             AS OF 13:17 EST VITALS:    BP - 161/88  HR - 79  TEMP - 97.6 °F (36.4 °C) (Tympanic)  O2 SATS - 93%    COMPLEXITY OF CARE  Admission was considered but after careful review of the patient's presentation, physical examination, diagnostic results, and response to treatment  the patient may be safely discharged with outpatient follow-up.      DIAGNOSIS  Final diagnoses:   Closed fracture of multiple ribs of left side, initial encounter         DISPOSITION  ED Disposition       ED Disposition   Discharge    Condition   Stable    Comment   --                  FOLLOW UP  Zelalem Flor, HILARIA, APRN  [address]  [facility] [address]  [facility] [id number] [phone]    In 2 days      [facility] EMERGENCY DEPARTMENT  [address]  [facility] [address] [phone]    If symptoms worsen or any concerns        Prescribed Medications     Medication List        New Prescriptions      lidocaine 5 %  Commonly known as: LIDODERM  Place 1 patch on the skin as directed by provider Daily. Remove & Discard patch within 12 hours or as directed by MD     traMADol 50 MG tablet  Commonly known as: ULTRAM  Take 1 tablet by mouth Every 8 (Eight) Hours As Needed for Moderate Pain.            Changed      acetaminophen 500 MG tablet  Commonly known as: TYLENOL  Take 2 tablets by mouth Every 8 (Eight) Hours As Needed for Mild Pain.  What changed:   how much to take  when to take this  reasons to take this               Where to Get Your Medications        These medications were sent to [facility]/pharmacy #[id number] - [place], [place] - [address] - [phone]  - [phone]   [address], [facility] [address]      Phone: [phone]   acetaminophen 500 MG tablet  lidocaine 5 %  traMADol 50 MG tablet                   Please note that portions of this document were completed with a voice recognition program.    Note Disclaimer: At [facility], we believe that sharing information builds trust and better relationships. You are receiving this note because you recently visited [facility]. It is possible you will see health information before a provider has talked with you about it. This kind of information can be easy to misunderstand. To help you fully understand what it means  for your health, we urge you to discuss this note with your provider.         Dorothy Petty PA-C  02/28/25 [id number]

## 2025-03-20 ENCOUNTER — OFFICE VISIT (OUTPATIENT)
Dept: INTERNAL MEDICINE | Age: 75
End: 2025-03-20
Payer: MEDICARE

## 2025-03-20 VITALS
HEART RATE: 82 BPM | DIASTOLIC BLOOD PRESSURE: 74 MMHG | WEIGHT: 144.4 LBS | BODY MASS INDEX: 25.59 KG/M2 | TEMPERATURE: 97.9 F | OXYGEN SATURATION: 98 % | HEIGHT: 63 IN | RESPIRATION RATE: 16 BRPM | SYSTOLIC BLOOD PRESSURE: 136 MMHG

## 2025-03-20 DIAGNOSIS — S22.42XD CLOSED FRACTURE OF MULTIPLE RIBS OF LEFT SIDE WITH ROUTINE HEALING, SUBSEQUENT ENCOUNTER: Primary | ICD-10-CM

## 2025-03-20 NOTE — PROGRESS NOTES
"    I N T E R N A L  M E D I C I N E  TOMEKA STANTON, APRN      ENCOUNTER DATE:  03/20/2025    Katherine Williamson / 74 y.o. / female      CHIEF COMPLAINT / REASON FOR OFFICE VISIT     Hospital Follow Up Visit (C/o Rib # and ER visit about a month ago. Today, feeling better. Has soreness in the chest. No SOB/ pain)      ASSESSMENT & PLAN     Problem List Items Addressed This Visit    None  Visit Diagnoses         Closed fracture of multiple ribs of left side with routine healing, subsequent encounter    -  Primary          No orders of the defined types were placed in this encounter.    No orders of the defined types were placed in this encounter.      SUMMARY/DISCUSSION  Would recommend continuing Tylenol as needed along with heat or ice whichever is more comfortable for her.  Pain is improving.  No significant shortness of breath.  Declines further evaluation for emphysema.  Will follow-up with pulmonary as scheduled.  Declines referral for hiatal hernia.  She will see me as scheduled in August for chronic medical      Next Appointment with me: 8/5/2025    Return for Next scheduled follow up.      VITAL SIGNS     Visit Vitals  /74   Pulse 82   Temp 97.9 °F (36.6 °C) (Oral)   Resp 16   Ht 160 cm (62.99\")   Wt 65.5 kg (144 lb 6.4 oz)   SpO2 98%   BMI 25.59 kg/m²       Wt Readings from Last 3 Encounters:   03/20/25 65.5 kg (144 lb 6.4 oz)   02/28/25 66.2 kg (146 lb)   02/04/25 67.1 kg (148 lb)     Body mass index is 25.59 kg/m².      MEDICATIONS AT THE TIME OF OFFICE VISIT     Current Outpatient Medications on File Prior to Visit   Medication Sig    acetaminophen (TYLENOL) 500 MG tablet Take 2 tablets by mouth Every 8 (Eight) Hours As Needed for Mild Pain.    aspirin 81 MG chewable tablet Chew 1 tablet Daily.    atorvastatin (LIPITOR) 80 MG tablet Take 1 tablet by mouth every night at bedtime.    Budesonide (ENTOCORT EC) 3 MG 24 hr capsule Take 3 capsules by mouth Every Morning.    Cholecalciferol (VITAMIN D) 1000 UNITS " tablet Take 1 tablet by mouth Daily.    cyanocobalamin (VITAMIN B-12) 1000 MCG tablet Take 1 tablet by mouth Daily.    DULoxetine (CYMBALTA) 30 MG capsule TAKE 1 CAPSULE BY MOUTH EVERY DAY    furosemide (LASIX) 40 MG tablet Take 1 tablet by mouth 2 (Two) Times a Day.    metoprolol tartrate (LOPRESSOR) 25 MG tablet Take 0.5 tablets by mouth 2 (Two) Times a Day. 1/2 pill BID    pantoprazole (PROTONIX) 40 MG EC tablet Take 1 tablet by mouth Daily.    potassium chloride (Klor-Con M20) 20 MEQ CR tablet Take 1 tablet by mouth Daily.    traMADol (ULTRAM) 50 MG tablet Take 1 tablet by mouth Every 8 (Eight) Hours As Needed for Moderate Pain.    vitamin C (ASCORBIC ACID) 500 MG tablet Take 1 tablet by mouth Daily. Indications: Inadequate Vitamin C    lidocaine (LIDODERM) 5 % Place 1 patch on the skin as directed by provider Daily. Remove & Discard patch within 12 hours or as directed by MD (Patient not taking: Reported on 3/20/2025)     No current facility-administered medications on file prior to visit.        HISTORY OF PRESENT ILLNESS     Patient presents for ER follow-up, she had closed fracture of multiple ribs of the left side.  States that the fall was not precipitated with any syncope or presyncope.  She had a couple alcoholic beverages and slipped on a stair.  Ribs with minimal pain, no longer needing lidocaine patches.  Breathing okay.  Imaging revealed emphysema which is known.  Has pulmonary on board, CT chest screening regularly.  No significant wheezing or shortness of breath.  Denies routine inhaler.  Incidental large hiatal hernia but she is having no early satiety, GERD, nausea, abdominal pain or vomiting.  Declines surgical intervention at this time or refer to general surgery.  Hepatic steatosis.  LDL is now extremely well-controlled on atorvastatin.  Exercises minimally.    REVIEW OF SYSTEMS     Constitutional neg except per HPI   Resp neg  CV neg   Musc minimal left rib pain    PHYSICAL EXAMINATION      Physical Exam  Constitutional  No distress  Cardiovascular Rate  normal . Rhythm: regular . Heart sounds:  normal  Pulmonary/Chest  Effort normal. Breath sounds:  normal  Musc mild pain left rib/chest  Psychiatric  Alert. Judgment and thought content normal. Mood normal      REVIEWED DATA     Labs:   Lab Results   Component Value Date    GLUCOSE 98 11/06/2024    BUN 9 11/06/2024    CREATININE 0.67 11/06/2024    EGFR 93.6 11/06/2024    BCR 13.4 11/06/2024    K 4.3 11/06/2024    CO2 26.6 11/06/2024    CALCIUM 9.4 11/06/2024    ALBUMIN 4.3 01/22/2025    BILITOT 0.9 01/22/2025    AST 29 01/22/2025    ALT 31 01/22/2025     Lab Results   Component Value Date    WBC 7.56 11/06/2024    HGB 14.1 11/06/2024    HCT 42.1 11/06/2024    MCV 92.3 11/06/2024     11/06/2024     Lab Results   Component Value Date    CHOL 94 05/15/2023    CHLPL 141 09/25/2024    TRIG 54 09/25/2024    HDL 87 (H) 09/25/2024    LDL 42 09/25/2024      Lab Results   Component Value Date    TSH 1.160 04/18/2018     Brief Urine Lab Results       None          Lab Results   Component Value Date    HGBA1C 6.20 (H) 05/15/2023       Imaging:           Medical Tests:           Summary of old records / correspondence / consultant report:           Request outside records:

## 2025-03-21 NOTE — PROGRESS NOTES
CC: follow-up r/t stroke    HPI:  Katherine Williamson is a  72 y.o.  right-handed female past medical history of hypertension and arthritis who is here for hospital follow-up. Reviewed prior documentation and made edits where needed    Back in May 2023 she had an acute team D activation.  She had been admitted with pneumonia and right lung abscess and developed acute onset of left hemiparesis and speech disturbance witnessed by her treating nurse.  She received TNK timely.  CTA showed right MCA perfusion deficit of the right posterior parietal lobe and a distal M3 occlusion.  She had no events on telemetry and TTE was negative for cardiac source.  MRI revealed acute embolic appearing strokes of multiple territories strongly suspicious of embolic etiology.  ELIGIO was also done but unremarkable.  She had loop monitor placed.  She was started on DAPT and high intensity statin.  She continued her PICC line antibiotics.  Loop monitor was placed after cardiac monitor negative for A-fib -showed asymptomatic VT.  She is doing very well no further strokelike symptoms.  Since the time of her stroke she has noticed left foot numbness that became more bothersome.  We started gabapentin but she stopped this for wt gain.  She tells she has gained 25lbs since she stopped smoking.  She was started on cymbalta and is at 30mg daily.   She notices a positive effect on her symptoms.  CTA was repeated in December and improvement seen.  We dropped her down to monotherapy and she continues to not smoke.  No further stroke like symptoms but says she has less patience than before the stroke and easier to say snappy statements that previously she would have refrained from saying out loud    Interval hx:  She had a bad bout of diarrhea in the fall and had GI w.u and dx with collagenous colitis.  Sxs are resolved.  It appeas she had rib fracture recently as well.  No described neurologic symptoms.  No stroke hx.  She tells she has not followed with  cardiology.  There is a telephone that her loop monitor is not recording.  Her BP is  some elevated today              Social history: Ex-smoker pack a day, 50-year pack a day history, drinks beer (de la paz light) upto 5 a night    Family history: Father with heart disease, mother with breast cancer      Pain Scale:        ROS:  Review of Systems   Constitutional:  Negative for fatigue and unexpected weight change.   HENT:  Negative for ear pain, hearing loss, nosebleeds and tinnitus.    Eyes:  Negative for pain and visual disturbance.   Respiratory:  Negative for chest tightness, shortness of breath and wheezing.    Cardiovascular:  Negative for chest pain and palpitations.   Musculoskeletal:  Positive for gait problem (2 falls since LOV).   Skin:  Negative for color change, pallor, rash and wound.   Allergic/Immunologic: Negative for food allergies.   Neurological:  Positive for numbness (left foot). Negative for dizziness, speech difficulty and weakness.   Psychiatric/Behavioral:  Negative for confusion, decreased concentration and sleep disturbance. The patient is not nervous/anxious.        Reviewed ROS conducted by MA and john        Physical Exam:  There were no vitals filed for this visit.   Orthostatic BP:    There is no height or weight on file to calculate BMI.        General: pt well appearing, no distress  HEENT: Normocephalic, conjunctiva normal, external canals normal, no nasal discharge, moist mucous membranes  Neck: No lymphadenopathy, thyroid not enlarged, no JVD  CV: Regular rate and rhythm, no murmurs negative no bruits auscultated at neck, equal pulses  Pulmonary: Normal respiratory effort, clear to auscultation bilaterally  Extremities: no edema, bruising, or skin lesions  Pysch: good eye contact, cooperative, full affect, euthymic mood, good attention, good insight  Mental: alert, conversant, AOx3, provides history  Language fluent, names, repeats.  CN: CN II-XII intact, PERRL, EOMi, no gaze  "palsy or nystagmus, intact facial sensation, no facial assymetry, intact hearing, symmetric palate elevation, tongue midline, good SCM strength  Motor: no abnormal movements, no pronator drift, normal  bulk and tone, 5/5 strength b/l UE & LE  Sensory: Glove and stocking distribution decreased sensation lower extremities   reflexes: 2+ throughout, neg babinski  Coordination: no ataxia on finger-nose, heel-shin  Gait: normal gait, no ataxia, intact heel and toe walking        Results:      Lab Results   Component Value Date    GLUCOSE 76 08/02/2023    BUN 9 08/02/2023    CREATININE 0.63 08/02/2023    EGFRIFNONA 76 04/18/2018    EGFRIFAFRI 93 04/18/2018    BCR 14.3 08/02/2023    CO2 22.4 08/02/2023    CALCIUM 10.1 08/02/2023    PROTENTOTREF 7.2 04/18/2018    ALBUMIN 2.7 (L) 06/19/2023    LABIL2 1.4 04/18/2018    AST 46 (H) 06/19/2023    ALT 47 (H) 06/19/2023       Lab Results   Component Value Date    WBC 8.92 06/26/2023    HGB 11.6 (L) 06/26/2023    HCT 34.8 06/26/2023    MCV 89.0 06/26/2023     06/26/2023         .No results found for: \"RPR\"      Lab Results   Component Value Date    TSH 1.160 04/18/2018         No results found for: \"[id number]\"      No results found for: \"FOLATE\"      Lab Results   Component Value Date    HGBA1C 6.20 (H) 05/15/2023         Lab Results   Component Value Date    GLUCOSE 76 08/02/2023    BUN 9 08/02/2023    CREATININE 0.63 08/02/2023    EGFRIFNONA 76 04/18/2018    EGFRIFAFRI 93 04/18/2018    BCR 14.3 08/02/2023    K 4.9 08/02/2023    CO2 22.4 08/02/2023    CALCIUM 10.1 08/02/2023    PROTENTOTREF 7.2 04/18/2018    ALBUMIN 2.7 (L) 06/19/2023    LABIL2 1.4 04/18/2018    AST 46 (H) 06/19/2023    ALT 47 (H) 06/19/2023         Diagnosis:  1.  History of stroke, ESUS  2.  Pneumonia with lung abscess  3   R foot numbness    Impression: 72-year-old right-handed female with past medical history of hypertension and arthritis who was admitted to the hospital with pneumonia found to have " right lung abscess and while inpatient developed strokelike symptoms of left hemiparesis and speech impairment.  She was able to she received TNK timely.  Was found to have multiple embolic appearing strokes.  She had ELIGIO negative for vegetation or cardiac source.  No evidence of a thrombotic microangiopathy and had thoracentesis and fluid negative for malignancy.    She finished her PICC line antibiotics and followed with ID.  Has pending CT chest for follow-up.  Loop monitor has since been placed.  ESUS either cardioembolic or athero embolic etiology suspected    She is neurologically is intact but complains of left foot numbness since this day.  There is subtle glove and stocking distribution sensory loss nondermatomal.  Suspect poststroke residual given her clinical story.  Little concern for separate neurologic etiology-can continue to monitor       Plan:  Continue ASA and high intensity statin for secondary prevention  Encouraged less etoh use  BP surveillance  F/u with cardiology regarding loop monitoring  Continue Cymbalta, consider increasing dose if foot sxs more refractory     F/u in 9-12 mos    I spent at least 40 minutes interviewing, examining, and counseling patient.  I independently reviewed documentation, laboratory and diagnostic findings, external documentation where applicable, and formulated treatment plan which was discussed with the patient.

## 2025-03-25 ENCOUNTER — OFFICE VISIT (OUTPATIENT)
Dept: NEUROLOGY | Facility: CLINIC | Age: 75
End: 2025-03-25
Payer: MEDICARE

## 2025-03-25 VITALS
BODY MASS INDEX: 25.66 KG/M2 | SYSTOLIC BLOOD PRESSURE: 140 MMHG | HEART RATE: 82 BPM | HEIGHT: 63 IN | WEIGHT: 144.8 LBS | DIASTOLIC BLOOD PRESSURE: 92 MMHG | OXYGEN SATURATION: 97 %

## 2025-03-25 DIAGNOSIS — Z72.0 TOBACCO ABUSE: ICD-10-CM

## 2025-03-25 DIAGNOSIS — I10 BENIGN ESSENTIAL HTN: ICD-10-CM

## 2025-03-25 DIAGNOSIS — M79.2 NEUROPATHIC PAIN: Primary | ICD-10-CM

## 2025-03-25 DIAGNOSIS — Z86.73 HISTORY OF STROKE: ICD-10-CM

## 2025-03-25 PROCEDURE — 99215 OFFICE O/P EST HI 40 MIN: CPT | Performed by: PHYSICIAN ASSISTANT

## 2025-03-25 PROCEDURE — 3077F SYST BP >= 140 MM HG: CPT | Performed by: PHYSICIAN ASSISTANT

## 2025-03-25 PROCEDURE — G2211 COMPLEX E/M VISIT ADD ON: HCPCS | Performed by: PHYSICIAN ASSISTANT

## 2025-03-25 PROCEDURE — 3080F DIAST BP >= 90 MM HG: CPT | Performed by: PHYSICIAN ASSISTANT

## 2025-03-25 NOTE — LETTER
March 25, 2025     MARGARITO Reyes  [address]  [facility] [address]  [facility] [address]    Patient: Katherine Williamson   YOB: 1950   Date of Visit: 3/25/2025       Dear MARGARITO Reyes    Katherine Williamson was in my office today. Below is a copy of my note.    If you have questions, please do not hesitate to call me. I look forward to following Katherine along with you.         Sincerely,        AYLA Gomes        CC: No Recipients    CC: follow-up r/t stroke    HPI:  Katherine Williamson is a  72 y.o.  right-handed female past medical history of hypertension and arthritis who is here for hospital follow-up. Reviewed prior documentation and made edits where needed    Back in May 2023 she had an acute team D activation.  She had been admitted with pneumonia and right lung abscess and developed acute onset of left hemiparesis and speech disturbance witnessed by her treating nurse.  She received TNK timely.  CTA showed right MCA perfusion deficit of the right posterior parietal lobe and a distal M3 occlusion.  She had no events on telemetry and TTE was negative for cardiac source.  MRI revealed acute embolic appearing strokes of multiple territories strongly suspicious of embolic etiology.  ELIGIO was also done but unremarkable.  She had loop monitor placed.  She was started on DAPT and high intensity statin.  She continued her PICC line antibiotics.  Loop monitor was placed after cardiac monitor negative for A-fib -showed asymptomatic VT.  She is doing very well no further strokelike symptoms.  Since the time of her stroke she has noticed left foot numbness that became more bothersome.  We started gabapentin but she stopped this for wt gain.  She tells she has gained 25lbs since she stopped smoking.  She was started on cymbalta and is at 30mg daily.   She notices a positive effect on her symptoms.  CTA was repeated in December and improvement seen.  We dropped her down to monotherapy and she continues to not smoke.  No  further stroke like symptoms but says she has less patience than before the stroke and easier to say snappy statements that previously she would have refrained from saying out loud    Interval hx:  She had a bad bout of diarrhea in the fall and had GI w.u and dx with collagenous colitis.  Sxs are resolved.  It appeas she had rib fracture recently as well.  No described neurologic symptoms.  No stroke hx.  She tells she has not followed with cardiology.  There is a telephone that her loop monitor is not recording.  Her BP is  some elevated today              Social history: Ex-smoker pack a day, 50-year pack a day history, drinks beer (de la paz light) upto 5 a night    Family history: Father with heart disease, mother with breast cancer      Pain Scale:        ROS:  Review of Systems   Constitutional:  Negative for fatigue and unexpected weight change.   HENT:  Negative for ear pain, hearing loss, nosebleeds and tinnitus.    Eyes:  Negative for pain and visual disturbance.   Respiratory:  Negative for chest tightness, shortness of breath and wheezing.    Cardiovascular:  Negative for chest pain and palpitations.   Musculoskeletal:  Positive for gait problem (2 falls since LOV).   Skin:  Negative for color change, pallor, rash and wound.   Allergic/Immunologic: Negative for food allergies.   Neurological:  Positive for numbness (left foot). Negative for dizziness, speech difficulty and weakness.   Psychiatric/Behavioral:  Negative for confusion, decreased concentration and sleep disturbance. The patient is not nervous/anxious.        Reviewed ROS conducted by MA and john        Physical Exam:  There were no vitals filed for this visit.   Orthostatic BP:    There is no height or weight on file to calculate BMI.        General: pt well appearing, no distress  HEENT: Normocephalic, conjunctiva normal, external canals normal, no nasal discharge, moist mucous membranes  Neck: No lymphadenopathy, thyroid not enlarged, no  "JVD  CV: Regular rate and rhythm, no murmurs negative no bruits auscultated at neck, equal pulses  Pulmonary: Normal respiratory effort, clear to auscultation bilaterally  Extremities: no edema, bruising, or skin lesions  Pysch: good eye contact, cooperative, full affect, euthymic mood, good attention, good insight  Mental: alert, conversant, AOx3, provides history  Language fluent, names, repeats.  CN: CN II-XII intact, PERRL, EOMi, no gaze palsy or nystagmus, intact facial sensation, no facial assymetry, intact hearing, symmetric palate elevation, tongue midline, good SCM strength  Motor: no abnormal movements, no pronator drift, normal  bulk and tone, 5/5 strength b/l UE & LE  Sensory: Glove and stocking distribution decreased sensation lower extremities   reflexes: 2+ throughout, neg babinski  Coordination: no ataxia on finger-nose, heel-shin  Gait: normal gait, no ataxia, intact heel and toe walking        Results:      Lab Results   Component Value Date    GLUCOSE 76 08/02/2023    BUN 9 08/02/2023    CREATININE 0.63 08/02/2023    EGFRIFNONA 76 04/18/2018    EGFRIFAFRI 93 04/18/2018    BCR 14.3 08/02/2023    CO2 22.4 08/02/2023    CALCIUM 10.1 08/02/2023    PROTENTOTREF 7.2 04/18/2018    ALBUMIN 2.7 (L) 06/19/2023    LABIL2 1.4 04/18/2018    AST 46 (H) 06/19/2023    ALT 47 (H) 06/19/2023       Lab Results   Component Value Date    WBC 8.92 06/26/2023    HGB 11.6 (L) 06/26/2023    HCT 34.8 06/26/2023    MCV 89.0 06/26/2023     06/26/2023         .No results found for: \"RPR\"      Lab Results   Component Value Date    TSH 1.160 04/18/2018         No results found for: \"[id number]\"      No results found for: \"FOLATE\"      Lab Results   Component Value Date    HGBA1C 6.20 (H) 05/15/2023         Lab Results   Component Value Date    GLUCOSE 76 08/02/2023    BUN 9 08/02/2023    CREATININE 0.63 08/02/2023    EGFRIFNONA 76 04/18/2018    EGFRIFAFRI 93 04/18/2018    BCR 14.3 08/02/2023    K 4.9 08/02/2023    CO2 " 22.4 08/02/2023    CALCIUM 10.1 08/02/2023    PROTENTOTREF 7.2 04/18/2018    ALBUMIN 2.7 (L) 06/19/2023    LABIL2 1.4 04/18/2018    AST 46 (H) 06/19/2023    ALT 47 (H) 06/19/2023         Diagnosis:  1.  History of stroke, ESUS  2.  Pneumonia with lung abscess  3   R foot numbness    Impression: 72-year-old right-handed female with past medical history of hypertension and arthritis who was admitted to the hospital with pneumonia found to have right lung abscess and while inpatient developed strokelike symptoms of left hemiparesis and speech impairment.  She was able to she received TNK timely.  Was found to have multiple embolic appearing strokes.  She had ELIGIO negative for vegetation or cardiac source.  No evidence of a thrombotic microangiopathy and had thoracentesis and fluid negative for malignancy.    She finished her PICC line antibiotics and followed with ID.  Has pending CT chest for follow-up.  Loop monitor has since been placed.  ESUS either cardioembolic or athero embolic etiology suspected    She is neurologically is intact but complains of left foot numbness since this day.  There is subtle glove and stocking distribution sensory loss nondermatomal.  Suspect poststroke residual given her clinical story.  Little concern for separate neurologic etiology-can continue to monitor       Plan:  Continue ASA and high intensity statin for secondary prevention  Encouraged less etoh use  BP surveillance  F/u with cardiology regarding loop monitoring  Continue Cymbalta, consider increasing dose if foot sxs more refractory     F/u in 9-12 mos    I spent at least 40 minutes interviewing, examining, and counseling patient.  I independently reviewed documentation, laboratory and diagnostic findings, external documentation where applicable, and formulated treatment plan which was discussed with the patient.

## 2025-03-25 NOTE — PROGRESS NOTES
Thank you.  I reviewed your office note.  I have sent a message to scheduling to help coordinate a follow-up appointment in our office.

## 2025-03-25 NOTE — LETTER
March 25, 2025     Zelalem Flor DNP, APRN  [address]  [address] [address]    Patient: Katherine Williamson   YOB: 1950   Date of Visit: 3/25/2025     Dear Zellaem Flor DNP, APRN:       Thank you for referring Katherine Williamson to me for evaluation. Below are the relevant portions of my assessment and plan of care.    If you have questions, please do not hesitate to call me. I look forward to following Katherine along with you.         Sincerely,        AYLA Gomes        CC: No Recipients    Marsha Ferris PA  03/25/25 1301  Sign when Signing Visit  CC: follow-up r/t stroke    HPI:  Katherine Williamson is a  72 y.o.  right-handed female past medical history of hypertension and arthritis who is here for hospital follow-up. Reviewed prior documentation and made edits where needed    Back in May 2023 she had an acute team D activation.  She had been admitted with pneumonia and right lung abscess and developed acute onset of left hemiparesis and speech disturbance witnessed by her treating nurse.  She received TNK timely.  CTA showed right MCA perfusion deficit of the right posterior parietal lobe and a distal M3 occlusion.  She had no events on telemetry and TTE was negative for cardiac source.  MRI revealed acute embolic appearing strokes of multiple territories strongly suspicious of embolic etiology.  ELIGIO was also done but unremarkable.  She had loop monitor placed.  She was started on DAPT and high intensity statin.  She continued her PICC line antibiotics.  Loop monitor was placed after cardiac monitor negative for A-fib -showed asymptomatic VT.  She is doing very well no further strokelike symptoms.  Since the time of her stroke she has noticed left foot numbness that became more bothersome.  We started gabapentin but she stopped this for wt gain.  She tells she has gained 25lbs since she stopped smoking.  She was started on cymbalta and is at 30mg daily.   She notices a positive effect on her  symptoms.  CTA was repeated in December and improvement seen.  We dropped her down to monotherapy and she continues to not smoke.  No further stroke like symptoms but says she has less patience than before the stroke and easier to say snappy statements that previously she would have refrained from saying out loud    Interval hx:  She had a bad bout of diarrhea in the fall and had GI w.u and dx with collagenous colitis.  Sxs are resolved.  It appeas she had rib fracture recently as well.  No described neurologic symptoms.  No stroke hx.  She tells she has not followed with cardiology.  There is a telephone that her loop monitor is not recording.  Her BP is  some elevated today              Social history: Ex-smoker pack a day, 50-year pack a day history, drinks beer (de la paz light) upto 5 a night    Family history: Father with heart disease, mother with breast cancer      Pain Scale:        ROS:  Review of Systems   Constitutional:  Negative for fatigue and unexpected weight change.   HENT:  Negative for ear pain, hearing loss, nosebleeds and tinnitus.    Eyes:  Negative for pain and visual disturbance.   Respiratory:  Negative for chest tightness, shortness of breath and wheezing.    Cardiovascular:  Negative for chest pain and palpitations.   Musculoskeletal:  Positive for gait problem (2 falls since LOV).   Skin:  Negative for color change, pallor, rash and wound.   Allergic/Immunologic: Negative for food allergies.   Neurological:  Positive for numbness (left foot). Negative for dizziness, speech difficulty and weakness.   Psychiatric/Behavioral:  Negative for confusion, decreased concentration and sleep disturbance. The patient is not nervous/anxious.        Reviewed ROS conducted by MA and john        Physical Exam:  There were no vitals filed for this visit.   Orthostatic BP:    There is no height or weight on file to calculate BMI.        General: pt well appearing, no distress  HEENT: Normocephalic,  "conjunctiva normal, external canals normal, no nasal discharge, moist mucous membranes  Neck: No lymphadenopathy, thyroid not enlarged, no JVD  CV: Regular rate and rhythm, no murmurs negative no bruits auscultated at neck, equal pulses  Pulmonary: Normal respiratory effort, clear to auscultation bilaterally  Extremities: no edema, bruising, or skin lesions  Pysch: good eye contact, cooperative, full affect, euthymic mood, good attention, good insight  Mental: alert, conversant, AOx3, provides history  Language fluent, names, repeats.  CN: CN II-XII intact, PERRL, EOMi, no gaze palsy or nystagmus, intact facial sensation, no facial assymetry, intact hearing, symmetric palate elevation, tongue midline, good SCM strength  Motor: no abnormal movements, no pronator drift, normal  bulk and tone, 5/5 strength b/l UE & LE  Sensory: Glove and stocking distribution decreased sensation lower extremities   reflexes: 2+ throughout, neg babinski  Coordination: no ataxia on finger-nose, heel-shin  Gait: normal gait, no ataxia, intact heel and toe walking        Results:      Lab Results   Component Value Date    GLUCOSE 76 08/02/2023    BUN 9 08/02/2023    CREATININE 0.63 08/02/2023    EGFRIFNONA 76 04/18/2018    EGFRIFAFRI 93 04/18/2018    BCR 14.3 08/02/2023    CO2 22.4 08/02/2023    CALCIUM 10.1 08/02/2023    PROTENTOTREF 7.2 04/18/2018    ALBUMIN 2.7 (L) 06/19/2023    LABIL2 1.4 04/18/2018    AST 46 (H) 06/19/2023    ALT 47 (H) 06/19/2023       Lab Results   Component Value Date    WBC 8.92 06/26/2023    HGB 11.6 (L) 06/26/2023    HCT 34.8 06/26/2023    MCV 89.0 06/26/2023     06/26/2023         .No results found for: \"RPR\"      Lab Results   Component Value Date    TSH 1.160 04/18/2018         No results found for: \"[id number]\"      No results found for: \"FOLATE\"      Lab Results   Component Value Date    HGBA1C 6.20 (H) 05/15/2023         Lab Results   Component Value Date    GLUCOSE 76 08/02/2023    BUN 9 08/02/2023 "    CREATININE 0.63 08/02/2023    EGFRIFNONA 76 04/18/2018    EGFRIFAFRI 93 04/18/2018    BCR 14.3 08/02/2023    K 4.9 08/02/2023    CO2 22.4 08/02/2023    CALCIUM 10.1 08/02/2023    PROTENTOTREF 7.2 04/18/2018    ALBUMIN 2.7 (L) 06/19/2023    LABIL2 1.4 04/18/2018    AST 46 (H) 06/19/2023    ALT 47 (H) 06/19/2023         Diagnosis:  1.  History of stroke, ESUS  2.  Pneumonia with lung abscess  3   R foot numbness    Impression: 72-year-old right-handed female with past medical history of hypertension and arthritis who was admitted to the hospital with pneumonia found to have right lung abscess and while inpatient developed strokelike symptoms of left hemiparesis and speech impairment.  She was able to she received TNK timely.  Was found to have multiple embolic appearing strokes.  She had ELIGIO negative for vegetation or cardiac source.  No evidence of a thrombotic microangiopathy and had thoracentesis and fluid negative for malignancy.    She finished her PICC line antibiotics and followed with ID.  Has pending CT chest for follow-up.  Loop monitor has since been placed.  ESUS either cardioembolic or athero embolic etiology suspected    She is neurologically is intact but complains of left foot numbness since this day.  There is subtle glove and stocking distribution sensory loss nondermatomal.  Suspect poststroke residual given her clinical story.  Little concern for separate neurologic etiology-can continue to monitor       Plan:  Continue ASA and high intensity statin for secondary prevention  Encouraged less etoh use  BP surveillance  F/u with cardiology regarding loop monitoring  Continue Cymbalta, consider increasing dose if foot sxs more refractory     F/u in 9-12 mos    I spent at least 40 minutes interviewing, examining, and counseling patient.  I independently reviewed documentation, laboratory and diagnostic findings, external documentation where applicable, and formulated treatment plan which was  discussed with the patient.   Equal and normal pulses (carotid, femoral, dorsalis pedis)

## 2025-03-25 NOTE — PATIENT INSTRUCTIONS
Follow up with cardiology  Keep taking aspirin and atorvastatin  Your BP should be less than 130    Control risk factors to prevent stroke which means keep BP at or below 130/80, take your statin if able and try to have LDL measurements < 70, stop smoking if you smoke, control your blood sugar, and get regular moderate exercise four times weekly, and avoid prolonged sitting.  Adopt a healthy diet such as the Mediterranean diet which includes vegetables, fruits, whole grains, low-fat dairy, poultry, fish, legumes, nontropical fish oils, and nuts.  Limit sweets, sugary drinks, and red meats.  Reduce or eliminate alcohol intake.    Call if you want to increase your cymbalta for you foot paresthesia

## 2025-03-25 NOTE — LETTER
March 25, 2025     MARGARITO Reyes  [address]  [facility] [address]  [facility] [address]    Patient: Katherine Williamson   YOB: 1950   Date of Visit: 3/25/2025     Dear MARGARITO Mina:    Thank you for referring Katherine Williamson to me for evaluation. Below are the relevant portions of my assessment and plan of care.    If you have questions, please do not hesitate to call me. I look forward to following Katherine along with you.         Sincerely,        AYLA Gomes        CC: No Recipients      Progress Notes:  CC: follow-up r/t stroke    HPI:  Katherine Williamson is a  72 y.o.  right-handed female past medical history of hypertension and arthritis who is here for hospital follow-up. Reviewed prior documentation and made edits where needed    Back in May 2023 she had an acute team D activation.  She had been admitted with pneumonia and right lung abscess and developed acute onset of left hemiparesis and speech disturbance witnessed by her treating nurse.  She received TNK timely.  CTA showed right MCA perfusion deficit of the right posterior parietal lobe and a distal M3 occlusion.  She had no events on telemetry and TTE was negative for cardiac source.  MRI revealed acute embolic appearing strokes of multiple territories strongly suspicious of embolic etiology.  ELIGIO was also done but unremarkable.  She had loop monitor placed.  She was started on DAPT and high intensity statin.  She continued her PICC line antibiotics.  Loop monitor was placed after cardiac monitor negative for A-fib -showed asymptomatic VT.  She is doing very well no further strokelike symptoms.  Since the time of her stroke she has noticed left foot numbness that became more bothersome.  We started gabapentin but she stopped this for wt gain.  She tells she has gained 25lbs since she stopped smoking.  She was started on cymbalta and is at 30mg daily.   She notices a positive effect on her symptoms.  CTA was repeated in December and improvement  seen.  We dropped her down to monotherapy and she continues to not smoke.  No further stroke like symptoms but says she has less patience than before the stroke and easier to say snappy statements that previously she would have refrained from saying out loud    Interval hx:  She had a bad bout of diarrhea in the fall and had GI w.u and dx with collagenous colitis.  Sxs are resolved.  It appeas she had rib fracture recently as well.  No described neurologic symptoms.  No stroke hx.  She tells she has not followed with cardiology.  There is a telephone that her loop monitor is not recording.  Her BP is  some elevated today              Social history: Ex-smoker pack a day, 50-year pack a day history, drinks beer (de la paz light) upto 5 a night    Family history: Father with heart disease, mother with breast cancer      Pain Scale:        ROS:  Review of Systems   Constitutional:  Negative for fatigue and unexpected weight change.   HENT:  Negative for ear pain, hearing loss, nosebleeds and tinnitus.    Eyes:  Negative for pain and visual disturbance.   Respiratory:  Negative for chest tightness, shortness of breath and wheezing.    Cardiovascular:  Negative for chest pain and palpitations.   Musculoskeletal:  Positive for gait problem (2 falls since LOV).   Skin:  Negative for color change, pallor, rash and wound.   Allergic/Immunologic: Negative for food allergies.   Neurological:  Positive for numbness (left foot). Negative for dizziness, speech difficulty and weakness.   Psychiatric/Behavioral:  Negative for confusion, decreased concentration and sleep disturbance. The patient is not nervous/anxious.        Reviewed ROS conducted by MA and john        Physical Exam:  There were no vitals filed for this visit.   Orthostatic BP:    There is no height or weight on file to calculate BMI.        General: pt well appearing, no distress  HEENT: Normocephalic, conjunctiva normal, external canals normal, no nasal discharge,  "moist mucous membranes  Neck: No lymphadenopathy, thyroid not enlarged, no JVD  CV: Regular rate and rhythm, no murmurs negative no bruits auscultated at neck, equal pulses  Pulmonary: Normal respiratory effort, clear to auscultation bilaterally  Extremities: no edema, bruising, or skin lesions  Pysch: good eye contact, cooperative, full affect, euthymic mood, good attention, good insight  Mental: alert, conversant, AOx3, provides history  Language fluent, names, repeats.  CN: CN II-XII intact, PERRL, EOMi, no gaze palsy or nystagmus, intact facial sensation, no facial assymetry, intact hearing, symmetric palate elevation, tongue midline, good SCM strength  Motor: no abnormal movements, no pronator drift, normal  bulk and tone, 5/5 strength b/l UE & LE  Sensory: Glove and stocking distribution decreased sensation lower extremities   reflexes: 2+ throughout, neg babinski  Coordination: no ataxia on finger-nose, heel-shin  Gait: normal gait, no ataxia, intact heel and toe walking        Results:      Lab Results   Component Value Date    GLUCOSE 76 08/02/2023    BUN 9 08/02/2023    CREATININE 0.63 08/02/2023    EGFRIFNONA 76 04/18/2018    EGFRIFAFRI 93 04/18/2018    BCR 14.3 08/02/2023    CO2 22.4 08/02/2023    CALCIUM 10.1 08/02/2023    PROTENTOTREF 7.2 04/18/2018    ALBUMIN 2.7 (L) 06/19/2023    LABIL2 1.4 04/18/2018    AST 46 (H) 06/19/2023    ALT 47 (H) 06/19/2023       Lab Results   Component Value Date    WBC 8.92 06/26/2023    HGB 11.6 (L) 06/26/2023    HCT 34.8 06/26/2023    MCV 89.0 06/26/2023     06/26/2023         .No results found for: \"RPR\"      Lab Results   Component Value Date    TSH 1.160 04/18/2018         No results found for: \"AXCTCCSA53\"      No results found for: \"FOLATE\"      Lab Results   Component Value Date    HGBA1C 6.20 (H) 05/15/2023         Lab Results   Component Value Date    GLUCOSE 76 08/02/2023    BUN 9 08/02/2023    CREATININE 0.63 08/02/2023    EGFRIFNONA 76 04/18/2018    " EGFRIFAFRI 93 04/18/2018    BCR 14.3 08/02/2023    K 4.9 08/02/2023    CO2 22.4 08/02/2023    CALCIUM 10.1 08/02/2023    PROTENTOTREF 7.2 04/18/2018    ALBUMIN 2.7 (L) 06/19/2023    LABIL2 1.4 04/18/2018    AST 46 (H) 06/19/2023    ALT 47 (H) 06/19/2023         Diagnosis:  1.  History of stroke, ESUS  2.  Pneumonia with lung abscess  3   R foot numbness    Impression: 72-year-old right-handed female with past medical history of hypertension and arthritis who was admitted to the hospital with pneumonia found to have right lung abscess and while inpatient developed strokelike symptoms of left hemiparesis and speech impairment.  She was able to she received TNK timely.  Was found to have multiple embolic appearing strokes.  She had ELIGIO negative for vegetation or cardiac source.  No evidence of a thrombotic microangiopathy and had thoracentesis and fluid negative for malignancy.    She finished her PICC line antibiotics and followed with ID.  Has pending CT chest for follow-up.  Loop monitor has since been placed.  ESUS either cardioembolic or athero embolic etiology suspected    She is neurologically is intact but complains of left foot numbness since this day.  There is subtle glove and stocking distribution sensory loss nondermatomal.  Suspect poststroke residual given her clinical story.  Little concern for separate neurologic etiology-can continue to monitor       Plan:  Continue ASA and high intensity statin for secondary prevention  Encouraged less etoh use  BP surveillance  F/u with cardiology regarding loop monitoring  Continue Cymbalta, consider increasing dose if foot sxs more refractory     F/u in 9-12 mos    I spent at least 40 minutes interviewing, examining, and counseling patient.  I independently reviewed documentation, laboratory and diagnostic findings, external documentation where applicable, and formulated treatment plan which was discussed with the patient.

## 2025-04-18 NOTE — PROGRESS NOTES
"Chief Complaint   Patient presents with    GI Problem         History of Present Illness  Patient is a 74-year-old female who presents today for Follow-up.  She had had elevated liver function test which was worked up and she ultimately underwent biopsy that showed steatosis and patchy inflammation of uncertain etiology, felt to possibly be from medication or alpha-1 antitrypsin deficiency for which she was found to have with SZ genotype.    Since last office visit, she has also had an EGD and colonoscopy.  EGD showed a 6 cm hiatal hernia and 3 gastric ulcers as well as Nemesio erosions.  Colonoscopy showed diverticulosis, 1 polyp, internal hemorrhoids.  Random colon biopsies were obtained and were consistent with collagenous colitis.  She was started on budesonide.    Patient presents today for follow-up.  Since starting treatment with budesonide her diarrhea has resolved.  She is now having a daily bowel movement.  She has had no further diarrhea.  Denies any abdominal pain, nausea, or vomiting.  Denies any heartburn or reflux.    She has been taking pantoprazole for gastric ulcers, has had no significant symptoms from these.     Result Review :       Tissue Pathology Exam (01/28/2025 10:40)    Colonoscopy (01/28/2025 10:26)    Upper GI Endoscopy (01/28/2025 10:28)    Alpha - 1 - Antitrypsin Phenotype (01/22/2025 10:55)    Office Visit with Ivonne Camp APRN (01/22/2025)    Tissue Pathology Exam (11/05/2024 12:25)    Vital Signs:   /64   Temp 98 °F (36.7 °C)   Ht 160 cm (63\")   Wt 64.4 kg (142 lb)   BMI 25.15 kg/m²     Body mass index is 25.15 kg/m².     Physical Exam  Vitals reviewed.   Constitutional:       General: She is not in acute distress.     Appearance: She is well-developed.   HENT:      Head: Normocephalic and atraumatic.   Pulmonary:      Effort: Pulmonary effort is normal. No respiratory distress.   Skin:     General: Skin is dry.      Coloration: Skin is not pale.   Neurological:      " Mental Status: She is alert and oriented to person, place, and time.   Psychiatric:         Thought Content: Thought content normal.           Assessment and Plan    Diagnoses and all orders for this visit:    1. Collagenous colitis (Primary)    2. Hiatal hernia    3. Gastric ulcer without hemorrhage or perforation, unspecified chronicity    4. Nemesio ulcer, unspecified ulcer chronicity    5. Alpha-1-antitrypsin deficiency    6. Fatty liver  -     Hepatic Function Panel         Discussion  Patient presents today for follow-up after EGD.  EGD and colonoscopy findings reviewed at today's office visit.  Colonoscopy with biopsies consistent with collagenous colitis which was felt to be source of patient's diarrhea.  She has responded well to budesonide and diarrhea has resolved.  Will begin tapering off of budesonide at this time.  If diarrhea recurs, could consider prolonging treatment with budesonide or trial of bile acid sequestrant.    EGD with evidence of hiatal hernia, Nemesio erosions, and gastric ulcerations.  Recommended continue pantoprazole and recommended follow-up EGD to evaluate for healing of ulcers.  As she is not having any significant symptoms from the hernia, no need for surgical repair at this time.    Regarding fatty liver, reinforced dietary and lifestyle modifications to help with this.  Will recheck [place] function panel today for monitoring.  Recommended following up with pulmonologist for alpha-1 antitrypsin deficiency, she will see them next month.          Follow Up   Return for Follow up to review results after testing complete.    Patient Instructions   For collagenous colitis, begin tapering off of budesonide. Take 2 pills per day for 2 weeks, then 1 pill per day for 2 weeks, then stop taking. If diarrhea recurs, please notify the office.    Schedule EGD to follow-up on gastric ulcers.  Continue taking pantoprazole daily until follow-up EGD.    Check lab work today to monitor liver  function test.    Follow-up with pulmonology as planned regarding alpha-1 antitrypsin deficiency.    For fatty liver, recommend maintaining a healthy weight. Recommend following a low fat and low sugar diet. Recommend management of diabetes and elevated cholesterol with primary care provider if indicated. Regular exercise is recommended. Alcohol avoidance is recommended.

## 2025-04-21 ENCOUNTER — OFFICE VISIT (OUTPATIENT)
Dept: GASTROENTEROLOGY | Facility: CLINIC | Age: 75
End: 2025-04-21
Payer: MEDICARE

## 2025-04-21 ENCOUNTER — PREP FOR SURGERY (OUTPATIENT)
Dept: SURGERY | Facility: SURGERY CENTER | Age: 75
End: 2025-04-21
Payer: MEDICARE

## 2025-04-21 ENCOUNTER — LAB (OUTPATIENT)
Dept: LAB | Facility: HOSPITAL | Age: 75
End: 2025-04-21
Payer: MEDICARE

## 2025-04-21 VITALS
HEIGHT: 63 IN | DIASTOLIC BLOOD PRESSURE: 64 MMHG | TEMPERATURE: 98 F | WEIGHT: 142 LBS | BODY MASS INDEX: 25.16 KG/M2 | SYSTOLIC BLOOD PRESSURE: 104 MMHG

## 2025-04-21 DIAGNOSIS — K25.9 CAMERON ULCER, UNSPECIFIED ULCER CHRONICITY: ICD-10-CM

## 2025-04-21 DIAGNOSIS — K44.9 HIATAL HERNIA: ICD-10-CM

## 2025-04-21 DIAGNOSIS — K76.0 FATTY LIVER: ICD-10-CM

## 2025-04-21 DIAGNOSIS — K52.831 COLLAGENOUS COLITIS: Primary | ICD-10-CM

## 2025-04-21 DIAGNOSIS — E88.01 ALPHA-1-ANTITRYPSIN DEFICIENCY: ICD-10-CM

## 2025-04-21 DIAGNOSIS — K25.9 GASTRIC ULCER WITHOUT HEMORRHAGE OR PERFORATION, UNSPECIFIED CHRONICITY: Primary | ICD-10-CM

## 2025-04-21 DIAGNOSIS — K25.9 GASTRIC ULCER WITHOUT HEMORRHAGE OR PERFORATION, UNSPECIFIED CHRONICITY: ICD-10-CM

## 2025-04-21 LAB
ALBUMIN SERPL-MCNC: 4.5 G/DL (ref 3.5–5.2)
ALP SERPL-CCNC: 159 U/L (ref 39–117)
ALT SERPL W P-5'-P-CCNC: 39 U/L (ref 1–33)
AST SERPL-CCNC: 29 U/L (ref 1–32)
BILIRUB CONJ SERPL-MCNC: 0.3 MG/DL (ref 0–0.3)
BILIRUB INDIRECT SERPL-MCNC: 0.7 MG/DL
BILIRUB SERPL-MCNC: 1 MG/DL (ref 0–1.2)
PROT SERPL-MCNC: 7.7 G/DL (ref 6–8.5)

## 2025-04-21 PROCEDURE — 1160F RVW MEDS BY RX/DR IN RCRD: CPT | Performed by: NURSE PRACTITIONER

## 2025-04-21 PROCEDURE — 3078F DIAST BP <80 MM HG: CPT | Performed by: NURSE PRACTITIONER

## 2025-04-21 PROCEDURE — 80076 HEPATIC FUNCTION PANEL: CPT | Performed by: NURSE PRACTITIONER

## 2025-04-21 PROCEDURE — 3074F SYST BP LT 130 MM HG: CPT | Performed by: NURSE PRACTITIONER

## 2025-04-21 PROCEDURE — 1159F MED LIST DOCD IN RCRD: CPT | Performed by: NURSE PRACTITIONER

## 2025-04-21 PROCEDURE — 99214 OFFICE O/P EST MOD 30 MIN: CPT | Performed by: NURSE PRACTITIONER

## 2025-04-21 PROCEDURE — 36415 COLL VENOUS BLD VENIPUNCTURE: CPT | Performed by: NURSE PRACTITIONER

## 2025-04-21 RX ORDER — SODIUM CHLORIDE 0.9 % (FLUSH) 0.9 %
10 SYRINGE (ML) INJECTION AS NEEDED
OUTPATIENT
Start: 2025-04-21

## 2025-04-21 RX ORDER — SODIUM CHLORIDE 0.9 % (FLUSH) 0.9 %
3 SYRINGE (ML) INJECTION EVERY 12 HOURS SCHEDULED
OUTPATIENT
Start: 2025-04-21

## 2025-04-21 RX ORDER — SODIUM CHLORIDE, SODIUM LACTATE, POTASSIUM CHLORIDE, CALCIUM CHLORIDE 600; 310; 30; 20 MG/100ML; MG/100ML; MG/100ML; MG/100ML
30 INJECTION, SOLUTION INTRAVENOUS CONTINUOUS PRN
OUTPATIENT
Start: 2025-04-21 | End: 2025-04-21

## 2025-04-21 NOTE — PATIENT INSTRUCTIONS
For collagenous colitis, begin tapering off of budesonide. Take 2 pills per day for 2 weeks, then 1 pill per day for 2 weeks, then stop taking. If diarrhea recurs, please notify the office.    Schedule EGD to follow-up on gastric ulcers.  Continue taking pantoprazole daily until follow-up EGD.    Check lab work today to monitor liver function test.    Follow-up with pulmonology as planned regarding alpha-1 antitrypsin deficiency.    For fatty liver, recommend maintaining a healthy weight. Recommend following a low fat and low sugar diet. Recommend management of diabetes and elevated cholesterol with primary care provider if indicated. Regular exercise is recommended. Alcohol avoidance is recommended.

## 2025-04-24 ENCOUNTER — OFFICE VISIT (OUTPATIENT)
Dept: CARDIOLOGY | Age: 75
End: 2025-04-24
Payer: MEDICARE

## 2025-04-24 VITALS
BODY MASS INDEX: 24.98 KG/M2 | HEART RATE: 85 BPM | SYSTOLIC BLOOD PRESSURE: 130 MMHG | DIASTOLIC BLOOD PRESSURE: 80 MMHG | HEIGHT: 63 IN | WEIGHT: 141 LBS

## 2025-04-24 DIAGNOSIS — R60.0 LOWER EXTREMITY EDEMA: ICD-10-CM

## 2025-04-24 DIAGNOSIS — I47.10 PAROXYSMAL SVT (SUPRAVENTRICULAR TACHYCARDIA): ICD-10-CM

## 2025-04-24 DIAGNOSIS — Z95.818 STATUS POST PLACEMENT OF IMPLANTABLE LOOP RECORDER: Primary | ICD-10-CM

## 2025-04-24 RX ORDER — FUROSEMIDE 40 MG/1
40 TABLET ORAL DAILY
Start: 2025-04-24

## 2025-04-24 NOTE — PROGRESS NOTES
Date of Office Visit: 25  Encounter Provider: MARGARITO Reyes  Place of Service: [facility] CARDIOLOGY  Patient Name: Katherine Williamson  :1950    Chief Complaint   Patient presents with    Hypertension    Follow-up   :     HPI: Katherine Williamson is a 74 y.o. female  with  emphysema, tobacco use, hypertension, hyperlipidemia,carotid artery disease, right MCA CVA and status post implantable loop recorder 2023..      She was previously seen by Dr. Nixon and had a negative nuclear stress test in May 2022. Echocardiogram May 2020 showed normal LV function, grade 1 diastolic dysfunction and no significant valve disease.  Carotid Doppler on May 15, 2023 showed a less than 50% right internal carotid artery stenosis and no significant stenosis of the left carotid artery.  Transthoracic echo on May 15, 2023 showed normal LV function, grade 1 diastolic dysfunction, negative saline study and no valve disease.     She was admitted on May 2 with shortness of breath, cough and brown sputum proBNP of 1345.  Chest x-ray showed pneumonia . Then On May 14 she developed an acute right MCA stroke and was treated with tenecteplase.  She was discharged to acute inpatient rehab with a 2-week mobile telemetry which resulted in nonsustained ventricular tachycardia nonsustained supraventricular tachycardia..  She ultimately was discharged from acute inpatient rehab with a PICC line and continued on IV antibiotics which were completed 2023..     Ultimately had implantable loop recorder 2023.    She presents today for reassessment.  She denies chest pain or shortness of breath or edema or dizziness or palpitation.  She is not exercising but plans to start back now the summer and she can walk more outside.  She is currently smoking 8 to 10 cigarettes a day.  She previously smoked about 20 cigarettes in a day but she has no plans to further cut back or quit at this time.  No bleeding  "issues with her aspirin.  She has a follow-up EGD on Monday.        Allergies   Allergen Reactions    Hydrocodone Itching           Family and social history reviewed.     ROS  All other systems were reviewed and are negative          Objective:     Vitals:    04/24/25 1038   BP: 130/80   BP Location: Left arm   Patient Position: Sitting   Cuff Size: Adult   Pulse: 85   Weight: 64 kg (141 lb)   Height: 160 cm (63\")     Body mass index is 24.98 kg/m².    PHYSICAL EXAM:  Pulmonary:      Effort: Pulmonary effort is normal.      Breath sounds: Normal breath sounds.   Cardiovascular:      Normal rate. Regular rhythm.           ECG 12 Lead    Date/Time: 4/24/2025 10:55 AM  Performed by: Debora Harper APRN    Authorized by: Debora Harper APRN  Comparison: compared with previous ECG   Similar to previous ECG  Rhythm: sinus rhythm  Rate: normal  QRS axis: normal    Clinical impression: normal ECG            Current Outpatient Medications   Medication Sig Dispense Refill    acetaminophen (TYLENOL) 500 MG tablet Take 2 tablets by mouth Every 8 (Eight) Hours As Needed for Mild Pain. 60 tablet 0    aspirin 81 MG chewable tablet Chew 1 tablet Daily. 90 tablet 0    atorvastatin (LIPITOR) 80 MG tablet Take 1 tablet by mouth every night at bedtime. 90 tablet 1    Budesonide (ENTOCORT EC) 3 MG 24 hr capsule Take 3 capsules by mouth Every Morning. (Patient taking differently: Take 2 capsules by mouth Every Morning.) 90 capsule 2    Cholecalciferol (VITAMIN D) 1000 UNITS tablet Take 1 tablet by mouth Daily.      cyanocobalamin (VITAMIN B-12) 1000 MCG tablet Take 1 tablet by mouth Daily. 90 tablet 0    DULoxetine (CYMBALTA) 30 MG capsule TAKE 1 CAPSULE BY MOUTH EVERY DAY 90 capsule 3    furosemide (LASIX) 40 MG tablet Take 1 tablet by mouth Daily.      metoprolol tartrate (LOPRESSOR) 25 MG tablet Take 0.5 tablets by mouth 2 (Two) Times a Day. 1/2 pill BID 90 tablet 1    pantoprazole (PROTONIX) 40 MG EC tablet Take 1 tablet by mouth " Daily. 90 tablet 1    potassium chloride (Klor-Con M20) 20 MEQ CR tablet Take 1 tablet by mouth Daily. 90 tablet 1    vitamin C (ASCORBIC ACID) 500 MG tablet Take 1 tablet by mouth Daily. Indications: Inadequate Vitamin C       No current facility-administered medications for this visit.     Assessment:       Diagnosis Plan   1. Status post placement of implantable loop recorder  ECG 12 Lead      2. Paroxysmal SVT (supraventricular tachycardia)        3. Lower extremity edema  furosemide (LASIX) 40 MG tablet           Orders Placed This Encounter   Procedures    ECG 12 Lead     This order was created via procedure documentation     Release to patient:   Routine Release [[id number]]         Plan:       1.  Right MCA stroke May 2023 bilateral infarcts.   2-week mobile telemetry did not show significant arrhythmia.  She has a loop recorder which was implanted September 2023.  She completed dual antiplatelet therapy  -We continue to follow her device which was reconnected on 4/22/2025.  I check with our device clinic and there have been no events.   -Continue high intensity atorvastatin and aspirin.  2.  Hypertension-blood pressure appears well-controlled on current regimen continue the same  3.  Hyperlipidemia on high intensity atorvastatin.    4. Carotid artery stenosis-right with less than 50% stenosis.  On duplex May 2023 continue atorvastatin and aspirin  5.  Emphysema-denies shortness of breath  6. Nonsustained SVT-continue low-dose metoprolol tartrate and she denies palpitations  7. Nonsustained VT-asymptomatic  8.  Current tobacco smoker-no desire to cut back or quit.  Encouraged that she stop  9. Gastric ulcer - follows with GI and has an EGD scheduled for/28/25 with Dr. Tao Walker.  She may proceed from cardiac standpoint  10. Hiatal hernia   11.  Pause greater than 4 seconds versus noise on loop recorder May 2024.  Patient did not have any symptoms.  No dizziness or near syncope.  Continue to follow  device    Follow-up in 1 year            It has been a pleasure to participate in this patient's care.      Thank you,  MARGARITO Reyes      **I used Dragon to dictate this note:**

## 2025-04-27 DIAGNOSIS — R60.0 LOWER EXTREMITY EDEMA: ICD-10-CM

## 2025-04-28 RX ORDER — POTASSIUM CHLORIDE 1500 MG/1
20 TABLET, EXTENDED RELEASE ORAL DAILY
Qty: 90 TABLET | Refills: 1 | Status: SHIPPED | OUTPATIENT
Start: 2025-04-28

## 2025-04-30 RX ORDER — PANTOPRAZOLE SODIUM 40 MG/1
40 TABLET, DELAYED RELEASE ORAL DAILY
Qty: 90 TABLET | Refills: 1 | Status: SHIPPED | OUTPATIENT
Start: 2025-04-30

## 2025-05-05 RX ORDER — BUDESONIDE 3 MG/1
9 CAPSULE, COATED PELLETS ORAL EVERY MORNING
Qty: 270 CAPSULE | Refills: 0 | Status: SHIPPED | OUTPATIENT
Start: 2025-05-05

## 2025-05-09 RX ORDER — PANTOPRAZOLE SODIUM 40 MG/1
40 TABLET, DELAYED RELEASE ORAL DAILY
Qty: 90 TABLET | Refills: 1 | Status: SHIPPED | OUTPATIENT
Start: 2025-05-09

## 2025-05-09 NOTE — TELEPHONE ENCOUNTER
Hub staff attempted to follow warm transfer process and was unsuccessful     Caller: Katherine Williamson    Relationship to patient: Self    Best call back number: [phone]    Patient is needing: PRESCRIPTION WENT TO THE WRONG PHARMACY.  PLEASE RESEND THE PANTOPRAZOLE PRESCRIPTION TO:  [facility]/pharmacy #[id number] - [place], [place] - [address] - [phone]  - [phone]     ALSO, PT HAD HER BUDESONIDE REFILLED BUT ARETHA TOLD HER TO ONLY TAKE 2 PER DAY FOR 2 WEEKS AND THEN GO DOWN TO 1 PER DAY.   DOES SHE NEED TO CONTINUE TO TAKE THIS MEDICATION?  PLEASE CALL HER BACK TO ADVISE.

## 2025-05-12 ENCOUNTER — HOSPITAL ENCOUNTER (OUTPATIENT)
Facility: HOSPITAL | Age: 75
Discharge: HOME | End: 2025-05-12
Admitting: INTERNAL MEDICINE
Payer: MEDICARE

## 2025-05-12 DIAGNOSIS — R91.1 LUNG NODULE: ICD-10-CM

## 2025-05-12 PROCEDURE — 71250 CT THORAX DX C-: CPT

## 2025-06-05 ENCOUNTER — TRANSCRIBE ORDERS (OUTPATIENT)
Dept: ADMINISTRATIVE | Facility: HOSPITAL | Age: 75
End: 2025-06-05
Payer: MEDICARE

## 2025-06-05 DIAGNOSIS — F17.210 CIGARETTE SMOKER: Primary | ICD-10-CM

## 2025-06-18 LAB
MDC_IDC_PG_IMPLANT_DTM: NORMAL
MDC_IDC_PG_MFG: NORMAL
MDC_IDC_PG_MODEL: NORMAL
MDC_IDC_PG_SERIAL: NORMAL
MDC_IDC_PG_TYPE: NORMAL
MDC_IDC_SESS_DTM: NORMAL
MDC_IDC_SESS_TYPE: NORMAL

## 2025-08-05 ENCOUNTER — OFFICE VISIT (OUTPATIENT)
Dept: INTERNAL MEDICINE | Age: 75
End: 2025-08-05
Payer: MEDICARE

## 2025-08-05 VITALS
TEMPERATURE: 96.4 F | OXYGEN SATURATION: 95 % | WEIGHT: 144 LBS | SYSTOLIC BLOOD PRESSURE: 138 MMHG | HEIGHT: 63 IN | DIASTOLIC BLOOD PRESSURE: 80 MMHG | BODY MASS INDEX: 25.52 KG/M2 | HEART RATE: 75 BPM

## 2025-08-05 DIAGNOSIS — E53.8 B12 DEFICIENCY: ICD-10-CM

## 2025-08-05 DIAGNOSIS — E78.5 HYPERLIPIDEMIA, UNSPECIFIED HYPERLIPIDEMIA TYPE: ICD-10-CM

## 2025-08-05 DIAGNOSIS — I10 BENIGN ESSENTIAL HTN: ICD-10-CM

## 2025-08-05 DIAGNOSIS — K25.9 GASTRIC ULCER, UNSPECIFIED CHRONICITY, UNSPECIFIED WHETHER GASTRIC ULCER HEMORRHAGE OR PERFORATION PRESENT: Primary | ICD-10-CM

## 2025-08-05 DIAGNOSIS — R73.9 HYPERGLYCEMIA: ICD-10-CM

## 2025-08-05 DIAGNOSIS — E55.9 VITAMIN D DEFICIENCY: ICD-10-CM

## 2025-08-05 LAB
25(OH)D3+25(OH)D2 SERPL-MCNC: 37.6 NG/ML (ref 30–100)
ALBUMIN SERPL-MCNC: 4.5 G/DL (ref 3.5–5.2)
ALBUMIN/GLOB SERPL: 1.7 G/DL
ALP SERPL-CCNC: 178 U/L (ref 39–117)
ALT SERPL-CCNC: 25 U/L (ref 1–33)
AST SERPL-CCNC: 27 U/L (ref 1–32)
BASOPHILS # BLD AUTO: 0.04 10*3/MM3 (ref 0–0.2)
BASOPHILS NFR BLD AUTO: 0.6 % (ref 0–1.5)
BILIRUB SERPL-MCNC: 1 MG/DL (ref 0–1.2)
BUN SERPL-MCNC: 11 MG/DL (ref 8–23)
BUN/CREAT SERPL: 15.1 (ref 7–25)
CALCIUM SERPL-MCNC: 9.9 MG/DL (ref 8.6–10.5)
CHLORIDE SERPL-SCNC: 103 MMOL/L (ref 98–107)
CHOLEST SERPL-MCNC: 152 MG/DL (ref 0–200)
CHOLEST/HDLC SERPL: 1.52 {RATIO}
CO2 SERPL-SCNC: 28.8 MMOL/L (ref 22–29)
CREAT SERPL-MCNC: 0.73 MG/DL (ref 0.57–1)
EGFRCR SERPLBLD CKD-EPI 2021: 87.5 ML/MIN/1.73
EOSINOPHIL # BLD AUTO: 0.13 10*3/MM3 (ref 0–0.4)
EOSINOPHIL NFR BLD AUTO: 1.8 % (ref 0.3–6.2)
ERYTHROCYTE [DISTWIDTH] IN BLOOD BY AUTOMATED COUNT: 12.4 % (ref 12.3–15.4)
GLOBULIN SER CALC-MCNC: 2.6 GM/DL
GLUCOSE SERPL-MCNC: 104 MG/DL (ref 65–99)
HBA1C MFR BLD: 5.7 % (ref 4.8–5.6)
HCT VFR BLD AUTO: 45.9 % (ref 34–46.6)
HDLC SERPL-MCNC: 100 MG/DL (ref 40–60)
HGB BLD-MCNC: 15.6 G/DL (ref 12–15.9)
IMM GRANULOCYTES # BLD AUTO: 0.02 10*3/MM3 (ref 0–0.05)
IMM GRANULOCYTES NFR BLD AUTO: 0.3 % (ref 0–0.5)
LDLC SERPL CALC-MCNC: 39 MG/DL (ref 0–100)
LYMPHOCYTES # BLD AUTO: 2.1 10*3/MM3 (ref 0.7–3.1)
LYMPHOCYTES NFR BLD AUTO: 29.5 % (ref 19.6–45.3)
MCH RBC QN AUTO: 33.3 PG (ref 26.6–33)
MCHC RBC AUTO-ENTMCNC: 34 G/DL (ref 31.5–35.7)
MCV RBC AUTO: 97.9 FL (ref 79–97)
MONOCYTES # BLD AUTO: 0.38 10*3/MM3 (ref 0.1–0.9)
MONOCYTES NFR BLD AUTO: 5.3 % (ref 5–12)
NEUTROPHILS # BLD AUTO: 4.45 10*3/MM3 (ref 1.7–7)
NEUTROPHILS NFR BLD AUTO: 62.5 % (ref 42.7–76)
NRBC BLD AUTO-RTO: 0 /100 WBC (ref 0–0.2)
PLATELET # BLD AUTO: 181 10*3/MM3 (ref 140–450)
POTASSIUM SERPL-SCNC: 4.5 MMOL/L (ref 3.5–5.2)
PROT SERPL-MCNC: 7.1 G/DL (ref 6–8.5)
RBC # BLD AUTO: 4.69 10*6/MM3 (ref 3.77–5.28)
SODIUM SERPL-SCNC: 142 MMOL/L (ref 136–145)
T4 FREE SERPL-MCNC: 1.11 NG/DL (ref 0.92–1.68)
TRIGL SERPL-MCNC: 62 MG/DL (ref 0–150)
TSH SERPL DL<=0.005 MIU/L-ACNC: 1.31 UIU/ML (ref 0.27–4.2)
VIT B12 SERPL-MCNC: 1131 PG/ML (ref 211–946)
VLDLC SERPL CALC-MCNC: 13 MG/DL (ref 5–40)
WBC # BLD AUTO: 7.12 10*3/MM3 (ref 3.4–10.8)

## 2025-08-05 PROCEDURE — G2211 COMPLEX E/M VISIT ADD ON: HCPCS | Performed by: NURSE PRACTITIONER

## 2025-08-05 PROCEDURE — 1126F AMNT PAIN NOTED NONE PRSNT: CPT | Performed by: NURSE PRACTITIONER

## 2025-08-05 PROCEDURE — 99214 OFFICE O/P EST MOD 30 MIN: CPT | Performed by: NURSE PRACTITIONER

## 2025-08-05 PROCEDURE — 3075F SYST BP GE 130 - 139MM HG: CPT | Performed by: NURSE PRACTITIONER

## 2025-08-05 PROCEDURE — 3079F DIAST BP 80-89 MM HG: CPT | Performed by: NURSE PRACTITIONER

## 2025-08-05 RX ORDER — PANTOPRAZOLE SODIUM 40 MG/1
40 TABLET, DELAYED RELEASE ORAL DAILY
Qty: 90 TABLET | Refills: 1 | Status: CANCELLED | OUTPATIENT
Start: 2025-08-05

## 2025-08-05 RX ORDER — PANTOPRAZOLE SODIUM 40 MG/1
40 TABLET, DELAYED RELEASE ORAL DAILY
Qty: 90 TABLET | Refills: 1 | Status: SHIPPED | OUTPATIENT
Start: 2025-08-05

## 2025-08-18 RX ORDER — BUDESONIDE 3 MG/1
9 CAPSULE, COATED PELLETS ORAL EVERY MORNING
Qty: 270 CAPSULE | Refills: 0 | Status: SHIPPED | OUTPATIENT
Start: 2025-08-18

## 2025-08-22 LAB
MDC_IDC_PG_IMPLANT_DTM: NORMAL
MDC_IDC_PG_MFG: NORMAL
MDC_IDC_PG_MODEL: NORMAL
MDC_IDC_PG_SERIAL: NORMAL
MDC_IDC_PG_TYPE: NORMAL
MDC_IDC_SESS_DTM: NORMAL
MDC_IDC_SESS_TYPE: NORMAL

## (undated) DEVICE — ADAPT CLN BIOGUARD AIR/H2O DISP

## (undated) DEVICE — GOWN ,SIRUS,NONREINFORCED 3XL: Brand: MEDLINE

## (undated) DEVICE — SENSR O2 OXIMAX FNGR A/ 18IN NONSTR

## (undated) DEVICE — DRAPE,CHEST,FENES,15X10,STERIL: Brand: MEDLINE

## (undated) DEVICE — LOU LOOP RECORDER PACK: Brand: MEDLINE INDUSTRIES, INC.

## (undated) DEVICE — SINGLE-USE BIOPSY FORCEPS: Brand: RADIAL JAW 4

## (undated) DEVICE — CANN O2 ETCO2 FITS ALL CONN CO2 SMPL A/ 7IN DISP LF

## (undated) DEVICE — GOWN SURG AERO CHROME XL

## (undated) DEVICE — MSK ENDO PORT O2 POM ELITE CURAPLEX A/

## (undated) DEVICE — GOWN ISOL W/THUMB UNIV BLU BX/15

## (undated) DEVICE — KT ORCA ORCAPOD DISP STRL

## (undated) DEVICE — TUBING, SUCTION, 1/4" X 10', STRAIGHT: Brand: MEDLINE

## (undated) DEVICE — BLCK/BITE BLOX W/DENTL/RIM W/STRAP 54F

## (undated) DEVICE — THE SINGLE USE ETRAP – POLYP TRAP IS USED FOR SUCTION RETRIEVAL OF ENDOSCOPICALLY REMOVED POLYPS.: Brand: ETRAP

## (undated) DEVICE — FLEX ADVANTAGE 1500CC: Brand: FLEX ADVANTAGE

## (undated) DEVICE — ADAPT CLN SCPE ENDO PORPOISE BX/50 DISP

## (undated) DEVICE — SNAR POLYP SENSATION STDOVL 27 240 BX40

## (undated) DEVICE — VIAL FORMLN CAP 10PCT 20ML

## (undated) DEVICE — LN SMPL CO2 SHTRM SD STREAM W/M LUER

## (undated) DEVICE — Device